# Patient Record
Sex: MALE | Race: WHITE | Employment: OTHER | URBAN - METROPOLITAN AREA
[De-identification: names, ages, dates, MRNs, and addresses within clinical notes are randomized per-mention and may not be internally consistent; named-entity substitution may affect disease eponyms.]

---

## 2017-08-24 ENCOUNTER — APPOINTMENT (OUTPATIENT)
Dept: CT IMAGING | Age: 52
DRG: 660 | End: 2017-08-24
Attending: EMERGENCY MEDICINE
Payer: MEDICARE

## 2017-08-24 ENCOUNTER — ANESTHESIA EVENT (OUTPATIENT)
Dept: SURGERY | Age: 52
DRG: 660 | End: 2017-08-24
Payer: MEDICARE

## 2017-08-24 ENCOUNTER — APPOINTMENT (OUTPATIENT)
Dept: GENERAL RADIOLOGY | Age: 52
DRG: 660 | End: 2017-08-24
Attending: EMERGENCY MEDICINE
Payer: MEDICARE

## 2017-08-24 ENCOUNTER — HOSPITAL ENCOUNTER (INPATIENT)
Age: 52
LOS: 3 days | Discharge: HOME | DRG: 660 | End: 2017-08-27
Attending: EMERGENCY MEDICINE | Admitting: HOSPITALIST
Payer: MEDICARE

## 2017-08-24 DIAGNOSIS — N20.0 KIDNEY STONE ON LEFT SIDE: ICD-10-CM

## 2017-08-24 DIAGNOSIS — R73.9 HYPERGLYCEMIA: ICD-10-CM

## 2017-08-24 DIAGNOSIS — N23 RENAL COLIC ON LEFT SIDE: Primary | ICD-10-CM

## 2017-08-24 PROBLEM — M06.9 RA (RHEUMATOID ARTHRITIS) (HCC): Status: ACTIVE | Noted: 2017-08-24

## 2017-08-24 PROBLEM — N13.2 HYDRONEPHROSIS WITH URINARY OBSTRUCTION DUE TO URETERAL CALCULUS: Status: ACTIVE | Noted: 2017-08-24

## 2017-08-24 PROBLEM — G43.909 MIGRAINE: Status: ACTIVE | Noted: 2017-08-24

## 2017-08-24 PROBLEM — K56.7 ILEUS (HCC): Status: ACTIVE | Noted: 2017-08-24

## 2017-08-24 PROBLEM — E10.9 TYPE 1 DIABETES MELLITUS (HCC): Status: ACTIVE | Noted: 2017-08-24

## 2017-08-24 PROBLEM — F32.A DEPRESSION: Status: ACTIVE | Noted: 2017-08-24

## 2017-08-24 LAB
ALBUMIN SERPL-MCNC: 3.5 G/DL (ref 3.4–5)
ALBUMIN/GLOB SERPL: 0.9 {RATIO} (ref 0.8–1.7)
ALP SERPL-CCNC: 120 U/L (ref 45–117)
ALT SERPL-CCNC: 32 U/L (ref 16–61)
ANION GAP BLD CALC-SCNC: 19 MMOL/L (ref 10–20)
ANION GAP SERPL CALC-SCNC: 6 MMOL/L (ref 3–18)
APPEARANCE UR: CLEAR
AST SERPL-CCNC: 17 U/L (ref 15–37)
BASOPHILS # BLD: 0 K/UL (ref 0–0.06)
BASOPHILS NFR BLD: 0 % (ref 0–2)
BILIRUB SERPL-MCNC: 0.3 MG/DL (ref 0.2–1)
BILIRUB UR QL: NEGATIVE
BUN BLD-MCNC: 16 MG/DL (ref 7–18)
BUN SERPL-MCNC: 16 MG/DL (ref 7–18)
BUN/CREAT SERPL: 15 (ref 12–20)
CA-I BLD-MCNC: 1.2 MMOL/L (ref 1.12–1.32)
CALCIUM SERPL-MCNC: 9.1 MG/DL (ref 8.5–10.1)
CHLORIDE BLD-SCNC: 101 MMOL/L (ref 100–108)
CHLORIDE SERPL-SCNC: 104 MMOL/L (ref 100–108)
CK MB CFR SERPL CALC: 1.4 % (ref 0–4)
CK MB SERPL-MCNC: 1.2 NG/ML (ref 5–25)
CK SERPL-CCNC: 86 U/L (ref 39–308)
CO2 BLD-SCNC: 27 MMOL/L (ref 19–24)
CO2 SERPL-SCNC: 30 MMOL/L (ref 21–32)
COLOR UR: YELLOW
CREAT SERPL-MCNC: 1.09 MG/DL (ref 0.6–1.3)
CREAT UR-MCNC: 0.9 MG/DL (ref 0.6–1.3)
DIFFERENTIAL METHOD BLD: ABNORMAL
EOSINOPHIL # BLD: 0.3 K/UL (ref 0–0.4)
EOSINOPHIL NFR BLD: 3 % (ref 0–5)
ERYTHROCYTE [DISTWIDTH] IN BLOOD BY AUTOMATED COUNT: 13.5 % (ref 11.6–14.5)
EST. AVERAGE GLUCOSE BLD GHB EST-MCNC: 235 MG/DL
GLOBULIN SER CALC-MCNC: 3.9 G/DL (ref 2–4)
GLUCOSE BLD STRIP.AUTO-MCNC: 140 MG/DL (ref 70–110)
GLUCOSE BLD STRIP.AUTO-MCNC: 174 MG/DL (ref 70–110)
GLUCOSE BLD STRIP.AUTO-MCNC: 245 MG/DL (ref 74–106)
GLUCOSE SERPL-MCNC: 265 MG/DL (ref 74–99)
GLUCOSE UR STRIP.AUTO-MCNC: >1000 MG/DL
HBA1C MFR BLD: 9.8 % (ref 4.5–5.6)
HCT VFR BLD AUTO: 42.8 % (ref 36–48)
HCT VFR BLD CALC: 41 % (ref 36–49)
HGB BLD-MCNC: 13.9 G/DL (ref 12–16)
HGB BLD-MCNC: 14.2 G/DL (ref 13–16)
HGB UR QL STRIP: NEGATIVE
KETONES UR QL STRIP.AUTO: NEGATIVE MG/DL
LEUKOCYTE ESTERASE UR QL STRIP.AUTO: NEGATIVE
LIPASE SERPL-CCNC: 36 U/L (ref 73–393)
LYMPHOCYTES # BLD: 0.6 K/UL (ref 0.9–3.6)
LYMPHOCYTES NFR BLD: 7 % (ref 21–52)
MCH RBC QN AUTO: 29.7 PG (ref 24–34)
MCHC RBC AUTO-ENTMCNC: 33.2 G/DL (ref 31–37)
MCV RBC AUTO: 89.5 FL (ref 74–97)
MONOCYTES # BLD: 0.6 K/UL (ref 0.05–1.2)
MONOCYTES NFR BLD: 6 % (ref 3–10)
NEUTS SEG # BLD: 7.6 K/UL (ref 1.8–8)
NEUTS SEG NFR BLD: 84 % (ref 40–73)
NITRITE UR QL STRIP.AUTO: NEGATIVE
PH UR STRIP: 5 [PH] (ref 5–8)
PLATELET # BLD AUTO: 228 K/UL (ref 135–420)
PMV BLD AUTO: 10.7 FL (ref 9.2–11.8)
POTASSIUM BLD-SCNC: 4 MMOL/L (ref 3.5–5.5)
POTASSIUM SERPL-SCNC: 4.4 MMOL/L (ref 3.5–5.5)
PROT SERPL-MCNC: 7.4 G/DL (ref 6.4–8.2)
PROT UR STRIP-MCNC: NEGATIVE MG/DL
RBC # BLD AUTO: 4.78 M/UL (ref 4.7–5.5)
SODIUM BLD-SCNC: 142 MMOL/L (ref 136–145)
SODIUM SERPL-SCNC: 140 MMOL/L (ref 136–145)
SP GR UR REFRACTOMETRY: >1.03 (ref 1–1.03)
TROPONIN I SERPL-MCNC: <0.02 NG/ML (ref 0–0.06)
UROBILINOGEN UR QL STRIP.AUTO: 0.2 EU/DL (ref 0.2–1)
WBC # BLD AUTO: 9.2 K/UL (ref 4.6–13.2)

## 2017-08-24 PROCEDURE — 74011000258 HC RX REV CODE- 258: Performed by: EMERGENCY MEDICINE

## 2017-08-24 PROCEDURE — 93005 ELECTROCARDIOGRAM TRACING: CPT

## 2017-08-24 PROCEDURE — 81003 URINALYSIS AUTO W/O SCOPE: CPT | Performed by: EMERGENCY MEDICINE

## 2017-08-24 PROCEDURE — 83036 HEMOGLOBIN GLYCOSYLATED A1C: CPT | Performed by: HOSPITALIST

## 2017-08-24 PROCEDURE — 85025 COMPLETE CBC W/AUTO DIFF WBC: CPT | Performed by: EMERGENCY MEDICINE

## 2017-08-24 PROCEDURE — 65270000029 HC RM PRIVATE

## 2017-08-24 PROCEDURE — 80047 BASIC METABLC PNL IONIZED CA: CPT

## 2017-08-24 PROCEDURE — 74011636320 HC RX REV CODE- 636/320: Performed by: EMERGENCY MEDICINE

## 2017-08-24 PROCEDURE — 96376 TX/PRO/DX INJ SAME DRUG ADON: CPT

## 2017-08-24 PROCEDURE — 74011250636 HC RX REV CODE- 250/636: Performed by: EMERGENCY MEDICINE

## 2017-08-24 PROCEDURE — 71010 XR CHEST PORT: CPT

## 2017-08-24 PROCEDURE — 99285 EMERGENCY DEPT VISIT HI MDM: CPT

## 2017-08-24 PROCEDURE — 82962 GLUCOSE BLOOD TEST: CPT

## 2017-08-24 PROCEDURE — 82550 ASSAY OF CK (CPK): CPT | Performed by: EMERGENCY MEDICINE

## 2017-08-24 PROCEDURE — 96375 TX/PRO/DX INJ NEW DRUG ADDON: CPT

## 2017-08-24 PROCEDURE — 74000 XR ABD (KUB): CPT

## 2017-08-24 PROCEDURE — 74011250636 HC RX REV CODE- 250/636: Performed by: HOSPITALIST

## 2017-08-24 PROCEDURE — 96374 THER/PROPH/DIAG INJ IV PUSH: CPT

## 2017-08-24 PROCEDURE — 80053 COMPREHEN METABOLIC PANEL: CPT | Performed by: EMERGENCY MEDICINE

## 2017-08-24 PROCEDURE — 83690 ASSAY OF LIPASE: CPT | Performed by: EMERGENCY MEDICINE

## 2017-08-24 PROCEDURE — 74011250637 HC RX REV CODE- 250/637: Performed by: HOSPITALIST

## 2017-08-24 PROCEDURE — 96361 HYDRATE IV INFUSION ADD-ON: CPT

## 2017-08-24 PROCEDURE — 74011000258 HC RX REV CODE- 258: Performed by: UROLOGY

## 2017-08-24 PROCEDURE — 74177 CT ABD & PELVIS W/CONTRAST: CPT

## 2017-08-24 PROCEDURE — 77030027138 HC INCENT SPIROMETER -A

## 2017-08-24 RX ORDER — METOCLOPRAMIDE 10 MG/1
10 TABLET ORAL AS NEEDED
COMMUNITY

## 2017-08-24 RX ORDER — MAGNESIUM SULFATE 100 %
4 CRYSTALS MISCELLANEOUS AS NEEDED
Status: DISCONTINUED | OUTPATIENT
Start: 2017-08-24 | End: 2017-08-27 | Stop reason: HOSPADM

## 2017-08-24 RX ORDER — INSULIN LISPRO 100 [IU]/ML
INJECTION, SOLUTION INTRAVENOUS; SUBCUTANEOUS ONCE
Status: CANCELLED | OUTPATIENT
Start: 2017-08-24 | End: 2017-08-25

## 2017-08-24 RX ORDER — DEXTROSE 50 % IN WATER (D50W) INTRAVENOUS SYRINGE
25-50 AS NEEDED
Status: CANCELLED | OUTPATIENT
Start: 2017-08-24

## 2017-08-24 RX ORDER — INSULIN LISPRO 100 [IU]/ML
INJECTION, SOLUTION INTRAVENOUS; SUBCUTANEOUS
Status: DISCONTINUED | OUTPATIENT
Start: 2017-08-24 | End: 2017-08-27 | Stop reason: HOSPADM

## 2017-08-24 RX ORDER — TRIAZOLAM 0.12 MG/1
0.12 TABLET ORAL
Status: ON HOLD | COMMUNITY
End: 2017-08-26

## 2017-08-24 RX ORDER — HYDROMORPHONE HYDROCHLORIDE 1 MG/ML
1 INJECTION, SOLUTION INTRAMUSCULAR; INTRAVENOUS; SUBCUTANEOUS
Status: COMPLETED | OUTPATIENT
Start: 2017-08-24 | End: 2017-08-24

## 2017-08-24 RX ORDER — MORPHINE SULFATE 4 MG/ML
4 INJECTION, SOLUTION INTRAMUSCULAR; INTRAVENOUS ONCE
Status: COMPLETED | OUTPATIENT
Start: 2017-08-24 | End: 2017-08-24

## 2017-08-24 RX ORDER — LEVOFLOXACIN 500 MG/1
500 TABLET, FILM COATED ORAL EVERY 24 HOURS
Status: DISCONTINUED | OUTPATIENT
Start: 2017-08-25 | End: 2017-08-25

## 2017-08-24 RX ORDER — ENOXAPARIN SODIUM 100 MG/ML
40 INJECTION SUBCUTANEOUS EVERY 24 HOURS
Status: DISCONTINUED | OUTPATIENT
Start: 2017-08-24 | End: 2017-08-27 | Stop reason: HOSPADM

## 2017-08-24 RX ORDER — DEXTROSE 50 % IN WATER (D50W) INTRAVENOUS SYRINGE
25-50 AS NEEDED
Status: DISCONTINUED | OUTPATIENT
Start: 2017-08-24 | End: 2017-08-27 | Stop reason: HOSPADM

## 2017-08-24 RX ORDER — INSULIN GLARGINE 100 [IU]/ML
18 INJECTION, SOLUTION SUBCUTANEOUS DAILY
Status: DISCONTINUED | OUTPATIENT
Start: 2017-08-24 | End: 2017-08-25

## 2017-08-24 RX ORDER — LAMOTRIGINE 100 MG/1
100 TABLET ORAL DAILY
COMMUNITY
End: 2019-02-19

## 2017-08-24 RX ORDER — INSULIN ASPART 100 [IU]/ML
INJECTION, SOLUTION INTRAVENOUS; SUBCUTANEOUS
COMMUNITY

## 2017-08-24 RX ORDER — SODIUM CHLORIDE 450 MG/100ML
100 INJECTION, SOLUTION INTRAVENOUS CONTINUOUS
Status: DISCONTINUED | OUTPATIENT
Start: 2017-08-24 | End: 2017-08-27 | Stop reason: HOSPADM

## 2017-08-24 RX ORDER — ALPRAZOLAM 0.25 MG/1
0.25 TABLET ORAL
Status: DISCONTINUED | OUTPATIENT
Start: 2017-08-24 | End: 2017-08-27 | Stop reason: HOSPADM

## 2017-08-24 RX ORDER — SODIUM CHLORIDE 0.9 % (FLUSH) 0.9 %
5-10 SYRINGE (ML) INJECTION EVERY 8 HOURS
Status: DISCONTINUED | OUTPATIENT
Start: 2017-08-24 | End: 2017-08-27 | Stop reason: HOSPADM

## 2017-08-24 RX ORDER — ONDANSETRON 2 MG/ML
4 INJECTION INTRAMUSCULAR; INTRAVENOUS
Status: DISCONTINUED | OUTPATIENT
Start: 2017-08-24 | End: 2017-08-27 | Stop reason: HOSPADM

## 2017-08-24 RX ORDER — METOCLOPRAMIDE 10 MG/1
10 TABLET ORAL
Status: DISCONTINUED | OUTPATIENT
Start: 2017-08-24 | End: 2017-08-26

## 2017-08-24 RX ORDER — LAMOTRIGINE 100 MG/1
100 TABLET ORAL DAILY
Status: DISCONTINUED | OUTPATIENT
Start: 2017-08-25 | End: 2017-08-27 | Stop reason: HOSPADM

## 2017-08-24 RX ORDER — SODIUM CHLORIDE 0.9 % (FLUSH) 0.9 %
5-10 SYRINGE (ML) INJECTION AS NEEDED
Status: DISCONTINUED | OUTPATIENT
Start: 2017-08-24 | End: 2017-08-27 | Stop reason: HOSPADM

## 2017-08-24 RX ORDER — HYDROMORPHONE HYDROCHLORIDE 1 MG/ML
1 INJECTION, SOLUTION INTRAMUSCULAR; INTRAVENOUS; SUBCUTANEOUS
Status: DISPENSED | OUTPATIENT
Start: 2017-08-24 | End: 2017-08-25

## 2017-08-24 RX ORDER — HYDROMORPHONE HYDROCHLORIDE 2 MG/ML
1-2 INJECTION, SOLUTION INTRAMUSCULAR; INTRAVENOUS; SUBCUTANEOUS
Status: DISCONTINUED | OUTPATIENT
Start: 2017-08-24 | End: 2017-08-26

## 2017-08-24 RX ORDER — KETOROLAC TROMETHAMINE 30 MG/ML
30 INJECTION, SOLUTION INTRAMUSCULAR; INTRAVENOUS
Status: COMPLETED | OUTPATIENT
Start: 2017-08-24 | End: 2017-08-24

## 2017-08-24 RX ORDER — ONDANSETRON 2 MG/ML
4 INJECTION INTRAMUSCULAR; INTRAVENOUS ONCE
Status: DISPENSED | OUTPATIENT
Start: 2017-08-24 | End: 2017-08-24

## 2017-08-24 RX ORDER — VENLAFAXINE HYDROCHLORIDE 37.5 MG/1
37.5 CAPSULE, EXTENDED RELEASE ORAL
Status: DISCONTINUED | OUTPATIENT
Start: 2017-08-25 | End: 2017-08-26

## 2017-08-24 RX ADMIN — METOCLOPRAMIDE 10 MG: 10 TABLET ORAL at 22:19

## 2017-08-24 RX ADMIN — SODIUM CHLORIDE 1000 ML: 900 INJECTION, SOLUTION INTRAVENOUS at 16:59

## 2017-08-24 RX ADMIN — IOPAMIDOL 100 ML: 612 INJECTION, SOLUTION INTRAVENOUS at 12:12

## 2017-08-24 RX ADMIN — ONDANSETRON 4 MG: 2 INJECTION INTRAMUSCULAR; INTRAVENOUS at 21:43

## 2017-08-24 RX ADMIN — Medication 4 MG: at 11:18

## 2017-08-24 RX ADMIN — KETOROLAC TROMETHAMINE 30 MG: 30 INJECTION, SOLUTION INTRAMUSCULAR at 12:52

## 2017-08-24 RX ADMIN — PROMETHAZINE HYDROCHLORIDE 12.5 MG: 25 INJECTION, SOLUTION INTRAMUSCULAR; INTRAVENOUS at 12:55

## 2017-08-24 RX ADMIN — SODIUM CHLORIDE 1000 ML: 900 INJECTION, SOLUTION INTRAVENOUS at 11:18

## 2017-08-24 RX ADMIN — Medication 4 MG: at 12:50

## 2017-08-24 RX ADMIN — PANCRELIPASE 4 CAPSULE: 5000; 17000; 27000 CAPSULE, DELAYED RELEASE ORAL at 22:19

## 2017-08-24 RX ADMIN — Medication 10 ML: at 21:43

## 2017-08-24 RX ADMIN — HYDROMORPHONE HYDROCHLORIDE 2 MG: 2 INJECTION INTRAMUSCULAR; INTRAVENOUS; SUBCUTANEOUS at 18:09

## 2017-08-24 RX ADMIN — HYDROMORPHONE HYDROCHLORIDE 1 MG: 2 INJECTION INTRAMUSCULAR; INTRAVENOUS; SUBCUTANEOUS at 22:19

## 2017-08-24 RX ADMIN — SODIUM CHLORIDE 100 ML/HR: 4.5 INJECTION, SOLUTION INTRAVENOUS at 18:10

## 2017-08-24 RX ADMIN — HYDROMORPHONE HYDROCHLORIDE 1 MG: 1 INJECTION, SOLUTION INTRAMUSCULAR; INTRAVENOUS; SUBCUTANEOUS at 14:02

## 2017-08-24 NOTE — ED NOTES
Pt medicated per MAR. Pt continues to report pain, 8/10. IV phenergan infusing. Dr. Augustus Gabriel aware. Further order to be placed. Pt reports that he is not able to void at this time. Bladder scan performed with result of 277 mL.

## 2017-08-24 NOTE — IP AVS SNAPSHOT
Sara James 
 
 
 [address] [place], [place] [address] 
[phone] Patient: Deonte Landers MRN: [id number] AOV:[id number] You are allergic to the following Allergen Reactions Penicillins Hives Swelling Thimerosal Hives Swelling Recent Documentation Height Weight BMI Smoking Status 1.778 m 67.5 kg 21.35 kg/m2 Former Smoker Unresulted Labs Order Current Status STONE ANALYSIS, URINARY In process Emergency Contacts Name Discharge Info Relation Home Work Mobile Dylon Law [3] [phone] About your hospitalization You were admitted on:  August 24, 2017 You last received care in the:  [address] [place] [address] You were discharged on:  August 27, 2017 Unit phone number:  [phone] Why you were hospitalized Your primary diagnosis was:  Kidney Stone On Left Side Your diagnoses also included:  Hyperglycemia, Renal Colic On Left Side, Type 1 Diabetes Mellitus (Hcc), Hydronephrosis With Urinary Obstruction Due To Ureteral Calculus, Ileus (Hcc), Migraine, Depression, Ra (Rheumatoid Arthritis) ([facility]) Providers Seen During Your Hospitalizations Provider Role Specialty Primary office phone Ramila Singh MD Attending Provider Emergency Medicine [phone] Veronika Jhaveri MD Attending Provider Internal Medicine [phone] Your Primary Care Physician (PCP) Primary Care Physician Office Phone Office Fax Jael Simmons [phone] Follow-up Information Follow up With Details Comments Contact Info Yolanda Curran [facility] [address] 
[phone] Loco Brody MD In 3 days  [address] [facility] [address] 
[phone] Emma Grace MD In 1 week  [address] [facility] [address] 
[phone] Current Discharge Medication List  
  
START taking these medications Dose & Instructions Dispensing Information Comments Morning Noon Evening Bedtime HYDROcodone-acetaminophen 5-325 mg per tablet Commonly known as:  Tyra Iron Your last dose was: Your next dose is:    
   
   
 Dose:  1-2 Tab Take 1-2 Tabs by mouth every four (4) hours as needed. Max Daily Amount: 12 Tabs. Quantity:  10 Tab Refills:  0  
     
   
   
   
  
 levoFLOXacin 250 mg tablet Commonly known as:  Abrahan Herndon Your last dose was: Your next dose is:    
   
   
 Dose:  250 mg Take 1 Tab by mouth daily. Quantity:  4 Tab Refills:  0 CONTINUE these medications which have NOT CHANGED Dose & Instructions Dispensing Information Comments Morning Noon Evening Bedtime CREON 24,000-76,000 -120,000 unit capsule Generic drug:  amylase-lipase-protease Your last dose was: Your next dose is:    
   
   
 Dose:  1 Cap Take 1 Cap by mouth three (3) times daily (with meals). 1 tab with snacks , 2 tab with meals Refills:  0  
     
   
   
   
  
 EFFEXOR XR PO Your last dose was: Your next dose is:    
   
   
 Dose:  300 mg Take 300 mg by mouth once. Refills:  0 LaMICtal 100 mg tablet Generic drug:  lamoTRIgine Your last dose was: Your next dose is:    
   
   
 Dose:  100 mg Take 100 mg by mouth daily. Refills:  0  
     
   
   
   
  
 leflunomide 10 mg tablet Commonly known as:  Korina Garcia Your last dose was: Your next dose is:    
   
   
 Dose:  10 mg Take 10 mg by mouth daily. Refills:  0 LYRICA 150 mg capsule Generic drug:  pregabalin Your last dose was: Your next dose is:    
   
   
 Dose:  150 mg Take 150 mg by mouth three (3) times daily. Indications: Diabetic Peripheral Neuropathy Refills:  0 NovoLOG 100 unit/mL injection Generic drug:  insulin aspart Your last dose was: Your next dose is:    
   
   
 by Continuous Infusion route. Refills:  0 REGLAN 10 mg tablet Generic drug:  metoclopramide HCl Your last dose was: Your next dose is:    
   
   
 Dose:  10 mg Take 10 mg by mouth as needed. Refills:  0  
     
   
   
   
  
 RITUXAN 10 mg/mL injection Generic drug:  riTUXimab Your last dose was: Your next dose is:    
   
   
 by IntraVENous route once. Every 6 months Refills:  0  
     
   
   
   
  
 triazolam 0.25 mg tablet Commonly known as:  Dawit Nery Your last dose was: Your next dose is:    
   
   
 Dose:  0.25 mg Take 0.25 mg by mouth nightly as needed (midnight). Refills:  0  
     
   
   
   
  
 XANAX 0.5 mg tablet Generic drug:  ALPRAZolam  
   
Your last dose was: Your next dose is:    
   
   
 Dose:  0.5 mg Take 0.5 mg by mouth nightly as needed (5 pm). Refills:  0 ZOFRAN (AS HYDROCHLORIDE) 4 mg tablet Generic drug:  ondansetron hcl Your last dose was: Your next dose is:    
   
   
 Dose:  4 mg Take 4 mg by mouth every eight (8) hours as needed. Refills:  0 Where to Get Your Medications Information on where to get these meds will be given to you by the nurse or doctor. ! Ask your nurse or doctor about these medications HYDROcodone-acetaminophen 5-325 mg per tablet  
 levoFLOXacin 250 mg tablet Discharge Instructions Lidya Zepeda. Janneth Kaiser M.D. [address] Office: [phone] Fax:    [phone] PROCEDURE: Procedure(s): 
CYSTOSCOPY,LEFT RETRO PYELOGRAM,LEFT URETEROSCOPY WITH HOLIMIUM LASER FRAGMENTATION OF STONE AND STONE EXTRACTION,STENT PLACEMENT W/C-ARM Notify [facility] IMMEDIATELY if any of the following occur: ? You are unable to urinate. Urgency to urinate is not uncommon. ? You find yourself urinating small frequent amounts associated with severe lower abdominal discomfort. ? Bright red blood with clots in the urine. Some reddish urine is not uncommon and should be treated with increasing the amount of fluids you drink. ? Temperature above 101.5° and / or chills. ? You are nauseous and / or vomiting and you cannot hold down any fluids. ? Your pain is not controlled with the pain medication prescribed. Special Considerations:  
 
? Do not drive for at least 24 hours after the procedure and until you are no longer taking narcotic pain medication and you are able to move and react without hesitation. MEDICATIONS: 
Pain     Norco®     Percocet®   Dilaudid® Antibiotics     Cipro     Ceftin®     Levaquin     Bactrim DS® Urgency     Vesicare® Burning     Pyridium®      Marvene Cap Nausea     Zofran®       Phenergan® Miscellaneous Prescriptions Written on Chart Prescriptions sent Electronically My office will call pt on MONDAY to schedule your first follow-up appointment. Please contact [facility] at [phone] or go to the nearest Emergency Department / Urgent Care facility for any other medical questions or concerns. Discharge Orders None [facility] Announcement We are excited to announce that we are making your provider's discharge notes available to you in [employer]. You will see these notes when they are completed and signed by the physician that discharged you from your recent hospital stay. If you have any questions or concerns about any information you see in [facility], please call the Health Information Department where you were seen or reach out to your Primary Care Provider for more information about your plan of care. Introducing [facility]! Dear Elaine Wang: Thank you for requesting a [employer] account. Our records indicate that you already have an active [employer] account. You can access your account anytime at [email or web address] Did you know that you can access your hospital and ER discharge instructions at any time in [employer]? You can also review all of your test results from your hospital stay or ER visit. Additional Information If you have questions, please visit the Frequently Asked Questions section of the [employer] website at [email or web address] Remember, [employer] is NOT to be used for urgent needs. For medical emergencies, dial 911. Now available from your iPhone and Android! General Information Please provide this summary of care documentation to your next provider. Patient Signature:  ____________________________________________________________ Date:  ____________________________________________________________  
  
Richelle Samse Provider Signature:  ____________________________________________________________ Date:  ____________________________________________________________

## 2017-08-24 NOTE — CONSULTS
[facility]rology Consultation        Patient: Ten Davila MRN: [id number]  SSN: [id number]    YOB: 1965  Age: 46 y.o. Sex: male          Subjective:     Date of Consultation:  August 24, 2017    Referring Physician: Dr. Sabiha Moore    Reason for Consultation:  LEFT ureteral stone    History of Present Illness:   Patient is a 46 y.o.  male who is being seen for a 5.5mm stone in mid left ureter and a lower pole stone left kidney. He was admitted to the hospital for Renal colic on left side  Kidney stone on left side  Hyperglycemia. Pt denies any previous stones. He woke up this morning with severe LEFT flank pain and then had N/V. No fever. Denies hematuria, dysuria frequency urgency. CT scan performed revealed a mid ureteral stone and LEFT renal stone.   I reviewed these findings with the patient and treatment options and he wants     Past Medical History:   Diagnosis Date    Arthritis     BPH (benign prostatic hyperplasia)     Chronic pain     COPD     Depression     Diverticulosis     Gastritis     Gastroparesis     GERD (gastroesophageal reflux disease)     fair control with meds    Hiatal hernia     Hypercholesterolemia     Migraine     Other ill-defined conditions     chronic pancreatitis    Pancreatitis, chronic (HCC)     Psychiatric disorder     emetophobia    Rheumatoid arthritis ([facility] [address]     Vertigo, peripheral       Past Surgical History:   Procedure Laterality Date    ABDOMEN SURGERY PROC UNLISTED      whipple procedure    HX CERVICAL DISKECTOMY  2001    HX ORTHOPAEDIC      left foot surgery x 2    HX ORTHOPAEDIC      joint replacement  right index finger      Family History   Problem Relation Age of Onset    Malignant Hyperthermia Neg Hx     Pseudocholinesterase Deficiency Neg Hx     Delayed Awakening Neg Hx     Post-op Nausea/Vomiting Neg Hx     Emergence Delirium Neg Hx     Post-op Cognitive Dysfunction Neg Hx     Other Neg Hx       Social History Substance Use Topics    Smoking status: Former Smoker     Packs/day: 1.00     Years: 35.00    Smokeless tobacco: Not on file    Alcohol use No     Allergies   Allergen Reactions    Penicillins Hives and Swelling    Thimerosal Hives and Swelling      Prior to Admission medications    Medication Sig Start Date End Date Taking? Authorizing Provider   insulin aspart (NOVOLOG) 100 unit/mL injection by Continuous Infusion route. Yes Jose Live MD   triazolam (HALCION) 0.125 mg tablet Take 0.125 mg by mouth nightly as needed. Yes Jose Live MD   metoclopramide HCl (REGLAN) 10 mg tablet Take 10 mg by mouth Before breakfast, lunch, dinner and at bedtime. Yes Jose Live MD   lamoTRIgine (LAMICTAL) 100 mg tablet Take 100 mg by mouth daily. Yes Jose Live MD   riTUXimab (RITUXAN) 10 mg/mL injection by IntraVENous route once. Every 6 months   Yes Jose Live MD   ZOLPIDEM TARTRATE (AMBIEN PO) Take  by mouth. Jose Live MD   VENLAFAXINE HCL (EFFEXOR XR PO) Take  by mouth. Jose Live MD   ALPRAZolam Huson Leader) 0.5 mg tablet Take  by mouth three (3) times daily as needed. Historical Provider   amylase-lipase-protease (CREON) capsule Take 1 Cap by mouth three (3) times daily (with meals). 1 tab with snacks , 2 tab with meals     Historical Provider   ondansetron hcl (ZOFRAN) 4 mg tablet Take 4 mg by mouth every eight (8) hours as needed. Historical Provider         REVIEW OF SYSTEMS  System Details   Constitutional Chills and fever. ENMT Ear infections and sore throat. Eyes Blurred vision, double vision and eye pain. Respiratory Asthma, chronic cough, dyspnea and wheezing. Cardio Chest pain. GI Constipation. GI Decreased appetite, diarrhea, nausea and vomiting.  Incomplete emptying, Intermittent urinary stream, Slow stream, Urinary straining.     Dysuria, hematuria, pelvic pain, pelvic pressure, pressure, suprapubic pain, urgency, urinary dribbling, urinary frequency and urinary incontinence. Endocrine Cold intolerance, heat intolerance, increased thirst and weight loss. Neuro Headache and tremors. Psych Anxiety and depression. Integumentary Itching skin and rash. MS Back pain and joint pain. Hema/Lymph Easy bleeding. Objective:     Patient Vitals for the past 8 hrs:   BP Temp Pulse Resp SpO2 Height Weight   17 1400 129/74 - 77 15 98 % - -   17 1345 132/73 - 75 15 98 % - -   17 1330 131/76 - 70 16 96 % - -   17 1315 138/79 - 65 19 97 % - -   17 1300 138/76 - 67 26 100 % - -   17 1245 129/66 - 77 20 100 % - -   17 1034 121/70 97.5 °F (36.4 °C) 79 16 99 % 5' 10\" (1.778 m) 65.8 kg (145 lb)     Temp (24hrs), Av.5 °F (36.4 °C), Min:97.5 °F (36.4 °C), Max:97.5 °F (36.4 °C)      Intake and Output:        Physical Exam:  Constitutional Well developed. Neck Exam Inspection - Normal. Palpation - Normal. Thyroid gland - Normal.   Respiratory Inspection - No labored breathing   Abdomen No abdominal tenderness. No hepatic enlargement. No splenic enlargement. No hernia. Genitourinary No Suprapubic tenderness. No CVA tenderness. No Flank mass   Extremity No Edema. Neurological Level of consciousness - Normal. Orientation - Normal.   Psychiatric Oriented to time, place, person and situation. Appropriate mood and affect.      Lab/Data Reviewed:  BMP:   Lab Results   Component Value Date/Time     2017 11:00 AM    K 4.4 2017 11:00 AM     2017 11:00 AM    CO2 30 2017 11:00 AM    AGAP 6 2017 11:00 AM     (H) 2017 11:00 AM    BUN 16 2017 11:00 AM    CREA 1.09 2017 11:00 AM    GFRAA >60 2017 11:00 AM    GFRNA >60 2017 11:00 AM     CBC:   Lab Results   Component Value Date/Time    WBC 9.2 2017 11:00 AM    HGB 14.2 2017 11:00 AM    HCT 42.8 2017 11:00 AM     2017 11:00 AM       Assessment:     Active Problems:    Hyperglycemia (4/58/6480)      Renal colic on left side (6/86/8445)      Kidney stone on left side (8/24/2017)        Impression:  46 yr old male with multiple medical problems and a left 5.5mm ureteral stone and renal stone. He is scheduled for LEFT ureteroscopic stone extraction tomorrow. All risks including pain infection bleeding ureteral injury and need for stent reviewed and they agreed    Plan:     1. NPO after MN for OR tomorrow  2. IV fluids'  3. IV pain medications  4.  Strain all urine    Signed By: Yoli Chang MD                         August 24, 2017

## 2017-08-24 NOTE — ROUTINE PROCESS
TRANSFER - OUT REPORT:    Bedside and verbal report given to To Aguirre RN on Gina Alfaro  being transferred to [facility] for routine progression of care       Report consisted of patients Situation, Background, Assessment and   Recommendations(SBAR). Information from the following report(s) SBAR, ED Summary, [facility] and Recent Results was reviewed with the receiving nurse. Lines:   Peripheral IV 08/24/17 Right Antecubital (Active)   Site Assessment Clean, dry, & intact 8/24/2017 11:00 AM   Phlebitis Assessment 0 8/24/2017 11:00 AM   Infiltration Assessment 0 8/24/2017 11:00 AM   Dressing Status Clean, dry, & intact 8/24/2017 11:00 AM   Dressing Type Tape;Transparent 8/24/2017 11:00 AM   Hub Color/Line Status Pink;Capped;Flushed 8/24/2017 11:00 AM   Action Taken Blood drawn 8/24/2017 11:00 AM   Alcohol Cap Used Yes 8/24/2017 11:00 AM        Opportunity for questions and clarification was provided.       Patient transported with:   [employer]

## 2017-08-24 NOTE — ED TRIAGE NOTES
Pt states woke up this am with left side abd pain radiating to left groin area;  Vomiting several times this morning (10);

## 2017-08-24 NOTE — H&P
History & Physical    Patient: Anuel Gonzalez MRN: [id number]  CSN: [id number]    YOB: 1965  Age: 46 y.o. Sex: male      DOA: 8/24/2017  Primary Care Provider:  Willis Lou MD      Assessment/Plan     Hospital Problems  Date Reviewed: 11/9/2012          Codes Class Noted POA    Hyperglycemia ICD-10-CM: R73.9  ICD-9-CM: 790.29  8/24/2017 Unknown        Renal colic on left side [id number]-[id number]: N23  ICD-9-CM: 788.0  8/24/2017 Unknown        * (Principal)Kidney stone on left side ICD-10-CM: N20.0  ICD-9-CM: 592.0  8/24/2017 Unknown        Type 1 diabetes mellitus ([facility] ICD-10-CM: E10.9  ICD-9-CM: 250.01  8/24/2017 Unknown        Hydronephrosis with urinary obstruction due to ureteral calculus ICD-10-CM: N13.2  ICD-9-CM: 592.1, 661  8/24/2017 Unknown        Ileus ([facility] ICD-10-CM: K56.7  ICD-9-CM: 560.1  8/24/2017 Unknown        Migraine ICD-10-CM: Z03.673  ICD-9-CM: 346.90  8/24/2017 Unknown        Depression ICD-10-CM: F32.9  ICD-9-CM: 566  8/24/2017 Unknown        RA (rheumatoid arthritis) ([facility] ICD-10-CM: M06.9  ICD-9-CM: 714.0  8/24/2017 Unknown                Admit to medical   1.  hydronephrosis left with urethra   Urology on board, will have procedure tomorrow   Npo after midnight   2. Renal stone   Pain control, hydration   3. Ileus   Clear diet,  potentially relating to left hydroureter or obstruction. 4. DM type I  Acquired after whipple procedure in 2014    Hold insulin pump due to the procedure  Use 0.925 U/hr basal, will give insulin 18 units, ssi   5. Lung nodule   6 mm, need repeat ct chest in 6-12 month    6. Migraine and depression   Continue home medication      full code     DVT : lovenox. ppi proph  CC: left val pain        HPI:     Aunel Gonzalez is a 46 y.o. male who hx of depression/whipple procedure 2014 -type I DM came to ed due to left flank pain while waking up today. It is sharp and radiated to LLQ, associated with n/v.  Denies any slurred speech/headache/cp/blurred vission/d/c/palpitation/gait change/bleeding. Denies smoking/ any alcohol or drug use. Ct abdomen : Left ureteral obstructing 5 mm calculus, positioned above the left iliac crossing result mild to moderate left-sided hydronephrosis, nephroureteral  induration and a small amount of perinephric fluid. He was given iv dilaudid in ed and urology was called. Urology wants to patient to be admitted to medical team due to DM.        Visit Vitals    /75 (BP 1 Location: Left arm, BP Patient Position: At rest)    Pulse 76    Temp 98.4 °F (36.9 °C)    Resp 19    Ht 5' 10\" (1.778 m)    Wt 65.8 kg (145 lb)    SpO2 100%    BMI 20.81 kg/m2      O2 Device: Room air      Past Medical History:   Diagnosis Date    Arthritis     BPH (benign prostatic hyperplasia)     Chronic pain     COPD     Depression     Diverticulosis     Gastritis     Gastroparesis     GERD (gastroesophageal reflux disease)     fair control with meds    Hiatal hernia     Hypercholesterolemia     Migraine     Other ill-defined conditions     chronic pancreatitis    Pancreatitis, chronic (HCC)     Psychiatric disorder     emetophobia    RA (rheumatoid arthritis) ([facility] 8/24/2017    Rheumatoid arthritis (HCC)     Type 1 diabetes mellitus ([facility] 8/24/2017    Vertigo, peripheral        Past Surgical History:   Procedure Laterality Date    ABDOMEN SURGERY PROC UNLISTED      whipple procedure    HX CERVICAL DISKECTOMY  2001    HX ORTHOPAEDIC      left foot surgery x 2    HX ORTHOPAEDIC      joint replacement  right index finger       Family History   Problem Relation Age of Onset    Malignant Hyperthermia Neg Hx     Pseudocholinesterase Deficiency Neg Hx     Delayed Awakening Neg Hx     Post-op Nausea/Vomiting Neg Hx     Emergence Delirium Neg Hx     Post-op Cognitive Dysfunction Neg Hx     Other Neg Hx        Social History     Social History    Marital status:      Spouse name: N/A    Number of children: N/A    Years of education: N/A     Social History Main Topics    Smoking status: Former Smoker     Packs/day: 1.00     Years: 35.00    Smokeless tobacco: None    Alcohol use No    Drug use: No    Sexual activity: Not Asked     Other Topics Concern    None     Social History Narrative       Prior to Admission medications    Medication Sig Start Date End Date Taking? Authorizing Provider   insulin aspart (NOVOLOG) 100 unit/mL injection by Continuous Infusion route. Yes Jose Live MD   triazolam (HALCION) 0.125 mg tablet Take 0.125 mg by mouth nightly as needed. Yes Jose Live MD   metoclopramide HCl (REGLAN) 10 mg tablet Take 10 mg by mouth Before breakfast, lunch, dinner and at bedtime. Yes Jose Live MD   lamoTRIgine (LAMICTAL) 100 mg tablet Take 100 mg by mouth daily. Yes Jose Live MD   riTUXimab (RITUXAN) 10 mg/mL injection by IntraVENous route once. Every 6 months   Yes Jose Live MD   ZOLPIDEM TARTRATE (AMBIEN PO) Take  by mouth. Jose Lvie MD   VENLAFAXINE HCL (EFFEXOR XR PO) Take  by mouth. Jose Live MD   ALPRAZolam Margorie Clunes) 0.5 mg tablet Take  by mouth three (3) times daily as needed. Historical Provider   amylase-lipase-protease (CREON) capsule Take 1 Cap by mouth three (3) times daily (with meals). 1 tab with snacks , 2 tab with meals     Historical Provider   ondansetron hcl (ZOFRAN) 4 mg tablet Take 4 mg by mouth every eight (8) hours as needed. Historical Provider       Allergies   Allergen Reactions    Penicillins Hives and Swelling    Thimerosal Hives and Swelling       Review of Systems  Gen: No fever, chills, malaise, weight loss/gain. Heent: No headache, rhinorrhea, epistaxis, ear pain, hearing loss, sinus pain, neck pain/stiffness, sore throat. Heart: No chest pain, palpitations, CUMMINGS, pnd, or orthopnea. Resp: No cough, hemoptysis, wheezing and shortness of breath. GI: nausea, vomiting, no diarrhea, constipation, melena or hematochezia.  Left abdomen pain   : left flank pain   Derm: No rash, new skin lesion or pruritis. Musc/skeletal: no bone or joint complains. Vasc: No edema, cyanosis or claudication. Endo: No heat/cold intolerance, no polyuria,polydipsia or polyphagia. Neuro: No unilateral weakness, numbness, tingling. No seizures. Heme: No easy bruising or bleeding. Physical Exam:     Physical Exam:  Visit Vitals    /75 (BP 1 Location: Left arm, BP Patient Position: At rest)    Pulse 76    Temp 98.4 °F (36.9 °C)    Resp 19    Ht 5' 10\" (1.778 m)    Wt 65.8 kg (145 lb)    SpO2 100%    BMI 20.81 kg/m2      O2 Device: Room air    Temp (24hrs), Av °F (36.7 °C), Min:97.5 °F (36.4 °C), Max:98.4 °F (36.9 °C)             General:  Awake, cooperative, no distress. Head:  Normocephalic, without obvious abnormality, atraumatic. Eyes:  Conjunctivae/corneas clear, sclera anicteric, PERRL, EOMs intact. Nose: Nares normal. No drainage or sinus tenderness. Throat: Lips, mucosa, and tongue normal. .   Neck: Supple, symmetrical, trachea midline, no adenopathy. Lungs:   Clear to auscultation bilaterally. Heart:  Regular rate and rhythm, S1, S2 normal, no murmur, click, rub or gallop. Abdomen: Soft, non-tender. Bowel sounds normal. No masses,  No organomegaly. Hernia noted, no tenderness, left cva tenderness    Extremities: Extremities normal, atraumatic, no cyanosis or edema. Pulses: 2+ and symmetric all extremities. Skin: Skin color-pink, texture, turgor normal. No rashes or lesions. Capillary refill normal    Neurologic: CNII-XII intact. No focal motor or sensory deficit.        Labs Reviewed:    BMP:   Lab Results   Component Value Date/Time     2017 11:00 AM    K 4.4 2017 11:00 AM     2017 11:00 AM    CO2 30 2017 11:00 AM    AGAP 6 2017 11:00 AM     (H) 2017 11:00 AM    BUN 16 2017 11:00 AM    CREA 1.09 2017 11:00 AM    GFRAA >60 2017 11:00 AM    GFRNA >60 08/24/2017 11:00 AM     CMP:   Lab Results   Component Value Date/Time     08/24/2017 11:00 AM    K 4.4 08/24/2017 11:00 AM     08/24/2017 11:00 AM    CO2 30 08/24/2017 11:00 AM    AGAP 6 08/24/2017 11:00 AM     (H) 08/24/2017 11:00 AM    BUN 16 08/24/2017 11:00 AM    CREA 1.09 08/24/2017 11:00 AM    GFRAA >60 08/24/2017 11:00 AM    GFRNA >60 08/24/2017 11:00 AM    CA 9.1 08/24/2017 11:00 AM    ALB 3.5 08/24/2017 11:00 AM    TP 7.4 08/24/2017 11:00 AM    GLOB 3.9 08/24/2017 11:00 AM    AGRAT 0.9 08/24/2017 11:00 AM    SGOT 17 08/24/2017 11:00 AM    ALT 32 08/24/2017 11:00 AM     CBC:   Lab Results   Component Value Date/Time    WBC 9.2 08/24/2017 11:00 AM    HGB 14.2 08/24/2017 11:00 AM    HCT 42.8 08/24/2017 11:00 AM     08/24/2017 11:00 AM     All Cardiac Markers in the last 24 hours:   Lab Results   Component Value Date/Time    CPK 86 08/24/2017 11:00 AM    CKMB 1.2 08/24/2017 11:00 AM    CKND1 1.4 08/24/2017 11:00 AM    TROIQ <0.02 08/24/2017 11:00 AM     Recent Glucose Results:   Lab Results   Component Value Date/Time     (H) 08/24/2017 11:00 AM     ABG: No results found for: PH, PHI, PCO2, PCO2I, PO2, PO2I, HCO3, HCO3I, FIO2, FIO2I  COAGS: No results found for: APTT, PTP, INR  Liver Panel:   Lab Results   Component Value Date/Time    ALB 3.5 08/24/2017 11:00 AM    TP 7.4 08/24/2017 11:00 AM    GLOB 3.9 08/24/2017 11:00 AM    AGRAT 0.9 08/24/2017 11:00 AM    SGOT 17 08/24/2017 11:00 AM    ALT 32 08/24/2017 11:00 AM     (H) 08/24/2017 11:00 AM     Pancreatic Markers:   Lab Results   Component Value Date/Time    LPSE 36 (L) 08/24/2017 11:00 AM       Procedures/imaging: see electronic medical records for all procedures/Xrays and details which were not copied into this note but were reviewed prior to creation of Johann Young MD, Internal Medicine     CC: Candice Soto MD

## 2017-08-24 NOTE — ED PROVIDER NOTES
[address]  EMERGENCY DEPARTMENT HISTORY AND PHYSICAL EXAM       Date: 8/24/2017   Patient Name: Betina Hawkins   YOB: 1965  Medical Record Number: [id number]    History of Presenting Illness     Chief Complaint   Patient presents with    Groin Pain    Vomiting        History Provided By:  patient    Additional History: 10:48 AM  Betina Hawkins is a 46 y.o. male with a PMHx of IDDM who presents to the emergency department C/O 6/10 left flank and LLQ pain which he woke up with this morning. Associated symptoms include nausea, vomiting (10 times). Hx of Whipple surgery at [facility] (2014). Patient denies urgency, fever, testicle pain, dizziness, CP, SOB and any other symptoms or complaints. Pt is followed by Dr. Leti José.     Primary Care Provider: Uma Haddad MD   Specialist:    Past History     Past Medical History:   Past Medical History:   Diagnosis Date    Arthritis     BPH (benign prostatic hyperplasia)     Chronic pain     COPD     Depression     Diverticulosis     Gastritis     Gastroparesis     GERD (gastroesophageal reflux disease)     fair control with meds    Hiatal hernia     Hypercholesterolemia     Migraine     Other ill-defined conditions     chronic pancreatitis    Pancreatitis, chronic (HCC)     Psychiatric disorder     emetophobia    Rheumatoid arthritis ([address]     Vertigo, peripheral         Past Surgical History:   Past Surgical History:   Procedure Laterality Date    ABDOMEN SURGERY PROC UNLISTED      whipple procedure    HX CERVICAL DISKECTOMY  2001    HX ORTHOPAEDIC      left foot surgery x 2    HX ORTHOPAEDIC      joint replacement  right index finger        Family History:   Family History   Problem Relation Age of Onset    Malignant Hyperthermia Neg Hx     Pseudocholinesterase Deficiency Neg Hx     Delayed Awakening Neg Hx     Post-op Nausea/Vomiting Neg Hx     Emergence Delirium Neg Hx     Post-op Cognitive Dysfunction Neg Hx     Other Neg Hx         Social History:   Social History   Substance Use Topics    Smoking status: Former Smoker     Packs/day: 1.00     Years: 35.00    Smokeless tobacco: None    Alcohol use No        Allergies: Allergies   Allergen Reactions    Penicillins Hives and Swelling    Thimerosal Hives and Swelling        Review of Systems   Review of Systems   Constitutional: Negative for fever. Respiratory: Negative for shortness of breath. Cardiovascular: Negative for chest pain. Gastrointestinal: Positive for abdominal pain (Llq), nausea and vomiting. Negative for diarrhea. Genitourinary: Positive for flank pain (left). Negative for dysuria, testicular pain and urgency. Musculoskeletal: Negative for back pain and neck pain. Neurological: Negative for dizziness. All other systems reviewed and are negative. Physical Exam  Vitals:    08/24/17 1315 08/24/17 1330 08/24/17 1345 08/24/17 1400   BP: 138/79 131/76 132/73 129/74   Pulse: 65 70 75 77   Resp: 19 16 15 15   Temp:       SpO2: 97% 96% 98% 98%   Weight:       Height:           Physical Exam   Constitutional: He is oriented to person, place, and time. He appears well-developed and well-nourished. He appears distressed. Active retching   HENT:   Head: Normocephalic and atraumatic. Right Ear: External ear normal.   Left Ear: External ear normal.   Nose: Nose normal.   OP dry   Eyes: Conjunctivae and EOM are normal. Pupils are equal, round, and reactive to light. Neck: Normal range of motion. Neck supple. No JVD present. No tracheal deviation present. No thyromegaly present. Cardiovascular: Normal rate, regular rhythm, normal heart sounds and intact distal pulses. Exam reveals no gallop and no friction rub. No murmur heard. Pulmonary/Chest: Effort normal and breath sounds normal. No respiratory distress. He has no wheezes. He has no rales. Abdominal: Soft.  Bowel sounds are normal. He exhibits no distension and no mass. There is tenderness (left flank) in the left lower quadrant. There is no rebound and no guarding. Musculoskeletal: Normal range of motion. Right lower leg: He exhibits no edema. Left lower leg: He exhibits no edema. Neurological: He is alert and oriented to person, place, and time. He has normal reflexes. No cranial nerve deficit. Skin: Skin is warm and dry. No rash noted. Psychiatric: He has a normal mood and affect. His behavior is normal.   Nursing note and vitals reviewed. Diagnostic Study Results     Labs -      Recent Results (from the past 12 hour(s))   CBC WITH AUTOMATED DIFF    Collection Time: 08/24/17 11:00 AM   Result Value Ref Range    WBC 9.2 4.6 - 13.2 K/uL    RBC 4.78 4.70 - 5.50 M/uL    HGB 14.2 13.0 - 16.0 g/dL    HCT 42.8 36.0 - 48.0 %    MCV 89.5 74.0 - 97.0 FL    MCH 29.7 24.0 - 34.0 PG    MCHC 33.2 31.0 - 37.0 g/dL    RDW 13.5 11.6 - 14.5 %    PLATELET 400 103 - 900 K/uL    MPV 10.7 9.2 - 11.8 FL    NEUTROPHILS 84 (H) 40 - 73 %    LYMPHOCYTES 7 (L) 21 - 52 %    MONOCYTES 6 3 - 10 %    EOSINOPHILS 3 0 - 5 %    BASOPHILS 0 0 - 2 %    ABS. NEUTROPHILS 7.6 1.8 - 8.0 K/UL    ABS. LYMPHOCYTES 0.6 (L) 0.9 - 3.6 K/UL    ABS. MONOCYTES 0.6 0.05 - 1.2 K/UL    ABS. EOSINOPHILS 0.3 0.0 - 0.4 K/UL    ABS. BASOPHILS 0.0 0.0 - 0.06 K/UL    DF AUTOMATED     METABOLIC PANEL, COMPREHENSIVE    Collection Time: 08/24/17 11:00 AM   Result Value Ref Range    Sodium 140 136 - 145 mmol/L    Potassium 4.4 3.5 - 5.5 mmol/L    Chloride 104 100 - 108 mmol/L    CO2 30 21 - 32 mmol/L    Anion gap 6 3.0 - 18 mmol/L    Glucose 265 (H) 74 - 99 mg/dL    BUN 16 7.0 - 18 MG/DL    Creatinine 1.09 0.6 - 1.3 MG/DL    BUN/Creatinine ratio 15 12 - 20      GFR est AA >60 >60 ml/min/1.73m2    GFR est non-AA >60 >60 ml/min/1.73m2    Calcium 9.1 8.5 - 10.1 MG/DL    Bilirubin, total 0.3 0.2 - 1.0 MG/DL    ALT (SGPT) 32 16 - 61 U/L    AST (SGOT) 17 15 - 37 U/L    Alk.  phosphatase 120 (H) 45 - 117 U/L Protein, total 7.4 6.4 - 8.2 g/dL    Albumin 3.5 3.4 - 5.0 g/dL    Globulin 3.9 2.0 - 4.0 g/dL    A-G Ratio 0.9 0.8 - 1.7     CARDIAC PANEL,(CK, CKMB & TROPONIN)    Collection Time: 08/24/17 11:00 AM   Result Value Ref Range    CK 86 39 - 308 U/L    CK - MB 1.2 <3.6 ng/ml    CK-MB Index 1.4 0.0 - 4.0 %    Troponin-I, Qt. <0.02 0.00 - 0.06 NG/ML   LIPASE    Collection Time: 08/24/17 11:00 AM   Result Value Ref Range    Lipase 36 (L) 73 - 393 U/L   EKG, 12 LEAD, INITIAL    Collection Time: 08/24/17 11:32 AM   Result Value Ref Range    Ventricular Rate 66 BPM    Atrial Rate 66 BPM    P-R Interval 202 ms    QRS Duration 106 ms    Q-T Interval 404 ms    QTC Calculation (Bezet) 423 ms    Calculated P Axis 42 degrees    Calculated R Axis 27 degrees    Calculated T Axis 62 degrees    Diagnosis       Normal sinus rhythm  RSR' or QR pattern in V1 suggests right ventricular conduction delay  Septal infarct , age undetermined  Abnormal ECG  When compared with ECG of 31-OCT-2012 10:39,  Septal infarct is now present     POC CHEM8    Collection Time: 08/24/17 11:42 AM   Result Value Ref Range    CO2, POC 27 (H) 19 - 24 MMOL/L    Glucose,  (H) 74 - 106 MG/DL    BUN, POC 16 7 - 18 MG/DL    Creatinine, POC 0.9 0.6 - 1.3 MG/DL    GFRAA, POC >60 >60 ml/min/1.73m2    GFRNA, POC >60 >60 ml/min/1.73m2    Sodium,  136 - 145 MMOL/L    Potassium, POC 4.0 3.5 - 5.5 MMOL/L    Calcium, ionized (POC) 1.20 1. 12 - 1.32 MMOL/L    Chloride,  100 - 108 MMOL/L    Anion gap, POC 19 10 - 20      Hematocrit, POC 41 36 - 49 %    Hemoglobin, POC 13.9 12 - 16 G/DL       Radiologic Studies -  The following have been ordered and reviewed:  CT ABD PELV W CONT   Final Result   IMPRESSION:        1. Left ureteral obstructing 5 mm calculus, positioned above the left iliac  crossing result mild to moderate left-sided hydronephrosis, nephroureteral  induration and a small amount of perinephric fluid.        2.  CT findings that are suggestive of small bowel ileus, potentially relating to  left hydroureter or obstruction.     3. Nonobstructive left renal calculus.     4. Lateral right lower lung subpleural 6 mm pulmonary nodule, please see  recommended follow-up as directed below.     5. CT findings suggestive of of chronic pancreatitis.     6. Prior cholecystectomy with pneumobilia, likely related to sphincterotomy. Recommend clinical correlation with patient's surgical history. As read by the radiologist.     XR CHEST PORT   Final Result   IMPRESSION:     1. Minimal left diaphragmatic opacity favoring atelectasis.       As read by the radiologist.     XR ABD (KUB)   Final Result   IMPRESSION:     1. Small bowel ileus versus developing obstruction. 2. CT findings may represent constipation in the appropriate clinical setting. As read by the radiologist.       EKG interpretation: (Preliminary)  Rhythm: NSR Rate (approx.): 66 bpm; Q wave V2; ST elements V2 V3; ERR 'V1+V2  EKG read by Arlyn Cook MD at 11:35 AM    Medical Decision Making   I am the first provider for this patient. I reviewed the vital signs, available nursing notes, past medical history, past surgical history, family history and social history. Vital Signs-Reviewed the patient's vital signs. Patient Vitals for the past 12 hrs:   Temp Pulse Resp BP SpO2   08/24/17 1400 - 77 15 129/74 98 %   08/24/17 1345 - 75 15 132/73 98 %   08/24/17 1330 - 70 16 131/76 96 %   08/24/17 1315 - 65 19 138/79 97 %   08/24/17 1300 - 67 26 138/76 100 %   08/24/17 1245 - 77 20 129/66 100 %   08/24/17 1034 97.5 °F (36.4 °C) 79 16 121/70 99 %       Pulse Oximetry Analysis - Normal 99% on RA     Old Medical Records: Old medical records. Provider Notes:  INITIAL CLINICAL IMPRESSION and PLANS:  The patient presents with the primary complaint(s) of: Left flank pain and LLQ pain.  The presentation, to include historical aspects and clinical findings are consistent with the DX of Renal Colic. However, other possible DX's to consider as primary, associated with, or exacerbated by include: UTI, SBO, diverticulitis, colitis    Considering the above, my initial management plan to evaluate and therapeutic interventions include the following and as noted in the orders:    1. Labs: CMP, CBC, UA, POC Chem 8, Cardiac Panel, Lipase  2. Imaging: EKG, XR Abd, CT Abd, CXR      Procedures:   Procedures    ED Course:  10:48 AM  Initial assessment performed. The patients presenting problems have been discussed, and they are in agreement with the care plan formulated and outlined with them. I have encouraged them to ask questions as they arise throughout their visit. CONSULT NOTE:   2:10 PM  Arlyn Cook MD spoke with Loco rBody MD   Specialty: Urology  Discussed pt's hx, disposition, and available diagnostic and imaging results over the telephone. Reviewed care plans. Consulting physician agrees with plans as outlined. Advises admitting  to medicine for hyperglycemia. CONSULT NOTE:   2:29 PM  Arlyn Cook MD spoke with Veronika Jhaveri MD   Specialty: Hospitalist  Discussed pt's hx, disposition, and available diagnostic and imaging results. Reviewed care plans. Consulting physician agrees with plans as outlined. Agrees to admit pt to Medical.   Written by Karla Smith ED Scribe, as dictated by Arlyn Cook MD    DISCUSSION:  Patient was stable in the ED with severe left flank pain and vomiting, improved after Toradol and serial doses of morphine, Dilaudid, Zofran and phenergan. Patient was given IV NS boluses. Labs remarkable for hyperglycemia. CT abdomen and pelvis showed an obstructing left ureteral stone with left perinephric stranding. Case discussed with  Urology and hospitalist who agree with admission. ADMISSION NOTE:  2:29 PM  Patient is being admitted to Medical by Veronika Jhaveri MD. The results of their tests and reasons for their admission have been discussed with them and/or available family. They convey agreement and understanding for the need to be admitted and for their admission diagnosis. Written by Andres Gilbert ED Scribe, as dictated by Kelly Bauer MD.    CONDITIONS ON ADMISSION:  Deep Vein Thrombosis is not present at the time of admission. Thrombosis is not present at the time of admission. Urinary Tract Infection is not present at the time of admission. Pneumonia is not present at the time of admission. MRSA is not present at the time of admission. Wound infection is not present at the time of admission. Pressure Ulcer is not present at the time of admission. CLINICAL IMPRESSION    1. Renal colic on left side    2. Kidney stone on left side    3. Hyperglycemia    4. Insulin dependent diabetes mellitus (HCC)          Medications Given in the ED:  Medications   sodium chloride (NS) flush 5-10 mL (not administered)   sodium chloride (NS) flush 5-10 mL (not administered)   ondansetron (ZOFRAN) injection 4 mg (0 mg IntraVENous Held 8/24/17 1120)   HYDROmorphone (PF) (DILAUDID) injection 1 mg (not administered)   sodium chloride 0.9 % bolus infusion 1,000 mL (not administered)   sodium chloride 0.9 % bolus infusion 1,000 mL (0 mL IntraVENous IV Completed 8/24/17 1230)   morphine injection 4 mg (4 mg IntraVENous Given 8/24/17 1118)   iopamidol (ISOVUE 300) 61 % contrast injection 100 mL (100 mL IntraVENous Given 8/24/17 1212)   morphine injection 4 mg (4 mg IntraVENous Given 8/24/17 1250)   promethazine (PHENERGAN) 12.5 mg in 0.9% sodium chloride 50 mL IVPB (0 mg IntraVENous IV Completed 8/24/17 1310)   ketorolac (TORADOL) injection 30 mg (30 mg IntraVENous Given 8/24/17 1252)   HYDROmorphone (PF) (DILAUDID) injection 1 mg (1 mg IntraVENous Given 8/24/17 1402)       _______________________________   Attestations: This note is prepared by Andres Gilbert, acting as a Scribe for Kelly Bauer MD on 10:48 AM on 8/24/2017 .     Kelly Bauer MD: The scribe's documentation has been prepared under my direction and personally reviewed by me in its entirety.   _______________________________

## 2017-08-24 NOTE — ED NOTES
Pt appears sleepy at this time, resting quietly in stretcher, alert to verbal stimulation. Pt reports pain as documented. Urinal provided for pt to provide specimen, verbalizes understanding. Pt in NAD at this time. Family at bedside. Pt on CCM, call light within reach.

## 2017-08-24 NOTE — ED NOTES
Rounded on pt. Pt in obvious distress, sitting in stretcher, holding abdomen and rocking back and forth. Pt tachypneic between 28-33. Wife at bedside. MD aware and orders placed for pain medication.

## 2017-08-25 ENCOUNTER — APPOINTMENT (OUTPATIENT)
Dept: GENERAL RADIOLOGY | Age: 52
DRG: 660 | End: 2017-08-25
Attending: UROLOGY
Payer: MEDICARE

## 2017-08-25 ENCOUNTER — ANESTHESIA (OUTPATIENT)
Dept: SURGERY | Age: 52
DRG: 660 | End: 2017-08-25
Payer: MEDICARE

## 2017-08-25 LAB
ANION GAP SERPL CALC-SCNC: 7 MMOL/L (ref 3–18)
BASOPHILS # BLD: 0 K/UL (ref 0–0.06)
BASOPHILS NFR BLD: 0 % (ref 0–2)
BUN SERPL-MCNC: 16 MG/DL (ref 7–18)
BUN/CREAT SERPL: 13 (ref 12–20)
CALCIUM SERPL-MCNC: 7.9 MG/DL (ref 8.5–10.1)
CHLORIDE SERPL-SCNC: 107 MMOL/L (ref 100–108)
CO2 SERPL-SCNC: 27 MMOL/L (ref 21–32)
CREAT SERPL-MCNC: 1.2 MG/DL (ref 0.6–1.3)
DIFFERENTIAL METHOD BLD: ABNORMAL
EOSINOPHIL # BLD: 0.4 K/UL (ref 0–0.4)
EOSINOPHIL NFR BLD: 5 % (ref 0–5)
ERYTHROCYTE [DISTWIDTH] IN BLOOD BY AUTOMATED COUNT: 13.9 % (ref 11.6–14.5)
GLUCOSE BLD STRIP.AUTO-MCNC: 110 MG/DL (ref 70–110)
GLUCOSE BLD STRIP.AUTO-MCNC: 114 MG/DL (ref 70–110)
GLUCOSE BLD STRIP.AUTO-MCNC: 117 MG/DL (ref 70–110)
GLUCOSE BLD STRIP.AUTO-MCNC: 149 MG/DL (ref 70–110)
GLUCOSE BLD STRIP.AUTO-MCNC: 172 MG/DL (ref 70–110)
GLUCOSE BLD STRIP.AUTO-MCNC: 189 MG/DL (ref 70–110)
GLUCOSE SERPL-MCNC: 196 MG/DL (ref 74–99)
HCT VFR BLD AUTO: 38.4 % (ref 36–48)
HGB BLD-MCNC: 12.5 G/DL (ref 13–16)
LYMPHOCYTES # BLD: 1 K/UL (ref 0.9–3.6)
LYMPHOCYTES NFR BLD: 13 % (ref 21–52)
MAGNESIUM SERPL-MCNC: 1.7 MG/DL (ref 1.6–2.6)
MCH RBC QN AUTO: 29.6 PG (ref 24–34)
MCHC RBC AUTO-ENTMCNC: 32.6 G/DL (ref 31–37)
MCV RBC AUTO: 90.8 FL (ref 74–97)
MONOCYTES # BLD: 0.8 K/UL (ref 0.05–1.2)
MONOCYTES NFR BLD: 11 % (ref 3–10)
NEUTS SEG # BLD: 5.3 K/UL (ref 1.8–8)
NEUTS SEG NFR BLD: 71 % (ref 40–73)
PLATELET # BLD AUTO: 197 K/UL (ref 135–420)
PMV BLD AUTO: 10.6 FL (ref 9.2–11.8)
POTASSIUM SERPL-SCNC: 5.3 MMOL/L (ref 3.5–5.5)
RBC # BLD AUTO: 4.23 M/UL (ref 4.7–5.5)
SODIUM SERPL-SCNC: 141 MMOL/L (ref 136–145)
WBC # BLD AUTO: 7.5 K/UL (ref 4.6–13.2)

## 2017-08-25 PROCEDURE — 74011636637 HC RX REV CODE- 636/637: Performed by: HOSPITALIST

## 2017-08-25 PROCEDURE — BT1F1ZZ FLUOROSCOPY OF LEFT KIDNEY, URETER AND BLADDER USING LOW OSMOLAR CONTRAST: ICD-10-PCS | Performed by: UROLOGY

## 2017-08-25 PROCEDURE — C1758 CATHETER, URETERAL: HCPCS | Performed by: UROLOGY

## 2017-08-25 PROCEDURE — 93005 ELECTROCARDIOGRAM TRACING: CPT

## 2017-08-25 PROCEDURE — 83735 ASSAY OF MAGNESIUM: CPT | Performed by: HOSPITALIST

## 2017-08-25 PROCEDURE — 77030012508 HC MSK AIRWY LMA AMBU -A: Performed by: ANESTHESIOLOGY

## 2017-08-25 PROCEDURE — 0T778DZ DILATION OF LEFT URETER WITH INTRALUMINAL DEVICE, VIA NATURAL OR ARTIFICIAL OPENING ENDOSCOPIC: ICD-10-PCS | Performed by: UROLOGY

## 2017-08-25 PROCEDURE — 76210000006 HC OR PH I REC 0.5 TO 1 HR: Performed by: UROLOGY

## 2017-08-25 PROCEDURE — 74011250637 HC RX REV CODE- 250/637: Performed by: HOSPITALIST

## 2017-08-25 PROCEDURE — 0TC18ZZ EXTIRPATION OF MATTER FROM LEFT KIDNEY, VIA NATURAL OR ARTIFICIAL OPENING ENDOSCOPIC: ICD-10-PCS | Performed by: UROLOGY

## 2017-08-25 PROCEDURE — 74011250636 HC RX REV CODE- 250/636

## 2017-08-25 PROCEDURE — 76010000149 HC OR TIME 1 TO 1.5 HR: Performed by: UROLOGY

## 2017-08-25 PROCEDURE — 85025 COMPLETE CBC W/AUTO DIFF WBC: CPT | Performed by: HOSPITALIST

## 2017-08-25 PROCEDURE — 74011000250 HC RX REV CODE- 250

## 2017-08-25 PROCEDURE — 74011250636 HC RX REV CODE- 250/636: Performed by: HOSPITALIST

## 2017-08-25 PROCEDURE — 77030018836 HC SOL IRR NACL ICUM -A: Performed by: UROLOGY

## 2017-08-25 PROCEDURE — 74011250636 HC RX REV CODE- 250/636: Performed by: UROLOGY

## 2017-08-25 PROCEDURE — C2617 STENT, NON-COR, TEM W/O DEL: HCPCS | Performed by: UROLOGY

## 2017-08-25 PROCEDURE — 82360 CALCULUS ASSAY QUANT: CPT | Performed by: HOSPITALIST

## 2017-08-25 PROCEDURE — 36415 COLL VENOUS BLD VENIPUNCTURE: CPT | Performed by: HOSPITALIST

## 2017-08-25 PROCEDURE — 77030019927 HC TBNG IRR CYSTO BAXT -A: Performed by: UROLOGY

## 2017-08-25 PROCEDURE — 77030010103 HC SEAL PRT ENDOSC OCOA -B: Performed by: UROLOGY

## 2017-08-25 PROCEDURE — C1769 GUIDE WIRE: HCPCS | Performed by: UROLOGY

## 2017-08-25 PROCEDURE — 77030014023 HC SYR INFL LVEEN BSC -B: Performed by: UROLOGY

## 2017-08-25 PROCEDURE — 74011000250 HC RX REV CODE- 250: Performed by: HOSPITALIST

## 2017-08-25 PROCEDURE — 74011250636 HC RX REV CODE- 250/636: Performed by: ANESTHESIOLOGY

## 2017-08-25 PROCEDURE — 74011636320 HC RX REV CODE- 636/320: Performed by: UROLOGY

## 2017-08-25 PROCEDURE — 76060000033 HC ANESTHESIA 1 TO 1.5 HR: Performed by: UROLOGY

## 2017-08-25 PROCEDURE — 82962 GLUCOSE BLOOD TEST: CPT

## 2017-08-25 PROCEDURE — 80048 BASIC METABOLIC PNL TOTAL CA: CPT | Performed by: HOSPITALIST

## 2017-08-25 PROCEDURE — 77030013079 HC BLNKT BAIR HGGR 3M -A: Performed by: ANESTHESIOLOGY

## 2017-08-25 PROCEDURE — 65660000000 HC RM CCU STEPDOWN

## 2017-08-25 PROCEDURE — 77030006974 HC BSKT URET RTVR BSC -C: Performed by: UROLOGY

## 2017-08-25 PROCEDURE — C1894 INTRO/SHEATH, NON-LASER: HCPCS | Performed by: UROLOGY

## 2017-08-25 DEVICE — URETERAL STENT
Type: IMPLANTABLE DEVICE | Site: URETER | Status: FUNCTIONAL
Brand: POLARIS™ ULTRA

## 2017-08-25 RX ORDER — INSULIN GLARGINE 100 [IU]/ML
15 INJECTION, SOLUTION SUBCUTANEOUS DAILY
Status: DISCONTINUED | OUTPATIENT
Start: 2017-08-25 | End: 2017-08-27 | Stop reason: HOSPADM

## 2017-08-25 RX ORDER — HYDROMORPHONE HYDROCHLORIDE 1 MG/ML
0.5 INJECTION, SOLUTION INTRAMUSCULAR; INTRAVENOUS; SUBCUTANEOUS
Status: DISCONTINUED | OUTPATIENT
Start: 2017-08-25 | End: 2017-08-25 | Stop reason: HOSPADM

## 2017-08-25 RX ORDER — INSULIN LISPRO 100 [IU]/ML
INJECTION, SOLUTION INTRAVENOUS; SUBCUTANEOUS ONCE
Status: DISCONTINUED | OUTPATIENT
Start: 2017-08-25 | End: 2017-08-25 | Stop reason: HOSPADM

## 2017-08-25 RX ORDER — LEVOFLOXACIN 5 MG/ML
500 INJECTION, SOLUTION INTRAVENOUS EVERY 24 HOURS
Status: DISCONTINUED | OUTPATIENT
Start: 2017-08-26 | End: 2017-08-27 | Stop reason: HOSPADM

## 2017-08-25 RX ORDER — DILTIAZEM HYDROCHLORIDE 5 MG/ML
5 INJECTION INTRAVENOUS ONCE
Status: COMPLETED | OUTPATIENT
Start: 2017-08-25 | End: 2017-08-25

## 2017-08-25 RX ORDER — DEXTROSE 50 % IN WATER (D50W) INTRAVENOUS SYRINGE
25-50 AS NEEDED
Status: DISCONTINUED | OUTPATIENT
Start: 2017-08-25 | End: 2017-08-27 | Stop reason: HOSPADM

## 2017-08-25 RX ORDER — HYDROCODONE BITARTRATE AND ACETAMINOPHEN 5; 325 MG/1; MG/1
1-2 TABLET ORAL
Status: DISCONTINUED | OUTPATIENT
Start: 2017-08-25 | End: 2017-08-27 | Stop reason: HOSPADM

## 2017-08-25 RX ORDER — METOCLOPRAMIDE HYDROCHLORIDE 5 MG/ML
INJECTION INTRAMUSCULAR; INTRAVENOUS AS NEEDED
Status: DISCONTINUED | OUTPATIENT
Start: 2017-08-25 | End: 2017-08-25 | Stop reason: HOSPADM

## 2017-08-25 RX ORDER — SODIUM CHLORIDE 0.9 % (FLUSH) 0.9 %
5-10 SYRINGE (ML) INJECTION AS NEEDED
Status: DISCONTINUED | OUTPATIENT
Start: 2017-08-25 | End: 2017-08-25 | Stop reason: HOSPADM

## 2017-08-25 RX ORDER — FENTANYL CITRATE 50 UG/ML
INJECTION, SOLUTION INTRAMUSCULAR; INTRAVENOUS AS NEEDED
Status: DISCONTINUED | OUTPATIENT
Start: 2017-08-25 | End: 2017-08-25 | Stop reason: HOSPADM

## 2017-08-25 RX ORDER — SODIUM CHLORIDE, SODIUM LACTATE, POTASSIUM CHLORIDE, CALCIUM CHLORIDE 600; 310; 30; 20 MG/100ML; MG/100ML; MG/100ML; MG/100ML
125 INJECTION, SOLUTION INTRAVENOUS CONTINUOUS
Status: DISCONTINUED | OUTPATIENT
Start: 2017-08-25 | End: 2017-08-25 | Stop reason: HOSPADM

## 2017-08-25 RX ORDER — ESMOLOL HYDROCHLORIDE 10 MG/ML
INJECTION INTRAVENOUS AS NEEDED
Status: DISCONTINUED | OUTPATIENT
Start: 2017-08-25 | End: 2017-08-25 | Stop reason: HOSPADM

## 2017-08-25 RX ORDER — ACETAMINOPHEN 10 MG/ML
1000 INJECTION, SOLUTION INTRAVENOUS ONCE
Status: COMPLETED | OUTPATIENT
Start: 2017-08-25 | End: 2017-08-26

## 2017-08-25 RX ORDER — LEVOFLOXACIN 5 MG/ML
500 INJECTION, SOLUTION INTRAVENOUS ONCE
Status: COMPLETED | OUTPATIENT
Start: 2017-08-25 | End: 2017-08-25

## 2017-08-25 RX ORDER — LIDOCAINE HYDROCHLORIDE 20 MG/ML
INJECTION, SOLUTION EPIDURAL; INFILTRATION; INTRACAUDAL; PERINEURAL AS NEEDED
Status: DISCONTINUED | OUTPATIENT
Start: 2017-08-25 | End: 2017-08-25 | Stop reason: HOSPADM

## 2017-08-25 RX ORDER — PROPOFOL 10 MG/ML
INJECTION, EMULSION INTRAVENOUS AS NEEDED
Status: DISCONTINUED | OUTPATIENT
Start: 2017-08-25 | End: 2017-08-25 | Stop reason: HOSPADM

## 2017-08-25 RX ORDER — MIDAZOLAM HYDROCHLORIDE 1 MG/ML
INJECTION, SOLUTION INTRAMUSCULAR; INTRAVENOUS AS NEEDED
Status: DISCONTINUED | OUTPATIENT
Start: 2017-08-25 | End: 2017-08-25 | Stop reason: HOSPADM

## 2017-08-25 RX ORDER — KETAMINE HYDROCHLORIDE 10 MG/ML
INJECTION, SOLUTION INTRAMUSCULAR; INTRAVENOUS AS NEEDED
Status: DISCONTINUED | OUTPATIENT
Start: 2017-08-25 | End: 2017-08-25 | Stop reason: HOSPADM

## 2017-08-25 RX ORDER — ONDANSETRON 2 MG/ML
INJECTION INTRAMUSCULAR; INTRAVENOUS AS NEEDED
Status: DISCONTINUED | OUTPATIENT
Start: 2017-08-25 | End: 2017-08-25 | Stop reason: HOSPADM

## 2017-08-25 RX ORDER — FENTANYL CITRATE 50 UG/ML
50 INJECTION, SOLUTION INTRAMUSCULAR; INTRAVENOUS AS NEEDED
Status: DISCONTINUED | OUTPATIENT
Start: 2017-08-25 | End: 2017-08-25 | Stop reason: HOSPADM

## 2017-08-25 RX ORDER — MAGNESIUM SULFATE HEPTAHYDRATE 40 MG/ML
2 INJECTION, SOLUTION INTRAVENOUS ONCE
Status: COMPLETED | OUTPATIENT
Start: 2017-08-25 | End: 2017-08-26

## 2017-08-25 RX ORDER — FLUMAZENIL 0.1 MG/ML
0.2 INJECTION INTRAVENOUS
Status: DISCONTINUED | OUTPATIENT
Start: 2017-08-25 | End: 2017-08-25 | Stop reason: HOSPADM

## 2017-08-25 RX ORDER — NALOXONE HYDROCHLORIDE 0.4 MG/ML
0.1 INJECTION, SOLUTION INTRAMUSCULAR; INTRAVENOUS; SUBCUTANEOUS AS NEEDED
Status: DISCONTINUED | OUTPATIENT
Start: 2017-08-25 | End: 2017-08-25 | Stop reason: HOSPADM

## 2017-08-25 RX ORDER — MAGNESIUM SULFATE 100 %
4 CRYSTALS MISCELLANEOUS AS NEEDED
Status: DISCONTINUED | OUTPATIENT
Start: 2017-08-25 | End: 2017-08-25 | Stop reason: HOSPADM

## 2017-08-25 RX ORDER — GLYCOPYRROLATE 0.2 MG/ML
INJECTION INTRAMUSCULAR; INTRAVENOUS AS NEEDED
Status: DISCONTINUED | OUTPATIENT
Start: 2017-08-25 | End: 2017-08-25 | Stop reason: HOSPADM

## 2017-08-25 RX ORDER — SODIUM CHLORIDE, SODIUM LACTATE, POTASSIUM CHLORIDE, CALCIUM CHLORIDE 600; 310; 30; 20 MG/100ML; MG/100ML; MG/100ML; MG/100ML
125 INJECTION, SOLUTION INTRAVENOUS CONTINUOUS
Status: DISCONTINUED | OUTPATIENT
Start: 2017-08-25 | End: 2017-08-26

## 2017-08-25 RX ADMIN — SODIUM CHLORIDE, SODIUM LACTATE, POTASSIUM CHLORIDE, AND CALCIUM CHLORIDE 125 ML/HR: 600; 310; 30; 20 INJECTION, SOLUTION INTRAVENOUS at 18:42

## 2017-08-25 RX ADMIN — HYDROMORPHONE HYDROCHLORIDE 2 MG: 2 INJECTION INTRAMUSCULAR; INTRAVENOUS; SUBCUTANEOUS at 12:28

## 2017-08-25 RX ADMIN — SODIUM CHLORIDE, SODIUM LACTATE, POTASSIUM CHLORIDE, AND CALCIUM CHLORIDE 125 ML/HR: 600; 310; 30; 20 INJECTION, SOLUTION INTRAVENOUS at 20:15

## 2017-08-25 RX ADMIN — ESMOLOL HYDROCHLORIDE 10 MG: 10 INJECTION INTRAVENOUS at 15:19

## 2017-08-25 RX ADMIN — LIDOCAINE HYDROCHLORIDE 100 MG: 20 INJECTION, SOLUTION EPIDURAL; INFILTRATION; INTRACAUDAL; PERINEURAL at 13:42

## 2017-08-25 RX ADMIN — ACETAMINOPHEN 1000 MG: 10 INJECTION, SOLUTION INTRAVENOUS at 20:24

## 2017-08-25 RX ADMIN — HYDROMORPHONE HYDROCHLORIDE 1 MG: 2 INJECTION INTRAMUSCULAR; INTRAVENOUS; SUBCUTANEOUS at 06:40

## 2017-08-25 RX ADMIN — LEVOFLOXACIN 500 MG: 5 INJECTION, SOLUTION INTRAVENOUS at 13:36

## 2017-08-25 RX ADMIN — HYDROMORPHONE HYDROCHLORIDE 0.5 MG: 2 INJECTION INTRAMUSCULAR; INTRAVENOUS; SUBCUTANEOUS at 19:07

## 2017-08-25 RX ADMIN — METOCLOPRAMIDE 10 MG: 10 TABLET ORAL at 22:12

## 2017-08-25 RX ADMIN — SODIUM CHLORIDE, SODIUM LACTATE, POTASSIUM CHLORIDE, AND CALCIUM CHLORIDE: 600; 310; 30; 20 INJECTION, SOLUTION INTRAVENOUS at 14:20

## 2017-08-25 RX ADMIN — INSULIN LISPRO 2 UNITS: 100 INJECTION, SOLUTION INTRAVENOUS; SUBCUTANEOUS at 09:34

## 2017-08-25 RX ADMIN — ONDANSETRON 4 MG: 2 INJECTION INTRAMUSCULAR; INTRAVENOUS at 23:37

## 2017-08-25 RX ADMIN — HYDROMORPHONE HYDROCHLORIDE 2 MG: 2 INJECTION INTRAMUSCULAR; INTRAVENOUS; SUBCUTANEOUS at 09:41

## 2017-08-25 RX ADMIN — ESMOLOL HYDROCHLORIDE 30 MG: 10 INJECTION INTRAVENOUS at 15:30

## 2017-08-25 RX ADMIN — Medication 10 ML: at 05:41

## 2017-08-25 RX ADMIN — PROPOFOL 20 MG: 10 INJECTION, EMULSION INTRAVENOUS at 14:43

## 2017-08-25 RX ADMIN — ONDANSETRON 4 MG: 2 INJECTION INTRAMUSCULAR; INTRAVENOUS at 09:41

## 2017-08-25 RX ADMIN — SODIUM CHLORIDE, SODIUM LACTATE, POTASSIUM CHLORIDE, AND CALCIUM CHLORIDE 125 ML/HR: 600; 310; 30; 20 INJECTION, SOLUTION INTRAVENOUS at 13:09

## 2017-08-25 RX ADMIN — KETAMINE HYDROCHLORIDE 30 MG: 10 INJECTION, SOLUTION INTRAMUSCULAR; INTRAVENOUS at 13:56

## 2017-08-25 RX ADMIN — HYDROMORPHONE HYDROCHLORIDE 1 MG: 2 INJECTION INTRAMUSCULAR; INTRAVENOUS; SUBCUTANEOUS at 05:41

## 2017-08-25 RX ADMIN — INSULIN LISPRO 2 UNITS: 100 INJECTION, SOLUTION INTRAVENOUS; SUBCUTANEOUS at 12:09

## 2017-08-25 RX ADMIN — ESMOLOL HYDROCHLORIDE 20 MG: 10 INJECTION INTRAVENOUS at 15:21

## 2017-08-25 RX ADMIN — DILTIAZEM HYDROCHLORIDE 5 MG: 5 INJECTION INTRAVENOUS at 16:46

## 2017-08-25 RX ADMIN — FENTANYL CITRATE 25 MCG: 50 INJECTION, SOLUTION INTRAMUSCULAR; INTRAVENOUS at 15:13

## 2017-08-25 RX ADMIN — METOCLOPRAMIDE HYDROCHLORIDE 10 MG: 5 INJECTION INTRAMUSCULAR; INTRAVENOUS at 13:36

## 2017-08-25 RX ADMIN — ESMOLOL HYDROCHLORIDE 10 MG: 10 INJECTION INTRAVENOUS at 15:12

## 2017-08-25 RX ADMIN — INSULIN GLARGINE 15 UNITS: 100 INJECTION, SOLUTION SUBCUTANEOUS at 10:03

## 2017-08-25 RX ADMIN — GLYCOPYRROLATE 0.1 MG: 0.2 INJECTION INTRAMUSCULAR; INTRAVENOUS at 13:36

## 2017-08-25 RX ADMIN — PROPOFOL 20 MG: 10 INJECTION, EMULSION INTRAVENOUS at 14:47

## 2017-08-25 RX ADMIN — MIDAZOLAM HYDROCHLORIDE 2 MG: 1 INJECTION, SOLUTION INTRAMUSCULAR; INTRAVENOUS at 13:36

## 2017-08-25 RX ADMIN — GLYCOPYRROLATE 0.1 MG: 0.2 INJECTION INTRAMUSCULAR; INTRAVENOUS at 13:42

## 2017-08-25 RX ADMIN — ONDANSETRON 8 MG: 2 INJECTION INTRAMUSCULAR; INTRAVENOUS at 14:37

## 2017-08-25 RX ADMIN — ESMOLOL HYDROCHLORIDE 20 MG: 10 INJECTION INTRAVENOUS at 15:26

## 2017-08-25 RX ADMIN — PROPOFOL 100 MG: 10 INJECTION, EMULSION INTRAVENOUS at 13:51

## 2017-08-25 RX ADMIN — ESMOLOL HYDROCHLORIDE 10 MG: 10 INJECTION INTRAVENOUS at 15:15

## 2017-08-25 RX ADMIN — FENTANYL CITRATE 25 MCG: 50 INJECTION, SOLUTION INTRAMUSCULAR; INTRAVENOUS at 15:16

## 2017-08-25 RX ADMIN — MAGNESIUM SULFATE HEPTAHYDRATE 2 G: 40 INJECTION, SOLUTION INTRAVENOUS at 16:52

## 2017-08-25 NOTE — PROGRESS NOTES
Hospitalist Progress Note-critical care note     Patient: Zenia Stahl MRN: [id number]  CSN: [id number]    YOB: 1965  Age: 46 y.o. Sex: male    DOA: 8/24/2017 LOS:  LOS: 1 day            Chief complaint: DM . Hydronephrosis , renal stone     Assessment/Plan         Hospital Problems  Date Reviewed: 8/25/2017          Codes Class Noted POA    Hyperglycemia ICD-10-CM: R73.9  ICD-9-CM: 790.29  8/24/2017 Unknown        Renal colic on left side QLF-31-VN: N23  ICD-9-CM: 788.0  8/24/2017 Unknown        * (Principal)Kidney stone on left side ICD-10-CM: N20.0  ICD-9-CM: 592.0  8/24/2017 Unknown        Type 1 diabetes mellitus ([facility] ICD-10-CM: E10.9  ICD-9-CM: 250.01  8/24/2017 Unknown        Hydronephrosis with urinary obstruction due to ureteral calculus ICD-10-CM: N13.2  ICD-9-CM: 592.1, 045  8/24/2017 Unknown        Ileus ([facility] ICD-10-CM: K56.7  ICD-9-CM: 560.1  8/24/2017 Unknown        Migraine ICD-10-CM: Q46.838  ICD-9-CM: 346.90  8/24/2017 Unknown        Depression ICD-10-CM: F32.9  ICD-9-CM: 521  8/24/2017 Unknown        RA (rheumatoid arthritis) ([facility] ICD-10-CM: M06.9  ICD-9-CM: 714.0  8/24/2017 Unknown              1  hydronephrosis left with urethra   Urology on board, will have procedure today     2. Renal stone   Pain control, hydration   3. Ileus   Tolerated the clear diet well. No n/v   potentially relating to left hydroureter or obstruction. 4. DM type I  He agrees to have lantus during his stay ,  Hold insulin pump  Diabetic educator on board   5. Lung nodule   6 mm, need repeat ct chest in 6-12 month     7. Migraine and depression   Stable   Continue home medication     Subjective: no n/v , pain is controlled     Nurse: no acute issue   Review of systems:    General: No fevers or chills. Cardiovascular: No chest pain or pressure. No palpitations. Pulmonary: No shortness of breath. Gastrointestinal: No nausea, vomiting.      Vital signs/Intake and Output:  Visit Vitals    /69 (BP 1 Location: Left arm, BP Patient Position: At rest;Head of bed elevated (Comment degrees))    Pulse 96    Temp 98.9 °F (37.2 °C)    Resp 16    Ht 5' 10\" (1.778 m)    Wt 65.8 kg (145 lb)    SpO2 97%    BMI 20.81 kg/m2     Current Shift:  08/25 0701 - 08/25 1900  In: 1000 [I.V.:1000]  Out: 500 [Urine:500]  Last three shifts:  08/23 1901 - 08/25 0700  In: 1320 [P.O.:120; I.V.:1200]  Out: 400 [Urine:400]    Physical Exam:  General: WD, WN. Alert, cooperative, no acute distress    HEENT: NC, Atraumatic. PERRLA, anicteric sclerae. Lungs: CTA Bilaterally. No Wheezing/Rhonchi/Rales. Heart:  Regular  rhythm,  No murmur, No Rubs, No Gallops  Abdomen: Soft, Non distended, Non tender.  +Bowel sounds,   Extremities: No c/c/e  Psych:   Not anxious or agitated. Neurologic:  No acute neurological deficit. Labs: Results:       Chemistry Recent Labs      08/25/17   0524 08/24/17   1100   GLU  196*  265*   NA  141  140   K  5.3  4.4   CL  107  104   CO2  27  30   BUN  16  16   CREA  1.20  1.09   CA  7.9*  9.1   AGAP  7  6   BUCR  13  15   AP   --   120*   TP   --   7.4   ALB   --   3.5   GLOB   --   3.9   AGRAT   --   0.9      CBC w/Diff Recent Labs      08/25/17   0524  08/24/17   1100   WBC  7.5  9.2   RBC  4.23*  4.78   HGB  12.5*  14.2   HCT  38.4  42.8   PLT  197  228   GRANS  71  84*   LYMPH  13*  7*   EOS  5  3      Cardiac Enzymes Recent Labs      08/24/17   1100   CPK  86   CKND1  1.4      Coagulation No results for input(s): PTP, INR, APTT in the last 72 hours. No lab exists for component: INREXT    Lipid Panel No results found for: CHOL, CHOLPOCT, CHOLX, CHLST, CHOLV, 859230, HDL, LDL, LDLC, DLDLP, 078542, VLDLC, VLDL, TGLX, TRIGL, TRIGP, TGLPOCT, CHHD, CHHDX   BNP No results for input(s): BNPP in the last 72 hours.    Liver Enzymes Recent Labs      08/24/17   1100   TP  7.4   ALB  3.5   AP  120*   SGOT  17      Thyroid Studies No results found for: T4, T3U, TSH, TSHEXT     Procedures/imaging: see electronic medical records for all procedures/Xrays and details which were not copied into this note but were reviewed prior to creation of Liss Davies MD

## 2017-08-25 NOTE — DISCHARGE INSTRUCTIONS
Yohana Barboza. Rosemary Juan M.D. [facility]  [address], [address] [place], [address] [facility]  Office: [phone]  Fax:    [phone]    PROCEDURE: Procedure(s):  CYSTOSCOPY,LEFT RETRO PYELOGRAM,LEFT URETEROSCOPY Deepak Trav [address] [place] [address] W/C-ARM    Notify [facility] IMMEDIATELY if any of the following occur:     You are unable to urinate. Urgency to urinate is not uncommon.  You find yourself urinating small frequent amounts associated with severe lower abdominal discomfort.  Bright red blood with clots in the urine. Some reddish urine is not uncommon and should be treated with increasing the amount of fluids you drink.  Temperature above 101.5° and / or chills.  You are nauseous and / or vomiting and you cannot hold down any fluids.  Your pain is not controlled with the pain medication prescribed. Special Considerations:      Do not drive for at least 24 hours after the procedure and until you are no longer taking narcotic pain medication and you are able to move and react without hesitation. MEDICATIONS:  Pain   [x]  Norco®   []  Percocet® []  Dilaudid®       Antibiotics   []  Cipro   []  Ceftin®    [x] Levaquin   []  Bactrim DS®       Urgency   []  Vesicare®   []       Burning   [x]  Pyridium®   []   UribelTM     Nausea   []  Zofran®   []    Phenergan®     Miscellaneous         [x] Prescriptions Written on Chart    [] Prescriptions sent Electronically           My office will call pt on MONDAY to schedule your first follow-up appointment. Please contact [facility] at [phone] or go to the nearest Emergency Department / Urgent Care facility for any other medical questions or concerns.

## 2017-08-25 NOTE — OP NOTES
OPERATIVE NOTE     Patient: Tori Fuller MRN: [id number]  SSN: [id number]    YOB: 1965  Age: 46 y.o. Sex: male      Date of Procedure:  8/25/2017   Preoperative Diagnosis:  LEFT URETERAL CALCULUS  Postoperative Diagnosis:  LEFT URETERAL CALCULUS    Procedure:  Procedure(s):  CYSTOSCOPY,LEFT RETRO PYELOGRAM,LEFT URETEROSCOPY WITH HOLIMIUM LASER FRAGMENTATION OF STONE AND STONE EXTRACTION,STENT PLACEMENT W/C-ARM  Surgeon:  Surgeon(s) and Role:     * Adrianna Palacio MD - Primary  Anesthesia:  General     Findings:  Mid ureteral stone that floated up to collecting system during RPG, stone in lower pole LK    Procedure Details: The patient was seen in the pre-operative area. The risks, benefits, complications, alternative treatment options, and expected outcomes were again discussed with the patient. The possibilities of reaction to medication, pain, infection, bleeding, major cardiovascular event, death, damage to surrounding structures were specifically addressed. Informed consent was then obtained. The site of surgery properly noted/marked. Patient brought in the operating room and placed   in supine position. After administration of general anesthesia, he was   placed in lithotomy position. Groin and genitalia prepped and draped in the   usual sterile fashion. I began with a 21-[place]izen [place] cystoscope. Cystourethroscopy was performed. I identified the LEFT ureteral orifice. I was able to intubate the ureteral orifice with an open-ended   catheter and retrograde pyelogram was performed using 10 ml of Visipaque. There was mild left  hydronephrosis   and mild  hydroureter identified. The stone in the mid ureter floated up to the renal pelvis as well. I then used a 0.038 sensor wire, intubated the open-ended catheter, placed this into the collecting system on   the LEFT side.  Then using a dual-lumen catheter, a second wire was placed into the   collecting system and an 11 x 13 Navigator sheath was placed over one of   the wires. The sheath and the wire were removed. So at this point, we had a   wire in place in the collecting system and the Navigator sheath. I then   used a flexible ureteroscope, traversed the ureter up to the collecting   system on the left side. Using holmium laser, I fragmented both the stones into   multiple tiny pieces. Using a 0 tip basket, I removed the 2 larger stone fragments,which   will be sent to pathology for analysis. At this point, on   fluoroscopy, there was no evidence of any residual stone. The wire remained   in place. I removed the ureteroscope and the Navigator sheath under direct   vision and using cystoscopic guidance, a 6 x 24 double-J stent was placed   over the wire into the collecting system. I removed the   wire. There was a good coil in the kidney and a good coil in the bladder. The bladder was emptied. The patient tolerated the procedure well. The patient was   transferred to recovery room in stable condition. Estimated Blood Loss:  Minimal  Specimens:    ID Type Source Tests Collected by Time Destination   1 : left ureteraL stone Preservative   Michelle Mccoy MD 8/25/2017 1422 Pathology      Implants:    Implant Name Type Inv.  Item Serial No.  Lot No. LRB No. Used Action   Teresa Lapping DBL-PGTL [id number] Tere Henriquez   Teresa Lapping DBL-PGTL [id number] -- [facility] [id number] Left 1 Implanted       Complications:  None    Michelle Mccoy MD  8/25/2017  2:56 PM

## 2017-08-25 NOTE — PROGRESS NOTES
Urology Progress Note    Patient: Tori Fuller MRN: [id number] SSN: [id number]    YOB: 1965  Age: 46 y.o. Sex: male    DOA: 2017 LOS:  LOS: 1 day              Subjective:   Pt still having intermittant LEFT flank pain. No vomiting. Pain has been controlled. Objective:      Visit Vitals    /60 (BP 1 Location: Left arm, BP Patient Position: At rest)    Pulse 86    Temp 99 °F (37.2 °C)    Resp 18    Ht 5' 10\" (1.778 m)    Wt 65.8 kg (145 lb)    SpO2 97%    BMI 20.81 kg/m2     Temp (24hrs), Av.7 °F (37.1 °C), Min:98 °F (36.7 °C), Max:99.5 °F (37.5 °C)      Intake and Output:  1901 -  0700  In: 1931 [P.O.:120; I.V.:1200]  Out: 400 [Urine:400]   07 -  190  In: -   Out: 500 [Urine:500]    Physical Exam:  Abdomen:  soft, non-tender, non-distended, + bowel sounds, + bowel movement, + flatus  Lab/Data Reviewed:  BMP:   Lab Results   Component Value Date/Time     2017 05:24 AM    K 5.3 2017 05:24 AM     2017 05:24 AM    CO2 27 2017 05:24 AM    AGAP 7 2017 05:24 AM     (H) 2017 05:24 AM    BUN 16 2017 05:24 AM    CREA 1.20 2017 05:24 AM    GFRAA >60 2017 05:24 AM    GFRNA >60 2017 05:24 AM     CBC:   Lab Results   Component Value Date/Time    WBC 7.5 2017 05:24 AM    HGB 12.5 (L) 2017 05:24 AM    HCT 38.4 2017 05:24 AM     2017 05:24 AM       Medications Reviewed.     Assessment/Plan:   Principal Problem:    Kidney stone on left side (2017)    Active Problems:    Hyperglycemia (3623)      Renal colic on left side (7052)      Type 1 diabetes mellitus ([address] (2017)      Hydronephrosis with urinary obstruction due to ureteral calculus (2017)      Ileus ([address] (2017)      Migraine (2017)      Depression (2017)      RA (rheumatoid arthritis) ([address] (2017)        Status Post:  Procedure(s):  CYSTOSCOPY,LEFT RETRO PYELOGRAM,LEFT URETEROSCOPY WITH HOLIMIUM LASER FRAGMENTATION OF STONE AND STONE EXTRACTION,STENT PLACEMENT W/C-ARM,PATIENT IS IN ROOM 313   Impression: Pt with a 5.5 mm LEFT ureteral stone and 5.5mm LEFT Renal stone    Plan:  1. Pt scheduled for OR this afternoon. LEFT ureteroscopic stone extraction. JJ stent placement. All risks including  Pain, infection, bleeding, ureteral injury need for stent reviewed.   He understands and agrees    Wai Arciniega MD  August 25, 2017

## 2017-08-25 NOTE — DIABETES MGMT
NUTRITION ASSESSMENT / [facility] PLAN OF CARE     Margarito Salas           46 y.o.              Patient Active Problem List   Diagnosis Code    DDD (degenerative disc disease), cervical M50.30    Hyperglycemia I05.8    Renal colic on left side U89    Kidney stone on left side N20.0    Type 1 diabetes mellitus (HCC) E10.9    Hydronephrosis with urinary obstruction due to ureteral calculus N13.2    Ileus ([facility]) K56.7    Migraine G43.909    Depression F32.9    RA (rheumatoid arthritis) ([facility]) M06.9        INTERVENTIONS/PLAN:   - BG out of target range r/t DM1, wears insulin pump at home, was confused about Lantus last night & refused  - long discussion today re: how to precede with Dm management while IP, pt with desire to remove his pump at this time & let [facility] manage his insulin  - recommend decrease basal dose to Lantus 15 units every day and continue corrective coverage, he will need a mealtime dose but feel like we need to observe his trends once he starts eating before scheduling dose, per his pump history his TDD fluctuated a lot  - DM education provided per Hayley Tinsley RD  - discussed with RN on floor and Dr. Oswald Harmon)      ASSESSMENT:   Nutritional Status: healthy wt (BMI 20%), sporadic intake per pt report, uncontrolled DM 1 (A1C 9.8%)         Lab Results   Component Value Date/Time     (H) 08/25/2017 05:24 AM    GLUCPOC 189 (H) 08/25/2017 09:06 AM    GLUCPOC 140 (H) 08/24/2017 09:05 PM    GLUCPOC 174 (H) 08/24/2017 06:32 PM             Within target range (non-ICU: <140; ICU<180): [] Yes   [x]  No    Current Insulin regimen:   - Lantus 18 qhs (pt refused last night, was confused about type of insulin)  - Humalog Normal Insulin Sensitivity Corrective Coverage    Home medication/insulin regimen:     INSULIN PUMP    Brand of pump and model #:  Medtronic Mini-med 630   Type of insulin used  novolog u100   Type of infusion set  unknown   What are your basal rate(s)?  ~0.925 units/hr x 24 hours (= TDD 22.2 basal insulin )   What is your sensitivity factor?  unknown   What is your insulin to carbohydrate ratio?  unknown   What is your total daily dose?  12.225 - 22.2 units/day (per pump history last 14 days)   What conventional insulin dose do you use if you pump were inoperable?   (\"off pump plan\")  unknown           Lab Results   Component Value Date/Time    Hemoglobin A1c 9.8 08/24/2017 11:00 AM       Adequate glycemic control PTA:  [] Yes  [x] No       SUBJECTIVE/OBJECTIVE:   Information obtained from: pt, chart, IDT; report DM1 after whipple procedure in 2014, reports has been using insulin pump at home since January 2017, suspended pump yesterday ~ 3pm, pt was able to provide some information regarding pump settings (see above) but was unable to provide full detailed information; states goal for A1C is <10% because he is \"brittle\", however pt reports rarely <70 mg/dl at home, states goal is 160-170 mg/dl and SMBG 2-3/xday, reports does not eat consistently and does not bolus when he eats, waits 2 hours to check BG & will then do a correctional bolus, pt states he does known how to CHO count, pt was very pleased to hear A1C <10%        Medications: [x]                Reviewed        Labs:   Lab Results   Component Value Date/Time    Sodium 141 08/25/2017 05:24 AM    Potassium 5.3 08/25/2017 05:24 AM    Chloride 107 08/25/2017 05:24 AM    CO2 27 08/25/2017 05:24 AM    Anion gap 7 08/25/2017 05:24 AM    Glucose 196 08/25/2017 05:24 AM    BUN 16 08/25/2017 05:24 AM    Creatinine 1.20 08/25/2017 05:24 AM    Calcium 7.9 08/25/2017 05:24 AM    Magnesium 1.7 08/25/2017 05:24 AM    Albumin 3.5 08/24/2017 11:00 AM       Anthropometrics: IBW : 68 kg (150 lb), % IBW (Calculated): 96.67 %, BMI (calculated): 20.8  Wt Readings from Last 1 Encounters:   08/25/17 65.8 kg (145 lb)      Ht Readings from Last 1 Encounters:   08/25/17 5' 10\" (1.778 m)       Estimated Nutrition Needs: 1700 Kcals/day (25 kcal/kg of IBW), Protein (g): 82 g (1.2 g/kg) Fluid (ml): 1700 ml (1 ml/kcal)  Based on:   []          Actual BW    [x]          IBW   []            Adjusted BW        Diet:   Active Orders   Diet    DIET NPO       Intake:   No data found. Nutrition Diagnoses:   1. Nutrition Diagnosis 1: altered nutrition related lab values Related to: DM1 Nutrition Diagnosis 1 Evidenced By: A1C of 9.8% and known h/o DM1    2. Nutrition Diagnosis 2: Self-monitoring deficit Nutrition Diagnosis 2 Related to: Dm and insulin pump Nutrition Diagnosis 2 Evidenced By: pt report of SMBG 2-3x/day      Nutrition Interventions:   Intervention :Food/Nutrient Delivery: Yes  Meals/Snacks: General/healthful diet (Consistent CHO)  Intervention: Nutrition Education: Yes  Intervention: Nutrition Counseling: Yes  Recommended Diet/Supplements: Continue current diet (Consistent CHO )    Goal:   - BG will be within target range of  mg/dl by 8/28  - PO intake will average at least 50% once resumes  - pt will follow-up with OP PCM re: DM1 goals and regimen   - pt will demonstrate the ability to identify CHO containing foods and order from the Consistent CHO menu by 8/28     Nutrition Monitoring and Evaluation      [x]     Monitor po intake on meal rounds  [x]     Continue inpatient monitoring and intervention  []     Other:      Nutrition Risk:  []   High     []  Moderate    [x]  Minimal/Uncompromised    UDAY Lott, MPH, RD, CDE

## 2017-08-25 NOTE — PERIOP NOTES
Patient tachycardic and temp elevated. Advised Dr. Maurice Sarah and he spoke with hospitalist.  Hospitalist advised to xfer to Addie Bueno.  Called nursing supervisor

## 2017-08-25 NOTE — PERIOP NOTES
TRANSFER - IN REPORT:    Verbal report received from Janet Chandra on Lesli Search  being received from [facility] for routine progression of care      Report consisted of patients Situation, Background, Assessment and   Recommendations(SBAR). Information from the following report(s) SBAR, Kardex, Intake/Output and MAR was reviewed with the receiving nurse. Opportunity for questions and clarification was provided. Assessment completed upon patients arrival to unit and care assumed.

## 2017-08-25 NOTE — PROGRESS NOTES
Pt admitted due to left flank pain due to left ureteral obstruction/kidney stone. Plan surgical intervention today (8/25/17). Pt is independent. Please encourage ambulation when appropriate following surgical intervention. No plan of care needs identified. Anticipate pt will be medically stable for discharge within the next 24-48 hours. CM available to assist as needed. Discharge Reassessment Plan:  Low Risk    RRAT Score:  1 - 12     Low Risk Care Transition Interventions:  1. Discharge transition plan:  Physician follow up   2. Involved patient/caregiver in assessment, planning, education and implement of intervention. 3. CM daily patient care huddles/interdisciplinary rounds were completed. 4. PCP/Specialist appointment within 5 days made prior to discharge. Date/Time  5. Facilitated transportation and logistics for follow-up appointments. 6. Handoff to [facility] Nurse Navigator or PCP practice. Care Management Interventions  PCP Verified by CM: Yes  Mode of Transport at Discharge:  Other (see comment) (spouse)  Transition of Care Consult (CM Consult): Discharge Planning  Health Maintenance Reviewed: Yes  Current Support Network: Lives with Spouse  Confirm Follow Up Transport: Self  Discharge Location  Discharge Placement: Home

## 2017-08-25 NOTE — ANESTHESIA PREPROCEDURE EVALUATION
Anesthetic History   No history of anesthetic complications            Review of Systems / Medical History  Patient summary reviewed, nursing notes reviewed and pertinent labs reviewed    Pulmonary    COPD: moderate               Neuro/Psych              Cardiovascular                  Exercise tolerance: <4 METS     GI/Hepatic/Renal     GERD: well controlled           Endo/Other    Diabetes: well controlled    Arthritis     Other Findings            Physical Exam    Airway  Mallampati: II  TM Distance: 4 - 6 cm  Neck ROM: normal range of motion   Mouth opening: Normal     Cardiovascular    Rhythm: regular  Rate: normal         Dental  No notable dental hx       Pulmonary  Breath sounds clear to auscultation               Abdominal  GI exam deferred       Other Findings            Anesthetic Plan    ASA: 2  Anesthesia type: general          Induction: Intravenous  Anesthetic plan and risks discussed with: Patient

## 2017-08-25 NOTE — DIABETES MGMT
Diabetes Patient/Family Education Record    Factors That  May Influence Patients Ability  to Learn or  Comply With  Recommendations:    []   Language barrier    []   Cultural needs   [x]   Motivation    []   Cognitive limitation    []   Physical   []   Education    []   Physiological factors   []   Hearing/vision/speaking impairment   []   Jainism beliefs    []   Financial factors   []  Other:   []  No factors identified at this time.      Person Instructed:   [x]   Patient   []   Family   []  Other     Preference for Learning:   [x]   Verbal   [x]   Written   []  Demonstration     Level of Comprehension & Competence:    []  Good                                      [x] Fair                                     []  Poor                             [x]  Needs Reinforcement   []  Teachback completed    Education Component:   [x]  Medication management, confirmation of home insulin pump  (see in depth notes)    [x]  Nutritional management including the role of carbohydrate intake, pt reports sporadic appetite   []  Exercise   [x]  Signs, symptoms, and treatment of hyperglycemia and hypoglycemia, pt reports get shaky, lightheaded   [] Treatment of hyperglycemia and hypoglycemia   [x]  Importance of blood glucose monitoring, reports SMBG 2-3x/day, reports rarely <70 mg/dl, states he is \"brittle\" and goal is 160-170 mg/dl    []  Instruction on use of blood glucose meter   [x]  Discuss the importance of HbA1C monitoring and provide patient with  results   []  Sick day guidelines   []  Proper use and disposal of lancets, needles, syringes or insulin pens (if appropriate)   []  Potential long-term complications (retinopathy, kidney disease, neuropathy, heart disease, stroke, vascular disease, foot care)   [] Provide emergency contact number and contact number for more information    [x]  Goal:  Patient/family will demonstrate understanding of Diabetes Self Management Skills by: (date) __10/1_____  Plan for post-discharge education or self-management support:    [x] Outpatient class schedule provided            [] Patient Declined    [] Scheduled for outpatient classes (date) _______           John Ahn.  Jeremy Banerjee, MPH, RD, CDE

## 2017-08-25 NOTE — PERIOP NOTES
TRANSFER - IN REPORT:    Verbal report received from ORN and CRNA on [facility]  being received from OR for routine progression of care      Report consisted of patients Situation, Background, Assessment and   Recommendations(SBAR). Information from the following report(s) SBAR, Kardex, OR Summary, Intake/Output, MAR and Recent Results was reviewed with the receiving nurse. Opportunity for questions and clarification was provided. Assessment completed upon patients arrival to unit and care assumed.

## 2017-08-25 NOTE — PROGRESS NOTES
Pt alert and awake with c/o pain. PRN meds given as ordered. Assessment complete. Call light in reach, safety and comfort measures are in place.

## 2017-08-25 NOTE — ANESTHESIA POSTPROCEDURE EVALUATION
Post-Anesthesia Evaluation and Assessment    Patient: Shanna Lewis MRN: [id number]  SSN: [id number]    YOB: 1965  Age: 46 y.o. Sex: male       Cardiovascular Function/Vital Signs  Visit Vitals    /73 (BP 1 Location: Left arm, BP Patient Position: At rest;Supine)    Pulse 94    Temp 36.5 °C (97.7 °F)    Resp 20    Ht 5' 10\" (1.778 m)    Wt 65.8 kg (145 lb)    SpO2 99%    BMI 20.81 kg/m2       Patient is status post general anesthesia for Procedure(s):  CYSTOSCOPY,LEFT RETRO PYELOGRAM,LEFT URETEROSCOPY WITH HOLIMIUM LASER FRAGMENTATION OF STONE AND STONE EXTRACTION,STENT PLACEMENT W/C-ARM. Nausea/Vomiting: None    Postoperative hydration reviewed and adequate. Pain:  Pain Scale 1: Numeric (0 - 10) (08/25/17 1540)  Pain Intensity 1: 0 (08/25/17 1540)   Managed    Neurological Status:   Neuro (WDL): Within Defined Limits (08/25/17 1540)  Neuro  Neurologic State: Alert (08/25/17 1540)  Orientation Level: Oriented X4 (08/25/17 1459)  Cognition: Appropriate decision making (08/24/17 1235)  Speech: Clear (08/24/17 1235)   At baseline    Mental Status and Level of Consciousness: Arousable    Pulmonary Status:   O2 Device: Nasal cannula (08/25/17 1540)   Adequate oxygenation and airway patent    Complications related to anesthesia: None    Post-anesthesia assessment completed.  No concerns      Signed By: Romeo Townsend CRNA     August 25, 2017

## 2017-08-25 NOTE — ROUTINE PROCESS
Bedside and Verbal shift change report given to KENNETH Dickinson RN (oncoming nurse) by PRETTY Haley RN (offgoing nurse). Report included the following information SBAR, Kardex, Intake/Output and MAR.

## 2017-08-26 ENCOUNTER — APPOINTMENT (OUTPATIENT)
Dept: GENERAL RADIOLOGY | Age: 52
DRG: 660 | End: 2017-08-26
Attending: HOSPITALIST
Payer: MEDICARE

## 2017-08-26 LAB
ANION GAP SERPL CALC-SCNC: 6 MMOL/L (ref 3–18)
BASOPHILS # BLD: 0 K/UL (ref 0–0.06)
BASOPHILS NFR BLD: 0 % (ref 0–2)
BUN SERPL-MCNC: 10 MG/DL (ref 7–18)
BUN/CREAT SERPL: 10 (ref 12–20)
CALCIUM SERPL-MCNC: 8.4 MG/DL (ref 8.5–10.1)
CHLORIDE SERPL-SCNC: 106 MMOL/L (ref 100–108)
CO2 SERPL-SCNC: 30 MMOL/L (ref 21–32)
CREAT SERPL-MCNC: 1.02 MG/DL (ref 0.6–1.3)
DIFFERENTIAL METHOD BLD: ABNORMAL
EOSINOPHIL # BLD: 0.1 K/UL (ref 0–0.4)
EOSINOPHIL NFR BLD: 2 % (ref 0–5)
ERYTHROCYTE [DISTWIDTH] IN BLOOD BY AUTOMATED COUNT: 13.9 % (ref 11.6–14.5)
GLUCOSE BLD STRIP.AUTO-MCNC: 128 MG/DL (ref 70–110)
GLUCOSE BLD STRIP.AUTO-MCNC: 137 MG/DL (ref 70–110)
GLUCOSE BLD STRIP.AUTO-MCNC: 142 MG/DL (ref 70–110)
GLUCOSE BLD STRIP.AUTO-MCNC: 158 MG/DL (ref 70–110)
GLUCOSE SERPL-MCNC: 134 MG/DL (ref 74–99)
HCT VFR BLD AUTO: 38.7 % (ref 36–48)
HGB BLD-MCNC: 12.6 G/DL (ref 13–16)
LYMPHOCYTES # BLD: 0.8 K/UL (ref 0.9–3.6)
LYMPHOCYTES NFR BLD: 11 % (ref 21–52)
MAGNESIUM SERPL-MCNC: 1.8 MG/DL (ref 1.6–2.6)
MCH RBC QN AUTO: 29.4 PG (ref 24–34)
MCHC RBC AUTO-ENTMCNC: 32.6 G/DL (ref 31–37)
MCV RBC AUTO: 90.4 FL (ref 74–97)
MONOCYTES # BLD: 0.7 K/UL (ref 0.05–1.2)
MONOCYTES NFR BLD: 10 % (ref 3–10)
NEUTS SEG # BLD: 5.6 K/UL (ref 1.8–8)
NEUTS SEG NFR BLD: 77 % (ref 40–73)
PLATELET # BLD AUTO: 206 K/UL (ref 135–420)
PMV BLD AUTO: 11.2 FL (ref 9.2–11.8)
POTASSIUM SERPL-SCNC: 4.7 MMOL/L (ref 3.5–5.5)
RBC # BLD AUTO: 4.28 M/UL (ref 4.7–5.5)
SODIUM SERPL-SCNC: 142 MMOL/L (ref 136–145)
WBC # BLD AUTO: 7.3 K/UL (ref 4.6–13.2)

## 2017-08-26 PROCEDURE — 74000 XR ABD (KUB): CPT

## 2017-08-26 PROCEDURE — 74011250636 HC RX REV CODE- 250/636: Performed by: HOSPITALIST

## 2017-08-26 PROCEDURE — 36415 COLL VENOUS BLD VENIPUNCTURE: CPT | Performed by: HOSPITALIST

## 2017-08-26 PROCEDURE — 74011000258 HC RX REV CODE- 258: Performed by: UROLOGY

## 2017-08-26 PROCEDURE — 74011250637 HC RX REV CODE- 250/637: Performed by: HOSPITALIST

## 2017-08-26 PROCEDURE — 85025 COMPLETE CBC W/AUTO DIFF WBC: CPT | Performed by: HOSPITALIST

## 2017-08-26 PROCEDURE — 80048 BASIC METABOLIC PNL TOTAL CA: CPT | Performed by: HOSPITALIST

## 2017-08-26 PROCEDURE — 82962 GLUCOSE BLOOD TEST: CPT

## 2017-08-26 PROCEDURE — 65270000029 HC RM PRIVATE

## 2017-08-26 PROCEDURE — 74011636637 HC RX REV CODE- 636/637: Performed by: HOSPITALIST

## 2017-08-26 PROCEDURE — 83735 ASSAY OF MAGNESIUM: CPT | Performed by: HOSPITALIST

## 2017-08-26 RX ORDER — VENLAFAXINE HYDROCHLORIDE 75 MG/1
300 CAPSULE, EXTENDED RELEASE ORAL
Status: DISCONTINUED | OUTPATIENT
Start: 2017-08-26 | End: 2017-08-27 | Stop reason: HOSPADM

## 2017-08-26 RX ORDER — PREGABALIN 150 MG/1
150 CAPSULE ORAL 3 TIMES DAILY
COMMUNITY
End: 2019-02-19

## 2017-08-26 RX ORDER — TRIAZOLAM 0.25 MG/1
0.25 TABLET ORAL
COMMUNITY
End: 2021-02-26

## 2017-08-26 RX ORDER — DIPHENHYDRAMINE HCL 25 MG
25 CAPSULE ORAL
Status: COMPLETED | OUTPATIENT
Start: 2017-08-26 | End: 2017-08-26

## 2017-08-26 RX ORDER — LEFLUNOMIDE 10 MG/1
10 TABLET ORAL DAILY
COMMUNITY
End: 2019-02-19

## 2017-08-26 RX ORDER — PREGABALIN 75 MG/1
150 CAPSULE ORAL 3 TIMES DAILY
Status: DISCONTINUED | OUTPATIENT
Start: 2017-08-26 | End: 2017-08-27 | Stop reason: HOSPADM

## 2017-08-26 RX ADMIN — LAMOTRIGINE 100 MG: 100 TABLET ORAL at 12:03

## 2017-08-26 RX ADMIN — DIPHENHYDRAMINE HYDROCHLORIDE 25 MG: 25 CAPSULE ORAL at 14:20

## 2017-08-26 RX ADMIN — PREGABALIN 150 MG: 75 CAPSULE ORAL at 21:15

## 2017-08-26 RX ADMIN — ONDANSETRON 4 MG: 2 INJECTION INTRAMUSCULAR; INTRAVENOUS at 12:03

## 2017-08-26 RX ADMIN — METOCLOPRAMIDE 10 MG: 10 TABLET ORAL at 10:18

## 2017-08-26 RX ADMIN — SODIUM CHLORIDE 100 ML/HR: 4.5 INJECTION, SOLUTION INTRAVENOUS at 20:07

## 2017-08-26 RX ADMIN — SODIUM CHLORIDE 100 ML/HR: 4.5 INJECTION, SOLUTION INTRAVENOUS at 06:34

## 2017-08-26 RX ADMIN — LEVOFLOXACIN 500 MG: 5 INJECTION, SOLUTION INTRAVENOUS at 12:03

## 2017-08-26 RX ADMIN — ONDANSETRON 4 MG: 2 INJECTION INTRAMUSCULAR; INTRAVENOUS at 06:27

## 2017-08-26 RX ADMIN — ENOXAPARIN SODIUM 40 MG: 40 INJECTION SUBCUTANEOUS at 21:16

## 2017-08-26 RX ADMIN — INSULIN GLARGINE 15 UNITS: 100 INJECTION, SOLUTION SUBCUTANEOUS at 10:22

## 2017-08-26 RX ADMIN — VENLAFAXINE HYDROCHLORIDE 300 MG: 150 CAPSULE, EXTENDED RELEASE ORAL at 12:02

## 2017-08-26 NOTE — PROGRESS NOTES
Urology Progress Note    Patient: Bear Garcia MRN: [id number] SSN: [id number]    YOB: 1965  Age: 46 y.o. Sex: male    DOA: 2017 LOS:  LOS: 2 days              Subjective:   Post op events from yesterday noted. He has done well overnight but cont to complain of nausea. He has  Some mild stent symptoms but not significantly bothered. Pt states he is on Effexor 300 mg at home. It   Appears all records list him as taking 37.5mg. He will call his wife to confirm dosage and bring in medication bottle    Objective:      Visit Vitals    /66 (BP 1 Location: Left arm, BP Patient Position: At rest)    Pulse 72    Temp 98.9 °F (37.2 °C)    Resp 18    Ht 5' 10\" (1.778 m)    Wt 71.8 kg (158 lb 4.8 oz)    SpO2 95%    BMI 22.71 kg/m2     Temp (24hrs), Av.5 °F (37.5 °C), Min:97.7 °F (36.5 °C), Max:101 °F (38.3 °C)      Intake and Output:  1901 -  0700  In: 5574 [P.O.:240; I.V.:4600]  Out: 1500 [Urine:1500]   07 -  190  In: -   Out: 500 [Urine:500]    Lab/Data Reviewed:  BMP:   Lab Results   Component Value Date/Time     2017 04:25 AM    K 4.7 2017 04:25 AM     2017 04:25 AM    CO2 30 2017 04:25 AM    AGAP 6 2017 04:25 AM     (H) 2017 04:25 AM    BUN 10 2017 04:25 AM    CREA 1.02 2017 04:25 AM    GFRAA >60 2017 04:25 AM    GFRNA >60 2017 04:25 AM     CBC:   Lab Results   Component Value Date/Time    WBC 7.3 2017 04:25 AM    HGB 12.6 (L) 2017 04:25 AM    HCT 38.7 2017 04:25 AM     2017 04:25 AM       Medications Reviewed.     Assessment/Plan:   Principal Problem:    Kidney stone on left side (2017)    Active Problems:    Hyperglycemia ()      Renal colic on left side ()      Type 1 diabetes mellitus ([address] (2017)      Hydronephrosis with urinary obstruction due to ureteral calculus (2017)      Ileus ([address] (2017) Migraine (8/24/2017)      Depression (8/24/2017)      RA (rheumatoid arthritis) ([facility] (8/24/2017)        Status Post:  Procedure(s):  CYSTOSCOPY,LEFT RETRO PYELOGRAM,LEFT URETEROSCOPY WITH HOLIMIUM LASER FRAGMENTATION OF STONE AND STONE EXTRACTION,STENT PLACEMENT W/C-ARM   Impression: POD #1 pt doing well urologically, still with nausea and has not had anything to eat post op. Plan:  1. Check Effexor dosing  2. Pt cleared for discharge urologically once he tolerates eating food  3.  My office will call patient Monday to arrange post op          Freddy Garces MD  August 26, 2017

## 2017-08-26 NOTE — PERIOP NOTES
Transported pt to room 356, via hospital bed, accompanied by his wife, Awake A & O X4, upon arrival to room pt was received by Nicole VELEZ, Vs: upon arrival to room 119/64, 87, 16, 97% on RA, 99.4. No C/O voiced by pt, opportunity for questions provided.

## 2017-08-26 NOTE — PROGRESS NOTES
Problem: Falls - Risk of  Goal: *Absence of Falls  Document Sanjiv Fall Risk and appropriate interventions in the flowsheet.    Outcome: Progressing Towards Goal  Fall Risk Interventions:              Medication Interventions: Patient to call before getting OOB, Teach patient to arise slowly     Elimination Interventions: Call light in reach, Patient to call for help with toileting needs, Toileting schedule/hourly rounds, Urinal in reach

## 2017-08-26 NOTE — PROGRESS NOTES
Hospitalist Progress Note-critical care note     Patient: Devorah Lopez MRN: [id number]  CSN: [id number]    YOB: 1965  Age: 46 y.o. Sex: male    DOA: 8/24/2017 LOS:  LOS: 2 days            Chief complaint: DM . Hydronephrosis , renal stone. ileus     Assessment/Plan         Hospital Problems  Date Reviewed: 8/25/2017          Codes Class Noted POA    Hyperglycemia ICD-10-CM: R73.9  ICD-9-CM: 790.29  8/24/2017 Unknown        Renal colic on left side ODJ-51-LV: N23  ICD-9-CM: 788.0  8/24/2017 Unknown        * (Principal)Kidney stone on left side ICD-10-CM: N20.0  ICD-9-CM: 592.0  8/24/2017 Unknown        Type 1 diabetes mellitus ([facility] ICD-10-CM: E10.9  ICD-9-CM: 250.01  8/24/2017 Unknown        Hydronephrosis with urinary obstruction due to ureteral calculus ICD-10-CM: N13.2  ICD-9-CM: 592.1, 933  8/24/2017 Unknown        Ileus ([facility] ICD-10-CM: K56.7  ICD-9-CM: 560.1  8/24/2017 Unknown        Migraine ICD-10-CM: Z67.057  ICD-9-CM: 346.90  8/24/2017 Unknown        Depression ICD-10-CM: F32.9  ICD-9-CM: 133  8/24/2017 Unknown        RA (rheumatoid arthritis) ([facility] ICD-10-CM: M06.9  ICD-9-CM: 714.0  8/24/2017 Unknown              1  hydronephrosis left with urethra   Urology on board and stent placed   Low T this am     2. Renal stone   Pain control,     3. Ileus   Repeated kub: decreased small bowel gaseous distention  Will try clear liquid     4. DM type I  Continue lantus   Hold insulin pump  Diabetic educator on board     5. Lung nodule   6 mm, need repeat ct chest in 6-12 month     7. Migraine and depression   Stable, home medication dose verified with pt and will start the correct dose     Continue home medication     Subjective: nausea, effexor 300 mg at home, felt a little bit sweating -not taking medication since     He was transferred to [facility] yesterday due to tachy, now resolved. Will try clear diet. Review of systems:    General: No fevers or chills.   Cardiovascular: No chest pain or pressure. No palpitations. Pulmonary: No shortness of breath. Gastrointestinal:  nausea, no vomiting. Vital signs/Intake and Output:  Visit Vitals    BP (!) 81/55 (BP 1 Location: Left arm, BP Patient Position: At rest)    Pulse 62    Temp 98.1 °F (36.7 °C)    Resp 18    Ht 5' 10\" (1.778 m)    Wt 71.8 kg (158 lb 4.8 oz)    SpO2 99%    BMI 22.71 kg/m2     Current Shift:  08/26 0701 - 08/26 1900  In: -   Out: 500 [Urine:500]  Last three shifts:  08/24 1901 - 08/26 0700  In: 5886 [P.O.:240; I.V.:4600]  Out: 1500 [Urine:1500]    Physical Exam:  General: WD, WN. Alert, cooperative, no acute distress    HEENT: NC, Atraumatic. PERRLA, anicteric sclerae. Lungs: CTA Bilaterally. No Wheezing/Rhonchi/Rales. Heart:  Regular  rhythm,  No murmur, No Rubs, No Gallops  Abdomen: Soft, Non distended, Non tender.  +Bowel sounds,   Extremities: No c/c/e  Psych:   Not anxious or agitated. Neurologic:  No acute neurological deficit. Labs: Results:       Chemistry Recent Labs      08/26/17 0425 08/25/17 0524 08/24/17   1100   GLU  134*  196*  265*   NA  142  141  140   K  4.7  5.3  4.4   CL  106  107  104   CO2  30  27  30   BUN  10  16  16   CREA  1.02  1.20  1.09   CA  8.4*  7.9*  9.1   AGAP  6  7  6   BUCR  10*  13  15   AP   --    --   120*   TP   --    --   7.4   ALB   --    --   3.5   GLOB   --    --   3.9   AGRAT   --    --   0.9      CBC w/Diff Recent Labs      08/26/17 0425 08/25/17 0524 08/24/17   1100   WBC  7.3  7.5  9.2   RBC  4.28*  4.23*  4.78   HGB  12.6*  12.5*  14.2   HCT  38.7  38.4  42.8   PLT  206  197  228   GRANS  77*  71  84*   LYMPH  11*  13*  7*   EOS  2  5  3      Cardiac Enzymes Recent Labs      08/24/17   1100   CPK  86   CKND1  1.4      Coagulation No results for input(s): PTP, INR, APTT in the last 72 hours.     No lab exists for component: INREXT, INREXT    Lipid Panel No results found for: CHOL, CHOLPOCT, CHOLX, CHLST, CHOLV, 529386, HDL, LDL, LDLC, DLDLP, 791378, VLDLC, VLDL, TGLX, TRIGL, TRIGP, TGLPOCT, CHHD, CHHDX   BNP No results for input(s): BNPP in the last 72 hours.    Liver Enzymes Recent Labs      08/24/17   1100   TP  7.4   ALB  3.5   AP  120*   SGOT  17      Thyroid Studies No results found for: T4, T3U, TSH, TSHEXT, TSHEXT     Procedures/imaging: see electronic medical records for all procedures/Xrays and details which were not copied into this note but were reviewed prior to creation of Korina Morton MD

## 2017-08-26 NOTE — PROGRESS NOTES
conducted an initial consultation and Spiritual Assessment for Piter Perez, who is a 46 y. o.,male. Patients Primary Language is: [place]. According to the patients EMR Jain Affiliation is: Amish. The reason the Patient came to the hospital is:   Patient Active Problem List    Diagnosis Date Noted    Hyperglycemia     Renal colic on left side     Kidney stone on left side 2017    Type 1 diabetes mellitus ([facility] 2017    Hydronephrosis with urinary obstruction due to ureteral calculus 2017    Ileus ([facility] 2017    Migraine 2017    Depression 2017    RA (rheumatoid arthritis) ([facility] 2017    DDD (degenerative disc disease), cervical 2012        The  provided the following Interventions:  Initiated a relationship of care and support. Explored issues of geeta, spirituality and/or Druze needs while hospitalized. Listened empathically. Provided chaplaincy education. Provided information about Spiritual Care Services. Offered prayer and assurance of continued prayers on patient's behalf. Chart reviewed. The following outcomes were achieved:  Patient shared some information about their medical narrative and spiritual journey/beliefs. Patient processed feeling about current hospitalization. Patient expressed gratitude for the 's visit. Assessment:  Patient did not indicate any spiritual or Druze issues which require Spiritual Care Services interventions at this time. Patient does not have any Druze/cultural needs that will affect patients preferences in health care. Plan:  Chaplains will continue to follow and will provide pastoral care on an as needed or requested basis.  recommends bedside caregivers page  on duty if patient shows signs of acute spiritual or emotional distress.     [address], [address] [facility]

## 2017-08-26 NOTE — PROGRESS NOTES
Bedside and Verbal shift change report given to 1 Rhonda Kulkarni (oncoming nurse) by Kiersten Arellano RN (offgoing nurse). Report included the following information SBAR and Kardex.      Patient Vitals for the past 12 hrs:   Temp Pulse Resp BP SpO2   08/26/17 1555 98.1 °F (36.7 °C) 62 18 - 99 %   08/26/17 1127 97.9 °F (36.6 °C) 60 18 (!) 81/55 97 %   08/26/17 0841 97.9 °F (36.6 °C) 78 18 118/64 99 %

## 2017-08-26 NOTE — PROGRESS NOTES
2051 TRANSFER - IN REPORT:    Verbal [facility] [address] [facility] for routine post - op      Report consisted of patients Situation, Background, Assessment and   Recommendations(SBAR). Information from the following report(s) SBAR, Procedure Summary, Intake/Output, MAR and Recent Results was reviewed with the receiving nurse. Opportunity for questions and clarification was provided. Assessment completed upon patients arrival to unit and care assumed. 2113 Assessment completed and documented in flow sheet. Pt denies any further needs at this time. Pt in NAD with bed in low position, wheels locked and call bell within reach. Pt noted to have mild hematuria and small amount of dark red blood noted on peripad  2337 zofran administered for c/o nausea. 2342 Reassessment completed with no changes noted. Bed locked, in lowest position, with call light within reach. 1672 Reassessment completed with no changes noted. Bed locked, in lowest position, with call light within reach. 7248 zofran administered for c/o nausea. 4670 Dr Marge Kawasaki updated on pt status and lovenox being held related to bleeding and hematuria post op., Dr Marge Kawasaki in agreement with holding lovenox for 1900 dose on 8/25/17.  0730 Bedside and Verbal shift change report given to Rosalinda Duane RN (oncoming nurse) by Marianne Sheppard RN   (offgoing nurse). Report included the following information SBAR, Intake/Output and MAR.

## 2017-08-26 NOTE — PROGRESS NOTES
6404 patient very concern about his home medications, stated that the dosage he has in this hospital is wrong. Informed Dr Melinda Luu. Updated home list medications. 1415 patient stated he might have an allergic reaction with levaquin, stated he feels his red is hot and swelling. No redness noted and no swelling on the lips noted. [facility] 24 talked to Dr Melinda Luu. Order one time dose of benadryl.

## 2017-08-26 NOTE — PERIOP NOTES
TRANSFER - OUT REPORT:    Verbal report given to Nicole VELEZ on Chad Solis  being transferred to  for routine post - op       Report consisted of patients Situation, Background, Assessment and   Recommendations(SBAR). Information from the following report(s) OR Summary, Procedure Summary, Intake/Output and MAR was reviewed with the receiving nurse. Lines:   Peripheral IV 08/24/17 Right Antecubital (Active)   Site Assessment Clean, dry, & intact 8/25/2017  8:50 PM   Phlebitis Assessment 0 8/25/2017  8:50 PM   Infiltration Assessment 0 8/25/2017  8:50 PM   Dressing Status Clean, dry, & intact 8/25/2017  8:50 PM   Dressing Type Tape;Transparent 8/25/2017  8:50 PM   Hub Color/Line Status Pink; Infusing 8/25/2017  8:50 PM   Action Taken Blood drawn 8/24/2017 11:00 AM   Alcohol Cap Used Yes 8/24/2017 11:00 AM        Opportunity for questions and clarification was provided.       Patient transported with:   Registered Nurse  Tech

## 2017-08-26 NOTE — PROGRESS NOTES
Problem: Falls - Risk of  Goal: *Absence of Falls  Document Sanjiv Fall Risk and appropriate interventions in the flowsheet.    Outcome: Progressing Towards Goal  Fall Risk Interventions:              Medication Interventions: Evaluate medications/consider consulting pharmacy, Patient to call before getting OOB     Elimination Interventions: Patient to call for help with toileting needs

## 2017-08-27 VITALS
HEIGHT: 70 IN | TEMPERATURE: 98.4 F | RESPIRATION RATE: 17 BRPM | SYSTOLIC BLOOD PRESSURE: 129 MMHG | DIASTOLIC BLOOD PRESSURE: 80 MMHG | OXYGEN SATURATION: 100 % | WEIGHT: 148.8 LBS | HEART RATE: 84 BPM | BODY MASS INDEX: 21.3 KG/M2

## 2017-08-27 LAB
ANION GAP SERPL CALC-SCNC: 9 MMOL/L (ref 3–18)
ATRIAL RATE: 66 BPM
BUN SERPL-MCNC: 9 MG/DL (ref 7–18)
BUN/CREAT SERPL: 10 (ref 12–20)
CALCIUM SERPL-MCNC: 8.3 MG/DL (ref 8.5–10.1)
CALCULATED P AXIS, ECG09: 42 DEGREES
CALCULATED R AXIS, ECG10: 27 DEGREES
CALCULATED T AXIS, ECG11: 62 DEGREES
CHLORIDE SERPL-SCNC: 104 MMOL/L (ref 100–108)
CO2 SERPL-SCNC: 27 MMOL/L (ref 21–32)
CREAT SERPL-MCNC: 0.91 MG/DL (ref 0.6–1.3)
DIAGNOSIS, 93000: NORMAL
GLUCOSE BLD STRIP.AUTO-MCNC: 128 MG/DL (ref 70–110)
GLUCOSE BLD STRIP.AUTO-MCNC: 172 MG/DL (ref 70–110)
GLUCOSE SERPL-MCNC: 127 MG/DL (ref 74–99)
MAGNESIUM SERPL-MCNC: 1.7 MG/DL (ref 1.6–2.6)
P-R INTERVAL, ECG05: 202 MS
POTASSIUM SERPL-SCNC: 3.6 MMOL/L (ref 3.5–5.5)
Q-T INTERVAL, ECG07: 404 MS
QRS DURATION, ECG06: 106 MS
QTC CALCULATION (BEZET), ECG08: 423 MS
SODIUM SERPL-SCNC: 140 MMOL/L (ref 136–145)
VENTRICULAR RATE, ECG03: 66 BPM

## 2017-08-27 PROCEDURE — 36415 COLL VENOUS BLD VENIPUNCTURE: CPT | Performed by: HOSPITALIST

## 2017-08-27 PROCEDURE — 74011636637 HC RX REV CODE- 636/637: Performed by: HOSPITALIST

## 2017-08-27 PROCEDURE — 80048 BASIC METABOLIC PNL TOTAL CA: CPT | Performed by: HOSPITALIST

## 2017-08-27 PROCEDURE — 82962 GLUCOSE BLOOD TEST: CPT

## 2017-08-27 PROCEDURE — 74011250637 HC RX REV CODE- 250/637: Performed by: HOSPITALIST

## 2017-08-27 PROCEDURE — 83735 ASSAY OF MAGNESIUM: CPT | Performed by: HOSPITALIST

## 2017-08-27 RX ORDER — LEVOFLOXACIN 250 MG/1
250 TABLET ORAL DAILY
Qty: 4 TAB | Refills: 0 | Status: SHIPPED | OUTPATIENT
Start: 2017-08-27 | End: 2019-02-19

## 2017-08-27 RX ORDER — HYDROCODONE BITARTRATE AND ACETAMINOPHEN 5; 325 MG/1; MG/1
1-2 TABLET ORAL
Qty: 10 TAB | Refills: 0 | Status: SHIPPED | OUTPATIENT
Start: 2017-08-27 | End: 2019-02-19

## 2017-08-27 RX ADMIN — LAMOTRIGINE 100 MG: 100 TABLET ORAL at 08:39

## 2017-08-27 RX ADMIN — PANCRELIPASE 4 CAPSULE: 5000; 17000; 27000 CAPSULE, DELAYED RELEASE ORAL at 08:40

## 2017-08-27 RX ADMIN — VENLAFAXINE HYDROCHLORIDE 300 MG: 150 CAPSULE, EXTENDED RELEASE ORAL at 08:40

## 2017-08-27 RX ADMIN — INSULIN LISPRO 2 UNITS: 100 INJECTION, SOLUTION INTRAVENOUS; SUBCUTANEOUS at 12:12

## 2017-08-27 RX ADMIN — INSULIN GLARGINE 15 UNITS: 100 INJECTION, SOLUTION SUBCUTANEOUS at 08:40

## 2017-08-27 RX ADMIN — PREGABALIN 150 MG: 75 CAPSULE ORAL at 08:39

## 2017-08-27 RX ADMIN — PANCRELIPASE 4 CAPSULE: 5000; 17000; 27000 CAPSULE, DELAYED RELEASE ORAL at 12:12

## 2017-08-27 NOTE — DISCHARGE SUMMARY
Discharge Summary    Patient: Johanne Blanchard MRN: [id number]  CSN: [id number]    YOB: 1965  Age: 46 y.o. Sex: male    DOA: 8/24/2017 LOS:  LOS: 3 days   Discharge Date:      Primary Care Provider:  Amisha Dawson MD    Admission Diagnoses: Renal colic on left side  Kidney stone on left side  Hyperglycemia  LEFT URETERAL CALCULUS    Discharge Diagnoses:    Hospital Problems  Date Reviewed: 8/25/2017          Codes Class Noted POA    Hyperglycemia ICD-10-CM: R73.9  ICD-9-CM: 790.29  8/24/2017 Unknown        Renal colic on left side YHF-74-TE: N23  ICD-9-CM: 788.0  8/24/2017 Unknown        * (Principal)Kidney stone on left side ICD-10-CM: N20.0  ICD-9-CM: 592.0  8/24/2017 Unknown        Type 1 diabetes mellitus ([facility] ICD-10-CM: E10.9  ICD-9-CM: 250.01  8/24/2017 Unknown        Hydronephrosis with urinary obstruction due to ureteral calculus ICD-10-CM: N13.2  ICD-9-CM: 592.1, 277  8/24/2017 Unknown        Ileus ([facility] ICD-10-CM: K56.7  ICD-9-CM: 560.1  8/24/2017 Unknown        Migraine ICD-10-CM: Q61.851  ICD-9-CM: 346.90  8/24/2017 Unknown        Depression ICD-10-CM: F32.9  ICD-9-CM: 852  8/24/2017 Unknown        RA (rheumatoid arthritis) ([facility] ICD-10-CM: M06.9  ICD-9-CM: 714.0  8/24/2017 Unknown              Discharge Condition: Stable    Discharge Medications:     Current Discharge Medication List      START taking these medications    Details   HYDROcodone-acetaminophen (NORCO) 5-325 mg per tablet Take 1-2 Tabs by mouth every four (4) hours as needed. Max Daily Amount: 12 Tabs. Qty: 10 Tab, Refills: 0      levoFLOXacin (LEVAQUIN) 250 mg tablet Take 1 Tab by mouth daily. Qty: 4 Tab, Refills: 0         CONTINUE these medications which have NOT CHANGED    Details   pregabalin (LYRICA) 150 mg capsule Take 150 mg by mouth three (3) times daily. Indications: Diabetic Peripheral Neuropathy      triazolam (HALCION) 0.25 mg tablet Take 0.25 mg by mouth nightly as needed (midnight).       leflunomide (ARAVA) 10 mg tablet Take 10 mg by mouth daily. insulin aspart (NOVOLOG) 100 unit/mL injection by Continuous Infusion route. metoclopramide HCl (REGLAN) 10 mg tablet Take 10 mg by mouth as needed. lamoTRIgine (LAMICTAL) 100 mg tablet Take 100 mg by mouth daily. riTUXimab (RITUXAN) 10 mg/mL injection by IntraVENous route once. Every 6 months      VENLAFAXINE HCL (EFFEXOR XR PO) Take 300 mg by mouth once. ALPRAZolam (XANAX) 0.5 mg tablet Take 0.5 mg by mouth nightly as needed (5 pm). amylase-lipase-protease (CREON) capsule Take 1 Cap by mouth three (3) times daily (with meals). 1 tab with snacks , 2 tab with meals       ondansetron hcl (ZOFRAN) 4 mg tablet Take 4 mg by mouth every eight (8) hours as needed. Procedures :   CYSTOSCOPY,LEFT RETRO PYELOGRAM,LEFT URETEROSCOPY WITH HOLIMIUM LASER FRAGMENTATION OF STONE AND STONE EXTRACTION,STENT PLACEMENT W/C-ARM    Consults: Urology      PHYSICAL EXAM   Visit Vitals    /78 (BP 1 Location: Left arm, BP Patient Position: Sitting)    Pulse 86    Temp 97.1 °F (36.2 °C)    Resp 18    Ht 5' 10\" (1.778 m)    Wt 67.5 kg (148 lb 12.8 oz)    SpO2 100%    BMI 21.35 kg/m2     General: Awake, cooperative, no acute distress    HEENT: NC, Atraumatic. PERRLA, EOMI. Anicteric sclerae. Lungs:  CTA Bilaterally. No Wheezing/Rhonchi/Rales. Heart:  Regular  rhythm,  No murmur, No Rubs, No Gallops  Abdomen: Soft, Non distended, Non tender. +Bowel sounds,   Extremities: No c/c/e  Psych:   Not anxious or agitated. Neurologic:  No acute neurological deficits. Admission HPI :   Jamie Nieves is a 46 y.o. male who hx of depression/whipple procedure 2014 -type I DM came to ed due to left flank pain while waking up today. It is sharp and radiated to LLQ, associated with n/v. Denies any slurred speech/headache/cp/blurred vission/d/c/palpitation/gait change/bleeding. Denies smoking/ any alcohol or drug use. Ct abdomen : Left ureteral obstructing 5 mm calculus, positioned above the left iliac crossing result mild to moderate left-sided hydronephrosis, nephroureteral  induration and a small amount of perinephric fluid. He was given iv dilaudid in ed and urology was called. Urology wants to patient to be admitted to medical team due to DM. Hospital Course :   Since he was admitted, urology was on board for hydronephrosis left with urethra and stent was place. He tolerated the procedure well. He did have low T/tachycardia after the procedure, he was transferred to [facility] for over night. Fever and tachycardia resolved. levaquin was given and will continued after discharge. He remained afebrile over 24 hr before discharge. He was cleared to be discharged per urology. He did have ileus. Repeated kub: decreased small bowel gaseous distention and tolerated clear liquid. He has DM type I and using insulin pump, pump was hold due to the procedure and he received lantus and ssi for DM control. He was advice to start insulin pump 24 hr after last lantus given and he indicated a verbal understanding. He has lung nodule -6 mm, need repeat ct chest in 6-12 month-discussed with patient. We continued home medication for his Migraine and depression. He remained hemodynamic stable.    Activity: Activity as tolerated    Diet: Clear liquids, advance as tolerated    Follow-up: pcm ,urology     Disposition: home     Minutes spent on discharge: 60 min       Labs: Results:       Chemistry Recent Labs      08/27/17   0312  08/26/17   0425  08/25/17   0524  08/24/17   1100   GLU  127*  134*  196*  265*   NA  140  142  141  140   K  3.6  4.7  5.3  4.4   CL  104  106  107  104   CO2  27  30  27  30   BUN  9  10  16  16   CREA  0.91  1.02  1.20  1.09   CA  8.3*  8.4*  7.9*  9.1   AGAP  9  6  7  6   BUCR  10*  10*  13  15   AP   --    --    --   120*   TP   --    --    --   7.4   ALB   --    --    --   3.5   GLOB   --    --    --   3.9   AGRAT --    --    --   0.9      CBC w/Diff Recent Labs      08/26/17   0425  08/25/17   0524  08/24/17   1100   WBC  7.3  7.5  9.2   RBC  4.28*  4.23*  4.78   HGB  12.6*  12.5*  14.2   HCT  38.7  38.4  42.8   PLT  206  197  228   GRANS  77*  71  84*   LYMPH  11*  13*  7*   EOS  2  5  3      Cardiac Enzymes Recent Labs      08/24/17   1100   CPK  86   CKND1  1.4      Coagulation No results for input(s): PTP, INR, APTT in the last 72 hours. No lab exists for component: INREXT    Lipid Panel No results found for: CHOL, CHOLPOCT, CHOLX, CHLST, CHOLV, 221265, HDL, LDL, LDLC, DLDLP, 632216, VLDLC, VLDL, TGLX, TRIGL, TRIGP, TGLPOCT, CHHD, CHHDX   BNP No results for input(s): BNPP in the last 72 hours. Liver Enzymes Recent Labs      08/24/17   1100   TP  7.4   ALB  3.5   AP  120*   SGOT  17      Thyroid Studies No results found for: T4, T3U, TSH, TSHEXT         Significant Diagnostic Studies: Xr Abd (kub)    Result Date: 8/26/2017  Abdomen, single view COMPARISON: Abdominal radiograph the 20/4/2017. INDICATION: Nephrolithiasis, prior small bowel dilatation, follow-up exam. FINDINGS: Supine AP view the abdomen obtained. Left-sided nephroureteral stent noted with formed loops projecting in expected position. Included bowel gas pattern is nonobstructive and nonspecific. Decreased small bowel diameter in the interval from prior. Dextroconvex curvature of the lumbar spine demonstrated. No acute osseous abnormality. Impression: 1. Improved bowel gas pattern in the interval from prior examination, with decreased small bowel gaseous distention. 2. Left nephroureteral stent in expected position. Xr Abd (kub)    Result Date: 8/24/2017  EXAM: Frontal View of the Abdomen And Pelvis Indication: Vomiting and chest pain Technique: Single frontal view  of the abdomen and pelvis. Comparison: Chest x-ray performed same day, no prior views of the abdomen _______________ Findings:  Moderate length segment of air-filled dilated small bowel in the left abdomen measuring up to 5.4 cm. There is a prominent amount of stool in the right abdomen to left upper quadrant. A small amount of bowel gas and admixed stool are present in the left colon, sigmoid and rectal vault. Surgical clips in the right upper quadrant from prior cholecystectomy. Intact osseous structures with mild to moderate dextroscoliosis. Is a moderate amount of _______________    IMPRESSION: 1. Small bowel ileus versus developing obstruction. 2. CT findings may represent constipation in the appropriate clinical setting. Ct Abd Pelv W Cont    Result Date: 8/24/2017  EXAM: CT of the Abdomen and Pelvis INDICATION: Left lower quadrant pain, clinical concern for diverticulitis COMPARISON: None. TECHNIQUE: Axial CT imaging of the abdomen and pelvis was performed with intravenous contrast. Multiplanar reformats were generated. Dose reduction techniques used: Automated exposure control, adjustment of the mAs and/or kVp according to patient's size, and iterative reconstruction techniques. _______________ FINDINGS: LOWER CHEST: Peripheral pleural based right lower lobe 6 mm pulmonary nodule (image 6). Patchy bibasilar dependent changes with no pleural effusion or focal consolidation. Frutoso Golder LIVER, BILIARY: Low attenuating left lobe 5 mm cystic density structure, without suspicious enhancing features. Pneumobilia on the dependent liver with dilated common duct measuring up to 1.7 cm with an air-fluid level but no discrete induration. Surgically absent gallbladder. PANCREAS: Atrophic pancreas with prominent duct measuring near 5 mm with extensive calcifications, resembling sequela of chronic pancreatitis. No peripancreatic fluid collections. No peripancreatic induration or fluid collections. SPLEEN: Normal. ADRENALS: Normal. KIDNEYS: Mild/moderate left-sided hydronephrosis with delayed left renal nephrogram, secondary to a mid ureteral 5 mm calculus, above the level of the iliac crossing.  There is a nonobstructive left renal calculus in the lower pole, measuring 5 mm. There is a small amount of perinephric fluid and induration. Unremarkable right kidney without hydronephrosis or obstruction. Betha Lute LYMPH NODES: No enlarged lymph nodes. GASTROINTESTINAL TRACT: Moderate air-fluid level in the stomach with air-filled dilated small bowel loops in the upper abdomen measuring up to 3.9 cm with no discrete transition point. There is a small amount of fluid in the distal ileum in the right lower quadrant. Normal caliber appendix. There is a prominent amount of stool in the right and transverse colon with scattered diverticula in this distal left and sigmoid segments. Air filled nondistended sigmoid. No findings of diverticulitis. PELVIC ORGANS: Unremarkable. VASCULATURE: Unremarkable. BONES: No acute or aggressive osseous abnormalities identified. Grade 1 anterolisthesis of L5 on S1 secondary to bilateral L5 pars defects. Dextroscoliosis of the lumbar spine OTHER: None. _______________     IMPRESSION: 1. Left ureteral obstructing 5 mm calculus, positioned above the left iliac crossing result mild to moderate left-sided hydronephrosis, nephroureteral induration and a small amount of perinephric fluid. 2. CT findings that are suggestive of small bowel ileus, potentially relating to left hydroureter or obstruction. 3. Nonobstructive left renal calculus. 4. Lateral right lower lung subpleural 6 mm pulmonary nodule, please see recommended follow-up as directed below. 5. CT findings suggestive of of chronic pancreatitis. 6. Prior cholecystectomy with pneumobilia, likely related to sphincterotomy. Recommend clinical correlation with patient's surgical history. Xr Chest Port    Result Date: 8/24/2017  EXAM:Chest X-Ray  History: Chest pain radiating to back, vomiting Technique:  Portable Frontal View Comparison: 10/31/2012 _______________ FINDINGS: The trachea is midline. Mildly tortuous thoracic aorta.  Midline cardiac silhouette within normal limits. Unremarkable hilar vasculature. Minimal left diaphragmatic opacity, favoring atelectasis secondary to mild elevated diaphragm. No discrete right lung opacity. No pneumothorax or effusion. Intact osseous structures. _______________     IMPRESSION: 1. Minimal left diaphragmatic opacity favoring atelectasis.              [facility]     CC: Cinthya Brown MD

## 2017-08-27 NOTE — PROGRESS NOTES
0720-received report from Lorri Hanna RN included SBAR MAR and Agustina. 0820-assessed pt resting comfortably in bed, IV in progress no c/o.  1200-discharged to home prescriptions given to pt and instructions reviewed and understood by pt.

## 2017-08-27 NOTE — ROUTINE PROCESS
TRANSFER - OUT REPORT:    Verbal report given to Kaitlynn GONZALEZ RN(name) on Ramya Longoria  being transferred to (unit) for routine progression of care       Report consisted of patients Situation, Background, Assessment and   Recommendations(SBAR). Information from the following report(s) SBAR, Kardex and MAR was reviewed with the receiving nurse. Lines:   Peripheral IV 08/24/17 Right Antecubital (Active)   Site Assessment Clean, dry, & intact 8/25/2017  8:50 PM   Phlebitis Assessment 0 8/25/2017  8:50 PM   Infiltration Assessment 0 8/25/2017  8:50 PM   Dressing Status Clean, dry, & intact 8/25/2017  8:50 PM   Dressing Type Tape;Transparent 8/25/2017  8:50 PM   Hub Color/Line Status Pink; Infusing 8/25/2017  8:50 PM   Action Taken Blood drawn 8/24/2017 11:00 AM   Alcohol Cap Used Yes 8/24/2017 11:00 AM        Opportunity for questions and clarification was provided.       Patient transported with:   Monitor

## 2017-08-27 NOTE — PROGRESS NOTES
5325 - Report received on patient by Deborah Baker RN for transfer to medical unit (room 328). 2130 - Transfer completed to room. Patient and wife oriented to room. Patient states that he has not attempted clear liquid diet at this time. 2205 - Clear liquid diet consisting of broth and jello presented to patient. 2300 - Shift assessment completed. Patient currently tolerating clear liquid diet. Vital signs obtained: Blood pressure 144/78, pulse 82, temperature 98.2 °F (36.8 °C), resp. rate 16, height 5' 10\" (1.778 m), weight 67.5 kg (148 lb 12.8 oz), SpO2 99 %. 3783 - Patient resting quietly in bed.     0305 - Patient resting quietly in bed.     0450 - Vital signs obtained by PCT: Blood pressure 139/89, pulse 82, temperature 98.3 °F (36.8 °C), resp. rate 18, height 5' 10\" (1.778 m), weight 67.5 kg (148 lb 12.8 oz), SpO2 100 %.

## 2017-08-27 NOTE — ROUTINE PROCESS
Bedside and Verbal shift change report given to Marifer Raymond RN (oncoming nurse) by John Tan RN (offgoing nurse). Report included the following information SBAR, Kardex, ED Summary, OR Summary, Procedure Summary, Intake/Output, MAR, Recent Results and Med Rec Status.

## 2017-08-27 NOTE — ROUTINE PROCESS
Verbal shift change report given to [address] (oncoming nurse) by Phyllis Rendon   (offgoing nurse). Report included the following information SBAR, Kardex and MAR.

## 2017-08-28 LAB
ATRIAL RATE: 111 BPM
CALCULATED P AXIS, ECG09: 65 DEGREES
CALCULATED R AXIS, ECG10: 45 DEGREES
CALCULATED T AXIS, ECG11: 66 DEGREES
DIAGNOSIS, 93000: NORMAL
P-R INTERVAL, ECG05: 186 MS
Q-T INTERVAL, ECG07: 326 MS
QRS DURATION, ECG06: 92 MS
QTC CALCULATION (BEZET), ECG08: 443 MS
VENTRICULAR RATE, ECG03: 111 BPM

## 2017-09-06 LAB
CA PHOS MFR STONE: 3 %
COLOR STONE: NORMAL
COM MFR STONE: 97 %
COMMENT, 519994: NORMAL
COMPOSITION, 120440: NORMAL
DISCLAIMER, STO32L: NORMAL
NIDUS STONE QL: NORMAL
SIZE STONE: NORMAL MM
WT STONE: 19 MG

## 2017-10-02 ENCOUNTER — HOSPITAL ENCOUNTER (EMERGENCY)
Age: 52
Discharge: HOME | End: 2017-10-02
Attending: FAMILY MEDICINE
Payer: MEDICARE

## 2017-10-02 VITALS
SYSTOLIC BLOOD PRESSURE: 126 MMHG | OXYGEN SATURATION: 100 % | BODY MASS INDEX: 19.33 KG/M2 | TEMPERATURE: 97.9 F | WEIGHT: 135 LBS | DIASTOLIC BLOOD PRESSURE: 82 MMHG | HEART RATE: 94 BPM | RESPIRATION RATE: 18 BRPM | HEIGHT: 70 IN

## 2017-10-02 DIAGNOSIS — E16.2 HYPOGLYCEMIA: Primary | ICD-10-CM

## 2017-10-02 LAB
ALBUMIN SERPL-MCNC: 3.6 G/DL (ref 3.4–5)
ALBUMIN/GLOB SERPL: 1.1 {RATIO} (ref 0.8–1.7)
ALP SERPL-CCNC: 109 U/L (ref 45–117)
ALT SERPL-CCNC: 45 U/L (ref 16–61)
ANION GAP SERPL CALC-SCNC: 14 MMOL/L (ref 3–18)
APPEARANCE UR: CLEAR
AST SERPL-CCNC: 27 U/L (ref 15–37)
BACTERIA URNS QL MICRO: ABNORMAL /HPF
BASOPHILS # BLD: 0 K/UL (ref 0–0.06)
BASOPHILS NFR BLD: 1 % (ref 0–2)
BILIRUB SERPL-MCNC: 0.3 MG/DL (ref 0.2–1)
BILIRUB UR QL: NEGATIVE
BUN SERPL-MCNC: 11 MG/DL (ref 7–18)
BUN/CREAT SERPL: 10 (ref 12–20)
CALCIUM SERPL-MCNC: 8.6 MG/DL (ref 8.5–10.1)
CAOX CRY URNS QL MICRO: ABNORMAL
CHLORIDE SERPL-SCNC: 101 MMOL/L (ref 100–108)
CO2 SERPL-SCNC: 24 MMOL/L (ref 21–32)
COLOR UR: YELLOW
CREAT SERPL-MCNC: 1.12 MG/DL (ref 0.6–1.3)
DIFFERENTIAL METHOD BLD: ABNORMAL
EOSINOPHIL # BLD: 0.3 K/UL (ref 0–0.4)
EOSINOPHIL NFR BLD: 6 % (ref 0–5)
EPITH CASTS URNS QL MICRO: NEGATIVE /LPF (ref 0–5)
ERYTHROCYTE [DISTWIDTH] IN BLOOD BY AUTOMATED COUNT: 13 % (ref 11.6–14.5)
GLOBULIN SER CALC-MCNC: 3.3 G/DL (ref 2–4)
GLUCOSE BLD STRIP.AUTO-MCNC: 146 MG/DL (ref 70–110)
GLUCOSE BLD STRIP.AUTO-MCNC: 261 MG/DL (ref 70–110)
GLUCOSE SERPL-MCNC: 159 MG/DL (ref 74–99)
GLUCOSE UR STRIP.AUTO-MCNC: >1000 MG/DL
HCT VFR BLD AUTO: 40.8 % (ref 36–48)
HGB BLD-MCNC: 14 G/DL (ref 13–16)
HGB UR QL STRIP: NEGATIVE
KETONES UR QL STRIP.AUTO: NEGATIVE MG/DL
LEUKOCYTE ESTERASE UR QL STRIP.AUTO: NEGATIVE
LYMPHOCYTES # BLD: 1.3 K/UL (ref 0.9–3.6)
LYMPHOCYTES NFR BLD: 28 % (ref 21–52)
MCH RBC QN AUTO: 29.7 PG (ref 24–34)
MCHC RBC AUTO-ENTMCNC: 34.3 G/DL (ref 31–37)
MCV RBC AUTO: 86.6 FL (ref 74–97)
MONOCYTES # BLD: 0.5 K/UL (ref 0.05–1.2)
MONOCYTES NFR BLD: 10 % (ref 3–10)
MUCOUS THREADS URNS QL MICRO: ABNORMAL /LPF
NEUTS SEG # BLD: 2.6 K/UL (ref 1.8–8)
NEUTS SEG NFR BLD: 55 % (ref 40–73)
NITRITE UR QL STRIP.AUTO: NEGATIVE
PH UR STRIP: 5 [PH] (ref 5–8)
PLATELET # BLD AUTO: 199 K/UL (ref 135–420)
PMV BLD AUTO: 10.9 FL (ref 9.2–11.8)
POTASSIUM SERPL-SCNC: 3.3 MMOL/L (ref 3.5–5.5)
PROT SERPL-MCNC: 6.9 G/DL (ref 6.4–8.2)
PROT UR STRIP-MCNC: 30 MG/DL
RBC # BLD AUTO: 4.71 M/UL (ref 4.7–5.5)
RBC #/AREA URNS HPF: ABNORMAL /HPF (ref 0–5)
SODIUM SERPL-SCNC: 139 MMOL/L (ref 136–145)
SP GR UR REFRACTOMETRY: >1.03 (ref 1–1.03)
UROBILINOGEN UR QL STRIP.AUTO: 1 EU/DL (ref 0.2–1)
WBC # BLD AUTO: 4.7 K/UL (ref 4.6–13.2)
WBC URNS QL MICRO: ABNORMAL /HPF (ref 0–5)

## 2017-10-02 PROCEDURE — 81001 URINALYSIS AUTO W/SCOPE: CPT | Performed by: FAMILY MEDICINE

## 2017-10-02 PROCEDURE — 99285 EMERGENCY DEPT VISIT HI MDM: CPT

## 2017-10-02 PROCEDURE — 93005 ELECTROCARDIOGRAM TRACING: CPT

## 2017-10-02 PROCEDURE — 82962 GLUCOSE BLOOD TEST: CPT

## 2017-10-02 PROCEDURE — 85025 COMPLETE CBC W/AUTO DIFF WBC: CPT | Performed by: FAMILY MEDICINE

## 2017-10-02 PROCEDURE — 80053 COMPREHEN METABOLIC PANEL: CPT | Performed by: FAMILY MEDICINE

## 2017-10-02 NOTE — ED PROVIDER NOTES
[address]  EMERGENCY DEPARTMENT HISTORY AND PHYSICAL EXAM       Date: 10/2/2017   Patient Name: Erwin Ellis   YOB: 1965  Medical Record Number: [id number]    History of Presenting Illness     Chief Complaint   Patient presents with    Low Blood Sugar        History Provided By:  Patient, EMS, and mother    Additional History:   7:57 PM    Erwin Ellis is a 45 y.o. male with PMHx of IDDM (dx 8/24/17) and pancreatectomy presenting by EMS to the ED c/o hypoglycemia, onset PTA. Associated symptoms include resolved tremors. Witness by mother, pt had violent tremors with abnormal breathing. Pt had similar sxs 2 days ago in [place]. Pt was given oral glucose and 1 amp D50. FSBS was 160 upon arrival. Pt specifically denies any CP, SOB, or any other sxs or complaints at this time.     Primary Care Provider: Chaim Lainez MD   Specialist:    Past History     Past Medical History:   Past Medical History:   Diagnosis Date    Arthritis     BPH (benign prostatic hyperplasia)     Chronic pain     COPD     Depression     Diverticulosis     Gastritis     Gastroparesis     GERD (gastroesophageal reflux disease)     fair control with meds    Hiatal hernia     Hypercholesterolemia     Migraine     Other ill-defined conditions(799.89)     chronic pancreatitis    Pancreatitis, chronic (HCC)     Psychiatric disorder     emetophobia    RA (rheumatoid arthritis) ([address] 8/24/2017    Rheumatoid arthritis(714.0)     Type 1 diabetes mellitus ([facility] 8/24/2017    Vertigo, peripheral         Past Surgical History:   Past Surgical History:   Procedure Laterality Date    ABDOMEN SURGERY PROC UNLISTED      whipple procedure    HX CERVICAL DISKECTOMY  2001    HX ORTHOPAEDIC      left foot surgery x 2    HX ORTHOPAEDIC      joint replacement  right index finger        Family History:   Family History   Problem Relation Age of Onset    Malignant Hyperthermia Neg Hx     Pseudocholinesterase Deficiency Neg Hx     Delayed Awakening Neg Hx     Post-op Nausea/Vomiting Neg Hx     Emergence Delirium Neg Hx     Post-op Cognitive Dysfunction Neg Hx     Other Neg Hx         Social History:   Social History   Substance Use Topics    Smoking status: Former Smoker     Packs/day: 1.00     Years: 35.00    Smokeless tobacco: None    Alcohol use No        Allergies: Allergies   Allergen Reactions    Penicillins Hives and Swelling    Thimerosal Hives and Swelling        Review of Systems   Review of Systems   Constitutional: Negative for fever.        (+) hypoglycemia   Respiratory: Negative for shortness of breath. (+) irregular breathing; resolved   Cardiovascular: Negative for chest pain. Neurological: Positive for tremors (resolved). All other systems reviewed and are negative. Physical Exam  Vitals:    10/02/17 1949 10/02/17 2015 10/02/17 2034   BP: 150/90 126/82    Pulse: (!) 102 94    Resp: 16 18    Temp: 97.9 °F (36.6 °C)     SpO2: 100% 99% 100%   Weight: 61.2 kg (135 lb)     Height: 5' 10\" (1.778 m)         Physical Exam   Nursing note and vitals reviewed. Vital signs and nursing notes reviewed    CONSTITUTIONAL: Alert, in no apparent distress; well-developed; well-nourished. HEAD:  Normocephalic, atraumatic  EYES: PERRL; EOM's intact. ENTM: Nose: no rhinorrhea; Throat: no erythema or exudate, mucous membranes moist  Neck:  No JVD, supple without lymphadenopathy  RESP: Chest clear, equal breath sounds. CV: S1 and S2 WNL; No murmurs, gallops or rubs. GI: Normal bowel sounds, abdomen soft and non-tender. No masses or organomegaly. Abdomen midline with hernia  : No costo-vertebral angle tenderness. BACK:  Non-tender  UPPER EXT:  Normal inspection  LOWER EXT: No edema, no calf tenderness. Distal pulses intact. NEURO: CN intact, reflexes 2/4 and sym, strength 5/5 and sym, sensation intact. SKIN: No rashes; Normal for age and stage.  Well healed surgical scar. PSYCH:  Alert and oriented, normal affect. Diagnostic Study Results     Labs -      Recent Results (from the past 12 hour(s))   EKG, 12 LEAD, INITIAL    Collection Time: 10/02/17  7:51 PM   Result Value Ref Range    Ventricular Rate 0 BPM    Atrial Rate 0 BPM    QRS Duration 0 ms    Q-T Interval 0 ms    QTC Calculation (Bezet) 0 ms    Calculated R Axis 0 degrees    Calculated T Axis 0 degrees    Diagnosis       No QRS complexes found, no ECG analysis possible  When compared with ECG of 25-AUG-2017 16:24,  Current undetermined rhythm precludes rhythm comparison, needs review     GLUCOSE, POC    Collection Time: 10/02/17  7:54 PM   Result Value Ref Range    Glucose (POC) 146 (H) 70 - 110 mg/dL   CBC WITH AUTOMATED DIFF    Collection Time: 10/02/17  7:57 PM   Result Value Ref Range    WBC 4.7 4.6 - 13.2 K/uL    RBC 4.71 4.70 - 5.50 M/uL    HGB 14.0 13.0 - 16.0 g/dL    HCT 40.8 36.0 - 48.0 %    MCV 86.6 74.0 - 97.0 FL    MCH 29.7 24.0 - 34.0 PG    MCHC 34.3 31.0 - 37.0 g/dL    RDW 13.0 11.6 - 14.5 %    PLATELET 253 277 - 031 K/uL    MPV 10.9 9.2 - 11.8 FL    NEUTROPHILS 55 40 - 73 %    LYMPHOCYTES 28 21 - 52 %    MONOCYTES 10 3 - 10 %    EOSINOPHILS 6 (H) 0 - 5 %    BASOPHILS 1 0 - 2 %    ABS. NEUTROPHILS 2.6 1.8 - 8.0 K/UL    ABS. LYMPHOCYTES 1.3 0.9 - 3.6 K/UL    ABS. MONOCYTES 0.5 0.05 - 1.2 K/UL    ABS. EOSINOPHILS 0.3 0.0 - 0.4 K/UL    ABS.  BASOPHILS 0.0 0.0 - 0.06 K/UL    DF AUTOMATED     METABOLIC PANEL, COMPREHENSIVE    Collection Time: 10/02/17  7:57 PM   Result Value Ref Range    Sodium 139 136 - 145 mmol/L    Potassium 3.3 (L) 3.5 - 5.5 mmol/L    Chloride 101 100 - 108 mmol/L    CO2 24 21 - 32 mmol/L    Anion gap 14 3.0 - 18 mmol/L    Glucose 159 (H) 74 - 99 mg/dL    BUN 11 7.0 - 18 MG/DL    Creatinine 1.12 0.6 - 1.3 MG/DL    BUN/Creatinine ratio 10 (L) 12 - 20      GFR est AA >60 >60 ml/min/1.73m2    GFR est non-AA >60 >60 ml/min/1.73m2    Calcium 8.6 8.5 - 10.1 MG/DL    Bilirubin, total 0.3 0.2 - 1.0 MG/DL    ALT (SGPT) 45 16 - 61 U/L    AST (SGOT) 27 15 - 37 U/L    Alk. phosphatase 109 45 - 117 U/L    Protein, total 6.9 6.4 - 8.2 g/dL    Albumin 3.6 3.4 - 5.0 g/dL    Globulin 3.3 2.0 - 4.0 g/dL    A-G Ratio 1.1 0.8 - 1.7     URINALYSIS W/ RFLX MICROSCOPIC    Collection Time: 10/02/17  9:00 PM   Result Value Ref Range    Color YELLOW      Appearance CLEAR      Specific gravity >1.030 (H) 1.005 - 1.030    pH (UA) 5.0 5.0 - 8.0      Protein 30 (A) NEG mg/dL    Glucose >1000 (A) NEG mg/dL    Ketone NEGATIVE  NEG mg/dL    Bilirubin NEGATIVE  NEG      Blood NEGATIVE  NEG      Urobilinogen 1.0 0.2 - 1.0 EU/dL    Nitrites NEGATIVE  NEG      Leukocyte Esterase NEGATIVE  NEG     URINE MICROSCOPIC ONLY    Collection Time: 10/02/17  9:00 PM   Result Value Ref Range    WBC 0 to 2 0 - 5 /hpf    RBC 0 to 2 0 - 5 /hpf    Epithelial cells NEGATIVE  0 - 5 /lpf    Bacteria FEW (A) NEG /hpf    Mucus 2+ (A) NEG /lpf    CA Oxalate crystals 1+ (A) NEG   GLUCOSE, POC    Collection Time: 10/02/17  9:20 PM   Result Value Ref Range    Glucose (POC) 261 (H) 70 - 110 mg/dL       Radiologic Studies -  No orders to display        Medical Decision Making   I am the first provider for this patient. I reviewed the vital signs, available nursing notes, past medical history, past surgical history, family history and social history. Vital Signs-Reviewed the patient's vital signs. Patient Vitals for the past 12 hrs:   Temp Pulse Resp BP SpO2   10/02/17 2034 - - - - 100 %   10/02/17 2015 - 94 18 126/82 99 %   10/02/17 1949 97.9 °F (36.6 °C) (!) 102 16 150/90 100 %     INITIAL CLINICAL IMPRESSION and PLANS:  The patient presents with the primary complaint(s) of: hypoglycemia. The presentation, to include historical aspects and clinical findings are consistent with the DX of hypoglycemia. However, other possible DX's to consider as primary, associated with, or exacerbated by include:    1.  UTI  2.   Electrolyte abnormality    Considering the above, my initial management plan to evaluate and therapeutic interventions include the following and as noted in the orders:    1. Labs: POC glucose, UA, urine microscope, CBC, CMP    Pulse Oximetry Analysis - Normal 100% on RA. No intervention needed. Old Medical Records: Nursing notes. ED Course:     7:57 PM   Initial assessment performed. The patients presenting problems have been discussed, and they are in agreement with the care plan formulated and outlined with them. I have encouraged them to ask questions as they arise throughout their visit. 9:20 PM  Feeling much better, glucose was in the 250s after eating. Medications Given in the ED:  Medications - No data to display     Discharge Note:  9:25 PM   Patients results have been reviewed with them. Patient and/or family have verbally conveyed their understanding and agreement of the patient's signs, symptoms, diagnosis, treatment and prognosis and additionally agree to follow up as recommended or return to the Emergency Room should their condition change prior to their follow-up appointment. Patient verbally agrees with the care-plan and verbally conveys that all of their questions have been answered. Discharge instructions have also been provided to the patient with some educational information regarding their diagnosis as well a list of reasons why they would want to return to the ER prior to their follow-up appointment should their condition change. Diagnosis   Clinical Impression:   1. Hypoglycemia         Discussion:  45 y.o. male presenting with seizure like activity after a hypoglycemic attack. Blood glucose, per EMS was in 50s, 160 upon arrival. Labs were unremarkable. Able to get blood glucose to 250's here. Will discharge home with PCP follow up.     Follow-up Information     Follow up With Details Comments [address], [place] Schedule an appointment as soon as possible for a visit in 2 days for primary care follow up [address]      THE [facility] EMERGENCY DEPT Go to As needed, If symptoms worsen [address] [place] [id number]  [phone]          Discharge Medication List as of 10/2/2017  9:23 PM          _______________________________   Attestations:     SCRIBE ATTESTATION:  This note is prepared by Meet Soriano, acting as Scribe for Marti Amos MD.    PROVIDER ATTESTATION:  Marti Amos MD: The scribe's documentation has been prepared under my direction and personally reviewed by me in its entirety.  I confirm that the note above accurately reflects all work, treatment, procedures, and medical decision making performed by me.   _______________________________

## 2017-10-02 NOTE — ED TRIAGE NOTES
Pt had pancreas removed for chronic pancreatitis. Has insulin pump. Tonight dropped low and had seizure activity. Pt alert and oriented at this time. EMS gave oral glucose and 1 amp D50. FSBS 160 upon arrival by EMS. Sepsis Screening completed    (  )Patient meets SIRS criteria. ( x )Patient does not meet SIRS criteria.       SIRS Criteria is achieved when two or more of the following are present   Temperature < 96.8°F (36°C) or > 100.9°F (38.3°C)   Heart Rate > 90 beats per minute   Respiratory Rate > 20 breaths per minute   WBC count > 12,000 or <4,000 or > 10% bands

## 2017-10-03 NOTE — ED NOTES
Pt presents to the ER with complaints of hypoglycemia. Pt states he has 1 episode of hypoglycemia 1 week ago while in [place]. Pt has an insulin pump on which patient has currently turned off while in the ER. Pt FSBS upon arrival in the ER is 145. Pt was provided a boxed lunch. Pt placed on cardiac monitor with vital signs stable. Bed in lowest locked position, side rails up x 1 and call bell in reach of patient and patient instructed to call for any assistance needed.

## 2017-10-03 NOTE — DISCHARGE INSTRUCTIONS
Hypoglycemia: Care Instructions  Your Care Instructions  Hypoglycemia means that your blood sugar is low and your body is not getting enough fuel. Some people get low blood sugar from not eating often enough. Some medicines to treat diabetes can cause low blood sugar. People who have had surgery on their stomachs or intestines may get hypoglycemia. Problems with the pancreas, kidneys, or liver also can cause low blood sugar. A snack or drink with sugar in it will raise your blood sugar and should ease your symptoms right away. Your doctor may recommend that you change or stop your medicines until you can get your blood sugar levels under control. In the long run, you may need to change your diet and eating habits so that you get enough fuel for your body throughout the day. Follow-up care is a key part of your treatment and safety. Be sure to make and go to all appointments, and call your doctor if you are having problems. It's also a good idea to know your test results and keep a list of the medicines you take. How can you care for yourself at home? · Learn to recognize the early signs of low blood sugar. Signs include:  ¨ Nausea. ¨ Hunger. ¨ Feeling nervous, irritable, or shaky. ¨ Cold, clammy, wet skin. ¨ Sweating (when you are not exercising). ¨ A fast heartbeat. ¨ Numbness or tingling of the fingertips or lips. · If you feel an episode of low blood sugar coming on, drink fruit juice or sugared (not diet) soda, or eat sugar in the form of candy, cubes, or tablets. [employer] are another [employer]. · Eat small, frequent meals so that you do not get too hungry between meals. · Balance extra exercise with eating more. · Keep a written record of your low blood sugar episodes, including when you last ate and what you ate, so that you can learn what causes your blood sugar to drop.   · Make sure your family, friends, and coworkers know the symptoms of low blood sugar and know what to do to get your sugar level up. · Wear medical alert jewelry that lists your condition. You can buy this at most drugstores. When should you call for help? Call 911 anytime you think you may need emergency care. For example, call if:  · You passed out (lost consciousness). · You are confused or cannot think clearly. · Your blood sugar is very high or very low. Watch closely for changes in your health, and be sure to contact your doctor if:  · Your blood sugar stays outside the level your doctor set for you. · You have any problems. Where can you learn more? Go to http://isauro-maday.info/. Enter V228 in the search box to learn more about \"Hypoglycemia: Care Instructions. \"  Current as of: March 13, 2017  Content Version: 11.3  © 6980-4015 [employer]. Care instructions adapted under license by [employer] (which disclaims liability or warranty for this information). If you have questions about a medical condition or this instruction, always ask your healthcare professional. [address] any warranty or liability for your use of this information.

## 2017-10-07 LAB
ATRIAL RATE: 93 BPM
CALCULATED P AXIS, ECG09: 53 DEGREES
CALCULATED R AXIS, ECG10: -10 DEGREES
CALCULATED T AXIS, ECG11: 60 DEGREES
DIAGNOSIS, 93000: NORMAL
P-R INTERVAL, ECG05: 192 MS
Q-T INTERVAL, ECG07: 364 MS
QRS DURATION, ECG06: 96 MS
QTC CALCULATION (BEZET), ECG08: 452 MS
VENTRICULAR RATE, ECG03: 93 BPM

## 2018-08-06 NOTE — PROGRESS NOTES
Shift summary: Pt A&Ox4, up ad artem, voiding per urinal. Pain managed with IV Dilaudid. Denies nausea. Pt NPO at midnight for procedure 8/25. Informed Dr. Jacquie Acuna of patient's refusal of Lantus 18 units. Pt does not feel comfortable with that amount of Lantus. Dr. Jacquie Acuna made aware that pt BS was 140. Received no new orders. Surgeon: Dr. Jax Worley

## 2019-02-15 ENCOUNTER — HOSPITAL ENCOUNTER (EMERGENCY)
Age: 54
Discharge: HOME | End: 2019-02-16
Attending: EMERGENCY MEDICINE
Payer: MEDICARE

## 2019-02-15 DIAGNOSIS — N13.2 HYDRONEPHROSIS WITH URINARY OBSTRUCTION DUE TO RENAL CALCULUS: ICD-10-CM

## 2019-02-15 DIAGNOSIS — N20.0 KIDNEY STONE ON LEFT SIDE: Primary | ICD-10-CM

## 2019-02-15 LAB
ALBUMIN SERPL-MCNC: 4.2 G/DL (ref 3.4–5)
ALBUMIN/GLOB SERPL: 1.4 {RATIO} (ref 0.8–1.7)
ALP SERPL-CCNC: 129 U/L (ref 45–117)
ALT SERPL-CCNC: 46 U/L (ref 16–61)
ANION GAP SERPL CALC-SCNC: 10 MMOL/L (ref 3–18)
APPEARANCE UR: CLEAR
AST SERPL-CCNC: 32 U/L (ref 15–37)
BACTERIA URNS QL MICRO: ABNORMAL /HPF
BASOPHILS # BLD: 0 K/UL (ref 0–0.1)
BASOPHILS NFR BLD: 0 % (ref 0–2)
BILIRUB SERPL-MCNC: 0.7 MG/DL (ref 0.2–1)
BILIRUB UR QL: NEGATIVE
BUN SERPL-MCNC: 22 MG/DL (ref 7–18)
BUN/CREAT SERPL: 16 (ref 12–20)
CALCIUM SERPL-MCNC: 9.6 MG/DL (ref 8.5–10.1)
CHLORIDE SERPL-SCNC: 105 MMOL/L (ref 100–108)
CO2 SERPL-SCNC: 24 MMOL/L (ref 21–32)
COLOR UR: YELLOW
CREAT SERPL-MCNC: 1.39 MG/DL (ref 0.6–1.3)
DIFFERENTIAL METHOD BLD: ABNORMAL
EOSINOPHIL # BLD: 0.1 K/UL (ref 0–0.4)
EOSINOPHIL NFR BLD: 1 % (ref 0–5)
EPITH CASTS URNS QL MICRO: NEGATIVE /LPF (ref 0–5)
ERYTHROCYTE [DISTWIDTH] IN BLOOD BY AUTOMATED COUNT: 13.1 % (ref 11.6–14.5)
GLOBULIN SER CALC-MCNC: 3.1 G/DL (ref 2–4)
GLUCOSE SERPL-MCNC: 193 MG/DL (ref 74–99)
GLUCOSE UR STRIP.AUTO-MCNC: 100 MG/DL
HCT VFR BLD AUTO: 45.2 % (ref 36–48)
HGB BLD-MCNC: 15 G/DL (ref 13–16)
HGB UR QL STRIP: ABNORMAL
KETONES UR QL STRIP.AUTO: 15 MG/DL
LEUKOCYTE ESTERASE UR QL STRIP.AUTO: ABNORMAL
LYMPHOCYTES # BLD: 0.8 K/UL (ref 0.9–3.6)
LYMPHOCYTES NFR BLD: 6 % (ref 21–52)
MCH RBC QN AUTO: 29.8 PG (ref 24–34)
MCHC RBC AUTO-ENTMCNC: 33.2 G/DL (ref 31–37)
MCV RBC AUTO: 89.9 FL (ref 74–97)
MONOCYTES # BLD: 0.6 K/UL (ref 0.05–1.2)
MONOCYTES NFR BLD: 5 % (ref 3–10)
MUCOUS THREADS URNS QL MICRO: ABNORMAL /LPF
NEUTS SEG # BLD: 11.4 K/UL (ref 1.8–8)
NEUTS SEG NFR BLD: 88 % (ref 40–73)
NITRITE UR QL STRIP.AUTO: NEGATIVE
PH UR STRIP: 5 [PH] (ref 5–8)
PLATELET # BLD AUTO: 237 K/UL (ref 135–420)
PMV BLD AUTO: 10.6 FL (ref 9.2–11.8)
POTASSIUM SERPL-SCNC: 5.1 MMOL/L (ref 3.5–5.5)
PROT SERPL-MCNC: 7.3 G/DL (ref 6.4–8.2)
PROT UR STRIP-MCNC: ABNORMAL MG/DL
RBC # BLD AUTO: 5.03 M/UL (ref 4.7–5.5)
RBC #/AREA URNS HPF: ABNORMAL /HPF (ref 0–5)
RBC MORPH BLD: ABNORMAL
SODIUM SERPL-SCNC: 139 MMOL/L (ref 136–145)
SP GR UR REFRACTOMETRY: 1.02 (ref 1–1.03)
UROBILINOGEN UR QL STRIP.AUTO: 0.2 EU/DL (ref 0.2–1)
WBC # BLD AUTO: 12.9 K/UL (ref 4.6–13.2)
WBC URNS QL MICRO: ABNORMAL /HPF (ref 0–5)

## 2019-02-15 PROCEDURE — 74011250636 HC RX REV CODE- 250/636: Performed by: EMERGENCY MEDICINE

## 2019-02-15 PROCEDURE — 81001 URINALYSIS AUTO W/SCOPE: CPT

## 2019-02-15 PROCEDURE — 99285 EMERGENCY DEPT VISIT HI MDM: CPT

## 2019-02-15 PROCEDURE — 96361 HYDRATE IV INFUSION ADD-ON: CPT

## 2019-02-15 PROCEDURE — 80053 COMPREHEN METABOLIC PANEL: CPT

## 2019-02-15 PROCEDURE — 96375 TX/PRO/DX INJ NEW DRUG ADDON: CPT

## 2019-02-15 PROCEDURE — 96374 THER/PROPH/DIAG INJ IV PUSH: CPT

## 2019-02-15 PROCEDURE — 85025 COMPLETE CBC W/AUTO DIFF WBC: CPT

## 2019-02-15 RX ORDER — ONDANSETRON 2 MG/ML
4 INJECTION INTRAMUSCULAR; INTRAVENOUS
Status: COMPLETED | OUTPATIENT
Start: 2019-02-15 | End: 2019-02-15

## 2019-02-15 RX ORDER — KETOROLAC TROMETHAMINE 15 MG/ML
15 INJECTION, SOLUTION INTRAMUSCULAR; INTRAVENOUS
Status: COMPLETED | OUTPATIENT
Start: 2019-02-15 | End: 2019-02-15

## 2019-02-15 RX ADMIN — KETOROLAC TROMETHAMINE 15 MG: 15 INJECTION, SOLUTION INTRAMUSCULAR; INTRAVENOUS at 21:26

## 2019-02-15 RX ADMIN — SODIUM CHLORIDE 1000 ML: 900 INJECTION, SOLUTION INTRAVENOUS at 21:26

## 2019-02-15 RX ADMIN — ONDANSETRON HYDROCHLORIDE 4 MG: 2 INJECTION INTRAMUSCULAR; INTRAVENOUS at 21:26

## 2019-02-16 ENCOUNTER — APPOINTMENT (OUTPATIENT)
Dept: CT IMAGING | Age: 54
End: 2019-02-16
Attending: EMERGENCY MEDICINE
Payer: MEDICARE

## 2019-02-16 VITALS
WEIGHT: 140 LBS | BODY MASS INDEX: 20.04 KG/M2 | DIASTOLIC BLOOD PRESSURE: 71 MMHG | HEIGHT: 70 IN | HEART RATE: 87 BPM | RESPIRATION RATE: 14 BRPM | TEMPERATURE: 98.3 F | OXYGEN SATURATION: 100 % | SYSTOLIC BLOOD PRESSURE: 112 MMHG

## 2019-02-16 LAB — GLUCOSE BLD STRIP.AUTO-MCNC: 184 MG/DL (ref 70–110)

## 2019-02-16 PROCEDURE — 74176 CT ABD & PELVIS W/O CONTRAST: CPT

## 2019-02-16 PROCEDURE — 82962 GLUCOSE BLOOD TEST: CPT

## 2019-02-16 PROCEDURE — 96372 THER/PROPH/DIAG INJ SC/IM: CPT

## 2019-02-16 PROCEDURE — 74011250636 HC RX REV CODE- 250/636: Performed by: EMERGENCY MEDICINE

## 2019-02-16 RX ORDER — HYDROCODONE BITARTRATE AND ACETAMINOPHEN 5; 325 MG/1; MG/1
1 TABLET ORAL
Qty: 20 TAB | Refills: 0 | Status: SHIPPED | OUTPATIENT
Start: 2019-02-16 | End: 2021-02-26

## 2019-02-16 RX ORDER — KETOROLAC TROMETHAMINE 30 MG/ML
15 INJECTION, SOLUTION INTRAMUSCULAR; INTRAVENOUS
Status: COMPLETED | OUTPATIENT
Start: 2019-02-16 | End: 2019-02-16

## 2019-02-16 RX ORDER — TAMSULOSIN HYDROCHLORIDE 0.4 MG/1
0.4 CAPSULE ORAL DAILY
Qty: 15 CAP | Refills: 0 | Status: SHIPPED | OUTPATIENT
Start: 2019-02-16 | End: 2019-02-19

## 2019-02-16 RX ADMIN — KETOROLAC TROMETHAMINE 15 MG: 30 INJECTION, SOLUTION INTRAMUSCULAR at 02:18

## 2019-02-16 NOTE — ED PROVIDER NOTES
EMERGENCY DEPARTMENT HISTORY AND PHYSICAL EXAM 
 
Date: 2/15/2019 Patient Name: Margarito Salas History of Presenting Illness Chief Complaint Patient presents with  Kidney Stone History Provided By: Patient Chief Complaint: Left flank pain Duration: 5 Hours Timing:  Acute Location: Left flank Severity: 6 out of 10 Modifying Factors: No nausea relief with Zofran Associated Symptoms: N/V and urinary retention Additional History (Context):  
9:26 PM 
Margarito Salas is a 52 y.o. male with PMHX of IDDM, recurrent kidney stones, and gastroparesis who presents to the emergency department C/O 6/10 left flank pain with radiation to abd, onset 5 hours ago. Associated sxs include N/V and urinary retention. No nausea relief with Zofran. Pt complaining of flank pain and N/V that are similar in symptomology to previous kidney stones. Pt has hx of recurrent kidney stones and gastroparesis  and is followed by Yoli Chang MD (Urology). His next f/u appointment is in 9 months. Pt also notes that he previously seen by his PCP yesterday who noted a thickening of his cardiac wall. He has been referred to a cardiologist. Pt denies hematuria, fever, diarrhea, right sided abd pain, CP, SOB and any other sxs or complaints. PCP: Suzy Thomas MD 
 
Current Outpatient Medications Medication Sig Dispense Refill  
 HYDROcodone-acetaminophen (NORCO) 5-325 mg per tablet Take 1 Tab by mouth every four (4) hours as needed for Pain. Max Daily Amount: 6 Tabs. 20 Tab 0  
 tamsulosin (FLOMAX) 0.4 mg capsule Take 1 Cap by mouth daily for 15 days. 15 Cap 0  
 HYDROcodone-acetaminophen (NORCO) 5-325 mg per tablet Take 1-2 Tabs by mouth every four (4) hours as needed. Max Daily Amount: 12 Tabs. 10 Tab 0  
 levoFLOXacin (LEVAQUIN) 250 mg tablet Take 1 Tab by mouth daily. 4 Tab 0  pregabalin (LYRICA) 150 mg capsule Take 150 mg by mouth three (3) times daily. Indications: Diabetic Peripheral Neuropathy  triazolam (HALCION) 0.25 mg tablet Take 0.25 mg by mouth nightly as needed (midnight).  leflunomide (ARAVA) 10 mg tablet Take 10 mg by mouth daily.  insulin aspart (NOVOLOG) 100 unit/mL injection by Continuous Infusion route.  metoclopramide HCl (REGLAN) 10 mg tablet Take 10 mg by mouth as needed.  lamoTRIgine (LAMICTAL) 100 mg tablet Take 100 mg by mouth daily.  riTUXimab (RITUXAN) 10 mg/mL injection by IntraVENous route once. Every 6 months  VENLAFAXINE HCL (EFFEXOR XR PO) Take 300 mg by mouth once.  ALPRAZolam (XANAX) 0.5 mg tablet Take 0.5 mg by mouth nightly as needed (5 pm).  amylase-lipase-protease (CREON) capsule Take 1 Cap by mouth three (3) times daily (with meals). 1 tab with snacks , 2 tab with meals  ondansetron hcl (ZOFRAN) 4 mg tablet Take 4 mg by mouth every eight (8) hours as needed. Past History Past Medical History: 
Past Medical History:  
Diagnosis Date  Arthritis  BPH (benign prostatic hyperplasia)  Chronic pain  COPD  Depression  Diverticulosis  Gastritis  Gastroparesis  GERD (gastroesophageal reflux disease)   
 fair control with meds  Hiatal hernia  Hypercholesterolemia  Migraine  Other ill-defined conditions(799.89) chronic pancreatitis  Pancreatitis, chronic ([address]  Psychiatric disorder   
 emetophobia  RA (rheumatoid arthritis) [address] [address] 8/24/2017  Rheumatoid arthritis(714.0)  Type 1 diabetes mellitus ([facility] [address] 8/24/2017  Vertigo, peripheral   
 
 
Past Surgical History: 
Past Surgical History:  
Procedure Laterality Date  ABDOMEN SURGERY PROC UNLISTED    
 whipple procedure  HX CERVICAL DISKECTOMY  2001  HX ORTHOPAEDIC    
 left foot surgery x 2  
 HX ORTHOPAEDIC    
 joint replacement  right index finger Family History: 
Family History Problem Relation Age of Onset  Malignant Hyperthermia Neg Hx  Pseudocholinesterase Deficiency Neg Hx  Delayed Awakening Neg Hx  Post-op Nausea/Vomiting Neg Hx  Emergence Delirium Neg Hx  Post-op Cognitive Dysfunction Neg Hx   
 Other Neg Hx Social History: 
Social History Tobacco Use  Smoking status: Former Smoker Packs/day: 1.00 Years: 35.00 Pack years: 35.00  Smokeless tobacco: Never Used Substance Use Topics  Alcohol use: No  
 Drug use: No  
 
 
Allergies: Allergies Allergen Reactions  Fentanyl Shortness of Breath  Penicillins Hives and Swelling  Thimerosal Hives and Swelling Review of Systems Review of Systems Constitutional: Negative for chills and fever. HENT: Negative for congestion, ear pain, sinus pain and sore throat. Eyes: Negative for pain and visual disturbance. Respiratory: Negative for cough and shortness of breath. Cardiovascular: Negative for chest pain and leg swelling. Gastrointestinal: Positive for abdominal pain (right sided), nausea and vomiting. Negative for constipation and diarrhea. Genitourinary: Positive for decreased urine volume and flank pain. Negative for dysuria and hematuria. Musculoskeletal: Negative for back pain and neck pain. All other systems reviewed and are negative. Physical Exam  
 
Vitals:  
 02/15/19 2029 BP: 115/68 Pulse: 89 Resp: 16 Temp: 98 °F (36.7 °C) SpO2: 100% Weight: 63.5 kg (140 lb) Height: 5' 10\" (1.778 m) Physical Exam  
Nursing note and vitals reviewed. Constitutional: 53 yo  male. No acute distress Head: Normocephalic, Atraumatic Eyes: Pupils are equal, round, and reactive to light, EOMI, no scleral icterus, no conjunctival pallor ENT: moist mucous membranes, hearing intact Neck: Supple, non-tender Cardiovascular: Regular rate and rhythm, no murmurs, rubs, or gallops Chest: Normal work of breathing and chest excursion bilaterally Lungs: Clear to ausculation bilaterally, no wheezes, no rhonchi Abdomen: Soft, non tender, non distended, normoactive bowel sounds Back: No evidence of trauma or deformity. No CVA tenderness. Extremities: No evidence of trauma or deformity, no LE edema Skin: Warm and dry, normal cap refill Neuro: Alert and appropriate, CN intact, normal speech, moving all 4 extremities freely and symmetrically Psychiatric: Cooperative, appropriate mood Diagnostic Study Results Labs - Recent Results (from the past 12 hour(s)) CBC WITH AUTOMATED DIFF Collection Time: 02/15/19  9:37 PM  
Result Value Ref Range WBC 12.9 4.6 - 13.2 K/uL  
 RBC 5.03 4.70 - 5.50 M/uL  
 HGB 15.0 13.0 - 16.0 g/dL HCT 45.2 36.0 - 48.0 % MCV 89.9 74.0 - 97.0 FL  
 MCH 29.8 24.0 - 34.0 PG  
 MCHC 33.2 31.0 - 37.0 g/dL  
 RDW 13.1 11.6 - 14.5 % PLATELET 505 820 - 890 K/uL MPV 10.6 9.2 - 11.8 FL  
 NEUTROPHILS 88 (H) 40 - 73 % LYMPHOCYTES 6 (L) 21 - 52 % MONOCYTES 5 3 - 10 % EOSINOPHILS 1 0 - 5 % BASOPHILS 0 0 - 2 %  
 ABS. NEUTROPHILS 11.4 (H) 1.8 - 8.0 K/UL  
 ABS. LYMPHOCYTES 0.8 (L) 0.9 - 3.6 K/UL  
 ABS. MONOCYTES 0.6 0.05 - 1.2 K/UL  
 ABS. EOSINOPHILS 0.1 0.0 - 0.4 K/UL  
 ABS. BASOPHILS 0.0 0.0 - 0.1 K/UL  
 RBC COMMENTS NORMOCYTIC, NORMOCHROMIC    
 DF AUTOMATED METABOLIC PANEL, COMPREHENSIVE Collection Time: 02/15/19  9:37 PM  
Result Value Ref Range Sodium 139 136 - 145 mmol/L Potassium 5.1 3.5 - 5.5 mmol/L Chloride 105 100 - 108 mmol/L  
 CO2 24 21 - 32 mmol/L Anion gap 10 3.0 - 18 mmol/L Glucose 193 (H) 74 - 99 mg/dL BUN 22 (H) 7.0 - 18 MG/DL Creatinine 1.39 (H) 0.6 - 1.3 MG/DL  
 BUN/Creatinine ratio 16 12 - 20 GFR est AA >60 >60 ml/min/1.73m2 GFR est non-AA 53 (L) >60 ml/min/1.73m2 Calcium 9.6 8.5 - 10.1 MG/DL Bilirubin, total 0.7 0.2 - 1.0 MG/DL  
 ALT (SGPT) 46 16 - 61 U/L  
 AST (SGOT) 32 15 - 37 U/L Alk. phosphatase 129 (H) 45 - 117 U/L Protein, total 7.3 6.4 - 8.2 g/dL Albumin 4.2 3.4 - 5.0 g/dL Globulin 3.1 2.0 - 4.0 g/dL A-G Ratio 1.4 0.8 - 1.7 URINALYSIS W/ RFLX MICROSCOPIC Collection Time: 02/15/19 11:25 PM  
Result Value Ref Range Color YELLOW Appearance CLEAR Specific gravity 1.020 1.005 - 1.030    
 pH (UA) 5.0 5.0 - 8.0 Protein TRACE (A) NEG mg/dL Glucose 100 (A) NEG mg/dL Ketone 15 (A) NEG mg/dL Bilirubin NEGATIVE  NEG Blood MODERATE (A) NEG Urobilinogen 0.2 0.2 - 1.0 EU/dL Nitrites NEGATIVE  NEG Leukocyte Esterase TRACE (A) NEG URINE MICROSCOPIC ONLY Collection Time: 02/15/19 11:25 PM  
Result Value Ref Range WBC 2 to 5 0 - 5 /hpf  
 RBC 5 to 10 0 - 5 /hpf Epithelial cells NEGATIVE  0 - 5 /lpf Bacteria FEW (A) NEG /hpf Mucus 3+ (A) NEG /lpf Radiologic Studies - 
CT ABD PELV WO CONT Final Result IMPRESSION:  
  
5 x 6 mm calculus proximal left ureter with mild left proximal hydroureter and  
hydronephrosis. Nonobstructing bilateral renal calculi. Stable 3.5 cm infrarenal abdominal aortic aneurysm. CT Results  (Last 48 hours) 02/16/19 0046  CT ABD PELV WO CONT Final result Impression:  IMPRESSION:  
   
5 x 6 mm calculus proximal left ureter with mild left proximal hydroureter and  
hydronephrosis. Nonobstructing bilateral renal calculi. Stable 3.5 cm infrarenal abdominal aortic aneurysm. Narrative:  EXAM: CT of the abdomen and pelvis INDICATION: Flank pain. COMPARISON: August 24, 2017. TECHNIQUE: Axial CT imaging of the abdomen and pelvis was performed without  
intravenous contrast. Multiplanar reformats were generated. Dose reduction  
techniques used: automated exposure control, adjustment of the mAs and/or kVp  
according to patient size, and iterative reconstruction techniques. _______________ FINDINGS:  
   
LOWER CHEST: There is scarring at the left lung base. LIVER, BILIARY: The liver is normal in appearance. Biliary air is again noted. There has been a prior cholecystectomy. PANCREAS: Diffuse pancreatic calcification and atrophy is noted. SPLEEN: Normal.  
   
ADRENALS: Normal.  
   
KIDNEYS: There is a 2 mm calculus upper pole of the right kidney. There are  
scattered 1 to 2 mm calculi within the left kidney. There is mild left  
hydronephrosis and hydroureter to the level of a 5 x 6 mm calculus proximal left  
ureter. LYMPH NODES: No enlarged lymph nodes. GASTROINTESTINAL TRACT: No bowel dilation or wall thickening. The appendix is  
visualized and is within normal limits. There is retained stool throughout the  
colon. PELVIC ORGANS: Unremarkable. VASCULATURE: There is atherosclerotic plaque of the abdominal aorta with a  
stable 3.9 cm infrarenal abdominal aortic aneurysm. BONES: There is degenerative change throughout the lumbar spine with mild  
curvature to the right. There is grade 1 anterolisthesis L5 over S1 with L5  
spondylolysis. Michaelyn Daily OTHER: None.  
   
_______________ CXR Results  (Last 48 hours) None Medications given in the ED- Medications  
ketorolac (TORADOL) injection 15 mg (15 mg IntraVENous Given 2/15/19 2126)  
sodium chloride 0.9 % bolus infusion 1,000 mL (0 mL IntraVENous IV Completed 2/15/19 2226) ondansetron [facility]) injection 4 mg (4 mg IntraVENous Given 2/15/19 2126) Medical Decision Making I am the first provider for this patient. I reviewed the vital signs, available nursing notes, past medical history, past surgical history, family history and social history. Vital Signs-Reviewed the patient's vital signs. Pulse Oximetry Analysis - 100% on RA Cardiac Monitor: 
Rate: 89 bpm 
Rhythm: NSR Records Reviewed: Nursing Notes and Old Medical Records Provider Notes (Medical Decision Making): 53 yo male hx of recurrent kidney stones and gastroparesis presenting with left flank pain, N/V and urinary retention. On exam, pt is in NAD with a benign abd exam. Will check kidney function and a CT scan of his abd/pelvis to evaluate for stone and will treat him symptomatically. Procedures: 
Procedures ED Course:  
9:26 PM Initial assessment performed. The patients presenting problems have been discussed, and they are in agreement with the care plan formulated and outlined with them. I have encouraged them to ask questions as they arise throughout their visit. UA reassuring, not indicative of UTI. Cr stable. CT showing 5-6 mm L ureteral stone with hydro-uternephrosis. CONSULT NOTE: 
1:50 AM 
General Renny DO spoke with Samson Acosta MD, Specialty: Urology Discussed pt's hx, disposition, and available diagnostic and imaging results. Reviewed care plans. Consultant agrees with plans as outlined. Samson Acosta MD advises follow up with Yonathan Boyle MD. 
Written by Qi Romero, ED Scribe, as dictated by General Renny DO. PROGRESS NOTE: 
1:5 AM 
Will DC with short course of Norco for breakthrough pain and Flomax. Urge close FU with Dr. Jhoan June. Pt and/or family have been updated on their results. Pt and/or pt's family are aware of the plan of care and are in agreement. Written by Qi Romero, ED Scribe, as dictated by General Renny DO. Diagnosis and Disposition DISCHARGE NOTE: 
1:53 AM 
Maggy Morel  results have been reviewed with him. He has been counseled regarding his diagnosis, treatment, and plan. He verbally conveys understanding and agreement of the signs, symptoms, diagnosis, treatment and prognosis and additionally agrees to follow up as discussed. He also agrees with the care-plan and conveys that all of his questions have been answered.   I have also provided discharge instructions for him that include: educational information regarding their diagnosis and treatment, and list of reasons why they would want to return to the ED prior to their follow-up appointment, should his condition change. He has been provided with education for proper emergency department utilization. CLINICAL IMPRESSION: 
 
1. Kidney stone on left side 2. Hydronephrosis with urinary obstruction due to renal calculus PLAN: 
1. D/C Home 2. Current Discharge Medication List  
  
START taking these medications Details  
!! HYDROcodone-acetaminophen (NORCO) 5-325 mg per tablet Take 1 Tab by mouth every four (4) hours as needed for Pain. Max Daily Amount: 6 Tabs. Qty: 20 Tab, Refills: 0 Associated Diagnoses: Kidney stone on left side; Hydronephrosis with urinary obstruction due to renal calculus  
  
tamsulosin (FLOMAX) 0.4 mg capsule Take 1 Cap by mouth daily for 15 days. Qty: 15 Cap, Refills: 0  
  
 !! - Potential duplicate medications found. Please discuss with provider. CONTINUE these medications which have NOT CHANGED Details  
!! HYDROcodone-acetaminophen (NORCO) 5-325 mg per tablet Take 1-2 Tabs by mouth every four (4) hours as needed. Max Daily Amount: 12 Tabs. Qty: 10 Tab, Refills: 0  
  
 !! - Potential duplicate medications found. Please discuss with provider. 3.  
Follow-up Information Follow up With Specialties Details Why Contact Info Beth Villavicencio MD Urology Schedule an appointment as soon as possible for a visit for urology follow up [address] [place] [address] 
[phone] [facility] EMERGENCY DEPT Emergency Medicine  As needed, If symptoms worsen [address] [place] [id number] 
[phone]  
  
 
_______________________________ Attestations: This note is prepared by Maribel Farnsworth and Carlitos Serrano, acting as Scribes for Kathy Sequeira DO.  
 
Kathy Sequeira DO:  The scribe's documentation has been prepared under my direction and personally reviewed by me in its entirety. I confirm that the note above accurately reflects all work, treatment, procedures, and medical decision making performed by me. 
_______________________________

## 2019-02-16 NOTE — DISCHARGE INSTRUCTIONS
Learning About Hydronephrosis  What is hydronephrosis? Hydronephrosis is swelling of the kidneys. It is caused by a buildup of urine. This condition can happen if a tube that drains urine from your kidneys is blocked. The blockage can come from within the urinary tract or from pressure outside of the tract. Pregnancy is an example of an outside (external) cause. This condition is often caused by a blockage such as a kidney stone, tumor, or blood clot. It also can be caused by a problem in your urinary system that you were born with (congenital problem). What are the symptoms? Some of the common symptoms are:  · Pain in one or both sides. · Stomach pain. · Blood in your urine. Some people have no symptoms. How is it diagnosed? Your doctor will do an ultrasound to look for a blockage in your urinary system. An ultrasound allows your doctor to see a picture of the organs and other structures in your belly (abdomen). You also may need blood and urine tests. How is it treated? Your treatment depends on the cause of the swelling. If it is caused by a blockage, your treatment will depend on the type of blockage you have. If the blockage is caused by a kidney stone, you may wait for the stone to pass. If hydronephrosis happens during pregnancy, it usually clears up on its own. You may need to have urine drained from your bladder or kidneys. A urinary catheter is a small, flexible tube that can be inserted through the urethra and into the bladder, allowing urine to drain. A nephrostomy catheter is a thin tube placed into your kidney to drain urine. Sometimes surgery is needed to clear the blockage. If you have a blockage, you should begin to feel better after the blockage is gone. Many people recover and have no long-term problems. But some may have kidney damage. If hydronephrosis was left untreated for a long time, the damage can be severe. Severe damage will require further treatment.   Follow-up care is a key part of your treatment and safety. Be sure to make and go to all appointments, and call your doctor if you are having problems. It's also a good idea to know your test results and keep a list of the medicines you take. Where can you learn more? Go to http://isauro-maday.info/. Enter S386 in the search box to learn more about \"Learning About Hydronephrosis. \"  Current as of: March 14, 2018  Content Version: 11.9  © 1895-6851 [employer]. Care instructions adapted under license by [employer] (which disclaims liability or warranty for this information). If you have questions about a medical condition or this instruction, always ask your healthcare professional. [address] any warranty or liability for your use of this information. Learning About Diet for Kidney Stone Prevention  What are kidney stones? Kidney stones are made of salts and minerals in the urine that form small \"paulina. \" Stones can form in the kidneys and the ureters (the tubes that lead from the kidneys to the bladder). They can also form in the bladder. Stones may not cause a problem as long as they stay in the kidneys. But they can cause sudden, severe pain. Pain is most likely when the stones travel from the kidneys to the bladder. Kidney stones can cause bloody urine. Kidney stones often run in families. You are more likely to get them if you don't drink enough fluids, mainly water. Certain foods and drinks and some dietary supplements may also increase your risk for kidney stones if you consume too much of them. What can you do to prevent kidney stones? Changing what you eat may not prevent all types of kidney stones. But for people who have a history of certain kinds of kidney stones, some changes in diet may help. A dietitian can help you set up a meal plan that includes healthy, low-oxalate choices. Here are some general guidelines to get you started.   Plan your meals and snacks around foods that are low in oxalate. These foods include:  · Corn, kale, parsnips, and squash,. · Beef, chicken, pork, turkey, and fish. · Milk, butter, cheese, and yogurt. You can eat certain foods that are medium-high in oxalate, but eat them only once in a while. These foods include:  · Bread. · Brown rice. · English muffins. · Figs. · Popcorn. · String beans. · Tomatoes. Limit very high-oxalate foods, including:  · Black tea. · Coffee. · Chocolate. · Dark green vegetables. · Nuts. Here are some other things you can do to help prevent kidney stones. · Drink plenty of fluids. If you have kidney, heart, or liver disease and have to limit fluids, talk with your doctor before you increase the amount of fluids you drink. · Do not take more than the recommended daily dose of vitamins C and D.  · Limit the salt in your diet. · Eat a balanced diet that is not too high in protein. Follow-up care is a key part of your treatment and safety. Be sure to make and go to all appointments, and call your doctor if you are having problems. It's also a good idea to know your test results and keep a list of the medicines you take. Where can you learn more? Go to http://isauro-maday.info/. Enter C138 in the search box to learn more about \"Learning About Diet for Kidney Stone Prevention. \"  Current as of: March 28, 2018  Content Version: 11.9  © 3948-3892 [employer]. Care instructions adapted under license by [employer] (which disclaims liability or warranty for this information). If you have questions about a medical condition or this instruction, always ask your healthcare professional. [place] [address] any warranty or liability for your use of this information.     Kidney Stone: Care Instructions  Your Care Instructions    Kidney stones are formed when salts, minerals, and other substances normally found in the urine clump together. They can be as small as grains of sand or, rarely, as large as golf balls. While the stone is traveling through the ureter, which is the tube that carries urine from the kidney to the bladder, you will probably feel pain. The pain may be mild or very severe. You may also have some blood in your urine. As soon as the stone reaches the bladder, any intense pain should go away. If a stone is too large to pass on its own, you may need a medical procedure to help you pass the stone. The doctor has checked you carefully, but problems can develop later. If you notice any problems or new symptoms, get medical treatment right away. Follow-up care is a key part of your treatment and safety. Be sure to make and go to all appointments, and call your doctor if you are having problems. It's also a good idea to know your test results and keep a list of the medicines you take. How can you care for yourself at home? · Drink plenty of fluids, enough so that your urine is light yellow or clear like water. If you have kidney, heart, or liver disease and have to limit fluids, talk with your doctor before you increase the amount of fluids you drink. · Take pain medicines exactly as directed. Call your doctor if you think you are having a problem with your medicine. ? If the doctor gave you a prescription medicine for pain, take it as prescribed. ? If you are not taking a prescription pain medicine, ask your doctor if you can take an over-the-counter medicine. Read and follow all instructions on the label. · Your doctor may ask you to strain your urine so that you can collect your kidney stone when it passes. You can use a kitchen strainer or a tea strainer to catch the stone. Store it in a plastic bag until you see your doctor again. Preventing future kidney stones  Some changes in your diet may help prevent kidney stones.  Depending on the cause of your stones, your doctor may recommend that you:  · Drink plenty of fluids, enough so that your urine is light yellow or clear like water. If you have kidney, heart, or liver disease and have to limit fluids, talk with your doctor before you increase the amount of fluids you drink. · Limit coffee, tea, and alcohol. Also avoid grapefruit juice. · Do not take more than the recommended daily dose of vitamins C and D.  · Avoid antacids such as Gaviscon, Maalox, Mylanta, or Tums. · Limit the amount of salt (sodium) in your diet. · Eat a balanced diet that is not too high in protein. · Limit foods that are high in a substance called oxalate, which can cause kidney stones. These foods include dark green vegetables, rhubarb, chocolate, wheat bran, nuts, cranberries, and beans. When should you call for help? Call your doctor now or seek immediate medical care if:    · You cannot keep down fluids.     · Your pain gets worse.     · You have a fever or chills.     · You have new or worse pain in your back just below your rib cage (the flank area).     · You have new or more blood in your urine.    Watch closely for changes in your health, and be sure to contact your doctor if:    · You do not get better as expected. Where can you learn more? Go to http://isauro-maday.info/. Enter U792 in the search box to learn more about \"Kidney Stone: Care Instructions. \"  Current as of: March 14, 2018  Content Version: 11.9  © 7223-4770 [employer]. Care instructions adapted under license by [employer] (which disclaims liability or warranty for this information). If you have questions about a medical condition or this instruction, always ask your healthcare professional. [place] [address] any warranty or liability for your use of this information.

## 2019-02-16 NOTE — ED TRIAGE NOTES
Pt arrives ambulatory to ED with c\o \"I've got kidney stones again, the pain started at 4 pm and hasnt stopped or let up but gotten worse\", pts pain is located left flank, pt is able to make needs known speaking in complete sentences, pt in nad at this time

## 2019-02-19 NOTE — H&P
Urology [facility] [address]  [address] Tel: [phone] Fax: [phone] Patient: Luis Ferrari YOB: 1965 Date: 02/18/2019 1:00 PM  
Visit Type: Office Visit Assessment/Plan # Detail Type Description 1. Assessment Hydronephrosis with ureteral calculous obstruction (N13.2). Patient Plan Pt with a long hx of kidney stones has a hx of extremely high urinary oxalate. He is here today, b/c on Friday he was seen in the ER at [facility] with left flank pain. A CT revealed a 5x6mm in the upper ureter of the left kidney. I reviewed tx options with the pt, he would like to undergo ureteroscopic stone extraction. He has undergone this before so he is familiar with the procedure. All risks including pain, infection, bleeding were reviewed and he agreed. We will schedule him for a cysto, left RPG, left URS stone extraction with Holmium laser fragmentation and placement of a left double J stent. This 52year old male presents for Kidney and/or Ureteral Stone, Hematuria and BPH. History of Present Illness: 1. Kidney and/or Ureteral Adine Howard Onset was gradual. Severity level is moderate. It occurs intermittently. The problem is worse. Location of pain is left flank. Prior stone type of Calcium oxalate di-hydrate. Pertinent history includes history of prior stone(s). Associated symptoms include dysuria and hematuria. Pertinent negatives include chills, constipation, diarrhea, fever, nausea, pain and vomiting. Additional information: Pt with a hx of kidney stones presented 2/15/19 LEFT flank pain, 5x6mm stone LEFT kidney. I reviewed options and will schedule him Thursday  . Marcus Stevenson 2.  Hematuria The patient presents with hematuria (gross). The problem began suddenly and occurs every 6-12 hours. The problem has resolved. He denies pain.  Pertinent history includes being at least 34years of age but not history of UTIs, family history of stones or prior prostate surgeries. Denies aggravating factors. Denies relieving factors. He denies chills, fever, nausea and vomiting. Additional information: Pt with sudden onset of clots x 2 in urine. He has hx of stones. He had CT scan 8/2017 and then ureteroscopy so no work up needs to be done. 3.  BPH Onset was gradual. It occurs intermittently. The problem is improving. Reviewed today was a PSA taken on 11/12/2018 with findings of 0.607 ng/mL. There were no identified risk factors. Associated symptoms include dysuria, hematuria, intermittent stream, pelvic pressure and urgency. Pertinent negatives include chills, constipation and fever. Additional information: Pt with pelvic pressure and discomfort after voiding also urinates small amount when passing gas. He is using 50mg   He will f/u 1 yr PSA prior. PROBLEM LIST:    
Problem Description Onset Date Chronic Clinical Status Notes Depression  N Hyperlipidemia  N Migraine  N Peripheral neuropathy  N    
Depression  N    
RA  N Asthma  N Pancreatic alpha-amylase deficiency  N Seizures due to metabolic disorder  N    
Kidney stone  Y Diabetes type 1  Y Medications (active prior to today) Medication Instructions Start Date Stop Date Refilled Elsewhere Creon 24,000-76,000-120,000 unit capsule,delayed release  04/03/2013   N Effexor  mg capsule,extended release take 1 capsule by oral route  every day 09/11/2013 09/11/2013 N Lyrica 150 mg capsule take 1 capsule by oral route 3 times every day // 02/18/2019  Y Zofran 4 mg tablet take 2 tablet by oral route 3 times every day 08/31/2017 08/31/2017 N Xanax 0.5 mg tablet take 1 tablet by oral route 3 times every day 08/31/2017 08/31/2017 N  
triazolam 0.25 mg tablet take 1 tablet by oral route  every day at bedtime as needed 08/31/2017   N Reglan 10 mg tablet take 1 tablet by oral route 4 times every day 30 minutes before meals and at bedtime 08/31/2017   N Aspir-81 81 mg tablet,delayed release take 1 tablet by oral route  every day 08/31/2017   N Rituxan 10 mg/mL concentrate,intravenous infuse (375MG/M2)  by intravenous route once 08/31/2017   N Lamictal 100 mg tablet take 1 tablet by oral route  every day 08/31/2017   N  
simvastatin 20 mg tablet take 1 tablet by oral route  every day in the evening //  01/29/2018 Y Myrbetriq 50 mg tablet,extended release take 1 tablet by oral route  every day swallowing whole with water. Do not crush, chew and/or divide. 05/09/2018 02/18/2019  N  
pyridoxine (vitamin B6) 100 mg tablet take 1 by Oral route once 10/12/2018 02/18/2019  N Tums 200 mg calcium (500 mg) chewable tablet One PO with largest meal 10/12/2018   N  
potassium citrate-citric acid 1,100 mg-334 mg/5 mL oral solution take 10 milliliter by oral route 2 times every day diluted with water or juice after meals and at bedtime with a snack 10/12/2018   N Medication Reconciliation Medications reconciled today. Medication Reviewed Adherence Medication Name Sig Desc Elsewhere Status  
taking as directed Effexor  mg capsule,extended release take 1 capsule by oral route  every day N Verified  
taking as directed simvastatin 20 mg tablet take 1 tablet by oral route  every day in the evening Y Verified  
taking as directed Zofran 4 mg tablet take 2 tablet by oral route 3 times every day N Verified  
taking as directed triazolam 0.25 mg tablet take 1 tablet by oral route  every day at bedtime as needed N Verified  
taking as directed Reglan 10 mg tablet take 1 tablet by oral route 4 times every day 30 minutes before meals and at bedtime N Verified  
taking as directed potassium citrate-citric acid 1,100 mg-334 mg/5 mL oral solution take 10 milliliter by oral route 2 times every day diluted with water or juice after meals and at bedtime with a snack N Verified taking as directed Lamictal 100 mg tablet take 1 tablet by oral route  every day N Verified  
taking as directed Tums 200 mg calcium (500 mg) chewable tablet One PO with largest meal N Verified  
taking as directed Xanax 0.5 mg tablet take 1 tablet by oral route 3 times every day N Verified  
taking as directed Rituxan 10 mg/mL concentrate,intravenous infuse (375MG/M2)  by intravenous route once N Verified  
taking as directed Aspir-81 81 mg tablet,delayed release take 1 tablet by oral route  every day N Verified  
taking as directed Creon 24,000-76,000-120,000 unit capsule,delayed release  N Verified Allergies Ingredient Reaction (Severity) Medication Name Comment PENICILLINS THIMEROSAL Reviewed, no changes. Review of Systems System Neg/Pos Details Constitutional Negative Chills, Fever and Pain. ENMT Negative Ear infections and Sore throat. Eyes Negative Blurred vision, Double vision and Eye pain. Respiratory Negative Asthma, Chronic cough, Dyspnea and Wheezing. Cardio Negative Chest pain. GI Negative Constipation, Decreased appetite, Diarrhea, Nausea and Vomiting.  Positive Dysuria, Hematuria, Intermittent urinary stream, Pelvic pressure, Urgency. Endocrine Negative Cold intolerance, Heat intolerance, Increased thirst and Weight loss. Neuro Negative Headache and Tremors. Psych Negative Anxiety and Depression. Integumentary Negative Itching skin and Rash. MS Negative Back pain and Joint pain. Hema/Lymph Negative Easy bleeding. Reproductive Negative Sexual dysfunction. Physical Exam 
Exam Findings Details Constitutional Normal Well developed. Neck Exam Normal Inspection - Normal.  
Respiratory Normal Inspection - Normal.  
Abdomen Normal No abdominal tenderness. Extremity Normal No edema. Neurological Normal Alert and oriented to person, place and time. Cranial nerves intact. No motor or sensory deficits. Psychiatric Normal Orientation - Oriented to time, place, person & situation. Appropriate mood and affect. Medications (added, continued, or stopped today) Start Date Medication Directions PRN Status PRN Reason Instruction Stop Date  
08/31/2017 Aspir-81 81 mg tablet,delayed release take 1 tablet by oral route  every day N     
04/03/2013 Creon 24,000-76,000-120,000 unit capsule,delayed release  N     
09/11/2013 Effexor  mg capsule,extended release take 1 capsule by oral route  every day N     
08/31/2017 Lamictal 100 mg tablet take 1 tablet by oral route  every day N Lyrica 150 mg capsule take 1 capsule by oral route 3 times every day N   02/18/2019 05/09/2018 Myrbetriq 50 mg tablet,extended release take 1 tablet by oral route  every day swallowing whole with water. Do not crush, chew and/or divide. N   02/18/2019  
10/12/2018 potassium citrate-citric acid 1,100 mg-334 mg/5 mL oral solution take 10 milliliter by oral route 2 times every day diluted with water or juice after meals and at bedtime with a snack N     
10/12/2018 pyridoxine (vitamin B6) 100 mg tablet take 1 by Oral route once N   02/18/2019 08/31/2017 Reglan 10 mg tablet take 1 tablet by oral route 4 times every day 30 minutes before meals and at bedtime N     
08/31/2017 Rituxan 10 mg/mL concentrate,intravenous infuse (375MG/M2)  by intravenous route once N     
 simvastatin 20 mg tablet take 1 tablet by oral route  every day in the evening N     
08/31/2017 triazolam 0.25 mg tablet take 1 tablet by oral route  every day at bedtime as needed N     
10/12/2018 Tums 200 mg calcium (500 mg) chewable tablet One PO with largest meal N     
08/31/2017 Xanax 0.5 mg tablet take 1 tablet by oral route 3 times every day N     
08/31/2017 Zofran 4 mg tablet take 2 tablet by oral route 3 times every day N Active Patient Care Team Members Name Contact Agency Type Support Role Relationship Active Date Inactive Date Specialty Kaitlin Zabala   encounter provider    Urology Bethany Tadeo   encounter provider Kendrick Goel   Emergency Contact Spouse Kendrick Goel    Spouse I L    Other     
Joie Juan   encounter provider    Neurology X Y   Emergency Contact Other Provider:  
Davied Claude  02/18/2019 7:30 PM  
Document generated by:  Destini Melara.  Kaylee 02/18/2019 07:30 PM

## 2019-02-20 NOTE — DIABETES MGMT
INSULIN PUMP CONSULT/ Plan Of Care How long have you had diabetes? T1DM ~ 2 years (secondary to [facility] Procedure to pancrease for recurrent chronic pancreatitis How long have you had an insulin pump? 2 years Where did you learn how to use it? Endocrinologist office What is your blood glucose target? < 150 mg/dL How often do you usually test your blood glucose in a day? Dexcom G 5 What was your most recent A1C and date? 8% February 2019 Who is your endocrinologist?     Dr. Amador Moreno When was your last visit to your endocrinologist?  January 2019 INSULIN PUMP Brand of pump and model #:   Medtronic 630 G Type of insulin used   Novolog Type of infusion set What are your basal rate(s)? 0.85 units/hr What is your sensitivity factor? 50 mg/dL What is your insulin to carbohydrate ratio? 1:20 What is your total daily dose? What conventional insulin dose do you use if you pump were inoperable? (\"off pump plan\") Do you often have hypoglycemia? How do you treat hypoglycemia? Do you have any site problems?  no 
Do you feel able and confident to manage your pump while here?  yes Who helps you manage your insulin pump when you are not able? No one Spoke with pt via phone for insulin pump consult r/t upcoming surgery scheduled with Dr. Marge Kawasaki on 02/20/19. The following topics were thoroughly discussed: 
   
Per THE [facility] policy for patient safety, the use of home insulin pumps is not permitted during surgery or diagnostic procedures (i.e. xray, ultrasound, MRI, etc). You will be able to wear your pump up until they roll you back for surgery, at which time you will be asked to either suspend the pump or turn it off completely and remove it from your body; you may keep your cannula in place and use the same infusion set.  Your blood sugar will be checked during the entire pre-op process and you will be able to bolus yourself if needed prior to taking off the pump. You will be able to re-apply your pump once you are coherent in the recovery room and able to operate the device. You will be solely responsible for the operation and management of your insulin pump. Please bring extra of all supplies needed, including insulin. We ask that you communicate with the nursing staff at all times regarding your pump and notify your nurse before you bolus at any time so that the amount of insulin being delivered can be noted in your chart. Please come prepared to report your basal rates to your nurse as well as your carb ratio and your sensitivity factor. The pump agreement also states that if at any time it becomes unsafe for you to continue wearing your pump, you will agree to remove the pump if asked so by your nurse or doctor and let our medical team handle your insulin delivery via sub-q injection. Examples of this would be a change in your level of consciousness or level of awareness or an inability for you to competently operate the pump. If you have a family member that is able to operate your pump on your behalf they may do so if they are willing to stay with you at all times. Your safety and your recovery remain our primary goals and we look forward to having you with us. Pt verbalized understanding of all items discussed. Pt stated that he has no additional questions or concerns at this time. Contact information provided in the event pt needs to discuss anything further prior to surgery. - pt instructed to contact Endocrinologist office today regarding if there is a need to adjust home insulin pump basal rates the night before and day of surgery r/t NPO status; pt states he left a message with office 
- pt reports in the past he has adjusted basal rate temporarily to 50% and will do so again the night before surgery - spoke with Pre-OP holding, aware of pt with pump & per THE [facility] policy to be removed prior to OR, please make sure orders in place for POCT & Humalog corrective coverage Sea García MS, RN, CDE Glycemic Control Team 
[phone] Pager [phone] (M-TH 8:00-4:30P) *After Hours pager [phone]

## 2019-02-21 ENCOUNTER — HOSPITAL ENCOUNTER (OUTPATIENT)
Age: 54
Setting detail: OUTPATIENT SURGERY
Discharge: HOME | End: 2019-02-21
Attending: UROLOGY | Admitting: UROLOGY
Payer: MEDICARE

## 2019-02-21 ENCOUNTER — ANESTHESIA EVENT (OUTPATIENT)
Dept: SURGERY | Age: 54
End: 2019-02-21
Payer: MEDICARE

## 2019-02-21 ENCOUNTER — APPOINTMENT (OUTPATIENT)
Dept: GENERAL RADIOLOGY | Age: 54
End: 2019-02-21
Attending: UROLOGY
Payer: MEDICARE

## 2019-02-21 ENCOUNTER — ANESTHESIA (OUTPATIENT)
Dept: SURGERY | Age: 54
End: 2019-02-21
Payer: MEDICARE

## 2019-02-21 VITALS
RESPIRATION RATE: 14 BRPM | OXYGEN SATURATION: 99 % | BODY MASS INDEX: 20.93 KG/M2 | HEART RATE: 77 BPM | DIASTOLIC BLOOD PRESSURE: 88 MMHG | SYSTOLIC BLOOD PRESSURE: 117 MMHG | HEIGHT: 70 IN | WEIGHT: 146.19 LBS | TEMPERATURE: 97.5 F

## 2019-02-21 LAB
GLUCOSE BLD STRIP.AUTO-MCNC: 159 MG/DL (ref 70–110)
GLUCOSE BLD STRIP.AUTO-MCNC: 164 MG/DL (ref 70–110)

## 2019-02-21 PROCEDURE — 77030032490 HC SLV COMPR SCD KNE COVD -B: Performed by: UROLOGY

## 2019-02-21 PROCEDURE — 50590 FRAGMENTING OF KIDNEY STONE: CPT | Performed by: UROLOGY

## 2019-02-21 PROCEDURE — C1769 GUIDE WIRE: HCPCS | Performed by: UROLOGY

## 2019-02-21 PROCEDURE — 82962 GLUCOSE BLOOD TEST: CPT

## 2019-02-21 PROCEDURE — 77030014023 HC SYR INFL LVEEN BSC -B: Performed by: UROLOGY

## 2019-02-21 PROCEDURE — 74011250636 HC RX REV CODE- 250/636: Performed by: UROLOGY

## 2019-02-21 PROCEDURE — 82360 CALCULUS ASSAY QUANT: CPT

## 2019-02-21 PROCEDURE — 77030018836 HC SOL IRR NACL ICUM -A: Performed by: UROLOGY

## 2019-02-21 PROCEDURE — C1758 CATHETER, URETERAL: HCPCS | Performed by: UROLOGY

## 2019-02-21 PROCEDURE — 77030006974 HC BSKT URET RTVR BSC -C: Performed by: UROLOGY

## 2019-02-21 PROCEDURE — 77030010103 HC SEAL PRT ENDOSC OCOA -B: Performed by: UROLOGY

## 2019-02-21 PROCEDURE — 76060000033 HC ANESTHESIA 1 TO 1.5 HR

## 2019-02-21 PROCEDURE — 74011250636 HC RX REV CODE- 250/636

## 2019-02-21 PROCEDURE — 77030012508 HC MSK AIRWY LMA AMBU -A: Performed by: ANESTHESIOLOGY

## 2019-02-21 PROCEDURE — 74011000250 HC RX REV CODE- 250

## 2019-02-21 PROCEDURE — C1894 INTRO/SHEATH, NON-LASER: HCPCS | Performed by: UROLOGY

## 2019-02-21 PROCEDURE — 76210000026 HC REC RM PH II 1 TO 1.5 HR: Performed by: UROLOGY

## 2019-02-21 PROCEDURE — 74011000272 HC RX REV CODE- 272: Performed by: UROLOGY

## 2019-02-21 PROCEDURE — C2617 STENT, NON-COR, TEM W/O DEL: HCPCS | Performed by: UROLOGY

## 2019-02-21 PROCEDURE — 74011636320 HC RX REV CODE- 636/320: Performed by: UROLOGY

## 2019-02-21 PROCEDURE — 76010000149 HC OR TIME 1 TO 1.5 HR

## 2019-02-21 PROCEDURE — 74011636637 HC RX REV CODE- 636/637: Performed by: ANESTHESIOLOGY

## 2019-02-21 PROCEDURE — 77030020782 HC GWN BAIR PAWS FLX 3M -B: Performed by: UROLOGY

## 2019-02-21 DEVICE — URETERAL STENT
Type: IMPLANTABLE DEVICE | Site: URETER | Status: FUNCTIONAL
Brand: POLARIS™ ULTRA

## 2019-02-21 RX ORDER — GLYCOPYRROLATE 0.2 MG/ML
INJECTION INTRAMUSCULAR; INTRAVENOUS AS NEEDED
Status: DISCONTINUED | OUTPATIENT
Start: 2019-02-21 | End: 2019-02-21 | Stop reason: HOSPADM

## 2019-02-21 RX ORDER — LIDOCAINE HYDROCHLORIDE 20 MG/ML
INJECTION, SOLUTION EPIDURAL; INFILTRATION; INTRACAUDAL; PERINEURAL AS NEEDED
Status: DISCONTINUED | OUTPATIENT
Start: 2019-02-21 | End: 2019-02-21 | Stop reason: HOSPADM

## 2019-02-21 RX ORDER — HYDROMORPHONE HYDROCHLORIDE 2 MG/ML
0.5 INJECTION, SOLUTION INTRAMUSCULAR; INTRAVENOUS; SUBCUTANEOUS
Status: DISCONTINUED | OUTPATIENT
Start: 2019-02-21 | End: 2019-02-21 | Stop reason: HOSPADM

## 2019-02-21 RX ORDER — SODIUM CHLORIDE 0.9 % (FLUSH) 0.9 %
5-40 SYRINGE (ML) INJECTION EVERY 8 HOURS
Status: DISCONTINUED | OUTPATIENT
Start: 2019-02-21 | End: 2019-02-21 | Stop reason: HOSPADM

## 2019-02-21 RX ORDER — SODIUM CHLORIDE, SODIUM LACTATE, POTASSIUM CHLORIDE, CALCIUM CHLORIDE 600; 310; 30; 20 MG/100ML; MG/100ML; MG/100ML; MG/100ML
125 INJECTION, SOLUTION INTRAVENOUS CONTINUOUS
Status: DISCONTINUED | OUTPATIENT
Start: 2019-02-21 | End: 2019-02-21 | Stop reason: HOSPADM

## 2019-02-21 RX ORDER — SODIUM CHLORIDE 0.9 % (FLUSH) 0.9 %
5-40 SYRINGE (ML) INJECTION AS NEEDED
Status: DISCONTINUED | OUTPATIENT
Start: 2019-02-21 | End: 2019-02-21 | Stop reason: HOSPADM

## 2019-02-21 RX ORDER — SODIUM CHLORIDE 0.9 % (FLUSH) 0.9 %
5-40 SYRINGE (ML) INJECTION AS NEEDED
Status: CANCELLED | OUTPATIENT
Start: 2019-02-21

## 2019-02-21 RX ORDER — MAGNESIUM SULFATE 100 %
4 CRYSTALS MISCELLANEOUS AS NEEDED
Status: DISCONTINUED | OUTPATIENT
Start: 2019-02-21 | End: 2019-02-21 | Stop reason: HOSPADM

## 2019-02-21 RX ORDER — MIDAZOLAM HYDROCHLORIDE 1 MG/ML
INJECTION, SOLUTION INTRAMUSCULAR; INTRAVENOUS AS NEEDED
Status: DISCONTINUED | OUTPATIENT
Start: 2019-02-21 | End: 2019-02-21 | Stop reason: HOSPADM

## 2019-02-21 RX ORDER — SODIUM CHLORIDE 0.9 % (FLUSH) 0.9 %
5-40 SYRINGE (ML) INJECTION EVERY 8 HOURS
Status: CANCELLED | OUTPATIENT
Start: 2019-02-21

## 2019-02-21 RX ORDER — LEVOFLOXACIN 5 MG/ML
500 INJECTION, SOLUTION INTRAVENOUS ONCE
Status: COMPLETED | OUTPATIENT
Start: 2019-02-21 | End: 2019-02-21

## 2019-02-21 RX ORDER — INSULIN LISPRO 100 [IU]/ML
INJECTION, SOLUTION INTRAVENOUS; SUBCUTANEOUS ONCE
Status: DISCONTINUED | OUTPATIENT
Start: 2019-02-21 | End: 2019-02-21 | Stop reason: HOSPADM

## 2019-02-21 RX ORDER — INSULIN LISPRO 100 [IU]/ML
INJECTION, SOLUTION INTRAVENOUS; SUBCUTANEOUS ONCE
Status: COMPLETED | OUTPATIENT
Start: 2019-02-21 | End: 2019-02-21

## 2019-02-21 RX ORDER — FLUMAZENIL 0.1 MG/ML
0.2 INJECTION INTRAVENOUS
Status: DISCONTINUED | OUTPATIENT
Start: 2019-02-21 | End: 2019-02-21 | Stop reason: HOSPADM

## 2019-02-21 RX ORDER — SODIUM CHLORIDE, SODIUM LACTATE, POTASSIUM CHLORIDE, CALCIUM CHLORIDE 600; 310; 30; 20 MG/100ML; MG/100ML; MG/100ML; MG/100ML
1000 INJECTION, SOLUTION INTRAVENOUS CONTINUOUS
Status: DISCONTINUED | OUTPATIENT
Start: 2019-02-21 | End: 2019-02-21 | Stop reason: HOSPADM

## 2019-02-21 RX ORDER — ONDANSETRON 2 MG/ML
INJECTION INTRAMUSCULAR; INTRAVENOUS AS NEEDED
Status: DISCONTINUED | OUTPATIENT
Start: 2019-02-21 | End: 2019-02-21 | Stop reason: HOSPADM

## 2019-02-21 RX ORDER — DEXTROSE 50 % IN WATER (D50W) INTRAVENOUS SYRINGE
25-50 AS NEEDED
Status: DISCONTINUED | OUTPATIENT
Start: 2019-02-21 | End: 2019-02-21 | Stop reason: HOSPADM

## 2019-02-21 RX ORDER — PROPOFOL 10 MG/ML
INJECTION, EMULSION INTRAVENOUS AS NEEDED
Status: DISCONTINUED | OUTPATIENT
Start: 2019-02-21 | End: 2019-02-21 | Stop reason: HOSPADM

## 2019-02-21 RX ORDER — NALOXONE HYDROCHLORIDE 0.4 MG/ML
0.1 INJECTION, SOLUTION INTRAMUSCULAR; INTRAVENOUS; SUBCUTANEOUS AS NEEDED
Status: DISCONTINUED | OUTPATIENT
Start: 2019-02-21 | End: 2019-02-21 | Stop reason: HOSPADM

## 2019-02-21 RX ORDER — HYDROMORPHONE HYDROCHLORIDE 1 MG/ML
INJECTION, SOLUTION INTRAMUSCULAR; INTRAVENOUS; SUBCUTANEOUS AS NEEDED
Status: DISCONTINUED | OUTPATIENT
Start: 2019-02-21 | End: 2019-02-21 | Stop reason: HOSPADM

## 2019-02-21 RX ADMIN — SODIUM CHLORIDE, SODIUM LACTATE, POTASSIUM CHLORIDE, AND CALCIUM CHLORIDE: 600; 310; 30; 20 INJECTION, SOLUTION INTRAVENOUS at 10:04

## 2019-02-21 RX ADMIN — SODIUM CHLORIDE, SODIUM LACTATE, POTASSIUM CHLORIDE, AND CALCIUM CHLORIDE 125 ML/HR: 600; 310; 30; 20 INJECTION, SOLUTION INTRAVENOUS at 09:06

## 2019-02-21 RX ADMIN — ONDANSETRON 4 MG: 2 INJECTION INTRAMUSCULAR; INTRAVENOUS at 09:50

## 2019-02-21 RX ADMIN — SODIUM CHLORIDE, SODIUM LACTATE, POTASSIUM CHLORIDE, AND CALCIUM CHLORIDE 125 ML/HR: 600; 310; 30; 20 INJECTION, SOLUTION INTRAVENOUS at 11:27

## 2019-02-21 RX ADMIN — INSULIN LISPRO 2 UNITS: 100 INJECTION, SOLUTION INTRAVENOUS; SUBCUTANEOUS at 09:39

## 2019-02-21 RX ADMIN — PROPOFOL 150 MG: 10 INJECTION, EMULSION INTRAVENOUS at 09:54

## 2019-02-21 RX ADMIN — LIDOCAINE HYDROCHLORIDE 100 MG: 20 INJECTION, SOLUTION EPIDURAL; INFILTRATION; INTRACAUDAL; PERINEURAL at 09:54

## 2019-02-21 RX ADMIN — MIDAZOLAM HYDROCHLORIDE 2 MG: 1 INJECTION, SOLUTION INTRAMUSCULAR; INTRAVENOUS at 09:50

## 2019-02-21 RX ADMIN — LEVOFLOXACIN 500 MG: 5 INJECTION, SOLUTION INTRAVENOUS at 09:50

## 2019-02-21 RX ADMIN — GLYCOPYRROLATE 0.2 MG: 0.2 INJECTION INTRAMUSCULAR; INTRAVENOUS at 09:50

## 2019-02-21 RX ADMIN — HYDROMORPHONE HYDROCHLORIDE 1 MG: 1 INJECTION, SOLUTION INTRAMUSCULAR; INTRAVENOUS; SUBCUTANEOUS at 09:50

## 2019-02-21 NOTE — INTERVAL H&P NOTE
H&P Update: 
Johanne Blanchard was seen and examined. History and physical has been reviewed. The patient has been examined.  There have been no significant clinical changes since the completion of the originally dated History and Physical. 
 
Signed By: Kaitlin Zabala MD   
 February 21, 2019 9:46 AM

## 2019-02-21 NOTE — PERIOP NOTES
Spoke to Dr. Kaleb Chambers and let her know that patient's blood sugar was 164 and patient had already removed insulin pump. Dr. Kaleb Chambers ordered 2 units of lispro, least resistance. Spoke to [facility] and let her know order was placed but needs to be administered. Enio Sin confirms and will follow up the order.

## 2019-02-21 NOTE — ANESTHESIA PREPROCEDURE EVALUATION
Anesthetic History No history of anesthetic complications Review of Systems / Medical History Patient summary reviewed, nursing notes reviewed and pertinent labs reviewed Pulmonary COPD: mild Neuro/Psych Psychiatric history Cardiovascular Exercise tolerance: >4 METS 
  
GI/Hepatic/Renal 
  
GERD: well controlled Liver disease Comments: cintron Endo/Other Diabetes: well controlled, type 1, using insulin Arthritis Other Findings Anesthetic History No history of anesthetic complications Review of Systems / Medical History Patient summary reviewed, nursing notes reviewed and pertinent labs reviewed Pulmonary COPD: moderate Neuro/Psych Cardiovascular Exercise tolerance: <4 METS 
  
GI/Hepatic/Renal 
  
GERD: well controlled Endo/Other Diabetes: well controlled Arthritis Other Findings Physical Exam 
 
Airway Mallampati: II 
TM Distance: 4 - 6 cm Neck ROM: normal range of motion Mouth opening: Normal 
 
 Cardiovascular Rhythm: regular Rate: normal 
 
 
 
 Dental 
No notable dental hx Pulmonary Breath sounds clear to auscultation Abdominal 
GI exam deferred Other Findings Anesthetic Plan ASA: 2 Anesthesia type: general 
 
 
 
 
Induction: Intravenous Anesthetic plan and risks discussed with: Patient Physical Exam 
 
Airway Mallampati: II 
TM Distance: 4 - 6 cm Neck ROM: normal range of motion Mouth opening: Normal 
 
 Cardiovascular Rhythm: regular Rate: normal 
 
 
 
 Dental 
 
Dentition: Caps/crowns Pulmonary Breath sounds clear to auscultation Abdominal 
GI exam deferred Other Findings Anesthetic Plan ASA: 3 Anesthesia type: general 
 
 
 
 
Induction: Intravenous Anesthetic plan and risks discussed with: Patient

## 2019-02-21 NOTE — PERIOP NOTES
Reviewed PTA medication list with patient/caregiver and patient/caregiver denies any additional medications. Patient admits to having a responsible adult care for them at home for at least 24 hours after surgery. Pt denies having an active cough and confirms being able to lie still in the supine position for at least 20 minutes. Patient denies jessica chewing/swallowing difficulties. Dual skin assessment & fall risk band verification completed with Alex Penaloza RN.

## 2019-02-21 NOTE — ANESTHESIA POSTPROCEDURE EVALUATION
Procedure(s): 
CYSTOSCOPY,LEFT RETROGRADE PYELOGRAM WITH INTERPRETATION ,LEFT URETEROSCOPIC STONE EXTRECTION WITH  HOLMIUM, JJ STENT PLACEMENT WITH C-ARM. Anesthesia Post Evaluation Comments: Post-Anesthesia Evaluation and Assessment Cardiovascular Function/Vital Signs /79   Pulse 81   Temp 36.4 °C (97.6 °F)   Resp 14   Ht 5' 10\" (1.778 m)   Wt 66.3 kg (146 lb 3 oz)   SpO2 100%   BMI 20.98 kg/m² Patient is status post Procedure(s): 
CYSTOSCOPY,LEFT RETROGRADE PYELOGRAM WITH INTERPRETATION ,LEFT URETEROSCOPIC STONE EXTRECTION WITH  HOLMIUM, JJ STENT PLACEMENT WITH C-ARM. Nausea/Vomiting: Controlled. Postoperative hydration reviewed and adequate. Pain: 
Pain Scale 1: Numeric (0 - 10) (02/21/19 1128) Pain Intensity 1: 0 (02/21/19 1128) Managed. Neurological Status:  
Neuro (WDL): Within Defined Limits (02/21/19 1128) At baseline. Mental Status and Level of Consciousness: Arousable. Pulmonary Status:  
O2 Device: Room air (02/21/19 1128) Adequate oxygenation and airway patent. Complications related to anesthesia: None Post-anesthesia assessment completed. No concerns. Patient has met all discharge requirements. Signed By: Juan Morin MD  
 February 21, 2019 Visit Vitals /79 Pulse 81 Temp 36.4 °C (97.6 °F) Resp 14 Ht 5' 10\" (1.778 m) Wt 66.3 kg (146 lb 3 oz) SpO2 100% BMI 20.98 kg/m²

## 2019-02-21 NOTE — DISCHARGE INSTRUCTIONS
Gema Yuen. Girish Mcdonald M.D. [facility]  [address], [address] [place], [address]  Office: [phone]  Fax:    [phone]: Procedure(s):  CYSTOSCOPY,LEFT RETROGRADE PYELOGRAM WITH INTERPRETATION ,LEFT URETEROSCOPIC STONE EXTRECTION WITH  HOLMIUM, [facility] WITH C-ARM    Notify [facility] IMMEDIATELY if any of the following occur:     You are unable to urinate. Urgency to urinate is not uncommon.  You find yourself urinating small frequent amounts associated with severe lower abdominal discomfort.  Bright red blood with clots in the urine. Some reddish urine is not uncommon and should be treated with increasing the amount of fluids you drink.  Temperature above 101.5° and / or chills.  You are nauseous and / or vomiting and you cannot hold down any fluids.  Your pain is not controlled with the pain medication prescribed. Special Considerations:      Do not drive for at least 24 hours after the procedure and until you are no longer taking narcotic pain medication and you are able to move and react without hesitation. MEDICATIONS:  Pain   []  Norco®   []  Percocet® []  Dilaudid®    []  Tramadol   Antibiotics   []  Cipro   []  Keflex    [x] Levaquin   []  Bactrim DS®       Urination   []  Vesicare®   []  Flomax     Burning   []  Pyridium®   []  UribelTM     Nausea   []  Zofran®   []  Phenergan®     Miscellaneous   []           [] Prescriptions Written on Chart    [x] Prescriptions sent Electronically           Our office will call you tomorrow to schedule your first follow-up appointment. Please contact [facility]. Urology at [phone] or go to the nearest Emergency Department / Urgent Care facility for any other medical questions or concerns.       DISCHARGE SUMMARY from Nurse    PATIENT INSTRUCTIONS:    After general anesthesia or intravenous sedation, for 24 hours or while taking prescription Narcotics:  · Limit your activities  · Do not drive and operate hazardous machinery  · Do not make important personal or business decisions  · Do  not drink alcoholic beverages  · If you have not urinated within 8 hours after discharge, please contact your surgeon on call. Report the following to your surgeon:  · Excessive pain, swelling, redness or odor of or around the surgical area  · Temperature over 100.5  · Nausea and vomiting lasting longer than 4 hours or if unable to take medications  · Any signs of decreased circulation or nerve impairment to extremity: change in color, persistent  numbness, tingling, coldness or increase pain  · Any questions    What to do at Home:  Recommended activity: Ambulate in house and No driving while on analgesics. If you experience any of the following symptoms fever, chills, uncontrollable pain, unable to urinate please follow up with Dr. Nancy Harry. *  Please give a list of your current medications to your Primary Care Provider. *  Please update this list whenever your medications are discontinued, doses are      changed, or new medications (including over-the-counter products) are added. *  Please carry medication information at all times in case of emergency situations. These are general instructions for a healthy lifestyle:    No smoking/ No tobacco products/ Avoid exposure to second hand smoke  Surgeon General's Warning:  Quitting smoking now greatly reduces serious risk to your health. Obesity, smoking, and sedentary lifestyle greatly increases your risk for illness    A healthy diet, regular physical exercise & weight monitoring are important for maintaining a healthy lifestyle    You may be retaining fluid if you have a history of heart failure or if you experience any of the following symptoms:  Weight gain of 3 pounds or more overnight or 5 pounds in a week, increased swelling in our hands or feet or shortness of breath while lying flat in bed.   Please call your doctor as soon as you notice any of these symptoms; do not wait until your next office visit. Recognize signs and symptoms of STROKE:    F-face looks uneven    A-arms unable to move or move unevenly    S-speech slurred or non-existent    T-time-call 911 as soon as signs and symptoms begin-DO NOT go       Back to bed or wait to see if you get better-TIME IS BRAIN. Warning Signs of HEART ATTACK     Call 911 if you have these symptoms:   Chest discomfort. Most heart attacks involve discomfort in the center of the chest that lasts more than a few minutes, or that goes away and comes back. It can feel like uncomfortable pressure, squeezing, fullness, or pain.  Discomfort in other areas of the upper body. Symptoms can include pain or discomfort in one or both arms, the back, neck, jaw, or stomach.  Shortness of breath with or without chest discomfort.  Other signs may include breaking out in a cold sweat, nausea, or lightheadedness. Don't wait more than five minutes to call 911 - MINUTES MATTER! Fast action can save your life. Calling 911 is almost always the fastest way to get lifesaving treatment. Emergency Medical Services staff can begin treatment when they arrive -- up to an hour sooner than if someone gets to the hospital by car. The discharge information has been reviewed with the patient and caregiver. The patient and caregiver verbalized understanding. Discharge medications reviewed with the patient and caregiver and appropriate educational materials and side effects teaching were provided. ___________________________________________________________________________________________________________________________________    Patient armband removed and shredded.

## 2019-02-21 NOTE — BRIEF OP NOTE
BRIEF OPERATIVE NOTE Date of Procedure: 2/21/2019 Preoperative Diagnosis: URETERAL STONE Postoperative Diagnosis: URETERAL STONE Procedure(s): 
CYSTOSCOPY,LEFT RETROGRADE PYELOGRAM WITH INTERPRETATION ,LEFT URETEROSCOPIC STONE EXTRECTION WITH  HOLMIUM, JJ STENT PLACEMENT WITH C-ARM Surgeon(s) and Role: Krishna Hummel MD - Primary Surgical Assistant: UDAY Chaidez Surgical Staff: 
Circ-1: Lois Keenan Circ-Relief: Marcelino Damon RN Radiology Technician: Alicia An Scrub Tech-1: Keyana Heard Scrub Tech-2: Linda Dawn Event Time In Time Out Incision Start 1004 Incision Close 1049 Anesthesia: General  
Estimated Blood Loss: minimal 
Specimens:  
ID Type Source Tests Collected by Time Destination 1 : left kidney stones Fresh Kidney  Jonathanl MD Marques 2/21/2019 1038 Pathology Findings: several stones LEFT kidney, 5 x 6 mm in lower ureter Complications: none Implants:  
Implant Name Type Inv. Item Serial No.  Lot No. LRB No. Used Action Fausto Campbell [facility]-PGTL [id number] [facility] Mobile - [id number]  FQRWQ POLLY [facility]-PGTL [id number] -- [address] [id number] Left 1 Implanted

## 2019-02-25 LAB
CA PHOS MFR STONE: 15 %
COLOR STONE: NORMAL
COM MFR STONE: 85 %
COMMENT, 519994: NORMAL
COMPOSITION, 120440: NORMAL
DISCLAIMER, STO32L: NORMAL
NIDUS STONE QL: NORMAL
SIZE STONE: NORMAL MM
WT STONE: 108.9 MG

## 2019-02-28 NOTE — OP NOTES
OPERATIVE NOTE     Date of Procedure: 2/21/2019   Preoperative Diagnosis: URETERAL STONE  Postoperative Diagnosis: URETERAL STONE    Procedure(s):  CYSTOSCOPY,LEFT RETROGRADE PYELOGRAM WITH INTERPRETATION ,LEFT URETEROSCOPIC STONE EXTRECTION WITH  HOLMIUM, JJ STENT PLACEMENT WITH C-ARM  Surgeon(s) and Role:     Mariza Peace MD - Primary         Surgical Assistant: Tran Ureña     Surgical Staff:  Circ-1: Carey Mahmood RN  Radiology Technician: Chanell Mckeon  Scrub Tech-1: Rikki Zimmerman  Scrub Tech-2: David Ponce  Event Time In Time Out   Incision Start 1004     Incision Close 1049        Anesthesia: General   Estimated Blood Loss: minimal  Specimens:   ID Type Source Tests Collected by Time Destination   1 : left kidney stones Fresh Kidney   Kassi Figueroa MD 2/21/2019 1038 Pathology      Findings: several stones LEFT kidney, 5 x 6 mm in lower ureter  Complications: none  Implants:   Implant Name Type Inv. Item Serial No.  Lot No. LRB No. Used Action   STENT URET DBL-PGTL [id number] -- [facility] - [id number]   Castellanos Fend DBL-PGTL [id number] -- [facility] [id number] Left 1 Implanted           Procedure Details: The patient was brought in the operating room, placed in the   supine position. After administration of general anesthesia, she was placed   in lithotomy position. Groin and genitalia were prepped and draped in the   usual sterile fashion. I began with a 21-Romansh cystoscope. Cystourethroscopy did not reveal any abnormalities. I identified the LEFT  ureteral orifice and intubated it with a 5-Romansh open-ended catheter. I   then performed a retrograde pyelogram, which confirmed a filling defect and   obstructing stone in the mid-lower ureter. I then placed a 0.038 sensor wire   through the open-ended catheter up into the collecting system.  Then, using   a 4 cm ureteral balloon dilator, I dilated the lower ureter without difficulty. I then removed the balloon and we had 1 wire in place. I used   the 6.9-Luxembourgish semirigid ureteroscope, I traversed the ureter up into the    the stone. Using the holmium laser, I fragmented and dusted the stone   Then, using a 0 tip basket, I removed all these   fragments in total. There was no residual fragments or stones seen in the   ureter. Att point I now began to treat the multiple left Renal stones. Using a dual-lumen catheter, a second wire was placed into the   collecting system and an 11 x 13 Navigator sheath was placed over one of   the wires. The sheath and the wire were removed. So at this point, we had a   wire in place in the collecting system and the Navigator sheath. I then   used a flexible ureteroscope, traversed the ureter up to the collecting   system on the left side. Using holmium laser, I fragmented and dusted all the stonest Using saline, I then flushed the stones out of the collecting system. Using a 0 tip basket, I   removed the majority of these stone fragments,which will be sent to pathology for analysis. At this point, on   fluoroscopy, there was no evidence of any residual stone. The wire remained   in place. I removed the ureteroscope and the Navigator sheath under direct   vision and using cystoscopic guidance, a 5 x 26 double-J stent was placed   over the wire into the collecting system. I removed the   wire. There was a good coil in the kidney and a good coil in the bladder. The bladder was emptied. The patient tolerated the procedure well. The patient was   transferred to recovery room in stable condition. Estimated Blood Loss:  Minimal  Specimens:    ID Type Source Tests Collected by Time Destination   1 : left kidney stones Fresh Kidney  Jose Angel Mendosa MD 2/21/2019 1038 Pathology      Implants:    Implant Name Type Inv.  Item Serial No.  Lot No. LRB No. Used Action   STENT URET DBL-PGTL [id number] -- [employer] - [id number]  STENT URET DBL-PGTL [id number] -- [address] [id number] Left 1 Implanted       Complications:  None    Adrianna Palacio MD  2/28/2019  6:45 AM

## 2019-11-29 ENCOUNTER — HOSPITAL ENCOUNTER (OUTPATIENT)
Dept: CT IMAGING | Age: 54
Discharge: HOME | End: 2019-11-29
Attending: UROLOGY
Payer: MEDICARE

## 2019-11-29 DIAGNOSIS — N20.0 KIDNEY STONE: ICD-10-CM

## 2019-11-29 PROCEDURE — 74176 CT ABD & PELVIS W/O CONTRAST: CPT

## 2020-03-04 ENCOUNTER — HOSPITAL ENCOUNTER (EMERGENCY)
Age: 55
Discharge: HOME | End: 2020-03-04
Attending: EMERGENCY MEDICINE
Payer: MEDICARE

## 2020-03-04 ENCOUNTER — APPOINTMENT (OUTPATIENT)
Dept: GENERAL RADIOLOGY | Age: 55
End: 2020-03-04
Attending: EMERGENCY MEDICINE
Payer: MEDICARE

## 2020-03-04 VITALS
BODY MASS INDEX: 18.9 KG/M2 | TEMPERATURE: 98.7 F | HEIGHT: 70 IN | SYSTOLIC BLOOD PRESSURE: 119 MMHG | RESPIRATION RATE: 11 BRPM | DIASTOLIC BLOOD PRESSURE: 70 MMHG | WEIGHT: 132 LBS | OXYGEN SATURATION: 100 % | HEART RATE: 78 BPM

## 2020-03-04 DIAGNOSIS — R73.9 HYPERGLYCEMIA: Primary | ICD-10-CM

## 2020-03-04 DIAGNOSIS — B34.9 VIRAL ILLNESS: ICD-10-CM

## 2020-03-04 LAB
ALBUMIN SERPL-MCNC: 3.7 G/DL (ref 3.4–5)
ALBUMIN/GLOB SERPL: 1.2 {RATIO} (ref 0.8–1.7)
ALP SERPL-CCNC: 144 U/L (ref 45–117)
ALT SERPL-CCNC: 40 U/L (ref 16–61)
ANION GAP SERPL CALC-SCNC: 8 MMOL/L (ref 3–18)
AST SERPL-CCNC: 26 U/L (ref 10–38)
ATRIAL RATE: 102 BPM
BASOPHILS # BLD: 0 K/UL (ref 0–0.1)
BASOPHILS NFR BLD: 1 % (ref 0–2)
BILIRUB SERPL-MCNC: 0.7 MG/DL (ref 0.2–1)
BUN SERPL-MCNC: 9 MG/DL (ref 7–18)
BUN/CREAT SERPL: 9 (ref 12–20)
CALCIUM SERPL-MCNC: 8.7 MG/DL (ref 8.5–10.1)
CALCULATED P AXIS, ECG09: 73 DEGREES
CALCULATED R AXIS, ECG10: 53 DEGREES
CALCULATED T AXIS, ECG11: 79 DEGREES
CHLORIDE SERPL-SCNC: 101 MMOL/L (ref 100–111)
CO2 SERPL-SCNC: 26 MMOL/L (ref 21–32)
CREAT SERPL-MCNC: 1.03 MG/DL (ref 0.6–1.3)
DIAGNOSIS, 93000: NORMAL
DIFFERENTIAL METHOD BLD: ABNORMAL
EOSINOPHIL # BLD: 0.2 K/UL (ref 0–0.4)
EOSINOPHIL NFR BLD: 4 % (ref 0–5)
ERYTHROCYTE [DISTWIDTH] IN BLOOD BY AUTOMATED COUNT: 13.1 % (ref 11.6–14.5)
FLUAV AG NPH QL IA: NEGATIVE
FLUBV AG NOSE QL IA: NEGATIVE
GLOBULIN SER CALC-MCNC: 3.2 G/DL (ref 2–4)
GLUCOSE BLD STRIP.AUTO-MCNC: 273 MG/DL (ref 70–110)
GLUCOSE BLD STRIP.AUTO-MCNC: 326 MG/DL (ref 70–110)
GLUCOSE SERPL-MCNC: 312 MG/DL (ref 74–99)
HCT VFR BLD AUTO: 42.3 % (ref 36–48)
HGB BLD-MCNC: 13.9 G/DL (ref 13–16)
LYMPHOCYTES # BLD: 0.3 K/UL (ref 0.9–3.6)
LYMPHOCYTES NFR BLD: 5 % (ref 21–52)
MCH RBC QN AUTO: 29.3 PG (ref 24–34)
MCHC RBC AUTO-ENTMCNC: 32.9 G/DL (ref 31–37)
MCV RBC AUTO: 89.1 FL (ref 74–97)
MONOCYTES # BLD: 0.7 K/UL (ref 0.05–1.2)
MONOCYTES NFR BLD: 12 % (ref 3–10)
NEUTS SEG # BLD: 4.7 K/UL (ref 1.8–8)
NEUTS SEG NFR BLD: 78 % (ref 40–73)
P-R INTERVAL, ECG05: 196 MS
PLATELET # BLD AUTO: 204 K/UL (ref 135–420)
PMV BLD AUTO: 10.8 FL (ref 9.2–11.8)
POTASSIUM SERPL-SCNC: 4.1 MMOL/L (ref 3.5–5.5)
PROT SERPL-MCNC: 6.9 G/DL (ref 6.4–8.2)
Q-T INTERVAL, ECG07: 334 MS
QRS DURATION, ECG06: 90 MS
QTC CALCULATION (BEZET), ECG08: 435 MS
RBC # BLD AUTO: 4.75 M/UL (ref 4.7–5.5)
SODIUM SERPL-SCNC: 135 MMOL/L (ref 136–145)
VENTRICULAR RATE, ECG03: 102 BPM
WBC # BLD AUTO: 6 K/UL (ref 4.6–13.2)

## 2020-03-04 PROCEDURE — 87804 INFLUENZA ASSAY W/OPTIC: CPT

## 2020-03-04 PROCEDURE — 99285 EMERGENCY DEPT VISIT HI MDM: CPT

## 2020-03-04 PROCEDURE — 85025 COMPLETE CBC W/AUTO DIFF WBC: CPT

## 2020-03-04 PROCEDURE — 82962 GLUCOSE BLOOD TEST: CPT

## 2020-03-04 PROCEDURE — 93005 ELECTROCARDIOGRAM TRACING: CPT

## 2020-03-04 PROCEDURE — 96361 HYDRATE IV INFUSION ADD-ON: CPT

## 2020-03-04 PROCEDURE — 80053 COMPREHEN METABOLIC PANEL: CPT

## 2020-03-04 PROCEDURE — 96360 HYDRATION IV INFUSION INIT: CPT

## 2020-03-04 PROCEDURE — 71045 X-RAY EXAM CHEST 1 VIEW: CPT

## 2020-03-04 PROCEDURE — 74011250636 HC RX REV CODE- 250/636: Performed by: EMERGENCY MEDICINE

## 2020-03-04 RX ORDER — ALBUTEROL SULFATE 90 UG/1
1 AEROSOL, METERED RESPIRATORY (INHALATION)
Qty: 1 INHALER | Refills: 0 | Status: SHIPPED | OUTPATIENT
Start: 2020-03-04

## 2020-03-04 RX ADMIN — SODIUM CHLORIDE 1000 ML: 900 INJECTION, SOLUTION INTRAVENOUS at 13:07

## 2020-03-04 RX ADMIN — SODIUM CHLORIDE 1000 ML: 900 INJECTION, SOLUTION INTRAVENOUS at 14:06

## 2020-03-04 NOTE — ED TRIAGE NOTES
Pt states \" I started feeling sick last night and everyone in my house has had something but didn't go to the doctor. I had a fever 101 earlier. Pt does have a cough as well. \"

## 2020-03-04 NOTE — ED NOTES
I have reviewed discharge instructions with the patient. The patient verbalized understanding.  Patient armband removed and shredded no radiation

## 2020-03-04 NOTE — DISCHARGE INSTRUCTIONS
You were seen and evaluated in the Emergency Department. Please understand that your work up is not all encompassing and you should follow up with your primary care physician for further management and continuity of care. Please return to Emergency Department or seek medical attention immediately if you have acute worsening in your symptoms or develop chest pain, shortness of breath, repeated vomiting, fever, altered level of consciousness, coughing up blood, or start sweating and feel clammy. If you were prescribed any medicine for home, please take as prescribed by your health-care provider. If you were given any follow-up appointments or numbers to call, please do so as instructed. Avoid any tobacco products or excessive alcohol. Patient Education        Learning About High Blood Sugar  What is high blood sugar? Your body turns the food you eat into glucose (sugar), which it uses for energy. But if your body isn't able to use the sugar right away, it can build up in your blood and lead to high blood sugar. When the amount of sugar in your blood stays too high for too much of the time, you may have diabetes. Diabetes is a disease that can cause serious health problems. The good news is that lifestyle changes may help you get your blood sugar back to normal and avoid or delay diabetes. What causes high blood sugar? Sugar (glucose) can build up in your blood if you:  · Are overweight. · Have a family history of diabetes. · Take certain medicines, such as steroids. What are the symptoms? Having high blood sugar may not cause any symptoms at all. Or it may make you feel very thirsty or very hungry. You may also urinate more often than usual, have blurry vision, or lose weight without trying. How is high blood sugar treated? You can take steps to lower your blood sugar level if you understand what makes it get higher. Your doctor may want you to learn how to test your blood sugar level at home. Then you can see how illness, stress, or different kinds of food or medicine raise or lower your blood sugar level. Other tests may be needed to see if you have diabetes. How can you prevent high blood sugar? · Watch your weight. If you're overweight, losing just a small amount of weight may help. Reducing fat around your waist is most important. · Limit the amount of calories, sweets, and unhealthy fat you eat. Ask your doctor if a dietitian can help you. A registered dietitian can help you create meal plans that fit your lifestyle. · Get at least 30 minutes of exercise on most days of the week. Exercise helps control your blood sugar. It also helps you maintain a healthy weight. Walking is a good choice. You also may want to do other activities, such as running, swimming, cycling, or playing tennis or team sports. · If your doctor prescribed medicines, take them exactly as prescribed. Call your doctor if you think you are having a problem with your medicine. You will get more details on the specific medicines your doctor prescribes. Follow-up care is a key part of your treatment and safety. Be sure to make and go to all appointments, and call your doctor if you are having problems. It's also a good idea to know your test results and keep a list of the medicines you take. Where can you learn more? Go to http://isauro-maday.info/. Enter O108 in the search box to learn more about \"Learning About High Blood Sugar. \"  Current as of: April 16, 2019  Content Version: 12.2  © 6638-0893 [employer]. Care instructions adapted under license by [employer] (which disclaims liability or warranty for this information). If you have questions about a medical condition or this instruction, always ask your healthcare professional. [place] [address] any warranty or liability for your use of this information.          Patient Education        Viral Infections: Care Instructions  Your Care Instructions    You don't feel well, but it's not clear what's causing it. You may have a viral infection. Viruses cause many illnesses, such as the common cold, influenza, fever, rashes, and the diarrhea, nausea, and vomiting that are often called \"stomach flu. \" You may wonder if antibiotic medicines could make you feel better. But antibiotics only treat infections caused by bacteria. They don't work on viruses. The good news is that viral infections usually aren't serious. Most will go away in a few days without medical treatment. In the meantime, there are a few things you can do to make yourself more comfortable. Follow-up care is a key part of your treatment and safety. Be sure to make and go to all appointments, and call your doctor if you are having problems. It's also a good idea to know your test results and keep a list of the medicines you take. How can you care for yourself at home? · Get plenty of rest if you feel tired. · Take an over-the-counter pain medicine if needed, such as acetaminophen (Tylenol), ibuprofen (Advil, Motrin), or naproxen (Aleve). Read and follow all instructions on the label. · Be careful when taking over-the-counter cold or flu medicines and Tylenol at the same time. Many of these medicines have acetaminophen, which is Tylenol. Read the labels to make sure that you are not taking more than the recommended dose. Too much acetaminophen (Tylenol) can be harmful. · Drink plenty of fluids, enough so that your urine is light yellow or clear like water. If you have kidney, heart, or liver disease and have to limit fluids, talk with your doctor before you increase the amount of fluids you drink. · Stay home from work, school, and other public places while you have a fever. When should you call for help? Call 911 anytime you think you may need emergency care.  For example, call if:    · You have severe trouble breathing.     · You passed out (lost consciousness).    Call your doctor now or seek immediate medical care if:    · You seem to be getting much sicker.     · You have a new or higher fever.     · You have blood in your stools.     · You have new belly pain, or your pain gets worse.     · You have a new rash.    Watch closely for changes in your health, and be sure to contact your doctor if:    · You start to get better and then get worse.     · You do not get better as expected. Where can you learn more? Go to http://isauro-maday.info/. Enter Z472 in the search box to learn more about \"Viral Infections: Care Instructions. \"  Current as of: June 9, 2019  Content Version: 12.2  © 9051-4145 [employer]. Care instructions adapted under license by [employer] (which disclaims liability or warranty for this information). If you have questions about a medical condition or this instruction, always ask your healthcare professional. [address] any warranty or liability for your use of this information.

## 2020-03-04 NOTE — ED PROVIDER NOTES
EMERGENCY DEPARTMENT HISTORY AND PHYSICAL EXAM    Date: 3/4/2020  Patient Name: Amanda Kaur    History of Presenting Illness     Chief Complaint   Patient presents with    Fever         History Provided By: Patient    Additional History (Context):   Amanda Kaur is a 62 y.o. male with PMHX hyperlipidemia, arthritis, hypertension presents to the emergency department C/O body aches, cough and fever that started today. Patient reports that family members at home have similar symptoms. Pt denies, dysuria, hematuria, and any other sxs or complaints. States that he took Motrin at approximately 11 AM today. PCP: Paul Garcia MD    Current Facility-Administered Medications   Medication Dose Route Frequency Provider Last Rate Last Dose    sodium chloride 0.9 % bolus infusion 1,000 mL  1,000 mL IntraVENous ONCE AgKenia Bernal Odor, DO 1,000 mL/hr at 03/04/20 1307 1,000 mL at 03/04/20 1307     Current Outpatient Medications   Medication Sig Dispense Refill    albuterol (PROAIR HFA) 90 mcg/actuation inhaler Take 1 Puff by inhalation every six (6) hours as needed for Wheezing. 1 Inhaler 0    HYDROcodone-acetaminophen (NORCO) 5-325 mg per tablet Take 1 Tab by mouth every four (4) hours as needed for Pain. Max Daily Amount: 6 Tabs. 20 Tab 0    triazolam (HALCION) 0.25 mg tablet Take 0.25 mg by mouth nightly.  insulin aspart (NOVOLOG) 100 unit/mL injection by Continuous Infusion route. Pump      metoclopramide HCl (REGLAN) 10 mg tablet Take 10 mg by mouth as needed.  riTUXimab (RITUXAN) 10 mg/mL injection by IntraVENous route once. Every 6 months       VENLAFAXINE HCL (EFFEXOR XR PO) Take 300 mg by mouth daily.  ALPRAZolam (XANAX) 0.5 mg tablet Take 0.5 mg by mouth nightly as needed (5 pm).  amylase-lipase-protease (CREON) capsule Take 1 Cap by mouth three (3) times daily (with meals).  1 tab with snacks , 2 tab with meals       ondansetron hcl (ZOFRAN) 4 mg tablet Take 4 mg by mouth every eight (8) hours as needed. Past History     Past Medical History:  Past Medical History:   Diagnosis Date    Arthritis     BPH (benign prostatic hyperplasia)     Chronic pain     COPD     Depression     Diverticulosis     Gastritis     Gastroparesis     GERD (gastroesophageal reflux disease)     fair control with meds    Hiatal hernia     Hypercholesterolemia     Liver disease     ELMORE    Migraine     Other ill-defined conditions(799.89)     chronic pancreatitis    Pancreatitis, chronic (HCC)     Psychiatric disorder     emetophobia    RA (rheumatoid arthritis) ([facility] 8/24/2017    Rheumatoid arthritis(714.0)     Type 1 diabetes mellitus ([facility] 08/24/2015    Vertigo, peripheral        Past Surgical History:  Past Surgical History:   Procedure Laterality Date    ABDOMEN SURGERY PROC UNLISTED  2014    whipple procedure    HX CERVICAL DISKECTOMY  2001    HX CERVICAL FUSION      HX ORTHOPAEDIC      left foot surgery x 2    HX ORTHOPAEDIC      joint replacement  right index finger    HX UROLOGICAL      cysto       Family History:  Family History   Problem Relation Age of Onset    Malignant Hyperthermia Neg Hx     Pseudocholinesterase Deficiency Neg Hx     Delayed Awakening Neg Hx     Post-op Nausea/Vomiting Neg Hx     Emergence Delirium Neg Hx     Post-op Cognitive Dysfunction Neg Hx     Other Neg Hx        Social History:  Social History     Tobacco Use    Smoking status: Former Smoker     Packs/day: 1.00     Years: 35.00     Pack years: 35.00    Smokeless tobacco: Never Used   Substance Use Topics    Alcohol use: No    Drug use: No       Allergies: Allergies   Allergen Reactions    Fentanyl Shortness of Breath    Morphine Nausea and Vomiting    Penicillins Hives and Swelling    Thimerosal Hives and Swelling         Review of Systems   Review of Systems   Constitutional: Positive for fever. Negative for chills.    HENT: Negative for congestion, ear pain, sinus pain and sore throat. Eyes: Negative for pain and visual disturbance. Respiratory: Negative for cough and shortness of breath. Cardiovascular: Negative for chest pain and leg swelling. Gastrointestinal: Negative for abdominal pain, constipation, diarrhea, nausea and vomiting. Genitourinary: Negative for dysuria and hematuria. Musculoskeletal: Positive for myalgias. Negative for back pain and neck pain. Skin: Negative for pallor and rash. Neurological: Negative for dizziness, tremors, weakness, light-headedness and headaches. All other systems reviewed and are negative. Physical Exam     Vitals:    03/04/20 1253   BP: 125/71   Pulse: 99   Resp: 16   Temp: 98.7 °F (37.1 °C)   SpO2: 99%   Weight: 59.9 kg (132 lb)   Height: 5' 10\" (1.778 m)     Physical Exam    Nursing note and vitals reviewed    Constitutional: Middle-aged  male, no acute distress  Head: Normocephalic, Atraumatic  Eyes: Pupils are equal, round, and reactive to light, EOMI  Neck: Supple, non-tender  Cardiovascular: Regular rate and rhythm, no murmurs, rubs, or gallops  Chest: Normal work of breathing and chest excursion bilaterally  Lungs: Clear to ausculation bilaterally, no wheezes, no rhonchi  Abdomen: Soft, non tender, non distended, normoactive bowel sounds  Back: No evidence of trauma or deformity  Extremities: No evidence of trauma or deformity, no LE edema.  No streaking erythema, vesicular lesions, ulcerations or bulla  Skin: Warm and dry, normal cap refill  Neuro: Alert and appropriate, CN intact, normal speech, moving all 4 extremities freely and symmetrically  Psychiatric: Normal mood and affect       Diagnostic Study Results     Labs -     Recent Results (from the past 12 hour(s))   INFLUENZA A & B AG (RAPID TEST)    Collection Time: 03/04/20 12:52 PM   Result Value Ref Range    Influenza A Antigen NEGATIVE  NEG      Influenza B Antigen NEGATIVE  NEG     EKG, 12 LEAD, INITIAL    Collection Time: 03/04/20 12:54 PM   Result Value Ref Range    Ventricular Rate 102 BPM    Atrial Rate 102 BPM    P-R Interval 196 ms    QRS Duration 90 ms    Q-T Interval 334 ms    QTC Calculation (Bezet) 435 ms    Calculated P Axis 73 degrees    Calculated R Axis 53 degrees    Calculated T Axis 79 degrees    Diagnosis       Sinus tachycardia  Nonspecific T wave abnormality  Abnormal ECG  When compared with ECG of 02-OCT-2017 19:52,  Questionable change in QRS axis     CBC WITH AUTOMATED DIFF    Collection Time: 03/04/20  1:00 PM   Result Value Ref Range    WBC 6.0 4.6 - 13.2 K/uL    RBC 4.75 4.70 - 5.50 M/uL    HGB 13.9 13.0 - 16.0 g/dL    HCT 42.3 36.0 - 48.0 %    MCV 89.1 74.0 - 97.0 FL    MCH 29.3 24.0 - 34.0 PG    MCHC 32.9 31.0 - 37.0 g/dL    RDW 13.1 11.6 - 14.5 %    PLATELET 252 424 - 400 K/uL    MPV 10.8 9.2 - 11.8 FL    NEUTROPHILS 78 (H) 40 - 73 %    LYMPHOCYTES 5 (L) 21 - 52 %    MONOCYTES 12 (H) 3 - 10 %    EOSINOPHILS 4 0 - 5 %    BASOPHILS 1 0 - 2 %    ABS. NEUTROPHILS 4.7 1.8 - 8.0 K/UL    ABS. LYMPHOCYTES 0.3 (L) 0.9 - 3.6 K/UL    ABS. MONOCYTES 0.7 0.05 - 1.2 K/UL    ABS. EOSINOPHILS 0.2 0.0 - 0.4 K/UL    ABS. BASOPHILS 0.0 0.0 - 0.1 K/UL    DF AUTOMATED     METABOLIC PANEL, COMPREHENSIVE    Collection Time: 03/04/20  1:00 PM   Result Value Ref Range    Sodium 135 (L) 136 - 145 mmol/L    Potassium 4.1 3.5 - 5.5 mmol/L    Chloride 101 100 - 111 mmol/L    CO2 26 21 - 32 mmol/L    Anion gap 8 3.0 - 18 mmol/L    Glucose 312 (H) 74 - 99 mg/dL    BUN 9 7.0 - 18 MG/DL    Creatinine 1.03 0.6 - 1.3 MG/DL    BUN/Creatinine ratio 9 (L) 12 - 20      GFR est AA >60 >60 ml/min/1.73m2    GFR est non-AA >60 >60 ml/min/1.73m2    Calcium 8.7 8.5 - 10.1 MG/DL    Bilirubin, total 0.7 0.2 - 1.0 MG/DL    ALT (SGPT) 40 16 - 61 U/L    AST (SGOT) 26 10 - 38 U/L    Alk.  phosphatase 144 (H) 45 - 117 U/L    Protein, total 6.9 6.4 - 8.2 g/dL    Albumin 3.7 3.4 - 5.0 g/dL    Globulin 3.2 2.0 - 4.0 g/dL    A-G Ratio 1.2 0.8 - 1.7         Radiologic Studies -   XR CHEST PORT   Final Result   IMPRESSION:      No acute radiographic cardiopulmonary abnormality. CT Results  (Last 48 hours)    None        CXR Results  (Last 48 hours)               03/04/20 1323  XR CHEST PORT Final result    Impression:  IMPRESSION:       No acute radiographic cardiopulmonary abnormality. Narrative:  EXAM: XR CHEST PORT       CLINICAL INDICATION/HISTORY: fever, cough   -Additional: None       COMPARISON: 8/24/2017       TECHNIQUE: Frontal view of the chest       _______________       FINDINGS:       HEART AND MEDIASTINUM: Normal cardiac size and mediastinal contours. LUNGS AND PLEURAL SPACES: No focal pneumonic consolidation, pneumothorax, or   pleural effusion. BONY THORAX AND SOFT TISSUES: No acute osseous abnormality. Postop changes from   cervical fusion procedure partially visualized. _______________                   Medical Decision Making   I am the first provider for this patient. I reviewed the vital signs, available nursing notes, past medical history, past surgical history, family history and social history. Vital Signs-Reviewed the patient's vital signs. Pulse Oximetry Analysis -99 % on room air    Cardiac Monitor:  Rate: 99 bpm  Rhythm: Regular    EKG interpretation: (Preliminary)  1:26 PM   Sinus tachycardia 102 beats minute. QTc 435 ms. No acute ST elevation    Records Reviewed: Nursing Notes and Old Medical Records    Provider Notes:   62 y.o. male presenting with body aches and fever. On exam patient is afebrile 98.7 however did just take Motrin prior to arrival.  Normotensive not tachycardic. Does not appear toxic. Will evaluate for infectious etiology and check flu, chest x-ray and UA. Procedures:  Procedures    ED Course:   1:27 PM   Initial assessment performed. The patients presenting problems have been discussed, and they are in agreement with the care plan formulated and outlined with them.   I have encouraged them to ask questions as they arise throughout their visit. 2:00 PM  CBC showing no leukocytosis or bandemia. CMP with hyperglycemia however normal bicarb and normal anion gap, not consistent with DKA. Patient is flu negative. Chest x-ray showing no acute process. On reassessment, patient is laying on the stretcher comfortably. Requesting a refill of pro-air as states that \"it helps my breathing\". Diagnosis and Disposition       DISCHARGE NOTE:  1:59 PM    Tayo Torres  results have been reviewed with him. He has been counseled regarding his diagnosis, treatment, and plan. He verbally conveys understanding and agreement of the signs, symptoms, diagnosis, treatment and prognosis and additionally agrees to follow up as discussed. He also agrees with the care-plan and conveys that all of his questions have been answered. I have also provided discharge instructions for him that include: educational information regarding their diagnosis and treatment, and list of reasons why they would want to return to the ED prior to their follow-up appointment, should his condition change. He has been provided with education for proper emergency department utilization. CLINICAL IMPRESSION:    1. Hyperglycemia    2. Viral illness        PLAN:  1. D/C Home  2. Current Discharge Medication List      START taking these medications    Details   albuterol (PROAIR HFA) 90 mcg/actuation inhaler Take 1 Puff by inhalation every six (6) hours as needed for Wheezing. Qty: 1 Inhaler, Refills: 0           3.    Follow-up Information     Follow up With Specialties Details Why Contact Info    Ben Vanegas MD Internal Medicine Schedule an appointment as soon as possible for a visit in 2 days  [facility] [address] [phone]      [facility] EMERGENCY DEPT Emergency Medicine  As needed if symptoms worsen [address] [id number]  [phone] ____________________________________     Please note that this dictation was completed with [employer], the computer voice recognition software. Quite often unanticipated grammatical, syntax, homophones, and other interpretive errors are inadvertently transcribed by the computer software. Please disregard these errors. Please excuse any errors that have escaped final proofreading.

## 2021-02-17 ENCOUNTER — HOSPITAL ENCOUNTER (EMERGENCY)
Age: 56
Discharge: HOME | End: 2021-02-18
Attending: EMERGENCY MEDICINE
Payer: MEDICARE

## 2021-02-17 DIAGNOSIS — R73.9 HYPERGLYCEMIA: ICD-10-CM

## 2021-02-17 DIAGNOSIS — N20.0 KIDNEY STONE: Primary | ICD-10-CM

## 2021-02-17 LAB
ALBUMIN SERPL-MCNC: 3.5 G/DL (ref 3.4–5)
ALBUMIN/GLOB SERPL: 1 {RATIO} (ref 0.8–1.7)
ALP SERPL-CCNC: 125 U/L (ref 45–117)
ALT SERPL-CCNC: 36 U/L (ref 16–61)
ANION GAP SERPL CALC-SCNC: 9 MMOL/L (ref 3–18)
AST SERPL-CCNC: 23 U/L (ref 10–38)
BASOPHILS # BLD: 0.1 K/UL (ref 0–0.1)
BASOPHILS NFR BLD: 1 % (ref 0–2)
BILIRUB SERPL-MCNC: 0.4 MG/DL (ref 0.2–1)
BUN SERPL-MCNC: 15 MG/DL (ref 7–18)
BUN/CREAT SERPL: 10 (ref 12–20)
CALCIUM SERPL-MCNC: 8.9 MG/DL (ref 8.5–10.1)
CHLORIDE SERPL-SCNC: 105 MMOL/L (ref 100–111)
CO2 SERPL-SCNC: 23 MMOL/L (ref 21–32)
CREAT SERPL-MCNC: 1.46 MG/DL (ref 0.6–1.3)
DIFFERENTIAL METHOD BLD: ABNORMAL
EOSINOPHIL # BLD: 0.3 K/UL (ref 0–0.4)
EOSINOPHIL NFR BLD: 3 % (ref 0–5)
ERYTHROCYTE [DISTWIDTH] IN BLOOD BY AUTOMATED COUNT: 12.8 % (ref 11.6–14.5)
GLOBULIN SER CALC-MCNC: 3.4 G/DL (ref 2–4)
GLUCOSE SERPL-MCNC: 348 MG/DL (ref 74–99)
HCT VFR BLD AUTO: 39 % (ref 36–48)
HGB BLD-MCNC: 13.4 G/DL (ref 13–16)
LIPASE SERPL-CCNC: 17 U/L (ref 73–393)
LYMPHOCYTES # BLD: 1 K/UL (ref 0.9–3.6)
LYMPHOCYTES NFR BLD: 12 % (ref 21–52)
MAGNESIUM SERPL-MCNC: 1.9 MG/DL (ref 1.6–2.6)
MCH RBC QN AUTO: 30.2 PG (ref 24–34)
MCHC RBC AUTO-ENTMCNC: 34.4 G/DL (ref 31–37)
MCV RBC AUTO: 88 FL (ref 74–97)
MONOCYTES # BLD: 0.5 K/UL (ref 0.05–1.2)
MONOCYTES NFR BLD: 6 % (ref 3–10)
NEUTS SEG # BLD: 7 K/UL (ref 1.8–8)
NEUTS SEG NFR BLD: 78 % (ref 40–73)
PLATELET # BLD AUTO: 205 K/UL (ref 135–420)
PMV BLD AUTO: 10.2 FL (ref 9.2–11.8)
POTASSIUM SERPL-SCNC: 3.8 MMOL/L (ref 3.5–5.5)
PROT SERPL-MCNC: 6.9 G/DL (ref 6.4–8.2)
RBC # BLD AUTO: 4.43 M/UL (ref 4.7–5.5)
SODIUM SERPL-SCNC: 137 MMOL/L (ref 136–145)
WBC # BLD AUTO: 8.9 K/UL (ref 4.6–13.2)

## 2021-02-17 PROCEDURE — 96374 THER/PROPH/DIAG INJ IV PUSH: CPT

## 2021-02-17 PROCEDURE — 83735 ASSAY OF MAGNESIUM: CPT

## 2021-02-17 PROCEDURE — 83690 ASSAY OF LIPASE: CPT

## 2021-02-17 PROCEDURE — 74011250636 HC RX REV CODE- 250/636: Performed by: EMERGENCY MEDICINE

## 2021-02-17 PROCEDURE — 96361 HYDRATE IV INFUSION ADD-ON: CPT

## 2021-02-17 PROCEDURE — 85025 COMPLETE CBC W/AUTO DIFF WBC: CPT

## 2021-02-17 PROCEDURE — 80053 COMPREHEN METABOLIC PANEL: CPT

## 2021-02-17 PROCEDURE — 74011000258 HC RX REV CODE- 258: Performed by: EMERGENCY MEDICINE

## 2021-02-17 PROCEDURE — 99285 EMERGENCY DEPT VISIT HI MDM: CPT

## 2021-02-17 PROCEDURE — 74011000250 HC RX REV CODE- 250: Performed by: EMERGENCY MEDICINE

## 2021-02-17 PROCEDURE — 81001 URINALYSIS AUTO W/SCOPE: CPT

## 2021-02-17 RX ORDER — KETOROLAC TROMETHAMINE 15 MG/ML
15 INJECTION, SOLUTION INTRAMUSCULAR; INTRAVENOUS
Status: DISCONTINUED | OUTPATIENT
Start: 2021-02-17 | End: 2021-02-17

## 2021-02-17 RX ADMIN — SODIUM CHLORIDE 1000 ML: 900 INJECTION, SOLUTION INTRAVENOUS at 22:36

## 2021-02-17 RX ADMIN — LIDOCAINE HYDROCHLORIDE 66 MG: 20 INJECTION, SOLUTION EPIDURAL; INFILTRATION; INTRACAUDAL; PERINEURAL at 22:57

## 2021-02-18 ENCOUNTER — APPOINTMENT (OUTPATIENT)
Dept: CT IMAGING | Age: 56
End: 2021-02-18
Attending: EMERGENCY MEDICINE
Payer: MEDICARE

## 2021-02-18 VITALS
SYSTOLIC BLOOD PRESSURE: 143 MMHG | OXYGEN SATURATION: 100 % | BODY MASS INDEX: 20.62 KG/M2 | DIASTOLIC BLOOD PRESSURE: 83 MMHG | HEART RATE: 69 BPM | RESPIRATION RATE: 11 BRPM | WEIGHT: 144 LBS | TEMPERATURE: 97.6 F | HEIGHT: 70 IN

## 2021-02-18 LAB
AMORPH CRY URNS QL MICRO: ABNORMAL
APPEARANCE UR: CLEAR
BACTERIA URNS QL MICRO: ABNORMAL /HPF
BILIRUB UR QL: NEGATIVE
CAOX CRY URNS QL MICRO: ABNORMAL
COLOR UR: YELLOW
EPITH CASTS URNS QL MICRO: ABNORMAL /LPF (ref 0–5)
GLUCOSE BLD STRIP.AUTO-MCNC: 307 MG/DL (ref 70–110)
GLUCOSE UR STRIP.AUTO-MCNC: >1000 MG/DL
HGB UR QL STRIP: ABNORMAL
HYALINE CASTS URNS QL MICRO: ABNORMAL /LPF (ref 0–2)
KETONES UR QL STRIP.AUTO: ABNORMAL MG/DL
LEUKOCYTE ESTERASE UR QL STRIP.AUTO: NEGATIVE
NITRITE UR QL STRIP.AUTO: NEGATIVE
PH UR STRIP: 5 [PH] (ref 5–8)
PROT UR STRIP-MCNC: ABNORMAL MG/DL
RBC #/AREA URNS HPF: ABNORMAL /HPF (ref 0–5)
SP GR UR REFRACTOMETRY: 1.03 (ref 1–1.03)
UROBILINOGEN UR QL STRIP.AUTO: 0.2 EU/DL (ref 0.2–1)
WBC URNS QL MICRO: ABNORMAL /HPF (ref 0–5)

## 2021-02-18 PROCEDURE — 74011250636 HC RX REV CODE- 250/636: Performed by: EMERGENCY MEDICINE

## 2021-02-18 PROCEDURE — 74176 CT ABD & PELVIS W/O CONTRAST: CPT

## 2021-02-18 PROCEDURE — 74011250637 HC RX REV CODE- 250/637: Performed by: EMERGENCY MEDICINE

## 2021-02-18 PROCEDURE — 82962 GLUCOSE BLOOD TEST: CPT

## 2021-02-18 RX ORDER — HYDROCODONE BITARTRATE AND ACETAMINOPHEN 5; 325 MG/1; MG/1
1 TABLET ORAL
Qty: 12 TAB | Refills: 0 | Status: SHIPPED | OUTPATIENT
Start: 2021-02-18 | End: 2021-02-21

## 2021-02-18 RX ORDER — HYDROCODONE BITARTRATE AND ACETAMINOPHEN 5; 325 MG/1; MG/1
1 TABLET ORAL
Status: COMPLETED | OUTPATIENT
Start: 2021-02-18 | End: 2021-02-18

## 2021-02-18 RX ORDER — ONDANSETRON 4 MG/1
4 TABLET, ORALLY DISINTEGRATING ORAL
Qty: 20 TAB | Refills: 0 | Status: SHIPPED | OUTPATIENT
Start: 2021-02-18

## 2021-02-18 RX ORDER — KETOROLAC TROMETHAMINE 10 MG/1
10 TABLET, FILM COATED ORAL
Qty: 20 TAB | Refills: 0 | Status: SHIPPED | OUTPATIENT
Start: 2021-02-18

## 2021-02-18 RX ORDER — ONDANSETRON 2 MG/ML
4 INJECTION INTRAMUSCULAR; INTRAVENOUS
Status: COMPLETED | OUTPATIENT
Start: 2021-02-18 | End: 2021-02-18

## 2021-02-18 RX ORDER — TAMSULOSIN HYDROCHLORIDE 0.4 MG/1
0.4 CAPSULE ORAL DAILY
Qty: 15 CAP | Refills: 0 | Status: SHIPPED | OUTPATIENT
Start: 2021-02-18 | End: 2021-03-05

## 2021-02-18 RX ADMIN — ONDANSETRON 4 MG: 2 INJECTION INTRAMUSCULAR; INTRAVENOUS at 02:40

## 2021-02-18 RX ADMIN — HYDROCODONE BITARTRATE AND ACETAMINOPHEN 1 TABLET: 5; 325 TABLET ORAL at 02:01

## 2021-02-18 NOTE — ED PROVIDER NOTES
EMERGENCY DEPARTMENT HISTORY AND PHYSICAL EXAM    Date: 2/17/2021  Patient Name: Oriana Morales    History of Presenting Illness     Chief Complaint   Patient presents with    Flank Pain         History Provided By: Patient    Additional History (Context):   Oriana Morales is a 60 y.o. male with PMHX kidney stone, CAD, DM presents to the emergency department via private vehicle C/O sided flank pain and difficulty urinating that started approximately 5 PM.  Patient reports nausea and vomiting. Patient reports that the symptoms are similar to when he had kidney stones in the past. Pt denies chest pain, shortness of breath, fever, diarrhea, and any other sxs or complaints. No relieving or exacerbating factors identified. PCP: Josh Morales MD    Current Facility-Administered Medications   Medication Dose Route Frequency Provider Last Rate Last Admin    ondansetron [facility]) injection 4 mg  4 mg IntraVENous NOW Laura Laurent,          Current Outpatient Medications   Medication Sig Dispense Refill    HYDROcodone-acetaminophen (Norco) 5-325 mg per tablet Take 1 Tab by mouth every six (6) hours as needed for Pain for up to 3 days. Max Daily Amount: 4 Tabs. 12 Tab 0    ondansetron (Zofran ODT) 4 mg disintegrating tablet 1 Tab by SubLINGual route every eight (8) hours as needed for Nausea or Vomiting. 20 Tab 0    tamsulosin (Flomax) 0.4 mg capsule Take 1 Cap by mouth daily for 15 days. 15 Cap 0    ketorolac (TORADOL) 10 mg tablet Take 1 Tab by mouth every six (6) hours as needed for Pain. 20 Tab 0    albuterol (PROAIR HFA) 90 mcg/actuation inhaler Take 1 Puff by inhalation every six (6) hours as needed for Wheezing. 1 Inhaler 0    HYDROcodone-acetaminophen (NORCO) 5-325 mg per tablet Take 1 Tab by mouth every four (4) hours as needed for Pain. Max Daily Amount: 6 Tabs. 20 Tab 0    triazolam (HALCION) 0.25 mg tablet Take 0.25 mg by mouth nightly.       insulin aspart (NOVOLOG) 100 unit/mL injection by Continuous Infusion route. Pump      metoclopramide HCl (REGLAN) 10 mg tablet Take 10 mg by mouth as needed.  riTUXimab (RITUXAN) 10 mg/mL injection by IntraVENous route once. Every 6 months       VENLAFAXINE HCL (EFFEXOR XR PO) Take 300 mg by mouth daily.  ALPRAZolam (XANAX) 0.5 mg tablet Take 0.5 mg by mouth nightly as needed (5 pm).  amylase-lipase-protease (CREON) capsule Take 1 Cap by mouth three (3) times daily (with meals). 1 tab with snacks , 2 tab with meals       ondansetron hcl (ZOFRAN) 4 mg tablet Take 4 mg by mouth every eight (8) hours as needed.            Past History     Past Medical History:  Past Medical History:   Diagnosis Date    Arthritis     BPH (benign prostatic hyperplasia)     Chronic pain     COPD     Depression     Diverticulosis     Gastritis     Gastroparesis     GERD (gastroesophageal reflux disease)     fair control with meds    Hiatal hernia     Hypercholesterolemia     Liver disease     ELMORE    Migraine     Other ill-defined conditions(799.89)     chronic pancreatitis    Pancreatitis, chronic (HCC)     Psychiatric disorder     emetophobia    RA (rheumatoid arthritis) ([facility] [address] 8/24/2017    Rheumatoid arthritis(714.0)     Type 1 diabetes mellitus ([facility] [address] 08/24/2015    Vertigo, peripheral        Past Surgical History:  Past Surgical History:   Procedure Laterality Date    HX CERVICAL DISKECTOMY  2001    HX CERVICAL FUSION      HX ORTHOPAEDIC      left foot surgery x 2    HX ORTHOPAEDIC      joint replacement  right index finger    HX UROLOGICAL      cysto    NM ABDOMEN SURGERY PROC UNLISTED  2014    whipple procedure       Family History:  Family History   Problem Relation Age of Onset    Malignant Hyperthermia Neg Hx     Pseudocholinesterase Deficiency Neg Hx     Delayed Awakening Neg Hx     Post-op Nausea/Vomiting Neg Hx     Emergence Delirium Neg Hx     Post-op Cognitive Dysfunction Neg Hx     Other Neg Hx        Social History:  Social History     Tobacco Use    Smoking status: Former Smoker     Packs/day: 1.00     Years: 35.00     Pack years: 35.00    Smokeless tobacco: Never Used   Substance Use Topics    Alcohol use: No    Drug use: No       Allergies: Allergies   Allergen Reactions    Fentanyl Shortness of Breath    Morphine Nausea and Vomiting    Penicillins Hives and Swelling    Thimerosal Hives and Swelling         Review of Systems   Review of Systems   Constitutional: Negative for chills and fever. HENT: Negative for congestion, ear pain, sinus pain and sore throat. Eyes: Negative for pain and visual disturbance. Respiratory: Negative for cough and shortness of breath. Cardiovascular: Negative for chest pain and leg swelling. Gastrointestinal: Negative for abdominal pain, constipation, diarrhea, nausea and vomiting. Genitourinary: Positive for difficulty urinating and flank pain. Negative for dysuria and hematuria. Musculoskeletal: Negative for back pain and neck pain. Skin: Negative for pallor and rash. Neurological: Negative for dizziness, tremors, weakness, light-headedness and headaches. All other systems reviewed and are negative.       Physical Exam     Vitals:    02/17/21 2300 02/17/21 2330 02/18/21 0000 02/18/21 0030   BP: (!) 148/79 (!) 153/82 (!) 142/83 (!) 143/83   Pulse: 66 69 69 69   Resp: 14 11 11 11   Temp:       SpO2: 100% 99% 96% 100%   Weight:       Height:         Physical Exam    Nursing note and vitals reviewed    Constitutional: Middle-aged  male, appears uncomfortable   head: Normocephalic, Atraumatic  Eyes: Pupils are equal, round, and reactive to light, EOMI  Neck: Supple, non-tender  Cardiovascular: Regular rate and rhythm, no murmurs, rubs, or gallops, + 2 radial pulses bilaterally  Chest: Normal work of breathing and chest excursion bilaterally  Lungs: Clear to ausculation bilaterally, no wheezes, no rhonchi  Abdomen: Soft, non tender, non distended, normoactive bowel sounds  Back: No evidence of trauma or deformity  Extremities: No evidence of trauma or deformity, no LE edema. No streaking erythema, vesicular lesions, ulcerations or bulla  Skin: Warm and dry, normal cap refill  Neuro: Alert and appropriate, CN intact, normal speech, moving all 4 extremities freely and symmetrically  Psychiatric: Normal mood and affect       Diagnostic Study Results     Labs -     Recent Results (from the past 12 hour(s))   CBC WITH AUTOMATED DIFF    Collection Time: 02/17/21 10:30 PM   Result Value Ref Range    WBC 8.9 4.6 - 13.2 K/uL    RBC 4.43 (L) 4.70 - 5.50 M/uL    HGB 13.4 13.0 - 16.0 g/dL    HCT 39.0 36.0 - 48.0 %    MCV 88.0 74.0 - 97.0 FL    MCH 30.2 24.0 - 34.0 PG    MCHC 34.4 31.0 - 37.0 g/dL    RDW 12.8 11.6 - 14.5 %    PLATELET 397 199 - 404 K/uL    MPV 10.2 9.2 - 11.8 FL    NEUTROPHILS 78 (H) 40 - 73 %    LYMPHOCYTES 12 (L) 21 - 52 %    MONOCYTES 6 3 - 10 %    EOSINOPHILS 3 0 - 5 %    BASOPHILS 1 0 - 2 %    ABS. NEUTROPHILS 7.0 1.8 - 8.0 K/UL    ABS. LYMPHOCYTES 1.0 0.9 - 3.6 K/UL    ABS. MONOCYTES 0.5 0.05 - 1.2 K/UL    ABS. EOSINOPHILS 0.3 0.0 - 0.4 K/UL    ABS. BASOPHILS 0.1 0.0 - 0.1 K/UL    DF AUTOMATED     METABOLIC PANEL, COMPREHENSIVE    Collection Time: 02/17/21 10:30 PM   Result Value Ref Range    Sodium 137 136 - 145 mmol/L    Potassium 3.8 3.5 - 5.5 mmol/L    Chloride 105 100 - 111 mmol/L    CO2 23 21 - 32 mmol/L    Anion gap 9 3.0 - 18 mmol/L    Glucose 348 (H) 74 - 99 mg/dL    BUN 15 7.0 - 18 MG/DL    Creatinine 1.46 (H) 0.6 - 1.3 MG/DL    BUN/Creatinine ratio 10 (L) 12 - 20      GFR est AA >60 >60 ml/min/1.73m2    GFR est non-AA 50 (L) >60 ml/min/1.73m2    Calcium 8.9 8.5 - 10.1 MG/DL    Bilirubin, total 0.4 0.2 - 1.0 MG/DL    ALT (SGPT) 36 16 - 61 U/L    AST (SGOT) 23 10 - 38 U/L    Alk.  phosphatase 125 (H) 45 - 117 U/L    Protein, total 6.9 6.4 - 8.2 g/dL    Albumin 3.5 3.4 - 5.0 g/dL    Globulin 3.4 2.0 - 4.0 g/dL    A-G Ratio 1.0 0.8 - 1.7 MAGNESIUM    Collection Time: 02/17/21 10:30 PM   Result Value Ref Range    Magnesium 1.9 1.6 - 2.6 mg/dL   LIPASE    Collection Time: 02/17/21 10:30 PM   Result Value Ref Range    Lipase 17 (L) 73 - 393 U/L       Radiologic Studies -   CT ABD PELV WO CONT   Final Result      5 mm proximal right ureteric calculus with mild right proximal hydroureter and   hydronephrosis. Numerous bilateral nonobstructing renal calculi. 4.2 cm infrarenal abdominal aortic aneurysm. Descending colon diverticulosis without evidence for diverticulitis. Retained stool. There appears to be mild rectal thickening possibly a mild   colitis. CT Results  (Last 48 hours)               02/18/21 0048  CT ABD PELV WO CONT Final result    Impression:      5 mm proximal right ureteric calculus with mild right proximal hydroureter and   hydronephrosis. Numerous bilateral nonobstructing renal calculi. 4.2 cm infrarenal abdominal aortic aneurysm. Descending colon diverticulosis without evidence for diverticulitis. Retained stool. There appears to be mild rectal thickening possibly a mild   colitis. Narrative:  EXAM: CT of the abdomen and pelvis       INDICATION: Pain. COMPARISON: February 16, 2019. TECHNIQUE: Axial CT imaging of the abdomen and pelvis was performed without   intravenous contrast. Multiplanar reformats were generated. Dose reduction   techniques used: automated exposure control, adjustment of the mAs and/or kVp   according to patient size, and iterative reconstruction techniques. Digital   imaging and communications in Medicine (DICOM) format image data are available   to nonaffiliated external healthcare facilities or entities on a secure, media   free, reciprocally searchable basis with patient authorization for at least 12   months after this study. _______________       FINDINGS:       LOWER CHEST: Unremarkable.        LIVER, BILIARY: There is a subcentimeter hypodensity left lobe of the liver. There is air within the common bile duct and proximal intrahepatic bile ducts. There has been a prior cholecystectomy. PANCREAS: The pancreas is atrophic with pancreatic calcifications throughout. SPLEEN: Normal.       ADRENALS: Normal.       KIDNEYS: There are numerous bilateral renal calculi measuring 1 to 3 mm 4 mm. There is mild right perinephric stranding, mild right hydronephrosis and   proximal hydroureter to the level of a 5 mm proximal right ureteric calculus. LYMPH NODES: No enlarged lymph nodes. GASTROINTESTINAL TRACT: No bowel dilation or wall thickening. There is retained   stool. The appendix is visualized and is within normal limits. There are a few   diverticula of the descending colon without evidence for diverticulitis. There   appears to be mild rectal thickening. PELVIC ORGANS: Unremarkable. VASCULATURE: There is atherosclerotic plaque of the abdominal aorta within   infrarenal abdominal aortic aneurysm measuring 4.2 cm. BONES: There is curvature of the lumbar spine to the right. There is grade 1   anterolisthesis L5 over S1 with bilateral L5 spondylolysis. There is moderate to   severe L5-S1 disc degenerative change. OTHER: None.       _______________               CXR Results  (Last 48 hours)    None            Medical Decision Making   I am the first provider for this patient. I reviewed the vital signs, available nursing notes, past medical history, past surgical history, family history and social history. Vital Signs-Reviewed the patient's vital signs. Pulse Oximetry Analysis -100% on room air    Cardiac Monitor:  Rate: 78 bpm  Rhythm: Regular    Records Reviewed: Nursing Notes and Old Medical Records    Provider Notes:   60 y.o. male who presents with right-sided flank pain that is similar to when he had kidney stones in the past.  On exam he appears uncomfortable but nontoxic. He is afebrile with a benign abdominal exam.  Will obtain lab work and CT imaging to evaluate kidney stones. Will also obtain urinalysis to rule out UTI. Will provide symptom control and reassess. Procedures:  Procedures    ED Course:   10:26 PM   Initial assessment performed. The patients presenting problems have been discussed, and they are in agreement with the care plan formulated and outlined with them. I have encouraged them to ask questions as they arise throughout their visit. 2:28 AM  Patient's labs showing no leukocytosis or bandemia. Creatinine 1.46. Glucose noted to be mildly elevated at 348, however not consistent with DKA. Patient hydrated with IV fluids, recheck glucose was 307. UA showing hematuria, not consistent with UTI. CT scan showing 5 mm proximal right ureteric calculus with mild right-sided occipital hydroureter and hydronephrosis. On reassessment, patient reports improved symptoms. Patient will be discharged with a urine strainer. And discharged with Toradol, Norco, Flomax, Zofran for symptom control and urology follow-up. Diagnosis and Disposition       DISCHARGE NOTE:  2:28 AM    Geno Lee  results have been reviewed with him. He has been counseled regarding his diagnosis, treatment, and plan. He verbally conveys understanding and agreement of the signs, symptoms, diagnosis, treatment and prognosis and additionally agrees to follow up as discussed. He also agrees with the care-plan and conveys that all of his questions have been answered. I have also provided discharge instructions for him that include: educational information regarding their diagnosis and treatment, and list of reasons why they would want to return to the ED prior to their follow-up appointment, should his condition change. He has been provided with education for proper emergency department utilization. CLINICAL IMPRESSION:    1. Kidney stone    2. Hyperglycemia        PLAN:  1. D/C Home  2.   Current Discharge Medication List      START taking these medications    Details   !! HYDROcodone-acetaminophen (Norco) 5-325 mg per tablet Take 1 Tab by mouth every six (6) hours as needed for Pain for up to 3 days. Max Daily Amount: 4 Tabs.  Qty: 12 Tab, Refills: 0    Associated Diagnoses: Kidney stone      ondansetron (Zofran ODT) 4 mg disintegrating tablet 1 Tab by SubLINGual route every eight (8) hours as needed for Nausea or Vomiting.  Qty: 20 Tab, Refills: 0      tamsulosin (Flomax) 0.4 mg capsule Take 1 Cap by mouth daily for 15 days.  Qty: 15 Cap, Refills: 0      ketorolac (TORADOL) 10 mg tablet Take 1 Tab by mouth every six (6) hours as needed for Pain.  Qty: 20 Tab, Refills: 0       !! - Potential duplicate medications found. Please discuss with provider.      CONTINUE these medications which have NOT CHANGED    Details   !! HYDROcodone-acetaminophen (NORCO) 5-325 mg per tablet Take 1 Tab by mouth every four (4) hours as needed for Pain. Max Daily Amount: 6 Tabs.  Qty: 20 Tab, Refills: 0    Associated Diagnoses: Kidney stone on left side; Hydronephrosis with urinary obstruction due to renal calculus       !! - Potential duplicate medications found. Please discuss with provider.        3.   Follow-up Information     Follow up With Specialties Details Why Contact Info    Sharmila Gamino MD Internal Medicine Schedule an appointment as soon as possible for a visit in 2 days  [address], [address]  [facility] [address] [phone]      Graham Page MD Urology Schedule an appointment as soon as possible for a visit in 2 days  [address]  [address]  [facility] [address] [phone]      [facility] EMERGENCY DEPT Emergency Medicine  As needed if symptoms worsen [address]  [place] [id number] [phone]        ____________________________________     Please note that this dictation was completed with [employer], the computer voice recognition software.  Quite often  unanticipated grammatical, syntax, homophones, and other interpretive errors are inadvertently transcribed by the computer software. Please disregard these errors. Please excuse any errors that have escaped final proofreading.

## 2021-02-18 NOTE — ED TRIAGE NOTES
Arrives via ems with c/o right flank pain. Pt states has chronic kidney stones and this feels the same.  Pt had 4mg zofran iv and 30mg toradol iv en route to [facility]

## 2021-02-26 ENCOUNTER — HOSPITAL ENCOUNTER (OUTPATIENT)
Dept: PREADMISSION TESTING | Age: 56
Discharge: HOME | End: 2021-02-26
Payer: MEDICARE

## 2021-02-26 PROCEDURE — U0003 INFECTIOUS AGENT DETECTION BY NUCLEIC ACID (DNA OR RNA); SEVERE ACUTE RESPIRATORY SYNDROME CORONAVIRUS 2 (SARS-COV-2) (CORONAVIRUS DISEASE [COVID-19]), AMPLIFIED PROBE TECHNIQUE, MAKING USE OF HIGH THROUGHPUT TECHNOLOGIES AS DESCRIBED BY CMS-2020-01-R: HCPCS

## 2021-02-27 LAB — SARS-COV-2, COV2NT: NOT DETECTED

## 2021-03-01 ENCOUNTER — HOSPITAL ENCOUNTER (OUTPATIENT)
Dept: PREADMISSION TESTING | Age: 56
Discharge: HOME | End: 2021-03-01
Payer: MEDICARE

## 2021-03-01 LAB
EST. AVERAGE GLUCOSE BLD GHB EST-MCNC: 240 MG/DL
HBA1C MFR BLD: 10 % (ref 4.2–5.6)

## 2021-03-01 PROCEDURE — 36415 COLL VENOUS BLD VENIPUNCTURE: CPT

## 2021-03-01 PROCEDURE — 83036 HEMOGLOBIN GLYCOSYLATED A1C: CPT

## 2021-03-05 ENCOUNTER — HOSPITAL ENCOUNTER (OUTPATIENT)
Dept: PREADMISSION TESTING | Age: 56
Discharge: HOME | End: 2021-03-05
Payer: MEDICARE

## 2021-03-05 PROCEDURE — U0005 INFEC AGEN DETEC AMPLI PROBE: HCPCS

## 2021-03-08 LAB — SARS-COV-2, NAA: NOT DETECTED

## 2021-03-11 ENCOUNTER — ANESTHESIA (OUTPATIENT)
Dept: SURGERY | Age: 56
End: 2021-03-11
Payer: MEDICARE

## 2021-03-11 ENCOUNTER — APPOINTMENT (OUTPATIENT)
Dept: GENERAL RADIOLOGY | Age: 56
End: 2021-03-11
Attending: UROLOGY
Payer: MEDICARE

## 2021-03-11 ENCOUNTER — HOSPITAL ENCOUNTER (OUTPATIENT)
Age: 56
Setting detail: OUTPATIENT SURGERY
Discharge: HOME | End: 2021-03-11
Attending: UROLOGY | Admitting: UROLOGY
Payer: MEDICARE

## 2021-03-11 ENCOUNTER — ANESTHESIA EVENT (OUTPATIENT)
Dept: SURGERY | Age: 56
End: 2021-03-11
Payer: MEDICARE

## 2021-03-11 VITALS
WEIGHT: 144.31 LBS | OXYGEN SATURATION: 100 % | TEMPERATURE: 98.6 F | BODY MASS INDEX: 20.66 KG/M2 | RESPIRATION RATE: 14 BRPM | SYSTOLIC BLOOD PRESSURE: 145 MMHG | HEIGHT: 70 IN | DIASTOLIC BLOOD PRESSURE: 88 MMHG | HEART RATE: 81 BPM

## 2021-03-11 LAB
GLUCOSE BLD STRIP.AUTO-MCNC: 172 MG/DL (ref 70–110)
GLUCOSE BLD STRIP.AUTO-MCNC: 199 MG/DL (ref 70–110)
GLUCOSE BLD STRIP.AUTO-MCNC: 220 MG/DL (ref 70–110)

## 2021-03-11 PROCEDURE — 74011250636 HC RX REV CODE- 250/636: Performed by: ANESTHESIOLOGY

## 2021-03-11 PROCEDURE — C2617 STENT, NON-COR, TEM W/O DEL: HCPCS | Performed by: UROLOGY

## 2021-03-11 PROCEDURE — 76210000016 HC OR PH I REC 1 TO 1.5 HR: Performed by: UROLOGY

## 2021-03-11 PROCEDURE — 76210000021 HC REC RM PH II 0.5 TO 1 HR: Performed by: UROLOGY

## 2021-03-11 PROCEDURE — 77030013079 HC BLNKT BAIR HGGR 3M -A: Performed by: ANESTHESIOLOGY

## 2021-03-11 PROCEDURE — 77030010103 HC SEAL PRT ENDOSC OCOA -B: Performed by: UROLOGY

## 2021-03-11 PROCEDURE — 2709999900 HC NON-CHARGEABLE SUPPLY: Performed by: UROLOGY

## 2021-03-11 PROCEDURE — 77030012508 HC MSK AIRWY LMA AMBU -A: Performed by: ANESTHESIOLOGY

## 2021-03-11 PROCEDURE — C1758 CATHETER, URETERAL: HCPCS | Performed by: UROLOGY

## 2021-03-11 PROCEDURE — 77030040361 HC SLV COMPR DVT MDII -B: Performed by: UROLOGY

## 2021-03-11 PROCEDURE — 76060000032 HC ANESTHESIA 0.5 TO 1 HR: Performed by: UROLOGY

## 2021-03-11 PROCEDURE — 76010000138 HC OR TIME 0.5 TO 1 HR: Performed by: UROLOGY

## 2021-03-11 PROCEDURE — 74011636637 HC RX REV CODE- 636/637: Performed by: ANESTHESIOLOGY

## 2021-03-11 PROCEDURE — 82962 GLUCOSE BLOOD TEST: CPT

## 2021-03-11 PROCEDURE — 74011250636 HC RX REV CODE- 250/636: Performed by: UROLOGY

## 2021-03-11 PROCEDURE — 74011000636 HC RX REV CODE- 636: Performed by: UROLOGY

## 2021-03-11 PROCEDURE — C1769 GUIDE WIRE: HCPCS | Performed by: UROLOGY

## 2021-03-11 PROCEDURE — 77030020782 HC GWN BAIR PAWS FLX 3M -B: Performed by: UROLOGY

## 2021-03-11 DEVICE — URETERAL STENT
Type: IMPLANTABLE DEVICE | Site: URETER | Status: FUNCTIONAL
Brand: POLARIS™ ULTRA

## 2021-03-11 RX ORDER — HYDROMORPHONE HYDROCHLORIDE 2 MG/ML
INJECTION, SOLUTION INTRAMUSCULAR; INTRAVENOUS; SUBCUTANEOUS AS NEEDED
Status: DISCONTINUED | OUTPATIENT
Start: 2021-03-11 | End: 2021-03-11 | Stop reason: HOSPADM

## 2021-03-11 RX ORDER — HYDROMORPHONE HYDROCHLORIDE 1 MG/ML
0.2 INJECTION, SOLUTION INTRAMUSCULAR; INTRAVENOUS; SUBCUTANEOUS AS NEEDED
Status: DISCONTINUED | OUTPATIENT
Start: 2021-03-11 | End: 2021-03-11 | Stop reason: HOSPADM

## 2021-03-11 RX ORDER — NALOXONE HYDROCHLORIDE 0.4 MG/ML
0.04 INJECTION, SOLUTION INTRAMUSCULAR; INTRAVENOUS; SUBCUTANEOUS
Status: DISCONTINUED | OUTPATIENT
Start: 2021-03-11 | End: 2021-03-11 | Stop reason: HOSPADM

## 2021-03-11 RX ORDER — FLUMAZENIL 0.1 MG/ML
0.2 INJECTION INTRAVENOUS
Status: DISCONTINUED | OUTPATIENT
Start: 2021-03-11 | End: 2021-03-11 | Stop reason: HOSPADM

## 2021-03-11 RX ORDER — MAGNESIUM SULFATE 100 %
4 CRYSTALS MISCELLANEOUS AS NEEDED
Status: DISCONTINUED | OUTPATIENT
Start: 2021-03-11 | End: 2021-03-11 | Stop reason: HOSPADM

## 2021-03-11 RX ORDER — ALBUTEROL SULFATE 0.83 MG/ML
2.5 SOLUTION RESPIRATORY (INHALATION)
Status: DISCONTINUED | OUTPATIENT
Start: 2021-03-11 | End: 2021-03-11 | Stop reason: HOSPADM

## 2021-03-11 RX ORDER — LEVOFLOXACIN 5 MG/ML
500 INJECTION, SOLUTION INTRAVENOUS ONCE
Status: COMPLETED | OUTPATIENT
Start: 2021-03-11 | End: 2021-03-11

## 2021-03-11 RX ORDER — DIPHENHYDRAMINE HYDROCHLORIDE 50 MG/ML
12.5 INJECTION, SOLUTION INTRAMUSCULAR; INTRAVENOUS
Status: DISCONTINUED | OUTPATIENT
Start: 2021-03-11 | End: 2021-03-11 | Stop reason: HOSPADM

## 2021-03-11 RX ORDER — SODIUM CHLORIDE, SODIUM LACTATE, POTASSIUM CHLORIDE, CALCIUM CHLORIDE 600; 310; 30; 20 MG/100ML; MG/100ML; MG/100ML; MG/100ML
125 INJECTION, SOLUTION INTRAVENOUS CONTINUOUS
Status: DISCONTINUED | OUTPATIENT
Start: 2021-03-11 | End: 2021-03-11 | Stop reason: HOSPADM

## 2021-03-11 RX ORDER — ONDANSETRON 2 MG/ML
INJECTION INTRAMUSCULAR; INTRAVENOUS AS NEEDED
Status: DISCONTINUED | OUTPATIENT
Start: 2021-03-11 | End: 2021-03-11 | Stop reason: HOSPADM

## 2021-03-11 RX ORDER — PROPOFOL 10 MG/ML
INJECTION, EMULSION INTRAVENOUS AS NEEDED
Status: DISCONTINUED | OUTPATIENT
Start: 2021-03-11 | End: 2021-03-11 | Stop reason: HOSPADM

## 2021-03-11 RX ORDER — INSULIN LISPRO 100 [IU]/ML
INJECTION, SOLUTION INTRAVENOUS; SUBCUTANEOUS ONCE
Status: DISCONTINUED | OUTPATIENT
Start: 2021-03-11 | End: 2021-03-11 | Stop reason: HOSPADM

## 2021-03-11 RX ORDER — HYDROMORPHONE HYDROCHLORIDE 1 MG/ML
0.5 INJECTION, SOLUTION INTRAMUSCULAR; INTRAVENOUS; SUBCUTANEOUS
Status: DISCONTINUED | OUTPATIENT
Start: 2021-03-11 | End: 2021-03-11 | Stop reason: HOSPADM

## 2021-03-11 RX ORDER — SODIUM CHLORIDE, SODIUM LACTATE, POTASSIUM CHLORIDE, CALCIUM CHLORIDE 600; 310; 30; 20 MG/100ML; MG/100ML; MG/100ML; MG/100ML
25 INJECTION, SOLUTION INTRAVENOUS CONTINUOUS
Status: DISCONTINUED | OUTPATIENT
Start: 2021-03-11 | End: 2021-03-11 | Stop reason: HOSPADM

## 2021-03-11 RX ORDER — MIDAZOLAM HYDROCHLORIDE 1 MG/ML
INJECTION, SOLUTION INTRAMUSCULAR; INTRAVENOUS AS NEEDED
Status: DISCONTINUED | OUTPATIENT
Start: 2021-03-11 | End: 2021-03-11 | Stop reason: HOSPADM

## 2021-03-11 RX ORDER — INSULIN LISPRO 100 [IU]/ML
INJECTION, SOLUTION INTRAVENOUS; SUBCUTANEOUS ONCE
Status: COMPLETED | OUTPATIENT
Start: 2021-03-11 | End: 2021-03-11

## 2021-03-11 RX ADMIN — INSULIN LISPRO 6 UNITS: 100 INJECTION, SOLUTION INTRAVENOUS; SUBCUTANEOUS at 09:39

## 2021-03-11 RX ADMIN — HYDROMORPHONE HYDROCHLORIDE 0.5 MG: 2 INJECTION, SOLUTION INTRAMUSCULAR; INTRAVENOUS; SUBCUTANEOUS at 11:07

## 2021-03-11 RX ADMIN — MIDAZOLAM 2 MG: 1 INJECTION INTRAMUSCULAR; INTRAVENOUS at 10:59

## 2021-03-11 RX ADMIN — SODIUM CHLORIDE, SODIUM LACTATE, POTASSIUM CHLORIDE, AND CALCIUM CHLORIDE: 600; 310; 30; 20 INJECTION, SOLUTION INTRAVENOUS at 11:20

## 2021-03-11 RX ADMIN — LEVOFLOXACIN 500 MG: 5 INJECTION, SOLUTION INTRAVENOUS at 11:05

## 2021-03-11 RX ADMIN — PHENYLEPHRINE HYDROCHLORIDE 100 MCG: 10 INJECTION INTRAVENOUS at 11:22

## 2021-03-11 RX ADMIN — ONDANSETRON HYDROCHLORIDE 4 MG: 2 INJECTION INTRAMUSCULAR; INTRAVENOUS at 11:22

## 2021-03-11 RX ADMIN — SODIUM CHLORIDE, SODIUM LACTATE, POTASSIUM CHLORIDE, AND CALCIUM CHLORIDE: 600; 310; 30; 20 INJECTION, SOLUTION INTRAVENOUS at 10:59

## 2021-03-11 RX ADMIN — PROPOFOL 200 MG: 10 INJECTION, EMULSION INTRAVENOUS at 11:03

## 2021-03-11 RX ADMIN — SODIUM CHLORIDE, SODIUM LACTATE, POTASSIUM CHLORIDE, AND CALCIUM CHLORIDE 125 ML/HR: 600; 310; 30; 20 INJECTION, SOLUTION INTRAVENOUS at 09:45

## 2021-03-11 RX ADMIN — PHENYLEPHRINE HYDROCHLORIDE 100 MCG: 10 INJECTION INTRAVENOUS at 11:26

## 2021-03-11 RX ADMIN — HYDROMORPHONE HYDROCHLORIDE 0.5 MG: 2 INJECTION, SOLUTION INTRAMUSCULAR; INTRAVENOUS; SUBCUTANEOUS at 11:17

## 2021-03-11 RX ADMIN — SODIUM CHLORIDE, SODIUM LACTATE, POTASSIUM CHLORIDE, AND CALCIUM CHLORIDE 25 ML/HR: 600; 310; 30; 20 INJECTION, SOLUTION INTRAVENOUS at 12:26

## 2021-03-11 NOTE — PERIOP NOTES
Clarified consent and posting sheet correction with Arin Chavez in holding, consent form ok to proceed.

## 2021-03-11 NOTE — H&P
Patient : Ingris Patel   YOB: 1965   Birth Sex: Male      Current Gender: Male      Date:  02/23/2021 8:50 AM    Visit Type:   Office Visit     This 60year old male presents for Kidney and/or Ureteral Stone, Hematuria and BPH. History of Present Illness  1. Kidney and/or Ureteral Stone   Onset was gradual. Severity level is moderate. It occurs intermittently. The problem is improving. Location of pain is right flank. The pain radiates to the abdomen. Prior stone type of Calcium oxalate di-hydrate. Pertinent history includes history of prior stone(s). Associated symptoms include nausea. Pertinent negatives include chills, constipation, diarrhea, fever, pain and vomiting. Additional information: Pt with a hx of kidney stones  He underwent LEFT ureteroscopic stone extraction 2/21/19. He is here for cysto stent removal..           11/25/19  Pt here for f/u, been having LEFT sided pain. He recently passed another stone and constantly passing sand and grit. I will obtain a CT scan  11/24/2020   Patient with a history of stones he has significant high urinary oxalate. He continues to have urinary stones he has passed multiple stones almost every day. We did discuss treatment for primary hyperoxaluria I am going to see if I can find a nephrologist for the patient I will let him know after the holiday weekend. 02/23/2021   Patient here today for follow-up. He has long history of stones on 02/18/2021 he went to the emergency room with right flank pain CT revealed numerous bilateral stones as well as a 5 mm proximal right ureteral stone with mild right hydronephrosis. Patient continues to have intermittent pain he was given prescription yesterday for [place] I will give him prescription today for Toradol and will schedule him for surgical intervention as soon as possible. 2.  Hematuria   The patient presents with hematuria (gross). The problem began suddenly and occurs every 6-12 hours.  The problem has resolved. He denies pain. Pertinent history includes being at least 34years of age but not history of UTIs, family history of stones or prior prostate surgeries. Denies aggravating factors. Denies relieving factors. He denies chills, fever, nausea and vomiting. Additional information: Pt with sudden onset of clots x 2 in urine. He has hx of stones. He had CT scan 8/2017 and then ureteroscopy so no work up needs to be done. 3.  BPH   Onset was gradual. It occurs intermittently. The problem is improving. Reviewed today was a PSA taken on 11/23/2020 with findings of 0.797 ng/mL. There were no identified risk factors. Associated symptoms include dysuria, hematuria, intermittent stream, pelvic pressure and urgency. Pertinent negatives include chills, constipation and fever. Additional information: Pt with pelvic pressure and discomfort after voiding also urinates small amount when passing gas. He is using 50mg   He will f/u 1 yr PSA prior. 11/25/19  Pt with perineal discomfort, he stopped using Myrbetriq 50mg. His sxs are worsening. He will restart, new Rx and sample  11/24/2020  Pt w hx of BPH had TURP,   He is voiding well he does not need Myrbetriq but uses it intermittently no other urinary complaints.       Past Medical/Surgical History   (Reviewed, updated)  Disease/disorder Onset Date Management Date Comments   ELMORE (nonalcoholic Liver disease) 9951      neck injury       numbness in arms       Peripheral Vertigo       RA  Arthritis       Rheumatoid arthritis       scoliosis       spinal stenosis       stomach problems       vestibular dysfunction         foot 2 times       hand       neck two times     Anxiety       atherosclerosis       BPH       copd       cronic pancreatitis       Depression       diverticulosis       dizziness/vertigo       Emetophobia       emotional problems       gastritis/duodenitis       GERD       grade II spondylolisthesis       Headache, migraine hiatal hernia       Hyperlipidemia           Problem List  Problem List reviewed. Problem Description Onset Date Chronic Clinical Status Notes   Depression  N     Hyperlipidemia  N     Migraine  N     Peripheral neuropathy  N     Depression  N     RA  N     Asthma  N     Pancreatic alpha-amylase deficiency  N     Seizures due to metabolic disorder  N     Kidney stone  Y     Diabetes type 1  Y       Medications (active prior to today)  Medication Instructions Start Date Stop Date Refilled Elsewhere   Creon 24,000-76,000-120,000 unit capsule,delayed release  04/03/2013   N   Effexor  mg capsule,extended release take 1 capsule by oral route  every day 09/11/2013 09/11/2013 N   Zofran 4 mg tablet take 2 tablet by oral route 3 times every day 08/31/2017 08/31/2017 N   Reglan 10 mg tablet take 1 tablet by oral route 4 times every day 30 minutes before meals and at bedtime 08/31/2017   N   Rituxan 10 mg/mL concentrate,intravenous infuse (375MG/M2)  by intravenous route once 08/31/2017   N   simvastatin 20 mg tablet take 1 tablet by oral route  every day in the evening // 02/23/2021 01/29/2018 Y   Norco 5 mg-325 mg tablet take 1 tablet by oral route  every 4 hours as needed for pain 02/22/2021   N   atorvastatin 20 mg tablet take 1 tablet by oral route  every day //   Y   Novolog Flexpen U-100 Insulin aspart 100 unit/mL (3 mL) subcutaneous inject by subcutaneous route per prescriber's instructions. Insulin dosing requires individualization. //   Y   ketorolac 10 mg tablet take 1 tablet by oral route  every 6 hours as needed for up to 5 days total use //   Y   ProAir HFA 90 mcg/actuation aerosol inhaler inhale 2 puff by inhalation route  every 4 - 6 hours as needed //   Y       Medication Reconciliation  Medications reconciled today.     Medication Reviewed  Adherence Medication Name Sig Desc Elsewhere Status   taking as directed Creon 24,000-76,000-120,000 unit capsule,delayed release  N Verified   taking as directed Effexor  mg capsule,extended release take 1 capsule by oral route  every day N Verified   taking as directed Zofran 4 mg tablet take 2 tablet by oral route 3 times every day N Verified   taking as directed Reglan 10 mg tablet take 1 tablet by oral route 4 times every day 30 minutes before meals and at bedtime N Verified   taking as directed Rituxan 10 mg/mL concentrate,intravenous infuse (375MG/M2)  by intravenous route once N Verified   taking as directed atorvastatin 20 mg tablet take 1 tablet by oral route  every day Y Verified   taking as directed Norco 5 mg-325 mg tablet take 1 tablet by oral route  every 4 hours as needed for pain N Verified   taking as directed Novolog Flexpen U-100 Insulin aspart 100 unit/mL (3 mL) subcutaneous inject by subcutaneous route per prescriber's instructions. Insulin dosing requires individualization. Y Verified   taking as directed ProAir HFA 90 mcg/actuation aerosol inhaler inhale 2 puff by inhalation route  every 4 - 6 hours as needed Y Verified   taking as directed ketorolac 10 mg tablet take 1 tablet by oral route  every 6 hours as needed for up to 5 days total use Y Verified     Allergies  Ingredient Reaction (Severity) Medication Name Comment   PENICILLINS      THIMEROSAL        Reviewed, no changes. Family History   (Reviewed, updated)    Relationship Family Member Name  Age at Death Condition Onset Age Cause of Death       Family history of Coronary artery disease  N       Family history of Cancer  N       Family history of Crohn's disease  N     Social History  (Reviewed, updated)  Tobacco use reviewed. Preferred language is [place]. Language spoken at home is [place]. Marital Status/Family/Social Support  Marital status:      Tobacco use status: Never smoked tobacco.    Smoking status: Never smoker. Tobacco Screening  Patient has never used tobacco. Patient has not used tobacco in the last 30 days.  Patient has not used smokeless tobacco in the last 30 days. Smoking Status  Type Smoking Status Usage Per Day Years Used Pack Years Total Pack Years    Never smoker         Tobacco/Vaping Exposure  No passive smoke exposure. Alcohol  There is no history of alcohol use. Review of Systems  System Neg/Pos Details   Constitutional Negative Chills, Fever and Pain. ENMT Negative Ear infections and Sore throat. Eyes Negative Blurred vision, Double vision and Eye pain. Respiratory Negative Asthma, Chronic cough, Dyspnea and Wheezing. Cardio Negative Chest pain. GI Positive Nausea. GI Negative Constipation, Decreased appetite, Diarrhea and Vomiting.  Positive Intermittent urinary stream, Pelvic pressure, Urgency. Endocrine Negative Cold intolerance, Heat intolerance, Increased thirst and Weight loss. Neuro Negative Headache and Tremors. Psych Negative Anxiety and Depression. Integumentary Negative Itching skin and Rash. MS Negative Back pain and Joint pain. Hema/Lymph Negative Easy bleeding. Reproductive Negative Sexual dysfunction. Vital Signs   Height  Time ft in cm Last Measured Height Position   8:50 AM 5.0 10.00 177.80 10/12/2018 0     Weight/BSA/BMI  Time lb oz kg Context BMI kg/m2 BSA m2   8:50 .00  67.132  21.24      Measured by  Time Measured by   8:50 AM Monique Seo     Physical Exam  Exam Findings Details   Constitutional Normal Well developed. Neck Exam Normal Inspection - Normal.   Respiratory Normal Inspection - Normal.   Extremity Normal No edema. Neurological Normal Alert and oriented to person, place and time. Cranial nerves intact. No motor or sensory deficits. Psychiatric Normal Orientation - Oriented to time, place, person & situation. Appropriate mood and affect. Assessment/Plan  # Detail Type Description    1. Assessment Hydronephrosis with renal and ureteral calculous obstruction (N13.2).     Patient Plan Patient with 5 mm stone right upper ureter causing mild hydronephrosis. He is doing better than he was previously. He still having intermittent pain which is controlled with Norco.  We discussed options he has elected to undergo right ureteroscopic stone extraction with holmium laser fragmentation and stent placement all risks including pain infection bleeding ureteral injury reviewed he understands and will proceed. 2. Assessment Calculus of kidney (N20.0). Plan Orders UA In Office No Micro to be performed. 3. Assessment Enlarged prostate w/ LUTS (N40.1). 4. Assessment Urgency of urination (R39.15). 5. Assessment Frequency of micturition (R35.0). Patient Education  # Patient Education   1. Kidney Stone: Care Instructions       Medications (added, continued, or stopped today)  Start Date Medication Directions PRN Status PRN Reason Instruction Stop Date    atorvastatin 20 mg tablet take 1 tablet by oral route  every day N      04/03/2013 Creon 24,000-76,000-120,000 unit capsule,delayed release  N      09/11/2013 Effexor  mg capsule,extended release take 1 capsule by oral route  every day N       ketorolac 10 mg tablet take 1 tablet by oral route  every 6 hours as needed for up to 5 days total use N      02/23/2021 ketorolac 10 mg tablet take 1 tablet by oral route  every 6 hours as needed for up to 5 days total use as needed Y      02/22/2021 Norco 5 mg-325 mg tablet take 1 tablet by oral route  every 4 hours as needed for pain N       Novolog Flexpen U-100 Insulin aspart 100 unit/mL (3 mL) subcutaneous inject by subcutaneous route per prescriber's instructions. Insulin dosing requires individualization.  N       ProAir HFA 90 mcg/actuation aerosol inhaler inhale 2 puff by inhalation route  every 4 - 6 hours as needed N      08/31/2017 Reglan 10 mg tablet take 1 tablet by oral route 4 times every day 30 minutes before meals and at bedtime N      08/31/2017 Rituxan 10 mg/mL concentrate,intravenous infuse (375MG/M2)  by intravenous route once N       simvastatin 20 mg tablet take 1 tablet by oral route  every day in the evening N   02/23/2021 08/31/2017 Zofran 4 mg tablet take 2 tablet by oral route 3 times every day N        Prescription Drug Monitoring Report: Accessed by Carl Dhillon MD on 2/22/2021 5:23:18 PM    Active Patient Care Team Members  Name Contact Agency Type Support Role Relationship Active Date Inactive Date Specialty   Purnima Gee   Patient provider PCP      Carl Dhillon   encounter provider    Urology   Cathi Ponce   encounter provider       Elmer Salgado   Emergency Contact Spouse      Elmer Salgado    Spouse      I L    Other      Jacki Honduran   encounter provider    Neurology   X Y   Emergency Contact Other          Provider:    Carl Dhillon MD 02/23/2021 12:31 PM     Document generated by:  Erich Page 02/23/2021 12:31 PM      ----------------------------------------------------------------------------------------------------------------------------------------------------------------------      Electronically signed by Carl Dhillon MD on 02/23/2021 07:56 PM

## 2021-03-11 NOTE — ANESTHESIA POSTPROCEDURE EVALUATION
Post-Anesthesia Evaluation & Assessment    Visit Vitals  /89   Pulse 73   Temp 37 °C (98.6 °F)   Resp 12   Ht 5' 10\" (1.778 m)   Wt 65.5 kg (144 lb 5 oz)   SpO2 98%   BMI 20.71 kg/m²       Nausea/Vomiting: no nausea and no vomiting    Post-operative hydration adequate. Pain score (VAS): 0    Mental status & Level of consciousness: alert and oriented x 3    Neurological status: moves all extremities, sensation grossly intact    Pulmonary status: airway patent, no supplemental oxygen required    Complications related to anesthesia: none    Patient has met all discharge requirements. Additional comments:  Procedure(s):  CYSTOSCOPY, RIGHT URETEROSCOPY, LASER LITHOTRIPSY WITH HOLMIUIM, STENT PLACEMENT (C-ARM, HOLMIUM LASER- AGILITY, STENTS). general    <BSHSIANPOST>    INITIAL Post-op Vital signs:   Vitals Value Taken Time   /89 03/11/21 1220   Temp 37 °C (98.6 °F) 03/11/21 1153   Pulse 75 03/11/21 1225   Resp 11 03/11/21 1225   SpO2 99 % 03/11/21 1225   Vitals shown include unvalidated device data.

## 2021-03-11 NOTE — BRIEF OP NOTE
Brief Postoperative Note    Patient: Thiago Welch  YOB: 1965  MRN: [id number]    Date of Procedure: 3/11/2021     Pre-Op Diagnosis: RIGHT Ureteral stone    Post-Op Diagnosis: Same as pre op    Procedure(s):  CYSTOSCOPY, RIGHT URETEROSCOPY, LASER LITHOTRIPSY WITH HOLMIUIM, STENT PLACEMENT (C-ARM, HOLMIUM LASER- AGILITY, STENTS)    Surgeon(s):  Alli Salinas MD    Surgical Assistant: Surg Asst-1: Gerre Sicard    Anesthesia: Other     Estimated Blood Loss (mL): Minimal    Complications: None    Specimens: * No specimens in log *     Implants:   Implant Name Type Inv.  Item Serial No.  Lot No. LRB No. Used Action   Raiza Aguirre DBL-PGTL [id number] -- PERCUFLEX - SN/A  Raiza Aguirre DBL-PGTL [id number] -- PERCUFLEX N/A [employer] SCI UROLOGY_WD [id number] Right 1 Implanted       Drains: * No LDAs found *    Findings: RIGHT lower ureteral stone    Electronically Signed by David Curtis MD on 3/11/2021 at 11:59 AM

## 2021-03-11 NOTE — PERIOP NOTES
Dr Kerline Slade aware of , no need  to be covered at this time, patient to restart his own Insulin pump when awake.

## 2021-03-11 NOTE — PERIOP NOTES
Assumed care of patient at this time. Sign out from anesthesia requested by prior RN. On hold for transfer to phase 2. Patient resting in stretcher, eyes closed, even chest rise and fall, vital signs WDL. Will continue to monitor and assess.

## 2021-03-11 NOTE — ANESTHESIA PREPROCEDURE EVALUATION
Relevant Problems   NEUROLOGY   (+) Depression      RENAL FAILURE   (+) Hydronephrosis with urinary obstruction due to ureteral calculus   (+) Kidney stone on left side      ENDOCRINE   (+) RA (rheumatoid arthritis) (HCC)   (+) Type 1 diabetes mellitus (HCC)       Anesthetic History   No history of anesthetic complications            Review of Systems / Medical History  Patient summary reviewed, nursing notes reviewed and pertinent labs reviewed    Pulmonary    COPD: mild          Pertinent negatives: No sleep apnea and smoker     Neuro/Psych         Psychiatric history     Cardiovascular              CAD  Pertinent negatives: No hypertension  Exercise tolerance: <4 METS  Comments: Medically managed for nonobstructive CAD   GI/Hepatic/Renal     GERD: well controlled    Renal disease: CRI       Endo/Other    Diabetes: poorly controlled, type 1    Arthritis     Other Findings   Comments: H/o whipple chronic pancreatitis           Physical Exam    Airway  Mallampati: II  TM Distance: 4 - 6 cm  Neck ROM: decreased range of motion        Cardiovascular    Rhythm: regular  Rate: normal         Dental    Dentition: Caps/crowns     Pulmonary  Breath sounds clear to auscultation               Abdominal  GI exam deferred       Other Findings            Anesthetic Plan    ASA: 3  Anesthesia type: general          Induction: Intravenous  Anesthetic plan and risks discussed with: Patient

## 2021-03-11 NOTE — PERIOP NOTES
Per Dr. Engle  patient to have blood sugar rechecked prior to procedure,  patient to have insulin pump removed prior to procedure. Insulin pump placed in Pacu  with glasses.

## 2021-03-11 NOTE — INTERVAL H&P NOTE
Update History & Physical    The Patient's History and Physical of February 23, 2021 was reviewed with the patient and I examined the patient. There was no change. The surgical site was confirmed by the patient and me. Plan:  The risk, benefits, expected outcome, and alternative to the recommended procedure have been discussed with the patient. Patient understands and wants to proceed with the procedure.     Electronically signed by Heber Gonsales MD on 3/11/2021 at 10:43 AM

## 2021-03-11 NOTE — OP NOTES
Intermittent contractions.  Noticing daily swelling now.  No other issues.  GBS obtained.   Postoperative Note     Patient: Ian Priest  YOB: 1965  MRN: [id number]     Date of Procedure: 3/11/2021      Pre-Op Diagnosis: RIGHT Ureteral stone     Post-Op Diagnosis: Same as pre op     Procedure(s):  CYSTOSCOPY, RIGHT URETEROSCOPY, LASER LITHOTRIPSY WITH HOLMIUIM, STENT PLACEMENT (C-ARM, HOLMIUM LASER- AGILITY, STENTS)     Surgeon(s):  Yeny Reilly MD     Surgical Assistant: Surg Asst-1: Page Urbina     Anesthesia: Other      Estimated Blood Loss (mL): Minimal     Complications: None     Specimens: * No specimens in log *      Implants:   Implant Name Type Inv. Item Serial No.  Lot No. LRB No. Used Action   STENT URET DBL-PGTL [id number] -- PERCUFLEX - SN/A   STENT URET DBL-PGTL [id number] -- PERCUFLEX N/A "[employer] UROLOGY_WD [id number] Right 1 Implanted         Drains: * No LDAs found *     Findings: RIGHT lower ureteral stone     Procedure Details: The patient was brought in the operating room, placed in the   supine position. After administration of general anesthesia, she was placed   in lithotomy position. Groin and genitalia were prepped and draped in the   usual sterile fashion. I began with a 21-[place]izen [place] cystoscope. Cystourethroscopy did not reveal any abnormalities. I identified the right   ureteral orifice and intubated it with a 5-[place]izen [place] open-ended catheter. I   then performed a retrograde pyelogram, which confirmed a filling defect and   obstructing stone in the lower ureter. I then placed a 0.035 sensor wire   through the open-ended catheter up into the collecting system. I used   the 6.9-[place]izen [place] semirigid ureteroscope, I traversed the ureter to the stone Using the holmium laser, I fragmented and dusted the stone completely. The stones then passed into the bladder. There was no residual fragments or stones seen in the   ureter. Cystoscopic guidance was used to place a 5 x 26 double-J stent over   the wire. The wire was removed.  There was a good coil in the kidney noted   on fluoroscopy and a good coil in the bladder. The bladder was emptied. The   patient was extubated. He was transferred to the recovery room in stable   condition.

## 2021-03-11 NOTE — DISCHARGE INSTRUCTIONS
Denae Gordon. Hyacinth Tamayo M.D. [facility]  [address], [address], [place] Hand  Office: [phone]  Fax:    [phone]    PROCEDURE: Procedure(s):  CYSTOSCOPY, RIGHT URETEROSCOPY, LASER LITHOTRIPSY WITH HOLMIUIM, [address] (C-ARM, HOLMIUM LASER- AGILITY, [address])    [address] [facility] [address] [place] [address] Urology IMMEDIATELY if any of the following occur:     You are unable to urinate. Urgency to urinate is not uncommon.  You find yourself urinating small frequent amounts associated with severe lower abdominal discomfort.  Bright red blood with clots in the urine. Some reddish urine is not uncommon and should be treated with increasing the amount of fluids you drink.  Temperature above 101.5° and / or chills.  You are nauseous and / or vomiting and you cannot hold down any fluids.  Your pain is not controlled with the pain medication prescribed. Special Considerations:      Do not drive for at least 24 hours after the procedure and until you are no longer taking narcotic pain medication and you are able to move and react without hesitation. MEDICATIONS:  Pain   [x]  Norco®   []  Percocet® []  Dilaudid®    []  Tramadol   Antibiotics   []  Cipro   []  Keflex    [x] Levaquin   []  Bactrim DS®       Urination   []  Vesicare®   []  Flomax     Burning   []  Pyridium®   []  UribelTM     Nausea   []  Zofran®   []  Phenergan®     Miscellaneous   []           [] Prescriptions Written on Chart    [x] Prescriptions sent Electronically           Our office will call you tomorrow to schedule your first follow-up appointment. Please contact [facility], [facility]. Urology at [phone] or go to the nearest Emergency Department / Urgent Care facility for any other medical questions or concerns.       DISCHARGE SUMMARY from Nurse    PATIENT INSTRUCTIONS:    After general anesthesia or intravenous sedation, for 24 hours or while taking prescription Narcotics:  · Limit your activities  · Do not drive and operate hazardous machinery  · Do not make important personal or business decisions  · Do  not drink alcoholic beverages  · If you have not urinated within 8 hours after discharge, please contact your surgeon on call. Report the following to your surgeon:  · Excessive pain, swelling, redness or odor of or around the surgical area  · Temperature over 100.5  · Nausea and vomiting lasting longer than 4 hours or if unable to take medications  · Any signs of decreased circulation or nerve impairment to extremity: change in color, persistent  numbness, tingling, coldness or increase pain  · Any questions    What to do at Home:  Recommended activity: Activity as tolerated, Ambulate in house and See surgical instructions. If you experience any of the following symptoms as noted above, please follow up with Dr. Lius Rausch. *  Please give a list of your current medications to your Primary Care Provider. *  Please update this list whenever your medications are discontinued, doses are      changed, or new medications (including over-the-counter products) are added. *  Please carry medication information at all times in case of emergency situations. These are general instructions for a healthy lifestyle:    No smoking/ No tobacco products/ Avoid exposure to second hand smoke  Surgeon General's Warning:  Quitting smoking now greatly reduces serious risk to your health. Obesity, smoking, and sedentary lifestyle greatly increases your risk for illness    A healthy diet, regular physical exercise & weight monitoring are important for maintaining a healthy lifestyle    You may be retaining fluid if you have a history of heart failure or if you experience any of the following symptoms:  Weight gain of 3 pounds or more overnight or 5 pounds in a week, increased swelling in our hands or feet or shortness of breath while lying flat in bed.   Please call your doctor as soon as you notice any of these symptoms; do not wait until your next office visit. The discharge information has been reviewed with the patient and caregiver. The patient and caregiver verbalized understanding. Discharge medications reviewed with the patient and caregiver and appropriate educational materials and side effects teaching were provided. ___________________________________________________________________________________________________________________________________    Learning About Coronavirus ([id number])  Coronavirus [phone]): Overview  What is coronavirus (GHBLF-07)? The coronavirus disease (COVID-19) is caused by a virus. It is an illness that was first found in [place], [place], in December 2019. It has since spread worldwide. The virus can cause fever, cough, and trouble breathing. In severe cases, it can cause pneumonia and make it hard to breathe without help. It can cause death. Coronaviruses are a large group of viruses. They cause the common cold. They also cause more serious illnesses like Middle East respiratory syndrome (MERS) and severe acute respiratory syndrome (SARS). COVID-19 is caused by a novel coronavirus. That means it's a new type that has not been seen in people before. This virus spreads person-to-person through droplets from coughing and sneezing. It can also spread when you are close to someone who is infected. And it can spread when you touch something that has the virus on it, such as a doorknob or a tabletop. What can you do to protect yourself from coronavirus (COVID-19)? The best way to protect yourself from getting sick is to:  · Avoid areas where there is an outbreak. · Avoid contact with people who may be infected. · Wash your hands often with soap or alcohol-based hand sanitizers. · Avoid crowds and try to stay at least 6 feet away from other people. · Wash your hands often, especially after you cough or sneeze. Use soap and water, and scrub for at least 20 seconds.  If soap and water aren't available, use an alcohol-based hand . · Avoid touching your mouth, nose, and eyes. What can you do to avoid spreading the virus to others? To help avoid spreading the virus to others:  · Cover your mouth with a tissue when you cough or sneeze. Then throw the tissue in the trash. · Use a disinfectant to clean things that you touch often. · Stay home if you are sick or have been exposed to the virus. Don't go to school, work, or public areas. And don't use public transportation. · If you are sick:  ? Leave your home only if you need to get medical care. But call the doctor's office first so they know you're coming. And wear a face mask, if you have one.  ? If you have a face mask, wear it whenever you're around other people. It can help stop the spread of the virus when you cough or sneeze. ? Clean and disinfect your home every day. Use household  and disinfectant wipes or sprays. Take special care to clean things that you grab with your hands. These include doorknobs, remote controls, phones, and handles on your refrigerator and microwave. And don't forget countertops, tabletops, bathrooms, and computer keyboards. When to call for help  Call 911 anytime you think you may need emergency care. For example, call if:  · You have severe trouble breathing. (You can't talk at all.)  · You have constant chest pain or pressure. · You are severely dizzy or lightheaded. · You are confused or can't think clearly. · Your face and lips have a blue color. · You pass out (lose consciousness) or are very hard to wake up. Call your doctor now if you develop symptoms such as:  · Shortness of breath. · Fever. · Cough. If you need to get care, call ahead to the doctor's office for instructions before you go. Make sure you wear a face mask, if you have one, to prevent exposing other people to the virus. Where can you get the latest information? The following health organizations are tracking and studying this virus. Their websites contain the most up-to-date information. Anita Patel also learn what to do if you think you may have been exposed to the virus. · U.S. Centers for Disease Control and Prevention (CDC): The CDC provides updated news about the disease and travel advice. The website also tells you how to prevent the spread of infection. www.cdc.gov  · World Health Organization [facility]): WHO offers information about the virus outbreaks. WHO also has travel advice. www.who.int  Current as of: April 1, 2020               Content Version: 12.4  © 6333-7808 Healthwise, Incorporated. Care instructions adapted under license by your healthcare professional. If you have questions about a medical condition or this instruction, always ask your healthcare professional. [address] any warranty or liability for your use of this information. Patient armband removed and shredded.

## 2021-11-24 ENCOUNTER — TRANSCRIBE ORDER (OUTPATIENT)
Dept: SCHEDULING | Age: 56
End: 2021-11-24

## 2021-11-24 DIAGNOSIS — N20.0 KIDNEY STONES: Primary | ICD-10-CM

## 2021-11-26 ENCOUNTER — HOSPITAL ENCOUNTER (OUTPATIENT)
Dept: CT IMAGING | Age: 56
Discharge: HOME | End: 2021-11-26
Attending: UROLOGY
Payer: MEDICARE

## 2021-11-26 DIAGNOSIS — N20.0 KIDNEY STONES: ICD-10-CM

## 2021-11-26 PROCEDURE — 74176 CT ABD & PELVIS W/O CONTRAST: CPT

## 2022-03-19 PROBLEM — N13.2 HYDRONEPHROSIS WITH URINARY OBSTRUCTION DUE TO URETERAL CALCULUS: Status: ACTIVE | Noted: 2017-08-24

## 2022-03-19 PROBLEM — E10.9 TYPE 1 DIABETES MELLITUS (HCC): Status: ACTIVE | Noted: 2017-08-24

## 2022-03-19 PROBLEM — K56.7 ILEUS (HCC): Status: ACTIVE | Noted: 2017-08-24

## 2022-03-19 PROBLEM — G43.909 MIGRAINE: Status: ACTIVE | Noted: 2017-08-24

## 2022-03-19 PROBLEM — N20.0 KIDNEY STONE ON LEFT SIDE: Status: ACTIVE | Noted: 2017-08-24

## 2022-03-19 PROBLEM — M06.9 RA (RHEUMATOID ARTHRITIS) (HCC): Status: ACTIVE | Noted: 2017-08-24

## 2022-03-19 PROBLEM — N23 RENAL COLIC ON LEFT SIDE: Status: ACTIVE | Noted: 2017-08-24

## 2022-03-19 PROBLEM — R73.9 HYPERGLYCEMIA: Status: ACTIVE | Noted: 2017-08-24

## 2022-03-20 PROBLEM — F32.A DEPRESSION: Status: ACTIVE | Noted: 2017-08-24

## 2023-07-03 ENCOUNTER — HOSPITAL ENCOUNTER (EMERGENCY)
Facility: HOSPITAL | Age: 58
Discharge: HOME | End: 2023-07-03
Attending: EMERGENCY MEDICINE
Payer: MEDICARE

## 2023-07-03 ENCOUNTER — APPOINTMENT (OUTPATIENT)
Facility: HOSPITAL | Age: 58
End: 2023-07-03
Payer: MEDICARE

## 2023-07-03 VITALS
RESPIRATION RATE: 22 BRPM | OXYGEN SATURATION: 98 % | SYSTOLIC BLOOD PRESSURE: 143 MMHG | TEMPERATURE: 99 F | WEIGHT: 145 LBS | HEIGHT: 70 IN | DIASTOLIC BLOOD PRESSURE: 83 MMHG | BODY MASS INDEX: 20.76 KG/M2 | HEART RATE: 100 BPM

## 2023-07-03 DIAGNOSIS — R10.9 LEFT FLANK PAIN: Primary | ICD-10-CM

## 2023-07-03 DIAGNOSIS — N20.1 URETEROLITHIASIS: ICD-10-CM

## 2023-07-03 DIAGNOSIS — I71.40 ABDOMINAL AORTIC ANEURYSM (AAA) WITHOUT RUPTURE, UNSPECIFIED PART (HCC): ICD-10-CM

## 2023-07-03 LAB
ALBUMIN SERPL-MCNC: 4 G/DL (ref 3.4–5)
ALBUMIN/GLOB SERPL: 1.2 (ref 0.8–1.7)
ALP SERPL-CCNC: 143 U/L (ref 45–117)
ALT SERPL-CCNC: 69 U/L (ref 16–61)
ANION GAP SERPL CALC-SCNC: 5 MMOL/L (ref 3–18)
AST SERPL-CCNC: 43 U/L (ref 10–38)
BASOPHILS # BLD: 0 K/UL (ref 0–0.1)
BASOPHILS NFR BLD: 0 % (ref 0–2)
BILIRUB SERPL-MCNC: 0.5 MG/DL (ref 0.2–1)
BUN SERPL-MCNC: 17 MG/DL (ref 7–18)
BUN/CREAT SERPL: 11 (ref 12–20)
CALCIUM SERPL-MCNC: 10.1 MG/DL (ref 8.5–10.1)
CHLORIDE SERPL-SCNC: 104 MMOL/L (ref 100–111)
CO2 SERPL-SCNC: 30 MMOL/L (ref 21–32)
CREAT SERPL-MCNC: 1.51 MG/DL (ref 0.6–1.3)
DIFFERENTIAL METHOD BLD: ABNORMAL
EOSINOPHIL # BLD: 0.3 K/UL (ref 0–0.4)
EOSINOPHIL NFR BLD: 5 % (ref 0–5)
ERYTHROCYTE [DISTWIDTH] IN BLOOD BY AUTOMATED COUNT: 12.4 % (ref 11.6–14.5)
GLOBULIN SER CALC-MCNC: 3.3 G/DL (ref 2–4)
GLUCOSE SERPL-MCNC: 315 MG/DL (ref 74–99)
HCT VFR BLD AUTO: 44.1 % (ref 36–48)
HGB BLD-MCNC: 14.9 G/DL (ref 13–16)
IMM GRANULOCYTES # BLD AUTO: 0 K/UL (ref 0–0.04)
IMM GRANULOCYTES NFR BLD AUTO: 0 % (ref 0–0.5)
LIPASE SERPL-CCNC: 17 U/L (ref 73–393)
LYMPHOCYTES # BLD: 1.3 K/UL (ref 0.9–3.6)
LYMPHOCYTES NFR BLD: 19 % (ref 21–52)
MCH RBC QN AUTO: 30.5 PG (ref 24–34)
MCHC RBC AUTO-ENTMCNC: 33.8 G/DL (ref 31–37)
MCV RBC AUTO: 90.2 FL (ref 78–100)
MONOCYTES # BLD: 0.5 K/UL (ref 0.05–1.2)
MONOCYTES NFR BLD: 7 % (ref 3–10)
NEUTS SEG # BLD: 4.6 K/UL (ref 1.8–8)
NEUTS SEG NFR BLD: 69 % (ref 40–73)
NRBC # BLD: 0 K/UL (ref 0–0.01)
NRBC BLD-RTO: 0 PER 100 WBC
PLATELET # BLD AUTO: 207 K/UL (ref 135–420)
PMV BLD AUTO: 11.1 FL (ref 9.2–11.8)
POTASSIUM SERPL-SCNC: 4.2 MMOL/L (ref 3.5–5.5)
PROT SERPL-MCNC: 7.3 G/DL (ref 6.4–8.2)
RBC # BLD AUTO: 4.89 M/UL (ref 4.35–5.65)
SODIUM SERPL-SCNC: 139 MMOL/L (ref 136–145)
WBC # BLD AUTO: 6.7 K/UL (ref 4.6–13.2)

## 2023-07-03 PROCEDURE — 80053 COMPREHEN METABOLIC PANEL: CPT

## 2023-07-03 PROCEDURE — 85025 COMPLETE CBC W/AUTO DIFF WBC: CPT

## 2023-07-03 PROCEDURE — 96376 TX/PRO/DX INJ SAME DRUG ADON: CPT

## 2023-07-03 PROCEDURE — 83690 ASSAY OF LIPASE: CPT

## 2023-07-03 PROCEDURE — 6360000002 HC RX W HCPCS: Performed by: EMERGENCY MEDICINE

## 2023-07-03 PROCEDURE — 74176 CT ABD & PELVIS W/O CONTRAST: CPT

## 2023-07-03 PROCEDURE — 99284 EMERGENCY DEPT VISIT MOD MDM: CPT

## 2023-07-03 PROCEDURE — 6370000000 HC RX 637 (ALT 250 FOR IP): Performed by: EMERGENCY MEDICINE

## 2023-07-03 PROCEDURE — 96375 TX/PRO/DX INJ NEW DRUG ADDON: CPT

## 2023-07-03 PROCEDURE — 96374 THER/PROPH/DIAG INJ IV PUSH: CPT

## 2023-07-03 PROCEDURE — 2580000003 HC RX 258: Performed by: EMERGENCY MEDICINE

## 2023-07-03 RX ORDER — IBUPROFEN 600 MG/1
600 TABLET ORAL EVERY 6 HOURS PRN
Qty: 120 TABLET | Refills: 0 | Status: SHIPPED | OUTPATIENT
Start: 2023-07-03

## 2023-07-03 RX ORDER — MORPHINE SULFATE 4 MG/ML
4 INJECTION, SOLUTION INTRAMUSCULAR; INTRAVENOUS
Status: COMPLETED | OUTPATIENT
Start: 2023-07-03 | End: 2023-07-03

## 2023-07-03 RX ORDER — OXYCODONE HYDROCHLORIDE AND ACETAMINOPHEN 5; 325 MG/1; MG/1
1 TABLET ORAL EVERY 6 HOURS PRN
Qty: 20 TABLET | Refills: 0 | Status: SHIPPED | OUTPATIENT
Start: 2023-07-03 | End: 2023-07-08

## 2023-07-03 RX ORDER — TAMSULOSIN HYDROCHLORIDE 0.4 MG/1
0.4 CAPSULE ORAL DAILY
Qty: 30 CAPSULE | Refills: 0 | Status: SHIPPED | OUTPATIENT
Start: 2023-07-03

## 2023-07-03 RX ORDER — KETOROLAC TROMETHAMINE 15 MG/ML
15 INJECTION, SOLUTION INTRAMUSCULAR; INTRAVENOUS ONCE
Status: COMPLETED | OUTPATIENT
Start: 2023-07-03 | End: 2023-07-03

## 2023-07-03 RX ORDER — ONDANSETRON 4 MG/1
4 TABLET, ORALLY DISINTEGRATING ORAL
Status: COMPLETED | OUTPATIENT
Start: 2023-07-03 | End: 2023-07-03

## 2023-07-03 RX ORDER — TAMSULOSIN HYDROCHLORIDE 0.4 MG/1
0.4 CAPSULE ORAL
Status: COMPLETED | OUTPATIENT
Start: 2023-07-03 | End: 2023-07-03

## 2023-07-03 RX ORDER — ONDANSETRON 4 MG/1
4 TABLET, FILM COATED ORAL 3 TIMES DAILY PRN
Qty: 15 TABLET | Refills: 0 | Status: SHIPPED | OUTPATIENT
Start: 2023-07-03

## 2023-07-03 RX ORDER — OXYCODONE HYDROCHLORIDE AND ACETAMINOPHEN 5; 325 MG/1; MG/1
1 TABLET ORAL
Status: COMPLETED | OUTPATIENT
Start: 2023-07-03 | End: 2023-07-03

## 2023-07-03 RX ORDER — MORPHINE SULFATE 2 MG/ML
2 INJECTION, SOLUTION INTRAMUSCULAR; INTRAVENOUS
Status: COMPLETED | OUTPATIENT
Start: 2023-07-03 | End: 2023-07-03

## 2023-07-03 RX ORDER — 0.9 % SODIUM CHLORIDE 0.9 %
1000 INTRAVENOUS SOLUTION INTRAVENOUS ONCE
Status: COMPLETED | OUTPATIENT
Start: 2023-07-03 | End: 2023-07-03

## 2023-07-03 RX ADMIN — SODIUM CHLORIDE 1000 ML: 9 INJECTION, SOLUTION INTRAVENOUS at 19:50

## 2023-07-03 RX ADMIN — ONDANSETRON 4 MG: 4 TABLET, ORALLY DISINTEGRATING ORAL at 19:49

## 2023-07-03 RX ADMIN — TAMSULOSIN HYDROCHLORIDE 0.4 MG: 0.4 CAPSULE ORAL at 21:51

## 2023-07-03 RX ADMIN — MORPHINE SULFATE 4 MG: 4 INJECTION, SOLUTION INTRAMUSCULAR; INTRAVENOUS at 19:52

## 2023-07-03 RX ADMIN — KETOROLAC TROMETHAMINE 15 MG: 15 INJECTION, SOLUTION INTRAMUSCULAR; INTRAVENOUS at 19:51

## 2023-07-03 RX ADMIN — OXYCODONE HYDROCHLORIDE AND ACETAMINOPHEN 1 TABLET: 5; 325 TABLET ORAL at 21:51

## 2023-07-03 RX ADMIN — MORPHINE SULFATE 2 MG: 2 INJECTION, SOLUTION INTRAMUSCULAR; INTRAVENOUS at 21:52

## 2023-07-03 ASSESSMENT — PAIN - FUNCTIONAL ASSESSMENT
PAIN_FUNCTIONAL_ASSESSMENT: 0-10
PAIN_FUNCTIONAL_ASSESSMENT: 0-10

## 2023-07-03 ASSESSMENT — PAIN DESCRIPTION - ORIENTATION
ORIENTATION: LEFT

## 2023-07-03 ASSESSMENT — PAIN DESCRIPTION - LOCATION
LOCATION: FLANK

## 2023-07-03 ASSESSMENT — PAIN SCALES - GENERAL
PAINLEVEL_OUTOF10: 9
PAINLEVEL_OUTOF10: 5
PAINLEVEL_OUTOF10: 9
PAINLEVEL_OUTOF10: 3
PAINLEVEL_OUTOF10: 5
PAINLEVEL_OUTOF10: 9
PAINLEVEL_OUTOF10: 2

## 2023-07-03 ASSESSMENT — LIFESTYLE VARIABLES
HOW MANY STANDARD DRINKS CONTAINING ALCOHOL DO YOU HAVE ON A TYPICAL DAY: PATIENT DOES NOT DRINK
HOW OFTEN DO YOU HAVE A DRINK CONTAINING ALCOHOL: NEVER

## 2023-07-03 ASSESSMENT — PAIN DESCRIPTION - PAIN TYPE: TYPE: ACUTE PAIN

## 2023-07-03 NOTE — ED TRIAGE NOTES
Pt with sunned left flank pain approx 2 hrs ago. Pt denies dysuria or hematuria. Pt is actively vomiting, cool, pale and clammy. Hx of stones, last was 2 years ago.

## 2023-07-04 NOTE — ED NOTES
Encouraged pt to give urine sample, urinal at bedside, call light within reach, bed low locked rails up x1     [place], [place]  07/03/23 0161

## 2023-07-14 ENCOUNTER — HOSPITAL ENCOUNTER (OUTPATIENT)
Facility: HOSPITAL | Age: 58
End: 2023-07-14
Payer: MEDICARE

## 2023-07-14 LAB
EKG ATRIAL RATE: 81 BPM
EKG DIAGNOSIS: NORMAL
EKG P AXIS: 66 DEGREES
EKG P-R INTERVAL: 216 MS
EKG Q-T INTERVAL: 358 MS
EKG QRS DURATION: 90 MS
EKG QTC CALCULATION (BAZETT): 415 MS
EKG R AXIS: 64 DEGREES
EKG T AXIS: 77 DEGREES
EKG VENTRICULAR RATE: 81 BPM

## 2023-07-14 PROCEDURE — 93010 ELECTROCARDIOGRAM REPORT: CPT | Performed by: INTERNAL MEDICINE

## 2023-07-14 PROCEDURE — 93005 ELECTROCARDIOGRAM TRACING: CPT | Performed by: UROLOGY

## 2023-11-16 ENCOUNTER — HOSPITAL ENCOUNTER (INPATIENT)
Facility: HOSPITAL | Age: 58
LOS: 12 days | Discharge: NURSING, REHAB OR LONG-TERM CARE | DRG: 870 | End: 2023-11-29
Attending: STUDENT IN AN ORGANIZED HEALTH CARE EDUCATION/TRAINING PROGRAM | Admitting: HOSPITALIST
Payer: MEDICARE

## 2023-11-16 ENCOUNTER — APPOINTMENT (OUTPATIENT)
Facility: HOSPITAL | Age: 58
DRG: 870 | End: 2023-11-16
Payer: MEDICARE

## 2023-11-16 DIAGNOSIS — E87.20 METABOLIC ACIDOSIS: ICD-10-CM

## 2023-11-16 DIAGNOSIS — R94.31 ABNORMAL EKG: ICD-10-CM

## 2023-11-16 DIAGNOSIS — E10.10 DIABETIC KETOACIDOSIS WITHOUT COMA ASSOCIATED WITH TYPE 1 DIABETES MELLITUS (HCC): ICD-10-CM

## 2023-11-16 DIAGNOSIS — E86.0 DEHYDRATION: ICD-10-CM

## 2023-11-16 DIAGNOSIS — I95.9 HYPOTENSION, UNSPECIFIED HYPOTENSION TYPE: Primary | ICD-10-CM

## 2023-11-16 DIAGNOSIS — E87.0 HYPERNATREMIA: ICD-10-CM

## 2023-11-16 DIAGNOSIS — R40.0 SOMNOLENCE: ICD-10-CM

## 2023-11-16 DIAGNOSIS — D72.829 LEUKOCYTOSIS, UNSPECIFIED TYPE: ICD-10-CM

## 2023-11-16 DIAGNOSIS — E87.5 HYPERKALEMIA: ICD-10-CM

## 2023-11-16 DIAGNOSIS — A41.9 SEPSIS, DUE TO UNSPECIFIED ORGANISM, UNSPECIFIED WHETHER ACUTE ORGAN DYSFUNCTION PRESENT (HCC): ICD-10-CM

## 2023-11-16 LAB
ALBUMIN SERPL-MCNC: 3.7 G/DL (ref 3.4–5)
ALBUMIN/GLOB SERPL: 1.1 (ref 0.8–1.7)
ALP SERPL-CCNC: 131 U/L (ref 45–117)
ALT SERPL-CCNC: 63 U/L (ref 16–61)
AMPHET UR QL SCN: NEGATIVE
ANION GAP SERPL CALC-SCNC: 30 MMOL/L (ref 3–18)
APAP SERPL-MCNC: <2 UG/ML (ref 10–30)
APPEARANCE UR: CLEAR
AST SERPL-CCNC: 9 U/L (ref 10–38)
BARBITURATES UR QL SCN: NEGATIVE
BASOPHILS # BLD: 0 K/UL (ref 0–0.1)
BASOPHILS NFR BLD: 0 % (ref 0–2)
BENZODIAZ UR QL: NEGATIVE
BILIRUB SERPL-MCNC: 0.6 MG/DL (ref 0.2–1)
BILIRUB UR QL: NEGATIVE
BUN SERPL-MCNC: 110 MG/DL (ref 7–18)
BUN/CREAT SERPL: 29 (ref 12–20)
CALCIUM SERPL-MCNC: 9.6 MG/DL (ref 8.5–10.1)
CANNABINOIDS UR QL SCN: NEGATIVE
CHLORIDE SERPL-SCNC: 105 MMOL/L (ref 100–111)
CO2 SERPL-SCNC: 8 MMOL/L (ref 21–32)
COCAINE UR QL SCN: NEGATIVE
COLOR UR: YELLOW
CREAT SERPL-MCNC: 3.73 MG/DL (ref 0.6–1.3)
DIFFERENTIAL METHOD BLD: ABNORMAL
EOSINOPHIL # BLD: 0 K/UL (ref 0–0.4)
EOSINOPHIL NFR BLD: 0 % (ref 0–5)
ERYTHROCYTE [DISTWIDTH] IN BLOOD BY AUTOMATED COUNT: 14.1 % (ref 11.6–14.5)
ETHANOL SERPL-MCNC: <3 MG/DL (ref 0–3)
GLOBULIN SER CALC-MCNC: 3.3 G/DL (ref 2–4)
GLUCOSE BLD STRIP.AUTO-MCNC: >600 MG/DL (ref 70–110)
GLUCOSE SERPL-MCNC: 1367 MG/DL (ref 74–99)
GLUCOSE UR STRIP.AUTO-MCNC: >1000 MG/DL
HCT VFR BLD AUTO: 50.3 % (ref 36–48)
HGB BLD-MCNC: 14.9 G/DL (ref 13–16)
HGB UR QL STRIP: NEGATIVE
IMM GRANULOCYTES # BLD AUTO: 0 K/UL
IMM GRANULOCYTES NFR BLD AUTO: 0 %
KETONES UR QL STRIP.AUTO: 15 MG/DL
LACTATE BLD-SCNC: 1.8 MMOL/L (ref 0.4–2)
LEUKOCYTE ESTERASE UR QL STRIP.AUTO: NEGATIVE
LYMPHOCYTES # BLD: 1.3 K/UL (ref 0.9–3.6)
LYMPHOCYTES NFR BLD: 6 % (ref 21–52)
Lab: NORMAL
MCH RBC QN AUTO: 29.8 PG (ref 24–34)
MCHC RBC AUTO-ENTMCNC: 29.6 G/DL (ref 31–37)
MCV RBC AUTO: 100.6 FL (ref 78–100)
METHADONE UR QL: NEGATIVE
MONOCYTES # BLD: 1.3 K/UL (ref 0.05–1.2)
MONOCYTES NFR BLD: 6 % (ref 3–10)
NEUTS BAND NFR BLD MANUAL: 1 % (ref 0–5)
NEUTS SEG # BLD: 19.5 K/UL (ref 1.8–8)
NEUTS SEG NFR BLD: 87 % (ref 40–73)
NITRITE UR QL STRIP.AUTO: NEGATIVE
NRBC # BLD: 0 K/UL (ref 0–0.01)
NRBC BLD-RTO: 0 PER 100 WBC
OPIATES UR QL: NEGATIVE
PCP UR QL: NEGATIVE
PH UR STRIP: 5 (ref 5–8)
PLATELET # BLD AUTO: 243 K/UL (ref 135–420)
PMV BLD AUTO: 13.1 FL (ref 9.2–11.8)
POTASSIUM SERPL-SCNC: 6.3 MMOL/L (ref 3.5–5.5)
PROT SERPL-MCNC: 7 G/DL (ref 6.4–8.2)
PROT UR STRIP-MCNC: NEGATIVE MG/DL
RBC # BLD AUTO: 5 M/UL (ref 4.35–5.65)
RBC MORPH BLD: ABNORMAL
SALICYLATES SERPL-MCNC: 4.9 MG/DL (ref 2.8–20)
SODIUM SERPL-SCNC: 143 MMOL/L (ref 136–145)
SP GR UR REFRACTOMETRY: 1.03 (ref 1–1.03)
TROPONIN I SERPL HS-MCNC: 60 NG/L (ref 0–78)
UROBILINOGEN UR QL STRIP.AUTO: 0.2 EU/DL (ref 0.2–1)
WBC # BLD AUTO: 22.1 K/UL (ref 4.6–13.2)

## 2023-11-16 PROCEDURE — 96375 TX/PRO/DX INJ NEW DRUG ADDON: CPT

## 2023-11-16 PROCEDURE — 82010 KETONE BODYS QUAN: CPT

## 2023-11-16 PROCEDURE — 99285 EMERGENCY DEPT VISIT HI MDM: CPT

## 2023-11-16 PROCEDURE — 96365 THER/PROPH/DIAG IV INF INIT: CPT

## 2023-11-16 PROCEDURE — 80053 COMPREHEN METABOLIC PANEL: CPT

## 2023-11-16 PROCEDURE — 70450 CT HEAD/BRAIN W/O DYE: CPT

## 2023-11-16 PROCEDURE — 83036 HEMOGLOBIN GLYCOSYLATED A1C: CPT

## 2023-11-16 PROCEDURE — 96360 HYDRATION IV INFUSION INIT: CPT

## 2023-11-16 PROCEDURE — 81003 URINALYSIS AUTO W/O SCOPE: CPT

## 2023-11-16 PROCEDURE — 96367 TX/PROPH/DG ADDL SEQ IV INF: CPT

## 2023-11-16 PROCEDURE — 84484 ASSAY OF TROPONIN QUANT: CPT

## 2023-11-16 PROCEDURE — 80143 DRUG ASSAY ACETAMINOPHEN: CPT

## 2023-11-16 PROCEDURE — 80179 DRUG ASSAY SALICYLATE: CPT

## 2023-11-16 PROCEDURE — 71045 X-RAY EXAM CHEST 1 VIEW: CPT

## 2023-11-16 PROCEDURE — 87154 CUL TYP ID BLD PTHGN 6+ TRGT: CPT

## 2023-11-16 PROCEDURE — 80307 DRUG TEST PRSMV CHEM ANLYZR: CPT

## 2023-11-16 PROCEDURE — 82077 ASSAY SPEC XCP UR&BREATH IA: CPT

## 2023-11-16 PROCEDURE — 87040 BLOOD CULTURE FOR BACTERIA: CPT

## 2023-11-16 PROCEDURE — 82962 GLUCOSE BLOOD TEST: CPT

## 2023-11-16 PROCEDURE — 83605 ASSAY OF LACTIC ACID: CPT

## 2023-11-16 PROCEDURE — 87186 SC STD MICRODIL/AGAR DIL: CPT

## 2023-11-16 PROCEDURE — 87077 CULTURE AEROBIC IDENTIFY: CPT

## 2023-11-16 PROCEDURE — 6370000000 HC RX 637 (ALT 250 FOR IP): Performed by: STUDENT IN AN ORGANIZED HEALTH CARE EDUCATION/TRAINING PROGRAM

## 2023-11-16 PROCEDURE — 2580000003 HC RX 258: Performed by: STUDENT IN AN ORGANIZED HEALTH CARE EDUCATION/TRAINING PROGRAM

## 2023-11-16 PROCEDURE — 6360000002 HC RX W HCPCS: Performed by: STUDENT IN AN ORGANIZED HEALTH CARE EDUCATION/TRAINING PROGRAM

## 2023-11-16 PROCEDURE — 85025 COMPLETE CBC W/AUTO DIFF WBC: CPT

## 2023-11-16 RX ORDER — 0.9 % SODIUM CHLORIDE 0.9 %
500 INTRAVENOUS SOLUTION INTRAVENOUS ONCE
Status: COMPLETED | OUTPATIENT
Start: 2023-11-16 | End: 2023-11-16

## 2023-11-16 RX ORDER — MAGNESIUM SULFATE IN WATER 40 MG/ML
2000 INJECTION, SOLUTION INTRAVENOUS PRN
Status: DISCONTINUED | OUTPATIENT
Start: 2023-11-16 | End: 2023-11-17 | Stop reason: SDUPTHER

## 2023-11-16 RX ORDER — POTASSIUM CHLORIDE 7.45 MG/ML
10 INJECTION INTRAVENOUS PRN
Status: DISCONTINUED | OUTPATIENT
Start: 2023-11-16 | End: 2023-11-17

## 2023-11-16 RX ORDER — SODIUM CHLORIDE 450 MG/100ML
INJECTION, SOLUTION INTRAVENOUS CONTINUOUS
Status: DISCONTINUED | OUTPATIENT
Start: 2023-11-16 | End: 2023-11-17

## 2023-11-16 RX ORDER — 0.9 % SODIUM CHLORIDE 0.9 %
1000 INTRAVENOUS SOLUTION INTRAVENOUS ONCE
Status: COMPLETED | OUTPATIENT
Start: 2023-11-16 | End: 2023-11-16

## 2023-11-16 RX ORDER — DEXTROSE AND SODIUM CHLORIDE 5; .45 G/100ML; G/100ML
INJECTION, SOLUTION INTRAVENOUS CONTINUOUS PRN
Status: DISCONTINUED | OUTPATIENT
Start: 2023-11-16 | End: 2023-11-17 | Stop reason: SDUPTHER

## 2023-11-16 RX ADMIN — CEFEPIME 2000 MG: 2 INJECTION, POWDER, FOR SOLUTION INTRAVENOUS at 21:44

## 2023-11-16 RX ADMIN — SODIUM CHLORIDE 1000 ML: 900 INJECTION, SOLUTION INTRAVENOUS at 20:41

## 2023-11-16 RX ADMIN — SODIUM CHLORIDE 10.8 UNITS/HR: 9 INJECTION, SOLUTION INTRAVENOUS at 23:59

## 2023-11-16 RX ADMIN — SODIUM CHLORIDE 500 ML: 9 INJECTION, SOLUTION INTRAVENOUS at 21:15

## 2023-11-16 RX ADMIN — SODIUM CHLORIDE 500 ML: 9 INJECTION, SOLUTION INTRAVENOUS at 21:55

## 2023-11-16 RX ADMIN — SODIUM CHLORIDE: 4.5 INJECTION, SOLUTION INTRAVENOUS at 23:32

## 2023-11-16 RX ADMIN — VANCOMYCIN HYDROCHLORIDE 750 MG: 750 INJECTION, POWDER, LYOPHILIZED, FOR SOLUTION INTRAVENOUS at 22:43

## 2023-11-17 ENCOUNTER — APPOINTMENT (OUTPATIENT)
Facility: HOSPITAL | Age: 58
DRG: 870 | End: 2023-11-17
Payer: MEDICARE

## 2023-11-17 ENCOUNTER — ANESTHESIA (OUTPATIENT)
Facility: HOSPITAL | Age: 58
End: 2023-11-17
Payer: MEDICARE

## 2023-11-17 ENCOUNTER — ANESTHESIA EVENT (OUTPATIENT)
Facility: HOSPITAL | Age: 58
End: 2023-11-17
Payer: MEDICARE

## 2023-11-17 ENCOUNTER — APPOINTMENT (OUTPATIENT)
Facility: HOSPITAL | Age: 58
DRG: 870 | End: 2023-11-17
Attending: INTERNAL MEDICINE
Payer: MEDICARE

## 2023-11-17 PROBLEM — G43.909 MIGRAINE: Status: RESOLVED | Noted: 2017-08-24 | Resolved: 2023-11-17

## 2023-11-17 PROBLEM — N23 RENAL COLIC ON LEFT SIDE: Status: RESOLVED | Noted: 2017-08-24 | Resolved: 2023-11-17

## 2023-11-17 PROBLEM — G24.01 TARDIVE DYSKINESIA: Status: ACTIVE | Noted: 2023-11-17

## 2023-11-17 PROBLEM — N20.0 KIDNEY STONE ON LEFT SIDE: Status: ACTIVE | Noted: 2017-08-24

## 2023-11-17 PROBLEM — E10.10 DKA, TYPE 1, NOT AT GOAL (HCC): Status: ACTIVE | Noted: 2023-11-17

## 2023-11-17 PROBLEM — M06.9 RA (RHEUMATOID ARTHRITIS) (HCC): Status: ACTIVE | Noted: 2017-08-24

## 2023-11-17 PROBLEM — F99 PSYCHIATRIC DISORDER: Status: ACTIVE | Noted: 2023-11-17

## 2023-11-17 PROBLEM — K56.7 ILEUS (HCC): Status: RESOLVED | Noted: 2017-08-24 | Resolved: 2023-11-17

## 2023-11-17 PROBLEM — R56.9 SEIZURE (HCC): Status: ACTIVE | Noted: 2023-11-17

## 2023-11-17 PROBLEM — F32.A DEPRESSION: Status: ACTIVE | Noted: 2017-08-24

## 2023-11-17 PROBLEM — N20.0 KIDNEY STONE ON LEFT SIDE: Status: RESOLVED | Noted: 2017-08-24 | Resolved: 2023-11-17

## 2023-11-17 PROBLEM — E10.9 TYPE 1 DIABETES MELLITUS (HCC): Status: ACTIVE | Noted: 2017-08-24

## 2023-11-17 PROBLEM — N13.2 HYDRONEPHROSIS WITH URINARY OBSTRUCTION DUE TO URETERAL CALCULUS: Status: RESOLVED | Noted: 2017-08-24 | Resolved: 2023-11-17

## 2023-11-17 PROBLEM — N17.9 ARF (ACUTE RENAL FAILURE) (HCC): Status: ACTIVE | Noted: 2023-11-17

## 2023-11-17 LAB
ACCESSION NUMBER, LLC1M: ABNORMAL
ACINETOBACTER CALCOAC BAUMANNII COMPLEX BY PCR: NOT DETECTED
AMMONIA PLAS-SCNC: 21 UMOL/L (ref 11–32)
ANION GAP BLD CALC-SCNC: ABNORMAL MMOL/L (ref 10–20)
ANION GAP BLD CALC-SCNC: ABNORMAL MMOL/L (ref 10–20)
ANION GAP SERPL CALC-SCNC: 10 MMOL/L (ref 3–18)
ANION GAP SERPL CALC-SCNC: 15 MMOL/L (ref 3–18)
ANION GAP SERPL CALC-SCNC: 19 MMOL/L (ref 3–18)
ANION GAP SERPL CALC-SCNC: 27 MMOL/L (ref 3–18)
ANION GAP SERPL CALC-SCNC: 4 MMOL/L (ref 3–18)
ANION GAP SERPL CALC-SCNC: 5 MMOL/L (ref 3–18)
ANION GAP SERPL CALC-SCNC: 6 MMOL/L (ref 3–18)
ANION GAP SERPL CALC-SCNC: 8 MMOL/L (ref 3–18)
ARTERIAL PATENCY WRIST A: ABNORMAL
ARTERIAL PATENCY WRIST A: POSITIVE
B FRAGILIS DNA BLD POS QL NAA+NON-PROBE: NOT DETECTED
B-OH-BUTYR SERPL-SCNC: >4.42 MMOL/L
BASE DEFICIT BLD-SCNC: 11.1 MMOL/L
BASE DEFICIT BLD-SCNC: 2.5 MMOL/L
BASOPHILS # BLD: 0 K/UL (ref 0–0.1)
BASOPHILS NFR BLD: 0 % (ref 0–2)
BDY SITE: ABNORMAL
BDY SITE: ABNORMAL
BIOFIRE TEST COMMENT: ABNORMAL
BLACTX-M ISLT/SPM QL: NOT DETECTED
BLAIMP ISLT/SPM QL: NOT DETECTED
BLAKPC BLD POS QL NAA+NON-PROBE: NOT DETECTED
BLAOXA-48-LIKE ISLT/SPM QL: NOT DETECTED
BLAVIM ISLT/SPM QL: NOT DETECTED
BUN SERPL-MCNC: 100 MG/DL (ref 7–18)
BUN SERPL-MCNC: 108 MG/DL (ref 7–18)
BUN SERPL-MCNC: 114 MG/DL (ref 7–18)
BUN SERPL-MCNC: 91 MG/DL (ref 7–18)
BUN SERPL-MCNC: 92 MG/DL (ref 7–18)
BUN SERPL-MCNC: 92 MG/DL (ref 7–18)
BUN SERPL-MCNC: 98 MG/DL (ref 7–18)
BUN/CREAT SERPL: 31 (ref 12–20)
BUN/CREAT SERPL: 32 (ref 12–20)
BUN/CREAT SERPL: 33 (ref 12–20)
BUN/CREAT SERPL: 34 (ref 12–20)
C ALBICANS DNA BLD POS QL NAA+NON-PROBE: NOT DETECTED
C AURIS DNA BLD POS QL NAA+NON-PROBE: NOT DETECTED
C GATTII+NEOFOR DNA BLD POS QL NAA+N-PRB: NOT DETECTED
C GLABRATA DNA BLD POS QL NAA+NON-PROBE: NOT DETECTED
C KRUSEI DNA BLD POS QL NAA+NON-PROBE: NOT DETECTED
C PARAP DNA BLD POS QL NAA+NON-PROBE: NOT DETECTED
C TROPICLS DNA BLD POS QL NAA+NON-PROBE: NOT DETECTED
CA-I BLD-MCNC: 1.21 MMOL/L (ref 1.12–1.32)
CA-I BLD-MCNC: 1.26 MMOL/L (ref 1.12–1.32)
CA-I SERPL-SCNC: 1.22 MMOL/L (ref 1.12–1.32)
CALCIUM SERPL-MCNC: 8.4 MG/DL (ref 8.5–10.1)
CALCIUM SERPL-MCNC: 8.4 MG/DL (ref 8.5–10.1)
CALCIUM SERPL-MCNC: 8.6 MG/DL (ref 8.5–10.1)
CALCIUM SERPL-MCNC: 8.8 MG/DL (ref 8.5–10.1)
CALCIUM SERPL-MCNC: 9.1 MG/DL (ref 8.5–10.1)
CALCIUM SERPL-MCNC: 9.4 MG/DL (ref 8.5–10.1)
CALCIUM SERPL-MCNC: 9.5 MG/DL (ref 8.5–10.1)
CHLORIDE BLD-SCNC: 127 MMOL/L (ref 98–107)
CHLORIDE BLD-SCNC: 127 MMOL/L (ref 98–107)
CHLORIDE SERPL-SCNC: 114 MMOL/L (ref 100–111)
CHLORIDE SERPL-SCNC: 121 MMOL/L (ref 100–111)
CHLORIDE SERPL-SCNC: 121 MMOL/L (ref 100–111)
CHLORIDE SERPL-SCNC: 122 MMOL/L (ref 100–111)
CHLORIDE SERPL-SCNC: 123 MMOL/L (ref 100–111)
CHLORIDE SERPL-SCNC: 124 MMOL/L (ref 100–111)
CHLORIDE SERPL-SCNC: 126 MMOL/L (ref 100–111)
CHLORIDE UR-SCNC: 30 MMOL/L (ref 55–125)
CO2 BLD-SCNC: 14 MMOL/L (ref 19–24)
CO2 BLD-SCNC: 23 MMOL/L (ref 19–24)
CO2 SERPL-SCNC: 17 MMOL/L (ref 21–32)
CO2 SERPL-SCNC: 21 MMOL/L (ref 21–32)
CO2 SERPL-SCNC: 26 MMOL/L (ref 21–32)
CO2 SERPL-SCNC: 27 MMOL/L (ref 21–32)
CO2 SERPL-SCNC: 27 MMOL/L (ref 21–32)
CO2 SERPL-SCNC: 29 MMOL/L (ref 21–32)
CO2 SERPL-SCNC: 30 MMOL/L (ref 21–32)
CO2 SERPL-SCNC: 9 MMOL/L (ref 21–32)
COLISTIN RES MCR-1 ISLT/SPM QL: NOT DETECTED
CREAT BLD-MCNC: 2.01 MG/DL (ref 0.6–1.3)
CREAT BLD-MCNC: 2.45 MG/DL (ref 0.6–1.3)
CREAT SERPL-MCNC: 2.69 MG/DL (ref 0.6–1.3)
CREAT SERPL-MCNC: 2.72 MG/DL (ref 0.6–1.3)
CREAT SERPL-MCNC: 2.76 MG/DL (ref 0.6–1.3)
CREAT SERPL-MCNC: 2.87 MG/DL (ref 0.6–1.3)
CREAT SERPL-MCNC: 2.9 MG/DL (ref 0.6–1.3)
CREAT SERPL-MCNC: 2.91 MG/DL (ref 0.6–1.3)
CREAT SERPL-MCNC: 3.1 MG/DL (ref 0.6–1.3)
CREAT SERPL-MCNC: 3.18 MG/DL (ref 0.6–1.3)
CREAT SERPL-MCNC: 3.37 MG/DL (ref 0.6–1.3)
CREAT SERPL-MCNC: 3.61 MG/DL (ref 0.6–1.3)
CREAT UR-MCNC: 160 MG/DL (ref 30–125)
DIFFERENTIAL METHOD BLD: ABNORMAL
E CLOAC COMP DNA BLD POS NAA+NON-PROBE: NOT DETECTED
E COLI DNA BLD POS QL NAA+NON-PROBE: NOT DETECTED
E FAECALIS DNA BLD POS QL NAA+NON-PROBE: NOT DETECTED
E FAECIUM DNA BLD POS QL NAA+NON-PROBE: NOT DETECTED
ECHO AO ARCH DIAM: 2.5 CM
ECHO AO ASC DIAM: 3.6 CM
ECHO AO ASCENDING AORTA INDEX: 2.17 CM/M2
ECHO AO ROOT DIAM: 3.2 CM
ECHO AO ROOT INDEX: 1.93 CM/M2
ECHO AV AREA PEAK VELOCITY: 2.7 CM2
ECHO AV AREA VTI: 3.2 CM2
ECHO AV AREA/BSA PEAK VELOCITY: 1.6 CM2/M2
ECHO AV AREA/BSA VTI: 1.9 CM2/M2
ECHO AV MEAN GRADIENT: 2 MMHG
ECHO AV MEAN VELOCITY: 0.6 M/S
ECHO AV PEAK GRADIENT: 4 MMHG
ECHO AV PEAK VELOCITY: 1 M/S
ECHO AV VELOCITY RATIO: 0.8
ECHO AV VTI: 15.1 CM
ECHO BSA: 1.63 M2
ECHO IVC PROX: 1.7 CM
ECHO LA DIAMETER INDEX: 1.2 CM/M2
ECHO LA DIAMETER: 2 CM
ECHO LA TO AORTIC ROOT RATIO: 0.63
ECHO LA VOL A-L A2C: 19 ML (ref 18–58)
ECHO LA VOL A-L A4C: 31 ML (ref 18–58)
ECHO LA VOL BP: 23 ML (ref 18–58)
ECHO LA VOL MOD A2C: 17 ML (ref 18–58)
ECHO LA VOL MOD A4C: 29 ML (ref 18–58)
ECHO LA VOL/BSA BIPLANE: 14 ML/M2 (ref 16–34)
ECHO LA VOLUME AREA LENGTH: 25 ML
ECHO LA VOLUME INDEX A-L A2C: 11 ML/M2 (ref 16–34)
ECHO LA VOLUME INDEX A-L A4C: 19 ML/M2 (ref 16–34)
ECHO LA VOLUME INDEX AREA LENGTH: 15 ML/M2 (ref 16–34)
ECHO LA VOLUME INDEX MOD A2C: 10 ML/M2 (ref 16–34)
ECHO LA VOLUME INDEX MOD A4C: 17 ML/M2 (ref 16–34)
ECHO LV E' LATERAL VELOCITY: 8 CM/S
ECHO LV E' SEPTAL VELOCITY: 6 CM/S
ECHO LV EDV A2C: 60 ML
ECHO LV EDV A4C: 52 ML
ECHO LV EDV BP: 59 ML (ref 67–155)
ECHO LV EDV INDEX A4C: 31 ML/M2
ECHO LV EDV INDEX BP: 36 ML/M2
ECHO LV EDV NDEX A2C: 36 ML/M2
ECHO LV EJECTION FRACTION A2C: 59 %
ECHO LV EJECTION FRACTION A4C: 57 %
ECHO LV EJECTION FRACTION BIPLANE: 58 % (ref 55–100)
ECHO LV ESV A2C: 25 ML
ECHO LV ESV A4C: 22 ML
ECHO LV ESV BP: 25 ML (ref 22–58)
ECHO LV ESV INDEX A2C: 15 ML/M2
ECHO LV ESV INDEX A4C: 13 ML/M2
ECHO LV ESV INDEX BP: 15 ML/M2
ECHO LV FRACTIONAL SHORTENING: 28 % (ref 28–44)
ECHO LV INTERNAL DIMENSION DIASTOLE INDEX: 2.35 CM/M2
ECHO LV INTERNAL DIMENSION DIASTOLIC: 3.9 CM (ref 4.2–5.9)
ECHO LV INTERNAL DIMENSION SYSTOLIC INDEX: 1.69 CM/M2
ECHO LV INTERNAL DIMENSION SYSTOLIC: 2.8 CM
ECHO LV IVSD: 1.4 CM (ref 0.6–1)
ECHO LV MASS 2D: 169.4 G (ref 88–224)
ECHO LV MASS INDEX 2D: 102 G/M2 (ref 49–115)
ECHO LV POSTERIOR WALL DIASTOLIC: 1.1 CM (ref 0.6–1)
ECHO LV RELATIVE WALL THICKNESS RATIO: 0.56
ECHO LVOT AREA: 3.5 CM2
ECHO LVOT AV VTI INDEX: 0.9
ECHO LVOT DIAM: 2.1 CM
ECHO LVOT MEAN GRADIENT: 1 MMHG
ECHO LVOT PEAK GRADIENT: 2 MMHG
ECHO LVOT PEAK VELOCITY: 0.8 M/S
ECHO LVOT STROKE VOLUME INDEX: 28.4 ML/M2
ECHO LVOT SV: 47.1 ML
ECHO LVOT VTI: 13.6 CM
ECHO MV A VELOCITY: 0.75 M/S
ECHO MV E DECELERATION TIME (DT): 275.4 MS
ECHO MV E VELOCITY: 0.47 M/S
ECHO MV E/A RATIO: 0.63
ECHO MV E/E' LATERAL: 5.88
ECHO MV E/E' RATIO (AVERAGED): 6.85
ECHO RA END SYSTOLIC VOLUME APICAL 4 CHAMBER INDEX BSA: 16 ML/M2
ECHO RA VOLUME: 26 ML
ECHO RV FREE WALL PEAK S': 15 CM/S
ECHO RV INTERNAL DIMENSION: 3.7 CM
ECHO RV TAPSE: 1.8 CM (ref 1.7–?)
ENTEROBACTERALES DNA BLD POS NAA+N-PRB: DETECTED
EOSINOPHIL # BLD: 0 K/UL (ref 0–0.4)
EOSINOPHIL NFR BLD: 0 % (ref 0–5)
ERYTHROCYTE [DISTWIDTH] IN BLOOD BY AUTOMATED COUNT: 13.2 % (ref 11.6–14.5)
EST. AVERAGE GLUCOSE BLD GHB EST-MCNC: 326 MG/DL
FIO2 ON VENT: 100 %
FIO2 ON VENT: 21 %
FLUAV RNA SPEC QL NAA+PROBE: NOT DETECTED
FLUBV RNA SPEC QL NAA+PROBE: NOT DETECTED
GAS FLOW.O2 O2 DELIVERY SYS: ABNORMAL
GLUCOSE BLD STRIP.AUTO-MCNC: 100 MG/DL (ref 70–110)
GLUCOSE BLD STRIP.AUTO-MCNC: 100 MG/DL (ref 70–110)
GLUCOSE BLD STRIP.AUTO-MCNC: 125 MG/DL (ref 70–110)
GLUCOSE BLD STRIP.AUTO-MCNC: 139 MG/DL (ref 70–110)
GLUCOSE BLD STRIP.AUTO-MCNC: 147 MG/DL (ref 70–110)
GLUCOSE BLD STRIP.AUTO-MCNC: 163 MG/DL (ref 70–110)
GLUCOSE BLD STRIP.AUTO-MCNC: 167 MG/DL (ref 70–110)
GLUCOSE BLD STRIP.AUTO-MCNC: 188 MG/DL (ref 70–110)
GLUCOSE BLD STRIP.AUTO-MCNC: 202 MG/DL (ref 70–110)
GLUCOSE BLD STRIP.AUTO-MCNC: 202 MG/DL (ref 70–110)
GLUCOSE BLD STRIP.AUTO-MCNC: 241 MG/DL (ref 70–110)
GLUCOSE BLD STRIP.AUTO-MCNC: 246 MG/DL (ref 70–110)
GLUCOSE BLD STRIP.AUTO-MCNC: 314 MG/DL (ref 70–110)
GLUCOSE BLD STRIP.AUTO-MCNC: 414 MG/DL (ref 70–110)
GLUCOSE BLD STRIP.AUTO-MCNC: 463 MG/DL (ref 70–110)
GLUCOSE BLD STRIP.AUTO-MCNC: 528 MG/DL (ref 70–110)
GLUCOSE BLD STRIP.AUTO-MCNC: 91 MG/DL (ref 70–110)
GLUCOSE BLD STRIP.AUTO-MCNC: >600 MG/DL (ref 70–110)
GLUCOSE BLD-MCNC: 622 MG/DL (ref 65–100)
GLUCOSE BLD-MCNC: >700 MG/DL (ref 65–100)
GLUCOSE SERPL-MCNC: 1185 MG/DL (ref 74–99)
GLUCOSE SERPL-MCNC: 139 MG/DL (ref 74–99)
GLUCOSE SERPL-MCNC: 170 MG/DL (ref 74–99)
GLUCOSE SERPL-MCNC: 209 MG/DL (ref 74–99)
GLUCOSE SERPL-MCNC: 230 MG/DL (ref 74–99)
GLUCOSE SERPL-MCNC: 231 MG/DL (ref 74–99)
GLUCOSE SERPL-MCNC: 266 MG/DL (ref 74–99)
GLUCOSE SERPL-MCNC: 427 MG/DL (ref 74–99)
GLUCOSE SERPL-MCNC: 619 MG/DL (ref 74–99)
GLUCOSE SERPL-MCNC: 858 MG/DL (ref 74–99)
GP B STREP DNA BLD POS QL NAA+NON-PROBE: NOT DETECTED
HAEM INFLU DNA BLD POS QL NAA+NON-PROBE: NOT DETECTED
HBA1C MFR BLD: 13 % (ref 4.2–5.6)
HBA1C MFR BLD: 13.7 % (ref 4.2–5.6)
HCO3 BLD-SCNC: 14.2 MMOL/L (ref 22–26)
HCO3 BLD-SCNC: 22.6 MMOL/L (ref 22–26)
HCT VFR BLD AUTO: 40 % (ref 36–48)
HGB BLD-MCNC: 13.3 G/DL (ref 13–16)
IMM GRANULOCYTES # BLD AUTO: 0 K/UL
IMM GRANULOCYTES NFR BLD AUTO: 0 %
K OXYTOCA DNA BLD POS QL NAA+NON-PROBE: NOT DETECTED
KLEBSIELLA SP DNA BLD POS QL NAA+NON-PRB: DETECTED
KLEBSIELLA SP DNA BLD POS QL NAA+NON-PRB: NOT DETECTED
L MONOCYTOG DNA BLD POS QL NAA+NON-PROBE: NOT DETECTED
LACTATE BLD-SCNC: 1.37 MMOL/L (ref 0.4–2)
LACTATE BLD-SCNC: 1.5 MMOL/L (ref 0.4–2)
LIPASE SERPL-CCNC: 15 U/L (ref 13–75)
LYMPHOCYTES # BLD: 1.5 K/UL (ref 0.9–3.6)
LYMPHOCYTES NFR BLD: 12 % (ref 21–52)
MAGNESIUM SERPL-MCNC: 2.4 MG/DL (ref 1.6–2.6)
MAGNESIUM SERPL-MCNC: 2.5 MG/DL (ref 1.6–2.6)
MAGNESIUM SERPL-MCNC: 2.6 MG/DL (ref 1.6–2.6)
MAGNESIUM SERPL-MCNC: 2.8 MG/DL (ref 1.6–2.6)
MAGNESIUM SERPL-MCNC: 3 MG/DL (ref 1.6–2.6)
MAGNESIUM SERPL-MCNC: 3.1 MG/DL (ref 1.6–2.6)
MAGNESIUM SERPL-MCNC: 3.2 MG/DL (ref 1.6–2.6)
MAGNESIUM SERPL-MCNC: 3.3 MG/DL (ref 1.6–2.6)
MCH RBC QN AUTO: 30.3 PG (ref 24–34)
MCHC RBC AUTO-ENTMCNC: 33.3 G/DL (ref 31–37)
MCV RBC AUTO: 91.1 FL (ref 78–100)
METAMYELOCYTES NFR BLD MANUAL: 1 %
MONOCYTES # BLD: 0.1 K/UL (ref 0.05–1.2)
MONOCYTES NFR BLD: 1 % (ref 3–10)
N MEN DNA BLD POS QL NAA+NON-PROBE: NOT DETECTED
NEUTS BAND NFR BLD MANUAL: 9 % (ref 0–5)
NEUTS SEG # BLD: 10.6 K/UL (ref 1.8–8)
NEUTS SEG NFR BLD: 77 % (ref 40–73)
NRBC # BLD: 0 K/UL (ref 0–0.01)
NRBC BLD-RTO: 0 PER 100 WBC
P AERUGINOSA DNA BLD POS NAA+NON-PROBE: NOT DETECTED
PCO2 BLD: 30.1 MMHG (ref 35–45)
PCO2 BLD: 39.6 MMHG (ref 35–45)
PEEP RESPIRATORY: 5
PH BLD: 7.28 (ref 7.35–7.45)
PH BLD: 7.37 (ref 7.35–7.45)
PHOSPHATE SERPL-MCNC: 1.9 MG/DL (ref 2.5–4.9)
PHOSPHATE SERPL-MCNC: 2.1 MG/DL (ref 2.5–4.9)
PHOSPHATE SERPL-MCNC: 2.2 MG/DL (ref 2.5–4.9)
PHOSPHATE SERPL-MCNC: 2.4 MG/DL (ref 2.5–4.9)
PHOSPHATE SERPL-MCNC: 2.6 MG/DL (ref 2.5–4.9)
PHOSPHATE SERPL-MCNC: 2.7 MG/DL (ref 2.5–4.9)
PHOSPHATE SERPL-MCNC: 3.2 MG/DL (ref 2.5–4.9)
PHOSPHATE SERPL-MCNC: 8.9 MG/DL (ref 2.5–4.9)
PLATELET # BLD AUTO: 175 K/UL (ref 135–420)
PLATELET COMMENT: ABNORMAL
PMV BLD AUTO: 12.8 FL (ref 9.2–11.8)
PO2 BLD: 146 MMHG (ref 80–100)
PO2 BLD: 529 MMHG (ref 80–100)
POTASSIUM BLD-SCNC: 3.3 MMOL/L (ref 3.5–5.1)
POTASSIUM BLD-SCNC: 3.5 MMOL/L (ref 3.5–5.1)
POTASSIUM SERPL-SCNC: 3.5 MMOL/L (ref 3.5–5.5)
POTASSIUM SERPL-SCNC: 3.9 MMOL/L (ref 3.5–5.5)
POTASSIUM SERPL-SCNC: 4.3 MMOL/L (ref 3.5–5.5)
POTASSIUM SERPL-SCNC: 4.5 MMOL/L (ref 3.5–5.5)
POTASSIUM SERPL-SCNC: 4.6 MMOL/L (ref 3.5–5.5)
POTASSIUM SERPL-SCNC: 4.7 MMOL/L (ref 3.5–5.5)
POTASSIUM SERPL-SCNC: 5 MMOL/L (ref 3.5–5.5)
POTASSIUM SERPL-SCNC: 5.5 MMOL/L (ref 3.5–5.5)
PROT UR-MCNC: 97 MG/DL
PROT/CREAT UR-RTO: 0.6
PROTEUS SP DNA BLD POS QL NAA+NON-PROBE: NOT DETECTED
RBC # BLD AUTO: 4.39 M/UL (ref 4.35–5.65)
RBC MORPH BLD: ABNORMAL
RESISTANT GENE NDM BY PCR: NOT DETECTED
RESISTANT GENE TARGETS: ABNORMAL
S AUREUS DNA BLD POS QL NAA+NON-PROBE: NOT DETECTED
S AUREUS+CONS DNA BLD POS NAA+NON-PROBE: NOT DETECTED
S EPIDERMIDIS DNA BLD POS QL NAA+NON-PRB: NOT DETECTED
S LUGDUNENSIS DNA BLD POS QL NAA+NON-PRB: NOT DETECTED
S MALTOPHILIA DNA BLD POS QL NAA+NON-PRB: NOT DETECTED
S MARCESCENS DNA BLD POS NAA+NON-PROBE: NOT DETECTED
S PNEUM DNA BLD POS QL NAA+NON-PROBE: NOT DETECTED
S PYO DNA BLD POS QL NAA+NON-PROBE: NOT DETECTED
SALMONELLA DNA BLD POS QL NAA+NON-PROBE: NOT DETECTED
SAO2 % BLD: 100 %
SAO2 % BLD: 99 %
SARS-COV-2 RNA RESP QL NAA+PROBE: NOT DETECTED
SERVICE CMNT-IMP: ABNORMAL
SERVICE CMNT-IMP: ABNORMAL
SODIUM BLD-SCNC: 155 MMOL/L (ref 136–145)
SODIUM BLD-SCNC: 160 MMOL/L (ref 136–145)
SODIUM SERPL-SCNC: 150 MMOL/L (ref 136–145)
SODIUM SERPL-SCNC: 153 MMOL/L (ref 136–145)
SODIUM SERPL-SCNC: 153 MMOL/L (ref 136–145)
SODIUM SERPL-SCNC: 155 MMOL/L (ref 136–145)
SODIUM SERPL-SCNC: 157 MMOL/L (ref 136–145)
SODIUM SERPL-SCNC: 158 MMOL/L (ref 136–145)
SODIUM SERPL-SCNC: 158 MMOL/L (ref 136–145)
SODIUM SERPL-SCNC: 159 MMOL/L (ref 136–145)
SODIUM SERPL-SCNC: 159 MMOL/L (ref 136–145)
SODIUM SERPL-SCNC: 160 MMOL/L (ref 136–145)
SODIUM UR-SCNC: <5 MMOL/L (ref 20–110)
SPECIMEN SITE: ABNORMAL
SPECIMEN SITE: ABNORMAL
STREPTOCOCCUS DNA BLD POS NAA+NON-PROBE: NOT DETECTED
T4 FREE SERPL-MCNC: 1.1 NG/DL (ref 0.7–1.5)
TSH SERPL DL<=0.05 MIU/L-ACNC: 0.23 UIU/ML (ref 0.36–3.74)
VENTILATION MODE VENT: ABNORMAL
VT SETTING VENT: 45
WBC # BLD AUTO: 12.3 K/UL (ref 4.6–13.2)

## 2023-11-17 PROCEDURE — 82570 ASSAY OF URINE CREATININE: CPT

## 2023-11-17 PROCEDURE — 2580000003 HC RX 258: Performed by: HOSPITALIST

## 2023-11-17 PROCEDURE — 6360000002 HC RX W HCPCS: Performed by: INTERNAL MEDICINE

## 2023-11-17 PROCEDURE — 6370000000 HC RX 637 (ALT 250 FOR IP): Performed by: STUDENT IN AN ORGANIZED HEALTH CARE EDUCATION/TRAINING PROGRAM

## 2023-11-17 PROCEDURE — 84156 ASSAY OF PROTEIN URINE: CPT

## 2023-11-17 PROCEDURE — 83930 ASSAY OF BLOOD OSMOLALITY: CPT

## 2023-11-17 PROCEDURE — 94002 VENT MGMT INPAT INIT DAY: CPT

## 2023-11-17 PROCEDURE — 82140 ASSAY OF AMMONIA: CPT

## 2023-11-17 PROCEDURE — 84132 ASSAY OF SERUM POTASSIUM: CPT

## 2023-11-17 PROCEDURE — 71045 X-RAY EXAM CHEST 1 VIEW: CPT

## 2023-11-17 PROCEDURE — 84439 ASSAY OF FREE THYROXINE: CPT

## 2023-11-17 PROCEDURE — 85025 COMPLETE CBC W/AUTO DIFF WBC: CPT

## 2023-11-17 PROCEDURE — 2500000003 HC RX 250 WO HCPCS: Performed by: HOSPITALIST

## 2023-11-17 PROCEDURE — 84443 ASSAY THYROID STIM HORMONE: CPT

## 2023-11-17 PROCEDURE — 2580000003 HC RX 258: Performed by: INTERNAL MEDICINE

## 2023-11-17 PROCEDURE — 2000000000 HC ICU R&B

## 2023-11-17 PROCEDURE — 83690 ASSAY OF LIPASE: CPT

## 2023-11-17 PROCEDURE — 82803 BLOOD GASES ANY COMBINATION: CPT

## 2023-11-17 PROCEDURE — 51702 INSERT TEMP BLADDER CATH: CPT

## 2023-11-17 PROCEDURE — 87636 SARSCOV2 & INF A&B AMP PRB: CPT

## 2023-11-17 PROCEDURE — 0BH17EZ INSERTION OF ENDOTRACHEAL AIRWAY INTO TRACHEA, VIA NATURAL OR ARTIFICIAL OPENING: ICD-10-PCS | Performed by: HOSPITALIST

## 2023-11-17 PROCEDURE — 6360000002 HC RX W HCPCS

## 2023-11-17 PROCEDURE — 2700000000 HC OXYGEN THERAPY PER DAY

## 2023-11-17 PROCEDURE — 31500 INSERT EMERGENCY AIRWAY: CPT | Performed by: NURSE ANESTHETIST, CERTIFIED REGISTERED

## 2023-11-17 PROCEDURE — 76770 US EXAM ABDO BACK WALL COMP: CPT

## 2023-11-17 PROCEDURE — 2500000003 HC RX 250 WO HCPCS: Performed by: INTERNAL MEDICINE

## 2023-11-17 PROCEDURE — 85014 HEMATOCRIT: CPT

## 2023-11-17 PROCEDURE — 87086 URINE CULTURE/COLONY COUNT: CPT

## 2023-11-17 PROCEDURE — 84100 ASSAY OF PHOSPHORUS: CPT

## 2023-11-17 PROCEDURE — 83605 ASSAY OF LACTIC ACID: CPT

## 2023-11-17 PROCEDURE — 36600 WITHDRAWAL OF ARTERIAL BLOOD: CPT

## 2023-11-17 PROCEDURE — 2580000003 HC RX 258: Performed by: STUDENT IN AN ORGANIZED HEALTH CARE EDUCATION/TRAINING PROGRAM

## 2023-11-17 PROCEDURE — P9047 ALBUMIN (HUMAN), 25%, 50ML: HCPCS | Performed by: HOSPITALIST

## 2023-11-17 PROCEDURE — 6370000000 HC RX 637 (ALT 250 FOR IP): Performed by: INTERNAL MEDICINE

## 2023-11-17 PROCEDURE — 95816 EEG AWAKE AND DROWSY: CPT

## 2023-11-17 PROCEDURE — A4216 STERILE WATER/SALINE, 10 ML: HCPCS | Performed by: HOSPITALIST

## 2023-11-17 PROCEDURE — 84295 ASSAY OF SERUM SODIUM: CPT

## 2023-11-17 PROCEDURE — 6370000000 HC RX 637 (ALT 250 FOR IP): Performed by: HOSPITALIST

## 2023-11-17 PROCEDURE — 5A1955Z RESPIRATORY VENTILATION, GREATER THAN 96 CONSECUTIVE HOURS: ICD-10-PCS | Performed by: HOSPITALIST

## 2023-11-17 PROCEDURE — C9113 INJ PANTOPRAZOLE SODIUM, VIA: HCPCS | Performed by: HOSPITALIST

## 2023-11-17 PROCEDURE — 82330 ASSAY OF CALCIUM: CPT

## 2023-11-17 PROCEDURE — 82436 ASSAY OF URINE CHLORIDE: CPT

## 2023-11-17 PROCEDURE — 93306 TTE W/DOPPLER COMPLETE: CPT

## 2023-11-17 PROCEDURE — 2500000003 HC RX 250 WO HCPCS

## 2023-11-17 PROCEDURE — 83735 ASSAY OF MAGNESIUM: CPT

## 2023-11-17 PROCEDURE — 70450 CT HEAD/BRAIN W/O DYE: CPT

## 2023-11-17 PROCEDURE — 83036 HEMOGLOBIN GLYCOSYLATED A1C: CPT

## 2023-11-17 PROCEDURE — 82962 GLUCOSE BLOOD TEST: CPT

## 2023-11-17 PROCEDURE — 80048 BASIC METABOLIC PNL TOTAL CA: CPT

## 2023-11-17 PROCEDURE — 6360000002 HC RX W HCPCS: Performed by: HOSPITALIST

## 2023-11-17 PROCEDURE — 84300 ASSAY OF URINE SODIUM: CPT

## 2023-11-17 PROCEDURE — 82947 ASSAY GLUCOSE BLOOD QUANT: CPT

## 2023-11-17 RX ORDER — DEXTROSE MONOHYDRATE 50 MG/ML
INJECTION, SOLUTION INTRAVENOUS CONTINUOUS
Status: DISCONTINUED | OUTPATIENT
Start: 2023-11-17 | End: 2023-11-17

## 2023-11-17 RX ORDER — POTASSIUM CHLORIDE 7.45 MG/ML
10 INJECTION INTRAVENOUS PRN
Status: DISCONTINUED | OUTPATIENT
Start: 2023-11-17 | End: 2023-11-29 | Stop reason: HOSPADM

## 2023-11-17 RX ORDER — DEXTROSE AND SODIUM CHLORIDE 5; .45 G/100ML; G/100ML
INJECTION, SOLUTION INTRAVENOUS CONTINUOUS PRN
Status: DISCONTINUED | OUTPATIENT
Start: 2023-11-17 | End: 2023-11-29 | Stop reason: HOSPADM

## 2023-11-17 RX ORDER — NOREPINEPHRINE BITARTRATE 0.06 MG/ML
1-30 INJECTION, SOLUTION INTRAVENOUS CONTINUOUS
Status: DISCONTINUED | OUTPATIENT
Start: 2023-11-17 | End: 2023-11-20

## 2023-11-17 RX ORDER — MAGNESIUM SULFATE IN WATER 40 MG/ML
2000 INJECTION, SOLUTION INTRAVENOUS PRN
Status: DISCONTINUED | OUTPATIENT
Start: 2023-11-17 | End: 2023-11-29 | Stop reason: HOSPADM

## 2023-11-17 RX ORDER — NOREPINEPHRINE BITARTRATE 0.06 MG/ML
INJECTION, SOLUTION INTRAVENOUS
Status: COMPLETED
Start: 2023-11-17 | End: 2023-11-17

## 2023-11-17 RX ORDER — ALBUMIN (HUMAN) 12.5 G/50ML
25 SOLUTION INTRAVENOUS ONCE
Status: COMPLETED | OUTPATIENT
Start: 2023-11-17 | End: 2023-11-17

## 2023-11-17 RX ORDER — CHLORHEXIDINE GLUCONATE ORAL RINSE 1.2 MG/ML
15 SOLUTION DENTAL 2 TIMES DAILY
Status: DISCONTINUED | OUTPATIENT
Start: 2023-11-17 | End: 2023-11-26

## 2023-11-17 RX ORDER — NOREPINEPHRINE BITARTRATE 0.06 MG/ML
1-30 INJECTION, SOLUTION INTRAVENOUS CONTINUOUS
Status: DISCONTINUED | OUTPATIENT
Start: 2023-11-17 | End: 2023-11-17

## 2023-11-17 RX ORDER — ENOXAPARIN SODIUM 100 MG/ML
40 INJECTION SUBCUTANEOUS DAILY
Status: DISCONTINUED | OUTPATIENT
Start: 2023-11-17 | End: 2023-11-20

## 2023-11-17 RX ORDER — HYDROMORPHONE HYDROCHLORIDE 1 MG/ML
0.5 INJECTION, SOLUTION INTRAMUSCULAR; INTRAVENOUS; SUBCUTANEOUS
Status: DISCONTINUED | OUTPATIENT
Start: 2023-11-17 | End: 2023-11-26

## 2023-11-17 RX ORDER — SODIUM CHLORIDE 450 MG/100ML
INJECTION, SOLUTION INTRAVENOUS CONTINUOUS
Status: DISCONTINUED | OUTPATIENT
Start: 2023-11-17 | End: 2023-11-17

## 2023-11-17 RX ORDER — HYDROMORPHONE HYDROCHLORIDE 1 MG/ML
1 INJECTION, SOLUTION INTRAMUSCULAR; INTRAVENOUS; SUBCUTANEOUS
Status: DISCONTINUED | OUTPATIENT
Start: 2023-11-17 | End: 2023-11-26

## 2023-11-17 RX ORDER — MAGNESIUM SULFATE IN WATER 40 MG/ML
2000 INJECTION, SOLUTION INTRAVENOUS PRN
Status: DISCONTINUED | OUTPATIENT
Start: 2023-11-17 | End: 2023-11-17

## 2023-11-17 RX ORDER — ATORVASTATIN CALCIUM 20 MG/1
20 TABLET, FILM COATED ORAL NIGHTLY
Status: DISCONTINUED | OUTPATIENT
Start: 2023-11-17 | End: 2023-11-17

## 2023-11-17 RX ORDER — METRONIDAZOLE 500 MG/100ML
500 INJECTION, SOLUTION INTRAVENOUS EVERY 8 HOURS
Status: DISCONTINUED | OUTPATIENT
Start: 2023-11-17 | End: 2023-11-19

## 2023-11-17 RX ORDER — TAMSULOSIN HYDROCHLORIDE 0.4 MG/1
0.4 CAPSULE ORAL DAILY
Status: DISCONTINUED | OUTPATIENT
Start: 2023-11-17 | End: 2023-11-29 | Stop reason: HOSPADM

## 2023-11-17 RX ORDER — MIDAZOLAM HCL IN 0.9 % NACL/PF 1 MG/ML
PLASTIC BAG, INJECTION (ML) INTRAVENOUS
Status: COMPLETED
Start: 2023-11-17 | End: 2023-11-17

## 2023-11-17 RX ORDER — IPRATROPIUM BROMIDE AND ALBUTEROL SULFATE 2.5; .5 MG/3ML; MG/3ML
1 SOLUTION RESPIRATORY (INHALATION) EVERY 4 HOURS PRN
Status: DISCONTINUED | OUTPATIENT
Start: 2023-11-17 | End: 2023-11-29 | Stop reason: HOSPADM

## 2023-11-17 RX ORDER — QUETIAPINE FUMARATE 50 MG/1
150 TABLET, EXTENDED RELEASE ORAL NIGHTLY
Status: DISCONTINUED | OUTPATIENT
Start: 2023-11-17 | End: 2023-11-22

## 2023-11-17 RX ORDER — LORAZEPAM 2 MG/ML
0.5 INJECTION INTRAMUSCULAR EVERY 6 HOURS PRN
Status: DISCONTINUED | OUTPATIENT
Start: 2023-11-17 | End: 2023-11-19

## 2023-11-17 RX ORDER — POTASSIUM CHLORIDE 20 MEQ/1
40 TABLET, EXTENDED RELEASE ORAL PRN
Status: DISCONTINUED | OUTPATIENT
Start: 2023-11-17 | End: 2023-11-29 | Stop reason: HOSPADM

## 2023-11-17 RX ORDER — MIDAZOLAM HCL IN 0.9 % NACL/PF 1 MG/ML
1-10 PLASTIC BAG, INJECTION (ML) INTRAVENOUS CONTINUOUS
Status: DISCONTINUED | OUTPATIENT
Start: 2023-11-17 | End: 2023-11-20

## 2023-11-17 RX ORDER — VENLAFAXINE HYDROCHLORIDE 150 MG/1
150 CAPSULE, EXTENDED RELEASE ORAL DAILY
Status: DISCONTINUED | OUTPATIENT
Start: 2023-11-17 | End: 2023-11-21

## 2023-11-17 RX ORDER — SODIUM CHLORIDE 450 MG/100ML
INJECTION, SOLUTION INTRAVENOUS CONTINUOUS
Status: DISCONTINUED | OUTPATIENT
Start: 2023-11-17 | End: 2023-11-18

## 2023-11-17 RX ORDER — ASPIRIN 81 MG/1
81 TABLET, CHEWABLE ORAL DAILY
Status: DISCONTINUED | OUTPATIENT
Start: 2023-11-17 | End: 2023-11-17

## 2023-11-17 RX ORDER — SODIUM CHLORIDE 9 MG/ML
INJECTION, SOLUTION INTRAVENOUS CONTINUOUS
Status: DISCONTINUED | OUTPATIENT
Start: 2023-11-17 | End: 2023-11-17

## 2023-11-17 RX ORDER — ACETAMINOPHEN 650 MG/1
650 SUPPOSITORY RECTAL EVERY 4 HOURS PRN
Status: DISCONTINUED | OUTPATIENT
Start: 2023-11-17 | End: 2023-11-29 | Stop reason: HOSPADM

## 2023-11-17 RX ADMIN — METRONIDAZOLE 500 MG: 500 INJECTION, SOLUTION INTRAVENOUS at 23:23

## 2023-11-17 RX ADMIN — SODIUM CHLORIDE 500 MG: 9 INJECTION, SOLUTION INTRAVENOUS at 03:58

## 2023-11-17 RX ADMIN — ALBUMIN (HUMAN) 25 G: 0.25 INJECTION, SOLUTION INTRAVENOUS at 01:14

## 2023-11-17 RX ADMIN — PANTOPRAZOLE SODIUM 40 MG: 40 INJECTION, POWDER, FOR SOLUTION INTRAVENOUS at 10:59

## 2023-11-17 RX ADMIN — POTASSIUM CHLORIDE 10 MEQ: 7.46 INJECTION, SOLUTION INTRAVENOUS at 06:27

## 2023-11-17 RX ADMIN — SODIUM BICARBONATE 50 MEQ: 84 INJECTION, SOLUTION INTRAVENOUS at 03:18

## 2023-11-17 RX ADMIN — MIDAZOLAM (PF) 1 MG/ML IN 0.9 % SODIUM CHLORIDE INTRAVENOUS SOLUTION 2 MG/HR: at 03:48

## 2023-11-17 RX ADMIN — SODIUM BICARBONATE: 84 INJECTION, SOLUTION INTRAVENOUS at 03:59

## 2023-11-17 RX ADMIN — HYDROMORPHONE HYDROCHLORIDE 0.5 MG: 1 INJECTION, SOLUTION INTRAMUSCULAR; INTRAVENOUS; SUBCUTANEOUS at 06:18

## 2023-11-17 RX ADMIN — ENOXAPARIN SODIUM 40 MG: 100 INJECTION SUBCUTANEOUS at 10:59

## 2023-11-17 RX ADMIN — Medication 9 MCG/MIN: at 04:21

## 2023-11-17 RX ADMIN — CHLORHEXIDINE GLUCONATE 15 ML: 1.2 RINSE ORAL at 21:34

## 2023-11-17 RX ADMIN — SODIUM BICARBONATE 50 MEQ: 84 INJECTION, SOLUTION INTRAVENOUS at 01:14

## 2023-11-17 RX ADMIN — METRONIDAZOLE 500 MG: 500 INJECTION, SOLUTION INTRAVENOUS at 16:00

## 2023-11-17 RX ADMIN — DEXTROSE MONOHYDRATE: 50 INJECTION, SOLUTION INTRAVENOUS at 10:53

## 2023-11-17 RX ADMIN — SODIUM CHLORIDE: 9 INJECTION, SOLUTION INTRAVENOUS at 01:13

## 2023-11-17 RX ADMIN — POTASSIUM CHLORIDE 10 MEQ: 7.46 INJECTION, SOLUTION INTRAVENOUS at 05:17

## 2023-11-17 RX ADMIN — SODIUM CHLORIDE: 4.5 INJECTION, SOLUTION INTRAVENOUS at 06:45

## 2023-11-17 RX ADMIN — POTASSIUM PHOSPHATE, MONOBASIC POTASSIUM PHOSPHATE, DIBASIC 10 MMOL: 224; 236 INJECTION, SOLUTION, CONCENTRATE INTRAVENOUS at 09:13

## 2023-11-17 RX ADMIN — CHLORHEXIDINE GLUCONATE 15 ML: 1.2 RINSE ORAL at 10:59

## 2023-11-17 RX ADMIN — HYDROMORPHONE HYDROCHLORIDE 1 MG: 1 INJECTION, SOLUTION INTRAMUSCULAR; INTRAVENOUS; SUBCUTANEOUS at 23:21

## 2023-11-17 RX ADMIN — CEFEPIME 2000 MG: 2 INJECTION, POWDER, FOR SOLUTION INTRAVENOUS at 21:34

## 2023-11-17 RX ADMIN — Medication 5 MCG/MIN: at 03:58

## 2023-11-17 RX ADMIN — SODIUM CHLORIDE 500 MG: 9 INJECTION, SOLUTION INTRAVENOUS at 18:13

## 2023-11-17 RX ADMIN — SODIUM CHLORIDE 22.86 UNITS/HR: 9 INJECTION, SOLUTION INTRAVENOUS at 09:34

## 2023-11-17 RX ADMIN — SODIUM CHLORIDE: 4.5 INJECTION, SOLUTION INTRAVENOUS at 03:27

## 2023-11-17 RX ADMIN — MIDAZOLAM (PF) 1 MG/ML IN 0.9 % SODIUM CHLORIDE INTRAVENOUS SOLUTION 4 MG/HR: at 05:49

## 2023-11-17 RX ADMIN — POTASSIUM BICARBONATE 40 MEQ: 782 TABLET, EFFERVESCENT ORAL at 05:42

## 2023-11-17 RX ADMIN — MIDAZOLAM (PF) 1 MG/ML IN 0.9 % SODIUM CHLORIDE INTRAVENOUS SOLUTION 4 MG/HR: at 21:01

## 2023-11-17 ASSESSMENT — PULMONARY FUNCTION TESTS
PIF_VALUE: 16
PIF_VALUE: 15
PIF_VALUE: 19
PIF_VALUE: 19
PIF_VALUE: 18
PIF_VALUE: 14

## 2023-11-17 ASSESSMENT — PAIN SCALES - GENERAL: PAINLEVEL_OUTOF10: 0

## 2023-11-17 NOTE — ANESTHESIA PROCEDURE NOTES
Airway  Date/Time: 11/17/2023 3:45 AM  Urgency: emergent      General Information and Staff    Patient location during procedure: ICU  Resident/CRNA: DEREK Gamez - JUDITH  Performed by: DEREK Gamez CRNA  Authorized by: DEREK Gamez CRNA      Consent for Airway (if performed for an anesthetic, see related documentation for consents)  Patient identity confirmed: per hospital policy      Indications and Patient Condition  Indications for airway management: respiratory distress  Spontaneous ventilation: present  Sedation level: deep  Preoxygenated: yes  Patient position: sniffing  Mask difficulty assessment: not attempted    Final Airway Details  Final airway type: endotracheal airway      Successful airway: ETT     Successful intubation technique: video laryngoscopy  Facilitating devices/methods: intubating stylet  Endotracheal tube insertion site: oral  Blade: [place]  Blade size: #4  ETT size (mm): 8.0  Cormack-Lehane Classification: grade I - full view of glottis  Placement verified by: chest auscultation and capnometry   Number of attempts at approach: 1

## 2023-11-17 NOTE — ED NOTES
Patient repositioned. Placed in Trendelenburg. Pt remains in gown and on tele monitoring. Primary RN traveled to and from CT scan with patient. Pt now back in room. 300mL left on fluid bolus.      Adelita Carson RN  11/16/23 5392
Pt to ER  via EMS with c/o AMS. Son at the bedside states the patient was \"sick yesterday and vomiting\" He states the patient may have been \"delirious\" as he was taking cups out of cabinets and placing them on the table. Today, the son states he went to work at 0800, the patient was alert. When he came back home from work around 1800 the patient was \"unresponsive\". On arrival to ER, pt alert but not responding or following commands. Accucheck critically HI in triage. Son states pt has hx of DM and he believes the patient has been compliant with all medications. Pt in gown and on tele monitoring. (2) IVs in place and patent.      Adeltia Carson RN  11/16/23 4899
Son and Goldie GIBBS at the bedside     Rebecca Boo, [address]  11/16/23 2112
Wife called primary RN. Updated wife on patient's status and POC.      Mack Samson RN  11/16/23 4342
whether acute organ dysfunction present ([address])    10. Dehydration        Disposition: admission     DEREK Roman NP  [address]  [address]  [phone]    Follow up         Disclaimer: Sections of this note are dictated using utilizing voice recognition software. Minor typographical errors may be present. If questions arise, please do not hesitate to contact me or call our department.          Michell Pat MD  11/18/23 8679

## 2023-11-17 NOTE — PROCEDURES
ELECTROENCEPHALOGRAPHY     Patient: Adriano Ragland MRN: [id number]  CSN: [id number]    YOB: 1965  Age: 59 y.o. Sex: male    DOA: 11/16/2023 LOS:  LOS: 0 days        Date of Service: 11/17/2023    DICTATING: Enrique Nava MD     REFERRING PHYSICIAN: Dr. Hazel Girt: 23-82    HISTORY OF PRESENT ILLNESS: This is a 58 YO male, who presented with unresponsiveness. This EEG was performed in order to assess electrodiagnostic risk factors for epilepsy. ELECTROENCEPHALOGRAM INTERPRETATION: This is a referential and bipolar EEG recorded with a 10-20 system. There is generalized 2-3 hertz delta activity intermixed with 4 to 7 hertz theta activity intermixed with the background. Drowsiness is accompanied by loss of posterior dominant rhythm and generalized alpha and theta activities. Photic stimulation does not elicit any abnormal activity. There are no epileptiform discharges or electrographic seizures in the study. IMPRESSION: This EEG is abnormal due to  generalized slowing, which is an indicator of diffuse cerebral dysfunction from any cause including -but not limited to- toxic, metabolic and infectious etiologies. There are no ictal or interictal findings with a specific correlation with seizures. Signed:   Enrique Nava MD  11/17/2023  6:06 PM

## 2023-11-17 NOTE — ED TRIAGE NOTES
Pt arrives alert non verbal staring off AMS. Per pts son pt was alert oriented this am, he recently came home to the pt being altered. MD Carrera at bedside. Lakhwinder Diaz)  Surgery  [address] [place] [address] [id number]  Phone: [phone]  Fax: [phone]  Follow Up Time: 2 weeks

## 2023-11-17 NOTE — PLAN OF CARE
the reason for restraint   Evaluate the patient's condition at the time of restraint application   Every 2 hours: Monitor safety, psychosocial status, comfort, nutrition and hydration     Problem: Safety - Adult  Goal: Free from fall injury  Outcome: Progressing     Problem: Chronic Conditions and Co-morbidities  Goal: Patient's chronic conditions and co-morbidity symptoms are monitored and maintained or improved  Outcome: Progressing  Flowsheets (Taken 11/17/2023 0800)  Care Plan - Patient's Chronic Conditions and Co-Morbidity Symptoms are Monitored and Maintained or Improved:   Monitor and assess patient's chronic conditions and comorbid symptoms for stability, deterioration, or improvement   Collaborate with multidisciplinary team to address chronic and comorbid conditions and prevent exacerbation or deterioration   Update acute care plan with appropriate goals if chronic or comorbid symptoms are exacerbated and prevent overall improvement and discharge     Problem: Pain  Goal: Verbalizes/displays adequate comfort level or baseline comfort level  Outcome: Progressing  Flowsheets  Taken 11/17/2023 1200  Verbalizes/displays adequate comfort level or baseline comfort level:   Encourage patient to monitor pain and request assistance   Implement non-pharmacological measures as appropriate and evaluate response   Notify Licensed Independent Practitioner if interventions unsuccessful or patient reports new pain   Administer analgesics based on type and severity of pain and evaluate response  Taken 11/17/2023 0800  Verbalizes/displays adequate comfort level or baseline comfort level:   Notify Licensed Independent Practitioner if interventions unsuccessful or patient reports new pain   Implement non-pharmacological measures as appropriate and evaluate response   Administer analgesics based on type and severity of pain and evaluate response   Assess pain using appropriate pain scale     Problem: Nutrition Deficit:  Goal:

## 2023-11-17 NOTE — CONSULTS
Comprehensive Nutrition Assessment      Type and Reason for Visit:  Initial, Consult    Nutrition Recommendations/Plan:   NPO as ordered  Recommend trickle feed with diabetic formula if advance TF   Continue to monitor diet plan, weight, labs, and plan of care during admission. Malnutrition Assessment:  Malnutrition Status:  Severe malnutrition (11/17/23 1219)    Context:  Chronic Illness     Findings of the 6 clinical characteristics of malnutrition:  Energy Intake:  Mild decrease in energy intake (Comment)  Weight Loss:  Greater than 7.5% over 3 months     Body Fat Loss:   (moderate fat loss on orbital)     Muscle Mass Loss:  Severe muscle mass loss Temples (temporalis)  Fluid Accumulation:  No significant fluid accumulation     Strength:   (KALEY)    Nutrition Assessment:       Derrick Boswell is a 59 y.o. male  admitted today for Altered Mental Status  . Hx of Anxiety, Arthritis, BPH (benign prostatic hyperplasia), CAD , COPD, Gastroparesis, GERD, Kidney stone, Liver disease, Type 1 diabetes mellitus. Per visit pt is laying in bed, Current on ventilation (Tmax 36.6, VE 7.31). Significant wt change noticed prior to admission: -12 % * 4 month . NPO at this time. Plan of care discussed with MD & RN - will continue NPO at this time. Wt Readings from Last 10 Encounters:   11/17/23 54.4 kg (120 lb)   07/12/23 63.5 kg (140 lb)   07/03/23 65.8 kg (145 lb)        Nutrition Related Findings:    Pertinent meds: levophed @12mcg/min. Labs: Glu 427, Na +159, eGFR 22   Wound Type: Open Wounds (on sacrum)       Current Nutrition Intake & Therapies:    Average Meal Intake: NPO  Average Supplements Intake: NPO  Diet NPO  ADULT TUBE FEEDING; Orogastric; Other Tube Feeding (specify); none; Bolus; Other (specify); none; 0; 200;  Other (specify); q2 hrs     Anthropometric Measures:  Height: 175.3 cm (5' 9\")  Ideal Body Weight (IBW): 160 lbs (73 kg)    Admission Body Weight: 54.4 kg (119 lb 14.9 oz)  Current Body

## 2023-11-17 NOTE — PROGRESS NOTES
Pharmacy Note:  Vancomycin    Vancomycin was Discontinued by Dr. Francisco Monteiro at 03:54 11/17/2023  Patient received 1 Dose of Vancomycin 750 mg at 22:43 11/16/2023  Dr. Naya Stoddard aware  Pharmacy has signed off. Please re-consult if Vancomycin is to continue. Byron Nice Gave  [facility]

## 2023-11-17 NOTE — CONSULTS
[facility]  (A Division of [facility])                                                           Date of Admission: 11/16/2023       Reason for Consult:   Referring MD:     Thank you for involving me in the care of this patient. Please do not hesitate to contact me on the above number if question or concern. Current Antimicrobials:    Prior Antimicrobials:  Cefepime IV 11/17 to date  Vancomycin IV X1 dose      Allergy to antibiotics: Pencillin       Assessment--ID related:     Critically Ill patient with:    GNR bacteremia--anaerobic medium-- source- likely GI/ transient. CXR clear, non obstructive renal stones- BL  with clear UA. CT head without sinus disease mention. Septic shock/ Bandemia 9%- on pressor  DKA Type 1  Acute encepahlopathy due to above  Acute respiratory failure due to above     Diagnosis     DKA, type 1, not at goal [facility]) [E10.10]-A1C11%     Tardive dyskinesia [G24.01]     Psychiatric disorder [F99]     ARF (acute renal failure) ([address]) [N17.9]     Seizure ([address]) [R56.9]     RA (rheumatoid arthritis) ([address]) [M06.9]     Depression [L57. A]     Type 1 diabetes mellitus ([address]) [E10.9]     Kidney stone on left side [N20.0]     Hyperkalemia/ Hypernatremia    Recommendation -- ID related:     Agree with Cefepime, add Flagyl until cultures are final  Supportive care per ICU  FU blood culture- ID On GNR until final  Daily labs-- GALDINO improving     I am covering weekend by phone. Please call  via [employer] or phone  if consults or questions. Thanks. HPI:      James Horta is a 59 y.o. male with PMH DMT1, RA, CAD, renal stones history, pancreatitis history,  was brought in to ED with AMS, and found to be in DKA, septic shock with GALDINO and admitted to ICU. In ED, WBC 22K, DKA with CO2 of 8, GALDINO, very high BS with hyperkalemia as well.      Intubated, on pressor and non verbal.    History obtained from  med stafff, chart reviewed in obtained from the skull base through the vertex. CT dose reduction was achieved through the use of a standardized protocol tailored for this examination and automatic exposure control for dose modulation. The ventricles and cortical sulci are prominent, compatible with age related volume loss. There is no evidence of intracranial hemorrhage, mass, mass effect, or acute infarct. There is periventricular white matter disease. No extra-axial fluid collections are seen. The visualized paranasal sinuses and mastoid air cells are clear. The orbital structures are unremarkable. No osseous abnormalities are seen. 1. No evidence of acute infarct or intracranial hemorrhage. 2. Mild periventricular white matter disease is likely secondary to chronic small vessel ischemic changes. XR CHEST PORTABLE    Result Date: 11/16/2023  EXAM:  XR CHEST PORTABLE INDICATION: Chest pain COMPARISON: March 2020 TECHNIQUE: portable chest AP view FINDINGS: The cardiac silhouette is within normal limits. The pulmonary vasculature is within normal limits. The lungs and pleural spaces are clear. Cervical spine fusion hardware is partially visualized. No acute process on portable chest.       Marcia Segal MD  [facility])  Office #:     [phone]  [phone]   Office Fax: [phone]

## 2023-11-17 NOTE — CONSULTS
Pulmonary Specialists  Pulmonary, Critical Care, and Sleep Medicine    Name: Mary Gallagher MRN: [id number]   : 1965 Hospital: [facility]    Date: 2023  Room: [address] Note                                              Consult requesting physician: Dr. Mohan Cohen  Reason for Consult: Assisting in ICU care for acute respiratory failure     IMPRESSION:     Acute respiratory failure O43.25  Metabolic encephalopathy P98.59  DKA, type 1 not at goal vs hyperglycemic hyperosmolar syndrome E10.10  Hypotension I95.0  Hypernatremia E85.0  ARF (acute renal failure) N17.9  Dehydration E86.0  RA (rheumatoid arthritis)  Kidney stone on left side  Depression  Tardive dyskinesia  Psychiatric disorder  Seizure    Active Hospital Problems    Diagnosis Date Noted    DKA, type 1, not at goal [facility]) [E10.10] 2023    Tardive dyskinesia [G24.01] 2023    Psychiatric disorder [F99] 2023    ARF (acute renal failure) ([address]) [N17.9] 2023    RA (rheumatoid arthritis) ([address]) [M06.9] 2017    Depression [F32. A] 2017    Type 1 diabetes mellitus ([address]) [E10.9] 2017    Kidney stone on left side [N20.0] 2017     Code status: Full Code      RECOMMENDATIONS:     Respiratory: Acute respiratory failure secondary to metabolic encephalopathy, intubated for airway protection  Chest x-ray 2023 no acute issues. Ventilator bundle & Sedation protocol followed. Daily sedation holiday, assessment for readiness for SBT and then re-titrate if required. Chlorhexidine mouth washes. Mech. Ventilated patients- aim to keep peak plateau pressure less than or equal to 30cm H2O. Keep SPO2 >=92%. HOB 30 degree elevation all the time. Aggressive pulmonary toileting. Aspiration precautions, pulmonary hygiene care.   Daily CXR and ABG  Bronchodilators: duo-neb    Daily sedation holiday and assessment for weaning with SBT as tolerated  Sedation: Versed and titrate for goal RASS 0 Blood Updated: 11/16/23 2143    Blood Culture 2 [[id number]] Collected: 11/16/23 1955    Order Status: Sent Specimen: Blood Updated: 11/16/23 2143               Images report reviewed by me:  XR CHEST PORTABLE    Result Date: 11/17/2023  EXAM:  XR CHEST PORTABLE INDICATION: ETT and NGT. COMPARISON: Chest x-ray 11/16/2023. TECHNIQUE: Single portable chest AP view FINDINGS: Cardiac monitoring leads are noted. Endotracheal tube is in expected position with the tip approximately 3 cm above the melvin. Enteric tube traverses expected course to below the diaphragm into the left upper quadrant. The cardiac silhouette is within normal limits. The pulmonary vasculature is within normal limits. The lungs and pleural spaces are clear. The visualized bones and upper abdomen are age-appropriate. Endotracheal tube and enteric tube in expected positions. No acute findings. CT HEAD WO CONTRAST    Result Date: 11/16/2023  Indication:  ams Comparison: None Findings: 5 mm axial images were obtained from the skull base through the vertex. CT dose reduction was achieved through the use of a standardized protocol tailored for this examination and automatic exposure control for dose modulation. The ventricles and cortical sulci are prominent, compatible with age related volume loss. There is no evidence of intracranial hemorrhage, mass, mass effect, or acute infarct. There is periventricular white matter disease. No extra-axial fluid collections are seen. The visualized paranasal sinuses and mastoid air cells are clear. The orbital structures are unremarkable. No osseous abnormalities are seen. 1. No evidence of acute infarct or intracranial hemorrhage. 2. Mild periventricular white matter disease is likely secondary to chronic small vessel ischemic changes.       XR CHEST PORTABLE    Result Date: 11/16/2023  EXAM:  XR CHEST PORTABLE INDICATION: Chest pain COMPARISON: March 2020 TECHNIQUE: portable chest AP view FINDINGS: The

## 2023-11-17 NOTE — CONSULTS
Consult received from Dr Serina LEIGH  GNR bacteremia on anaerobic bottle  Currently on vanco and cefepime  PCN allergy    Plan: add flagyl for anerobic coverage. Will see him  after 3-4pm.    Thanks  Marcia Kilgore MD  [facility])  Office: [phone] -Option #8  Office fax:  [phone]

## 2023-11-17 NOTE — H&P
[facility] [place]  HISTORY AND PHYSICAL    Name:  Adriano Ragland  MR#:   [id number]  :  1965  ACCOUNT #:  [de-identified]  ADMIT DATE:  2023    ADMITTING DIAGNOSES:  1. Diabetic ketoacidosis with type 1 diabetes mellitus. 2.  Nonalcoholic steatohepatitis, liver disease. 3.  Tardive dyskinesia. 4.  Coronary disease. 5.  Severe dehydration. 6.  Acute renal failure. 7.  Hyperkalemia and acidosis secondary to diabetic ketoacidosis. 8.  Leukocytosis. 9.  Severe metabolic encephalopathy. HISTORY OF PRESENT ILLNESS:  This is a 59-year-old gentleman who arrived with altered mental status, staring off into space, from home today. The patient was last seen awake and alert at 08:00 a.m. this morning by his son. When his son returned home today, he was altered. He was very ill appearing. They brought him into the emergency room. Blood sugar was very high. CT head showed no evidence for stroke. He does have an extensive history to include tardive dyskinesia; emetophobia, which is a fever of vomiting; and depression, and he has three psychiatric medications on his list.  He does have type 1 diabetes, rheumatoid arthritis, hypertension, hyperlipidemia. The patient has been hydrated aggressively. He has been started on an insulin drip. He now has been ordered bicarb, we are going to repeat his metabolic panel now, and he is being admitted to the intensive care unit. He is currently hemodynamically stable, also still altered mentally. PCP is Dr. Joann Echevarria. PAST MEDICAL HISTORY:  Anxiety; depression; coronary disease; COPD; diverticulosis; chronic pancreatitis, on Creon; emetophobia; rheumatoid arthritis; type 1 diabetes; peripheral vertigo. PAST SURGICAL HISTORY:  Previous surgeries include cardiac cath, cervical fusion and diskectomy, hand surgery, neck surgery, finger surgery, foot surgery, Whipple procedure, and urologic procedure for kidney stones.     FAMILY HISTORY:  There is no

## 2023-11-17 NOTE — CONSULTS
ORTHOPEDIC SURGERY      left foot surgery x 2    OTHER SURGICAL HISTORY  2014    WHIPPLE     MT UNLISTED PROCEDURE ABDOMEN PERITONEUM & OMENTUM  2014    whipple procedure    UROLOGICAL SURGERY      cysto  x4     FAMILY HISTORY:  Family History   Problem Relation Age of Onset    Pseudochol.  Deficiency Neg Hx     Delayed Awakening Neg Hx     Post-op Nausea/Vomiting Neg Hx     Emergence Delirium Neg Hx     Malig Hypertherm Neg Hx     Other Neg Hx     Post-op Cognitive Dysfunction Neg Hx      SOCIAL HISTORY:  Social History     Socioeconomic History    Marital status:    Tobacco Use    Smoking status: Former     Packs/day: 1     Types: Cigarettes     Quit date: 2012     Years since quittin.7    Smokeless tobacco: Never   Vaping Use    Vaping Use: Never used   Substance and Sexual Activity    Alcohol use: No    Drug use: No     MEDICATIONS:  Current Facility-Administered Medications   Medication Dose Route Frequency    ceFEPIme (MAXIPIME) 2,000 mg in sodium chloride 0.9 % 100 mL IVPB (mini-bag)  2,000 mg IntraVENous Q24H    enoxaparin (LOVENOX) injection 40 mg  40 mg SubCUTAneous Daily    pantoprazole (PROTONIX) 40 mg in sodium chloride (PF) 0.9 % 10 mL injection  40 mg IntraVENous Daily    [Held by provider] lipase-protease-amylase (ZENPEP) delayed release capsule 10,000 Units  10,000 Units Oral TID WC    [Held by provider] QUEtiapine (SEROQUEL XR) extended release tablet 150 mg  150 mg Oral Nightly    [Held by provider] tamsulosin (FLOMAX) capsule 0.4 mg  0.4 mg Oral Daily    [Held by provider] venlafaxine (EFFEXOR XR) extended release capsule 150 mg  150 mg Oral Daily    LORazepam (ATIVAN) injection 0.5 mg  0.5 mg IntraVENous Q6H PRN    acetaminophen (TYLENOL) suppository 650 mg  650 mg Rectal Q4H PRN    HYDROmorphone HCl PF (DILAUDID) injection 0.5 mg  0.5 mg IntraVENous Q3H PRN    potassium chloride (KLOR-CON M) extended release tablet 40 mEq  40 mEq Oral PRN    Or    potassium bicarb-citric acid standardized protocol tailored for this examination and automatic exposure control for dose modulation. The ventricles and cortical sulci are prominent, compatible with age related volume loss. There is no evidence of intracranial hemorrhage, mass, mass effect, or acute infarct. There is periventricular white matter disease. No extra-axial fluid collections are seen. The visualized paranasal sinuses and mastoid air cells are clear. The orbital structures are unremarkable. No osseous abnormalities are seen. 1. No evidence of acute infarct or intracranial hemorrhage. 2. Mild periventricular white matter disease is likely secondary to chronic small vessel ischemic changes. XR CHEST PORTABLE    Result Date: 11/16/2023  EXAM:  XR CHEST PORTABLE INDICATION: Chest pain COMPARISON: March 2020 TECHNIQUE: portable chest AP view FINDINGS: The cardiac silhouette is within normal limits. The pulmonary vasculature is within normal limits. The lungs and pleural spaces are clear. Cervical spine fusion hardware is partially visualized. No acute process on portable chest.     ASSESSMENT/IMPRESSION:  59 y.o. male with PMHx significant for diabetes, tardive dyskinesia, emetophobia, rheumatoid arthritis, hypertension, hyperlipidemia, pancreatitis who came via EMS to [facility] ER for DKA and unresponsiveness. Metabolic encephalopathy: Related to DKA. Head CT is unremarkable. Provoked seizure from DKA. RECOMMENDATIONS:  EEG  Stop Keppra after EEG ruled out active seizure.   Continue treat DKA.        ------------------------------------  Mohini London MD  11/17/2023  12:25 PM

## 2023-11-17 NOTE — PROGRESS NOTES
0720 Bedside and Verbal shift change report given to Bryce Plaza RN (oncoming nurse) by Es Holder RN (offgoing nurse). Report included the following information Intake/Output, Recent Results, Cardiac Rhythm SR, and Neuro Assessment. 0830 Patient's Ph 1.9, K 3.9, Na 159. Spoke with Dr. Femi Lemus and obtained an order for K Phos (10 mmol). Will also check Io. Ca at next lab draw. 1030 Stat Mg and Ph sent. 1200 Reassessment, no changes. 1210 Called to f/u on Stat labs sent at 1030, needs recollection. 1300 Dr. Janelle Ball in to assess patient. Requesting for q2hr BMP. Goal for sodium to decrease by 8 mmol in the next 24 hours. 1415 EEG in progress    1500 Patient's labs are clotting due to slow flow. Requested ML placement. 1600 Reassessment, no changes. Spoke with Dr. Femi Lemus about BMP and BS. Patient will stay on the insulin drip due to needing D5 for hypernatremia. 1750 Patient now drawing up right leg however, not able to do so on the right. Also not moving the right hand and appears contracted. Wife and son states that this is not a normal finding. Patient appears to be aware of stimulus however, not following commands or focusing or tracking with eyes. Pupils sluggish, but reactive. Versed continues to infuse due to patient alarming the vent. Dr. Burt Dotson paged to discuss findings. 1537 Chivo Whitney paged again, awaiting response. 200 Dr. Femi Lemus paged to discuss neuro assessment. 1920 Returned call from Dr. Femi Lemus. Concern for seizure versus possible stroke. Obtain STAT CT head. If negative consult neurology for further recommendations. Please refrain from giving additional ativan until after CT and speaking with neurology. Spoke with CT, able to accept patient now. Oncoming nurse, Es Holder aware. 1920 Bedside and Verbal shift change report given to Es Holder RN (oncoming nurse) by Bryce Plaza RN (offgoing nurse).  Report included the following information Nurse Handoff Report, Intake/Output, Recent Results, Cardiac Rhythm SR, and Neuro Assessment.

## 2023-11-18 ENCOUNTER — APPOINTMENT (OUTPATIENT)
Facility: HOSPITAL | Age: 58
DRG: 870 | End: 2023-11-18
Payer: MEDICARE

## 2023-11-18 LAB
ANION GAP BLD CALC-SCNC: ABNORMAL MMOL/L (ref 10–20)
ANION GAP SERPL CALC-SCNC: 3 MMOL/L (ref 3–18)
ANION GAP SERPL CALC-SCNC: 4 MMOL/L (ref 3–18)
ANION GAP SERPL CALC-SCNC: 6 MMOL/L (ref 3–18)
ANION GAP SERPL CALC-SCNC: 6 MMOL/L (ref 3–18)
ANION GAP SERPL CALC-SCNC: 7 MMOL/L (ref 3–18)
ANION GAP SERPL CALC-SCNC: 7 MMOL/L (ref 3–18)
ARTERIAL PATENCY WRIST A: POSITIVE
BASE DEFICIT BLD-SCNC: 1.5 MMOL/L
BASOPHILS # BLD: 0 K/UL (ref 0–0.1)
BASOPHILS NFR BLD: 0 % (ref 0–2)
BDY SITE: ABNORMAL
BUN SERPL-MCNC: 59 MG/DL (ref 7–18)
BUN SERPL-MCNC: 68 MG/DL (ref 7–18)
BUN SERPL-MCNC: 76 MG/DL (ref 7–18)
BUN SERPL-MCNC: 76 MG/DL (ref 7–18)
BUN SERPL-MCNC: 87 MG/DL (ref 7–18)
BUN SERPL-MCNC: 87 MG/DL (ref 7–18)
BUN SERPL-MCNC: 89 MG/DL (ref 7–18)
BUN SERPL-MCNC: 92 MG/DL (ref 7–18)
BUN/CREAT SERPL: 34 (ref 12–20)
BUN/CREAT SERPL: 37 (ref 12–20)
BUN/CREAT SERPL: 38 (ref 12–20)
BUN/CREAT SERPL: 40 (ref 12–20)
BUN/CREAT SERPL: 41 (ref 12–20)
BUN/CREAT SERPL: 43 (ref 12–20)
CA-I BLD-MCNC: 1.21 MMOL/L (ref 1.12–1.32)
CA-I SERPL-SCNC: 1.17 MMOL/L (ref 1.12–1.32)
CALCIUM SERPL-MCNC: 7.6 MG/DL (ref 8.5–10.1)
CALCIUM SERPL-MCNC: 7.7 MG/DL (ref 8.5–10.1)
CALCIUM SERPL-MCNC: 7.9 MG/DL (ref 8.5–10.1)
CALCIUM SERPL-MCNC: 8.3 MG/DL (ref 8.5–10.1)
CALCIUM SERPL-MCNC: 8.4 MG/DL (ref 8.5–10.1)
CALCIUM SERPL-MCNC: 8.5 MG/DL (ref 8.5–10.1)
CHLORIDE BLD-SCNC: 124 MMOL/L (ref 98–107)
CHLORIDE SERPL-SCNC: 122 MMOL/L (ref 100–111)
CHLORIDE SERPL-SCNC: 123 MMOL/L (ref 100–111)
CHLORIDE SERPL-SCNC: 123 MMOL/L (ref 100–111)
CHLORIDE SERPL-SCNC: 125 MMOL/L (ref 100–111)
CHLORIDE SERPL-SCNC: 126 MMOL/L (ref 100–111)
CHLORIDE SERPL-SCNC: 126 MMOL/L (ref 100–111)
CO2 BLD-SCNC: 23 MMOL/L (ref 19–24)
CO2 SERPL-SCNC: 22 MMOL/L (ref 21–32)
CO2 SERPL-SCNC: 23 MMOL/L (ref 21–32)
CO2 SERPL-SCNC: 23 MMOL/L (ref 21–32)
CO2 SERPL-SCNC: 24 MMOL/L (ref 21–32)
CO2 SERPL-SCNC: 24 MMOL/L (ref 21–32)
CO2 SERPL-SCNC: 25 MMOL/L (ref 21–32)
CO2 SERPL-SCNC: 26 MMOL/L (ref 21–32)
CO2 SERPL-SCNC: 26 MMOL/L (ref 21–32)
CREAT BLD-MCNC: 1.89 MG/DL (ref 0.6–1.3)
CREAT SERPL-MCNC: 1.36 MG/DL (ref 0.6–1.3)
CREAT SERPL-MCNC: 1.67 MG/DL (ref 0.6–1.3)
CREAT SERPL-MCNC: 1.88 MG/DL (ref 0.6–1.3)
CREAT SERPL-MCNC: 2.07 MG/DL (ref 0.6–1.3)
CREAT SERPL-MCNC: 2.28 MG/DL (ref 0.6–1.3)
CREAT SERPL-MCNC: 2.33 MG/DL (ref 0.6–1.3)
CREAT SERPL-MCNC: 2.39 MG/DL (ref 0.6–1.3)
CREAT SERPL-MCNC: 2.67 MG/DL (ref 0.6–1.3)
DIFFERENTIAL METHOD BLD: ABNORMAL
EOSINOPHIL # BLD: 0.1 K/UL (ref 0–0.4)
EOSINOPHIL NFR BLD: 1 % (ref 0–5)
ERYTHROCYTE [DISTWIDTH] IN BLOOD BY AUTOMATED COUNT: 13.6 % (ref 11.6–14.5)
FIO2 ON VENT: 50 %
GAS FLOW.O2 O2 DELIVERY SYS: ABNORMAL
GLUCOSE BLD STRIP.AUTO-MCNC: 100 MG/DL (ref 70–110)
GLUCOSE BLD STRIP.AUTO-MCNC: 107 MG/DL (ref 70–110)
GLUCOSE BLD STRIP.AUTO-MCNC: 117 MG/DL (ref 70–110)
GLUCOSE BLD STRIP.AUTO-MCNC: 118 MG/DL (ref 70–110)
GLUCOSE BLD STRIP.AUTO-MCNC: 123 MG/DL (ref 70–110)
GLUCOSE BLD STRIP.AUTO-MCNC: 125 MG/DL (ref 70–110)
GLUCOSE BLD STRIP.AUTO-MCNC: 129 MG/DL (ref 70–110)
GLUCOSE BLD STRIP.AUTO-MCNC: 130 MG/DL (ref 70–110)
GLUCOSE BLD STRIP.AUTO-MCNC: 148 MG/DL (ref 70–110)
GLUCOSE BLD STRIP.AUTO-MCNC: 162 MG/DL (ref 70–110)
GLUCOSE BLD STRIP.AUTO-MCNC: 162 MG/DL (ref 70–110)
GLUCOSE BLD STRIP.AUTO-MCNC: 164 MG/DL (ref 70–110)
GLUCOSE BLD STRIP.AUTO-MCNC: 174 MG/DL (ref 70–110)
GLUCOSE BLD STRIP.AUTO-MCNC: 200 MG/DL (ref 70–110)
GLUCOSE BLD STRIP.AUTO-MCNC: 205 MG/DL (ref 70–110)
GLUCOSE BLD STRIP.AUTO-MCNC: 208 MG/DL (ref 70–110)
GLUCOSE BLD STRIP.AUTO-MCNC: 223 MG/DL (ref 70–110)
GLUCOSE BLD STRIP.AUTO-MCNC: 227 MG/DL (ref 70–110)
GLUCOSE BLD STRIP.AUTO-MCNC: 238 MG/DL (ref 70–110)
GLUCOSE BLD STRIP.AUTO-MCNC: 255 MG/DL (ref 70–110)
GLUCOSE BLD STRIP.AUTO-MCNC: 272 MG/DL (ref 70–110)
GLUCOSE BLD STRIP.AUTO-MCNC: 95 MG/DL (ref 70–110)
GLUCOSE BLD-MCNC: 176 MG/DL (ref 65–100)
GLUCOSE SERPL-MCNC: 111 MG/DL (ref 74–99)
GLUCOSE SERPL-MCNC: 127 MG/DL (ref 74–99)
GLUCOSE SERPL-MCNC: 143 MG/DL (ref 74–99)
GLUCOSE SERPL-MCNC: 175 MG/DL (ref 74–99)
GLUCOSE SERPL-MCNC: 244 MG/DL (ref 74–99)
GLUCOSE SERPL-MCNC: 253 MG/DL (ref 74–99)
GLUCOSE SERPL-MCNC: 315 MG/DL (ref 74–99)
GLUCOSE SERPL-MCNC: 338 MG/DL (ref 74–99)
HCO3 BLD-SCNC: 23.1 MMOL/L (ref 22–26)
HCT VFR BLD AUTO: 39.7 % (ref 36–48)
HGB BLD-MCNC: 13.2 G/DL (ref 13–16)
IMM GRANULOCYTES # BLD AUTO: 0 K/UL
IMM GRANULOCYTES NFR BLD AUTO: 0 %
LACTATE BLD-SCNC: 0.59 MMOL/L (ref 0.4–2)
LYMPHOCYTES # BLD: 2.7 K/UL (ref 0.9–3.6)
LYMPHOCYTES NFR BLD: 23 % (ref 21–52)
MAGNESIUM SERPL-MCNC: 2.1 MG/DL (ref 1.6–2.6)
MAGNESIUM SERPL-MCNC: 2.3 MG/DL (ref 1.6–2.6)
MAGNESIUM SERPL-MCNC: 2.3 MG/DL (ref 1.6–2.6)
MAGNESIUM SERPL-MCNC: 2.4 MG/DL (ref 1.6–2.6)
MAGNESIUM SERPL-MCNC: 2.5 MG/DL (ref 1.6–2.6)
MCH RBC QN AUTO: 30.2 PG (ref 24–34)
MCHC RBC AUTO-ENTMCNC: 33.2 G/DL (ref 31–37)
MCV RBC AUTO: 90.8 FL (ref 78–100)
METAMYELOCYTES NFR BLD MANUAL: 1 %
MONOCYTES # BLD: 0.5 K/UL (ref 0.05–1.2)
MONOCYTES NFR BLD: 4 % (ref 3–10)
NEUTS BAND NFR BLD MANUAL: 8 % (ref 0–5)
NEUTS SEG # BLD: 8.3 K/UL (ref 1.8–8)
NEUTS SEG NFR BLD: 63 % (ref 40–73)
NRBC # BLD: 0 K/UL (ref 0–0.01)
NRBC BLD-RTO: 0 PER 100 WBC
OSMOLALITY SERPL: 400 MOSM/KG H2O (ref 280–300)
PCO2 BLD: 38 MMHG (ref 35–45)
PEEP RESPIRATORY: 5
PH BLD: 7.39 (ref 7.35–7.45)
PHOSPHATE SERPL-MCNC: 1.8 MG/DL (ref 2.5–4.9)
PHOSPHATE SERPL-MCNC: 2.5 MG/DL (ref 2.5–4.9)
PHOSPHATE SERPL-MCNC: 2.6 MG/DL (ref 2.5–4.9)
PHOSPHATE SERPL-MCNC: 2.7 MG/DL (ref 2.5–4.9)
PHOSPHATE SERPL-MCNC: 2.8 MG/DL (ref 2.5–4.9)
PLATELET # BLD AUTO: 88 K/UL (ref 135–420)
PLATELET COMMENT: ABNORMAL
PMV BLD AUTO: 12.2 FL (ref 9.2–11.8)
PO2 BLD: 202 MMHG (ref 80–100)
POTASSIUM BLD-SCNC: 4.8 MMOL/L (ref 3.5–5.1)
POTASSIUM SERPL-SCNC: 4.1 MMOL/L (ref 3.5–5.5)
POTASSIUM SERPL-SCNC: 4.1 MMOL/L (ref 3.5–5.5)
POTASSIUM SERPL-SCNC: 4.2 MMOL/L (ref 3.5–5.5)
POTASSIUM SERPL-SCNC: 4.7 MMOL/L (ref 3.5–5.5)
POTASSIUM SERPL-SCNC: 4.7 MMOL/L (ref 3.5–5.5)
POTASSIUM SERPL-SCNC: 5 MMOL/L (ref 3.5–5.5)
POTASSIUM SERPL-SCNC: 5.2 MMOL/L (ref 3.5–5.5)
POTASSIUM SERPL-SCNC: 5.2 MMOL/L (ref 3.5–5.5)
RBC # BLD AUTO: 4.37 M/UL (ref 4.35–5.65)
RBC MORPH BLD: ABNORMAL
SAO2 % BLD: 100 %
SERVICE CMNT-IMP: ABNORMAL
SODIUM BLD-SCNC: 154 MMOL/L (ref 136–145)
SODIUM SERPL-SCNC: 151 MMOL/L (ref 136–145)
SODIUM SERPL-SCNC: 152 MMOL/L (ref 136–145)
SODIUM SERPL-SCNC: 153 MMOL/L (ref 136–145)
SODIUM SERPL-SCNC: 154 MMOL/L (ref 136–145)
SODIUM SERPL-SCNC: 155 MMOL/L (ref 136–145)
SODIUM SERPL-SCNC: 156 MMOL/L (ref 136–145)
SPECIMEN SITE: ABNORMAL
VENTILATION MODE VENT: ABNORMAL
VT SETTING VENT: 450
WBC # BLD AUTO: 11.7 K/UL (ref 4.6–13.2)

## 2023-11-18 PROCEDURE — 2580000003 HC RX 258: Performed by: HOSPITALIST

## 2023-11-18 PROCEDURE — 85014 HEMATOCRIT: CPT

## 2023-11-18 PROCEDURE — 6370000000 HC RX 637 (ALT 250 FOR IP): Performed by: INTERNAL MEDICINE

## 2023-11-18 PROCEDURE — 2580000003 HC RX 258: Performed by: STUDENT IN AN ORGANIZED HEALTH CARE EDUCATION/TRAINING PROGRAM

## 2023-11-18 PROCEDURE — 2500000003 HC RX 250 WO HCPCS: Performed by: FAMILY MEDICINE

## 2023-11-18 PROCEDURE — 6360000002 HC RX W HCPCS: Performed by: INTERNAL MEDICINE

## 2023-11-18 PROCEDURE — 2000000000 HC ICU R&B

## 2023-11-18 PROCEDURE — 36415 COLL VENOUS BLD VENIPUNCTURE: CPT

## 2023-11-18 PROCEDURE — 80048 BASIC METABOLIC PNL TOTAL CA: CPT

## 2023-11-18 PROCEDURE — 6360000002 HC RX W HCPCS: Performed by: HOSPITALIST

## 2023-11-18 PROCEDURE — 82330 ASSAY OF CALCIUM: CPT

## 2023-11-18 PROCEDURE — 85025 COMPLETE CBC W/AUTO DIFF WBC: CPT

## 2023-11-18 PROCEDURE — 71045 X-RAY EXAM CHEST 1 VIEW: CPT

## 2023-11-18 PROCEDURE — 84132 ASSAY OF SERUM POTASSIUM: CPT

## 2023-11-18 PROCEDURE — C9113 INJ PANTOPRAZOLE SODIUM, VIA: HCPCS | Performed by: HOSPITALIST

## 2023-11-18 PROCEDURE — 37799 UNLISTED PX VASCULAR SURGERY: CPT

## 2023-11-18 PROCEDURE — 6370000000 HC RX 637 (ALT 250 FOR IP): Performed by: FAMILY MEDICINE

## 2023-11-18 PROCEDURE — 83605 ASSAY OF LACTIC ACID: CPT

## 2023-11-18 PROCEDURE — 84295 ASSAY OF SERUM SODIUM: CPT

## 2023-11-18 PROCEDURE — 94003 VENT MGMT INPAT SUBQ DAY: CPT

## 2023-11-18 PROCEDURE — 82962 GLUCOSE BLOOD TEST: CPT

## 2023-11-18 PROCEDURE — 2580000003 HC RX 258: Performed by: FAMILY MEDICINE

## 2023-11-18 PROCEDURE — 36600 WITHDRAWAL OF ARTERIAL BLOOD: CPT

## 2023-11-18 PROCEDURE — A4216 STERILE WATER/SALINE, 10 ML: HCPCS | Performed by: HOSPITALIST

## 2023-11-18 PROCEDURE — 83735 ASSAY OF MAGNESIUM: CPT

## 2023-11-18 PROCEDURE — 84100 ASSAY OF PHOSPHORUS: CPT

## 2023-11-18 PROCEDURE — 82803 BLOOD GASES ANY COMBINATION: CPT

## 2023-11-18 RX ORDER — GLUCAGON 1 MG/ML
1 KIT INJECTION PRN
Status: DISCONTINUED | OUTPATIENT
Start: 2023-11-18 | End: 2023-11-29 | Stop reason: HOSPADM

## 2023-11-18 RX ORDER — INSULIN LISPRO 100 [IU]/ML
0-10 INJECTION, SOLUTION INTRAVENOUS; SUBCUTANEOUS EVERY 6 HOURS
Status: DISCONTINUED | OUTPATIENT
Start: 2023-11-18 | End: 2023-11-29 | Stop reason: HOSPADM

## 2023-11-18 RX ORDER — INSULIN GLARGINE 100 [IU]/ML
15 INJECTION, SOLUTION SUBCUTANEOUS NIGHTLY
Status: DISCONTINUED | OUTPATIENT
Start: 2023-11-18 | End: 2023-11-26

## 2023-11-18 RX ORDER — INSULIN GLARGINE 100 [IU]/ML
15 INJECTION, SOLUTION SUBCUTANEOUS NIGHTLY
Status: DISCONTINUED | OUTPATIENT
Start: 2023-11-18 | End: 2023-11-18 | Stop reason: SDUPTHER

## 2023-11-18 RX ORDER — DEXTROSE MONOHYDRATE 100 MG/ML
INJECTION, SOLUTION INTRAVENOUS CONTINUOUS PRN
Status: DISCONTINUED | OUTPATIENT
Start: 2023-11-18 | End: 2023-11-29 | Stop reason: HOSPADM

## 2023-11-18 RX ORDER — DEXTROSE MONOHYDRATE 50 MG/ML
INJECTION, SOLUTION INTRAVENOUS CONTINUOUS
Status: DISCONTINUED | OUTPATIENT
Start: 2023-11-18 | End: 2023-11-18

## 2023-11-18 RX ORDER — INSULIN LISPRO 100 [IU]/ML
0-4 INJECTION, SOLUTION INTRAVENOUS; SUBCUTANEOUS
Status: DISCONTINUED | OUTPATIENT
Start: 2023-11-18 | End: 2023-11-18

## 2023-11-18 RX ADMIN — CHLORHEXIDINE GLUCONATE 15 ML: 1.2 RINSE ORAL at 20:20

## 2023-11-18 RX ADMIN — HYDROMORPHONE HYDROCHLORIDE 1 MG: 1 INJECTION, SOLUTION INTRAMUSCULAR; INTRAVENOUS; SUBCUTANEOUS at 05:26

## 2023-11-18 RX ADMIN — PANTOPRAZOLE SODIUM 40 MG: 40 INJECTION, POWDER, FOR SOLUTION INTRAVENOUS at 08:18

## 2023-11-18 RX ADMIN — DEXTROSE MONOHYDRATE: 50 INJECTION, SOLUTION INTRAVENOUS at 07:59

## 2023-11-18 RX ADMIN — HYDROMORPHONE HYDROCHLORIDE 0.5 MG: 1 INJECTION, SOLUTION INTRAMUSCULAR; INTRAVENOUS; SUBCUTANEOUS at 13:12

## 2023-11-18 RX ADMIN — HYDROMORPHONE HYDROCHLORIDE 1 MG: 1 INJECTION, SOLUTION INTRAMUSCULAR; INTRAVENOUS; SUBCUTANEOUS at 16:28

## 2023-11-18 RX ADMIN — CHLORHEXIDINE GLUCONATE 15 ML: 1.2 RINSE ORAL at 08:18

## 2023-11-18 RX ADMIN — METRONIDAZOLE 500 MG: 500 INJECTION, SOLUTION INTRAVENOUS at 15:16

## 2023-11-18 RX ADMIN — INSULIN LISPRO 6 UNITS: 100 INJECTION, SOLUTION INTRAVENOUS; SUBCUTANEOUS at 22:26

## 2023-11-18 RX ADMIN — CEFEPIME 2000 MG: 2 INJECTION, POWDER, FOR SOLUTION INTRAVENOUS at 20:19

## 2023-11-18 RX ADMIN — METRONIDAZOLE 500 MG: 500 INJECTION, SOLUTION INTRAVENOUS at 08:18

## 2023-11-18 RX ADMIN — INSULIN GLARGINE 15 UNITS: 100 INJECTION, SOLUTION SUBCUTANEOUS at 20:19

## 2023-11-18 RX ADMIN — POTASSIUM PHOSPHATE, MONOBASIC POTASSIUM PHOSPHATE, DIBASIC 15 MMOL: 224; 236 INJECTION, SOLUTION, CONCENTRATE INTRAVENOUS at 02:42

## 2023-11-18 RX ADMIN — METRONIDAZOLE 500 MG: 500 INJECTION, SOLUTION INTRAVENOUS at 22:24

## 2023-11-18 RX ADMIN — DEXTROSE MONOHYDRATE: 50 INJECTION, SOLUTION INTRAVENOUS at 19:45

## 2023-11-18 ASSESSMENT — PULMONARY FUNCTION TESTS
PIF_VALUE: 13
PIF_VALUE: 14
PIF_VALUE: 15
PIF_VALUE: 14

## 2023-11-18 NOTE — PROGRESS NOTES
Pulmonary Specialists  Pulmonary, Critical Care, and Sleep Medicine    Name: Alyssa Zuñiga MRN: [id number]   : 1965 Hospital: [facility]    Date: 2023  Room: [address] [place] [address] Note                                              Consult requesting physician: Dr. Tash Farias  Reason for Consult: Assisting in ICU care for acute respiratory failure     IMPRESSION:     Acute respiratory failure I67.28  Metabolic encephalopathy V87.37  DKA, type 1 not at goal vs hyperglycemic hyperosmolar syndrome E10.10  Hypotension I95.0  Hypernatremia E85.0  ARF (acute renal failure) N17.9  Dehydration E86.0  RA (rheumatoid arthritis)  Kidney stone on left side  Depression  Tardive dyskinesia  Psychiatric disorder  Seizure    Active Hospital Problems    Diagnosis Date Noted    DKA, type 1, not at goal [facility]) [E10.10] 2023    Tardive dyskinesia [G24.01] 2023    Psychiatric disorder [F99] 2023    ARF (acute renal failure) ([address]) [N17.9] 2023    Seizure ([address]) [R56.9] 2023    RA (rheumatoid arthritis) ([address]) [M06.9] 2017    Depression [F32. A] 2017    Type 1 diabetes mellitus ([address]) [E10.9] 2017    Kidney stone on left side [N20.0] 2017     Code status: Full Code      RECOMMENDATIONS:     Respiratory: Acute respiratory failure 2' to metabolic encephalopathy, intubated for airway protection  Chest x-ray 2023 pending report  ABG 2023 with good oxygenation and no hypercapnia  Daily CXR and ABG  On AC/VC+ 16/450/5/40 %; airway pressures are within reference range  Ventilator bundle & Sedation protocol followed. Daily sedation holiday, assessment for readiness for SBT and then re-titrate if required. Chlorhexidine mouth washes. Mech. Ventilated patients- aim to keep peak plateau pressure less than or equal to 30cm H2O. Keep SPO2 >=92%. HOB 30 degree elevation all the time. Aggressive pulmonary toileting.  Aspiration precautions, pulmonary hygiene - 111 mmol/L    CO2 26 21 - 32 mmol/L    Anion Gap 4 3.0 - 18 mmol/L    Glucose 143 (H) 74 - 99 mg/dL    BUN 92 (H) 7.0 - 18 MG/DL    Creatinine 2.67 (H) 0.6 - 1.3 MG/DL    Bun/Cre Ratio 34 (H) 12 - 20      Est, Glom Filt Rate 27 (L) >60 ml/min/1.73m2    Calcium 8.4 (L) 8.5 - 10.1 MG/DL   POCT Glucose    Collection Time: 11/18/23  2:02 AM   Result Value Ref Range    POC Glucose 117 (H) 70 - 110 mg/dL   POCT Glucose    Collection Time: 11/18/23  3:06 AM   Result Value Ref Range    POC Glucose 118 (H) 70 - 110 mg/dL   Basic Metabolic Panel    Collection Time: 11/18/23  3:08 AM   Result Value Ref Range    Sodium 155 (H) 136 - 145 mmol/L    Potassium 4.7 3.5 - 5.5 mmol/L    Chloride 125 (H) 100 - 111 mmol/L    CO2 26 21 - 32 mmol/L    Anion Gap 4 3.0 - 18 mmol/L    Glucose 127 (H) 74 - 99 mg/dL    BUN 89 (H) 7.0 - 18 MG/DL    Creatinine 2.39 (H) 0.6 - 1.3 MG/DL    Bun/Cre Ratio 37 (H) 12 - 20      Est, Glom Filt Rate 30 (L) >60 ml/min/1.73m2    Calcium 8.3 (L) 8.5 - 10.1 MG/DL   Magnesium    Collection Time: 11/18/23  3:08 AM   Result Value Ref Range    Magnesium 2.3 1.6 - 2.6 mg/dL   Phosphorus    Collection Time: 11/18/23  3:08 AM   Result Value Ref Range    Phosphorus 2.5 2.5 - 4.9 MG/DL   POCT Glucose    Collection Time: 11/18/23  4:08 AM   Result Value Ref Range    POC Glucose 123 (H) 70 - 110 mg/dL   POCT Blood Gas & Electrolytes    Collection Time: 11/18/23  4:22 AM   Result Value Ref Range    POC pH 7.39 7.35 - 7.45      POC pCO2 38.0 35.0 - 45.0 MMHG    POC PO2 202 (H) 80 - 100 MMHG    POC Ionized Calcium 1.21 1.12 - 1.32 mmol/L    BASE DEFICIT (POC) 1.5 mmol/L    POC HCO3 23.1 22 - 26 MMOL/L    POC TCO2 23 19 - 24 MMOL/L    POC O2  %    Source ARTERIAL      Site LEFT RADIAL      Toni Test Positive      DEVICE ADULT VENT      Mode ASSIST CONTROL      FIO2 Arterial 50 %    POC TIDAL VOLUME 450      POC PEEP/CPA 5      Performed by:  Gavin Hamlin Resp     POC Sodium 154 (H) 136 - 145 mmol/L    POC not applicable (Male)

## 2023-11-18 NOTE — PROGRESS NOTES
Remote chart review    VS/Notes/Labs and imaging reports reviewed   Blood culture + for Klebsiella aerogenes, no resistent gene detected by PCR    Cont Cefepime/Flagyl    Marcia Garcia MD  [facility])  Office:  -Option #8  Office fax:  [phone]

## 2023-11-18 NOTE — PROGRESS NOTES
1920 - Shift hand-off complete. Communication given from day-shift RN for possible seizure/stroke activity noticed in afternoon, Dr. Jacqueline Escamilla made aware, orders received to obtain STAT head CT and to contact Neurology team with results. 2000 - Patient off unit en-route to CT.     2015 - Patient back on unit and in room. 2120 - Notified Dr. Joshua Heck of CT scan results, orders received to discontinue Keppra. 2320 - Patient attempting to sit up and breathing over ventilator, PRN dilaudid given per orders. 0500 - No blood return in midline for labs, unable to draw labs with venipuncture. Notified medic to come assess midline. 2783 - Patient breathing over ventilator, tachycardia noted on monitor, PRN dilaudid given per orders.

## 2023-11-18 NOTE — PROGRESS NOTES
5 - Patient arrived on unit from emergency department. Responds to painful stimuli, pupils brisk, eyes shaking continuously. Continuous insulin drip in place. 4596 - Paged Dr. Martha Aleman in regards to patient contracted extremities with bilateral eyes shaking, still not following commands. 46 - Paged Dr. Martha Aleman in regards to patient's right eye showing possible droop, jaw locked up and unable to open. 0310 - No improvement in patient's state, Dr. Martha Aleman paged. 9994 - Dr. Martha Aleman at bedside, page out to Dr. Anabela Flores. 80 - Dr. Anabela Flores called, orders received to intubate. 0330 - Patient intubated at bedside.

## 2023-11-19 ENCOUNTER — APPOINTMENT (OUTPATIENT)
Facility: HOSPITAL | Age: 58
DRG: 870 | End: 2023-11-19
Payer: MEDICARE

## 2023-11-19 LAB
ANION GAP SERPL CALC-SCNC: 6 MMOL/L (ref 3–18)
ANION GAP SERPL CALC-SCNC: 6 MMOL/L (ref 3–18)
ARTERIAL PATENCY WRIST A: POSITIVE
BACTERIA SPEC CULT: NORMAL
BASE DEFICIT BLD-SCNC: 1.9 MMOL/L
BASOPHILS # BLD: 0 K/UL (ref 0–0.1)
BASOPHILS NFR BLD: 0 % (ref 0–2)
BDY SITE: ABNORMAL
BUN SERPL-MCNC: 38 MG/DL (ref 7–18)
BUN SERPL-MCNC: 53 MG/DL (ref 7–18)
BUN/CREAT SERPL: 40 (ref 12–20)
BUN/CREAT SERPL: 46 (ref 12–20)
CA-I SERPL-SCNC: 1.1 MMOL/L (ref 1.12–1.32)
CALCIUM SERPL-MCNC: 7.3 MG/DL (ref 8.5–10.1)
CALCIUM SERPL-MCNC: 7.8 MG/DL (ref 8.5–10.1)
CHLORIDE SERPL-SCNC: 117 MMOL/L (ref 100–111)
CHLORIDE SERPL-SCNC: 120 MMOL/L (ref 100–111)
CO2 SERPL-SCNC: 24 MMOL/L (ref 21–32)
CO2 SERPL-SCNC: 24 MMOL/L (ref 21–32)
CREAT SERPL-MCNC: 0.95 MG/DL (ref 0.6–1.3)
CREAT SERPL-MCNC: 1.16 MG/DL (ref 0.6–1.3)
DIFFERENTIAL METHOD BLD: ABNORMAL
EOSINOPHIL # BLD: 0.3 K/UL (ref 0–0.4)
EOSINOPHIL NFR BLD: 3 % (ref 0–5)
ERYTHROCYTE [DISTWIDTH] IN BLOOD BY AUTOMATED COUNT: 13.8 % (ref 11.6–14.5)
GAS FLOW.O2 O2 DELIVERY SYS: ABNORMAL
GAS FLOW.O2 SETTING OXYMISER: 16 BPM
GLUCOSE BLD STRIP.AUTO-MCNC: 149 MG/DL (ref 70–110)
GLUCOSE BLD STRIP.AUTO-MCNC: 171 MG/DL (ref 70–110)
GLUCOSE BLD STRIP.AUTO-MCNC: 213 MG/DL (ref 70–110)
GLUCOSE BLD STRIP.AUTO-MCNC: 222 MG/DL (ref 70–110)
GLUCOSE BLD STRIP.AUTO-MCNC: 228 MG/DL (ref 70–110)
GLUCOSE SERPL-MCNC: 169 MG/DL (ref 74–99)
GLUCOSE SERPL-MCNC: 223 MG/DL (ref 74–99)
HCO3 BLD-SCNC: 22.5 MMOL/L (ref 22–26)
HCT VFR BLD AUTO: 37.3 % (ref 36–48)
HGB BLD-MCNC: 12.3 G/DL (ref 13–16)
IMM GRANULOCYTES # BLD AUTO: 0 K/UL
IMM GRANULOCYTES NFR BLD AUTO: 0 %
LYMPHOCYTES # BLD: 1 K/UL (ref 0.9–3.6)
LYMPHOCYTES NFR BLD: 10 % (ref 21–52)
MAGNESIUM SERPL-MCNC: 2.1 MG/DL (ref 1.6–2.6)
MAGNESIUM SERPL-MCNC: 2.1 MG/DL (ref 1.6–2.6)
MCH RBC QN AUTO: 30.1 PG (ref 24–34)
MCHC RBC AUTO-ENTMCNC: 33 G/DL (ref 31–37)
MCV RBC AUTO: 91.4 FL (ref 78–100)
MONOCYTES # BLD: 0.5 K/UL (ref 0.05–1.2)
MONOCYTES NFR BLD: 5 % (ref 3–10)
NEUTS BAND NFR BLD MANUAL: 14 % (ref 0–5)
NEUTS SEG # BLD: 8.3 K/UL (ref 1.8–8)
NEUTS SEG NFR BLD: 68 % (ref 40–73)
NRBC # BLD: 0 K/UL (ref 0–0.01)
NRBC BLD-RTO: 0 PER 100 WBC
O2/TOTAL GAS SETTING VFR VENT: 30 %
PCO2 BLD: 35.9 MMHG (ref 35–45)
PEEP RESPIRATORY: 5 CMH2O
PH BLD: 7.4 (ref 7.35–7.45)
PHOSPHATE SERPL-MCNC: 1.6 MG/DL (ref 2.5–4.9)
PHOSPHATE SERPL-MCNC: 2.2 MG/DL (ref 2.5–4.9)
PLATELET # BLD AUTO: 60 K/UL (ref 135–420)
PLATELET COMMENT: ABNORMAL
PMV BLD AUTO: 12.3 FL (ref 9.2–11.8)
PO2 BLD: 59 MMHG (ref 80–100)
POTASSIUM SERPL-SCNC: 4.1 MMOL/L (ref 3.5–5.5)
POTASSIUM SERPL-SCNC: 4.3 MMOL/L (ref 3.5–5.5)
RBC # BLD AUTO: 4.08 M/UL (ref 4.35–5.65)
RBC MORPH BLD: ABNORMAL
SAO2 % BLD: 90.3 % (ref 92–97)
SERVICE CMNT-IMP: ABNORMAL
SERVICE CMNT-IMP: NORMAL
SODIUM SERPL-SCNC: 147 MMOL/L (ref 136–145)
SODIUM SERPL-SCNC: 150 MMOL/L (ref 136–145)
SPECIMEN TYPE: ABNORMAL
VENTILATION MODE VENT: ABNORMAL
VT SETTING VENT: 450 ML
WBC # BLD AUTO: 10.1 K/UL (ref 4.6–13.2)

## 2023-11-19 PROCEDURE — 2500000003 HC RX 250 WO HCPCS: Performed by: INTERNAL MEDICINE

## 2023-11-19 PROCEDURE — C9113 INJ PANTOPRAZOLE SODIUM, VIA: HCPCS | Performed by: HOSPITALIST

## 2023-11-19 PROCEDURE — 85025 COMPLETE CBC W/AUTO DIFF WBC: CPT

## 2023-11-19 PROCEDURE — 82330 ASSAY OF CALCIUM: CPT

## 2023-11-19 PROCEDURE — 36415 COLL VENOUS BLD VENIPUNCTURE: CPT

## 2023-11-19 PROCEDURE — 71045 X-RAY EXAM CHEST 1 VIEW: CPT

## 2023-11-19 PROCEDURE — 82803 BLOOD GASES ANY COMBINATION: CPT

## 2023-11-19 PROCEDURE — 84100 ASSAY OF PHOSPHORUS: CPT

## 2023-11-19 PROCEDURE — A4216 STERILE WATER/SALINE, 10 ML: HCPCS | Performed by: HOSPITALIST

## 2023-11-19 PROCEDURE — 87040 BLOOD CULTURE FOR BACTERIA: CPT

## 2023-11-19 PROCEDURE — 94003 VENT MGMT INPAT SUBQ DAY: CPT

## 2023-11-19 PROCEDURE — 6360000002 HC RX W HCPCS: Performed by: INTERNAL MEDICINE

## 2023-11-19 PROCEDURE — 6370000000 HC RX 637 (ALT 250 FOR IP): Performed by: FAMILY MEDICINE

## 2023-11-19 PROCEDURE — 2580000003 HC RX 258: Performed by: HOSPITALIST

## 2023-11-19 PROCEDURE — 82962 GLUCOSE BLOOD TEST: CPT

## 2023-11-19 PROCEDURE — 83735 ASSAY OF MAGNESIUM: CPT

## 2023-11-19 PROCEDURE — 36600 WITHDRAWAL OF ARTERIAL BLOOD: CPT

## 2023-11-19 PROCEDURE — 2000000000 HC ICU R&B

## 2023-11-19 PROCEDURE — 6360000002 HC RX W HCPCS: Performed by: HOSPITALIST

## 2023-11-19 PROCEDURE — 80048 BASIC METABOLIC PNL TOTAL CA: CPT

## 2023-11-19 PROCEDURE — 6370000000 HC RX 637 (ALT 250 FOR IP): Performed by: INTERNAL MEDICINE

## 2023-11-19 PROCEDURE — 2580000003 HC RX 258: Performed by: INTERNAL MEDICINE

## 2023-11-19 RX ORDER — METRONIDAZOLE 250 MG/1
500 TABLET ORAL EVERY 8 HOURS SCHEDULED
Status: DISCONTINUED | OUTPATIENT
Start: 2023-11-19 | End: 2023-11-21

## 2023-11-19 RX ORDER — LORAZEPAM 2 MG/ML
1 INJECTION INTRAMUSCULAR EVERY 6 HOURS PRN
Status: DISCONTINUED | OUTPATIENT
Start: 2023-11-19 | End: 2023-11-26

## 2023-11-19 RX ADMIN — INSULIN LISPRO 2 UNITS: 100 INJECTION, SOLUTION INTRAVENOUS; SUBCUTANEOUS at 06:39

## 2023-11-19 RX ADMIN — MIDAZOLAM (PF) 1 MG/ML IN 0.9 % SODIUM CHLORIDE INTRAVENOUS SOLUTION 2 MG/HR: at 22:37

## 2023-11-19 RX ADMIN — LORAZEPAM 1 MG: 2 INJECTION INTRAMUSCULAR at 20:49

## 2023-11-19 RX ADMIN — HYDROMORPHONE HYDROCHLORIDE 1 MG: 1 INJECTION, SOLUTION INTRAMUSCULAR; INTRAVENOUS; SUBCUTANEOUS at 04:20

## 2023-11-19 RX ADMIN — CHLORHEXIDINE GLUCONATE 15 ML: 1.2 RINSE ORAL at 20:46

## 2023-11-19 RX ADMIN — METRONIDAZOLE 500 MG: 500 INJECTION, SOLUTION INTRAVENOUS at 09:02

## 2023-11-19 RX ADMIN — MIDAZOLAM (PF) 1 MG/ML IN 0.9 % SODIUM CHLORIDE INTRAVENOUS SOLUTION 3 MG/HR: at 00:49

## 2023-11-19 RX ADMIN — INSULIN LISPRO 4 UNITS: 100 INJECTION, SOLUTION INTRAVENOUS; SUBCUTANEOUS at 17:43

## 2023-11-19 RX ADMIN — HYDROMORPHONE HYDROCHLORIDE 1 MG: 1 INJECTION, SOLUTION INTRAMUSCULAR; INTRAVENOUS; SUBCUTANEOUS at 16:06

## 2023-11-19 RX ADMIN — METRONIDAZOLE 500 MG: 250 TABLET ORAL at 17:43

## 2023-11-19 RX ADMIN — CHLORHEXIDINE GLUCONATE 15 ML: 1.2 RINSE ORAL at 09:03

## 2023-11-19 RX ADMIN — SODIUM PHOSPHATE, MONOBASIC, MONOHYDRATE AND SODIUM PHOSPHATE, DIBASIC, ANHYDROUS 10 MMOL: 142; 276 INJECTION, SOLUTION INTRAVENOUS at 11:52

## 2023-11-19 RX ADMIN — CEFEPIME: 2 INJECTION, POWDER, FOR SOLUTION INTRAVENOUS at 19:29

## 2023-11-19 RX ADMIN — PANTOPRAZOLE SODIUM 40 MG: 40 INJECTION, POWDER, FOR SOLUTION INTRAVENOUS at 09:03

## 2023-11-19 ASSESSMENT — PULMONARY FUNCTION TESTS
PIF_VALUE: 10
PIF_VALUE: 14
PIF_VALUE: 12
PIF_VALUE: 14
PIF_VALUE: 13
PIF_VALUE: 15

## 2023-11-19 NOTE — PROGRESS NOTES
1945-Report and care received, assessment completed per flow sheet. Intubated, sedated, opens eyes spontaneously, doesn't follow commands.  and  updated regarding status. 0030- Reassessment completed. 0400- Reassessment completed. 0420- Grimacing, restless, swung both feet out of bed, see MAR.

## 2023-11-19 NOTE — PROGRESS NOTES
[facility]  (A Division of [facility])                                                           Date of Admission: 11/16/2023       Reason for Consult: GNR Bacteremia  Referring MD: Dr Dora Christiansen         Current Antimicrobials:    Prior Antimicrobials:  Cefepime IV 11/17 to date  Flagyl 11/17 to date     Vancomycin IV X1 dose   Allergy to antibiotics: Pencillin       Assessment--ID related:     Critically Ill patient with:    Klebsiella aerogenes low grade  bacteremia--anaerobic medium-- source- lsuspect GI/ transient. CXR clear, non obstructive renal stones- BL  with clear UA. CT head without sinus disease mention. Septic shock- off pressor    Leucocytoisis imiproving/ Bandemia up to 14% today, Thrombocytopenia down further to 60K    DKA Type 1--better    Acute encepahlopathy due to above    Acute respiratory failure due to above    GALDINO- resolved     Diagnosis     DKA, type 1, not at goal ([address]) [E10.10]-A1C11%     Tardive dyskinesia [G24.01]     Psychiatric disorder [F99]     ARF (acute renal failure) ([address]) [N17.9]     Seizure ([address]) [R56.9]-      RA (rheumatoid arthritis) ([address]) [M06.9]     Depression [C69. A]     Type 1 diabetes mellitus (HCC) [E10.9]     Kidney stone on left side [N20.0]     Hyperkalemia/ Hypernatremia    Recommendation -- ID related:     Cont Cefepime and Flagyl-- K. Aerogenes without resistent gene by [employer]/PCR. Final ID pending  FU repeat blood cultures- 11/19- in process  Thrombocytopenia still worsening- due to sepsis ( which seems to be better) Vs other etiology. ICU team managing. Lovenox on hold. CT AP with IV contrast to rule out occult abscess tomorrow- if ok with renal team    DW ICU staff      Today's Visit:      Afebrile, 97.7. off presor.  Remains intubated and unable to give history  CT head seem ok, work up by Neuro ongoing- brain MRI pending       Notes/Labs/Cultures and Imaging reports reviewed -- GALDINO better, WBC acid assay. This test is a multiplex Real-Time Reverse Transcriptase Polymerase Chain Reaction (RT-PCR) based in vitro diagnostic test intended for the qualitative detection of nucleic acids from SARS-CoV-2, Influenza A, and Influenza B in nasopharyngeal for use under the FDA's Emergency Use Authorization(EAU) only. Fact sheet for Patients: FindDrives.pl  Fact sheet for Healthcare Providers: FindDrives.pl         Blood Culture 1 [[id number]]  (Abnormal) Collected: 11/16/23 1955    Order Status: Completed Specimen: Blood Updated: 11/19/23 0623     Special Requests NO SPECIAL REQUESTS        Gram stain       ANAEROBIC BOTTLE Gram negative rods                  SMEAR CALLED TO AND CORRECTLY REPEATED BY: Price Frank RN ICU 11/17/23 AT 1213 TO [facility]           Culture       PROBABLE Klebsiella (Enterobacter) aerogenes GROWING IN 1 OF 2 BOTTLES DRAWN No Site Indicated IDENTIFICATION AND SUSCEPTIBILITY TO FOLLOW          Blood Culture 2 [[id number]] Collected: 11/16/23 1955    Order Status: Completed Specimen: Blood Updated: 11/19/23 0631     Special Requests NO SPECIAL REQUESTS        Gram stain       AEROBIC BOTTLE Gram negative rods                  SMEAR CALLED TO AND CORRECTLY REPEATED BY: Fabricio Baez RN ICU ON 11/19/23 AT 0630 TO TMB. Culture       Sent to [facility] for ID/Susceptibility if indicated.                   CULTURE IN [facility] UPDATES TO FOLLOW          Culture, Blood, PCR ID Panel [[id number]]  (Abnormal) Collected: 11/16/23 1955    Order Status: Completed Specimen: Blood Updated: 11/17/23 2326     Accession Number [id number]     Enterococcus faecalis by PCR Not detected        Enterococcus faecium by PCR Not detected        Listeria monocytogenes by PCR Not detected        STAPHYLOCOCCUS Not detected        Staphylococcus Aureus Not detected        Staphylococcus epidermidis by PCR Not detected        Staphylococcus lugdunensis by PCR

## 2023-11-19 NOTE — PROGRESS NOTES
Physician Progress Note      PATIENT:               Gideon Smith  CSN #:                  [id number]  :                       1965  ADMIT DATE:       2023 7:44 PM  [address] [facility] DATE:   [place]  PROVIDER #:        Miriam Gomes MD          QUERY TEXT:    Patient admitted with DKA. Noted to have documentation by dietician assessment   with severe malnutrition diagnosis in progress note on 23. If possible,   please document in progress notes and discharge summary if you are evaluating   and /or treating any of the following: The medical record reflects the following:  Risk Factors: liver dz, gastroparesis, DKA, DM1  Clinical Indicators: Nutrition Diagnosis:  ? Predicted inadequate energy intake related to endocrine dysfunction (DKA,   type 1) as evidenced by NPO or clear liquid status due to medical condition,   intubation  Energy Intake:  Mild decrease in energy intake (Comment)  Weight Loss:  Greater than 7.5% over 3 months  Body Fat Loss:   (moderate fat loss on orbital)  Muscle Mass Loss:  Severe muscle mass loss Temples (temporalis)    Treatment: 1. NPO as ordered  2. Recommend trickle feed with diabetic formula if advance TF  3. Continue to monitor diet plan, weight, labs, and plan of care during   admission. ASPEN Criteria:    https://aspenjournals. onlinelibrary. hernandez. com/doi/full/10.1177/248517940094613  5    Thank You  Ayden Ramirez RN,CDI,CRCR  Options provided:  -- Protein calorie malnutrition severe  -- Other - I will add my own diagnosis  -- Disagree - Not applicable / Not valid  -- Disagree - Clinically unable to determine / Unknown  -- Refer to Clinical Documentation Reviewer    PROVIDER RESPONSE TEXT:    This patient has severe malnutrition.     Query created by: Ayden Ramirez on 2023 2:13 PM      Electronically signed by:  Miriam Gomes MD 2023 6:52 PM

## 2023-11-19 NOTE — PROGRESS NOTES
Value Ref Range    POC Glucose 100 70 - 110 mg/dL   Basic Metabolic Panel    Collection Time: 11/18/23  2:34 PM   Result Value Ref Range    Sodium 154 (H) 136 - 145 mmol/L    Potassium 5.0 3.5 - 5.5 mmol/L    Chloride 125 (H) 100 - 111 mmol/L    CO2 25 21 - 32 mmol/L    Anion Gap 4 3.0 - 18 mmol/L    Glucose 111 (H) 74 - 99 mg/dL    BUN 76 (H) 7.0 - 18 MG/DL    Creatinine 1.88 (H) 0.6 - 1.3 MG/DL    Bun/Cre Ratio 40 (H) 12 - 20      Est, Glom Filt Rate 40 (L) >60 ml/min/1.73m2    Calcium 8.5 8.5 - 10.1 MG/DL   Magnesium    Collection Time: 11/18/23  2:34 PM   Result Value Ref Range    Magnesium 2.5 1.6 - 2.6 mg/dL   Phosphorus    Collection Time: 11/18/23  2:34 PM   Result Value Ref Range    Phosphorus 2.8 2.5 - 4.9 MG/DL   POCT Glucose    Collection Time: 11/18/23  3:00 PM   Result Value Ref Range    POC Glucose 95 70 - 110 mg/dL   Basic Metabolic Panel    Collection Time: 11/18/23  3:21 PM   Result Value Ref Range    Sodium 154 (H) 136 - 145 mmol/L    Potassium 4.1 3.5 - 5.5 mmol/L    Chloride 123 (H) 100 - 111 mmol/L    CO2 24 21 - 32 mmol/L    Anion Gap 7 3.0 - 18 mmol/L    Glucose 175 (H) 74 - 99 mg/dL    BUN 68 (H) 7.0 - 18 MG/DL    Creatinine 1.67 (H) 0.6 - 1.3 MG/DL    Bun/Cre Ratio 41 (H) 12 - 20      Est, Glom Filt Rate 47 (L) >60 ml/min/1.73m2    Calcium 7.9 (L) 8.5 - 10.1 MG/DL   Magnesium    Collection Time: 11/18/23  3:21 PM   Result Value Ref Range    Magnesium 2.3 1.6 - 2.6 mg/dL   Phosphorus    Collection Time: 11/18/23  3:21 PM   Result Value Ref Range    Phosphorus 2.6 2.5 - 4.9 MG/DL   POCT Glucose    Collection Time: 11/18/23  4:10 PM   Result Value Ref Range    POC Glucose 125 (H) 70 - 110 mg/dL   POCT Glucose    Collection Time: 11/18/23  5:08 PM   Result Value Ref Range    POC Glucose 162 (H) 70 - 110 mg/dL   POCT Glucose    Collection Time: 11/18/23  6:18 PM   Result Value Ref Range    POC Glucose 164 (H) 70 - 110 mg/dL   POCT Glucose    Collection Time: 11/18/23  7:36 PM   Result Value Ref parapsilosis by PCR Not detected        Candida tropicalis by PCR Not detected        Cryptococcus neoformans/gattii by PCR Not detected        Resistant gene targets          Resistant gene ctx-m by PCR Not detected        Resistant gene imp by PCR Not detected        KPC (Carbapenem resistance gene) Not detected        Colistin Resistance mcr-1 gene by PCR Not detected        Resistant gene ndm by PCR Not detected        Resistant gene oxa-48-like by pcr Not detected        Resistant gene vim by PCR Not detected        [employer] test comment       False positive results may rarely occur. Correlate with clinical,epidemiologic, and other laboratory findings           Comment: Please see BCID Interpretation Guide in EPIC Links                  Images report reviewed by me:  XR CHEST PORTABLE    Result Date: 11/18/2023  EXAM:  XR CHEST PORTABLE INDICATION: Intubated COMPARISON: 11/17/2023 TECHNIQUE: Semiupright portable chest AP view FINDINGS: Tubes and lines are stable. The cardiac silhouette is within normal limits. The pulmonary vasculature is within normal limits. The lungs and pleural spaces are clear. The visualized bones and upper abdomen are age-appropriate. No acute process on portable chest.     CT HEAD WO CONTRAST    Result Date: 11/17/2023  EXAM: CT HEAD WO CONTRAST INDICATION: Altered mental status COMPARISON: 11/16/2023. CONTRAST: None. TECHNIQUE: Unenhanced CT of the head was performed using 5 mm images. Brain and bone windows were generated. Coronal and sagittal reformats. CT dose reduction was achieved through use of a standardized protocol tailored for this examination and automatic exposure control for dose modulation. FINDINGS: The ventricles and sulci are normal in size, shape and configuration. There is no significant white matter disease. There is no intracranial hemorrhage, extra-axial collection, or mass effect. The basilar cisterns are open. No CT evidence of acute infarct.  The bone

## 2023-11-19 NOTE — PROGRESS NOTES
I have been notified that because of staffing limitation, unable to transport patient with routine MRI need today. Neurology is okay with MRI done as routine when staff available to travel with patient.  ICU in-charge and patient's RN aware    Apoorva Gracia MD

## 2023-11-19 NOTE — PROGRESS NOTES
Renal Dosing Adjustment     The following medication: Cefepime was automatically dose-adjusted per [address] [address] [facility],Renato [address] P[employer]T approved Protocols, with respect to renal function. Pt Weight:   Wt Readings from Last 1 Encounters:   11/17/23 54.4 kg (120 lb)       Previous Regimen                                            Cefepime 2 grams IV q24h (over 30 minutes)   Creatinine Clearance Estimated Creatinine Clearance: 64 mL/min (based on SCr of 0.95 mg/dL). BUN Lab Results   Component Value Date/Time    BUN 38 11/19/2023 09:02 AM         Assessment/Plan  Dose adjusted to Cefepime 2 grams IV q8h (over 240 minutes) (extended interval infusion while in ICU)  Indication: sepsis   Improvement if renal function  Preparation in D5W requested by MD  Pharmacy to continue to monitor patient daily. Will make dosage adjustments based upon changing renal function.     CASI Henriquez [facility]  Contact: [phone]

## 2023-11-20 ENCOUNTER — APPOINTMENT (OUTPATIENT)
Facility: HOSPITAL | Age: 58
DRG: 870 | End: 2023-11-20
Payer: MEDICARE

## 2023-11-20 PROBLEM — D69.6 THROMBOCYTOPENIA (HCC): Status: ACTIVE | Noted: 2023-11-20

## 2023-11-20 PROBLEM — R78.81 BACTEREMIA: Status: ACTIVE | Noted: 2023-11-20

## 2023-11-20 LAB
ANION GAP SERPL CALC-SCNC: 11 MMOL/L (ref 3–18)
APTT PPP: 28.4 SEC (ref 23–36.4)
ARTERIAL PATENCY WRIST A: POSITIVE
BACTERIA SPEC CULT: ABNORMAL
BACTERIA SPEC CULT: ABNORMAL
BASE DEFICIT BLD-SCNC: 3.5 MMOL/L
BASOPHILS # BLD: 0 K/UL (ref 0–0.1)
BASOPHILS NFR BLD: 0 % (ref 0–2)
BDY SITE: ABNORMAL
BUN SERPL-MCNC: 24 MG/DL (ref 7–18)
BUN/CREAT SERPL: 25 (ref 12–20)
CA-I SERPL-SCNC: 1.11 MMOL/L (ref 1.12–1.32)
CALCIUM SERPL-MCNC: 7.9 MG/DL (ref 8.5–10.1)
CHLORIDE SERPL-SCNC: 110 MMOL/L (ref 100–111)
CO2 SERPL-SCNC: 20 MMOL/L (ref 21–32)
CREAT SERPL-MCNC: 0.96 MG/DL (ref 0.6–1.3)
DIFFERENTIAL METHOD BLD: ABNORMAL
EKG ATRIAL RATE: 104 BPM
EKG DIAGNOSIS: NORMAL
EKG P AXIS: 87 DEGREES
EKG P-R INTERVAL: 192 MS
EKG Q-T INTERVAL: 354 MS
EKG QRS DURATION: 76 MS
EKG QTC CALCULATION (BAZETT): 465 MS
EKG R AXIS: 74 DEGREES
EKG T AXIS: 86 DEGREES
EKG VENTRICULAR RATE: 104 BPM
EOSINOPHIL # BLD: 0.4 K/UL (ref 0–0.4)
EOSINOPHIL NFR BLD: 4 % (ref 0–5)
ERYTHROCYTE [DISTWIDTH] IN BLOOD BY AUTOMATED COUNT: 12.9 % (ref 11.6–14.5)
GAS FLOW.O2 O2 DELIVERY SYS: ABNORMAL
GAS FLOW.O2 SETTING OXYMISER: 16 BPM
GLUCOSE BLD STRIP.AUTO-MCNC: 190 MG/DL (ref 70–110)
GLUCOSE BLD STRIP.AUTO-MCNC: 211 MG/DL (ref 70–110)
GLUCOSE BLD STRIP.AUTO-MCNC: 212 MG/DL (ref 70–110)
GLUCOSE BLD STRIP.AUTO-MCNC: 251 MG/DL (ref 70–110)
GLUCOSE SERPL-MCNC: 228 MG/DL (ref 74–99)
GRAM STN SPEC: ABNORMAL
HCO3 BLD-SCNC: 19.7 MMOL/L (ref 22–26)
HCT VFR BLD AUTO: 35.8 % (ref 36–48)
HGB BLD-MCNC: 11.9 G/DL (ref 13–16)
IMM GRANULOCYTES # BLD AUTO: 0 K/UL
IMM GRANULOCYTES NFR BLD AUTO: 0 %
INR PPP: 1.4 (ref 0.9–1.1)
LYMPHOCYTES # BLD: 1.3 K/UL (ref 0.9–3.6)
LYMPHOCYTES NFR BLD: 12 % (ref 21–52)
MAGNESIUM SERPL-MCNC: 1.7 MG/DL (ref 1.6–2.6)
MCH RBC QN AUTO: 29.8 PG (ref 24–34)
MCHC RBC AUTO-ENTMCNC: 33.2 G/DL (ref 31–37)
MCV RBC AUTO: 89.5 FL (ref 78–100)
MONOCYTES # BLD: 0.3 K/UL (ref 0.05–1.2)
MONOCYTES NFR BLD: 3 % (ref 3–10)
NEUTS BAND NFR BLD MANUAL: 4 % (ref 0–5)
NEUTS SEG # BLD: 8.9 K/UL (ref 1.8–8)
NEUTS SEG NFR BLD: 77 % (ref 40–73)
NRBC # BLD: 0 K/UL (ref 0–0.01)
NRBC BLD-RTO: 0 PER 100 WBC
O2/TOTAL GAS SETTING VFR VENT: 40 %
PCO2 BLD: 29.1 MMHG (ref 35–45)
PEEP RESPIRATORY: 5 CMH2O
PH BLD: 7.44 (ref 7.35–7.45)
PHOSPHATE SERPL-MCNC: 1.7 MG/DL (ref 2.5–4.9)
PHOSPHATE SERPL-MCNC: 2 MG/DL (ref 2.5–4.9)
PLATELET # BLD AUTO: 48 K/UL (ref 135–420)
PLATELET COMMENT: ABNORMAL
PMV BLD AUTO: 12 FL (ref 9.2–11.8)
PO2 BLD: 158 MMHG (ref 80–100)
POTASSIUM SERPL-SCNC: 4.5 MMOL/L (ref 3.5–5.5)
PROTHROMBIN TIME: 16.8 SEC (ref 11.9–14.7)
RBC # BLD AUTO: 4 M/UL (ref 4.35–5.65)
RBC MORPH BLD: ABNORMAL
SAO2 % BLD: 99.5 % (ref 92–97)
SERVICE CMNT-IMP: ABNORMAL
SODIUM SERPL-SCNC: 141 MMOL/L (ref 136–145)
SPECIMEN TYPE: ABNORMAL
VENTILATION MODE VENT: ABNORMAL
VT SETTING VENT: 450 ML
WBC # BLD AUTO: 10.9 K/UL (ref 4.6–13.2)

## 2023-11-20 PROCEDURE — 2500000003 HC RX 250 WO HCPCS: Performed by: HOSPITALIST

## 2023-11-20 PROCEDURE — 2500000003 HC RX 250 WO HCPCS: Performed by: INTERNAL MEDICINE

## 2023-11-20 PROCEDURE — 94003 VENT MGMT INPAT SUBQ DAY: CPT

## 2023-11-20 PROCEDURE — 70551 MRI BRAIN STEM W/O DYE: CPT

## 2023-11-20 PROCEDURE — 2580000003 HC RX 258: Performed by: INTERNAL MEDICINE

## 2023-11-20 PROCEDURE — 83735 ASSAY OF MAGNESIUM: CPT

## 2023-11-20 PROCEDURE — 82330 ASSAY OF CALCIUM: CPT

## 2023-11-20 PROCEDURE — 2000000000 HC ICU R&B

## 2023-11-20 PROCEDURE — 36415 COLL VENOUS BLD VENIPUNCTURE: CPT

## 2023-11-20 PROCEDURE — 85025 COMPLETE CBC W/AUTO DIFF WBC: CPT

## 2023-11-20 PROCEDURE — 6360000002 HC RX W HCPCS: Performed by: INTERNAL MEDICINE

## 2023-11-20 PROCEDURE — 6370000000 HC RX 637 (ALT 250 FOR IP): Performed by: INTERNAL MEDICINE

## 2023-11-20 PROCEDURE — 36600 WITHDRAWAL OF ARTERIAL BLOOD: CPT

## 2023-11-20 PROCEDURE — 85610 PROTHROMBIN TIME: CPT

## 2023-11-20 PROCEDURE — A4216 STERILE WATER/SALINE, 10 ML: HCPCS | Performed by: HOSPITALIST

## 2023-11-20 PROCEDURE — C9113 INJ PANTOPRAZOLE SODIUM, VIA: HCPCS | Performed by: HOSPITALIST

## 2023-11-20 PROCEDURE — 2580000003 HC RX 258: Performed by: HOSPITALIST

## 2023-11-20 PROCEDURE — 82962 GLUCOSE BLOOD TEST: CPT

## 2023-11-20 PROCEDURE — 84100 ASSAY OF PHOSPHORUS: CPT

## 2023-11-20 PROCEDURE — 82803 BLOOD GASES ANY COMBINATION: CPT

## 2023-11-20 PROCEDURE — 6360000002 HC RX W HCPCS: Performed by: HOSPITALIST

## 2023-11-20 PROCEDURE — 85730 THROMBOPLASTIN TIME PARTIAL: CPT

## 2023-11-20 PROCEDURE — 80048 BASIC METABOLIC PNL TOTAL CA: CPT

## 2023-11-20 PROCEDURE — 6370000000 HC RX 637 (ALT 250 FOR IP): Performed by: FAMILY MEDICINE

## 2023-11-20 PROCEDURE — 76705 ECHO EXAM OF ABDOMEN: CPT

## 2023-11-20 PROCEDURE — 71045 X-RAY EXAM CHEST 1 VIEW: CPT

## 2023-11-20 RX ORDER — LORAZEPAM 2 MG/ML
4 INJECTION INTRAMUSCULAR ONCE
Status: COMPLETED | OUTPATIENT
Start: 2023-11-20 | End: 2023-11-20

## 2023-11-20 RX ORDER — PROPOFOL 10 MG/ML
5-50 INJECTION, EMULSION INTRAVENOUS CONTINUOUS
Status: DISCONTINUED | OUTPATIENT
Start: 2023-11-20 | End: 2023-11-26

## 2023-11-20 RX ORDER — CIPROFLOXACIN 2 MG/ML
400 INJECTION, SOLUTION INTRAVENOUS EVERY 12 HOURS
Status: COMPLETED | OUTPATIENT
Start: 2023-11-20 | End: 2023-11-29

## 2023-11-20 RX ORDER — CISATRACURIUM BESYLATE 2 MG/ML
10 INJECTION, SOLUTION INTRAVENOUS PRN
Status: DISCONTINUED | OUTPATIENT
Start: 2023-11-20 | End: 2023-11-29 | Stop reason: HOSPADM

## 2023-11-20 RX ADMIN — CHLORHEXIDINE GLUCONATE 15 ML: 1.2 RINSE ORAL at 21:06

## 2023-11-20 RX ADMIN — SODIUM PHOSPHATE, MONOBASIC, MONOHYDRATE AND SODIUM PHOSPHATE, DIBASIC, ANHYDROUS 20 MMOL: 142; 276 INJECTION, SOLUTION INTRAVENOUS at 10:00

## 2023-11-20 RX ADMIN — LORAZEPAM 1 MG: 2 INJECTION INTRAMUSCULAR at 18:46

## 2023-11-20 RX ADMIN — METRONIDAZOLE 500 MG: 250 TABLET ORAL at 06:10

## 2023-11-20 RX ADMIN — INSULIN LISPRO 4 UNITS: 100 INJECTION, SOLUTION INTRAVENOUS; SUBCUTANEOUS at 11:04

## 2023-11-20 RX ADMIN — LORAZEPAM 1 MG: 2 INJECTION INTRAMUSCULAR at 11:11

## 2023-11-20 RX ADMIN — PROPOFOL 20 MCG/KG/MIN: 10 INJECTION, EMULSION INTRAVENOUS at 20:09

## 2023-11-20 RX ADMIN — SODIUM PHOSPHATE, MONOBASIC, MONOHYDRATE AND SODIUM PHOSPHATE, DIBASIC, ANHYDROUS 20 MMOL: 142; 276 INJECTION, SOLUTION INTRAVENOUS at 10:11

## 2023-11-20 RX ADMIN — METRONIDAZOLE 500 MG: 250 TABLET ORAL at 16:27

## 2023-11-20 RX ADMIN — CIPROFLOXACIN 400 MG: 400 INJECTION, SOLUTION INTRAVENOUS at 21:00

## 2023-11-20 RX ADMIN — LORAZEPAM 4 MG: 2 INJECTION INTRAMUSCULAR; INTRAVENOUS at 14:09

## 2023-11-20 RX ADMIN — CEFEPIME: 2 INJECTION, POWDER, FOR SOLUTION INTRAVENOUS at 12:45

## 2023-11-20 RX ADMIN — INSULIN GLARGINE 15 UNITS: 100 INJECTION, SOLUTION SUBCUTANEOUS at 23:48

## 2023-11-20 RX ADMIN — INSULIN LISPRO 6 UNITS: 100 INJECTION, SOLUTION INTRAVENOUS; SUBCUTANEOUS at 06:10

## 2023-11-20 RX ADMIN — SODIUM PHOSPHATE, MONOBASIC, MONOHYDRATE AND SODIUM PHOSPHATE, DIBASIC, ANHYDROUS 15 MMOL: 142; 276 INJECTION, SOLUTION INTRAVENOUS at 23:41

## 2023-11-20 RX ADMIN — INSULIN LISPRO 4 UNITS: 100 INJECTION, SOLUTION INTRAVENOUS; SUBCUTANEOUS at 16:31

## 2023-11-20 RX ADMIN — HYDROMORPHONE HYDROCHLORIDE 1 MG: 1 INJECTION, SOLUTION INTRAMUSCULAR; INTRAVENOUS; SUBCUTANEOUS at 23:52

## 2023-11-20 RX ADMIN — INSULIN LISPRO 4 UNITS: 100 INJECTION, SOLUTION INTRAVENOUS; SUBCUTANEOUS at 00:18

## 2023-11-20 RX ADMIN — INSULIN LISPRO 2 UNITS: 100 INJECTION, SOLUTION INTRAVENOUS; SUBCUTANEOUS at 23:47

## 2023-11-20 RX ADMIN — PANTOPRAZOLE SODIUM 40 MG: 40 INJECTION, POWDER, FOR SOLUTION INTRAVENOUS at 08:34

## 2023-11-20 RX ADMIN — HYDROMORPHONE HYDROCHLORIDE 1 MG: 1 INJECTION, SOLUTION INTRAMUSCULAR; INTRAVENOUS; SUBCUTANEOUS at 18:22

## 2023-11-20 RX ADMIN — HYDROMORPHONE HYDROCHLORIDE 1 MG: 1 INJECTION, SOLUTION INTRAMUSCULAR; INTRAVENOUS; SUBCUTANEOUS at 12:48

## 2023-11-20 RX ADMIN — HYDROMORPHONE HYDROCHLORIDE 1 MG: 1 INJECTION, SOLUTION INTRAMUSCULAR; INTRAVENOUS; SUBCUTANEOUS at 09:42

## 2023-11-20 RX ADMIN — CISATRACURIUM BESYLATE 10 MG: 2 INJECTION, SOLUTION INTRAVENOUS at 14:09

## 2023-11-20 RX ADMIN — METRONIDAZOLE 500 MG: 250 TABLET ORAL at 23:47

## 2023-11-20 RX ADMIN — CHLORHEXIDINE GLUCONATE 15 ML: 1.2 RINSE ORAL at 08:34

## 2023-11-20 RX ADMIN — CEFEPIME: 2 INJECTION, POWDER, FOR SOLUTION INTRAVENOUS at 03:54

## 2023-11-20 RX ADMIN — INSULIN GLARGINE 15 UNITS: 100 INJECTION, SOLUTION SUBCUTANEOUS at 00:18

## 2023-11-20 ASSESSMENT — PULMONARY FUNCTION TESTS
PIF_VALUE: 16
PIF_VALUE: 13
PIF_VALUE: 9
PIF_VALUE: 10
PIF_VALUE: 13

## 2023-11-20 NOTE — PROGRESS NOTES
1300-350 residual from OG. OG clamped and MD aware. 1430- 150 residual from OG tube. Tube feed till clamped 400 water flush given with medication. 1800- Another 200 residual OG still clamped.

## 2023-11-20 NOTE — PROGRESS NOTES
UROLOGICAL SURGERY      cysto  x4     Family History   Problem Relation Age of Onset    Pseudochol.  Deficiency Neg Hx     Delayed Awakening Neg Hx     Post-op Nausea/Vomiting Neg Hx     Emergence Delirium Neg Hx     Malig Hypertherm Neg Hx     Other Neg Hx     Post-op Cognitive Dysfunction Neg Hx      Social History     Socioeconomic History    Marital status:      Spouse name: Not on file    Number of children: Not on file    Years of education: Not on file    Highest education level: Not on file   Occupational History    Not on file   Tobacco Use    Smoking status: Former     Packs/day: 1     Types: Cigarettes     Quit date: 2012     Years since quittin.7    Smokeless tobacco: Never   Vaping Use    Vaping Use: Never used   Substance and Sexual Activity    Alcohol use: No    Drug use: No    Sexual activity: Not on file   Other Topics Concern    Not on file   Social History Narrative    Not on file     Social Determinants of Health     Financial Resource Strain: Not on file   Food Insecurity: Not on file   Transportation Needs: Not on file   Physical Activity: Not on file   Stress: Not on file   Social Connections: Not on file   Intimate Partner Violence: Not on file   Housing Stability: Not on file       Medications:  Current Facility-Administered Medications   Medication Dose Route Frequency Provider Last Rate Last Admin    ciprofloxacin (CIPRO) IVPB 400 mg  400 mg IntraVENous Q12H Marcia Briggs MD        cisatracurium Besylate injection SOLN 10 mg  10 mg IntraVENous PRN Vincent Jarrell MD   10 mg at 23 1409    sodium phosphate 15 mmol in sodium chloride 0.9 % 250 mL IVPB  15 mmol IntraVENous PRN Melina Uriarte MD        LORazepam (ATIVAN) injection 1 mg  1 mg IntraVENous Q6H PRN Melina Uriarte MD   1 mg at 23 1111    metroNIDAZOLE (FLAGYL) tablet 500 mg  500 mg Per NG tube 3 times per day Melina Uriarte MD   500 mg at 23 1627    glucose chewable atelectasis compared to prior examination. ET tube and NG tube are stable in position. No interval change. XR CHEST PORTABLE    Result Date: 11/18/2023  EXAM:  XR CHEST PORTABLE INDICATION: Intubated COMPARISON: 11/17/2023 TECHNIQUE: Semiupright portable chest AP view FINDINGS: Tubes and lines are stable. The cardiac silhouette is within normal limits. The pulmonary vasculature is within normal limits. The lungs and pleural spaces are clear. The visualized bones and upper abdomen are age-appropriate. No acute process on portable chest.     CT HEAD WO CONTRAST    Result Date: 11/17/2023  EXAM: CT HEAD WO CONTRAST INDICATION: Altered mental status COMPARISON: 11/16/2023. CONTRAST: None. TECHNIQUE: Unenhanced CT of the head was performed using 5 mm images. Brain and bone windows were generated. Coronal and sagittal reformats. CT dose reduction was achieved through use of a standardized protocol tailored for this examination and automatic exposure control for dose modulation. FINDINGS: The ventricles and sulci are normal in size, shape and configuration. There is no significant white matter disease. There is no intracranial hemorrhage, extra-axial collection, or mass effect. The basilar cisterns are open. No CT evidence of acute infarct. The bone windows demonstrate no abnormalities. The visualized portions of the paranasal sinuses and mastoid air cells are clear. No acute process or change compared to prior exam.        Alex Landon.  Jas Hinton MD  [facility])  Office #:     [phone]   Office Fax: [phone]

## 2023-11-20 NOTE — CARE COORDINATION
The SW called and left a message with the pt son Jessee Loyola to complete the initial case management assessment. The SW will continue to follow.

## 2023-11-20 NOTE — PROGRESS NOTES
1945- Report and care received, assessment completed per flow sheet. 2049- Restless, agitated, swinging legs OOB, coughing, asynchronous with ventilator, ativan administered per orders. 2237- Remains restless and agitated, squirming all over bed and putting both feet OOB. See MAR.    2300- Calm, reassessment, bath and linen change completed. 0400- Reassessment completed.

## 2023-11-20 NOTE — PROGRESS NOTES
[facility]  Pulmonary, Critical Care, and Sleep Medicine    Name: Maricarmen Saunders MRN: [id number]   : 1965 Hospital: [facility]    Date: 2023  Room: [address] [place] [address] Note                                              Consult requesting physician: Dr. Rufino Ashraf  Reason for Consult: Assisting in ICU care for acute respiratory failure     IMPRESSION:     Acute respiratory failure L77.65  Metabolic encephalopathy M04.14  DKA, type 1 not at goal vs hyperglycemic hyperosmolar syndrome E10.10  Hypotension I95.0  Hypernatremia E85.0  ARF (acute renal failure) N17.9  Dehydration E86.0  RA (rheumatoid arthritis)  Kidney stone on left side  Depression  Tardive dyskinesia  Psychiatric disorder  Seizure  Active Hospital Problems    Diagnosis Date Noted    Bacteremia [R78.81] 2023    Thrombocytopenia ([address]) [D69.6] 2023    DKA, type 1, not at goal [facility]) [E10.10] 2023    Tardive dyskinesia [G24.01] 2023    Psychiatric disorder [F99] 2023    ARF (acute renal failure) ([address]) [N17.9] 2023    Seizure ([address]) [R56.9] 2023    RA (rheumatoid arthritis) ([address]) [M06.9] 2017    Depression [F32. A] 2017    Type 1 diabetes mellitus ([address]) [E10.9] 2017    Kidney stone on left side [N20.0] 2017     Code status: Full Code      RECOMMENDATIONS:     Respiratory: Acute respiratory failure secondary to metabolic encephalopathy, intubated for airway protection  Continue ventilator support; patient poorly responsive; not stable for weaning trials or extubation. Chest x-ray done today reviewed images and report-stable ET tube and OG tube position; cardiac size normal; lungs clear; no focal infiltrates or consolidation; no pleural effusions. VCV mode of ventilation; FiO2-30%; PEEP 5. Continue ventilator support. Sedation- prn Dilaudid. Continue ventilator and sedation bundles. Mech.  Ventilated patients- aim to keep peak plateau pressure is within normal limits. The bladder is decompressed with Hennessy. Atherosclerosis with 3.9 cm x 3.7 cm in axial dimension and 7.6 cm craniocaudal  dimension infrarenal abdominal aortic aneurysm. IVC within normal limits. Impression  1. Nonobstructing bilateral nephrolithiasis. 2.  Infrarenal abdominal aortic aneurysm. Culture data during this hospitalization. Results       Procedure Component Value Units Date/Time    Culture, Blood 1 [[id number]] Collected: 11/19/23 0258    Order Status: Completed Specimen: Blood Updated: 11/20/23 0748     Special Requests --        LEFT  ARM       Culture NO GROWTH 1 DAY       Culture, Blood 2 [[id number]] Collected: 11/19/23 0258    Order Status: Completed Specimen: Blood Updated: 11/20/23 0748     Special Requests --        LEFT  HAND       Culture NO GROWTH 1 DAY       Culture, Urine [[id number]] Collected: 11/17/23 1713    Order Status: Completed Specimen: Urine voided Updated: 11/19/23 1810     Special Requests NO SPECIAL REQUESTS        Culture No growth (<1,000 CFU/ML)       COVID-19 & Influenza Combo [[id number]] Collected: 11/17/23 0446    Order Status: Completed Specimen: Nasopharyngeal Updated: 11/17/23 0547     SARS-CoV-2, PCR Not detected        Comment: Not Detected results do not preclude SARS-CoV-2 infection and should not be used as the sole basis for patient management decisions. Results must be combined with clinical observations, patient history, and epidemiological information. Rapid Influenza A By PCR Not detected        Rapid Influenza B By PCR Not detected        Comment: Testing was performed using helga Rowena SARS-CoV-2 and Influenza A/B nucleic acid assay.   This test is a multiplex Real-Time Reverse Transcriptase Polymerase Chain Reaction (RT-PCR) based in vitro diagnostic test intended for the qualitative detection of nucleic acids from SARS-CoV-2, Influenza A, and Influenza B in nasopharyngeal for use under the FDA's Emergency

## 2023-11-21 ENCOUNTER — APPOINTMENT (OUTPATIENT)
Facility: HOSPITAL | Age: 58
DRG: 870 | End: 2023-11-21
Payer: MEDICARE

## 2023-11-21 LAB
ANION GAP SERPL CALC-SCNC: 7 MMOL/L (ref 3–18)
ARTERIAL PATENCY WRIST A: POSITIVE
BASE EXCESS BLD CALC-SCNC: 2.9 MMOL/L
BASOPHILS # BLD: 0 K/UL (ref 0–0.1)
BASOPHILS NFR BLD: 0 % (ref 0–2)
BDY SITE: ABNORMAL
BUN SERPL-MCNC: 18 MG/DL (ref 7–18)
BUN/CREAT SERPL: 19 (ref 12–20)
CALCIUM SERPL-MCNC: 7.7 MG/DL (ref 8.5–10.1)
CHLORIDE SERPL-SCNC: 114 MMOL/L (ref 100–111)
CO2 SERPL-SCNC: 26 MMOL/L (ref 21–32)
CREAT SERPL-MCNC: 0.94 MG/DL (ref 0.6–1.3)
DIFFERENTIAL METHOD BLD: ABNORMAL
EOSINOPHIL # BLD: 0.3 K/UL (ref 0–0.4)
EOSINOPHIL NFR BLD: 4 % (ref 0–5)
ERYTHROCYTE [DISTWIDTH] IN BLOOD BY AUTOMATED COUNT: 12.9 % (ref 11.6–14.5)
GAS FLOW.O2 O2 DELIVERY SYS: ABNORMAL
GAS FLOW.O2 SETTING OXYMISER: 16 BPM
GLUCOSE BLD STRIP.AUTO-MCNC: 107 MG/DL (ref 70–110)
GLUCOSE BLD STRIP.AUTO-MCNC: 147 MG/DL (ref 70–110)
GLUCOSE BLD STRIP.AUTO-MCNC: 197 MG/DL (ref 70–110)
GLUCOSE BLD STRIP.AUTO-MCNC: 202 MG/DL (ref 70–110)
GLUCOSE SERPL-MCNC: 140 MG/DL (ref 74–99)
HCO3 BLD-SCNC: 27.1 MMOL/L (ref 22–26)
HCT VFR BLD AUTO: 34.1 % (ref 36–48)
HGB BLD-MCNC: 11.5 G/DL (ref 13–16)
IMM GRANULOCYTES # BLD AUTO: 0 K/UL (ref 0–0.04)
IMM GRANULOCYTES NFR BLD AUTO: 1 % (ref 0–0.5)
LYMPHOCYTES # BLD: 1.1 K/UL (ref 0.9–3.6)
LYMPHOCYTES NFR BLD: 15 % (ref 21–52)
MAGNESIUM SERPL-MCNC: 2.2 MG/DL (ref 1.6–2.6)
MCH RBC QN AUTO: 30.4 PG (ref 24–34)
MCHC RBC AUTO-ENTMCNC: 33.7 G/DL (ref 31–37)
MCV RBC AUTO: 90.2 FL (ref 78–100)
MONOCYTES # BLD: 0.5 K/UL (ref 0.05–1.2)
MONOCYTES NFR BLD: 7 % (ref 3–10)
NEUTS SEG # BLD: 5.5 K/UL (ref 1.8–8)
NEUTS SEG NFR BLD: 73 % (ref 40–73)
NRBC # BLD: 0 K/UL (ref 0–0.01)
NRBC BLD-RTO: 0 PER 100 WBC
O2/TOTAL GAS SETTING VFR VENT: 30 %
PCO2 BLD: 38.9 MMHG (ref 35–45)
PEEP RESPIRATORY: 5 CMH2O
PH BLD: 7.45 (ref 7.35–7.45)
PHOSPHATE SERPL-MCNC: 3.1 MG/DL (ref 2.5–4.9)
PLATELET # BLD AUTO: 39 K/UL (ref 135–420)
PMV BLD AUTO: 12.2 FL (ref 9.2–11.8)
PO2 BLD: 121 MMHG (ref 80–100)
POTASSIUM SERPL-SCNC: 3.8 MMOL/L (ref 3.5–5.5)
RBC # BLD AUTO: 3.78 M/UL (ref 4.35–5.65)
SAO2 % BLD: 98.9 % (ref 92–97)
SERVICE CMNT-IMP: ABNORMAL
SODIUM SERPL-SCNC: 147 MMOL/L (ref 136–145)
SPECIMEN TYPE: ABNORMAL
VENTILATION MODE VENT: ABNORMAL
VT SETTING VENT: 400 ML
WBC # BLD AUTO: 7.5 K/UL (ref 4.6–13.2)

## 2023-11-21 PROCEDURE — 82803 BLOOD GASES ANY COMBINATION: CPT

## 2023-11-21 PROCEDURE — 6360000002 HC RX W HCPCS: Performed by: FAMILY MEDICINE

## 2023-11-21 PROCEDURE — 80048 BASIC METABOLIC PNL TOTAL CA: CPT

## 2023-11-21 PROCEDURE — 84100 ASSAY OF PHOSPHORUS: CPT

## 2023-11-21 PROCEDURE — A4216 STERILE WATER/SALINE, 10 ML: HCPCS | Performed by: HOSPITALIST

## 2023-11-21 PROCEDURE — 36415 COLL VENOUS BLD VENIPUNCTURE: CPT

## 2023-11-21 PROCEDURE — 6370000000 HC RX 637 (ALT 250 FOR IP): Performed by: INTERNAL MEDICINE

## 2023-11-21 PROCEDURE — 2580000003 HC RX 258: Performed by: HOSPITALIST

## 2023-11-21 PROCEDURE — 6360000002 HC RX W HCPCS: Performed by: INTERNAL MEDICINE

## 2023-11-21 PROCEDURE — 83735 ASSAY OF MAGNESIUM: CPT

## 2023-11-21 PROCEDURE — 2700000000 HC OXYGEN THERAPY PER DAY

## 2023-11-21 PROCEDURE — 51702 INSERT TEMP BLADDER CATH: CPT

## 2023-11-21 PROCEDURE — 85025 COMPLETE CBC W/AUTO DIFF WBC: CPT

## 2023-11-21 PROCEDURE — C9113 INJ PANTOPRAZOLE SODIUM, VIA: HCPCS | Performed by: HOSPITALIST

## 2023-11-21 PROCEDURE — 2000000000 HC ICU R&B

## 2023-11-21 PROCEDURE — 6360000002 HC RX W HCPCS: Performed by: HOSPITALIST

## 2023-11-21 PROCEDURE — 82962 GLUCOSE BLOOD TEST: CPT

## 2023-11-21 PROCEDURE — 6370000000 HC RX 637 (ALT 250 FOR IP): Performed by: FAMILY MEDICINE

## 2023-11-21 PROCEDURE — 94003 VENT MGMT INPAT SUBQ DAY: CPT

## 2023-11-21 PROCEDURE — 74176 CT ABD & PELVIS W/O CONTRAST: CPT

## 2023-11-21 PROCEDURE — 71045 X-RAY EXAM CHEST 1 VIEW: CPT

## 2023-11-21 PROCEDURE — 36600 WITHDRAWAL OF ARTERIAL BLOOD: CPT

## 2023-11-21 RX ORDER — ASPIRIN 81 MG/1
81 TABLET, CHEWABLE ORAL DAILY
Status: DISCONTINUED | OUTPATIENT
Start: 2023-11-21 | End: 2023-11-29 | Stop reason: HOSPADM

## 2023-11-21 RX ORDER — MIDAZOLAM HYDROCHLORIDE 2 MG/2ML
2 INJECTION, SOLUTION INTRAMUSCULAR; INTRAVENOUS
Status: DISCONTINUED | OUTPATIENT
Start: 2023-11-21 | End: 2023-11-26

## 2023-11-21 RX ORDER — FENTANYL CITRATE-0.9 % NACL/PF 10 MCG/ML
25-200 PLASTIC BAG, INJECTION (ML) INTRAVENOUS CONTINUOUS
Status: DISCONTINUED | OUTPATIENT
Start: 2023-11-21 | End: 2023-11-22

## 2023-11-21 RX ORDER — VENLAFAXINE 37.5 MG/1
37.5 TABLET ORAL
Status: DISCONTINUED | OUTPATIENT
Start: 2023-11-21 | End: 2023-11-29 | Stop reason: HOSPADM

## 2023-11-21 RX ORDER — ACETAMINOPHEN 325 MG/1
650 TABLET ORAL EVERY 6 HOURS PRN
Status: DISCONTINUED | OUTPATIENT
Start: 2023-11-21 | End: 2023-11-29 | Stop reason: HOSPADM

## 2023-11-21 RX ORDER — METOCLOPRAMIDE HYDROCHLORIDE 5 MG/ML
5 INJECTION INTRAMUSCULAR; INTRAVENOUS EVERY 8 HOURS
Status: DISCONTINUED | OUTPATIENT
Start: 2023-11-21 | End: 2023-11-29

## 2023-11-21 RX ADMIN — PANTOPRAZOLE SODIUM 40 MG: 40 INJECTION, POWDER, FOR SOLUTION INTRAVENOUS at 08:53

## 2023-11-21 RX ADMIN — LORAZEPAM 1 MG: 2 INJECTION INTRAMUSCULAR at 17:21

## 2023-11-21 RX ADMIN — CHLORHEXIDINE GLUCONATE 15 ML: 1.2 RINSE ORAL at 08:53

## 2023-11-21 RX ADMIN — HYDROMORPHONE HYDROCHLORIDE 0.5 MG: 1 INJECTION, SOLUTION INTRAMUSCULAR; INTRAVENOUS; SUBCUTANEOUS at 20:08

## 2023-11-21 RX ADMIN — INSULIN LISPRO 4 UNITS: 100 INJECTION, SOLUTION INTRAVENOUS; SUBCUTANEOUS at 17:13

## 2023-11-21 RX ADMIN — MIDAZOLAM HYDROCHLORIDE 2 MG: 1 INJECTION, SOLUTION INTRAMUSCULAR; INTRAVENOUS at 22:56

## 2023-11-21 RX ADMIN — INSULIN GLARGINE 15 UNITS: 100 INJECTION, SOLUTION SUBCUTANEOUS at 22:58

## 2023-11-21 RX ADMIN — CIPROFLOXACIN 400 MG: 400 INJECTION, SOLUTION INTRAVENOUS at 06:36

## 2023-11-21 RX ADMIN — CIPROFLOXACIN 400 MG: 400 INJECTION, SOLUTION INTRAVENOUS at 18:01

## 2023-11-21 RX ADMIN — PROPOFOL 15 MCG/KG/MIN: 10 INJECTION, EMULSION INTRAVENOUS at 09:29

## 2023-11-21 RX ADMIN — CHLORHEXIDINE GLUCONATE 15 ML: 1.2 RINSE ORAL at 20:09

## 2023-11-21 RX ADMIN — HYDROMORPHONE HYDROCHLORIDE 1 MG: 1 INJECTION, SOLUTION INTRAMUSCULAR; INTRAVENOUS; SUBCUTANEOUS at 03:28

## 2023-11-21 RX ADMIN — INSULIN LISPRO 2 UNITS: 100 INJECTION, SOLUTION INTRAVENOUS; SUBCUTANEOUS at 22:57

## 2023-11-21 RX ADMIN — METOCLOPRAMIDE 5 MG: 5 INJECTION, SOLUTION INTRAMUSCULAR; INTRAVENOUS at 22:56

## 2023-11-21 RX ADMIN — METOCLOPRAMIDE 5 MG: 5 INJECTION, SOLUTION INTRAMUSCULAR; INTRAVENOUS at 15:05

## 2023-11-21 RX ADMIN — METRONIDAZOLE 500 MG: 250 TABLET ORAL at 06:30

## 2023-11-21 ASSESSMENT — PULMONARY FUNCTION TESTS
PIF_VALUE: 15
PIF_VALUE: 11
PIF_VALUE: 13
PIF_VALUE: 15
PIF_VALUE: 10
PIF_VALUE: 13

## 2023-11-21 NOTE — PROGRESS NOTES
Nutrition Follow-up Assessment       Nutrition Recommendations/Plan:   Diet as ordered - hold today, resume trickle as tolerated   Encourage water flushes for trending up Na+   Obtain new wt when feasible  Continue to monitor diet plan, weight, labs, and plan of care during admission. Patient Active Problem List   Diagnosis    DDD (degenerative disc disease), cervical    Hyperglycemia    RA (rheumatoid arthritis) (HCC)    Kidney stone on left side    Type 1 diabetes mellitus (HCC)    Depression    DKA, type 1, not at goal [facility])    Tardive dyskinesia    Psychiatric disorder    ARF (acute renal failure) ([facility])    Seizure ([address])    Bacteremia    Thrombocytopenia ([facility])       Nutrition Assessment:  59 y.o. male is on ICU D# 4 for Altered Mental Status  Current on ventilation (Tmax 37.1, VE 10.7)  Current on NPO with TF formula (hold today for CT scan), high GRV (>200ml) reported. Last BM (including prior to admit):  (PTA)  Edema: None    Discussed care during interdisciplinary rounds - hold TF for today, water flushes continue for elevated Na+    Nutrition Related Findings:   Pertinent meds: lantus, humalog (held), propofol @ 15 mcg/kg/min.  Pertinent Labs: glu 140, Na+ 147 (trending up)   Wound Type: Open Wounds (on sacrum)    Malnutrition Assessment:  Malnutrition Status: Severe malnutrition  (Refer to Nutrition Note on 11/17 for full assessment)    Estimated Daily Nutrient Needs:  Energy (kcal):  1420 ([id number]) Weight Used for Energy Requirements: Current     Protein (g):   (1.5-2 g/kg) Weight Used for Protein Requirements: Ideal        Fluid (ml/day):  or per MD Method Used for Fluid Requirements: 1 ml/kcal    Current Nutrition Therapies:    Diet NPO  ADULT TUBE FEEDING; Orogastric; Diabetic; Continuous; 10; Yes; 10; Q 24 hours; 30; 200; Q 4 hours    Anthropometric Measures:  Height: 175.3 cm (5' 9\")  Current Body Wt: 54.4 kg (119 lb 14.9 oz)   BMI: 17.7    Nutrition Diagnosis:   Predicted inadequate

## 2023-11-21 NOTE — CARE COORDINATION
The SW called and left a message with the wife in regards to the case management initial assessment. The SW will continue to follow.

## 2023-11-21 NOTE — CARE COORDINATION
Case Management Assessment  Initial Evaluation    Date/Time of Evaluation: 11/21/2023 12:55 PM  Assessment Completed by: Marissa Rodas LCSW    If patient is discharged prior to next notation, then this note serves as note for discharge by case management. Patient Name: Alyssa Zuñiga                   YOB: 1965  Diagnosis: DKA, type 1, not at goal [facility]) [E10.10]                   Date / Time: 11/16/2023  7:44 PM    Patient Admission Status: Inpatient   Readmission Risk (Low < 19, Mod (19-27), High > 27): Readmission Risk Score: 13.5    Current PCP: DEREK Jiang NP  PCP verified by CM? (P)  (The son stated the PCP is Dr. Irma Ayala)    Chart Reviewed: Yes      History Provided by: (P)  (The history was provided by the pt son Sregey Hill.)  Patient Orientation: (P)  (The pt is on the vent.)    Patient Cognition: (P)  (Vent)    Hospitalization in the last 30 days (Readmission):  No    If yes, Readmission Assessment in CM Navigator will be completed. Advance Directives:      Code Status: Full Code   Patient's Primary Decision Maker is:        Discharge Planning:    Patient lives with: (P)  (Wife) Type of Home:    Primary Care Giver: (P) Self  Patient Support Systems include: (P)  (The pt son and his wife.)   Current Financial resources: (P)  (The son stated the pt is retired.)  Current community resources:    Current services prior to admission: (P) None            Current DME:              Type of Home Care services:       ADLS  Prior functional level: (P) Independent in ADLs/IADLs  Current functional level: (P)  (The pt is on the vent)    PT AM-PAC:   /24  OT AM-PAC:   /24    Family can provide assistance at DC: Would you like Case Management to discuss the discharge plan with any other family members/significant others, and if so, who?     Plans to Return to Present Housing: (P)  (TBD)  Other Identified Issues/Barriers to RETURNING to current housing: TBD  Potential Assistance

## 2023-11-21 NOTE — PROGRESS NOTES
(Susceptibility) Collected: 11/16/23 1955    Order Status: Completed Specimen: Blood Updated: 11/20/23 0704     Special Requests NO SPECIAL REQUESTS        Gram stain       ANAEROBIC BOTTLE Gram negative rods                  SMEAR CALLED TO AND CORRECTLY REPEATED BY: Salas Bell RN ICU 11/17/23 AT 1213 TO MCL           Culture       Klebsiella (Enterobacter) aerogenes GROWING IN 1 OF 2 BOTTLES DRAWN No Site Indicated          Susceptibility        Klebsiella aerogenes      BACTERIAL SUSCEPTIBILITY PANEL FIOR      amikacin <=2 ug/mL Sensitive      ceFAZolin >=64 ug/mL Resistant      cefepime <=1 ug/mL Sensitive      cefOXitin >=64 ug/mL Resistant      cefTAZidime <=1 ug/mL Sensitive      cefTRIAXone <=1 ug/mL Sensitive      ciprofloxacin <=0.25 ug/mL Sensitive      gentamicin <=1 ug/mL Sensitive      levofloxacin <=0.12 ug/mL Sensitive      tobramycin <=1 ug/mL Sensitive      trimethoprim-sulfamethoxazole <=20 ug/mL Sensitive                           Blood Culture 2 [[id number]]  (Abnormal) Collected: 11/16/23 1955    Order Status: Completed Specimen: Blood Updated: 11/20/23 1449     Special Requests NO SPECIAL REQUESTS        Gram stain       AEROBIC BOTTLE Gram negative rods                  SMEAR CALLED TO AND CORRECTLY REPEATED BY: Angelo Person RN ICU ON 11/19/23 AT 0630 TO TMB.            Culture       Klebsiella (Enterobacter) aerogenes GROWING IN 1 OF 2 BOTTLES DRAWN No Site Indicated REFER TO [id number] FOR SENSITIVITIES          Culture, Blood, PCR ID Panel [[id number]]  (Abnormal) Collected: 11/16/23 1955    Order Status: Completed Specimen: Blood Updated: 11/17/23 2326     Accession Number [id number]     Enterococcus faecalis by PCR Not detected        Enterococcus faecium by PCR Not detected        Listeria monocytogenes by PCR Not detected        STAPHYLOCOCCUS Not detected        Staphylococcus Aureus Not detected        Staphylococcus epidermidis by PCR Not detected        Staphylococcus lugdunensis by PCR tonsillar herniation. Expected arterial  flow-voids are present. There is a small amount of fluid in the mastoid air cells, greater on the left. The orbital contents are within normal limits. No significant osseous or scalp  lesions are identified. IMPRESSION:  Small acute lacunar infarct in the gray matter of the left parietal lobe. Please note: Voice-recognition software may have been used to generate this report, which may have resulted in some phonetic-based errors in grammar and contents. Even though attempts were made to correct all the mistakes, some may have been missed, and remained in the body of the document.       Daniel Sanchez MD  11/21/2023

## 2023-11-21 NOTE — PROGRESS NOTES
monocytogenes by PCR Not detected        STAPHYLOCOCCUS Not detected        Staphylococcus Aureus Not detected        Staphylococcus epidermidis by PCR Not detected        Staphylococcus lugdunensis by PCR Not detected        STREPTOCOCCUS Not detected        Streptococcus agalactiae (Group B) Not detected        Strep pneumoniae Not detected        Strep pyogenes,(Grp. A) Not detected        Acinetobacter calcoac baumannii complex by PCR Not detected        Bacteroides fragilis by PCR Not detected        Enterobacteriaceae by PCR Detected        Enterobacter cloacae complex by PCR Not detected        Escherichia Coli Not detected        Klebsiella aerogenes by PCR Detected        Klebsiella oxytoca by PCR Not detected        Klebsiella pneumoniae group by PCR Not detected        Proteus by PCR Not detected        Salmonella species by PCR Not detected        Serratia marcescens by PCR Not detected        Haemophilus Influenzae by PCR Not detected        Neisseria meningitidis by PCR Not detected        Pseudomonas aeruginosa Not detected        Stenotrophomonas maltophilia by PCR Not detected        Candida albicans by PCR Not detected        Candida auris by PCR Not detected        Candida glabrata Not detected        Candida krusei by PCR Not detected        Candida parapsilosis by PCR Not detected        Candida tropicalis by PCR Not detected        Cryptococcus neoformans/gattii by PCR Not detected        Resistant gene targets          Resistant gene ctx-m by PCR Not detected        Resistant gene imp by PCR Not detected        KPC (Carbapenem resistance gene) Not detected        Colistin Resistance mcr-1 gene by PCR Not detected        Resistant gene ndm by PCR Not detected        Resistant gene oxa-48-like by pcr Not detected        Resistant gene vim by PCR Not detected        [employer] test comment       False positive results may rarely occur.  Correlate with clinical,epidemiologic, and other laboratory chronic small vessel ischemic changes. XR CHEST PORTABLE    Result Date: 11/19/2023  Indication: Intubated Comparison to 11/18/2023. Portable exam obtained at 6:15 demonstrates little change in the bilateral lower lobe atelectasis compared to prior examination. ET tube and NG tube are stable in position. No interval change. XR CHEST PORTABLE    Result Date: 11/18/2023  EXAM:  XR CHEST PORTABLE INDICATION: Intubated COMPARISON: 11/17/2023 TECHNIQUE: Semiupright portable chest AP view FINDINGS: Tubes and lines are stable. The cardiac silhouette is within normal limits. The pulmonary vasculature is within normal limits. The lungs and pleural spaces are clear. The visualized bones and upper abdomen are age-appropriate. No acute process on portable chest.     CT HEAD WO CONTRAST    Result Date: 11/17/2023  EXAM: CT HEAD WO CONTRAST INDICATION: Altered mental status COMPARISON: 11/16/2023. CONTRAST: None. TECHNIQUE: Unenhanced CT of the head was performed using 5 mm images. Brain and bone windows were generated. Coronal and sagittal reformats. CT dose reduction was achieved through use of a standardized protocol tailored for this examination and automatic exposure control for dose modulation. FINDINGS: The ventricles and sulci are normal in size, shape and configuration. There is no significant white matter disease. There is no intracranial hemorrhage, extra-axial collection, or mass effect. The basilar cisterns are open. No CT evidence of acute infarct. The bone windows demonstrate no abnormalities. The visualized portions of the paranasal sinuses and mastoid air cells are clear. No acute process or change compared to prior exam.        Skipper Quale.  Ángel Gunter MD  [facility])  Office #:     [phone]  [phone]   Office Fax: [phone]

## 2023-11-21 NOTE — PROGRESS NOTES
Physician Progress Note      PATIENT:               Zohreh Hubbard  CSN #:                  [id number]  :                       1965  ADMIT DATE:       2023 7:44 PM  [address] [facility] DATE:  Carlos Mccarthy  PROVIDER #:        Simona Jackson MD          QUERY TEXT:    Pt admitted with DKA. Noted documentation of septic shock and GNR bacteremia   with source- likely GI/ transient on  by ordered ID consultant. If   possible, please document in progress notes and discharge summary:        The medical record reflects the following:  Risk Factors:Hypotension . Possible GI source  Clinical Indicators: WBC 22.1, with left shift, , RR 29-34, Hypotension   , Positive blood culture  / ID/GNR bacteremia--anaerobic medium-- source- likely GI/   transient. Septic shock/ Bandemia 9%- on pressor  Brought into ED with AMS, and found to be in DKA, septic shock with GALDINO and   admitted to ICU.    /Hosp/H&P/ levophed support hypotension    Treatment: Levophed, Currently on Vanco and cefepime, add Flagyl for anerobic   coverage      Thank You  Keerthi Ag RN,CDI,CRCR  Options provided:  -- Septic shock  confirmed present on admission  -- Septic shock ruled out  -- Other - I will add my own diagnosis  -- Disagree - Not applicable / Not valid  -- Disagree - Clinically unable to determine / Unknown  -- Refer to Clinical Documentation Reviewer    PROVIDER RESPONSE TEXT:    The diagnosis of Septic Shock was confirmed as present on admission.     Query created by: Keerthi Ag on 2023 9:35 AM      Electronically signed by:  Simona Jackson MD 2023 6:52 AM

## 2023-11-21 NOTE — PROGRESS NOTES
pleural spaces are clear. The visualized bones and upper abdomen are age-appropriate. Endotracheal tube and enteric tube in expected positions. No acute findings. CT HEAD WO CONTRAST    Result Date: 11/16/2023  Indication:  ams Comparison: None Findings: 5 mm axial images were obtained from the skull base through the vertex. CT dose reduction was achieved through the use of a standardized protocol tailored for this examination and automatic exposure control for dose modulation. The ventricles and cortical sulci are prominent, compatible with age related volume loss. There is no evidence of intracranial hemorrhage, mass, mass effect, or acute infarct. There is periventricular white matter disease. No extra-axial fluid collections are seen. The visualized paranasal sinuses and mastoid air cells are clear. The orbital structures are unremarkable. No osseous abnormalities are seen. 1. No evidence of acute infarct or intracranial hemorrhage. 2. Mild periventricular white matter disease is likely secondary to chronic small vessel ischemic changes. XR CHEST PORTABLE    Result Date: 11/16/2023  EXAM:  XR CHEST PORTABLE INDICATION: Chest pain COMPARISON: March 2020 TECHNIQUE: portable chest AP view FINDINGS: The cardiac silhouette is within normal limits. The pulmonary vasculature is within normal limits. The lungs and pleural spaces are clear. Cervical spine fusion hardware is partially visualized. No acute process on portable chest.       ASSESSMENT/IMPRESSION:   59 y.o. male with PMHx significant for diabetes, tardive dyskinesia, emetophobia, rheumatoid arthritis, hypertension, hyperlipidemia, pancreatitis who came via EMS to THE [facility] ER for DKA and unresponsiveness. Metabolic encephalopathy: Improved today. It is related to DKA, ARF, adverse effect from Versed, sepsis and hypernatremia. Head CT is unremarkable. Provoked seizure from DKA.   Acute small lacunar infarct in left parietal lobe RECOMMENDATIONS:  Aspirin 81 mg daily. Continue treat the DKA, sepsis, hypernatremia per respective team      Neurology standby. Please do not hesitate to return with any questions. Signed:   Tereso Wharton MD  11/21/2023  1:14 PM

## 2023-11-21 NOTE — PROGRESS NOTES
Patient kicking his feet over the bed, pulling at restraints, Restless, Bucking the vent. Tricia GIBBS, orders to begin propofol drip.

## 2023-11-21 NOTE — CONSULTS
Phone: [phone]  Paging : [phone]     Hematology/Oncology Consult Note    Patient: Kaylin Mccall MRN: [id number]  CSN: [id number]    YOB: 1965  Age: 59 y.o. Sex: male    DOA: 11/16/2023 LOS:  LOS: 4 days            REASON FOR CONSULTATION:     Thrombocytopenia    ASSESSMENT:     Hem/Onc Problems: Thrombocytopenia, likely due to sepsis, other etiology include meds. Reason for Admission: Mental status change - Metabolic encephalopathy  Other Active Problems:   Bacteremia  Respiratory failure, on vent  Hypotension/sepsis, on pressors  Stroke acute small lacunar infarct in left parietal lobe  Provoked seizure from DKA  DKA  ARF  Hypernatremia  Tardive dyskinesia. Coronary disease. Severe dehydration. Active Problems:        RECOMMENDATIONS:   Check fibrinogen, FDP to rule out DIC  LDH haptoglobin  It doesn't appear that patient had received heparin so will not check HIT  Continue abx for bacteremia/sepsis  Follow up counts  He is on ASA for acute stroke by neurology; monitor for bleeding given severe thrombocytopenia  We will follow up      HPI:     Kaylin Mccall is a 59 y.o., White (non-), male, who I have been asked to see for thrombocytopenia. History obtained by ramona review. He has PMH DMT1, RA, CAD, renal stones history, pancreatitis history,  was brought in to ED with AMS, and found to be in DKA, septic shock with GALDINO and admitted to ICU. He was subsequently found to have hypernatremia, bacteremia, stroke. He has been intubated, on pressors, and abx. He was noted to have trending down of platelet from normal range to 30's. No bleeding. No heparin exposure. We are consulted for further work up and management.     Past Medical History:   Diagnosis Date    Anxiety     Arthritis     PCP Libby Bolden    BPH (benign prostatic hyperplasia)     CAD (coronary artery disease) 2021    sees Dr Collie Kocher cardiology    Chronic pain     COPD (chronic obstructive pulmonary hemorrhage, extra-axial collection, or mass effect. The basilar cisterns are open. No CT evidence of acute infarct. The bone windows demonstrate no abnormalities. The visualized portions of the paranasal sinuses and mastoid air cells are clear. No acute process or change compared to prior exam.     Echo (TTE) complete (PRN contrast/bubble/strain/3D)    Result Date: 11/17/2023    Left Ventricle: Normal left ventricular systolic function with a visually estimated EF of 55 - 60%. Left ventricle size is normal. Mildly increased wall thickness. Normal wall motion. Indeterminate diastolic function. Tricuspid Valve: Unable to assess RVSP due to inadequate or insignificant tricuspid regurgitation. US RETROPERITONEAL COMPLETE    Result Date: 11/17/2023  EXAM: US RETROPERITONEAL COMPLETE INDICATION: ARF COMPARISON: CT abdomen pelvis 7/3/2023 TECHNIQUE: Ultrasound of the kidneys and urinary bladder. FINDINGS: The right and left kidneys measure 10.1 and 9.9 cm, respectively. No hydronephrosis. Several nonobstructing bilateral renal calculi measuring up to 9 mm in the right upper pole and 10 mm in the left lower pole. No renal mass. The cortical thickness and echogenicity are preserved. The corticomedullary differentiation is within normal limits. The bladder is decompressed with Hennessy. Atherosclerosis with 3.9 cm x 3.7 cm in axial dimension and 7.6 cm craniocaudal dimension infrarenal abdominal aortic aneurysm. IVC within normal limits. 1.  Nonobstructing bilateral nephrolithiasis. 2.  Infrarenal abdominal aortic aneurysm. XR CHEST PORTABLE    Result Date: 11/17/2023  EXAM:  XR CHEST PORTABLE INDICATION: ETT and NGT. COMPARISON: Chest x-ray 11/16/2023. TECHNIQUE: Single portable chest AP view FINDINGS: Cardiac monitoring leads are noted. Endotracheal tube is in expected position with the tip approximately 3 cm above the melvin.  Enteric tube traverses expected course to below the diaphragm into the left upper

## 2023-11-22 ENCOUNTER — APPOINTMENT (OUTPATIENT)
Facility: HOSPITAL | Age: 58
DRG: 870 | End: 2023-11-22
Payer: MEDICARE

## 2023-11-22 LAB
ANION GAP SERPL CALC-SCNC: 10 MMOL/L (ref 3–18)
BASOPHILS # BLD: 0 K/UL (ref 0–0.1)
BASOPHILS NFR BLD: 0 % (ref 0–2)
BUN SERPL-MCNC: 14 MG/DL (ref 7–18)
BUN/CREAT SERPL: 13 (ref 12–20)
CALCIUM SERPL-MCNC: 8 MG/DL (ref 8.5–10.1)
CHLORIDE SERPL-SCNC: 110 MMOL/L (ref 100–111)
CO2 SERPL-SCNC: 24 MMOL/L (ref 21–32)
CREAT SERPL-MCNC: 1.05 MG/DL (ref 0.6–1.3)
DIFFERENTIAL METHOD BLD: ABNORMAL
EOSINOPHIL # BLD: 0.4 K/UL (ref 0–0.4)
EOSINOPHIL NFR BLD: 4 % (ref 0–5)
ERYTHROCYTE [DISTWIDTH] IN BLOOD BY AUTOMATED COUNT: 12.6 % (ref 11.6–14.5)
GLUCOSE BLD STRIP.AUTO-MCNC: 144 MG/DL (ref 70–110)
GLUCOSE BLD STRIP.AUTO-MCNC: 154 MG/DL (ref 70–110)
GLUCOSE BLD STRIP.AUTO-MCNC: 190 MG/DL (ref 70–110)
GLUCOSE BLD STRIP.AUTO-MCNC: 236 MG/DL (ref 70–110)
GLUCOSE SERPL-MCNC: 164 MG/DL (ref 74–99)
HCT VFR BLD AUTO: 36.9 % (ref 36–48)
HGB BLD-MCNC: 12.4 G/DL (ref 13–16)
IMM GRANULOCYTES # BLD AUTO: 0.1 K/UL (ref 0–0.04)
IMM GRANULOCYTES NFR BLD AUTO: 1 % (ref 0–0.5)
LDH SERPL L TO P-CCNC: 352 U/L (ref 81–234)
LYMPHOCYTES # BLD: 1.1 K/UL (ref 0.9–3.6)
LYMPHOCYTES NFR BLD: 11 % (ref 21–52)
MAGNESIUM SERPL-MCNC: 1.8 MG/DL (ref 1.6–2.6)
MCH RBC QN AUTO: 30.2 PG (ref 24–34)
MCHC RBC AUTO-ENTMCNC: 33.6 G/DL (ref 31–37)
MCV RBC AUTO: 89.8 FL (ref 78–100)
MONOCYTES # BLD: 1.4 K/UL (ref 0.05–1.2)
MONOCYTES NFR BLD: 15 % (ref 3–10)
NEUTS SEG # BLD: 6.6 K/UL (ref 1.8–8)
NEUTS SEG NFR BLD: 69 % (ref 40–73)
NRBC # BLD: 0 K/UL (ref 0–0.01)
NRBC BLD-RTO: 0 PER 100 WBC
PHOSPHATE SERPL-MCNC: 1.8 MG/DL (ref 2.5–4.9)
PLATELET # BLD AUTO: 82 K/UL (ref 135–420)
PMV BLD AUTO: 12.5 FL (ref 9.2–11.8)
POTASSIUM SERPL-SCNC: 4.2 MMOL/L (ref 3.5–5.5)
RBC # BLD AUTO: 4.11 M/UL (ref 4.35–5.65)
SODIUM SERPL-SCNC: 144 MMOL/L (ref 136–145)
WBC # BLD AUTO: 9.6 K/UL (ref 4.6–13.2)

## 2023-11-22 PROCEDURE — 2700000000 HC OXYGEN THERAPY PER DAY

## 2023-11-22 PROCEDURE — 6360000002 HC RX W HCPCS: Performed by: INTERNAL MEDICINE

## 2023-11-22 PROCEDURE — 6370000000 HC RX 637 (ALT 250 FOR IP): Performed by: INTERNAL MEDICINE

## 2023-11-22 PROCEDURE — 83735 ASSAY OF MAGNESIUM: CPT

## 2023-11-22 PROCEDURE — 2580000003 HC RX 258: Performed by: HOSPITALIST

## 2023-11-22 PROCEDURE — 83615 LACTATE (LD) (LDH) ENZYME: CPT

## 2023-11-22 PROCEDURE — 6370000000 HC RX 637 (ALT 250 FOR IP): Performed by: FAMILY MEDICINE

## 2023-11-22 PROCEDURE — C9113 INJ PANTOPRAZOLE SODIUM, VIA: HCPCS | Performed by: HOSPITALIST

## 2023-11-22 PROCEDURE — 6370000000 HC RX 637 (ALT 250 FOR IP): Performed by: PSYCHIATRY & NEUROLOGY

## 2023-11-22 PROCEDURE — 6360000002 HC RX W HCPCS: Performed by: HOSPITALIST

## 2023-11-22 PROCEDURE — 80048 BASIC METABOLIC PNL TOTAL CA: CPT

## 2023-11-22 PROCEDURE — 84100 ASSAY OF PHOSPHORUS: CPT

## 2023-11-22 PROCEDURE — 94003 VENT MGMT INPAT SUBQ DAY: CPT

## 2023-11-22 PROCEDURE — A4216 STERILE WATER/SALINE, 10 ML: HCPCS | Performed by: HOSPITALIST

## 2023-11-22 PROCEDURE — 82962 GLUCOSE BLOOD TEST: CPT

## 2023-11-22 PROCEDURE — 2000000000 HC ICU R&B

## 2023-11-22 PROCEDURE — 85025 COMPLETE CBC W/AUTO DIFF WBC: CPT

## 2023-11-22 PROCEDURE — 71045 X-RAY EXAM CHEST 1 VIEW: CPT

## 2023-11-22 RX ADMIN — PROPOFOL 35 MCG/KG/MIN: 10 INJECTION, EMULSION INTRAVENOUS at 14:53

## 2023-11-22 RX ADMIN — CIPROFLOXACIN 400 MG: 400 INJECTION, SOLUTION INTRAVENOUS at 06:31

## 2023-11-22 RX ADMIN — METOCLOPRAMIDE 5 MG: 5 INJECTION, SOLUTION INTRAMUSCULAR; INTRAVENOUS at 13:51

## 2023-11-22 RX ADMIN — METOCLOPRAMIDE 5 MG: 5 INJECTION, SOLUTION INTRAMUSCULAR; INTRAVENOUS at 06:29

## 2023-11-22 RX ADMIN — INSULIN LISPRO 2 UNITS: 100 INJECTION, SOLUTION INTRAVENOUS; SUBCUTANEOUS at 07:23

## 2023-11-22 RX ADMIN — VENLAFAXINE 37.5 MG: 37.5 TABLET ORAL at 00:26

## 2023-11-22 RX ADMIN — CIPROFLOXACIN 400 MG: 400 INJECTION, SOLUTION INTRAVENOUS at 18:04

## 2023-11-22 RX ADMIN — INSULIN LISPRO 4 UNITS: 100 INJECTION, SOLUTION INTRAVENOUS; SUBCUTANEOUS at 23:49

## 2023-11-22 RX ADMIN — ASPIRIN 81 MG: 81 TABLET, CHEWABLE ORAL at 08:12

## 2023-11-22 RX ADMIN — PROPOFOL 35 MCG/KG/MIN: 10 INJECTION, EMULSION INTRAVENOUS at 00:32

## 2023-11-22 RX ADMIN — PROPOFOL 40 MCG/KG/MIN: 10 INJECTION, EMULSION INTRAVENOUS at 23:10

## 2023-11-22 RX ADMIN — CHLORHEXIDINE GLUCONATE 15 ML: 1.2 RINSE ORAL at 08:12

## 2023-11-22 RX ADMIN — METOCLOPRAMIDE 5 MG: 5 INJECTION, SOLUTION INTRAMUSCULAR; INTRAVENOUS at 22:23

## 2023-11-22 RX ADMIN — VENLAFAXINE 37.5 MG: 37.5 TABLET ORAL at 08:13

## 2023-11-22 RX ADMIN — VENLAFAXINE 37.5 MG: 37.5 TABLET ORAL at 12:05

## 2023-11-22 RX ADMIN — CHLORHEXIDINE GLUCONATE 15 ML: 1.2 RINSE ORAL at 20:06

## 2023-11-22 RX ADMIN — PANTOPRAZOLE SODIUM 40 MG: 40 INJECTION, POWDER, FOR SOLUTION INTRAVENOUS at 08:12

## 2023-11-22 RX ADMIN — LORAZEPAM 1 MG: 2 INJECTION INTRAMUSCULAR at 06:47

## 2023-11-22 RX ADMIN — INSULIN GLARGINE 15 UNITS: 100 INJECTION, SOLUTION SUBCUTANEOUS at 20:53

## 2023-11-22 RX ADMIN — VENLAFAXINE 37.5 MG: 37.5 TABLET ORAL at 17:06

## 2023-11-22 RX ADMIN — PROPOFOL 35 MCG/KG/MIN: 10 INJECTION, EMULSION INTRAVENOUS at 07:22

## 2023-11-22 ASSESSMENT — PULMONARY FUNCTION TESTS
PIF_VALUE: 10
PIF_VALUE: 9
PIF_VALUE: 9
PIF_VALUE: 14
PIF_VALUE: 13

## 2023-11-22 ASSESSMENT — PAIN SCALES - GENERAL
PAINLEVEL_OUTOF10: 0
PAINLEVEL_OUTOF10: 0

## 2023-11-22 NOTE — PROGRESS NOTES
24   BUN 24* 18 14   ANIONGAP 11 7 10   CREATININE 0.96 0.94 1.05   GLUCOSE 228* 140* 164*   CALCIUM 7.9* 7.7* 8.0*          Ionized Calcium:   No results found for: \"IONCA\"    No results found for: \"IONCA\"  No results for input(s): \"IONCA\" in the last 72 hours. Magnesium:   Recent Labs     11/20/23  0557 11/21/23  0427 11/22/23  0619   MG 1.7 2.2 1.8          Phosphorus:   Recent Labs     11/20/23  1900 11/21/23  0427 11/22/23  0619   PHOS 2.0* 3.1 1.8*         Amylase, Lipase:   Lipase   Date/Time Value Ref Range Status   11/17/2023 04:09 AM 15 13 - 75 U/L Final     Comment:     PLEASE NOTE NEW REFERENCE RANGE     No results for input(s): \"AMYLASE\", \"LIPASE\" in the last 72 hours. Lipase:   Lipase   Date/Time Value Ref Range Status   11/17/2023 04:09 AM 15 13 - 75 U/L Final     Comment:     PLEASE NOTE NEW REFERENCE RANGE     No results for input(s): \"LIPASE\" in the last 72 hours. Ammonis:   Lab Results   Component Value Date/Time    AMMONIA 21 11/17/2023 04:09 AM      No results for input(s): \"AMMONIA\" in the last 72 hours. Lactic Acid POC Lactic Acid   Date Value Ref Range Status   11/18/2023 0.59 0.40 - 2.00 mmol/L Final   11/17/2023 1.37 0.40 - 2.00 mmol/L Final   11/17/2023 1.50 0.40 - 2.00 mmol/L Final       No results for input(s): \"LACACIDPL\" in the last 72 hours. Cardiac Enzymes Troponin, High Sensitivity   Date/Time Value Ref Range Status   11/16/2023 07:55 PM 60 0 - 78 ng/L Final     Comment:     A HS troponin value change of (+ or -) 50% or more below the 99th percentile, in a 1/2/3 hr interval represents a significant change. Clinical correlation is recommended. A HS troponin value change of (+ or -) 20% or above the 99th percentile, in a 1/2/3 hr interval represents a significant change. Clinical correlation is recommended.   99th Percentile:    Women:  0-54 ng/L                                                               Men:  0-78 ng/L         No results for input(s): ug/mL Sensitive      cefTRIAXone <=1 ug/mL Sensitive      ciprofloxacin <=0.25 ug/mL Sensitive      gentamicin <=1 ug/mL Sensitive      levofloxacin <=0.12 ug/mL Sensitive      tobramycin <=1 ug/mL Sensitive      trimethoprim-sulfamethoxazole <=20 ug/mL Sensitive                           Blood Culture 2 [[id number]]  (Abnormal) Collected: 11/16/23 1955    Order Status: Completed Specimen: Blood Updated: 11/20/23 1449     Special Requests NO SPECIAL REQUESTS        Gram stain       AEROBIC BOTTLE Gram negative rods                  SMEAR CALLED TO AND CORRECTLY REPEATED BY: Radha Vera RN ICU ON 11/19/23 AT 0630 TO TMB.            Culture       Klebsiella (Enterobacter) aerogenes GROWING IN 1 OF 2 BOTTLES DRAWN No Site Indicated REFER TO [id number] FOR SENSITIVITIES          Culture, Blood, PCR ID Panel [[id number]]  (Abnormal) Collected: 11/16/23 1955    Order Status: Completed Specimen: Blood Updated: 11/17/23 2326     Accession Number [id number]     Enterococcus faecalis by PCR Not detected        Enterococcus faecium by PCR Not detected        Listeria monocytogenes by PCR Not detected        STAPHYLOCOCCUS Not detected        Staphylococcus Aureus Not detected        Staphylococcus epidermidis by PCR Not detected        Staphylococcus lugdunensis by PCR Not detected        STREPTOCOCCUS Not detected        Streptococcus agalactiae (Group B) Not detected        Strep pneumoniae Not detected        Strep pyogenes,(Grp. A) Not detected        Acinetobacter calcoac baumannii complex by PCR Not detected        Bacteroides fragilis by PCR Not detected        Enterobacteriaceae by PCR Detected        Enterobacter cloacae complex by PCR Not detected        Escherichia Coli Not detected        Klebsiella aerogenes by PCR Detected        Klebsiella oxytoca by PCR Not detected        Klebsiella pneumoniae group by PCR Not detected        Proteus by PCR Not detected        Salmonella species by PCR Not detected        Serratia

## 2023-11-23 ENCOUNTER — APPOINTMENT (OUTPATIENT)
Facility: HOSPITAL | Age: 58
DRG: 870 | End: 2023-11-23
Payer: MEDICARE

## 2023-11-23 LAB
ALBUMIN SERPL-MCNC: 2.9 G/DL (ref 3.4–5)
ALBUMIN/GLOB SERPL: 0.9 (ref 0.8–1.7)
ALP SERPL-CCNC: 143 U/L (ref 45–117)
ALT SERPL-CCNC: 47 U/L (ref 16–61)
ANION GAP SERPL CALC-SCNC: 14 MMOL/L (ref 3–18)
AST SERPL-CCNC: 51 U/L (ref 10–38)
BASOPHILS # BLD: 0 K/UL (ref 0–0.1)
BASOPHILS NFR BLD: 0 % (ref 0–2)
BILIRUB SERPL-MCNC: 0.6 MG/DL (ref 0.2–1)
BUN SERPL-MCNC: 14 MG/DL (ref 7–18)
BUN/CREAT SERPL: 12 (ref 12–20)
CALCIUM SERPL-MCNC: 8.5 MG/DL (ref 8.5–10.1)
CHLORIDE SERPL-SCNC: 107 MMOL/L (ref 100–111)
CO2 SERPL-SCNC: 18 MMOL/L (ref 21–32)
CREAT SERPL-MCNC: 1.15 MG/DL (ref 0.6–1.3)
DIFFERENTIAL METHOD BLD: ABNORMAL
EOSINOPHIL # BLD: 0.4 K/UL (ref 0–0.4)
EOSINOPHIL NFR BLD: 5 % (ref 0–5)
ERYTHROCYTE [DISTWIDTH] IN BLOOD BY AUTOMATED COUNT: 12.6 % (ref 11.6–14.5)
GLOBULIN SER CALC-MCNC: 3.1 G/DL (ref 2–4)
GLUCOSE BLD STRIP.AUTO-MCNC: 179 MG/DL (ref 70–110)
GLUCOSE BLD STRIP.AUTO-MCNC: 205 MG/DL (ref 70–110)
GLUCOSE BLD STRIP.AUTO-MCNC: 257 MG/DL (ref 70–110)
GLUCOSE BLD STRIP.AUTO-MCNC: 277 MG/DL (ref 70–110)
GLUCOSE SERPL-MCNC: 186 MG/DL (ref 74–99)
HCT VFR BLD AUTO: 35.1 % (ref 36–48)
HGB BLD-MCNC: 11.6 G/DL (ref 13–16)
IMM GRANULOCYTES # BLD AUTO: 0 K/UL
IMM GRANULOCYTES NFR BLD AUTO: 0 %
LYMPHOCYTES # BLD: 1.2 K/UL (ref 0.9–3.6)
LYMPHOCYTES NFR BLD: 14 % (ref 21–52)
MCH RBC QN AUTO: 30.2 PG (ref 24–34)
MCHC RBC AUTO-ENTMCNC: 33 G/DL (ref 31–37)
MCV RBC AUTO: 91.4 FL (ref 78–100)
MONOCYTES # BLD: 1.5 K/UL (ref 0.05–1.2)
MONOCYTES NFR BLD: 17 % (ref 3–10)
NEUTS SEG # BLD: 5.5 K/UL (ref 1.8–8)
NEUTS SEG NFR BLD: 64 % (ref 40–73)
NRBC # BLD: 0 K/UL (ref 0–0.01)
NRBC BLD-RTO: 0 PER 100 WBC
PLATELET # BLD AUTO: 148 K/UL (ref 135–420)
PLATELET COMMENT: ABNORMAL
PMV BLD AUTO: 11.5 FL (ref 9.2–11.8)
POTASSIUM SERPL-SCNC: 4.5 MMOL/L (ref 3.5–5.5)
PROT SERPL-MCNC: 6 G/DL (ref 6.4–8.2)
RBC # BLD AUTO: 3.84 M/UL (ref 4.35–5.65)
RBC MORPH BLD: ABNORMAL
SODIUM SERPL-SCNC: 139 MMOL/L (ref 136–145)
WBC # BLD AUTO: 8.6 K/UL (ref 4.6–13.2)

## 2023-11-23 PROCEDURE — A4216 STERILE WATER/SALINE, 10 ML: HCPCS | Performed by: HOSPITALIST

## 2023-11-23 PROCEDURE — 85025 COMPLETE CBC W/AUTO DIFF WBC: CPT

## 2023-11-23 PROCEDURE — 6360000002 HC RX W HCPCS: Performed by: INTERNAL MEDICINE

## 2023-11-23 PROCEDURE — 2580000003 HC RX 258: Performed by: HOSPITALIST

## 2023-11-23 PROCEDURE — 6370000000 HC RX 637 (ALT 250 FOR IP): Performed by: PSYCHIATRY & NEUROLOGY

## 2023-11-23 PROCEDURE — 82962 GLUCOSE BLOOD TEST: CPT

## 2023-11-23 PROCEDURE — 2000000000 HC ICU R&B

## 2023-11-23 PROCEDURE — 71045 X-RAY EXAM CHEST 1 VIEW: CPT

## 2023-11-23 PROCEDURE — 36415 COLL VENOUS BLD VENIPUNCTURE: CPT

## 2023-11-23 PROCEDURE — 80053 COMPREHEN METABOLIC PANEL: CPT

## 2023-11-23 PROCEDURE — 6370000000 HC RX 637 (ALT 250 FOR IP): Performed by: FAMILY MEDICINE

## 2023-11-23 PROCEDURE — 6370000000 HC RX 637 (ALT 250 FOR IP): Performed by: INTERNAL MEDICINE

## 2023-11-23 PROCEDURE — 94003 VENT MGMT INPAT SUBQ DAY: CPT

## 2023-11-23 PROCEDURE — C9113 INJ PANTOPRAZOLE SODIUM, VIA: HCPCS | Performed by: HOSPITALIST

## 2023-11-23 PROCEDURE — 6360000002 HC RX W HCPCS: Performed by: FAMILY MEDICINE

## 2023-11-23 PROCEDURE — 6360000002 HC RX W HCPCS: Performed by: HOSPITALIST

## 2023-11-23 RX ORDER — ENOXAPARIN SODIUM 100 MG/ML
30 INJECTION SUBCUTANEOUS EVERY 24 HOURS
Status: DISCONTINUED | OUTPATIENT
Start: 2023-11-23 | End: 2023-11-29 | Stop reason: HOSPADM

## 2023-11-23 RX ADMIN — CHLORHEXIDINE GLUCONATE 15 ML: 1.2 RINSE ORAL at 07:29

## 2023-11-23 RX ADMIN — METOCLOPRAMIDE 5 MG: 5 INJECTION, SOLUTION INTRAMUSCULAR; INTRAVENOUS at 14:27

## 2023-11-23 RX ADMIN — MIDAZOLAM HYDROCHLORIDE 2 MG: 1 INJECTION, SOLUTION INTRAMUSCULAR; INTRAVENOUS at 14:30

## 2023-11-23 RX ADMIN — METOPROLOL TARTRATE 12.5 MG: 25 TABLET, FILM COATED ORAL at 20:31

## 2023-11-23 RX ADMIN — PROPOFOL 10 MCG/KG/MIN: 10 INJECTION, EMULSION INTRAVENOUS at 20:58

## 2023-11-23 RX ADMIN — INSULIN GLARGINE 15 UNITS: 100 INJECTION, SOLUTION SUBCUTANEOUS at 21:30

## 2023-11-23 RX ADMIN — ENOXAPARIN SODIUM 30 MG: 100 INJECTION SUBCUTANEOUS at 14:28

## 2023-11-23 RX ADMIN — VENLAFAXINE 37.5 MG: 37.5 TABLET ORAL at 07:31

## 2023-11-23 RX ADMIN — CHLORHEXIDINE GLUCONATE 15 ML: 1.2 RINSE ORAL at 20:31

## 2023-11-23 RX ADMIN — ASPIRIN 81 MG: 81 TABLET, CHEWABLE ORAL at 07:28

## 2023-11-23 RX ADMIN — VENLAFAXINE 37.5 MG: 37.5 TABLET ORAL at 12:22

## 2023-11-23 RX ADMIN — CIPROFLOXACIN 400 MG: 400 INJECTION, SOLUTION INTRAVENOUS at 07:07

## 2023-11-23 RX ADMIN — VENLAFAXINE 37.5 MG: 37.5 TABLET ORAL at 18:10

## 2023-11-23 RX ADMIN — METOCLOPRAMIDE 5 MG: 5 INJECTION, SOLUTION INTRAMUSCULAR; INTRAVENOUS at 07:00

## 2023-11-23 RX ADMIN — CIPROFLOXACIN 400 MG: 400 INJECTION, SOLUTION INTRAVENOUS at 18:10

## 2023-11-23 RX ADMIN — METOCLOPRAMIDE 5 MG: 5 INJECTION, SOLUTION INTRAMUSCULAR; INTRAVENOUS at 21:49

## 2023-11-23 RX ADMIN — METOPROLOL TARTRATE 12.5 MG: 25 TABLET, FILM COATED ORAL at 14:28

## 2023-11-23 RX ADMIN — PROPOFOL 35 MCG/KG/MIN: 10 INJECTION, EMULSION INTRAVENOUS at 07:29

## 2023-11-23 RX ADMIN — PANTOPRAZOLE SODIUM 40 MG: 40 INJECTION, POWDER, FOR SOLUTION INTRAVENOUS at 07:29

## 2023-11-23 RX ADMIN — INSULIN LISPRO 6 UNITS: 100 INJECTION, SOLUTION INTRAVENOUS; SUBCUTANEOUS at 12:21

## 2023-11-23 RX ADMIN — INSULIN LISPRO 4 UNITS: 100 INJECTION, SOLUTION INTRAVENOUS; SUBCUTANEOUS at 18:18

## 2023-11-23 ASSESSMENT — PAIN SCALES - GENERAL: PAINLEVEL_OUTOF10: 0

## 2023-11-23 ASSESSMENT — PULMONARY FUNCTION TESTS
PIF_VALUE: 12
PIF_VALUE: 16
PIF_VALUE: 10
PIF_VALUE: 10
PIF_VALUE: 11
PIF_VALUE: 12

## 2023-11-23 NOTE — PROGRESS NOTES
[facility]  Pulmonary, Critical Care, and Sleep Medicine    Name: Reynaldo Lipscomb MRN: [id number]   : 1965 Hospital: [facility]    Date: 2023  Room: 109/[address] [place] [address] Note                                              Consult requesting physician: Dr. Flores November  Reason for Consult: Assisting in ICU care for acute respiratory failure     IMPRESSION:     Acute respiratory failure U08.99  Metabolic encephalopathy O98.82  DKA, type 1 not at goal vs hyperglycemic hyperosmolar syndrome E10.10  Hypotension I95.0  Hypernatremia E85.0  ARF (acute renal failure) N17.9  Dehydration E86.0  RA (rheumatoid arthritis)  Kidney stone on left side  Depression  Tardive dyskinesia  Psychiatric disorder  Seizure  Active Hospital Problems    Diagnosis Date Noted    Bacteremia [R78.81] 2023    Thrombocytopenia ([address]) [D69.6] 2023    DKA, type 1, not at goal [facility]) [E10.10] 2023    Tardive dyskinesia [G24.01] 2023    Psychiatric disorder [F99] 2023    ARF (acute renal failure) ([address]) [N17.9] 2023    Seizure ([address]) [R56.9] 2023    RA (rheumatoid arthritis) ([address]) [M06.9] 2017    Depression [F32. A] 2017    Type 1 diabetes mellitus ([address]) [E10.9] 2017    Kidney stone on left side [N20.0] 2017     Code status: Full Code      RECOMMENDATIONS:     Respiratory: Acute respiratory failure secondary to metabolic encephalopathy, intubated for airway protection  VCV mode of ventilation; FiO2 30%; PEEP 5. Chest x-ray done today reviewed images-stable ET tube position; OG tube needs advancement 10 cm-discussed with RN; lungs remain clear; no pleural effusions. Sedation-propofol-sedation interruption; switched to CPAP pressure support of 10-patient seems to be tolerating well. Weaning trials as tolerated. Continue ventilator support; continue ventilator and sedation bundles. Mech.  Ventilated patients- aim to keep peak plateau pressure less The orbital structures are unremarkable. No osseous abnormalities are seen. 1. No evidence of acute infarct or intracranial hemorrhage. 2. Mild periventricular white matter disease is likely secondary to chronic small vessel ischemic changes. US ABD LIMITED  Result Date: 11/20/2023  INDICATION: Abnormal liver function tests. Sepsis. COMPARISON:  CT 7/3/2023   TECHNIQUE:  Limited ultrasound of the abdomen. In particular only the right  upper quadrant was evaluated. FINDINGS: The liver is increased in echogenicity. No evidence of a focal liver  lesion. The portal vein is patent with flow towards the liver. The diameter of  the portal vein measures 1.2 cm. The velocity measures 24.0 cm/sec. The gallbladder is surgically absent. There are no gallstones, gallbladder wall  thickening or pericholecystic fluid. There is no intrahepatic biliary  dilatation. The common bile duct measures 7.5 mm. The visualized pancreas is within normal limits. The right kidney measures 10.2  cm in length. Nonobstructive nephrolithiasis is again seen in the right kidney. No hydronephrosis. IMPRESSION:  1. Increased liver echogenicity likely related to hepatic steatosis. 2. Status post cholecystectomy. Mild dilatation of the common bile duct is  likely related to postsurgical change. 3. Nonobstructive nephrolithiasis in the right kidney. CT ABDOMEN AND PELVIS   Result Date: 11/21/2023  FINDINGS:   LOWER THORAX: Dependent atelectasis  LIVER/GALLBLADDER: No mass. CBD is not dilated. SPLEEN/PANCREAS:  within normal limits. ADRENALS/KIDNEYS: Unremarkable. Bilateral nephrolithiasis. STOMACH: NG tube in place. SMALL BOWEL/COLON: Fecal stasis. PERITONEUM: No ascites or pneumoperitoneum.   RETROPERITONEUM: Aneurysmal dilatation of the abdominal aorta is mild to 43 x 37 mm in size, not significantly changed  APPENDIX: Unremarkable  BLADDER/REPRODUCTIVE ORGANS: Bladder wall thickening and air in the urinary bladder,

## 2023-11-23 NOTE — PLAN OF CARE
Bedside report given to RN Zonia Casas. SBAR< MAR< ED summary and overnight updates on the systems. She was given a chance to ask questions. We verified propofol at 40 and Cipro antibiotic infusing. Pt was just bathed and all linen changed. 1920 bedside turnover given to this RN, included SBAR< MAR< Ed summary, seizure precautions, fall risk, blood sugar checks. Given a chance to ask questions at bedside, wife and son present. Bed is in the lowest position with the wheels locked. 60 Y/O male full code  11/16/23 arrived altered mental status non- responsive, blood glucose was 1185.  DKA,    Diagnosis: Hypotension, abnormal EKG, DKA, metabolic acidosis, leukocytosis, dehydration, acute renal failure, seizure  Blood culture + Klebsiella aerogenes  Abdominal aneurism on CT scan11/21/23  MRI shows small stroke    Allergies: morphine, fentanyl, penicillins    History: rheumatoid arthritis, diabetes type 1, HTN, hyperlipidemia, CAD, tardive dyskinesia, emetophobia, depression and takes three psych medications, diverticulosis, chronic pancreatitis on Creon, peripheral vertigo  Has previously had high blood glucose and had a seizure from it    Neuro no command following, restraints, propofol  Cardiac   Respiratory 11/17/23 Intubated 8.0 ETT and 24 at the lip   GI prophylaxis Protonix tube feeds on hold d/t high feed residuals   ruggiero  Skin skin tears R Knee, L ankle, scattered scars and ecchymosis  Lines L midline  R midline        Problem: Discharge Planning  Goal: Discharge to home or other facility with appropriate resources  Outcome: Progressing  Flowsheets (Taken 11/22/2023 2000)  Discharge to home or other facility with appropriate resources: Identify barriers to discharge with patient and caregiver     Problem: Safety - Medical Restraint  Goal: Remains free of injury from restraints (Restraint for Interference with Medical Device)  Outcome: Progressing  Flowsheets  Taken 11/23/2023 0200 by Phani Santos

## 2023-11-23 NOTE — PROGRESS NOTES
Pt remains orally intubated with size 8.0 mm et tube secured with tube ortega @ 24 cm flores at the lip; breath sounds were decreased throughout with pt suctioned for small amount clear, white, thin secretions; all alarms on & functioning with ambu bag @ hob

## 2023-11-24 ENCOUNTER — APPOINTMENT (OUTPATIENT)
Facility: HOSPITAL | Age: 58
DRG: 870 | End: 2023-11-24
Payer: MEDICARE

## 2023-11-24 PROBLEM — G93.40 ENCEPHALOPATHY: Status: ACTIVE | Noted: 2023-11-24

## 2023-11-24 LAB
ALBUMIN SERPL-MCNC: 2.9 G/DL (ref 3.4–5)
ALBUMIN/GLOB SERPL: 1 (ref 0.8–1.7)
ALP SERPL-CCNC: 167 U/L (ref 45–117)
ALT SERPL-CCNC: 57 U/L (ref 16–61)
ANION GAP SERPL CALC-SCNC: 12 MMOL/L (ref 3–18)
AST SERPL-CCNC: 89 U/L (ref 10–38)
BASOPHILS # BLD: 0 K/UL (ref 0–0.1)
BASOPHILS NFR BLD: 0 % (ref 0–2)
BILIRUB SERPL-MCNC: 0.6 MG/DL (ref 0.2–1)
BUN SERPL-MCNC: 15 MG/DL (ref 7–18)
BUN/CREAT SERPL: 14 (ref 12–20)
CA-I SERPL-SCNC: 1.11 MMOL/L (ref 1.12–1.32)
CALCIUM SERPL-MCNC: 8.4 MG/DL (ref 8.5–10.1)
CHLORIDE SERPL-SCNC: 106 MMOL/L (ref 100–111)
CO2 SERPL-SCNC: 21 MMOL/L (ref 21–32)
CREAT SERPL-MCNC: 1.04 MG/DL (ref 0.6–1.3)
DIFFERENTIAL METHOD BLD: ABNORMAL
EOSINOPHIL # BLD: 0.6 K/UL (ref 0–0.4)
EOSINOPHIL NFR BLD: 6 % (ref 0–5)
ERYTHROCYTE [DISTWIDTH] IN BLOOD BY AUTOMATED COUNT: 12.4 % (ref 11.6–14.5)
GLOBULIN SER CALC-MCNC: 2.9 G/DL (ref 2–4)
GLUCOSE BLD STRIP.AUTO-MCNC: 173 MG/DL (ref 70–110)
GLUCOSE BLD STRIP.AUTO-MCNC: 189 MG/DL (ref 70–110)
GLUCOSE BLD STRIP.AUTO-MCNC: 194 MG/DL (ref 70–110)
GLUCOSE BLD STRIP.AUTO-MCNC: 247 MG/DL (ref 70–110)
GLUCOSE SERPL-MCNC: 173 MG/DL (ref 74–99)
HCT VFR BLD AUTO: 34.4 % (ref 36–48)
HGB BLD-MCNC: 11.8 G/DL (ref 13–16)
IMM GRANULOCYTES # BLD AUTO: 0 K/UL
IMM GRANULOCYTES NFR BLD AUTO: 0 %
LYMPHOCYTES # BLD: 1.4 K/UL (ref 0.9–3.6)
LYMPHOCYTES NFR BLD: 15 % (ref 21–52)
MAGNESIUM SERPL-MCNC: 1.9 MG/DL (ref 1.6–2.6)
MCH RBC QN AUTO: 30.3 PG (ref 24–34)
MCHC RBC AUTO-ENTMCNC: 34.3 G/DL (ref 31–37)
MCV RBC AUTO: 88.2 FL (ref 78–100)
MONOCYTES # BLD: 1.8 K/UL (ref 0.05–1.2)
MONOCYTES NFR BLD: 19 % (ref 3–10)
NEUTS BAND NFR BLD MANUAL: 1 % (ref 0–5)
NEUTS SEG # BLD: 5.5 K/UL (ref 1.8–8)
NEUTS SEG NFR BLD: 59 % (ref 40–73)
NRBC # BLD: 0 K/UL (ref 0–0.01)
NRBC BLD-RTO: 0 PER 100 WBC
PHOSPHATE SERPL-MCNC: 1.4 MG/DL (ref 2.5–4.9)
PLATELET # BLD AUTO: 204 K/UL (ref 135–420)
PLATELET COMMENT: ABNORMAL
PMV BLD AUTO: 11 FL (ref 9.2–11.8)
POTASSIUM SERPL-SCNC: 3.8 MMOL/L (ref 3.5–5.5)
PROT SERPL-MCNC: 5.8 G/DL (ref 6.4–8.2)
RBC # BLD AUTO: 3.9 M/UL (ref 4.35–5.65)
RBC MORPH BLD: ABNORMAL
SODIUM SERPL-SCNC: 139 MMOL/L (ref 136–145)
WBC # BLD AUTO: 9.3 K/UL (ref 4.6–13.2)

## 2023-11-24 PROCEDURE — 2580000003 HC RX 258: Performed by: INTERNAL MEDICINE

## 2023-11-24 PROCEDURE — 83735 ASSAY OF MAGNESIUM: CPT

## 2023-11-24 PROCEDURE — 6360000002 HC RX W HCPCS: Performed by: INTERNAL MEDICINE

## 2023-11-24 PROCEDURE — 6360000002 HC RX W HCPCS: Performed by: HOSPITALIST

## 2023-11-24 PROCEDURE — 6370000000 HC RX 637 (ALT 250 FOR IP): Performed by: INTERNAL MEDICINE

## 2023-11-24 PROCEDURE — 82962 GLUCOSE BLOOD TEST: CPT

## 2023-11-24 PROCEDURE — 84100 ASSAY OF PHOSPHORUS: CPT

## 2023-11-24 PROCEDURE — 2580000003 HC RX 258: Performed by: HOSPITALIST

## 2023-11-24 PROCEDURE — 82330 ASSAY OF CALCIUM: CPT

## 2023-11-24 PROCEDURE — 2500000003 HC RX 250 WO HCPCS: Performed by: INTERNAL MEDICINE

## 2023-11-24 PROCEDURE — 2700000000 HC OXYGEN THERAPY PER DAY

## 2023-11-24 PROCEDURE — 2000000000 HC ICU R&B

## 2023-11-24 PROCEDURE — 85025 COMPLETE CBC W/AUTO DIFF WBC: CPT

## 2023-11-24 PROCEDURE — A4216 STERILE WATER/SALINE, 10 ML: HCPCS | Performed by: HOSPITALIST

## 2023-11-24 PROCEDURE — C9113 INJ PANTOPRAZOLE SODIUM, VIA: HCPCS | Performed by: HOSPITALIST

## 2023-11-24 PROCEDURE — 6370000000 HC RX 637 (ALT 250 FOR IP): Performed by: PSYCHIATRY & NEUROLOGY

## 2023-11-24 PROCEDURE — 80053 COMPREHEN METABOLIC PANEL: CPT

## 2023-11-24 PROCEDURE — 6370000000 HC RX 637 (ALT 250 FOR IP): Performed by: FAMILY MEDICINE

## 2023-11-24 PROCEDURE — 71045 X-RAY EXAM CHEST 1 VIEW: CPT

## 2023-11-24 PROCEDURE — 6360000002 HC RX W HCPCS: Performed by: FAMILY MEDICINE

## 2023-11-24 PROCEDURE — 94003 VENT MGMT INPAT SUBQ DAY: CPT

## 2023-11-24 RX ORDER — CALCIUM GLUCONATE 20 MG/ML
2000 INJECTION, SOLUTION INTRAVENOUS ONCE
Status: COMPLETED | OUTPATIENT
Start: 2023-11-24 | End: 2023-11-24

## 2023-11-24 RX ADMIN — INSULIN LISPRO 2 UNITS: 100 INJECTION, SOLUTION INTRAVENOUS; SUBCUTANEOUS at 18:48

## 2023-11-24 RX ADMIN — ENOXAPARIN SODIUM 30 MG: 100 INJECTION SUBCUTANEOUS at 13:49

## 2023-11-24 RX ADMIN — CHLORHEXIDINE GLUCONATE 15 ML: 1.2 RINSE ORAL at 20:55

## 2023-11-24 RX ADMIN — CIPROFLOXACIN 400 MG: 400 INJECTION, SOLUTION INTRAVENOUS at 18:48

## 2023-11-24 RX ADMIN — METOCLOPRAMIDE 5 MG: 5 INJECTION, SOLUTION INTRAMUSCULAR; INTRAVENOUS at 05:59

## 2023-11-24 RX ADMIN — HYDROMORPHONE HYDROCHLORIDE 1 MG: 1 INJECTION, SOLUTION INTRAMUSCULAR; INTRAVENOUS; SUBCUTANEOUS at 18:47

## 2023-11-24 RX ADMIN — HYDROMORPHONE HYDROCHLORIDE 1 MG: 1 INJECTION, SOLUTION INTRAMUSCULAR; INTRAVENOUS; SUBCUTANEOUS at 22:50

## 2023-11-24 RX ADMIN — MIDAZOLAM HYDROCHLORIDE 2 MG: 1 INJECTION, SOLUTION INTRAMUSCULAR; INTRAVENOUS at 16:04

## 2023-11-24 RX ADMIN — INSULIN LISPRO 4 UNITS: 100 INJECTION, SOLUTION INTRAVENOUS; SUBCUTANEOUS at 01:23

## 2023-11-24 RX ADMIN — VENLAFAXINE 37.5 MG: 37.5 TABLET ORAL at 16:04

## 2023-11-24 RX ADMIN — CHLORHEXIDINE GLUCONATE 15 ML: 1.2 RINSE ORAL at 07:35

## 2023-11-24 RX ADMIN — CALCIUM GLUCONATE 2000 MG: 20 INJECTION, SOLUTION INTRAVENOUS at 09:00

## 2023-11-24 RX ADMIN — PANTOPRAZOLE SODIUM 40 MG: 40 INJECTION, POWDER, FOR SOLUTION INTRAVENOUS at 07:34

## 2023-11-24 RX ADMIN — SODIUM PHOSPHATE, MONOBASIC, MONOHYDRATE AND SODIUM PHOSPHATE, DIBASIC, ANHYDROUS 15 MMOL: 142; 276 INJECTION, SOLUTION INTRAVENOUS at 05:59

## 2023-11-24 RX ADMIN — MIDAZOLAM HYDROCHLORIDE 2 MG: 1 INJECTION, SOLUTION INTRAMUSCULAR; INTRAVENOUS at 20:59

## 2023-11-24 RX ADMIN — METOPROLOL TARTRATE 12.5 MG: 25 TABLET, FILM COATED ORAL at 07:34

## 2023-11-24 RX ADMIN — CIPROFLOXACIN 400 MG: 400 INJECTION, SOLUTION INTRAVENOUS at 11:00

## 2023-11-24 RX ADMIN — ASPIRIN 81 MG: 81 TABLET, CHEWABLE ORAL at 07:34

## 2023-11-24 RX ADMIN — MIDAZOLAM HYDROCHLORIDE 2 MG: 1 INJECTION, SOLUTION INTRAMUSCULAR; INTRAVENOUS at 01:47

## 2023-11-24 RX ADMIN — PROPOFOL 20 MCG/KG/MIN: 10 INJECTION, EMULSION INTRAVENOUS at 10:07

## 2023-11-24 RX ADMIN — MIDAZOLAM HYDROCHLORIDE 2 MG: 1 INJECTION, SOLUTION INTRAMUSCULAR; INTRAVENOUS at 10:22

## 2023-11-24 RX ADMIN — INSULIN GLARGINE 15 UNITS: 100 INJECTION, SOLUTION SUBCUTANEOUS at 20:57

## 2023-11-24 RX ADMIN — INSULIN LISPRO 2 UNITS: 100 INJECTION, SOLUTION INTRAVENOUS; SUBCUTANEOUS at 05:58

## 2023-11-24 RX ADMIN — METOCLOPRAMIDE 5 MG: 5 INJECTION, SOLUTION INTRAMUSCULAR; INTRAVENOUS at 13:49

## 2023-11-24 RX ADMIN — METOPROLOL TARTRATE 12.5 MG: 25 TABLET, FILM COATED ORAL at 20:58

## 2023-11-24 RX ADMIN — VENLAFAXINE 37.5 MG: 37.5 TABLET ORAL at 12:00

## 2023-11-24 RX ADMIN — VENLAFAXINE 37.5 MG: 37.5 TABLET ORAL at 07:34

## 2023-11-24 ASSESSMENT — PULMONARY FUNCTION TESTS
PIF_VALUE: 14
PIF_VALUE: 11
PIF_VALUE: 13
PIF_VALUE: 12
PIF_VALUE: 12

## 2023-11-24 ASSESSMENT — PAIN SCALES - GENERAL: PAINLEVEL_OUTOF10: 0

## 2023-11-24 NOTE — PROGRESS NOTES
[facility]  (A Division of [facility])                                                           Date of Admission: 11/16/2023       Reason for Consult: GNR Bacteremia  Referring MD: Dr Susu Orourke     Current Antimicrobials:    Prior Antimicrobials:  Cefepime IV 11/17-> Cipro 11/20  to date       Vancomycin IV X1 dose  Flagyl 11/17 to 11/21   Allergy to antibiotics: Pencillin       Assessment--ID related:     Critically Ill patient with:    Klebsiella aerogenes low grade  bacteremia(2/4)-- source- Suspect GI/ transient. CXR clear, non obstructive renal stones- BL  with clear UA. CT head without sinus disease mention. CT AP without IV contrast-- no GI finding suggestive of source, no obstructive uropathy- 11/21/23    Septic shock- off pressor    Leucocytoisis imiproving/ Bandemia improving- lower to 4%  Thrombocytopenia -- resolved    DKA Type 1--better    Acute encepahlopathy due to above    Acute respiratory failure due to above    GALDINO- resolved     Diagnosis     DKA, type 1, not at goal [facility]) [E10.10]-A1C11%     Tardive dyskinesia [G24.01]     Psychiatric disorder [F99]     ARF (acute renal failure) ([address]) [N17.9]     Seizure ([address]) [R56.9]-      RA (rheumatoid arthritis) ([address]) [M06.9]     Depression [T07. A]     Type 1 diabetes mellitus ([address]) [E10.9]     Kidney stone on left side [N20.0]     Hyperkalemia/ Hypernatremia  CVA- Acute left parietal 11/20/23  Abdominal aneruism CT 11/21/23    Recommendation -- ID related:     Cont Cipro IV 400mg IV BID, can switch to oral in few days to complete course to 11/27  Supportive care per team    Will sign off. Please call back if questions or concerns. Thanks. Today's Visit:      Unable to give history    Temp 99, still on Vent--  Vent PEEP 5, FIO2 30%  DW nursing/ ICU MD     Notes/Labs/Cultures and Imaging reports reviewed -- reviewed.  Platlet back to normal   CXR this am: Improvement in right basal aeration of acute infarct. The bone windows demonstrate no abnormalities. The visualized portions of the paranasal sinuses and mastoid air cells are clear. No acute process or change compared to prior exam.        Jeremy Hinton.  Jing Oshea MD  [facility])  Office #:     [phone]  [phone]   Office Fax: [phone]

## 2023-11-24 NOTE — PROGRESS NOTES
Pt tolerated SBT trial from 3535-2606 on PSV 7/5 30%, Vt 400+, RSBI 32, Spo2 100% . Vital signs stable.  Pt placed on AC VC 16/400/+5/30% to rest.

## 2023-11-24 NOTE — PROGRESS NOTES
PORTABLE    Result Date: 11/20/2023  EXAM:  XR CHEST PORTABLE INDICATION: Intubated COMPARISON: Prior day TECHNIQUE: portable chest AP view at 0630 hours FINDINGS: The cardiac silhouette is within normal limits. The pulmonary vasculature is within normal limits. The endotracheal tube terminates about 3 cm above the melvin. NG tube extends into the stomach. The lungs and pleural spaces are clear. The visualized bones and upper abdomen are age-appropriate. No acute process on portable chest. Lines and tubes as described. XR CHEST PORTABLE    Result Date: 11/19/2023  Indication: Intubated Comparison to 11/18/2023. Portable exam obtained at 6:15 demonstrates little change in the bilateral lower lobe atelectasis compared to prior examination. ET tube and NG tube are stable in position. No interval change. XR CHEST PORTABLE    Result Date: 11/18/2023  EXAM:  XR CHEST PORTABLE INDICATION: Intubated COMPARISON: 11/17/2023 TECHNIQUE: Semiupright portable chest AP view FINDINGS: Tubes and lines are stable. The cardiac silhouette is within normal limits. The pulmonary vasculature is within normal limits. The lungs and pleural spaces are clear. The visualized bones and upper abdomen are age-appropriate. No acute process on portable chest.     CT HEAD WO CONTRAST    Result Date: 11/17/2023  EXAM: CT HEAD WO CONTRAST INDICATION: Altered mental status COMPARISON: 11/16/2023. CONTRAST: None. TECHNIQUE: Unenhanced CT of the head was performed using 5 mm images. Brain and bone windows were generated. Coronal and sagittal reformats. CT dose reduction was achieved through use of a standardized protocol tailored for this examination and automatic exposure control for dose modulation. FINDINGS: The ventricles and sulci are normal in size, shape and configuration. There is no significant white matter disease. There is no intracranial hemorrhage, extra-axial collection, or mass effect. The basilar cisterns are open. is within normal limits. The lungs and pleural spaces are clear. The visualized bones and upper abdomen are age-appropriate. Endotracheal tube and enteric tube in expected positions. No acute findings. CT HEAD WO CONTRAST    Result Date: 11/16/2023  Indication:  ams Comparison: None Findings: 5 mm axial images were obtained from the skull base through the vertex. CT dose reduction was achieved through the use of a standardized protocol tailored for this examination and automatic exposure control for dose modulation. The ventricles and cortical sulci are prominent, compatible with age related volume loss. There is no evidence of intracranial hemorrhage, mass, mass effect, or acute infarct. There is periventricular white matter disease. No extra-axial fluid collections are seen. The visualized paranasal sinuses and mastoid air cells are clear. The orbital structures are unremarkable. No osseous abnormalities are seen. 1. No evidence of acute infarct or intracranial hemorrhage. 2. Mild periventricular white matter disease is likely secondary to chronic small vessel ischemic changes. XR CHEST PORTABLE    Result Date: 11/16/2023  EXAM:  XR CHEST PORTABLE INDICATION: Chest pain COMPARISON: March 2020 TECHNIQUE: portable chest AP view FINDINGS: The cardiac silhouette is within normal limits. The pulmonary vasculature is within normal limits. The lungs and pleural spaces are clear. Cervical spine fusion hardware is partially visualized. No acute process on portable chest.             Davie Beltrán.  Ashley Swenson MD, PhD, [facility] Cardenas  Phone: [phone]  Pager: [phone]

## 2023-11-24 NOTE — PROGRESS NOTES
Special Requests NO SPECIAL REQUESTS        Gram stain       ANAEROBIC BOTTLE Gram negative rods                  SMEAR CALLED TO AND CORRECTLY REPEATED BY: [facility] [place] RN ICU 11/17/23 AT 1213 TO MCL           Culture       Klebsiella (Enterobacter) aerogenes GROWING IN 1 OF 2 BOTTLES DRAWN No Site Indicated          Susceptibility        Klebsiella aerogenes      BACTERIAL SUSCEPTIBILITY PANEL FIOR      amikacin <=2 ug/mL Sensitive      ceFAZolin >=64 ug/mL Resistant      cefepime <=1 ug/mL Sensitive      cefOXitin >=64 ug/mL Resistant      cefTAZidime <=1 ug/mL Sensitive      cefTRIAXone <=1 ug/mL Sensitive      ciprofloxacin <=0.25 ug/mL Sensitive      gentamicin <=1 ug/mL Sensitive      levofloxacin <=0.12 ug/mL Sensitive      tobramycin <=1 ug/mL Sensitive      trimethoprim-sulfamethoxazole <=20 ug/mL Sensitive                           Blood Culture 2 [[id number]]  (Abnormal) Collected: 11/16/23 1955    Order Status: Completed Specimen: Blood Updated: 11/20/23 1449     Special Requests NO SPECIAL REQUESTS        Gram stain       AEROBIC BOTTLE Gram negative rods                  SMEAR CALLED TO AND CORRECTLY REPEATED BY: Nevin Holter RN ICU ON 11/19/23 AT 0630 TO TMB.            Culture       Klebsiella (Enterobacter) aerogenes GROWING IN 1 OF 2 BOTTLES DRAWN No Site Indicated REFER TO [id number] FOR SENSITIVITIES          Culture, Blood, PCR ID Panel [[id number]]  (Abnormal) Collected: 11/16/23 1955    Order Status: Completed Specimen: Blood Updated: 11/17/23 2326     Accession Number [id number]     Enterococcus faecalis by PCR Not detected        Enterococcus faecium by PCR Not detected        Listeria monocytogenes by PCR Not detected        STAPHYLOCOCCUS Not detected        Staphylococcus Aureus Not detected        Staphylococcus epidermidis by PCR Not detected        Staphylococcus lugdunensis by PCR Not detected        STREPTOCOCCUS Not detected        Streptococcus agalactiae (Group B) Not detected        Strep pneumoniae Not detected        Strep pyogenes,(Grp. A) Not detected        Acinetobacter calcoac baumannii complex by PCR Not detected        Bacteroides fragilis by PCR Not detected        Enterobacteriaceae by PCR Detected        Enterobacter cloacae complex by PCR Not detected        Escherichia Coli Not detected        Klebsiella aerogenes by PCR Detected        Klebsiella oxytoca by PCR Not detected        Klebsiella pneumoniae group by PCR Not detected        Proteus by PCR Not detected        Salmonella species by PCR Not detected        Serratia marcescens by PCR Not detected        Haemophilus Influenzae by PCR Not detected        Neisseria meningitidis by PCR Not detected        Pseudomonas aeruginosa Not detected        Stenotrophomonas maltophilia by PCR Not detected        Candida albicans by PCR Not detected        Candida auris by PCR Not detected        Candida glabrata Not detected        Candida krusei by PCR Not detected        Candida parapsilosis by PCR Not detected        Candida tropicalis by PCR Not detected        Cryptococcus neoformans/gattii by PCR Not detected        Resistant gene targets          Resistant gene ctx-m by PCR Not detected        Resistant gene imp by PCR Not detected        KPC (Carbapenem resistance gene) Not detected        Colistin Resistance mcr-1 gene by PCR Not detected        Resistant gene ndm by PCR Not detected        Resistant gene oxa-48-like by pcr Not detected        Resistant gene vim by PCR Not detected        [employer] test comment       False positive results may rarely occur.  Correlate with clinical,epidemiologic, and other laboratory findings           Comment: Please see BCID Interpretation Guide in EPIC Links                   Last EKG Results for orders placed or performed during the hospital encounter of 11/16/23   EKG 12 Lead   Result Value Ref Range    Ventricular Rate 104 BPM    Atrial Rate 104 BPM    P-R Interval 192 ms    QRS Duration 76 ms

## 2023-11-25 ENCOUNTER — APPOINTMENT (OUTPATIENT)
Facility: HOSPITAL | Age: 58
DRG: 870 | End: 2023-11-25
Payer: MEDICARE

## 2023-11-25 LAB
ALBUMIN SERPL-MCNC: 2.8 G/DL (ref 3.4–5)
ALBUMIN/GLOB SERPL: 0.8 (ref 0.8–1.7)
ALP SERPL-CCNC: 182 U/L (ref 45–117)
ALT SERPL-CCNC: 61 U/L (ref 16–61)
ANION GAP SERPL CALC-SCNC: 6 MMOL/L (ref 3–18)
AST SERPL-CCNC: 74 U/L (ref 10–38)
BACTERIA SPEC CULT: NORMAL
BACTERIA SPEC CULT: NORMAL
BASOPHILS # BLD: 0.1 K/UL (ref 0–0.1)
BASOPHILS NFR BLD: 1 % (ref 0–2)
BILIRUB SERPL-MCNC: 0.5 MG/DL (ref 0.2–1)
BUN SERPL-MCNC: 13 MG/DL (ref 7–18)
BUN/CREAT SERPL: 14 (ref 12–20)
CA-I SERPL-SCNC: 1.2 MMOL/L (ref 1.12–1.32)
CALCIUM SERPL-MCNC: 8.6 MG/DL (ref 8.5–10.1)
CHLORIDE SERPL-SCNC: 108 MMOL/L (ref 100–111)
CO2 SERPL-SCNC: 27 MMOL/L (ref 21–32)
CREAT SERPL-MCNC: 0.95 MG/DL (ref 0.6–1.3)
DIFFERENTIAL METHOD BLD: ABNORMAL
EOSINOPHIL # BLD: 0.5 K/UL (ref 0–0.4)
EOSINOPHIL NFR BLD: 5 % (ref 0–5)
ERYTHROCYTE [DISTWIDTH] IN BLOOD BY AUTOMATED COUNT: 12.4 % (ref 11.6–14.5)
GLOBULIN SER CALC-MCNC: 3.3 G/DL (ref 2–4)
GLUCOSE BLD STRIP.AUTO-MCNC: 108 MG/DL (ref 70–110)
GLUCOSE BLD STRIP.AUTO-MCNC: 146 MG/DL (ref 70–110)
GLUCOSE BLD STRIP.AUTO-MCNC: 187 MG/DL (ref 70–110)
GLUCOSE BLD STRIP.AUTO-MCNC: 202 MG/DL (ref 70–110)
GLUCOSE BLD STRIP.AUTO-MCNC: 237 MG/DL (ref 70–110)
GLUCOSE SERPL-MCNC: 151 MG/DL (ref 74–99)
HCT VFR BLD AUTO: 35.2 % (ref 36–48)
HGB BLD-MCNC: 11.8 G/DL (ref 13–16)
IMM GRANULOCYTES # BLD AUTO: 0.8 K/UL (ref 0–0.04)
IMM GRANULOCYTES NFR BLD AUTO: 8 % (ref 0–0.5)
LYMPHOCYTES # BLD: 2.1 K/UL (ref 0.9–3.6)
LYMPHOCYTES NFR BLD: 22 % (ref 21–52)
MAGNESIUM SERPL-MCNC: 1.9 MG/DL (ref 1.6–2.6)
MCH RBC QN AUTO: 29.9 PG (ref 24–34)
MCHC RBC AUTO-ENTMCNC: 33.5 G/DL (ref 31–37)
MCV RBC AUTO: 89.1 FL (ref 78–100)
MONOCYTES # BLD: 1.3 K/UL (ref 0.05–1.2)
MONOCYTES NFR BLD: 13 % (ref 3–10)
NEUTS SEG # BLD: 5.1 K/UL (ref 1.8–8)
NEUTS SEG NFR BLD: 52 % (ref 40–73)
NRBC # BLD: 0 K/UL (ref 0–0.01)
NRBC BLD-RTO: 0 PER 100 WBC
PHOSPHATE SERPL-MCNC: 2.3 MG/DL (ref 2.5–4.9)
PLATELET # BLD AUTO: 244 K/UL (ref 135–420)
PMV BLD AUTO: 11 FL (ref 9.2–11.8)
POTASSIUM SERPL-SCNC: 3.9 MMOL/L (ref 3.5–5.5)
PROT SERPL-MCNC: 6.1 G/DL (ref 6.4–8.2)
RBC # BLD AUTO: 3.95 M/UL (ref 4.35–5.65)
SERVICE CMNT-IMP: NORMAL
SERVICE CMNT-IMP: NORMAL
SODIUM SERPL-SCNC: 141 MMOL/L (ref 136–145)
WBC # BLD AUTO: 9.9 K/UL (ref 4.6–13.2)

## 2023-11-25 PROCEDURE — C9113 INJ PANTOPRAZOLE SODIUM, VIA: HCPCS | Performed by: HOSPITALIST

## 2023-11-25 PROCEDURE — 2580000003 HC RX 258: Performed by: HOSPITALIST

## 2023-11-25 PROCEDURE — 2580000003 HC RX 258: Performed by: INTERNAL MEDICINE

## 2023-11-25 PROCEDURE — 6370000000 HC RX 637 (ALT 250 FOR IP): Performed by: PSYCHIATRY & NEUROLOGY

## 2023-11-25 PROCEDURE — 82962 GLUCOSE BLOOD TEST: CPT

## 2023-11-25 PROCEDURE — 2500000003 HC RX 250 WO HCPCS: Performed by: INTERNAL MEDICINE

## 2023-11-25 PROCEDURE — 6360000002 HC RX W HCPCS: Performed by: INTERNAL MEDICINE

## 2023-11-25 PROCEDURE — 85025 COMPLETE CBC W/AUTO DIFF WBC: CPT

## 2023-11-25 PROCEDURE — 83735 ASSAY OF MAGNESIUM: CPT

## 2023-11-25 PROCEDURE — 6360000002 HC RX W HCPCS: Performed by: FAMILY MEDICINE

## 2023-11-25 PROCEDURE — A4216 STERILE WATER/SALINE, 10 ML: HCPCS | Performed by: HOSPITALIST

## 2023-11-25 PROCEDURE — 6370000000 HC RX 637 (ALT 250 FOR IP): Performed by: INTERNAL MEDICINE

## 2023-11-25 PROCEDURE — 94003 VENT MGMT INPAT SUBQ DAY: CPT

## 2023-11-25 PROCEDURE — 6370000000 HC RX 637 (ALT 250 FOR IP): Performed by: FAMILY MEDICINE

## 2023-11-25 PROCEDURE — 84100 ASSAY OF PHOSPHORUS: CPT

## 2023-11-25 PROCEDURE — 6360000002 HC RX W HCPCS: Performed by: HOSPITALIST

## 2023-11-25 PROCEDURE — 80053 COMPREHEN METABOLIC PANEL: CPT

## 2023-11-25 PROCEDURE — 82330 ASSAY OF CALCIUM: CPT

## 2023-11-25 PROCEDURE — 71045 X-RAY EXAM CHEST 1 VIEW: CPT

## 2023-11-25 PROCEDURE — 2000000000 HC ICU R&B

## 2023-11-25 RX ADMIN — ASPIRIN 81 MG: 81 TABLET, CHEWABLE ORAL at 09:41

## 2023-11-25 RX ADMIN — VENLAFAXINE 37.5 MG: 37.5 TABLET ORAL at 09:46

## 2023-11-25 RX ADMIN — METOCLOPRAMIDE 5 MG: 5 INJECTION, SOLUTION INTRAMUSCULAR; INTRAVENOUS at 21:52

## 2023-11-25 RX ADMIN — PROPOFOL 20 MCG/KG/MIN: 10 INJECTION, EMULSION INTRAVENOUS at 15:31

## 2023-11-25 RX ADMIN — INSULIN GLARGINE 15 UNITS: 100 INJECTION, SOLUTION SUBCUTANEOUS at 21:45

## 2023-11-25 RX ADMIN — VENLAFAXINE 37.5 MG: 37.5 TABLET ORAL at 17:23

## 2023-11-25 RX ADMIN — SODIUM PHOSPHATE, MONOBASIC, MONOHYDRATE AND SODIUM PHOSPHATE, DIBASIC, ANHYDROUS 15 MMOL: 142; 276 INJECTION, SOLUTION INTRAVENOUS at 17:58

## 2023-11-25 RX ADMIN — METOPROLOL TARTRATE 12.5 MG: 25 TABLET, FILM COATED ORAL at 21:47

## 2023-11-25 RX ADMIN — MIDAZOLAM HYDROCHLORIDE 2 MG: 1 INJECTION, SOLUTION INTRAMUSCULAR; INTRAVENOUS at 01:07

## 2023-11-25 RX ADMIN — MIDAZOLAM HYDROCHLORIDE 2 MG: 1 INJECTION, SOLUTION INTRAMUSCULAR; INTRAVENOUS at 16:03

## 2023-11-25 RX ADMIN — CIPROFLOXACIN 400 MG: 400 INJECTION, SOLUTION INTRAVENOUS at 19:00

## 2023-11-25 RX ADMIN — METOCLOPRAMIDE 5 MG: 5 INJECTION, SOLUTION INTRAMUSCULAR; INTRAVENOUS at 00:20

## 2023-11-25 RX ADMIN — METOCLOPRAMIDE 5 MG: 5 INJECTION, SOLUTION INTRAMUSCULAR; INTRAVENOUS at 13:14

## 2023-11-25 RX ADMIN — ENOXAPARIN SODIUM 30 MG: 100 INJECTION SUBCUTANEOUS at 14:07

## 2023-11-25 RX ADMIN — VENLAFAXINE 37.5 MG: 37.5 TABLET ORAL at 13:10

## 2023-11-25 RX ADMIN — METOPROLOL TARTRATE 12.5 MG: 25 TABLET, FILM COATED ORAL at 09:44

## 2023-11-25 RX ADMIN — CIPROFLOXACIN 400 MG: 400 INJECTION, SOLUTION INTRAVENOUS at 06:10

## 2023-11-25 RX ADMIN — MIDAZOLAM HYDROCHLORIDE 2 MG: 1 INJECTION, SOLUTION INTRAMUSCULAR; INTRAVENOUS at 21:55

## 2023-11-25 RX ADMIN — INSULIN LISPRO 4 UNITS: 100 INJECTION, SOLUTION INTRAVENOUS; SUBCUTANEOUS at 00:24

## 2023-11-25 RX ADMIN — CHLORHEXIDINE GLUCONATE 15 ML: 1.2 RINSE ORAL at 20:26

## 2023-11-25 RX ADMIN — MIDAZOLAM HYDROCHLORIDE 2 MG: 1 INJECTION, SOLUTION INTRAMUSCULAR; INTRAVENOUS at 06:10

## 2023-11-25 RX ADMIN — CHLORHEXIDINE GLUCONATE 15 ML: 1.2 RINSE ORAL at 09:47

## 2023-11-25 RX ADMIN — INSULIN LISPRO 2 UNITS: 100 INJECTION, SOLUTION INTRAVENOUS; SUBCUTANEOUS at 13:13

## 2023-11-25 RX ADMIN — METOCLOPRAMIDE 5 MG: 5 INJECTION, SOLUTION INTRAMUSCULAR; INTRAVENOUS at 06:10

## 2023-11-25 RX ADMIN — PANTOPRAZOLE SODIUM 40 MG: 40 INJECTION, POWDER, FOR SOLUTION INTRAVENOUS at 09:47

## 2023-11-25 RX ADMIN — HYDROMORPHONE HYDROCHLORIDE 1 MG: 1 INJECTION, SOLUTION INTRAMUSCULAR; INTRAVENOUS; SUBCUTANEOUS at 03:56

## 2023-11-25 ASSESSMENT — PULMONARY FUNCTION TESTS
PIF_VALUE: 12
PIF_VALUE: 12
PIF_VALUE: 15
PIF_VALUE: 11
PIF_VALUE: 12
PIF_VALUE: 11

## 2023-11-25 ASSESSMENT — PAIN SCALES - GENERAL: PAINLEVEL_OUTOF10: 0

## 2023-11-25 NOTE — PROGRESS NOTES
[facility]  Pulmonary, Critical Care, and Sleep Medicine    Name: Fortino Harman MRN: [id number]   : 1965 Hospital: [facility]    Date: 2023  Room: [address] [place] [address] Note                                              Consult requesting physician: Dr. José Dumont  Reason for Consult: Assisting in ICU care for acute respiratory failure     IMPRESSION:     Acute respiratory failure I02.08  Metabolic encephalopathy B47.31  DKA, type 1 not at goal vs hyperglycemic hyperosmolar syndrome E10.10  Hypotension I95.0  Hypernatremia E85.0  ARF (acute renal failure) N17.9  Dehydration E86.0  RA (rheumatoid arthritis)  Kidney stone on left side  Depression  Tardive dyskinesia  Psychiatric disorder  Seizure  Active Hospital Problems    Diagnosis Date Noted    Encephalopathy [G93.40] 2023    Bacteremia [R78.81] 2023    Thrombocytopenia ([address]) [D69.6] 2023    DKA, type 1, not at goal [facility]) [E10.10] 2023    Tardive dyskinesia [G24.01] 2023    Psychiatric disorder [F99] 2023    ARF (acute renal failure) ([address]) [N17.9] 2023    Seizure ([address]) [R56.9] 2023    RA (rheumatoid arthritis) ([address]) [M06.9] 2017    Depression [F32. A] 2017    Type 1 diabetes mellitus ([address]) [E10.9] 2017    Kidney stone on left side [N20.0] 2017     Code status: Full Code      RECOMMENDATIONS:     Respiratory: Acute respiratory failure secondary to metabolic encephalopathy, intubated for airway protection on 2023 early morning. Day 9 on ventilator support. VCV mode of ventilation; FiO2 30%; PEEP 5.   Patient did some CPAP trials today morning  Plan for sedation interruption tomorrow morning with weaning trials; cuff leak present; after discussion with family, may need to consider trial of extubation tomorrow morning  Chest x-ray today reviewed images and report-ET tube and OG tube positions are stable; lungs are clear; no focal consolidation; no present. There is a small amount of fluid in the mastoid air cells, greater on the left. The orbital contents are within normal limits. No significant osseous or scalp  lesions are identified. IMPRESSION:  Small acute lacunar infarct in the gray matter of the left parietal lobe. Please note: Voice-recognition software may have been used to generate this report, which may have resulted in some phonetic-based errors in grammar and contents. Even though attempts were made to correct all the mistakes, some may have been missed, and remained in the body of the document.       Ferdinand Noel MD  11/25/2023

## 2023-11-25 NOTE — PROGRESS NOTES
Pt tolerated SBT from 8903-3290, pt noted to have apnea and switched back to full support. AC/VC 16/400/+5/30%.  RT

## 2023-11-26 ENCOUNTER — APPOINTMENT (OUTPATIENT)
Facility: HOSPITAL | Age: 58
DRG: 870 | End: 2023-11-26
Payer: MEDICARE

## 2023-11-26 LAB
CA-I SERPL-SCNC: 1.15 MMOL/L (ref 1.12–1.32)
GLUCOSE BLD STRIP.AUTO-MCNC: 129 MG/DL (ref 70–110)
GLUCOSE BLD STRIP.AUTO-MCNC: 177 MG/DL (ref 70–110)
GLUCOSE BLD STRIP.AUTO-MCNC: 178 MG/DL (ref 70–110)
GLUCOSE BLD STRIP.AUTO-MCNC: 212 MG/DL (ref 70–110)
GLUCOSE BLD STRIP.AUTO-MCNC: 222 MG/DL (ref 70–110)
GLUCOSE BLD STRIP.AUTO-MCNC: 239 MG/DL (ref 70–110)
MAGNESIUM SERPL-MCNC: 1.8 MG/DL (ref 1.6–2.6)
PHOSPHATE SERPL-MCNC: 3 MG/DL (ref 2.5–4.9)

## 2023-11-26 PROCEDURE — 84100 ASSAY OF PHOSPHORUS: CPT

## 2023-11-26 PROCEDURE — 82330 ASSAY OF CALCIUM: CPT

## 2023-11-26 PROCEDURE — 6360000002 HC RX W HCPCS: Performed by: INTERNAL MEDICINE

## 2023-11-26 PROCEDURE — 2580000003 HC RX 258: Performed by: HOSPITALIST

## 2023-11-26 PROCEDURE — 6360000002 HC RX W HCPCS: Performed by: HOSPITALIST

## 2023-11-26 PROCEDURE — 6370000000 HC RX 637 (ALT 250 FOR IP): Performed by: INTERNAL MEDICINE

## 2023-11-26 PROCEDURE — 82962 GLUCOSE BLOOD TEST: CPT

## 2023-11-26 PROCEDURE — A4216 STERILE WATER/SALINE, 10 ML: HCPCS | Performed by: HOSPITALIST

## 2023-11-26 PROCEDURE — 92526 ORAL FUNCTION THERAPY: CPT

## 2023-11-26 PROCEDURE — 6370000000 HC RX 637 (ALT 250 FOR IP): Performed by: FAMILY MEDICINE

## 2023-11-26 PROCEDURE — 2580000003 HC RX 258: Performed by: FAMILY MEDICINE

## 2023-11-26 PROCEDURE — 94003 VENT MGMT INPAT SUBQ DAY: CPT

## 2023-11-26 PROCEDURE — 6370000000 HC RX 637 (ALT 250 FOR IP): Performed by: PSYCHIATRY & NEUROLOGY

## 2023-11-26 PROCEDURE — 92610 EVALUATE SWALLOWING FUNCTION: CPT

## 2023-11-26 PROCEDURE — 71045 X-RAY EXAM CHEST 1 VIEW: CPT

## 2023-11-26 PROCEDURE — 83735 ASSAY OF MAGNESIUM: CPT

## 2023-11-26 PROCEDURE — 6360000002 HC RX W HCPCS: Performed by: FAMILY MEDICINE

## 2023-11-26 PROCEDURE — 2000000000 HC ICU R&B

## 2023-11-26 PROCEDURE — C9113 INJ PANTOPRAZOLE SODIUM, VIA: HCPCS | Performed by: HOSPITALIST

## 2023-11-26 RX ORDER — SODIUM CHLORIDE 450 MG/100ML
INJECTION, SOLUTION INTRAVENOUS CONTINUOUS
Status: DISCONTINUED | OUTPATIENT
Start: 2023-11-26 | End: 2023-11-29

## 2023-11-26 RX ORDER — ATORVASTATIN CALCIUM 10 MG/1
10 TABLET, FILM COATED ORAL NIGHTLY
Status: DISCONTINUED | OUTPATIENT
Start: 2023-11-26 | End: 2023-11-29 | Stop reason: HOSPADM

## 2023-11-26 RX ORDER — INSULIN GLARGINE 100 [IU]/ML
10 INJECTION, SOLUTION SUBCUTANEOUS NIGHTLY
Status: DISCONTINUED | OUTPATIENT
Start: 2023-11-26 | End: 2023-11-29 | Stop reason: HOSPADM

## 2023-11-26 RX ADMIN — HYDROMORPHONE HYDROCHLORIDE 1 MG: 1 INJECTION, SOLUTION INTRAMUSCULAR; INTRAVENOUS; SUBCUTANEOUS at 04:08

## 2023-11-26 RX ADMIN — ASPIRIN 81 MG: 81 TABLET, CHEWABLE ORAL at 07:55

## 2023-11-26 RX ADMIN — CIPROFLOXACIN 400 MG: 400 INJECTION, SOLUTION INTRAVENOUS at 19:47

## 2023-11-26 RX ADMIN — SODIUM CHLORIDE: 4.5 INJECTION, SOLUTION INTRAVENOUS at 10:00

## 2023-11-26 RX ADMIN — MIDAZOLAM HYDROCHLORIDE 2 MG: 1 INJECTION, SOLUTION INTRAMUSCULAR; INTRAVENOUS at 01:38

## 2023-11-26 RX ADMIN — INSULIN LISPRO 2 UNITS: 100 INJECTION, SOLUTION INTRAVENOUS; SUBCUTANEOUS at 23:39

## 2023-11-26 RX ADMIN — INSULIN LISPRO 4 UNITS: 100 INJECTION, SOLUTION INTRAVENOUS; SUBCUTANEOUS at 01:40

## 2023-11-26 RX ADMIN — VENLAFAXINE 37.5 MG: 37.5 TABLET ORAL at 07:56

## 2023-11-26 RX ADMIN — CIPROFLOXACIN 400 MG: 400 INJECTION, SOLUTION INTRAVENOUS at 07:09

## 2023-11-26 RX ADMIN — CHLORHEXIDINE GLUCONATE 15 ML: 1.2 RINSE ORAL at 07:56

## 2023-11-26 RX ADMIN — METOPROLOL TARTRATE 12.5 MG: 25 TABLET, FILM COATED ORAL at 07:55

## 2023-11-26 RX ADMIN — INSULIN GLARGINE 10 UNITS: 100 INJECTION, SOLUTION SUBCUTANEOUS at 23:39

## 2023-11-26 RX ADMIN — PANTOPRAZOLE SODIUM 40 MG: 40 INJECTION, POWDER, FOR SOLUTION INTRAVENOUS at 07:55

## 2023-11-26 RX ADMIN — PROPOFOL 20 MCG/KG/MIN: 10 INJECTION, EMULSION INTRAVENOUS at 02:58

## 2023-11-26 RX ADMIN — ENOXAPARIN SODIUM 30 MG: 100 INJECTION SUBCUTANEOUS at 14:00

## 2023-11-26 RX ADMIN — INSULIN LISPRO 4 UNITS: 100 INJECTION, SOLUTION INTRAVENOUS; SUBCUTANEOUS at 13:00

## 2023-11-26 RX ADMIN — METOCLOPRAMIDE 5 MG: 5 INJECTION, SOLUTION INTRAMUSCULAR; INTRAVENOUS at 21:49

## 2023-11-26 RX ADMIN — METOCLOPRAMIDE 5 MG: 5 INJECTION, SOLUTION INTRAMUSCULAR; INTRAVENOUS at 06:21

## 2023-11-26 ASSESSMENT — PULMONARY FUNCTION TESTS
PIF_VALUE: 11
PIF_VALUE: 13

## 2023-11-26 ASSESSMENT — PAIN SCALES - GENERAL: PAINLEVEL_OUTOF10: 0

## 2023-11-26 NOTE — PROGRESS NOTES
aerogenes      BACTERIAL SUSCEPTIBILITY PANEL FIOR      amikacin <=2 ug/mL Sensitive      ceFAZolin >=64 ug/mL Resistant      cefepime <=1 ug/mL Sensitive      cefOXitin >=64 ug/mL Resistant      cefTAZidime <=1 ug/mL Sensitive      cefTRIAXone <=1 ug/mL Sensitive      ciprofloxacin <=0.25 ug/mL Sensitive      gentamicin <=1 ug/mL Sensitive      levofloxacin <=0.12 ug/mL Sensitive      tobramycin <=1 ug/mL Sensitive      trimethoprim-sulfamethoxazole <=20 ug/mL Sensitive                           Blood Culture 2 [[id number]]  (Abnormal) Collected: 11/16/23 1955    Order Status: Completed Specimen: Blood Updated: 11/20/23 1449     Special Requests NO SPECIAL REQUESTS        Gram stain       AEROBIC BOTTLE Gram negative rods                  SMEAR CALLED TO AND CORRECTLY REPEATED BY: Angelo Person RN ICU ON 11/19/23 AT 0630 TO TMB.            Culture       Klebsiella (Enterobacter) aerogenes GROWING IN 1 OF 2 BOTTLES DRAWN No Site Indicated REFER TO [id number] FOR SENSITIVITIES          Culture, Blood, PCR ID Panel [[id number]]  (Abnormal) Collected: 11/16/23 1955    Order Status: Completed Specimen: Blood Updated: 11/17/23 2326     Accession Number [id number]     Enterococcus faecalis by PCR Not detected        Enterococcus faecium by PCR Not detected        Listeria monocytogenes by PCR Not detected        STAPHYLOCOCCUS Not detected        Staphylococcus Aureus Not detected        Staphylococcus epidermidis by PCR Not detected        Staphylococcus lugdunensis by PCR Not detected        STREPTOCOCCUS Not detected        Streptococcus agalactiae (Group B) Not detected        Strep pneumoniae Not detected        Strep pyogenes,(Grp. A) Not detected        Acinetobacter calcoac baumannii complex by PCR Not detected        Bacteroides fragilis by PCR Not detected        Enterobacteriaceae by PCR Detected        Enterobacter cloacae complex by PCR Not detected        Escherichia Coli Not detected        Klebsiella aerogenes by significant osseous or scalp  lesions are identified. IMPRESSION:  Small acute lacunar infarct in the gray matter of the left parietal lobe. Please note: Voice-recognition software may have been used to generate this report, which may have resulted in some phonetic-based errors in grammar and contents. Even though attempts were made to correct all the mistakes, some may have been missed, and remained in the body of the document.       Ferdinand Noel MD  11/26/2023

## 2023-11-26 NOTE — PROGRESS NOTES
Pt started SBT on 7/5 30% @0755 for 30 min per MD Rayo. Vt 515 RSBI 24, Vital signs stable.  Pt placed back on full support post 30 min trial. RT

## 2023-11-26 NOTE — PROGRESS NOTES
MD, RN, and RT at bedside. Pt extubated @ 0920 to 2 lpm NC with no complications. Vital signs stable.  RT.

## 2023-11-27 ENCOUNTER — APPOINTMENT (OUTPATIENT)
Facility: HOSPITAL | Age: 58
DRG: 870 | End: 2023-11-27
Payer: MEDICARE

## 2023-11-27 LAB
ANION GAP SERPL CALC-SCNC: 9 MMOL/L (ref 3–18)
BASOPHILS # BLD: 0.1 K/UL (ref 0–0.1)
BASOPHILS NFR BLD: 1 % (ref 0–2)
BUN SERPL-MCNC: 11 MG/DL (ref 7–18)
BUN/CREAT SERPL: 15 (ref 12–20)
CALCIUM SERPL-MCNC: 8.5 MG/DL (ref 8.5–10.1)
CHLORIDE SERPL-SCNC: 108 MMOL/L (ref 100–111)
CO2 SERPL-SCNC: 26 MMOL/L (ref 21–32)
CREAT SERPL-MCNC: 0.73 MG/DL (ref 0.6–1.3)
DIFFERENTIAL METHOD BLD: ABNORMAL
EOSINOPHIL # BLD: 0.6 K/UL (ref 0–0.4)
EOSINOPHIL NFR BLD: 5 % (ref 0–5)
ERYTHROCYTE [DISTWIDTH] IN BLOOD BY AUTOMATED COUNT: 12.8 % (ref 11.6–14.5)
GLUCOSE BLD STRIP.AUTO-MCNC: 126 MG/DL (ref 70–110)
GLUCOSE BLD STRIP.AUTO-MCNC: 154 MG/DL (ref 70–110)
GLUCOSE BLD STRIP.AUTO-MCNC: 156 MG/DL (ref 70–110)
GLUCOSE BLD STRIP.AUTO-MCNC: 74 MG/DL (ref 70–110)
GLUCOSE SERPL-MCNC: 131 MG/DL (ref 74–99)
HCT VFR BLD AUTO: 32.9 % (ref 36–48)
HGB BLD-MCNC: 11 G/DL (ref 13–16)
IMM GRANULOCYTES # BLD AUTO: 0.3 K/UL (ref 0–0.04)
IMM GRANULOCYTES NFR BLD AUTO: 3 % (ref 0–0.5)
LYMPHOCYTES # BLD: 1 K/UL (ref 0.9–3.6)
LYMPHOCYTES NFR BLD: 8 % (ref 21–52)
MCH RBC QN AUTO: 30.1 PG (ref 24–34)
MCHC RBC AUTO-ENTMCNC: 33.4 G/DL (ref 31–37)
MCV RBC AUTO: 90.1 FL (ref 78–100)
MONOCYTES # BLD: 0.8 K/UL (ref 0.05–1.2)
MONOCYTES NFR BLD: 7 % (ref 3–10)
NEUTS SEG # BLD: 9 K/UL (ref 1.8–8)
NEUTS SEG NFR BLD: 77 % (ref 40–73)
NRBC # BLD: 0 K/UL (ref 0–0.01)
NRBC BLD-RTO: 0 PER 100 WBC
PLATELET # BLD AUTO: 355 K/UL (ref 135–420)
PMV BLD AUTO: 11.1 FL (ref 9.2–11.8)
POTASSIUM SERPL-SCNC: 3.8 MMOL/L (ref 3.5–5.5)
RBC # BLD AUTO: 3.65 M/UL (ref 4.35–5.65)
SODIUM SERPL-SCNC: 143 MMOL/L (ref 136–145)
WBC # BLD AUTO: 11.7 K/UL (ref 4.6–13.2)

## 2023-11-27 PROCEDURE — 6360000002 HC RX W HCPCS: Performed by: INTERNAL MEDICINE

## 2023-11-27 PROCEDURE — 71045 X-RAY EXAM CHEST 1 VIEW: CPT

## 2023-11-27 PROCEDURE — 85025 COMPLETE CBC W/AUTO DIFF WBC: CPT

## 2023-11-27 PROCEDURE — 6370000000 HC RX 637 (ALT 250 FOR IP): Performed by: INTERNAL MEDICINE

## 2023-11-27 PROCEDURE — 82962 GLUCOSE BLOOD TEST: CPT

## 2023-11-27 PROCEDURE — C9113 INJ PANTOPRAZOLE SODIUM, VIA: HCPCS | Performed by: HOSPITALIST

## 2023-11-27 PROCEDURE — 1100000003 HC PRIVATE W/ TELEMETRY

## 2023-11-27 PROCEDURE — 6360000002 HC RX W HCPCS: Performed by: HOSPITALIST

## 2023-11-27 PROCEDURE — 2580000003 HC RX 258: Performed by: FAMILY MEDICINE

## 2023-11-27 PROCEDURE — 80048 BASIC METABOLIC PNL TOTAL CA: CPT

## 2023-11-27 PROCEDURE — 92526 ORAL FUNCTION THERAPY: CPT

## 2023-11-27 PROCEDURE — 2580000003 HC RX 258: Performed by: HOSPITALIST

## 2023-11-27 PROCEDURE — 92610 EVALUATE SWALLOWING FUNCTION: CPT

## 2023-11-27 PROCEDURE — A4216 STERILE WATER/SALINE, 10 ML: HCPCS | Performed by: HOSPITALIST

## 2023-11-27 RX ADMIN — SODIUM CHLORIDE: 4.5 INJECTION, SOLUTION INTRAVENOUS at 05:52

## 2023-11-27 RX ADMIN — INSULIN GLARGINE 10 UNITS: 100 INJECTION, SOLUTION SUBCUTANEOUS at 22:00

## 2023-11-27 RX ADMIN — METOPROLOL TARTRATE 12.5 MG: 25 TABLET, FILM COATED ORAL at 21:44

## 2023-11-27 RX ADMIN — PANTOPRAZOLE SODIUM 40 MG: 40 INJECTION, POWDER, FOR SOLUTION INTRAVENOUS at 08:43

## 2023-11-27 RX ADMIN — METOCLOPRAMIDE 5 MG: 5 INJECTION, SOLUTION INTRAMUSCULAR; INTRAVENOUS at 21:48

## 2023-11-27 RX ADMIN — METOCLOPRAMIDE 5 MG: 5 INJECTION, SOLUTION INTRAMUSCULAR; INTRAVENOUS at 13:14

## 2023-11-27 RX ADMIN — ENOXAPARIN SODIUM 30 MG: 100 INJECTION SUBCUTANEOUS at 13:14

## 2023-11-27 RX ADMIN — SODIUM CHLORIDE: 4.5 INJECTION, SOLUTION INTRAVENOUS at 20:05

## 2023-11-27 RX ADMIN — ATORVASTATIN CALCIUM 10 MG: 10 TABLET, FILM COATED ORAL at 21:43

## 2023-11-27 RX ADMIN — METOCLOPRAMIDE 5 MG: 5 INJECTION, SOLUTION INTRAMUSCULAR; INTRAVENOUS at 05:59

## 2023-11-27 RX ADMIN — CIPROFLOXACIN 400 MG: 400 INJECTION, SOLUTION INTRAVENOUS at 21:53

## 2023-11-27 RX ADMIN — CIPROFLOXACIN 400 MG: 400 INJECTION, SOLUTION INTRAVENOUS at 06:35

## 2023-11-27 ASSESSMENT — PAIN SCALES - GENERAL
PAINLEVEL_OUTOF10: 0

## 2023-11-27 NOTE — PROGRESS NOTES
TRANSFER - OUT REPORT:    Verbal report given to JESI Chen on Charlotte Vickers  being transferred to [facility] for routine progression of patient care       Report consisted of patient's Situation, Background, Assessment and   Recommendations(SBAR). Information from the following report(s) Nurse Handoff Report, Intake/Output, and MAR was reviewed with the receiving nurse. Lines:   Extended Dwell Peripherial IV 11/17/23 Left Brachial (Active)   Criteria for Appropriate Use Limited/no vessel suitable for conventional peripheral access 11/27/23 1600   Site Assessment Clean, dry & intact 11/27/23 1600   Phlebitis Assessment No symptoms 11/27/23 1600   Infiltration Assessment 0 11/27/23 1600   Line Status Infusing 11/27/23 1600   Line Care Connections checked and tightened 11/27/23 1600   Alcohol Cap Used Yes 11/27/23 1600   Date of Last Dressing Change 11/17/23 11/27/23 1600   Dressing Type Transparent w/CHG gel 11/27/23 1600   Dressing Status Clean, dry & intact 11/27/23 1600       Extended Dwell Peripherial IV 11/17/23 Right Brachial (Active)   Criteria for Appropriate Use Limited/no vessel suitable for conventional peripheral access 11/27/23 1600   Site Assessment Clean, dry & intact 11/27/23 1600   Phlebitis Assessment No symptoms 11/27/23 1600   Infiltration Assessment 0 11/27/23 1600   Line Status Infusing 11/27/23 1600   Line Care Connections checked and tightened 11/27/23 1600   Alcohol Cap Used Yes 11/27/23 1600   Date of Last Dressing Change 11/17/23 11/27/23 1600   Dressing Type Transparent w/CHG gel 11/27/23 1600   Dressing Status Clean, dry & intact 11/27/23 1600        Opportunity for questions and clarification was provided.       Patient transported with:  Monitor, O2 @ 2lpm, and Registered Nurse

## 2023-11-27 NOTE — PLAN OF CARE
Problem: SLP Adult - Impaired Swallowing  Goal: By Discharge: Advance to least restrictive diet without signs or symptoms of aspiration for planned discharge setting. See evaluation for individualized goals. Description: Dysphagia:   Patient will:  1. Tolerate PO trials with 0 s/s overt distress in 4/5 trials  2. Utilize compensatory swallow strategies/maneuvers (decrease bite/sip, size/rate, alt. liq/sol) with min cues in 4/5 trials  3. Perform oral-motor/laryngeal exercises to increase oropharyngeal swallow function with min cues  4. Complete an objective swallow study (i.e., MBSS) to assess swallow integrity, r/o aspiration, and determine of safest LRD, min A as indicated/ordered by MD     Recommendations:  NPO x ice chips after oral care  Meds nonorally  Aspiration precautions  Oral care TID  Outcome: Progressing   SPEECH LANGUAGE PATHOLOGY BEDSIDE SWALLOW EVALUATION/TREATMENT    Patient: Eliane Nix (59 y.o. male)  Date: 11/26/2023  Primary Diagnosis: DKA, type 1, not at goal [facility]) [E10.10]       Precautions: Aspiration  PLOF: As per H&P  ASSESSMENT:  Clinical s/sx of oropharyngeal dysphagia in setting of AMS, prolonged endotracheal intubation, and small acute lacunar infarct in the gray matter of the left parietal lobe. Pt intubated 11/17; extubated 11/26. Seen at bedside, alert but appeared confused leaning forward in bed. Vocal quality hoarse with significantly reduced intensity. Dependent oral care completed via suction swab. Dxtx trials included ice chips and thin liquids via tsp and cup edge. Patient demo immediate weak cough and altered vocal quality following thin liquid via cup edge trials. No additional overt s/sx of aspiration with ice chips. Pt a high risk for prandial aspiration in the setting of encephalopathy, poor secretion management, and overall debility. Recommend NPO x ice chips with frequent oral care. Meds nononrally. POC d/w pt and RN.   SLP will follow understands intent and goals of therapy, neutral about participation. []            Patient unable to participate in goal setting/plan of care secondary to cognition, hearing/vision deficits; education ongoing with interdisciplinary staff   []            Handout regarding diet recommendations and thickener instructions provided. []         Posted safety precautions in patient's room.     Thank you for this referral.     Zonia Farooq M.S., [address] [facility]  Speech-Language Pathologist

## 2023-11-27 NOTE — PROGRESS NOTES
11/27/2023 PT note: consult received and chart reviewed. Okayed to see pt per nurse. Evaluation attempted. Pt too lethargic at this time. Nurse notified of same. Will f/u at later time as pt schedule allows for PT evaluation.  Thank you for this referral.   Nelda, PT

## 2023-11-27 NOTE — PROGRESS NOTES
Nutrition Follow-up Assessment       Nutrition Recommendations/Plan:   Initiate po diet within the next 7 days - texture per SLP  Monitor weight for trend  Continue to monitor diet plan, weight, labs, and plan of care during admission. Nutrition Assessment:  59 y.o. male is on ICU D# 10 for DKA, type 1. Recently extubated (on 11/26) and on 2L NC at this time. Per visit pt is laying in bed,  alert to voice, but non-verbal and weak . Significant wt change noticed since admission : +7.4% * 10 days. Question current wt accuracy. Current on NPO diet. Last BM (including prior to admit):  (PTA)  Edema: None    Nutrition Related Findings:   Pertinent meds: lantus.  Pertinent Labs: glu 131   Wound Type: Open Wounds (on sacrum)    Malnutrition Assessment:  Malnutrition Status: Severe malnutrition  (Refer to Nutrition Note on 11/17 for full assessment)    Estimated Daily Nutrient Needs:  Energy (kcal):  9027-5590 ([place] 1.2AF, 1-1.1 SF) Weight Used for Energy Requirements: Current     Protein (g):   (1.2-2 g/kg) Weight Used for Protein Requirements: Current        Fluid (ml/day):  or per MD Method Used for Fluid Requirements: 1 ml/kcal    Current Nutrition Therapies:    Diet NPO    Anthropometric Measures:  Height: 175.3 cm (5' 9.02\")  Current Body Wt: 58.4 kg (128 lb 12 oz)   BMI: 19    Nutrition Diagnosis:   Inadequate oral intake related to  (swallowing difficulty secondary to hx of intubation) as evidenced by NPO or clear liquid status due to medical condition      Nutrition Interventions:   Food and/or Nutrient Delivery: Continue NPO, Start Oral Diet  Nutrition Education/Counseling: No recommendation at this time  Coordination of Nutrition Care: Continue to monitor while inpatient, Interdisciplinary Rounds       Goals:  Previous Goal Met: Goal(s) Achieved  Goals: Initiate PO diet, within 7 days     Nutrition Monitoring and Evaluation:      Food/Nutrient Intake Outcomes: Diet

## 2023-11-27 NOTE — PROGRESS NOTES
[facility]  Pulmonary, Critical Care, and Sleep Medicine    Name: Ruben Mejia MRN: [id number]   : 1965 Hospital: [facility]    Date: 2023  Room: [address] Note                                              Consult requesting physician: Dr. Maximiliano Spicer  Reason for Consult: Assisting in ICU care for acute respiratory failure     IMPRESSION:     Acute respiratory failure D67.20  Metabolic encephalopathy P83.86  DKA, type 1 not at goal vs hyperglycemic hyperosmolar syndrome E10.10  Hypotension I95.0  Hypernatremia E85.0  ARF (acute renal failure) N17.9  Dehydration E86.0  RA (rheumatoid arthritis)  Kidney stone on left side  Depression  Tardive dyskinesia  Psychiatric disorder  Seizure  Active Hospital Problems    Diagnosis Date Noted    Encephalopathy [G93.40] 2023    Bacteremia [R78.81] 2023    Thrombocytopenia ([address]) [D69.6] 2023    DKA, type 1, not at goal [facility]) [E10.10] 2023    Tardive dyskinesia [G24.01] 2023    Psychiatric disorder [F99] 2023    ARF (acute renal failure) ([address]) [N17.9] 2023    Seizure ([address]) [R56.9] 2023    RA (rheumatoid arthritis) ([address]) [M06.9] 2017    Depression [F32. A] 2017    Type 1 diabetes mellitus ([address]) [E10.9] 2017    Kidney stone on left side [N20.0] 2017     Code status: Full Code      RECOMMENDATIONS:     Respiratory: Acute respiratory failure secondary to metabolic encephalopathy, intubated for airway protection on 2023 early morning. Extubated on 2023. CXR 2023: No acute process. Protecting airway well. Incentive spirometer; aspiration precautions. Keep SPO2 >=92%. HOB 30 degree elevation all the time. Aggressive pulmonary toileting. Aspiration precautions, pulmonary hygiene care. Bronchodilators: duo-neb prn. CVS: Stable hemodynamics; patient remains off pressor.   ECHO 2023: LVEF 55-60%, pulmonary pressures not Indeterminate diastolic function. Tricuspid Valve: Unable to assess RVSP due to inadequate or insignificant tricuspid regurgitation. US RETROPERITONEAL COMPLETE  Result Date: 11/17/2023  EXAM: US RETROPERITONEAL COMPLETE INDICATION: ARF COMPARISON: CT abdomen pelvis 7/3/2023 TECHNIQUE: Ultrasound of the kidneys and urinary bladder. FINDINGS: The right and left kidneys measure 10.1 and 9.9 cm, respectively. No hydronephrosis. Several nonobstructing bilateral renal calculi measuring up to 9 mm in the right upper pole and 10 mm in the left lower pole. No renal mass. The cortical thickness and echogenicity are preserved. The corticomedullary differentiation is within normal limits. The bladder is decompressed with Hennessy. Atherosclerosis with 3.9 cm x 3.7 cm in axial dimension and 7.6 cm craniocaudal dimension infrarenal abdominal aortic aneurysm. IVC within normal limits. 1.  Nonobstructing bilateral nephrolithiasis. 2.  Infrarenal abdominal aortic aneurysm. CT HEAD WO CONTRAST  Result Date: 11/16/2023  Indication:  ams Comparison: None Findings: 5 mm axial images were obtained from the skull base through the vertex. CT dose reduction was achieved through the use of a standardized protocol tailored for this examination and automatic exposure control for dose modulation. The ventricles and cortical sulci are prominent, compatible with age related volume loss. There is no evidence of intracranial hemorrhage, mass, mass effect, or acute infarct. There is periventricular white matter disease. No extra-axial fluid collections are seen. The visualized paranasal sinuses and mastoid air cells are clear. The orbital structures are unremarkable. No osseous abnormalities are seen. 1. No evidence of acute infarct or intracranial hemorrhage. 2. Mild periventricular white matter disease is likely secondary to chronic small vessel ischemic changes.       US ABD LIMITED  Result Date: 11/20/2023  INDICATION: Abnormal liver

## 2023-11-28 ENCOUNTER — APPOINTMENT (OUTPATIENT)
Facility: HOSPITAL | Age: 58
DRG: 870 | End: 2023-11-28
Payer: MEDICARE

## 2023-11-28 PROBLEM — I63.9 CVA (CEREBRAL VASCULAR ACCIDENT) (HCC): Status: ACTIVE | Noted: 2023-11-28

## 2023-11-28 LAB
GLUCOSE BLD STRIP.AUTO-MCNC: 166 MG/DL (ref 70–110)
GLUCOSE BLD STRIP.AUTO-MCNC: 172 MG/DL (ref 70–110)
GLUCOSE BLD STRIP.AUTO-MCNC: 189 MG/DL (ref 70–110)
GLUCOSE BLD STRIP.AUTO-MCNC: 196 MG/DL (ref 70–110)
GLUCOSE BLD STRIP.AUTO-MCNC: 231 MG/DL (ref 70–110)

## 2023-11-28 PROCEDURE — C9113 INJ PANTOPRAZOLE SODIUM, VIA: HCPCS | Performed by: HOSPITALIST

## 2023-11-28 PROCEDURE — 74230 X-RAY XM SWLNG FUNCJ C+: CPT

## 2023-11-28 PROCEDURE — A4216 STERILE WATER/SALINE, 10 ML: HCPCS | Performed by: HOSPITALIST

## 2023-11-28 PROCEDURE — 97166 OT EVAL MOD COMPLEX 45 MIN: CPT

## 2023-11-28 PROCEDURE — 6360000002 HC RX W HCPCS: Performed by: HOSPITALIST

## 2023-11-28 PROCEDURE — 6360000002 HC RX W HCPCS: Performed by: INTERNAL MEDICINE

## 2023-11-28 PROCEDURE — 97530 THERAPEUTIC ACTIVITIES: CPT

## 2023-11-28 PROCEDURE — 97162 PT EVAL MOD COMPLEX 30 MIN: CPT

## 2023-11-28 PROCEDURE — 2580000003 HC RX 258: Performed by: FAMILY MEDICINE

## 2023-11-28 PROCEDURE — 6370000000 HC RX 637 (ALT 250 FOR IP): Performed by: INTERNAL MEDICINE

## 2023-11-28 PROCEDURE — 1100000003 HC PRIVATE W/ TELEMETRY

## 2023-11-28 PROCEDURE — 2700000000 HC OXYGEN THERAPY PER DAY

## 2023-11-28 PROCEDURE — 6370000000 HC RX 637 (ALT 250 FOR IP): Performed by: FAMILY MEDICINE

## 2023-11-28 PROCEDURE — 82962 GLUCOSE BLOOD TEST: CPT

## 2023-11-28 PROCEDURE — 2580000003 HC RX 258: Performed by: HOSPITALIST

## 2023-11-28 PROCEDURE — 2500000003 HC RX 250 WO HCPCS: Performed by: HOSPITALIST

## 2023-11-28 RX ADMIN — METOCLOPRAMIDE 5 MG: 5 INJECTION, SOLUTION INTRAMUSCULAR; INTRAVENOUS at 06:29

## 2023-11-28 RX ADMIN — VENLAFAXINE 37.5 MG: 37.5 TABLET ORAL at 18:19

## 2023-11-28 RX ADMIN — PANTOPRAZOLE SODIUM 40 MG: 40 INJECTION, POWDER, FOR SOLUTION INTRAVENOUS at 09:17

## 2023-11-28 RX ADMIN — ENOXAPARIN SODIUM 30 MG: 100 INJECTION SUBCUTANEOUS at 14:12

## 2023-11-28 RX ADMIN — ATORVASTATIN CALCIUM 10 MG: 10 TABLET, FILM COATED ORAL at 21:12

## 2023-11-28 RX ADMIN — INSULIN GLARGINE 10 UNITS: 100 INJECTION, SOLUTION SUBCUTANEOUS at 21:14

## 2023-11-28 RX ADMIN — SODIUM CHLORIDE: 4.5 INJECTION, SOLUTION INTRAVENOUS at 21:03

## 2023-11-28 RX ADMIN — CIPROFLOXACIN 400 MG: 400 INJECTION, SOLUTION INTRAVENOUS at 06:32

## 2023-11-28 RX ADMIN — METOCLOPRAMIDE 5 MG: 5 INJECTION, SOLUTION INTRAMUSCULAR; INTRAVENOUS at 14:12

## 2023-11-28 RX ADMIN — METOPROLOL TARTRATE 12.5 MG: 25 TABLET, FILM COATED ORAL at 21:11

## 2023-11-28 RX ADMIN — BARIUM SULFATE 20 ML: 400 PASTE ORAL at 14:49

## 2023-11-28 RX ADMIN — METOCLOPRAMIDE 5 MG: 5 INJECTION, SOLUTION INTRAMUSCULAR; INTRAVENOUS at 21:11

## 2023-11-28 RX ADMIN — CIPROFLOXACIN 400 MG: 400 INJECTION, SOLUTION INTRAVENOUS at 18:23

## 2023-11-28 RX ADMIN — BARIUM SULFATE 20 G: 960 POWDER, FOR SUSPENSION ORAL at 14:49

## 2023-11-28 ASSESSMENT — PAIN SCALES - GENERAL
PAINLEVEL_OUTOF10: 0
PAINLEVEL_OUTOF10: 0

## 2023-11-28 NOTE — PROCEDURES
SPEECH PATHOLOGY MODIFIED BARIUM SWALLOW STUDY & TREATMENT  Recommendations:   Soft and bite size diet with thin liquids  Meds crushed in puree  Feeding assistance as needed   Aspiration precautions  HOB >45 degrees during all intake and for at least 30 min after intake  Small bites/sips, Feed slowly, alternating bites/sips   Oral care three times daily   Dysphagia treatment    Goals:  Patient will:  1. Tolerate PO trials with 0 s/sx overt distress in 4/5 trials in order to determine least restrictive diet  2. Utilize compensatory swallow strategies/maneuvers (decrease bite/sip, size/rate, alt. liq/sol) with min cues in 4/5 trials in order to increase safety and tolerance of PO intake. 3. Perform oral-motor/laryngeal exercises to increase oropharyngeal swallow function with min cues       Patient: Eve Sandoval (79 y.o. male)  Date: 11/28/2023  Primary Diagnosis: DKA, type 1, not at goal [facility]) [E10.10]       Precautions: Aspiration    ASSESSMENT :  Based on the objective data described below, the patient presents with mild oropharyngeal dysphagia. Patient seen today for MBS. Trials of thin liquid +/- straw, puree, and regular solid trials were completed. Overall, swallow is functional with positive airway protection. No aspiration or penetration observed across study. Deficits include mild oral transit delay and swallow delay, resulting premature spillage of thin and puree material. Residue of all textures noted in valleculae post swallow d/t decreased tongue base retraction. Results of study shared with patient with video feedback. Recommend soft and bite size with thin liquids, meds crushed in puree as able, and aspiration precautions. Compensatory strategies include small bite/sip size, eat only when alert, alternate solids and liquids, slow rate, and upright positioning. Patient verbalized understanding.          TREATMENT :  Treatment provided post diagnostic testing including oropharyngeal assessed  Pharyngeal-Esophageal Segment: No impairment  Pharyngeal Dysfunction: None  Findings  Oral Phase Severity: Mild  Pharyngeal Phase Severity: Minimal       8-point Penetration-Aspiration Scale: Score 1    PAIN:  Pt reports 0/10 pain or discomfort prior to MBS. Pt reports 0/10 pain or discomfort post MBS. COMMUNICATION/EDUCATION:   [x]  Post diagnostic testing education including oropharyngeal anatomy/physiology, MBS results, diet recommendations and compensatory strategies/positioning provided via demonstration, verbalization and teach back of comprehension   []  Video feedback utilized   []  Handout regarding diet recommendations and thickener instructions provided.   []  Patient/family have participated as able in goal setting and plan of care  []  Patient/family agree to work toward stated goals and plan of care  []  Patient understands intent and goals of therapy but is neutral about his/her participation  []  Patient unable to participate in goal setting/plan of care secondary to cognition, hearing/vision deficits; education ongoing with interdisciplinary staff    Thank you for this referral,  Luis E Escobedo M.S., [address] [facility]

## 2023-11-28 NOTE — PLAN OF CARE
Problem: Discharge Planning  Goal: Discharge to home or other facility with appropriate resources  11/28/2023 1156 by Daryle Hai, RN  Outcome: Progressing  Flowsheets (Taken 11/28/2023 0730)  Discharge to home or other facility with appropriate resources: Identify barriers to discharge with patient and caregiver  11/28/2023 0145 by Yokasta Mccord RN  Outcome: Progressing     Problem: Safety - Medical Restraint  Goal: Remains free of injury from restraints (Restraint for Interference with Medical Device)  Description: INTERVENTIONS:  1. Determine that other, less restrictive measures have been tried or would not be effective before applying the restraint  2. Evaluate the patient's condition at the time of restraint application  3. Inform patient/family regarding the reason for restraint  4.  Q2H: Monitor safety, psychosocial status, comfort, nutrition and hydration  11/28/2023 1156 by Daryle Hai, RN  Outcome: Progressing  11/28/2023 0145 by Yokasta Mccord RN  Outcome: Progressing     Problem: Safety - Adult  Goal: Free from fall injury  11/28/2023 0145 by Yokasta Mccord RN  Outcome: Progressing     Problem: Chronic Conditions and Co-morbidities  Goal: Patient's chronic conditions and co-morbidity symptoms are monitored and maintained or improved  Recent Flowsheet Documentation  Taken 11/28/2023 0730 by Daryle Hai, RN  Care Plan - Patient's Chronic Conditions and Co-Morbidity Symptoms are Monitored and Maintained or Improved: Monitor and assess patient's chronic conditions and comorbid symptoms for stability, deterioration, or improvement  11/28/2023 0145 by Yokasta Mccord RN  Outcome: Progressing     Problem: Pain  Goal: Verbalizes/displays adequate comfort level or baseline comfort level  11/28/2023 0145 by Yokasta Mccord RN  Outcome: Progressing     Problem: Nutrition Deficit:  Goal: Optimize nutritional status  11/28/2023 0145 by Yokasta Mccord RN  Outcome: Progressing     Problem:

## 2023-11-28 NOTE — PROGRESS NOTES
Hospitalist Progress Note    Patient: Ruben Mejia MRN: [id number]  CSN: [id number]    YOB: 1965  Age: 59 y.o. Sex: male    DOA: 11/16/2023 LOS:  LOS: 11 days                Assessment/Plan     Active Hospital Problems    Diagnosis     CVA (cerebral vascular accident) ([address]) [I63.9]     Encephalopathy [G93.40]     Bacteremia [R78.81]     Thrombocytopenia ([address]) [D69.6]     DKA, type 1, not at goal [facility]) [E10.10]     Tardive dyskinesia [G24.01]     Psychiatric disorder [F99]     ARF (acute renal failure) ([address]) [N17.9]     Seizure ([address]) [R56.9]     RA (rheumatoid arthritis) ([address]) [M06.9]     Depression [A52. A]     Type 1 diabetes mellitus (HCC) [E10.9]     Kidney stone on left side [N20.0]         Chief complaint :  AMS, DKA    Acute respiratory failure -  Secondary to metabolic encephalopathy  Intubated to protect airway  Extubated 11/23/2023    DKA -  Resolved  Off insulin drip  On lantus, SSI  Diabetic diet    Klebsiella bacteremia -  Suspect GI source  On cipro  Seen by ID    Encephalopathy -  Metabolic resolved    CVA -  MRI brain with small acute lacunar infarct in left parietal lobe  On aspirin and statin. Seen by neurology    Dysphagia -  SLP eval and diet per SLP    Thrombocytopenia -  Resolved     Tardive diskinesia    PT/OT    Discharge planning. Disposition : TBD    Review of systems  General: No fevers or chills. Cardiovascular: No chest pain or pressure. No palpitations. Pulmonary: No shortness of breath. Gastrointestinal: No nausea, vomiting. Physical Exam:  General: Awake, cooperative, no acute distress    HEENT: NC, Atraumatic. PERRLA, anicteric sclerae. Lungs: CTA Bilaterally. No Wheezing/Rhonchi/Rales. Heart:  S1 S2,  No murmur, No Rubs, No Gallops  Abdomen: Soft, Non distended, Non tender. +Bowel sounds,   Extremities: No c/c/e  Psych:   Not anxious or agitated. Neurologic:  No acute neurological deficit.          Vital signs/Intake and Output:  Visit Vitals  BP

## 2023-11-28 NOTE — PROGRESS NOTES
Speech-Language Pathology  MBS completed. No gross aspiration or penetration observed. Patient may begin soft and bite size diet with thin liquids. Aspiration precautions and meds crushed in puree as able. Full report to follow.    Trey Badillo M.S., CCC-SLP

## 2023-11-28 NOTE — PROGRESS NOTES
SW spoke with patient regarding DC planning needs. Patient agreeable to SNF upon DC. Referral placed for local facilities. List provided at bedside. Patient to continue working with PT/OT in acute setting. Patient remains on 02. WILBUR to follow.

## 2023-11-28 NOTE — PLAN OF CARE
Problem: Discharge Planning  Goal: Discharge to home or other facility with appropriate resources  Outcome: Progressing     Problem: Safety - Medical Restraint  Goal: Remains free of injury from restraints (Restraint for Interference with Medical Device)  Description: INTERVENTIONS:  1. Determine that other, less restrictive measures have been tried or would not be effective before applying the restraint  2. Evaluate the patient's condition at the time of restraint application  3. Inform patient/family regarding the reason for restraint  4. Q2H: Monitor safety, psychosocial status, comfort, nutrition and hydration  Outcome: Progressing     Problem: Safety - Adult  Goal: Free from fall injury  Outcome: Progressing     Problem: Pain  Goal: Verbalizes/displays adequate comfort level or baseline comfort level  Outcome: Progressing     Problem: Skin/Tissue Integrity  Goal: Absence of new skin breakdown  Description: 1. Monitor for areas of redness and/or skin breakdown  2. Assess vascular access sites hourly  3. Every 4-6 hours minimum:  Change oxygen saturation probe site  4. Every 4-6 hours:  If on nasal continuous positive airway pressure, respiratory therapy assess nares and determine need for appliance change or resting period.   Outcome: Progressing

## 2023-11-29 VITALS
HEIGHT: 69 IN | DIASTOLIC BLOOD PRESSURE: 71 MMHG | RESPIRATION RATE: 18 BRPM | HEART RATE: 69 BPM | SYSTOLIC BLOOD PRESSURE: 128 MMHG | OXYGEN SATURATION: 99 % | WEIGHT: 128 LBS | BODY MASS INDEX: 18.96 KG/M2 | TEMPERATURE: 97.3 F

## 2023-11-29 LAB
ANION GAP SERPL CALC-SCNC: 10 MMOL/L (ref 3–18)
BUN SERPL-MCNC: 9 MG/DL (ref 7–18)
BUN/CREAT SERPL: 11 (ref 12–20)
CALCIUM SERPL-MCNC: 8.2 MG/DL (ref 8.5–10.1)
CHLORIDE SERPL-SCNC: 103 MMOL/L (ref 100–111)
CO2 SERPL-SCNC: 25 MMOL/L (ref 21–32)
CREAT SERPL-MCNC: 0.82 MG/DL (ref 0.6–1.3)
ERYTHROCYTE [DISTWIDTH] IN BLOOD BY AUTOMATED COUNT: 12.7 % (ref 11.6–14.5)
GLUCOSE BLD STRIP.AUTO-MCNC: 114 MG/DL (ref 70–110)
GLUCOSE BLD STRIP.AUTO-MCNC: 140 MG/DL (ref 70–110)
GLUCOSE BLD STRIP.AUTO-MCNC: 228 MG/DL (ref 70–110)
GLUCOSE BLD STRIP.AUTO-MCNC: 238 MG/DL (ref 70–110)
GLUCOSE SERPL-MCNC: 124 MG/DL (ref 74–99)
HCT VFR BLD AUTO: 32.5 % (ref 36–48)
HGB BLD-MCNC: 10.9 G/DL (ref 13–16)
MCH RBC QN AUTO: 29.9 PG (ref 24–34)
MCHC RBC AUTO-ENTMCNC: 33.5 G/DL (ref 31–37)
MCV RBC AUTO: 89.3 FL (ref 78–100)
NRBC # BLD: 0 K/UL (ref 0–0.01)
NRBC BLD-RTO: 0 PER 100 WBC
PLATELET # BLD AUTO: 419 K/UL (ref 135–420)
PMV BLD AUTO: 10.6 FL (ref 9.2–11.8)
POTASSIUM SERPL-SCNC: 3.1 MMOL/L (ref 3.5–5.5)
RBC # BLD AUTO: 3.64 M/UL (ref 4.35–5.65)
SODIUM SERPL-SCNC: 138 MMOL/L (ref 136–145)
WBC # BLD AUTO: 11.9 K/UL (ref 4.6–13.2)

## 2023-11-29 PROCEDURE — 82962 GLUCOSE BLOOD TEST: CPT

## 2023-11-29 PROCEDURE — 97530 THERAPEUTIC ACTIVITIES: CPT

## 2023-11-29 PROCEDURE — 6370000000 HC RX 637 (ALT 250 FOR IP): Performed by: HOSPITALIST

## 2023-11-29 PROCEDURE — 36415 COLL VENOUS BLD VENIPUNCTURE: CPT

## 2023-11-29 PROCEDURE — 97112 NEUROMUSCULAR REEDUCATION: CPT

## 2023-11-29 PROCEDURE — 97116 GAIT TRAINING THERAPY: CPT

## 2023-11-29 PROCEDURE — 6370000000 HC RX 637 (ALT 250 FOR IP): Performed by: INTERNAL MEDICINE

## 2023-11-29 PROCEDURE — 85027 COMPLETE CBC AUTOMATED: CPT

## 2023-11-29 PROCEDURE — 80048 BASIC METABOLIC PNL TOTAL CA: CPT

## 2023-11-29 PROCEDURE — 6360000002 HC RX W HCPCS: Performed by: HOSPITALIST

## 2023-11-29 PROCEDURE — 6360000002 HC RX W HCPCS: Performed by: INTERNAL MEDICINE

## 2023-11-29 PROCEDURE — 6370000000 HC RX 637 (ALT 250 FOR IP): Performed by: PSYCHIATRY & NEUROLOGY

## 2023-11-29 PROCEDURE — 6370000000 HC RX 637 (ALT 250 FOR IP): Performed by: FAMILY MEDICINE

## 2023-11-29 RX ORDER — POTASSIUM CHLORIDE 20 MEQ/1
40 TABLET, EXTENDED RELEASE ORAL ONCE
Status: DISCONTINUED | OUTPATIENT
Start: 2023-11-29 | End: 2023-11-29 | Stop reason: HOSPADM

## 2023-11-29 RX ORDER — INSULIN GLARGINE 100 [IU]/ML
10 INJECTION, SOLUTION SUBCUTANEOUS NIGHTLY
Qty: 10 ML | Refills: 3
Start: 2023-11-29

## 2023-11-29 RX ADMIN — TAMSULOSIN HYDROCHLORIDE 0.4 MG: 0.4 CAPSULE ORAL at 10:00

## 2023-11-29 RX ADMIN — METOPROLOL TARTRATE 12.5 MG: 25 TABLET, FILM COATED ORAL at 10:00

## 2023-11-29 RX ADMIN — PANCRELIPASE LIPASE, PANCRELIPASE PROTEASE, PANCRELIPASE AMYLASE 10000 UNITS: 5000; 17000; 24000 CAPSULE, DELAYED RELEASE ORAL at 11:40

## 2023-11-29 RX ADMIN — ASPIRIN 81 MG: 81 TABLET, CHEWABLE ORAL at 09:59

## 2023-11-29 RX ADMIN — INSULIN LISPRO 4 UNITS: 100 INJECTION, SOLUTION INTRAVENOUS; SUBCUTANEOUS at 00:38

## 2023-11-29 RX ADMIN — CIPROFLOXACIN 400 MG: 400 INJECTION, SOLUTION INTRAVENOUS at 06:37

## 2023-11-29 RX ADMIN — METOCLOPRAMIDE 5 MG: 5 INJECTION, SOLUTION INTRAMUSCULAR; INTRAVENOUS at 06:33

## 2023-11-29 RX ADMIN — VENLAFAXINE 37.5 MG: 37.5 TABLET ORAL at 10:00

## 2023-11-29 RX ADMIN — POTASSIUM BICARBONATE 40 MEQ: 782 TABLET, EFFERVESCENT ORAL at 10:05

## 2023-11-29 RX ADMIN — VENLAFAXINE 37.5 MG: 37.5 TABLET ORAL at 13:24

## 2023-11-29 RX ADMIN — INSULIN LISPRO 4 UNITS: 100 INJECTION, SOLUTION INTRAVENOUS; SUBCUTANEOUS at 11:40

## 2023-11-29 RX ADMIN — ENOXAPARIN SODIUM 30 MG: 100 INJECTION SUBCUTANEOUS at 13:24

## 2023-11-29 NOTE — DISCHARGE SUMMARY
[facility]ia SUMMARY    Name:  Fortino Harman  MR#:   [id number]  :  1965  ACCOUNT #:  [de-identified]  ADMIT DATE:  2023  DISCHARGE DATE:  2023      HOSPITAL SUMMARY:  The patient was admitted and diagnosed with diabetic ketoacidosis, severe metabolic encephalopathy. He has a history of tardive dyskinesia, depression, type 1 diabetes, psychiatric disease, previous seizures. The patient came in with severe metabolic derangements consistent with hyperosmolar hyperglycemic state as well as DKA. The patient was intubated for management of severe electrolyte abnormalities and fluid resuscitation. The patient was diagnosed with a small stroke. During his hospitalization, he had a prolonged period on the mechanical ventilator, recovering from the severe metabolic illness. He was followed by Neurology and Critical Care Pulmonology. The stroke is a small acute lacunar infarct in the gray matter of the left parietal lobe. The patient's echocardiogram shows ejection fraction of 55-60%. The patient had a CT abdomen and pelvis that showed nephrolithiasis, but no significant hydronephrosis. There is a stable abdominal aortic aneurysm. The patient was ultimately stabilized enough to come off the mechanical ventilator and then placed on diet, transferred to the telemetry unit. Today's potassium is 3.1, which is being repleted. Last blood sugar is 238. Acidosis is all resolved. The patient's hemoglobin A1c was 13.7 on  indicating significant poor control at home. The patient's last chest x-ray shows no acute process. There are no signs of systemic infection here and the patient is off any intravenous antibiotics at this point. His Accu-Cheks have been done before meals and at bedtime. He is on aspirin, Lipitor, basal insulin, his pancreatic enzymes, low-dose Lopressor, Flomax, Effexor that he was on at home, and currently he is on a dysphagia soft and bite sized diet. Today, he is awake and very debilitated, thin gentleman, but pleasant and conversive. He is oriented now. Saturating 99% on room air, pulse is 69, blood pressure 128/71, temperature 97.3, respiratory rate is 18. Lungs are clear. Cardiac exam, regular rate and rhythm. Abdomen is benign. Lower extremities are without edema. The patient can discharge to a skilled nursing facility for continued rehabilitation and strengthening. DISCHARGE MEDICATIONS:  1. Lantus 10 units subcu at night. 2.  Metoprolol 12.5 mg twice daily. 3.  Vitamin D 125 mcg daily. 4.  Continue albuterol two puffs every 4 hours as needed. 5.  Aspirin 81 mg daily. 6.  Lipitor 20 mg at night. 7.  Ingrezza 80 mg at bedtime. 8.  Sliding scale insulin as needed three times daily. 9.  Creon one capsule four times daily. 10.  Seroquel 150 mg daily. 11.  Flomax 0.4 mg daily. 12.  Effexor 150 mg daily. With that, he is medically appropriate for discharge to skilled nursing for continued rehabilitation. Family is aware of the transition plan today. The patient is going to [facility] and [facility] in [place]. He is a Full Code and his diabetic followup should be with his primary care physician, Glenna Dhillon NP, after discharge from the nursing facility. Continue PT and OT and monitoring blood sugars before meals and at bedtime with hypoglycemia protocol in place. Total time on discharge 40 minutes.       Re Garnica MD      [id number]  D:  11/29/2023 13:34  T:  11/29/2023 14:01  JOB #:  [id number]

## 2023-11-29 NOTE — PROGRESS NOTES
0630: Patient was weaned off intranasal oxygen and has been off since 12am, his O2 sat has remained above 98%

## 2023-11-29 NOTE — PLAN OF CARE
Problem: Discharge Planning  Goal: Discharge to home or other facility with appropriate resources  11/29/2023 0139 by Michelle Sanchez RN  Outcome: Progressing  11/28/2023 1156 by Remona Favre, RN  Outcome: Progressing  Flowsheets (Taken 11/28/2023 0730)  Discharge to home or other facility with appropriate resources: Identify barriers to discharge with patient and caregiver     Problem: Safety - Medical Restraint  Goal: Remains free of injury from restraints (Restraint for Interference with Medical Device)  Description: INTERVENTIONS:  1. Determine that other, less restrictive measures have been tried or would not be effective before applying the restraint  2. Evaluate the patient's condition at the time of restraint application  3. Inform patient/family regarding the reason for restraint  4.  Q2H: Monitor safety, psychosocial status, comfort, nutrition and hydration  11/29/2023 0139 by Michelle Sanchez RN  Outcome: Progressing  11/28/2023 1156 by Remona Favre, RN  Outcome: Progressing     Problem: Safety - Adult  Goal: Free from fall injury  Outcome: Progressing     Problem: Chronic Conditions and Co-morbidities  Goal: Patient's chronic conditions and co-morbidity symptoms are monitored and maintained or improved  Outcome: Progressing     Problem: Pain  Goal: Verbalizes/displays adequate comfort level or baseline comfort level  Outcome: Progressing

## 2023-11-29 NOTE — PROGRESS NOTES
0630: Patient has was weaned off off intranasal oxygen and has been off since 12am, his O2 sat has remained above 98%

## 2023-11-29 NOTE — PROGRESS NOTES
PT to see patient prior to DC for SNF purposes. FOC offered, family has chosen:    [facility]    Address:  [address] [facility], [address] [place] [address] [address]    [place] [employer] [facility]  [address]    Phone:  [phone]  Fax:  [phone]      Transport requested for 1600 via stretcher. SNF reaching out to family to provide a med to building upon admission. Family in agreement. Patient aware and agreeable with DC plan at this time.

## 2023-11-29 NOTE — PROGRESS NOTES
Physical Therapy Goals:  Initiated 11/29/2023 to be met within 7-10 days. Short Term Goals  Short Term Goal 1: Patient will perform supine to/from sit with supervision. Short Term Goal 2: Patient will perform sit to/from stand with CGA. Short Term Goal 3: Patient will perform ambuation 50ft with RW/min A for increased functional mobility. Short Term Goal 4: Patient will perform step negotiation x 5 as indicated for home re-entry. PHYSICAL THERAPY TREATMENT    Patient: Catrachita Tadeo (2 y.o. male)  Date: 11/29/2023  Diagnosis: DKA, type 1, not at goal [facility]) [E10.10] DKA, type 1, not at goal [facility])    Precautions: Fall Risk  PLOF: amb independently w/o AD    ASSESSMENT:  Pt found supine in bed, CNA present taking VS.  Pt w/o c/o pain and eager to participate with PT session. Transitions supine to sit EOB with supervision. Demonstrates intact static sitting balance unsupported-able to resist perturbations; dynamic balance fair+/G-. Pt tolerated BLE ex as noted below well, H/S tightness L>R slightly. GB donned and pt STS with vc/CGA. Able to progress to GT/RW/CG/min A ~32ft with reciprocal gt, decr'd foot clearance and step length. Expressed feeling fatigued nursing home and returned to sit EOB post GT activity. Significant improvement in balance and tolerance for standing activity noted. Continue to recommend IPR at discharge. Progression toward goals:   [x]      Improving appropriately and progressing toward goals  []      Improving slowly and progressing toward goals  []      Not making progress toward goals and plan of care will be adjusted     PLAN:  Patient continues to benefit from skilled intervention to address the above impairments. Continue treatment per established plan of care.     Further Equipment Recommendations for Discharge: n/a  Discharge Recommendation: IP Rehab    [facility]: 15/20    This [facility] score should be considered in conjunction with interdisciplinary team recommendations to

## 2023-12-06 NOTE — PROGRESS NOTES
Physician Progress Note      PATIENT:               Charlotte Vickers  CSN #:                  [id number]  :                       1965  ADMIT DATE:       2023 7:44 PM  [address] [facility] DATE:        2023 4:36 PM  RESPONDING  PROVIDER #:        Caleb Blum MD          QUERY TEXT:    Patient admitted with DKA. Documentation reflects septic shock  in  ID   progress note and per query on 23. If possible, please document in the   progress notes and discharge summary if Sepsis gram-negative   was: The medical record reflects the following:  Risk Factors:  septic shock and GNR bacteremia with source- likely GI/  Clinical Indicators: WBC 22.1, with left shift, , RR 29-34, Hypotension   , Positive blood culture    / ID/GNR bacteremia--anaerobic medium-- source- likely GI/   transient. Septic shock/ Bandemia 9%- on pressor  Brought into ED with AMS, and found to be in DKA, septic shock with GALDINO and   admitted to ICU.    /Hosp/H&P/ levophed support hypotension    Treatment: Levophed, Currently on Vanco and cefepime, add Flagyl for anerobic  coverage    Thank You  Hector Yao RN,CDI,CRCR  Options provided:  -- Sepsis gram-negative   confirmed after study  -- Sepsis gram-negative treated and resolved  -- Sepsis ruled out after study  -- Other - I will add my own diagnosis  -- Disagree - Not applicable / Not valid  -- Disagree - Clinically unable to determine / Unknown  -- Refer to Clinical Documentation Reviewer    PROVIDER RESPONSE TEXT:    Sepsis gram-negative treated and resolved.     Query created by: Hector Yao on 2023 10:27 AM      Electronically signed by:  Caleb Blum MD 2023 6:53 AM

## 2024-06-18 RX ORDER — GLYCOPYRROLATE 0.2 MG/ML
0.1 INJECTION INTRAMUSCULAR; INTRAVENOUS ONCE
Status: CANCELLED | OUTPATIENT
Start: 2024-06-18 | End: 2024-06-18

## 2024-06-18 RX ORDER — EPINEPHRINE IN SOD CHLOR,ISO 1 MG/10 ML
1 SYRINGE (ML) INTRAVENOUS ONCE
Status: CANCELLED | OUTPATIENT
Start: 2024-06-18 | End: 2024-06-18

## 2024-06-18 RX ORDER — SIMETHICONE 40MG/0.6ML
40 SUSPENSION, DROPS(FINAL DOSAGE FORM)(ML) ORAL EVERY 6 HOURS PRN
Status: CANCELLED | OUTPATIENT
Start: 2024-06-18

## 2024-06-18 RX ORDER — DIPHENHYDRAMINE HYDROCHLORIDE 50 MG/ML
25 INJECTION INTRAMUSCULAR; INTRAVENOUS EVERY 6 HOURS PRN
Status: CANCELLED | OUTPATIENT
Start: 2024-06-18

## 2024-06-18 NOTE — H&P
Normal.   Respiratory Normal Inspection - Normal.   Cardiovascular Normal Rhythm - Regular. Extra sounds - None. Murmurs - None.   Vascular Normal Pulses - Brachial: Normal.   Skin Normal Inspection - Normal.   Musculoskeletal Normal Hands/Wrist - Right: Normal, Left: Normal.   Extremity Normal No edema.   Neurological Normal Fine motor skills - Normal.   Psychiatric Normal Orientation - Oriented to time, place, person & situation. Appropriate mood and affect.   Patient is questioned and examined

## 2024-06-19 ENCOUNTER — HOSPITAL ENCOUNTER (OUTPATIENT)
Facility: HOSPITAL | Age: 59
Setting detail: OUTPATIENT SURGERY
Discharge: HOME | End: 2024-06-19
Attending: INTERNAL MEDICINE | Admitting: INTERNAL MEDICINE
Payer: MEDICARE

## 2024-06-19 VITALS
WEIGHT: 145.4 LBS | DIASTOLIC BLOOD PRESSURE: 70 MMHG | BODY MASS INDEX: 20.81 KG/M2 | RESPIRATION RATE: 16 BRPM | SYSTOLIC BLOOD PRESSURE: 153 MMHG | HEIGHT: 70 IN | HEART RATE: 75 BPM | OXYGEN SATURATION: 100 % | TEMPERATURE: 97.8 F

## 2024-06-19 LAB — GLUCOSE BLD STRIP.AUTO-MCNC: 187 MG/DL (ref 70–110)

## 2024-06-19 PROCEDURE — 6360000002 HC RX W HCPCS: Performed by: INTERNAL MEDICINE

## 2024-06-19 PROCEDURE — 7100000010 HC PHASE II RECOVERY - FIRST 15 MIN: Performed by: INTERNAL MEDICINE

## 2024-06-19 PROCEDURE — 99152 MOD SED SAME PHYS/QHP 5/>YRS: CPT | Performed by: INTERNAL MEDICINE

## 2024-06-19 PROCEDURE — 3600007512: Performed by: INTERNAL MEDICINE

## 2024-06-19 PROCEDURE — 2580000003 HC RX 258: Performed by: INTERNAL MEDICINE

## 2024-06-19 PROCEDURE — 3600007502: Performed by: INTERNAL MEDICINE

## 2024-06-19 PROCEDURE — 99153 MOD SED SAME PHYS/QHP EA: CPT | Performed by: INTERNAL MEDICINE

## 2024-06-19 PROCEDURE — 82962 GLUCOSE BLOOD TEST: CPT

## 2024-06-19 PROCEDURE — 2709999900 HC NON-CHARGEABLE SUPPLY: Performed by: INTERNAL MEDICINE

## 2024-06-19 PROCEDURE — 7100000011 HC PHASE II RECOVERY - ADDTL 15 MIN: Performed by: INTERNAL MEDICINE

## 2024-06-19 RX ORDER — FENTANYL CITRATE 50 UG/ML
100 INJECTION, SOLUTION INTRAMUSCULAR; INTRAVENOUS
Status: DISCONTINUED | OUTPATIENT
Start: 2024-06-19 | End: 2024-06-19 | Stop reason: HOSPADM

## 2024-06-19 RX ORDER — MIDAZOLAM HYDROCHLORIDE 5 MG/5ML
5 INJECTION, SOLUTION INTRAMUSCULAR; INTRAVENOUS
Status: DISCONTINUED | OUTPATIENT
Start: 2024-06-19 | End: 2024-06-19 | Stop reason: HOSPADM

## 2024-06-19 RX ORDER — RIZATRIPTAN BENZOATE 5 MG/1
5 TABLET ORAL
COMMUNITY
Start: 2022-10-03

## 2024-06-19 RX ORDER — ATORVASTATIN CALCIUM 20 MG/1
20 TABLET, FILM COATED ORAL DAILY
Status: ON HOLD | COMMUNITY
Start: 2024-01-19 | End: 2024-06-19

## 2024-06-19 RX ORDER — ROPINIROLE 0.25 MG/1
0.25 TABLET, FILM COATED ORAL DAILY
COMMUNITY

## 2024-06-19 RX ORDER — ASPIRIN 81 MG/1
81 TABLET ORAL DAILY
COMMUNITY
Start: 2024-02-20 | End: 2025-02-18

## 2024-06-19 RX ORDER — KETOCONAZOLE 20 MG/G
1 CREAM TOPICAL DAILY
COMMUNITY
Start: 2024-06-06 | End: 2025-06-05

## 2024-06-19 RX ORDER — ONDANSETRON 4 MG/1
4 TABLET, FILM COATED ORAL EVERY 8 HOURS PRN
COMMUNITY

## 2024-06-19 RX ORDER — MIDAZOLAM HYDROCHLORIDE 1 MG/ML
INJECTION INTRAMUSCULAR; INTRAVENOUS PRN
Status: DISCONTINUED | OUTPATIENT
Start: 2024-06-19 | End: 2024-06-19 | Stop reason: ALTCHOICE

## 2024-06-19 RX ORDER — KETOCONAZOLE 20 MG/ML
1 SHAMPOO TOPICAL PRN
COMMUNITY
Start: 2023-02-21

## 2024-06-19 RX ORDER — MIRTAZAPINE 7.5 MG/1
7.5 TABLET, FILM COATED ORAL DAILY
COMMUNITY
Start: 2024-06-10

## 2024-06-19 RX ORDER — HYDROCHLOROTHIAZIDE 25 MG/1
25 TABLET ORAL DAILY
COMMUNITY
Start: 2024-04-04

## 2024-06-19 RX ORDER — NALOXONE HYDROCHLORIDE 0.4 MG/ML
0.4 INJECTION, SOLUTION INTRAMUSCULAR; INTRAVENOUS; SUBCUTANEOUS PRN
Status: DISCONTINUED | OUTPATIENT
Start: 2024-06-19 | End: 2024-06-19 | Stop reason: HOSPADM

## 2024-06-19 RX ORDER — SODIUM CHLORIDE 9 MG/ML
INJECTION, SOLUTION INTRAVENOUS CONTINUOUS
Status: DISCONTINUED | OUTPATIENT
Start: 2024-06-19 | End: 2024-06-19 | Stop reason: HOSPADM

## 2024-06-19 RX ORDER — PANCRELIPASE 24000; 76000; 120000 [USP'U]/1; [USP'U]/1; [USP'U]/1
24000 CAPSULE, DELAYED RELEASE PELLETS ORAL DAILY
COMMUNITY
Start: 2023-12-28 | End: 2024-12-26

## 2024-06-19 RX ORDER — QUETIAPINE FUMARATE 100 MG/1
150 TABLET, FILM COATED ORAL NIGHTLY
COMMUNITY
Start: 2024-06-10

## 2024-06-19 RX ORDER — ALBUTEROL SULFATE 90 UG/1
1 AEROSOL, METERED RESPIRATORY (INHALATION) EVERY 6 HOURS PRN
Status: ON HOLD | COMMUNITY
Start: 2020-03-04 | End: 2024-06-19

## 2024-06-19 RX ORDER — FLUMAZENIL 0.1 MG/ML
0.2 INJECTION INTRAVENOUS ONCE
Status: DISCONTINUED | OUTPATIENT
Start: 2024-06-19 | End: 2024-06-19 | Stop reason: HOSPADM

## 2024-06-19 RX ORDER — TRIAMCINOLONE ACETONIDE 1 MG/ML
1 LOTION TOPICAL DAILY
COMMUNITY
Start: 2023-10-12 | End: 2024-10-10

## 2024-06-19 RX ORDER — METOCLOPRAMIDE 10 MG/1
10 TABLET ORAL 4 TIMES DAILY
COMMUNITY
Start: 2006-03-23

## 2024-06-19 RX ADMIN — SODIUM CHLORIDE: 9 INJECTION, SOLUTION INTRAVENOUS at 11:47

## 2024-06-19 ASSESSMENT — PAIN - FUNCTIONAL ASSESSMENT
PAIN_FUNCTIONAL_ASSESSMENT: 0-10

## 2024-06-19 NOTE — DISCHARGE INSTRUCTIONS
Lan Gonzalez  [id number]  1965    COLON DISCHARGE INSTRUCTIONS    Discomfort:  Redness at IV site- apply warm compress to area; if redness or soreness persist- contact your physician  There may be a slight amount of blood passed from the rectum  Gaseous discomfort- walking, belching will help relieve any discomfort  You may not operate a vehicle til the next day.  You may not engage in an occupation involving machinery or appliances til the next day.  You may not drink alcoholic beverages til the next day.    DIET:   High fiber diet.     ACTIVITY:  You may not  resume your normal daily activities til the next day. it is recommended that you spend the remainder of the day resting -  avoid any strenuous activity.    CALL M.D.  IF ANY SIGN OF:   Increasing pain, nausea, vomiting  Abdominal distension (swelling)  New increased bleeding (oral or rectal)  Fever (chills)  Pain in chest area  Bloody discharge from nose or mouth  Shortness of breath    You may not  take any Advil, Aspirin, Ibuprofen, Motrin, Aleve, or Goody’s ONLY  Tylenol as needed for pain.    Post procedure diagnosis:  medium sized internal hemorrhoids. Hypertrophied anal papilla. Tortuous colon. Mild diverticulosis of the sigmoid, descending and transverse colon    Follow-up Instructions:   Your follow up colonoscopy will be in 10 years.    We will notify you the results of your biopsy by letter within 2 weeks.    Mariam Watt MD  June 19, 2024        DISCHARGE SUMMARY from Nurse    PATIENT INSTRUCTIONS:    After general anesthesia or intravenous sedation, for 24 hours or while taking prescription Narcotics:  Limit your activities  Do not drive and operate hazardous machinery  Do not make important personal or business decisions  Do  not drink alcoholic beverages  If you have not urinated within 8 hours after discharge, please contact your surgeon on call.    Report the following to your surgeon:  Excessive pain, swelling, redness or odor of

## 2024-06-19 NOTE — PROCEDURES
[facility]  Colonoscopy Procedure Report  _______________________________________________________  Patient: Lan Gonzalez                                        Attending Physician: JAN Watt MD    Patient ID: [id number]                                    Referring Physician: Nat Donohue APRN - NP    Exam Date: 6/19/2024     Introduction: A  58 y.o. male patient, presents for inpatient Colonoscopy    Indications: Last colonoscopy was reportedly done in 2012: Negative exam, 10yr Recall recommended (Overdue).No FHx of colon cancer  1/14/2023:  DM2 - Novalog  11/17/2023 - Update: Patient was brought to [facility] ED via EMS, unresponsive. Seizure activity noted, with diagnosis of DKA being determined. Prior hx of seizure provoked by hypoglycemia and hyperglycemia. (per Neurology consultation with Dr. Ashley Henry, in chart)   5/9/2024: Pt was admitted to [facility] 3 days after his last visit with me, and was found to be in DKA with Encephalopathy causing seizure activity. He has since stabilized, and has had no further seizure activity since being in the hospital. He is stable and able to be scoped at [facility] by Dr. Watt on 6/19/2024, as previously planned. See below for changes to bowel prep instructions d/t prior Whipple with Pancreaticojejunostomy, gastrojejunostomy, cholecystectomy and hepaticojejunostomy (2014).      Consent: The benefits, risks, and alternatives to the procedure were discussed and informed consent was obtained from the patient.    Preparation: EKG, pulse, pulse oximetry and blood pressure were monitored throughout the procedure. ASA Classification: Class II- . The heart is an S1-S2 and regular heart rate and rhythm. Lungs are clear to auscultation and percussion. Abdomen is soft, nondistended, and nontender. Mental Status: awake, alert, and oriented to person, place, and time    Medications:  Only Versed 5 mg IV throughout the procedure.    Rectal Exam: Normal Rectal Exam. No Blood.

## 2024-06-19 NOTE — PRE SEDATION
Sedation Pre-Procedure Note    Patient Name: Lan Gonzalez   YOB: 1965  Room/Bed: ENDO/PL  Medical Record Number: [id number]  Date: 6/19/2024   Time: 12:17 PM       Indication:  screen for colon cancer    Consent: I have discussed with the patient and/or the patient representative the indication, alternatives, and the possible risks and/or complications of the planned procedure and the anesthesia methods. The patient and/or patient representative appear to understand and agree to proceed.    Vital Signs:   Vitals:    06/19/24 1059   BP: 136/79   Pulse: 82   Resp: 14   Temp: 98.7 °F (37.1 °C)   SpO2: 100%       Past Medical History:   has a past medical history of Anxiety, Arthritis, BPH (benign prostatic hyperplasia), CAD (coronary artery disease), Chronic pain, COPD (chronic obstructive pulmonary disease) (HCC), Depression, Diverticulosis, Exercise involving walking, Gastritis, Gastroparesis, Hiatal hernia, Hypercholesterolemia, Kidney stone, Liver disease, Migraine, Other ill-defined conditions(799.89), Pancreatitis, chronic (HCC), PONV (postoperative nausea and vomiting), Psychiatric disorder, RA (rheumatoid arthritis) (HCC), Rheumatoid arthritis(714.0), Type 1 diabetes mellitus (HCC), Vertigo, peripheral, and Wears glasses.    Past Surgical History:   has a past surgical history that includes other surgical history (2014); cervical fusion; Cervical discectomy (2001); pr unlisted procedure abdomen peritoneum & omentum (2014); orthopedic surgery (Left); Urological Surgery; Cardiac catheterization (11/2020); orthopedic surgery; Neck surgery (2012); Hand surgery (Left); Endoscopy, colon, diagnostic; and Colonoscopy.    Medications:   Scheduled Meds:    flumazenil  0.2 mg IntraVENous Once     Continuous Infusions:    sodium chloride 100 mL/hr at 06/19/24 1147    fentaNYL      midazolam       PRN Meds: naloxone  Home Meds:   Prior to Admission medications    Medication Sig Start Date End Date Taking?

## 2024-10-09 ENCOUNTER — APPOINTMENT (OUTPATIENT)
Facility: HOSPITAL | Age: 59
End: 2024-10-09
Payer: MEDICARE

## 2024-10-09 ENCOUNTER — HOSPITAL ENCOUNTER (EMERGENCY)
Facility: HOSPITAL | Age: 59
Discharge: HOME | End: 2024-10-09
Payer: MEDICARE

## 2024-10-09 VITALS
WEIGHT: 145 LBS | DIASTOLIC BLOOD PRESSURE: 87 MMHG | SYSTOLIC BLOOD PRESSURE: 143 MMHG | RESPIRATION RATE: 16 BRPM | HEIGHT: 70 IN | OXYGEN SATURATION: 99 % | BODY MASS INDEX: 20.76 KG/M2 | TEMPERATURE: 97.5 F | HEART RATE: 104 BPM

## 2024-10-09 DIAGNOSIS — N23 RENAL COLIC: ICD-10-CM

## 2024-10-09 DIAGNOSIS — N20.1 OBSTRUCTION OF LEFT URETEROPELVIC JUNCTION DUE TO STONE: Primary | ICD-10-CM

## 2024-10-09 DIAGNOSIS — N17.9 AKI (ACUTE KIDNEY INJURY) (HCC): ICD-10-CM

## 2024-10-09 DIAGNOSIS — E10.69 TYPE 1 DIABETES MELLITUS WITH OTHER SPECIFIED COMPLICATION (HCC): ICD-10-CM

## 2024-10-09 LAB
ALBUMIN SERPL-MCNC: 3.9 G/DL (ref 3.4–5)
ALBUMIN/GLOB SERPL: 1.1 (ref 0.8–1.7)
ALP SERPL-CCNC: 149 U/L (ref 45–117)
ALT SERPL-CCNC: 26 U/L (ref 16–61)
ANION GAP SERPL CALC-SCNC: 9 MMOL/L (ref 3–18)
APPEARANCE UR: CLEAR
AST SERPL-CCNC: 20 U/L (ref 10–38)
BACTERIA URNS QL MICRO: ABNORMAL /HPF
BASOPHILS # BLD: 0 K/UL (ref 0–0.1)
BASOPHILS NFR BLD: 0 % (ref 0–2)
BILIRUB SERPL-MCNC: 0.7 MG/DL (ref 0.2–1)
BILIRUB UR QL: NEGATIVE
BUN SERPL-MCNC: 20 MG/DL (ref 7–18)
BUN/CREAT SERPL: 11 (ref 12–20)
CALCIUM SERPL-MCNC: 9.2 MG/DL (ref 8.5–10.1)
CHLORIDE SERPL-SCNC: 105 MMOL/L (ref 100–111)
CO2 SERPL-SCNC: 24 MMOL/L (ref 21–32)
COLOR UR: YELLOW
CREAT SERPL-MCNC: 1.76 MG/DL (ref 0.6–1.3)
DIFFERENTIAL METHOD BLD: ABNORMAL
EOSINOPHIL # BLD: 0 K/UL (ref 0–0.4)
EOSINOPHIL NFR BLD: 0 % (ref 0–5)
EPITH CASTS URNS QL MICRO: ABNORMAL /LPF (ref 0–5)
ERYTHROCYTE [DISTWIDTH] IN BLOOD BY AUTOMATED COUNT: 13.2 % (ref 11.6–14.5)
GLOBULIN SER CALC-MCNC: 3.4 G/DL (ref 2–4)
GLUCOSE SERPL-MCNC: 288 MG/DL (ref 74–99)
GLUCOSE UR STRIP.AUTO-MCNC: >1000 MG/DL
HCT VFR BLD AUTO: 41.6 % (ref 36–48)
HGB BLD-MCNC: 13.6 G/DL (ref 13–16)
HGB UR QL STRIP: NEGATIVE
IMM GRANULOCYTES # BLD AUTO: 0.1 K/UL (ref 0–0.04)
IMM GRANULOCYTES NFR BLD AUTO: 0 % (ref 0–0.5)
KETONES UR QL STRIP.AUTO: 15 MG/DL
LEUKOCYTE ESTERASE UR QL STRIP.AUTO: NEGATIVE
LIPASE SERPL-CCNC: 7 U/L (ref 13–75)
LYMPHOCYTES # BLD: 0.7 K/UL (ref 0.9–3.6)
LYMPHOCYTES NFR BLD: 6 % (ref 21–52)
MAGNESIUM SERPL-MCNC: 1.9 MG/DL (ref 1.6–2.6)
MCH RBC QN AUTO: 29.6 PG (ref 24–34)
MCHC RBC AUTO-ENTMCNC: 32.7 G/DL (ref 31–37)
MCV RBC AUTO: 90.6 FL (ref 78–100)
MONOCYTES # BLD: 0.3 K/UL (ref 0.05–1.2)
MONOCYTES NFR BLD: 3 % (ref 3–10)
MUCOUS THREADS URNS QL MICRO: ABNORMAL /LPF
NEUTS SEG # BLD: 10.2 K/UL (ref 1.8–8)
NEUTS SEG NFR BLD: 90 % (ref 40–73)
NITRITE UR QL STRIP.AUTO: NEGATIVE
NRBC # BLD: 0 K/UL (ref 0–0.01)
NRBC BLD-RTO: 0 PER 100 WBC
PH UR STRIP: 5 (ref 5–8)
PLATELET # BLD AUTO: 215 K/UL (ref 135–420)
PMV BLD AUTO: 10.8 FL (ref 9.2–11.8)
POTASSIUM SERPL-SCNC: 4.8 MMOL/L (ref 3.5–5.5)
PROT SERPL-MCNC: 7.3 G/DL (ref 6.4–8.2)
PROT UR STRIP-MCNC: ABNORMAL MG/DL
RBC # BLD AUTO: 4.59 M/UL (ref 4.35–5.65)
RBC #/AREA URNS HPF: ABNORMAL /HPF (ref 0–5)
SODIUM SERPL-SCNC: 138 MMOL/L (ref 136–145)
SP GR UR REFRACTOMETRY: 1.02 (ref 1–1.03)
TROPONIN I SERPL HS-MCNC: 5 NG/L (ref 0–78)
TROPONIN I SERPL HS-MCNC: 8 NG/L (ref 0–78)
UROBILINOGEN UR QL STRIP.AUTO: 0.2 EU/DL (ref 0.2–1)
WBC # BLD AUTO: 11.4 K/UL (ref 4.6–13.2)
WBC URNS QL MICRO: ABNORMAL /HPF (ref 0–5)

## 2024-10-09 PROCEDURE — 83735 ASSAY OF MAGNESIUM: CPT

## 2024-10-09 PROCEDURE — 81001 URINALYSIS AUTO W/SCOPE: CPT

## 2024-10-09 PROCEDURE — 96374 THER/PROPH/DIAG INJ IV PUSH: CPT

## 2024-10-09 PROCEDURE — 71045 X-RAY EXAM CHEST 1 VIEW: CPT

## 2024-10-09 PROCEDURE — 6360000002 HC RX W HCPCS: Performed by: PHYSICIAN ASSISTANT

## 2024-10-09 PROCEDURE — 80053 COMPREHEN METABOLIC PANEL: CPT

## 2024-10-09 PROCEDURE — 99285 EMERGENCY DEPT VISIT HI MDM: CPT

## 2024-10-09 PROCEDURE — 2580000003 HC RX 258: Performed by: PHYSICIAN ASSISTANT

## 2024-10-09 PROCEDURE — 96375 TX/PRO/DX INJ NEW DRUG ADDON: CPT

## 2024-10-09 PROCEDURE — 96361 HYDRATE IV INFUSION ADD-ON: CPT

## 2024-10-09 PROCEDURE — 87086 URINE CULTURE/COLONY COUNT: CPT

## 2024-10-09 PROCEDURE — 85025 COMPLETE CBC W/AUTO DIFF WBC: CPT

## 2024-10-09 PROCEDURE — 74176 CT ABD & PELVIS W/O CONTRAST: CPT

## 2024-10-09 PROCEDURE — 93005 ELECTROCARDIOGRAM TRACING: CPT | Performed by: PHYSICIAN ASSISTANT

## 2024-10-09 PROCEDURE — 83690 ASSAY OF LIPASE: CPT

## 2024-10-09 PROCEDURE — 84484 ASSAY OF TROPONIN QUANT: CPT

## 2024-10-09 RX ORDER — ONDANSETRON 2 MG/ML
4 INJECTION INTRAMUSCULAR; INTRAVENOUS ONCE
Status: COMPLETED | OUTPATIENT
Start: 2024-10-09 | End: 2024-10-09

## 2024-10-09 RX ORDER — TAMSULOSIN HYDROCHLORIDE 0.4 MG/1
0.4 CAPSULE ORAL DAILY
Qty: 14 CAPSULE | Refills: 0 | Status: SHIPPED | OUTPATIENT
Start: 2024-10-09

## 2024-10-09 RX ORDER — 0.9 % SODIUM CHLORIDE 0.9 %
1000 INTRAVENOUS SOLUTION INTRAVENOUS ONCE
Status: COMPLETED | OUTPATIENT
Start: 2024-10-09 | End: 2024-10-09

## 2024-10-09 RX ORDER — CIPROFLOXACIN 500 MG/1
250 TABLET, FILM COATED ORAL 2 TIMES DAILY
Qty: 7 TABLET | Refills: 0 | Status: SHIPPED | OUTPATIENT
Start: 2024-10-09 | End: 2024-10-16

## 2024-10-09 RX ORDER — KETOROLAC TROMETHAMINE 15 MG/ML
15 INJECTION, SOLUTION INTRAMUSCULAR; INTRAVENOUS ONCE
Status: COMPLETED | OUTPATIENT
Start: 2024-10-09 | End: 2024-10-09

## 2024-10-09 RX ORDER — ONDANSETRON 4 MG/1
4 TABLET, ORALLY DISINTEGRATING ORAL 3 TIMES DAILY PRN
Qty: 21 TABLET | Refills: 0 | Status: SHIPPED | OUTPATIENT
Start: 2024-10-09

## 2024-10-09 RX ORDER — OXYCODONE HYDROCHLORIDE 5 MG/1
5 TABLET ORAL EVERY 6 HOURS PRN
Qty: 12 TABLET | Refills: 0 | Status: SHIPPED | OUTPATIENT
Start: 2024-10-09 | End: 2024-10-12

## 2024-10-09 RX ADMIN — SODIUM CHLORIDE 1000 ML: 9 INJECTION, SOLUTION INTRAVENOUS at 10:12

## 2024-10-09 RX ADMIN — ONDANSETRON 4 MG: 2 INJECTION INTRAMUSCULAR; INTRAVENOUS at 10:15

## 2024-10-09 RX ADMIN — KETOROLAC TROMETHAMINE 15 MG: 15 INJECTION, SOLUTION INTRAMUSCULAR; INTRAVENOUS at 10:14

## 2024-10-09 ASSESSMENT — PAIN SCALES - GENERAL
PAINLEVEL_OUTOF10: 0
PAINLEVEL_OUTOF10: 7

## 2024-10-09 ASSESSMENT — PAIN DESCRIPTION - ORIENTATION: ORIENTATION: LEFT

## 2024-10-09 ASSESSMENT — PAIN DESCRIPTION - LOCATION: LOCATION: FLANK

## 2024-10-09 ASSESSMENT — PAIN - FUNCTIONAL ASSESSMENT: PAIN_FUNCTIONAL_ASSESSMENT: 0-10

## 2024-10-09 NOTE — ED NOTES
Paperwork read with patient making note of where to  prescriptions   Armband removed and disposed of in shredder.     Patient has no further questions at this time.     Will discharge from system.

## 2024-10-09 NOTE — DISCHARGE INSTRUCTIONS
Strain your urine  Continue your home medications  Keep your blood sugars under good control  Your creatinine was slightly elevated today, make sure you are staying well-hydrated, this will need to be rechecked in the next few days to week to ensure it is improving  CT does show several stones in your left ureter.  Call Dr. Mason's office today for early evaluation  Pain medication was prescribed for pain as needed, you should take MiraLAX daily to prevent constipation  Zofran for nausea  Flomax to help dilate the ureter  Antibiotic as prescribed  Return to ER if you develop fever, uncontrollable pain, vomiting or any new concerns

## 2024-10-09 NOTE — ED TRIAGE NOTES
Pt ambulatory to ED c/o left flank pain that radiates to abd. Pt reports pain and nausea  began this am. Pt reports hx of kidney stones. Pt denies any urinary symptoms ATT.

## 2024-10-09 NOTE — ED PROVIDER NOTES
Ventricular Rate 91 BPM    Atrial Rate 91 BPM    P-R Interval 232 ms    QRS Duration 92 ms    Q-T Interval 364 ms    QTc Calculation (Bazett) 447 ms    P Axis 64 degrees    R Axis 47 degrees    T Axis 82 degrees    Diagnosis       Sinus rhythm with 1st degree AV block  Otherwise normal ECG  When compared with ECG of 16-NOV-2023 20:00,  MO interval has increased  Criteria for Septal infarct are no longer present  ST no longer depressed in Inferior leads     Troponin    Collection Time: 10/09/24 11:12 AM   Result Value Ref Range    Troponin, High Sensitivity 8 0 - 78 ng/L       EKG: When ordered, EKG's are interpreted by the Emergency Department Provider in the absence of a cardiologist.  Please see their note for interpretation of EKG.      RADIOLOGY:  Non-plain film images such as CT, Ultrasound and MRI are read by the radiologist. Plain radiographic images are visualized and preliminarily interpreted by the ED.  Interpretation per the Radiologist below, if available at the time of this note:  CT ABDOMEN PELVIS WO CONTRAST Additional Contrast? None   Final Result      1. Mild left-sided hydronephrosis with 2 stones in the mid left ureter. Largest   measures 7 mm      2. Nonobstructing bilateral renal stones      3. 4.1 cm infrarenal abdominal aortic aneurysm      Electronically signed by Patricia Vu      XR CHEST PORTABLE   Final Result      Minimal bibasilar atelectasis.         Electronically signed by SCOTT MERCADO          Medications given in the ED-  Medications   sodium chloride 0.9 % bolus 1,000 mL (1,000 mLs IntraVENous New Bag 10/9/24 1012)   ondansetron (ZOFRAN) injection 4 mg (4 mg IntraVENous Given 10/9/24 1015)   ketorolac (TORADOL) injection 15 mg (15 mg IntraVENous Given 10/9/24 1014)       Please note that this dictation was completed with [employer], the [employer] voice recognition software.  Quite often unanticipated grammatical, syntax, homophones, and other interpretive errors are

## 2024-10-10 LAB
BACTERIA SPEC CULT: NORMAL
SERVICE CMNT-IMP: NORMAL

## 2024-10-12 LAB
EKG ATRIAL RATE: 91 BPM
EKG DIAGNOSIS: NORMAL
EKG P AXIS: 64 DEGREES
EKG P-R INTERVAL: 232 MS
EKG Q-T INTERVAL: 364 MS
EKG QRS DURATION: 92 MS
EKG QTC CALCULATION (BAZETT): 447 MS
EKG R AXIS: 47 DEGREES
EKG T AXIS: 82 DEGREES
EKG VENTRICULAR RATE: 91 BPM

## 2024-10-15 ENCOUNTER — ANESTHESIA EVENT (OUTPATIENT)
Facility: HOSPITAL | Age: 59
End: 2024-10-15
Payer: MEDICARE

## 2024-10-16 NOTE — H&P
[facility]  [address]  [facility] [address]  Tel:  [phone]  Fax: [phone]    Patient :  Lan Gonzalez  YOB: 1965  Birth Sex:  Male  Date:   10/14/2024 10:00 AM   Visit Type:    Office Visit  Assessment/Plan  #  Detail Type  Description   1.  Assessment  Hydronephrosis with renal and ureteral calculous obstruction (N13.2).    Patient Plan  Patient with 7 mm mid ureteral stone scheduled for ureteroscopy left side with retrograde pyelogram laser fragmentation of stone stent placement risks including pain infection bleeding reviewed with patient he understands will proceed         2.  Assessment  Calculus of kidney (N20.0).         3.  Assessment  Enlarged prostate w/ LUTS (N40.1).         4.  Assessment  Frequency of micturition (R35.0).         5.  Assessment  Type 2 diabetes mellitus with diabetic polyneuropathy (E11.42).         6.  Assessment  Other male erectile dysfunction (N52.8).         7.  Assessment  Hyperoxaluria (E72.53).    Patient Plan  Patient with primary hyperoxaluria.  Treated by Dr. Kurtz.              Additional Visit Information    This 59 year old patient presents for Kidney and/or Ureteral Stone, Hematuria, BPH and erectile dysfunction uro.    History of Present Illness  1.  Kidney and/or Ureteral Stone   Onset was gradual. Severity level is moderate. It occurs intermittently. The problem is improving. Location of pain is right flank. The pain radiates to the abdomen. Prior stone type of Calcium oxalate di-hydrate. Pertinent history includes history of prior stone(s). Associated symptoms include nausea. Pertinent negatives include chills, constipation, diarrhea, fever, pain and vomiting. Additional information: Pt with a hx of kidney stones  He underwent LEFT ureteroscopic stone extraction 2/21/19.  He is here for cysto stent removal..       Comments: 11/25/19  Pt here for f/u, been having LEFT sided pain.  He recently passed another  risk factors. Associated symptoms include intermittent stream, pelvic pressure and urgency. Pertinent negatives include chills, constipation and fever. Additional information: Pt with pelvic pressure and discomfort after voiding also urinates small amount when passing gas.   He is using 50mg   He will f/u 1 yr PSA prior.        Comments: 11/25/19  Pt with perineal discomfort, he stopped using Myrbetriq 50mg.  His sxs are worsening.  He will restart, new Rx and sample  11/24/2020  Pt w hx of BPH had TURP,   He is voiding well he does not need Myrbetriq but uses it intermittently no other urinary complaints.  06/07/2023 patient is voiding well he is having some rare episodes of overactive bladder he does not need any Myrbetriq at this time.  07/03/2024  Patient voiding well no significant complaints PSA remains very low at 0.524 no intervention is needed.    4.  erectile dysfunction uro   The symptoms began gradually, have been moderate and are worse. The patient is here today for an initial visit. Pertinent history does not include diabetes or neurologic disease.  Reviewed today was a PSA taken on 06/02/2023 with findings of 0.615 ng/mL. Additional information: Pt has had worsening erections significantly over the last 6 months he is having difficulty obtaining any type of erection at this time,  would options will send prescription for sildenafil 100 mg.        Problem List  Problem Description  Onset Date  Chronic  Clinical Status  Notes  Seizures due to metabolic disorder    N      Hyperlipidemia    N      Migraine    N      Peripheral neuropathy    N      Depression    N      RA    N      Asthma    N      Pancreatic alpha-amylase deficiency    N      Depression    N      Kidney stone    Y      Diabetes type 1    Y        Medications (active prior to today)  Medication Name  Sig Description  Start Date  Stop Date  Refilled  Rx Elsewhere  Creon 24,000-76,000-120,000 unit capsule,delayed

## 2024-10-17 ENCOUNTER — HOSPITAL ENCOUNTER (OUTPATIENT)
Facility: HOSPITAL | Age: 59
Setting detail: OUTPATIENT SURGERY
Discharge: HOME | End: 2024-10-17
Attending: UROLOGY | Admitting: UROLOGY
Payer: MEDICARE

## 2024-10-17 ENCOUNTER — ANESTHESIA (OUTPATIENT)
Facility: HOSPITAL | Age: 59
End: 2024-10-17
Payer: MEDICARE

## 2024-10-17 ENCOUNTER — APPOINTMENT (OUTPATIENT)
Facility: HOSPITAL | Age: 59
End: 2024-10-17
Attending: UROLOGY
Payer: MEDICARE

## 2024-10-17 VITALS
OXYGEN SATURATION: 98 % | TEMPERATURE: 98 F | DIASTOLIC BLOOD PRESSURE: 72 MMHG | HEART RATE: 70 BPM | BODY MASS INDEX: 20.57 KG/M2 | HEIGHT: 70 IN | SYSTOLIC BLOOD PRESSURE: 130 MMHG | RESPIRATION RATE: 16 BRPM | WEIGHT: 143.7 LBS

## 2024-10-17 LAB
GLUCOSE BLD STRIP.AUTO-MCNC: 111 MG/DL (ref 70–110)
GLUCOSE BLD STRIP.AUTO-MCNC: 137 MG/DL (ref 70–110)
GLUCOSE BLD STRIP.AUTO-MCNC: 210 MG/DL (ref 70–110)
GLUCOSE BLD STRIP.AUTO-MCNC: 253 MG/DL (ref 70–110)
GLUCOSE BLD STRIP.AUTO-MCNC: 295 MG/DL (ref 70–110)
GLUCOSE BLD STRIP.AUTO-MCNC: 333 MG/DL (ref 70–110)
GLUCOSE BLD STRIP.AUTO-MCNC: 72 MG/DL (ref 70–110)
GLUCOSE BLD STRIP.AUTO-MCNC: 83 MG/DL (ref 70–110)
GLUCOSE SERPL-MCNC: 92 MG/DL (ref 74–99)

## 2024-10-17 PROCEDURE — 6360000002 HC RX W HCPCS: Performed by: UROLOGY

## 2024-10-17 PROCEDURE — 2580000003 HC RX 258: Performed by: UROLOGY

## 2024-10-17 PROCEDURE — 6360000002 HC RX W HCPCS: Performed by: SPECIALIST

## 2024-10-17 PROCEDURE — 7100000011 HC PHASE II RECOVERY - ADDTL 15 MIN: Performed by: UROLOGY

## 2024-10-17 PROCEDURE — 3600000012 HC SURGERY LEVEL 2 ADDTL 15MIN: Performed by: UROLOGY

## 2024-10-17 PROCEDURE — 74420 UROGRAPHY RTRGR +-KUB: CPT

## 2024-10-17 PROCEDURE — 3700000001 HC ADD 15 MINUTES (ANESTHESIA): Performed by: UROLOGY

## 2024-10-17 PROCEDURE — 6360000002 HC RX W HCPCS: Performed by: NURSE ANESTHETIST, CERTIFIED REGISTERED

## 2024-10-17 PROCEDURE — 2500000003 HC RX 250 WO HCPCS: Performed by: NURSE ANESTHETIST, CERTIFIED REGISTERED

## 2024-10-17 PROCEDURE — 2720000010 HC SURG SUPPLY STERILE: Performed by: UROLOGY

## 2024-10-17 PROCEDURE — 82947 ASSAY GLUCOSE BLOOD QUANT: CPT

## 2024-10-17 PROCEDURE — 2709999900 HC NON-CHARGEABLE SUPPLY: Performed by: UROLOGY

## 2024-10-17 PROCEDURE — 6370000000 HC RX 637 (ALT 250 FOR IP): Performed by: ANESTHESIOLOGY

## 2024-10-17 PROCEDURE — 82962 GLUCOSE BLOOD TEST: CPT

## 2024-10-17 PROCEDURE — 7100000001 HC PACU RECOVERY - ADDTL 15 MIN: Performed by: UROLOGY

## 2024-10-17 PROCEDURE — C1894 INTRO/SHEATH, NON-LASER: HCPCS | Performed by: UROLOGY

## 2024-10-17 PROCEDURE — 7100000000 HC PACU RECOVERY - FIRST 15 MIN: Performed by: UROLOGY

## 2024-10-17 PROCEDURE — 7100000010 HC PHASE II RECOVERY - FIRST 15 MIN: Performed by: UROLOGY

## 2024-10-17 PROCEDURE — C2617 STENT, NON-COR, TEM W/O DEL: HCPCS | Performed by: UROLOGY

## 2024-10-17 PROCEDURE — 3700000000 HC ANESTHESIA ATTENDED CARE: Performed by: UROLOGY

## 2024-10-17 PROCEDURE — C1769 GUIDE WIRE: HCPCS | Performed by: UROLOGY

## 2024-10-17 PROCEDURE — 3600000002 HC SURGERY LEVEL 2 BASE: Performed by: UROLOGY

## 2024-10-17 DEVICE — URETERAL STENT
Type: IMPLANTABLE DEVICE | Site: URETER | Status: FUNCTIONAL
Brand: POLARIS™ ULTRA

## 2024-10-17 RX ORDER — ONDANSETRON 2 MG/ML
INJECTION INTRAMUSCULAR; INTRAVENOUS
Status: DISCONTINUED | OUTPATIENT
Start: 2024-10-17 | End: 2024-10-17 | Stop reason: SDUPTHER

## 2024-10-17 RX ORDER — ROCURONIUM BROMIDE 10 MG/ML
INJECTION, SOLUTION INTRAVENOUS
Status: DISCONTINUED | OUTPATIENT
Start: 2024-10-17 | End: 2024-10-17 | Stop reason: SDUPTHER

## 2024-10-17 RX ORDER — METOCLOPRAMIDE HYDROCHLORIDE 5 MG/ML
INJECTION INTRAMUSCULAR; INTRAVENOUS
Status: DISCONTINUED | OUTPATIENT
Start: 2024-10-17 | End: 2024-10-17 | Stop reason: SDUPTHER

## 2024-10-17 RX ORDER — IPRATROPIUM BROMIDE AND ALBUTEROL SULFATE 2.5; .5 MG/3ML; MG/3ML
1 SOLUTION RESPIRATORY (INHALATION)
Status: DISCONTINUED | OUTPATIENT
Start: 2024-10-17 | End: 2024-10-17 | Stop reason: HOSPADM

## 2024-10-17 RX ORDER — LABETALOL HYDROCHLORIDE 5 MG/ML
10 INJECTION, SOLUTION INTRAVENOUS
Status: DISCONTINUED | OUTPATIENT
Start: 2024-10-17 | End: 2024-10-17 | Stop reason: HOSPADM

## 2024-10-17 RX ORDER — PROCHLORPERAZINE EDISYLATE 5 MG/ML
5 INJECTION INTRAMUSCULAR; INTRAVENOUS
Status: DISCONTINUED | OUTPATIENT
Start: 2024-10-17 | End: 2024-10-17 | Stop reason: HOSPADM

## 2024-10-17 RX ORDER — SODIUM CHLORIDE 0.9 % (FLUSH) 0.9 %
5-40 SYRINGE (ML) INJECTION EVERY 12 HOURS SCHEDULED
Status: DISCONTINUED | OUTPATIENT
Start: 2024-10-17 | End: 2024-10-17 | Stop reason: HOSPADM

## 2024-10-17 RX ORDER — NALOXONE HYDROCHLORIDE 0.4 MG/ML
INJECTION, SOLUTION INTRAMUSCULAR; INTRAVENOUS; SUBCUTANEOUS PRN
Status: DISCONTINUED | OUTPATIENT
Start: 2024-10-17 | End: 2024-10-17 | Stop reason: HOSPADM

## 2024-10-17 RX ORDER — SODIUM CHLORIDE 0.9 % (FLUSH) 0.9 %
5-40 SYRINGE (ML) INJECTION PRN
Status: DISCONTINUED | OUTPATIENT
Start: 2024-10-17 | End: 2024-10-17 | Stop reason: HOSPADM

## 2024-10-17 RX ORDER — DIPHENHYDRAMINE HYDROCHLORIDE 50 MG/ML
12.5 INJECTION INTRAMUSCULAR; INTRAVENOUS
Status: DISCONTINUED | OUTPATIENT
Start: 2024-10-17 | End: 2024-10-17 | Stop reason: HOSPADM

## 2024-10-17 RX ORDER — ONDANSETRON 2 MG/ML
4 INJECTION INTRAMUSCULAR; INTRAVENOUS
Status: DISCONTINUED | OUTPATIENT
Start: 2024-10-17 | End: 2024-10-17 | Stop reason: HOSPADM

## 2024-10-17 RX ORDER — MIDAZOLAM HYDROCHLORIDE 1 MG/ML
INJECTION INTRAMUSCULAR; INTRAVENOUS
Status: DISCONTINUED | OUTPATIENT
Start: 2024-10-17 | End: 2024-10-17 | Stop reason: SDUPTHER

## 2024-10-17 RX ORDER — SODIUM CHLORIDE 9 MG/ML
INJECTION, SOLUTION INTRAVENOUS PRN
Status: DISCONTINUED | OUTPATIENT
Start: 2024-10-17 | End: 2024-10-17 | Stop reason: HOSPADM

## 2024-10-17 RX ORDER — HYDROMORPHONE HYDROCHLORIDE 1 MG/ML
0.5 INJECTION, SOLUTION INTRAMUSCULAR; INTRAVENOUS; SUBCUTANEOUS EVERY 5 MIN PRN
Status: DISCONTINUED | OUTPATIENT
Start: 2024-10-17 | End: 2024-10-17

## 2024-10-17 RX ORDER — LIDOCAINE HYDROCHLORIDE 20 MG/ML
INJECTION, SOLUTION EPIDURAL; INFILTRATION; INTRACAUDAL; PERINEURAL
Status: DISCONTINUED | OUTPATIENT
Start: 2024-10-17 | End: 2024-10-17 | Stop reason: SDUPTHER

## 2024-10-17 RX ORDER — METOPROLOL TARTRATE 25 MG/1
25 TABLET, FILM COATED ORAL ONCE
Status: COMPLETED | OUTPATIENT
Start: 2024-10-17 | End: 2024-10-17

## 2024-10-17 RX ORDER — LEVOFLOXACIN 5 MG/ML
500 INJECTION, SOLUTION INTRAVENOUS ONCE
Status: COMPLETED | OUTPATIENT
Start: 2024-10-17 | End: 2024-10-17

## 2024-10-17 RX ORDER — PHENYLEPHRINE HCL IN 0.9% NACL 1 MG/10 ML
SYRINGE (ML) INTRAVENOUS
Status: DISCONTINUED | OUTPATIENT
Start: 2024-10-17 | End: 2024-10-17 | Stop reason: SDUPTHER

## 2024-10-17 RX ORDER — HYDROMORPHONE HYDROCHLORIDE 1 MG/ML
0.5 INJECTION, SOLUTION INTRAMUSCULAR; INTRAVENOUS; SUBCUTANEOUS EVERY 5 MIN PRN
Status: COMPLETED | OUTPATIENT
Start: 2024-10-17 | End: 2024-10-17

## 2024-10-17 RX ORDER — SODIUM CHLORIDE, SODIUM LACTATE, POTASSIUM CHLORIDE, CALCIUM CHLORIDE 600; 310; 30; 20 MG/100ML; MG/100ML; MG/100ML; MG/100ML
INJECTION, SOLUTION INTRAVENOUS CONTINUOUS
Status: DISCONTINUED | OUTPATIENT
Start: 2024-10-17 | End: 2024-10-17 | Stop reason: HOSPADM

## 2024-10-17 RX ORDER — SUCCINYLCHOLINE/SOD CL,ISO/PF 100 MG/5ML
SYRINGE (ML) INTRAVENOUS
Status: DISCONTINUED | OUTPATIENT
Start: 2024-10-17 | End: 2024-10-17 | Stop reason: SDUPTHER

## 2024-10-17 RX ORDER — PROPOFOL 10 MG/ML
INJECTION, EMULSION INTRAVENOUS
Status: DISCONTINUED | OUTPATIENT
Start: 2024-10-17 | End: 2024-10-17 | Stop reason: SDUPTHER

## 2024-10-17 RX ADMIN — SODIUM CHLORIDE, SODIUM LACTATE, POTASSIUM CHLORIDE, AND CALCIUM CHLORIDE: 600; 310; 30; 20 INJECTION, SOLUTION INTRAVENOUS at 11:56

## 2024-10-17 RX ADMIN — METOCLOPRAMIDE 10 MG: 5 INJECTION, SOLUTION INTRAMUSCULAR; INTRAVENOUS at 14:44

## 2024-10-17 RX ADMIN — METOPROLOL TARTRATE 25 MG: 25 TABLET, FILM COATED ORAL at 13:18

## 2024-10-17 RX ADMIN — Medication 100 MCG: at 15:17

## 2024-10-17 RX ADMIN — HYDROMORPHONE HYDROCHLORIDE 0.5 MG: 1 INJECTION, SOLUTION INTRAMUSCULAR; INTRAVENOUS; SUBCUTANEOUS at 16:31

## 2024-10-17 RX ADMIN — ONDANSETRON 4 MG: 2 INJECTION, SOLUTION INTRAMUSCULAR; INTRAVENOUS at 15:17

## 2024-10-17 RX ADMIN — ROCURONIUM BROMIDE 5 MG: 10 INJECTION, SOLUTION INTRAVENOUS at 14:44

## 2024-10-17 RX ADMIN — SODIUM CHLORIDE, SODIUM LACTATE, POTASSIUM CHLORIDE, AND CALCIUM CHLORIDE: 600; 310; 30; 20 INJECTION, SOLUTION INTRAVENOUS at 17:16

## 2024-10-17 RX ADMIN — Medication 100 MCG: at 15:25

## 2024-10-17 RX ADMIN — Medication 20 MG: at 14:44

## 2024-10-17 RX ADMIN — PROPOFOL 200 MG: 10 INJECTION, EMULSION INTRAVENOUS at 14:44

## 2024-10-17 RX ADMIN — Medication 100 MCG: at 14:52

## 2024-10-17 RX ADMIN — LIDOCAINE HYDROCHLORIDE 100 MG: 20 INJECTION, SOLUTION EPIDURAL; INFILTRATION; INTRACAUDAL; PERINEURAL at 14:44

## 2024-10-17 RX ADMIN — SODIUM CHLORIDE, SODIUM LACTATE, POTASSIUM CHLORIDE, AND CALCIUM CHLORIDE: 600; 310; 30; 20 INJECTION, SOLUTION INTRAVENOUS at 15:28

## 2024-10-17 RX ADMIN — Medication 100 MG: at 14:44

## 2024-10-17 RX ADMIN — HYDROMORPHONE HYDROCHLORIDE 0.5 MG: 1 INJECTION, SOLUTION INTRAMUSCULAR; INTRAVENOUS; SUBCUTANEOUS at 16:23

## 2024-10-17 RX ADMIN — Medication 100 MCG: at 14:55

## 2024-10-17 RX ADMIN — MIDAZOLAM 2 MG: 1 INJECTION INTRAMUSCULAR; INTRAVENOUS at 14:38

## 2024-10-17 RX ADMIN — LEVOFLOXACIN 500 MG: 5 INJECTION, SOLUTION INTRAVENOUS at 14:38

## 2024-10-17 RX ADMIN — Medication 200 MCG: at 14:58

## 2024-10-17 ASSESSMENT — PAIN - FUNCTIONAL ASSESSMENT
PAIN_FUNCTIONAL_ASSESSMENT: NONE - DENIES PAIN
PAIN_FUNCTIONAL_ASSESSMENT: NONE - DENIES PAIN
PAIN_FUNCTIONAL_ASSESSMENT: ACTIVITIES ARE NOT PREVENTED
PAIN_FUNCTIONAL_ASSESSMENT: NONE - DENIES PAIN
PAIN_FUNCTIONAL_ASSESSMENT: ACTIVITIES ARE NOT PREVENTED
PAIN_FUNCTIONAL_ASSESSMENT: 0-10
PAIN_FUNCTIONAL_ASSESSMENT: ACTIVITIES ARE NOT PREVENTED
PAIN_FUNCTIONAL_ASSESSMENT: NONE - DENIES PAIN

## 2024-10-17 ASSESSMENT — PAIN DESCRIPTION - DESCRIPTORS
DESCRIPTORS: BURNING

## 2024-10-17 ASSESSMENT — PAIN SCALES - GENERAL
PAINLEVEL_OUTOF10: 0
PAINLEVEL_OUTOF10: 4
PAINLEVEL_OUTOF10: 0
PAINLEVEL_OUTOF10: 6
PAINLEVEL_OUTOF10: 5

## 2024-10-17 ASSESSMENT — COPD QUESTIONNAIRES: CAT_SEVERITY: MODERATE

## 2024-10-17 ASSESSMENT — PAIN DESCRIPTION - PAIN TYPE
TYPE: SURGICAL PAIN
TYPE: SURGICAL PAIN

## 2024-10-17 ASSESSMENT — PAIN DESCRIPTION - FREQUENCY
FREQUENCY: CONTINUOUS
FREQUENCY: CONTINUOUS

## 2024-10-17 ASSESSMENT — PAIN DESCRIPTION - ONSET
ONSET: GRADUAL
ONSET: GRADUAL

## 2024-10-17 ASSESSMENT — PAIN DESCRIPTION - LOCATION
LOCATION: PENIS
LOCATION: PENIS

## 2024-10-17 NOTE — PERIOP NOTE
TRANSFER - IN REPORT:    Verbal report received from JESI Patel on Lan Gonzalez  being received from PACU for routine progression of patient care      Report consisted of patient's Situation, Background, Assessment and   Recommendations(SBAR).     Information from the following report(s) Nurse Handoff Report, Intake/Output, and MAR was reviewed with the receiving nurse.    Opportunity for questions and clarification was provided.      Assessment completed upon patient's arrival to unit and care assumed.

## 2024-10-17 NOTE — PERIOP NOTE
TRANSFER - IN REPORT:    Verbal report received from OR nurse and CRNA on Lan Gonzalez  being received from OR for routine post-op      Report consisted of patient's Situation, Background, Assessment and   Recommendations(SBAR).     Information from the following report(s) Nurse Handoff Report, Adult Overview, Surgery Report, Intake/Output, MAR, Recent Results, and Med Rec Status was reviewed with the receiving nurse.    Opportunity for questions and clarification was provided.      Assessment completed upon patient's arrival to unit and care assumed.

## 2024-10-17 NOTE — PERIOP NOTE
Dr. Monet notified by charge nurse, JESI Price. Patient more alert, color better, patient continues to shiver

## 2024-10-17 NOTE — PERIOP NOTE
TRANSFER - OUT REPORT:    Verbal report given to JESI Arellano on Lan Gonzalez  being transferred to phase 2 for routine post-op       Report consisted of patient's Situation, Background, Assessment and   Recommendations(SBAR).     Information from the following report(s) Nurse Handoff Report, Adult Overview, Surgery Report, Intake/Output, MAR, Recent Results, and Med Rec Status was reviewed with the receiving nurse.           Lines:   Peripheral IV 10/17/24 Left;Posterior Hand (Active)   Site Assessment Clean, dry & intact 10/17/24 1555   Line Status Infusing 10/17/24 1555   Phlebitis Assessment No symptoms 10/17/24 1555   Infiltration Assessment 0 10/17/24 1555   Alcohol Cap Used No 10/17/24 1156   Dressing Status Clean, dry & intact 10/17/24 1555   Dressing Type Transparent 10/17/24 1555   Dressing Intervention New 10/17/24 1156        Opportunity for questions and clarification was provided.      Patient transported with:  Registered Nurse

## 2024-10-17 NOTE — PERIOP NOTE
Notified Dr. Monet for Pt' CGM reading between 51-61. Our BG check is 72 and 81. Will sent blood to LAB stat to confirm Per Dr Monet.

## 2024-10-17 NOTE — PERIOP NOTE
Patient arrived via stretcher, patient pale and shaking, patient's CGM checked noted at 61, patient given apple juice and crackers and peanut butter, remained with patient and monitored closely, blood glucose checked with hospital monitor noted at 72, patient CGM states he is 51, will stay with patient and monitor

## 2024-10-17 NOTE — PERIOP NOTE
Patient CGM ( Continuous  glucose monitor) blood sugar is 144 Hospital accucheck  blood sugar reads 137. Patient is alert and oriented and sitting in chair eating 4 kavitha crackers with peanut butter and 120 ml of juice, patient is calm alert, able to make needs known and denies pain or distress at this time. Paged Anesthesia for further orders.

## 2024-10-17 NOTE — PERIOP NOTE
Notified Dr. Velazco regarding pt's blood sugar of 333. Received orders for pt to bolus himself with insulin pump recommended bolus for sugar of 333. Orders to recheck pt's glucose in 30 min.

## 2024-10-17 NOTE — PERIOP NOTE
TRANSFER - IN REPORT:    Verbal report received from Adan DENNISON  on Lan Gonzalez  being received from PHASE 2  for (shift relief )routine progression of patient care      Report consisted of patient's Situation, Background, Assessment and   Recommendations(SBAR).     Information from the following report(s) Adult Overview, Surgery Report, Intake/Output, MAR, Recent Results, and Med Rec Status was reviewed with the receiving nurse.    Opportunity for questions and clarification was provided.      Assessment completed upon patient's arrival to unit and care assumed.

## 2024-10-17 NOTE — ANESTHESIA PRE PROCEDURE
Temp: 97.2 °F (36.2 °C)   TempSrc: Temporal   SpO2: 100%   Weight: 65.2 kg (143 lb 11.2 oz)   Height: 1.778 m (5' 10\")                                              BP Readings from Last 3 Encounters:   10/17/24 132/77   10/09/24 (!) 143/87   06/19/24 (!) 153/70       NPO Status: Time of last liquid consumption: 2200                        Time of last solid consumption: 1800                        Date of last liquid consumption: 10/16/24                        Date of last solid food consumption: 10/16/24    BMI:   Wt Readings from Last 3 Encounters:   10/17/24 65.2 kg (143 lb 11.2 oz)   10/15/24 63.5 kg (140 lb)   10/09/24 65.8 kg (145 lb)     Body mass index is 20.62 kg/m².    CBC:   Lab Results   Component Value Date/Time    WBC 11.4 10/09/2024 09:45 AM    RBC 4.59 10/09/2024 09:45 AM    HGB 13.6 10/09/2024 09:45 AM    HCT 41.6 10/09/2024 09:45 AM    MCV 90.6 10/09/2024 09:45 AM    RDW 13.2 10/09/2024 09:45 AM     10/09/2024 09:45 AM       CMP:   Lab Results   Component Value Date/Time     10/09/2024 09:45 AM    K 4.8 10/09/2024 09:45 AM     10/09/2024 09:45 AM    CO2 24 10/09/2024 09:45 AM    BUN 20 10/09/2024 09:45 AM    CREATININE 1.76 10/09/2024 09:45 AM    GFRAA >60 02/17/2021 10:30 PM    AGRATIO 1.0 02/17/2021 10:30 PM    LABGLOM 43 10/09/2024 09:45 AM    LABGLOM >60 11/29/2023 06:14 AM    GLUCOSE 288 10/09/2024 09:45 AM    CALCIUM 9.2 10/09/2024 09:45 AM    BILITOT 0.7 10/09/2024 09:45 AM    ALKPHOS 149 10/09/2024 09:45 AM    ALKPHOS 125 02/17/2021 10:30 PM    AST 20 10/09/2024 09:45 AM    ALT 26 10/09/2024 09:45 AM       POC Tests:   Recent Labs     10/17/24  1232   POCGLU 295*       Coags:   Lab Results   Component Value Date/Time    PROTIME 16.8 11/20/2023 03:45 PM    INR 1.4 11/20/2023 03:45 PM    APTT 28.4 11/20/2023 03:45 PM       HCG (If Applicable): No results found for: \"PREGTESTUR\", \"PREGSERUM\", \"HCG\", \"HCGQUANT\"     ABGs: No results found for: \"PHART\", \"PO2ART\", \"ENE1KCE\",

## 2024-10-17 NOTE — PERIOP NOTE
Notified A Kyaw RN that pt took ASA 81 on 10/15/24. She states she will notify Dr. Maosn prior to procedure.

## 2024-10-17 NOTE — PERIOP NOTE
Labs drawn per orders, Patient's monitor reads 76, nurse remains chairside, patient alert and oriented, states \"I feel weird\" Continue to monitor chairside

## 2024-10-17 NOTE — DISCHARGE INSTRUCTIONS
Ronaldo Mason M.D.  [facility]  [address], [address], [place] [address]  Office: [phone]  Fax:    [phone]    PROCEDURE: Procedure(s):  CYSTOSCOPY , LEFT RETROGRADE PYELOGRAM, LEFT URETEROSCOPY WITH THULIUM LASER LITHOTRIPSY, LEFT STENT PLACEMENT WITH C-ARM 'SPEC POP\"    Notify [facility] IMMEDIATELY if any of the following occur:    You are unable to urinate.  Urgency to urinate is not uncommon.  You find yourself urinating small frequent amounts associated with severe lower abdominal discomfort.  Bright red blood with clots in the urine. Some reddish urine is not uncommon and should be treated with increasing the amount of fluids you drink.  Temperature above 101.5° and / or chills.  You are nauseous and / or vomiting and you cannot hold down any fluids.  Your pain is not controlled with the pain medication prescribed.    Special Considerations:     Do not drive for at least 24 hours after the procedure and until you are no longer taking narcotic pain medication and you are able to move and react without hesitation.      MEDICATIONS:  Pain   []  Norco®   []  Percocet®  [] Dilaudid®    []  Tramadol  [] Ketorolac   Antibiotics   []  Cipro   []  Keflex    [] Levaquin   []  Bactrim DS®      [] Cefuroxime   Urination   []  Vesicare®   []  Flomax      Burning   []  Pyridium®   []  UribelTM      Nausea   []  Zofran®   []  Phenergan®      Miscellaneous   []            [] Prescriptions Written on Chart    [x] Prescriptions sent Electronically prior to surgery           Our office will call you tomorrow to schedule your first follow-up appointment.    Please contact [facility] at [phone] or go to the nearest Emergency Department / Urgent Care facility for any other medical questions or concerns.      [facility]  [address]  [place] [address] [phone]    Ureteral Stents   A ureteral stent is a soft plastic tube with holes in it. It's temporarily

## 2024-10-17 NOTE — PERIOP NOTE
Notified Pattie Cadena RN that pt has A1C of 8.5. She states she will notify Dr. Mason prior to procedure. Also notified her that pt has a BS of 333 and received orders from Dr. Velazco to have pt bolus himself with insulin pump and recheck blood sugar around 1230. Also notified her that pt has CGM to right upper arm and insulin pump to right lower abdomen. Dr. Velazco to make determination whether pt to removed pump for procedure or not.

## 2024-10-17 NOTE — PERIOP NOTE
Patient armband removed and shredded     The discharge information has been reviewed with the patient and caregiver.  The patient and caregiver verbalized understanding.  Discharge medications reviewed with the patient and guardian and appropriate educational materials and side effects teaching were provided.

## 2024-10-17 NOTE — OP NOTE
cystoscope, cystourethroscopy was performed. The left ureteral orifice was identified, and intubation was successful with an open-ended catheter, facilitating a retrograde pyelogram with 20 ml of Visipaque mild left hydronephrosis was identified. Subsequently, a 0.035 sensor wire was utilized to intubate the open-ended catheter and placed into the collecting system.  A dual-lumen catheter was then employed to introduce a second wire into the collecting system, followed by the placement of a 11 x 13 x 36 Navigator sheath over one of the wires. After removing the sheath and wire, leaving the wire in place, a flexible ureteroscope was utilized to navigate up to the level of the stone fragmentation and dusting of the stone were accomplished using a thulium laser.  Then using a 0 tip basket I was able to remove the stones.  Stones were sent to pathology for analysis.  On fluoroscopy, no evidence of residual stone was observed. The wire remained in place. Under direct vision, the ureteroscope and Navigator sheath were removed, and a 5 x 26 double-J stent was placed over the wire into the collecting system. The wire was then removed, ensuring proper coiling in both the kidney and bladder. The bladder was emptied, and the stent string was brought through the urethra and secured in place.  The patient tolerated the procedure well and was transferred to the recovery room in stable condition.      Electronically signed by Ronaldo Mason MD on 10/17/2024 at 3:50 PM

## 2024-10-17 NOTE — ANESTHESIA POSTPROCEDURE EVALUATION
.Post-Anesthesia Evaluation & Assessment    Visit Vitals  /89   Pulse 69   Temp 97.9 °F (36.6 °C) (Temporal)   Resp 16   Ht 1.778 m (5' 10\")   Wt 65.2 kg (143 lb 11.2 oz)   SpO2 97%   BMI 20.62 kg/m²       Nausea/Vomiting: no nausea    Post-operative hydration adequate.    Pain score (VAS): 0    Mental status & Level of consciousness: alert and oriented x 3    Neurological status: moves all extremities, sensation grossly intact    Pulmonary status: airway patent, no supplemental oxygen required    Complications related to anesthesia: none    Additional comments:

## 2024-10-17 NOTE — PERIOP NOTE
Reviewed PTA medication list with patient/caregiver and patient/caregiver denies any additional medications.     Patient admits to having a responsible adult care for them at home for at least 24 hours after surgery.    Patient encouraged to use gown warming system and informed that using said warming gown to regulate body temperature prior to a procedure has been shown to help reduce the risks of blood clots and infection.    Patient's pharmacy of choice verified and documented in PTA medication section.    Dual skin assessment & fall risk band verification completed with KEYON Calloway RN.

## 2024-10-17 NOTE — PERIOP NOTE
Notified EULOGIO Hernandez RN in holding that pt did not take betablocker this AM and usually take once in the AM and once in the PM. Hr is 82 and BP is 132/77. She states she will ntoif Armand Velazco

## 2025-02-23 ENCOUNTER — HOSPITAL ENCOUNTER (EMERGENCY)
Facility: HOSPITAL | Age: 60
Discharge: HOME | End: 2025-02-23
Payer: MEDICARE

## 2025-02-23 ENCOUNTER — APPOINTMENT (OUTPATIENT)
Facility: HOSPITAL | Age: 60
End: 2025-02-23
Payer: MEDICARE

## 2025-02-23 VITALS
TEMPERATURE: 98.2 F | HEIGHT: 70 IN | DIASTOLIC BLOOD PRESSURE: 79 MMHG | OXYGEN SATURATION: 99 % | RESPIRATION RATE: 16 BRPM | HEART RATE: 91 BPM | WEIGHT: 140 LBS | SYSTOLIC BLOOD PRESSURE: 149 MMHG | BODY MASS INDEX: 20.04 KG/M2

## 2025-02-23 DIAGNOSIS — N20.1 CALCULUS OF DISTAL RIGHT URETER: Primary | ICD-10-CM

## 2025-02-23 LAB
ALBUMIN SERPL-MCNC: 3.7 G/DL (ref 3.4–5)
ALBUMIN/GLOB SERPL: 1 (ref 0.8–1.7)
ALP SERPL-CCNC: 191 U/L (ref 45–117)
ALT SERPL-CCNC: 28 U/L (ref 16–61)
ANION GAP SERPL CALC-SCNC: 9 MMOL/L (ref 3–18)
APPEARANCE UR: CLEAR
AST SERPL-CCNC: 43 U/L (ref 10–38)
BACTERIA URNS QL MICRO: ABNORMAL /HPF
BASOPHILS # BLD: 0.07 K/UL (ref 0–0.1)
BASOPHILS NFR BLD: 0.5 % (ref 0–2)
BILIRUB SERPL-MCNC: 0.8 MG/DL (ref 0.2–1)
BILIRUB UR QL: NEGATIVE
BUN SERPL-MCNC: 20 MG/DL (ref 7–18)
BUN/CREAT SERPL: 11 (ref 12–20)
CALCIUM SERPL-MCNC: 9.2 MG/DL (ref 8.5–10.1)
CHLORIDE SERPL-SCNC: 106 MMOL/L (ref 100–111)
CO2 SERPL-SCNC: 23 MMOL/L (ref 21–32)
COLOR UR: YELLOW
CREAT SERPL-MCNC: 1.74 MG/DL (ref 0.6–1.3)
DIFFERENTIAL METHOD BLD: ABNORMAL
EOSINOPHIL # BLD: 0.07 K/UL (ref 0–0.4)
EOSINOPHIL NFR BLD: 0.5 % (ref 0–5)
EPITH CASTS URNS QL MICRO: ABNORMAL /LPF (ref 0–5)
ERYTHROCYTE [DISTWIDTH] IN BLOOD BY AUTOMATED COUNT: 13.4 % (ref 11.6–14.5)
GLOBULIN SER CALC-MCNC: 3.8 G/DL (ref 2–4)
GLUCOSE SERPL-MCNC: 323 MG/DL (ref 74–99)
GLUCOSE UR STRIP.AUTO-MCNC: >1000 MG/DL
HCT VFR BLD AUTO: 42.9 % (ref 36–48)
HGB BLD-MCNC: 13.7 G/DL (ref 13–16)
HGB UR QL STRIP: ABNORMAL
IMM GRANULOCYTES # BLD AUTO: 0.06 K/UL (ref 0–0.04)
IMM GRANULOCYTES NFR BLD AUTO: 0.4 % (ref 0–0.5)
KETONES UR QL STRIP.AUTO: 15 MG/DL
LEUKOCYTE ESTERASE UR QL STRIP.AUTO: NEGATIVE
LIPASE SERPL-CCNC: 8 U/L (ref 13–75)
LYMPHOCYTES # BLD: 1.07 K/UL (ref 0.9–3.3)
LYMPHOCYTES NFR BLD: 7.1 % (ref 21–52)
MCH RBC QN AUTO: 28.9 PG (ref 24–34)
MCHC RBC AUTO-ENTMCNC: 31.9 G/DL (ref 31–37)
MCV RBC AUTO: 90.5 FL (ref 78–100)
MONOCYTES # BLD: 0.76 K/UL (ref 0.05–1.2)
MONOCYTES NFR BLD: 5 % (ref 3–10)
NEUTS SEG # BLD: 13.14 K/UL (ref 1.8–8)
NEUTS SEG NFR BLD: 86.5 % (ref 40–73)
NITRITE UR QL STRIP.AUTO: NEGATIVE
NRBC # BLD: 0 K/UL (ref 0–0.01)
NRBC BLD-RTO: 0 PER 100 WBC
PH UR STRIP: 5.5 (ref 5–8)
PLATELET # BLD AUTO: 314 K/UL (ref 135–420)
PMV BLD AUTO: 10.7 FL (ref 9.2–11.8)
POTASSIUM SERPL-SCNC: 4.5 MMOL/L (ref 3.5–5.5)
PROT SERPL-MCNC: 7.5 G/DL (ref 6.4–8.2)
PROT UR STRIP-MCNC: 30 MG/DL
RBC # BLD AUTO: 4.74 M/UL (ref 4.35–5.65)
RBC #/AREA URNS HPF: ABNORMAL /HPF (ref 0–5)
SODIUM SERPL-SCNC: 138 MMOL/L (ref 136–145)
SP GR UR REFRACTOMETRY: 1.03 (ref 1–1.03)
UROBILINOGEN UR QL STRIP.AUTO: 0.2 EU/DL (ref 0.2–1)
WBC # BLD AUTO: 15.2 K/UL (ref 4.6–13.2)
WBC URNS QL MICRO: ABNORMAL /HPF (ref 0–5)

## 2025-02-23 PROCEDURE — 6370000000 HC RX 637 (ALT 250 FOR IP): Performed by: PHYSICIAN ASSISTANT

## 2025-02-23 PROCEDURE — 87086 URINE CULTURE/COLONY COUNT: CPT

## 2025-02-23 PROCEDURE — 80053 COMPREHEN METABOLIC PANEL: CPT

## 2025-02-23 PROCEDURE — 96374 THER/PROPH/DIAG INJ IV PUSH: CPT

## 2025-02-23 PROCEDURE — 96372 THER/PROPH/DIAG INJ SC/IM: CPT

## 2025-02-23 PROCEDURE — 2580000003 HC RX 258: Performed by: PHYSICIAN ASSISTANT

## 2025-02-23 PROCEDURE — 96375 TX/PRO/DX INJ NEW DRUG ADDON: CPT

## 2025-02-23 PROCEDURE — 99284 EMERGENCY DEPT VISIT MOD MDM: CPT

## 2025-02-23 PROCEDURE — 83690 ASSAY OF LIPASE: CPT

## 2025-02-23 PROCEDURE — 74176 CT ABD & PELVIS W/O CONTRAST: CPT

## 2025-02-23 PROCEDURE — 85025 COMPLETE CBC W/AUTO DIFF WBC: CPT

## 2025-02-23 PROCEDURE — 81001 URINALYSIS AUTO W/SCOPE: CPT

## 2025-02-23 PROCEDURE — 6360000002 HC RX W HCPCS: Performed by: PHYSICIAN ASSISTANT

## 2025-02-23 RX ORDER — TAMSULOSIN HYDROCHLORIDE 0.4 MG/1
0.4 CAPSULE ORAL DAILY
Qty: 10 CAPSULE | Refills: 0 | Status: SHIPPED | OUTPATIENT
Start: 2025-02-23

## 2025-02-23 RX ORDER — KETOROLAC TROMETHAMINE 15 MG/ML
15 INJECTION, SOLUTION INTRAMUSCULAR; INTRAVENOUS ONCE
Status: COMPLETED | OUTPATIENT
Start: 2025-02-23 | End: 2025-02-23

## 2025-02-23 RX ORDER — ONDANSETRON 2 MG/ML
4 INJECTION INTRAMUSCULAR; INTRAVENOUS
Status: COMPLETED | OUTPATIENT
Start: 2025-02-23 | End: 2025-02-23

## 2025-02-23 RX ORDER — 0.9 % SODIUM CHLORIDE 0.9 %
500 INTRAVENOUS SOLUTION INTRAVENOUS ONCE
Status: COMPLETED | OUTPATIENT
Start: 2025-02-23 | End: 2025-02-23

## 2025-02-23 RX ORDER — OXYCODONE AND ACETAMINOPHEN 5; 325 MG/1; MG/1
1 TABLET ORAL EVERY 6 HOURS PRN
Qty: 12 TABLET | Refills: 0 | Status: SHIPPED | OUTPATIENT
Start: 2025-02-23 | End: 2025-02-26

## 2025-02-23 RX ORDER — TAMSULOSIN HYDROCHLORIDE 0.4 MG/1
0.4 CAPSULE ORAL
Status: COMPLETED | OUTPATIENT
Start: 2025-02-23 | End: 2025-02-23

## 2025-02-23 RX ORDER — ONDANSETRON 4 MG/1
4 TABLET, ORALLY DISINTEGRATING ORAL EVERY 8 HOURS PRN
Qty: 12 TABLET | Refills: 0 | Status: SHIPPED | OUTPATIENT
Start: 2025-02-23

## 2025-02-23 RX ORDER — DICYCLOMINE HYDROCHLORIDE 10 MG/ML
20 INJECTION INTRAMUSCULAR
Status: COMPLETED | OUTPATIENT
Start: 2025-02-23 | End: 2025-02-23

## 2025-02-23 RX ADMIN — KETOROLAC TROMETHAMINE 15 MG: 15 INJECTION, SOLUTION INTRAMUSCULAR; INTRAVENOUS at 16:54

## 2025-02-23 RX ADMIN — DICYCLOMINE HYDROCHLORIDE 20 MG: 10 INJECTION, SOLUTION INTRAMUSCULAR at 16:54

## 2025-02-23 RX ADMIN — SODIUM CHLORIDE 500 ML: 9 INJECTION, SOLUTION INTRAVENOUS at 16:54

## 2025-02-23 RX ADMIN — ONDANSETRON 4 MG: 2 INJECTION, SOLUTION INTRAMUSCULAR; INTRAVENOUS at 16:53

## 2025-02-23 RX ADMIN — TAMSULOSIN HYDROCHLORIDE 0.4 MG: 0.4 CAPSULE ORAL at 16:55

## 2025-02-23 ASSESSMENT — PAIN - FUNCTIONAL ASSESSMENT: PAIN_FUNCTIONAL_ASSESSMENT: 0-10

## 2025-02-23 ASSESSMENT — PAIN SCALES - GENERAL
PAINLEVEL_OUTOF10: 8
PAINLEVEL_OUTOF10: 7

## 2025-02-23 ASSESSMENT — PAIN DESCRIPTION - ORIENTATION: ORIENTATION: RIGHT

## 2025-02-23 ASSESSMENT — PAIN DESCRIPTION - LOCATION: LOCATION: FLANK

## 2025-02-23 NOTE — ED TRIAGE NOTES
Pt ambulatory to ED c/o right flank pain and emesis. Pt reports pain and emesis began today. Pt has hx of kidney stones and has had stents put in. Pt denies any hematuria or urinary symptoms at this time.

## 2025-02-23 NOTE — ED PROVIDER NOTES
Provider with the below findings:       Read by me, pending review by radiologist.     Interpretation per the Radiologist below, if available at the time of this note:  CT ABDOMEN PELVIS WO CONTRAST Additional Contrast? None   Final Result   There is mild to moderate right-sided hydroureteronephrosis with 5 mm calculus   in the distal right ureter.       Chronic pancreatitis.   Incidental and/or nonemergent findings are as described in detail above.         Electronically signed by ROSARIO HABIB              PROCEDURES   Unless otherwise noted below, none  Procedures         CRITICAL CARE TIME       EMERGENCY DEPARTMENT COURSE and DIFFERENTIAL DIAGNOSIS/MDM   Vitals:    Vitals:    02/23/25 1620 02/23/25 1647 02/23/25 1746 02/23/25 1802   BP: (!) 142/87 (!) 149/79     Pulse: (!) 107  91    Resp: 16      Temp: 98.2 °F (36.8 °C)      TempSrc: Oral      SpO2: 100% 100%  99%   Weight: 63.5 kg (140 lb)      Height: 1.778 m (5' 10\")          Patient was given the following medications:  Medications   sodium chloride 0.9 % bolus 500 mL (0 mLs IntraVENous Stopped 2/23/25 1855)   tamsulosin (FLOMAX) capsule 0.4 mg (0.4 mg Oral Given 2/23/25 1655)   ketorolac (TORADOL) injection 15 mg (15 mg IntraVENous Given 2/23/25 1654)   dicyclomine (BENTYL) injection 20 mg (20 mg IntraMUSCular Given 2/23/25 1654)   ondansetron (ZOFRAN) injection 4 mg (4 mg IntraVENous Given 2/23/25 1653)           Records Reviewed (source and summary): Old medical records.  Nursing notes.  Previous radiology studies.    CLINICAL MANAGEMENT TOOLS:  Not Applicable      ED COURSE       Medial Decision Making:  DDX: stone, renal infarct, UTI/pyelo, appy, colitis, diverticulitis, MSK, zoster. Doubt dissection    Hx of renal stones presenting with right flank pain. Followed by [facility] urology. CT reveals 5 mm distal stone in right ureter. No evidence of concomitant infection. Pain nearly resolved. Will tx pain. Flomax. Antiemetics. FU with Dr. Mason. Reasons to RTED

## 2025-02-25 LAB
BACTERIA SPEC CULT: NORMAL
CC UR VC: NORMAL
SERVICE CMNT-IMP: NORMAL

## 2025-02-26 ENCOUNTER — ANESTHESIA EVENT (OUTPATIENT)
Facility: HOSPITAL | Age: 60
End: 2025-02-26
Payer: MEDICARE

## 2025-02-26 NOTE — H&P
[facility]  [address]  [facility] [address]  Tel:  [phone]  Fax: [phone]    Patient :  Lan Gonzalez  YOB: 1965  Birth Sex:  Male  Date:   02/26/2025 6:57 AM   Visit Type:    Chart Update  Assessment/Plan  #  Detail Type  Description   1.  Assessment  Hydronephrosis with renal and ureteral calculous obstruction (N13.2).    Patient Plan  Pt with RIGHT lower ureteral stone causing pain.  He is scheduled to undergo right ureteroscopy w laser fragmentation of stone and stent' Risks of pain, bleeding, infection, ureteral injury, need for stent and possible need for more than 1 procedure were discussed. The patient understood and agreed              Additional Visit Information    This 59 year old patient presents for Kidney and/or Ureteral Stone, Hematuria, BPH and erectile dysfunction uro.    History of Present Illness  1.  Kidney and/or Ureteral Stone   Onset was gradual. Severity level is moderate. It occurs intermittently. The problem is improving. Location of pain is right flank. The pain radiates to the abdomen. Prior stone type of Calcium oxalate di-hydrate. Pertinent history includes history of prior stone(s). Associated symptoms include nausea. Pertinent negatives include chills, constipation, diarrhea, fever, pain and vomiting. Additional information: Pt with a hx of kidney stones  He underwent LEFT ureteroscopic stone extraction 2/21/19.  He is here for cysto stent removal..       Comments: 11/25/19  Pt here for f/u, been having LEFT sided pain.  He recently passed another stone and constantly passing sand and grit.   I will obtain a CT scan  11/24/2020   Patient with a history of stones he has significant high urinary oxalate.  He continues to have urinary stones he has passed multiple stones almost every day.  We did discuss treatment for primary hyperoxaluria I am going to see if I can find a nephrologist for the patient I will let him know

## 2025-02-27 ENCOUNTER — HOSPITAL ENCOUNTER (OUTPATIENT)
Facility: HOSPITAL | Age: 60
Setting detail: OUTPATIENT SURGERY
Discharge: HOME | End: 2025-02-27
Attending: UROLOGY | Admitting: UROLOGY
Payer: MEDICARE

## 2025-02-27 ENCOUNTER — ANESTHESIA (OUTPATIENT)
Facility: HOSPITAL | Age: 60
End: 2025-02-27
Payer: MEDICARE

## 2025-02-27 ENCOUNTER — APPOINTMENT (OUTPATIENT)
Facility: HOSPITAL | Age: 60
End: 2025-02-27
Attending: UROLOGY
Payer: MEDICARE

## 2025-02-27 VITALS
DIASTOLIC BLOOD PRESSURE: 72 MMHG | BODY MASS INDEX: 19.9 KG/M2 | OXYGEN SATURATION: 99 % | HEART RATE: 90 BPM | WEIGHT: 139 LBS | HEIGHT: 70 IN | TEMPERATURE: 97.7 F | RESPIRATION RATE: 17 BRPM | SYSTOLIC BLOOD PRESSURE: 135 MMHG

## 2025-02-27 LAB
GLUCOSE BLD STRIP.AUTO-MCNC: 161 MG/DL (ref 70–110)
GLUCOSE BLD STRIP.AUTO-MCNC: 231 MG/DL (ref 70–110)
GLUCOSE BLD STRIP.AUTO-MCNC: 279 MG/DL (ref 70–110)

## 2025-02-27 PROCEDURE — 2500000003 HC RX 250 WO HCPCS: Performed by: NURSE ANESTHETIST, CERTIFIED REGISTERED

## 2025-02-27 PROCEDURE — 3600000002 HC SURGERY LEVEL 2 BASE: Performed by: UROLOGY

## 2025-02-27 PROCEDURE — 7100000001 HC PACU RECOVERY - ADDTL 15 MIN: Performed by: UROLOGY

## 2025-02-27 PROCEDURE — 2709999900 HC NON-CHARGEABLE SUPPLY: Performed by: UROLOGY

## 2025-02-27 PROCEDURE — 2580000003 HC RX 258: Performed by: UROLOGY

## 2025-02-27 PROCEDURE — 3700000000 HC ANESTHESIA ATTENDED CARE: Performed by: UROLOGY

## 2025-02-27 PROCEDURE — 2580000003 HC RX 258: Performed by: NURSE ANESTHETIST, CERTIFIED REGISTERED

## 2025-02-27 PROCEDURE — C1747 HC ENDOSCOPE, SINGLE, URINARY TRACT: HCPCS | Performed by: UROLOGY

## 2025-02-27 PROCEDURE — 7100000011 HC PHASE II RECOVERY - ADDTL 15 MIN: Performed by: UROLOGY

## 2025-02-27 PROCEDURE — C1894 INTRO/SHEATH, NON-LASER: HCPCS | Performed by: UROLOGY

## 2025-02-27 PROCEDURE — C1769 GUIDE WIRE: HCPCS | Performed by: UROLOGY

## 2025-02-27 PROCEDURE — C2617 STENT, NON-COR, TEM W/O DEL: HCPCS | Performed by: UROLOGY

## 2025-02-27 PROCEDURE — 6360000004 HC RX CONTRAST MEDICATION: Performed by: UROLOGY

## 2025-02-27 PROCEDURE — 7100000010 HC PHASE II RECOVERY - FIRST 15 MIN: Performed by: UROLOGY

## 2025-02-27 PROCEDURE — 7100000000 HC PACU RECOVERY - FIRST 15 MIN: Performed by: UROLOGY

## 2025-02-27 PROCEDURE — 74420 UROGRAPHY RTRGR +-KUB: CPT

## 2025-02-27 PROCEDURE — 3600000012 HC SURGERY LEVEL 2 ADDTL 15MIN: Performed by: UROLOGY

## 2025-02-27 PROCEDURE — 6360000002 HC RX W HCPCS: Performed by: NURSE ANESTHETIST, CERTIFIED REGISTERED

## 2025-02-27 PROCEDURE — 2720000010 HC SURG SUPPLY STERILE: Performed by: UROLOGY

## 2025-02-27 PROCEDURE — 82962 GLUCOSE BLOOD TEST: CPT

## 2025-02-27 PROCEDURE — 6360000002 HC RX W HCPCS: Performed by: UROLOGY

## 2025-02-27 PROCEDURE — 3700000001 HC ADD 15 MINUTES (ANESTHESIA): Performed by: UROLOGY

## 2025-02-27 DEVICE — URETERAL STENT
Type: IMPLANTABLE DEVICE | Site: URETER | Status: FUNCTIONAL
Brand: POLARIS™ ULTRA

## 2025-02-27 RX ORDER — SUCCINYLCHOLINE/SOD CL,ISO/PF 100 MG/5ML
SYRINGE (ML) INTRAVENOUS
Status: DISCONTINUED | OUTPATIENT
Start: 2025-02-27 | End: 2025-02-27 | Stop reason: SDUPTHER

## 2025-02-27 RX ORDER — DIPHENHYDRAMINE HYDROCHLORIDE 50 MG/ML
12.5 INJECTION INTRAMUSCULAR; INTRAVENOUS
Status: DISCONTINUED | OUTPATIENT
Start: 2025-02-27 | End: 2025-02-27 | Stop reason: HOSPADM

## 2025-02-27 RX ORDER — LEVOFLOXACIN 5 MG/ML
500 INJECTION, SOLUTION INTRAVENOUS
Status: COMPLETED | OUTPATIENT
Start: 2025-02-27 | End: 2025-02-27

## 2025-02-27 RX ORDER — OXYCODONE HYDROCHLORIDE 5 MG/1
5 TABLET ORAL PRN
Status: DISCONTINUED | OUTPATIENT
Start: 2025-02-27 | End: 2025-02-27 | Stop reason: HOSPADM

## 2025-02-27 RX ORDER — PHENYLEPHRINE HCL IN 0.9% NACL 1 MG/10 ML
SYRINGE (ML) INTRAVENOUS
Status: DISCONTINUED | OUTPATIENT
Start: 2025-02-27 | End: 2025-02-27 | Stop reason: SDUPTHER

## 2025-02-27 RX ORDER — METOCLOPRAMIDE HYDROCHLORIDE 5 MG/ML
INJECTION INTRAMUSCULAR; INTRAVENOUS
Status: DISCONTINUED | OUTPATIENT
Start: 2025-02-27 | End: 2025-02-27 | Stop reason: SDUPTHER

## 2025-02-27 RX ORDER — ONDANSETRON 2 MG/ML
4 INJECTION INTRAMUSCULAR; INTRAVENOUS
Status: DISCONTINUED | OUTPATIENT
Start: 2025-02-27 | End: 2025-02-27 | Stop reason: HOSPADM

## 2025-02-27 RX ORDER — PROPOFOL 10 MG/ML
INJECTION, EMULSION INTRAVENOUS
Status: DISCONTINUED | OUTPATIENT
Start: 2025-02-27 | End: 2025-02-27 | Stop reason: SDUPTHER

## 2025-02-27 RX ORDER — HYDROMORPHONE HYDROCHLORIDE 1 MG/ML
0.5 INJECTION, SOLUTION INTRAMUSCULAR; INTRAVENOUS; SUBCUTANEOUS EVERY 5 MIN PRN
Status: DISCONTINUED | OUTPATIENT
Start: 2025-02-27 | End: 2025-02-27 | Stop reason: HOSPADM

## 2025-02-27 RX ORDER — SODIUM CHLORIDE 0.9 % (FLUSH) 0.9 %
5-40 SYRINGE (ML) INJECTION PRN
Status: DISCONTINUED | OUTPATIENT
Start: 2025-02-27 | End: 2025-02-27 | Stop reason: HOSPADM

## 2025-02-27 RX ORDER — LABETALOL HYDROCHLORIDE 5 MG/ML
5 INJECTION, SOLUTION INTRAVENOUS EVERY 10 MIN PRN
Status: DISCONTINUED | OUTPATIENT
Start: 2025-02-27 | End: 2025-02-27 | Stop reason: HOSPADM

## 2025-02-27 RX ORDER — OXYCODONE HYDROCHLORIDE 5 MG/1
10 TABLET ORAL PRN
Status: DISCONTINUED | OUTPATIENT
Start: 2025-02-27 | End: 2025-02-27 | Stop reason: HOSPADM

## 2025-02-27 RX ORDER — NALOXONE HYDROCHLORIDE 0.4 MG/ML
INJECTION, SOLUTION INTRAMUSCULAR; INTRAVENOUS; SUBCUTANEOUS PRN
Status: DISCONTINUED | OUTPATIENT
Start: 2025-02-27 | End: 2025-02-27 | Stop reason: HOSPADM

## 2025-02-27 RX ORDER — ONDANSETRON 2 MG/ML
INJECTION INTRAMUSCULAR; INTRAVENOUS
Status: DISCONTINUED | OUTPATIENT
Start: 2025-02-27 | End: 2025-02-27 | Stop reason: SDUPTHER

## 2025-02-27 RX ORDER — MIDAZOLAM HYDROCHLORIDE 1 MG/ML
INJECTION, SOLUTION INTRAMUSCULAR; INTRAVENOUS
Status: DISCONTINUED | OUTPATIENT
Start: 2025-02-27 | End: 2025-02-27 | Stop reason: SDUPTHER

## 2025-02-27 RX ORDER — MIDAZOLAM HYDROCHLORIDE 2 MG/2ML
2 INJECTION, SOLUTION INTRAMUSCULAR; INTRAVENOUS
Status: DISCONTINUED | OUTPATIENT
Start: 2025-02-27 | End: 2025-02-27 | Stop reason: HOSPADM

## 2025-02-27 RX ORDER — SODIUM CHLORIDE 9 MG/ML
INJECTION, SOLUTION INTRAVENOUS PRN
Status: DISCONTINUED | OUTPATIENT
Start: 2025-02-27 | End: 2025-02-27 | Stop reason: HOSPADM

## 2025-02-27 RX ORDER — GLYCOPYRROLATE 0.2 MG/ML
INJECTION INTRAMUSCULAR; INTRAVENOUS
Status: DISCONTINUED | OUTPATIENT
Start: 2025-02-27 | End: 2025-02-27 | Stop reason: SDUPTHER

## 2025-02-27 RX ORDER — IOPAMIDOL 612 MG/ML
INJECTION, SOLUTION INTRATHECAL PRN
Status: DISCONTINUED | OUTPATIENT
Start: 2025-02-27 | End: 2025-02-27 | Stop reason: ALTCHOICE

## 2025-02-27 RX ORDER — SODIUM CHLORIDE, SODIUM LACTATE, POTASSIUM CHLORIDE, CALCIUM CHLORIDE 600; 310; 30; 20 MG/100ML; MG/100ML; MG/100ML; MG/100ML
INJECTION, SOLUTION INTRAVENOUS
Status: DISCONTINUED | OUTPATIENT
Start: 2025-02-27 | End: 2025-02-27 | Stop reason: SDUPTHER

## 2025-02-27 RX ORDER — SODIUM CHLORIDE 0.9 % (FLUSH) 0.9 %
5-40 SYRINGE (ML) INJECTION EVERY 12 HOURS SCHEDULED
Status: DISCONTINUED | OUTPATIENT
Start: 2025-02-27 | End: 2025-02-27 | Stop reason: HOSPADM

## 2025-02-27 RX ORDER — EPHEDRINE SULFATE/0.9% NACL/PF 50 MG/5 ML
SYRINGE (ML) INTRAVENOUS
Status: DISCONTINUED | OUTPATIENT
Start: 2025-02-27 | End: 2025-02-27 | Stop reason: SDUPTHER

## 2025-02-27 RX ORDER — ROCURONIUM BROMIDE 10 MG/ML
INJECTION, SOLUTION INTRAVENOUS
Status: DISCONTINUED | OUTPATIENT
Start: 2025-02-27 | End: 2025-02-27 | Stop reason: SDUPTHER

## 2025-02-27 RX ORDER — SODIUM CHLORIDE 9 MG/ML
INJECTION, SOLUTION INTRAVENOUS CONTINUOUS
Status: DISCONTINUED | OUTPATIENT
Start: 2025-02-27 | End: 2025-02-27 | Stop reason: HOSPADM

## 2025-02-27 RX ORDER — LIDOCAINE HYDROCHLORIDE 20 MG/ML
INJECTION, SOLUTION INTRAVENOUS
Status: DISCONTINUED | OUTPATIENT
Start: 2025-02-27 | End: 2025-02-27 | Stop reason: SDUPTHER

## 2025-02-27 RX ORDER — PROCHLORPERAZINE EDISYLATE 5 MG/ML
5 INJECTION INTRAMUSCULAR; INTRAVENOUS
Status: DISCONTINUED | OUTPATIENT
Start: 2025-02-27 | End: 2025-02-27 | Stop reason: HOSPADM

## 2025-02-27 RX ADMIN — Medication 10 MG: at 15:58

## 2025-02-27 RX ADMIN — LEVOFLOXACIN 500 MG: 5 INJECTION, SOLUTION INTRAVENOUS at 15:44

## 2025-02-27 RX ADMIN — Medication 10 MG: at 16:04

## 2025-02-27 RX ADMIN — Medication 100 MCG: at 15:54

## 2025-02-27 RX ADMIN — Medication 100 MCG: at 16:18

## 2025-02-27 RX ADMIN — Medication 100 MCG: at 15:56

## 2025-02-27 RX ADMIN — Medication 100 MCG: at 15:57

## 2025-02-27 RX ADMIN — SODIUM CHLORIDE, SODIUM LACTATE, POTASSIUM CHLORIDE, AND CALCIUM CHLORIDE: 600; 310; 30; 20 INJECTION, SOLUTION INTRAVENOUS at 16:15

## 2025-02-27 RX ADMIN — SODIUM CHLORIDE: 0.9 INJECTION, SOLUTION INTRAVENOUS at 13:00

## 2025-02-27 RX ADMIN — PROPOFOL 140 MG: 10 INJECTION, EMULSION INTRAVENOUS at 15:50

## 2025-02-27 RX ADMIN — Medication 100 MCG: at 16:14

## 2025-02-27 RX ADMIN — ONDANSETRON 4 MG: 2 INJECTION INTRAMUSCULAR; INTRAVENOUS at 15:59

## 2025-02-27 RX ADMIN — MIDAZOLAM 2 MG: 1 INJECTION INTRAMUSCULAR; INTRAVENOUS at 15:44

## 2025-02-27 RX ADMIN — METOCLOPRAMIDE 5 MG: 5 INJECTION, SOLUTION INTRAMUSCULAR; INTRAVENOUS at 15:58

## 2025-02-27 RX ADMIN — Medication 100 MG: at 15:50

## 2025-02-27 RX ADMIN — Medication 20 MG: at 15:49

## 2025-02-27 RX ADMIN — GLYCOPYRROLATE 0.1 MG: 0.2 INJECTION INTRAMUSCULAR; INTRAVENOUS at 15:44

## 2025-02-27 RX ADMIN — LIDOCAINE HYDROCHLORIDE 60 MG: 20 INJECTION, SOLUTION INTRAVENOUS at 15:50

## 2025-02-27 RX ADMIN — ROCURONIUM BROMIDE 5 MG: 10 INJECTION, SOLUTION INTRAVENOUS at 15:50

## 2025-02-27 ASSESSMENT — PAIN SCALES - GENERAL
PAINLEVEL_OUTOF10: 0

## 2025-02-27 ASSESSMENT — COPD QUESTIONNAIRES: CAT_SEVERITY: MILD

## 2025-02-27 ASSESSMENT — PAIN - FUNCTIONAL ASSESSMENT: PAIN_FUNCTIONAL_ASSESSMENT: 0-10

## 2025-02-27 NOTE — PERIOP NOTE
Notified Alexandra DENNISON in holding patient took aspirin 81mg on 2/25/25. Dr. Mason to be made aware.       Patient blood sugar this afternoon 279, patient is diagnosed diabetic and is currently wearing a glucose monitor and insulin pump. Dr. Aguirre notified- asked patient to self treat.     Patient gave himself 1 unit of insulin @ 1305. Will re-check in 30 minutes.

## 2025-02-27 NOTE — DISCHARGE INSTRUCTIONS
Ronaldo Mason M.D.  [facility]  [address], [address], [place] [address]  Office: [phone]  Fax:    [phone]    PROCEDURE: Procedure(s):  CYSTOSCOPY , RIGHT RETROGRADE PYELOGRAM, RIGHT URETEROSCOPY WITH THULIUM LASER LITHOTRIPSY , RIGHT JJ STENT PLACEMENT WITH C-ARM    Notify [facility] IMMEDIATELY if any of the following occur:    You are unable to urinate.  Urgency to urinate is not uncommon.  You find yourself urinating small frequent amounts associated with severe lower abdominal discomfort.  Bright red blood with clots in the urine. Some reddish urine is not uncommon and should be treated with increasing the amount of fluids you drink.  Temperature above 101.5° and / or chills.  You are nauseous and / or vomiting and you cannot hold down any fluids.  Your pain is not controlled with the pain medication prescribed.    Special Considerations:     Do not drive for at least 24 hours after the procedure and until you are no longer taking narcotic pain medication and you are able to move and react without hesitation.      MEDICATIONS:  Pain   []  Norco®   []  Percocet®  [] Dilaudid®    []  Tramadol  [] Ketorolac   Antibiotics   []  Cipro   []  Keflex    [] Levaquin   []  Bactrim DS®      [] Cefuroxime   Urination   []  Vesicare®   []  Flomax      Burning   []  Pyridium®   []  UribelTM      Nausea   []  Zofran®   []  Phenergan®      Miscellaneous   []            [] Prescriptions Written on Chart    [x] Prescriptions sent Electronically prior to surgery           Our office will call you tomorrow to schedule your first follow-up appointment.    Please contact [facility] at [phone] or go to the nearest Emergency Department / Urgent Care facility for any other medical questions or concerns.      DISCHARGE SUMMARY from Nurse    PATIENT INSTRUCTIONS:    After general anesthesia or intravenous sedation, for 24 hours or while taking prescription Narcotics:  Limit your

## 2025-02-27 NOTE — PERIOP NOTE
TRANSFER - IN REPORT:    Verbal report received from ORN & CRNA on Lan Gonzalez  being received from OR for routine progression of patient care      Report consisted of patient's Situation, Background, Assessment and   Recommendations(SBAR).     Information from the following report(s) Adult Overview, Surgery Report, Intake/Output, and MAR was reviewed with the receiving nurse.    Opportunity for questions and clarification was provided.      Assessment completed upon patient's arrival to unit and care assumed.

## 2025-02-27 NOTE — ANESTHESIA PRE PROCEDURE
CHOLANGIOPANCREATOGRAM T TUBE  2014    Endoscopic retrograde cholangiopancreatography   • FL CHOLANGIOPANCREATOGRAM T TUBE  2014    Endoscopic retrograde cholangiopancreatography   • HAND SURGERY Left     ,   • NECK SURGERY      &   RFA   • ORTHOPEDIC SURGERY Left     joint replacement   index finger   • ORTHOPEDIC SURGERY      left foot surgery x 2   • PANCREAS SURGERY  2014    Classic pancreaticoduodenectomy with open cholecystectomy.   • CA UNLISTED PROCEDURE ABDOMEN PERITONEUM & OMENTUM      whipple procedure for chronic pancreatitis   • UROLOGICAL SURGERY  2021    CYSTOSCOPY, RIGHT URETEROSCOPY, LASER LITHOTRIPSY WITH HOLMIUIM, STENT PLACEMENT (C-ARM, HOLMIUM LASER- AGILITY, STENTS)   • UROLOGICAL SURGERY  2019    CYSTOSCOPY,LEFT RETROGRADE PYELOGRAM WITH INTERPRETATION ,LEFT URETEROSCOPIC STONE EXTRECTION WITH HOLMIUM, JJ STENT PLACEMENT WITH C-ARM   • UROLOGICAL SURGERY  2017    CYSTOSCOPY,LEFT RETRO PYELOGRAM,LEFT URETEROSCOPY WITH HOLIMIUM LASER FRAGMENTATION OF STONE AND STONE EXTRACTION,STENT PLACEMENT W/C-ARM       Social History:    Social History     Tobacco Use   • Smoking status: Former     Current packs/day: 0.00     Types: Cigarettes     Quit date: 2012     Years since quittin.0   • Smokeless tobacco: Never   Substance Use Topics   • Alcohol use: Never                                Counseling given: Not Answered      Vital Signs (Current):   Vitals:    25 1322   BP: 127/73   Pulse: 91   Resp: 16   Temp: 99 °F (37.2 °C)   TempSrc: Temporal   SpO2: 97%   Weight: 63 kg (139 lb)   Height: 1.778 m (5' 10\")                                              BP Readings from Last 3 Encounters:   25 127/73   25 (!) 149/79   10/17/24 130/72       NPO Status: Time of last liquid consumption:                         Time of last solid consumption:                         Date of last liquid consumption: 25

## 2025-02-27 NOTE — PERIOP NOTE
Pt unable to void at this time. Returned to stretcher. IV fluids resumed. Pt drinking PO fluids. Call bell within reach.

## 2025-02-27 NOTE — INTERVAL H&P NOTE
Update History & Physical    The patient's History and Physical of February 26, 2025 was reviewed with the patient and I examined the patient. There was no change. The surgical site was confirmed by the patient and me.     Plan: The risks, benefits, expected outcome, and alternative to the recommended procedure have been discussed with the patient. Patient understands and wants to proceed with the procedure.     Electronically signed by Ronaldo Mason MD on 2/27/2025 at 12:35 PM

## 2025-02-27 NOTE — PERIOP NOTE
Reviewed PTA medication list with patient/caregiver and patient/caregiver denies any additional medications.     Patient admits to having a responsible adult care for them at home for at least 24 hours after surgery.    Patient encouraged to use gown warming system and informed that using said warming gown to regulate body temperature prior to a procedure has been shown to help reduce the risks of blood clots and infection.    Patient's pharmacy of choice verified and documented in PTA medication section.    Dual skin assessment & fall risk band verification completed with Herlinda ALICEA RN.

## 2025-02-27 NOTE — ANESTHESIA POSTPROCEDURE EVALUATION
Department of Anesthesiology  Postprocedure Note    Patient: Lan Gonzalez  MRN: [id number]  YOB: 1965  Date of evaluation: 2/27/2025    Procedure Summary       Date: 02/27/25 Room / Location: [facility] MAIN CYSTO / [facility] MAIN OR    Anesthesia Start: 1544 Anesthesia Stop: 1647    Procedure: CYSTOSCOPY , RIGHT RETROGRADE PYELOGRAM, RIGHT URETEROSCOPY WITH THULIUM LASER LITHOTRIPSY , RIGHT JJ STENT PLACEMENT WITH C-ARM (Right: Bladder) Diagnosis:       Calculus of kidney      (Calculus of kidney [N20.0])    Surgeons: Ronaldo Mason MD Responsible Provider: Elroy Lynch MD    Anesthesia Type: General ASA Status: 3            Anesthesia Type: General    Jose L Phase I: Jose L Score: 9    Jose L Phase II:      Anesthesia Post Evaluation    Patient location during evaluation: PACU  Patient participation: complete - patient participated  Level of consciousness: confused  Pain score: 1  Airway patency: patent  Nausea & Vomiting: no nausea and no vomiting  Cardiovascular status: blood pressure returned to baseline  Respiratory status: acceptable  Hydration status: euvolemic  Multimodal analgesia pain management approach  Pain management: adequate    No notable events documented.

## 2025-02-28 NOTE — OP NOTE
Operative Note      Patient: Lan Gonzalez  YOB: 1965  MRN: [id number]    Date of Procedure: 2/27/2025    Pre-Op Diagnosis Codes:      * Calculus of kidney [N20.0]    Post-Op Diagnosis:  Right ureteral stone, multiple right renal stones       Procedure(s):  CYSTOSCOPY , RIGHT RETROGRADE PYELOGRAM, RIGHT URETEROSCOPY WITH THULIUM LASER LITHOTRIPSY , RIGHT JJ STENT PLACEMENT WITH C-ARM    Surgeon(s):  Rnoaldo Mason MD    Assistant:   Surgical Assistant: Taylor Marie    Anesthesia: General    Estimated Blood Loss (mL): Minimal    Complications: None    Specimens:   * No specimens in log *    Implants:  Implant Name Type Inv. Item Serial No.  Lot No. LRB No. Used Action   STENT URET 6FR L24CM PERCFLX HYDR+ DBL PGTL THRD 2 - [id number]  STENT URET 6FR L24CM PERCFLX HYDR+ DBL PGTL THRD 2  [employer] [facility]- [id number] Right 1 Implanted         Drains: * No LDAs found *    Findings:  Infection Present At Time Of Surgery (PATOS) (choose all levels that have infection present):  No infection present  Other Findings: Right ureteral stone protruding from right ureteral orifice and multiple sized stones throughout collecting system on the right side    Detailed Description of Procedure:   Procedure Details:  The patient was brought in the operating room, placed in the   supine position. After administration of general anesthesia, she was placed   in lithotomy position. Groin and genitalia were prepped and draped in the   usual sterile fashion. I began with a 21-Burundian cystoscope.   Cystourethroscopy did not reveal any abnormalities.,  Other than the known ureteral stone protruding from the right ureteral orifice.  I began by using an open-ended catheter placed through the cystoscope and was able to the ureteral stone back into the lower ureter.  I was then able to form retrograde pyelogram through the 5 [place]ian open-ended catheter revealing moderate hydroureteronephrosis.  A 0.035 wire was able

## 2025-04-24 ENCOUNTER — APPOINTMENT (OUTPATIENT)
Facility: HOSPITAL | Age: 60
DRG: 872 | End: 2025-04-24
Attending: EMERGENCY MEDICINE
Payer: MEDICARE

## 2025-04-24 ENCOUNTER — HOSPITAL ENCOUNTER (INPATIENT)
Facility: HOSPITAL | Age: 60
LOS: 4 days | Discharge: HOME | DRG: 872 | End: 2025-04-28
Attending: EMERGENCY MEDICINE | Admitting: HOSPITALIST
Payer: MEDICARE

## 2025-04-24 ENCOUNTER — APPOINTMENT (OUTPATIENT)
Facility: HOSPITAL | Age: 60
DRG: 872 | End: 2025-04-24
Payer: MEDICARE

## 2025-04-24 DIAGNOSIS — R06.02 SHORTNESS OF BREATH: ICD-10-CM

## 2025-04-24 DIAGNOSIS — D69.2 PURPURA: ICD-10-CM

## 2025-04-24 DIAGNOSIS — M79.10 MYALGIA: ICD-10-CM

## 2025-04-24 DIAGNOSIS — M06.9 RHEUMATOID ARTHRITIS, INVOLVING UNSPECIFIED SITE, UNSPECIFIED WHETHER RHEUMATOID FACTOR PRESENT (HCC): Primary | ICD-10-CM

## 2025-04-24 DIAGNOSIS — I77.6 VASCULITIS: ICD-10-CM

## 2025-04-24 DIAGNOSIS — R20.2 PARESTHESIA: ICD-10-CM

## 2025-04-24 DIAGNOSIS — N18.31 STAGE 3A CHRONIC KIDNEY DISEASE (HCC): ICD-10-CM

## 2025-04-24 DIAGNOSIS — A41.9 SEPSIS, DUE TO UNSPECIFIED ORGANISM, UNSPECIFIED WHETHER ACUTE ORGAN DYSFUNCTION PRESENT (HCC): ICD-10-CM

## 2025-04-24 PROBLEM — I42.9 CARDIOMYOPATHY (HCC): Status: ACTIVE | Noted: 2025-04-24

## 2025-04-24 PROBLEM — L03.115 CELLULITIS OF RIGHT LOWER EXTREMITY: Status: ACTIVE | Noted: 2025-04-24

## 2025-04-24 PROBLEM — G62.9 PERIPHERAL POLYNEUROPATHY: Status: ACTIVE | Noted: 2025-04-24

## 2025-04-24 PROBLEM — I25.10 CORONARY ARTERY DISEASE INVOLVING NATIVE CORONARY ARTERY OF NATIVE HEART: Status: ACTIVE | Noted: 2025-04-24

## 2025-04-24 PROBLEM — N18.32 STAGE 3B CHRONIC KIDNEY DISEASE (HCC): Status: ACTIVE | Noted: 2025-04-24

## 2025-04-24 LAB
ALBUMIN SERPL-MCNC: 2.2 G/DL (ref 3.4–5)
ALBUMIN/GLOB SERPL: 0.6 (ref 0.8–1.7)
ALP SERPL-CCNC: 114 U/L (ref 45–117)
ALT SERPL-CCNC: 22 U/L (ref 16–61)
ANION GAP SERPL CALC-SCNC: 9 MMOL/L (ref 3–18)
AST SERPL-CCNC: 21 U/L (ref 10–38)
BASOPHILS # BLD: 0.07 K/UL (ref 0–0.1)
BASOPHILS NFR BLD: 0.4 % (ref 0–2)
BILIRUB SERPL-MCNC: 0.6 MG/DL (ref 0.2–1)
BUN SERPL-MCNC: 30 MG/DL (ref 7–18)
BUN/CREAT SERPL: 18 (ref 12–20)
CALCIUM SERPL-MCNC: 8.9 MG/DL (ref 8.5–10.1)
CHLORIDE SERPL-SCNC: 103 MMOL/L (ref 100–111)
CO2 SERPL-SCNC: 24 MMOL/L (ref 21–32)
CREAT SERPL-MCNC: 1.64 MG/DL (ref 0.6–1.3)
DIFFERENTIAL METHOD BLD: ABNORMAL
ECHO BSA: 1.77 M2
EOSINOPHIL # BLD: 0.48 K/UL (ref 0–0.4)
EOSINOPHIL NFR BLD: 2.6 % (ref 0–5)
ERYTHROCYTE [DISTWIDTH] IN BLOOD BY AUTOMATED COUNT: 14.4 % (ref 11.6–14.5)
FLUAV RNA SPEC QL NAA+PROBE: NOT DETECTED
FLUBV RNA SPEC QL NAA+PROBE: NOT DETECTED
GLOBULIN SER CALC-MCNC: 3.9 G/DL (ref 2–4)
GLUCOSE BLD STRIP.AUTO-MCNC: 269 MG/DL (ref 70–110)
GLUCOSE SERPL-MCNC: 193 MG/DL (ref 74–99)
HCT VFR BLD AUTO: 32.7 % (ref 36–48)
HGB BLD-MCNC: 10.3 G/DL (ref 13–16)
IMM GRANULOCYTES # BLD AUTO: 0.2 K/UL (ref 0–0.04)
IMM GRANULOCYTES NFR BLD AUTO: 1.1 % (ref 0–0.5)
LACTATE BLD-SCNC: 2.13 MMOL/L (ref 0.4–2)
LACTATE BLD-SCNC: 2.24 MMOL/L (ref 0.4–2)
LYMPHOCYTES # BLD: 0.84 K/UL (ref 0.9–3.3)
LYMPHOCYTES NFR BLD: 4.5 % (ref 21–52)
MCH RBC QN AUTO: 27 PG (ref 24–34)
MCHC RBC AUTO-ENTMCNC: 31.5 G/DL (ref 31–37)
MCV RBC AUTO: 85.8 FL (ref 78–100)
MONOCYTES # BLD: 0.65 K/UL (ref 0.05–1.2)
MONOCYTES NFR BLD: 3.5 % (ref 3–10)
NEUTS SEG # BLD: 16.52 K/UL (ref 1.8–8)
NEUTS SEG NFR BLD: 87.9 % (ref 40–73)
NRBC # BLD: 0 K/UL (ref 0–0.01)
NRBC BLD-RTO: 0 PER 100 WBC
PLATELET # BLD AUTO: 483 K/UL (ref 135–420)
PMV BLD AUTO: 9.9 FL (ref 9.2–11.8)
POTASSIUM SERPL-SCNC: 4.3 MMOL/L (ref 3.5–5.5)
PROT SERPL-MCNC: 6.1 G/DL (ref 6.4–8.2)
RBC # BLD AUTO: 3.81 M/UL (ref 4.35–5.65)
SARS-COV-2 RNA RESP QL NAA+PROBE: NOT DETECTED
SODIUM SERPL-SCNC: 136 MMOL/L (ref 136–145)
SOURCE: NORMAL
TROPONIN I SERPL HS-MCNC: 11 NG/L (ref 0–78)
WBC # BLD AUTO: 18.8 K/UL (ref 4.6–13.2)

## 2025-04-24 PROCEDURE — 93971 EXTREMITY STUDY: CPT

## 2025-04-24 PROCEDURE — 2580000003 HC RX 258: Performed by: EMERGENCY MEDICINE

## 2025-04-24 PROCEDURE — 86140 C-REACTIVE PROTEIN: CPT

## 2025-04-24 PROCEDURE — 2500000003 HC RX 250 WO HCPCS: Performed by: HOSPITALIST

## 2025-04-24 PROCEDURE — 71045 X-RAY EXAM CHEST 1 VIEW: CPT

## 2025-04-24 PROCEDURE — 87636 SARSCOV2 & INF A&B AMP PRB: CPT

## 2025-04-24 PROCEDURE — 73630 X-RAY EXAM OF FOOT: CPT

## 2025-04-24 PROCEDURE — 1100000000 HC RM PRIVATE

## 2025-04-24 PROCEDURE — 96366 THER/PROPH/DIAG IV INF ADDON: CPT

## 2025-04-24 PROCEDURE — 93005 ELECTROCARDIOGRAM TRACING: CPT | Performed by: EMERGENCY MEDICINE

## 2025-04-24 PROCEDURE — 96375 TX/PRO/DX INJ NEW DRUG ADDON: CPT

## 2025-04-24 PROCEDURE — 6360000002 HC RX W HCPCS: Performed by: EMERGENCY MEDICINE

## 2025-04-24 PROCEDURE — 84145 PROCALCITONIN (PCT): CPT

## 2025-04-24 PROCEDURE — 99285 EMERGENCY DEPT VISIT HI MDM: CPT

## 2025-04-24 PROCEDURE — 6370000000 HC RX 637 (ALT 250 FOR IP): Performed by: HOSPITALIST

## 2025-04-24 PROCEDURE — 80053 COMPREHEN METABOLIC PANEL: CPT

## 2025-04-24 PROCEDURE — 85610 PROTHROMBIN TIME: CPT

## 2025-04-24 PROCEDURE — 82550 ASSAY OF CK (CPK): CPT

## 2025-04-24 PROCEDURE — 2500000003 HC RX 250 WO HCPCS: Performed by: EMERGENCY MEDICINE

## 2025-04-24 PROCEDURE — 6360000002 HC RX W HCPCS: Performed by: HOSPITALIST

## 2025-04-24 PROCEDURE — 84484 ASSAY OF TROPONIN QUANT: CPT

## 2025-04-24 PROCEDURE — 87040 BLOOD CULTURE FOR BACTERIA: CPT

## 2025-04-24 PROCEDURE — 82962 GLUCOSE BLOOD TEST: CPT

## 2025-04-24 PROCEDURE — 85025 COMPLETE CBC W/AUTO DIFF WBC: CPT

## 2025-04-24 PROCEDURE — 96365 THER/PROPH/DIAG IV INF INIT: CPT

## 2025-04-24 PROCEDURE — 85652 RBC SED RATE AUTOMATED: CPT

## 2025-04-24 PROCEDURE — 83605 ASSAY OF LACTIC ACID: CPT

## 2025-04-24 RX ORDER — POTASSIUM CHLORIDE 1500 MG/1
40 TABLET, EXTENDED RELEASE ORAL PRN
Status: DISCONTINUED | OUTPATIENT
Start: 2025-04-24 | End: 2025-04-25

## 2025-04-24 RX ORDER — ENOXAPARIN SODIUM 100 MG/ML
40 INJECTION SUBCUTANEOUS DAILY
Status: DISCONTINUED | OUTPATIENT
Start: 2025-04-24 | End: 2025-04-29 | Stop reason: HOSPADM

## 2025-04-24 RX ORDER — ONDANSETRON 2 MG/ML
4 INJECTION INTRAMUSCULAR; INTRAVENOUS EVERY 6 HOURS PRN
Status: DISCONTINUED | OUTPATIENT
Start: 2025-04-24 | End: 2025-04-29 | Stop reason: HOSPADM

## 2025-04-24 RX ORDER — POTASSIUM CHLORIDE 7.45 MG/ML
10 INJECTION INTRAVENOUS PRN
Status: DISCONTINUED | OUTPATIENT
Start: 2025-04-24 | End: 2025-04-25

## 2025-04-24 RX ORDER — ASPIRIN 81 MG/1
81 TABLET, CHEWABLE ORAL DAILY
Status: DISCONTINUED | OUTPATIENT
Start: 2025-04-24 | End: 2025-04-29 | Stop reason: HOSPADM

## 2025-04-24 RX ORDER — MAGNESIUM SULFATE IN WATER 40 MG/ML
2000 INJECTION, SOLUTION INTRAVENOUS PRN
Status: DISCONTINUED | OUTPATIENT
Start: 2025-04-24 | End: 2025-04-29 | Stop reason: HOSPADM

## 2025-04-24 RX ORDER — SODIUM CHLORIDE 0.9 % (FLUSH) 0.9 %
5-40 SYRINGE (ML) INJECTION PRN
Status: DISCONTINUED | OUTPATIENT
Start: 2025-04-24 | End: 2025-04-29 | Stop reason: HOSPADM

## 2025-04-24 RX ORDER — 0.9 % SODIUM CHLORIDE 0.9 %
30 INTRAVENOUS SOLUTION INTRAVENOUS ONCE
Status: COMPLETED | OUTPATIENT
Start: 2025-04-24 | End: 2025-04-24

## 2025-04-24 RX ORDER — ROPINIROLE 0.25 MG/1
0.25 TABLET, FILM COATED ORAL DAILY
Status: DISCONTINUED | OUTPATIENT
Start: 2025-04-24 | End: 2025-04-28

## 2025-04-24 RX ORDER — SODIUM CHLORIDE 9 MG/ML
INJECTION, SOLUTION INTRAVENOUS PRN
Status: DISCONTINUED | OUTPATIENT
Start: 2025-04-24 | End: 2025-04-29 | Stop reason: HOSPADM

## 2025-04-24 RX ORDER — LINACLOTIDE 145 UG/1
145 CAPSULE, GELATIN COATED ORAL DAILY
Status: ON HOLD | COMMUNITY

## 2025-04-24 RX ORDER — DEXTROSE MONOHYDRATE 100 MG/ML
INJECTION, SOLUTION INTRAVENOUS CONTINUOUS PRN
Status: DISCONTINUED | OUTPATIENT
Start: 2025-04-24 | End: 2025-04-26 | Stop reason: SDUPTHER

## 2025-04-24 RX ORDER — SODIUM CHLORIDE 0.9 % (FLUSH) 0.9 %
5-40 SYRINGE (ML) INJECTION EVERY 12 HOURS SCHEDULED
Status: DISCONTINUED | OUTPATIENT
Start: 2025-04-24 | End: 2025-04-29 | Stop reason: HOSPADM

## 2025-04-24 RX ORDER — POLYETHYLENE GLYCOL 3350 17 G/17G
17 POWDER, FOR SOLUTION ORAL DAILY PRN
Status: DISCONTINUED | OUTPATIENT
Start: 2025-04-24 | End: 2025-04-29 | Stop reason: HOSPADM

## 2025-04-24 RX ORDER — ACETAMINOPHEN 650 MG/1
650 SUPPOSITORY RECTAL EVERY 6 HOURS PRN
Status: DISCONTINUED | OUTPATIENT
Start: 2025-04-24 | End: 2025-04-29 | Stop reason: HOSPADM

## 2025-04-24 RX ORDER — ONDANSETRON 4 MG/1
4 TABLET, ORALLY DISINTEGRATING ORAL EVERY 8 HOURS PRN
Status: DISCONTINUED | OUTPATIENT
Start: 2025-04-24 | End: 2025-04-29 | Stop reason: HOSPADM

## 2025-04-24 RX ORDER — ATORVASTATIN CALCIUM 20 MG/1
20 TABLET, FILM COATED ORAL NIGHTLY
Status: DISCONTINUED | OUTPATIENT
Start: 2025-04-24 | End: 2025-04-29 | Stop reason: HOSPADM

## 2025-04-24 RX ORDER — GLUCAGON 1 MG/ML
1 KIT INJECTION PRN
Status: DISCONTINUED | OUTPATIENT
Start: 2025-04-24 | End: 2025-04-26 | Stop reason: SDUPTHER

## 2025-04-24 RX ORDER — ACETAMINOPHEN 325 MG/1
650 TABLET ORAL EVERY 6 HOURS PRN
Status: DISCONTINUED | OUTPATIENT
Start: 2025-04-24 | End: 2025-04-29 | Stop reason: HOSPADM

## 2025-04-24 RX ADMIN — SODIUM CHLORIDE, PRESERVATIVE FREE 10 ML: 5 INJECTION INTRAVENOUS at 22:29

## 2025-04-24 RX ADMIN — ENOXAPARIN SODIUM 40 MG: 100 INJECTION SUBCUTANEOUS at 22:27

## 2025-04-24 RX ADMIN — QUETIAPINE FUMARATE 150 MG: 100 TABLET ORAL at 22:26

## 2025-04-24 RX ADMIN — VANCOMYCIN HYDROCHLORIDE 1500 MG: 10 INJECTION, POWDER, LYOPHILIZED, FOR SOLUTION INTRAVENOUS at 16:57

## 2025-04-24 RX ADMIN — WATER 2000 MG: 1 INJECTION INTRAMUSCULAR; INTRAVENOUS; SUBCUTANEOUS at 16:44

## 2025-04-24 RX ADMIN — ATORVASTATIN CALCIUM 20 MG: 20 TABLET, FILM COATED ORAL at 22:26

## 2025-04-24 RX ADMIN — ASPIRIN 81 MG: 81 TABLET, CHEWABLE ORAL at 22:27

## 2025-04-24 RX ADMIN — SODIUM CHLORIDE 1905 ML: 0.9 INJECTION, SOLUTION INTRAVENOUS at 16:42

## 2025-04-24 RX ADMIN — PANCRELIPASE LIPASE, PANCRELIPASE PROTEASE, PANCRELIPASE AMYLASE 5000 UNITS: 5000; 17000; 24000 CAPSULE, DELAYED RELEASE ORAL at 22:26

## 2025-04-24 ASSESSMENT — PAIN DESCRIPTION - LOCATION: LOCATION: FOOT

## 2025-04-24 ASSESSMENT — PAIN SCALES - GENERAL: PAINLEVEL_OUTOF10: 8

## 2025-04-24 ASSESSMENT — PAIN - FUNCTIONAL ASSESSMENT: PAIN_FUNCTIONAL_ASSESSMENT: 0-10

## 2025-04-24 ASSESSMENT — PAIN DESCRIPTION - ORIENTATION: ORIENTATION: RIGHT

## 2025-04-24 NOTE — ACP (ADVANCE CARE PLANNING)
[facility] Hospitalist Service  At the heart of better care     Advance Care Planning   Admit Date:  2025  3:36 PM   Name:  Lan Gonzalez   Age:  59 y.o.  Sex:  male  :  1965   MRN:  [id number]   Room:  BECKY/BECKY    Lan Gonzalez has capacity to make his own decisions:   Yes    If pt unable to make decisions, POA/surrogate decision maker:  Spouse    Other people present:   Spouse and Son    Patient / surrogate decision-maker directed code status:  Full Code    Other ACP topics discussed, if applicable:   None    Patient or surrogate consented to discussion of the current conditions, workup, management plans, prognosis, and the risk for further deterioration.  Aggregate face-to-facetime, 17 minutes was spent discussing end-of-life care planning with patient/family and/or Power of . Discussed CPR, Intubation, treatment goals, and Quality of life/Intensity of care.    Signed:  Leon Rodgers PA-C

## 2025-04-24 NOTE — ED TRIAGE NOTES
Pt reports sudden onset bilateral feet numbness, 2-3 days ago. Pt reports right foot is worse than left and is also discolored. Pt also reports feeling like his heart is racing.  in triage.

## 2025-04-24 NOTE — H&P
[]      TIME:  75 minutes were spent on the care of this patient today,  This time also includes physician non-face-to-face service time visit on the date of service such as  Preparing to see the patient (eg, review of tests)  Obtaining and/or reviewing separately obtained history  Performing a medically necessary appropriate examination and/or evaluation  Counseling and educating the patient/family/caregiver  Ordering medications, tests, or procedures  Referring and communicating with other health care professionals as needed  Documenting clinical information in the electronic or other health record  Independently interpreting results (not reported separately) and communicating results to the patient/family/caregiver  Care coordination and discharge planning with Case Management.          Dear patient, if you are reviewing this note and have a question regarding the medical terminology, please bring it with you to your next PCP visit.  Medical notes are meant to be a communication between medical professionals.  Additionally, portion of this note were created using voice recognition function, syntax and phonetic over may have escaped proofreading.    Leon Rodgers PA-C, BISHOP, DFAAPA    4/24/2025 6:54 PM

## 2025-04-24 NOTE — ED NOTES
TRANSFER - OUT REPORT:    Verbal report given to Asiya on Lan Gonzalez  being transferred to [facility] for routine progression of patient care       Report consisted of patient's Situation, Background, Assessment and   Recommendations(SBAR).     Information from the following report(s) Nurse Handoff Report was reviewed with the receiving nurse.    Zoila Fall Assessment:    Presents to emergency department  because of falls (Syncope, seizure, or loss of consciousness): Yes  Age > 70: No  Altered Mental Status, Intoxication with alcohol or substance confusion (Disorientation, impaired judgment, poor safety awaremess, or inability to follow instructions): No  Impaired Mobility: Ambulates or transfers with assistive devices or assistance; Unable to ambulate or transer.: No  Nursing Judgement: Yes          Lines:   Peripheral IV 04/24/25 Left Antecubital (Active)        Opportunity for questions and clarification was provided.      Patient transported with:  Tech

## 2025-04-24 NOTE — ED PROVIDER NOTES
[facility] EMERGENCY DEPT  EMERGENCY DEPARTMENT ENCOUNTER    Patient Name: Lan Gonzalez  MRN: [id number]  YOB: 1965  Provider: Shayne Avila MD  PCP: Nat Donohue APRN - NP   Time/Date of evaluation: 3:52 PM EDT on 4/24/25    History of Presenting Illness     Chief Complaint   Patient presents with    Numbness       History Provided by: Patient and Patient's Wife   History is limited by: Nothing    HISTORY (Narative):   Lan Gonzalez is a 59 y.o. male with a PMHX of AAA, CAD, cardiomyopathy, CKD 3, COPD, gastroparesis, hyperlipidemia, SUNG, diabetes, chronic pancreatitis  who presents to the emergency department (room 9) by POV C/O sudden onset of bilateral foot numbness onset 3 days ago. Associated sxs include discoloration of right foot, palpitations (for racing heart rate). Patient denies or any other sxs or complaints.  Patient states that his right foot is worse than his left.  Patient has been stated that he had a kidney stone approximately a month ago which was removed and after which she had a kidney infection.    Nursing Notes were all reviewed and agreed with or any disagreements were addressed in the HPI.    Past History     PAST MEDICAL HISTORY:  Past Medical History:   Diagnosis Date    AAA (abdominal aortic aneurysm)     \"probably seven or eight years ago\" last seen by Cassie Bagley 6/2/23    Anxiety     Arthritis     Nat Donohue    BPH (benign prostatic hyperplasia)     CAD (coronary artery disease)     at least since 2020, Nonobstructive CAD-mild diffuse RCA disease, minimal circumflex disease, moderate mid LAD disease on cardiac cath in 2020, Joselito Raizada    Cardiomyopathy (HCC)     at least since 2023, Joselito Raizada    Cerebral artery occlusion with cerebral infarction (HCC) 11/2023    Eddy Guallpa    Chronic kidney disease (CKD), active medical management without dialysis, stage 3 (moderate) (HCC) 2020    Ciara Nelson-Post    Chronic pain     lower back    Chronic pancreatitis (HCC)

## 2025-04-25 ENCOUNTER — APPOINTMENT (OUTPATIENT)
Facility: HOSPITAL | Age: 60
DRG: 872 | End: 2025-04-25
Payer: MEDICARE

## 2025-04-25 LAB
ALBUMIN SERPL-MCNC: 1.6 G/DL (ref 3.4–5)
ALBUMIN/GLOB SERPL: 0.5 (ref 0.8–1.7)
ALP SERPL-CCNC: 85 U/L (ref 45–117)
ALT SERPL-CCNC: 16 U/L (ref 16–61)
ANION GAP SERPL CALC-SCNC: 8 MMOL/L (ref 3–18)
APPEARANCE UR: CLEAR
AST SERPL-CCNC: 14 U/L (ref 10–38)
BACTERIA URNS QL MICRO: ABNORMAL /HPF
BASOPHILS # BLD: 0.04 K/UL (ref 0–0.1)
BASOPHILS NFR BLD: 0.3 % (ref 0–2)
BILIRUB SERPL-MCNC: 0.4 MG/DL (ref 0.2–1)
BILIRUB UR QL: NEGATIVE
BUN SERPL-MCNC: 26 MG/DL (ref 7–18)
BUN/CREAT SERPL: 18 (ref 12–20)
CALCIUM SERPL-MCNC: 7.8 MG/DL (ref 8.5–10.1)
CHLORIDE SERPL-SCNC: 107 MMOL/L (ref 100–111)
CK SERPL-CCNC: 57 U/L (ref 39–308)
CO2 SERPL-SCNC: 21 MMOL/L (ref 21–32)
COLOR UR: YELLOW
CREAT SERPL-MCNC: 1.46 MG/DL (ref 0.6–1.3)
CRP SERPL-MCNC: 9.2 MG/DL (ref 0–0.3)
DIFFERENTIAL METHOD BLD: ABNORMAL
ECHO AO ASC DIAM: 3.8 CM
ECHO AO ASCENDING AORTA INDEX: 2.12 CM/M2
ECHO AO ROOT DIAM: 3.8 CM
ECHO AO ROOT INDEX: 2.12 CM/M2
ECHO AV AREA PEAK VELOCITY: 3.4 CM2
ECHO AV AREA VTI: 2.9 CM2
ECHO AV AREA/BSA PEAK VELOCITY: 1.9 CM2/M2
ECHO AV AREA/BSA VTI: 1.6 CM2/M2
ECHO AV MEAN GRADIENT: 2 MMHG
ECHO AV MEAN VELOCITY: 0.7 M/S
ECHO AV PEAK GRADIENT: 4 MMHG
ECHO AV PEAK VELOCITY: 0.9 M/S
ECHO AV VELOCITY RATIO: 0.89
ECHO AV VTI: 17.9 CM
ECHO BSA: 1.77 M2
ECHO LA DIAMETER INDEX: 1.56 CM/M2
ECHO LA DIAMETER: 2.8 CM
ECHO LA TO AORTIC ROOT RATIO: 0.74
ECHO LA VOL A-L A2C: 29 ML (ref 18–58)
ECHO LA VOL A-L A4C: 39 ML (ref 18–58)
ECHO LA VOL BP: 28 ML (ref 18–58)
ECHO LA VOL MOD A2C: 26 ML (ref 18–58)
ECHO LA VOL MOD A4C: 31 ML (ref 18–58)
ECHO LA VOL/BSA BIPLANE: 16 ML/M2 (ref 16–34)
ECHO LA VOLUME AREA LENGTH: 34 ML
ECHO LA VOLUME INDEX A-L A2C: 16 ML/M2 (ref 16–34)
ECHO LA VOLUME INDEX A-L A4C: 22 ML/M2 (ref 16–34)
ECHO LA VOLUME INDEX AREA LENGTH: 19 ML/M2 (ref 16–34)
ECHO LA VOLUME INDEX MOD A2C: 15 ML/M2 (ref 16–34)
ECHO LA VOLUME INDEX MOD A4C: 17 ML/M2 (ref 16–34)
ECHO LV E' LATERAL VELOCITY: 9.01 CM/S
ECHO LV E' SEPTAL VELOCITY: 6.87 CM/S
ECHO LV EDV A2C: 72 ML
ECHO LV EDV A4C: 63 ML
ECHO LV EDV BP: 72 ML (ref 67–155)
ECHO LV EDV INDEX A4C: 35 ML/M2
ECHO LV EDV INDEX BP: 40 ML/M2
ECHO LV EDV NDEX A2C: 40 ML/M2
ECHO LV EF PHYSICIAN: 53 %
ECHO LV EJECTION FRACTION A2C: 49 %
ECHO LV EJECTION FRACTION A2C: 53 %
ECHO LV EJECTION FRACTION A4C: 51 %
ECHO LV EJECTION FRACTION A4C: 55 %
ECHO LV EJECTION FRACTION BIPLANE: 53 % (ref 55–100)
ECHO LV ESV A2C: 34 ML
ECHO LV ESV A4C: 29 ML
ECHO LV ESV BP: 33 ML (ref 22–58)
ECHO LV ESV INDEX A2C: 19 ML/M2
ECHO LV ESV INDEX A4C: 16 ML/M2
ECHO LV ESV INDEX BP: 18 ML/M2
ECHO LV FRACTIONAL SHORTENING: 30 % (ref 28–44)
ECHO LV GLOBAL LONGITUDINAL STRAIN (GLS): -15.1 %
ECHO LV INTERNAL DIMENSION DIASTOLE INDEX: 2.79 CM/M2
ECHO LV INTERNAL DIMENSION DIASTOLIC: 5 CM (ref 4.2–5.9)
ECHO LV INTERNAL DIMENSION SYSTOLIC INDEX: 1.96 CM/M2
ECHO LV INTERNAL DIMENSION SYSTOLIC: 3.5 CM
ECHO LV IVSD: 1 CM (ref 0.6–1)
ECHO LV MASS 2D: 169.9 G (ref 88–224)
ECHO LV MASS INDEX 2D: 94.9 G/M2 (ref 49–115)
ECHO LV POSTERIOR WALL DIASTOLIC: 0.9 CM (ref 0.6–1)
ECHO LV RELATIVE WALL THICKNESS RATIO: 0.36
ECHO LVOT AREA: 4.2 CM2
ECHO LVOT AV VTI INDEX: 0.69
ECHO LVOT DIAM: 2.3 CM
ECHO LVOT MEAN GRADIENT: 1 MMHG
ECHO LVOT PEAK GRADIENT: 2 MMHG
ECHO LVOT PEAK VELOCITY: 0.8 M/S
ECHO LVOT STROKE VOLUME INDEX: 28.5 ML/M2
ECHO LVOT SV: 51.1 ML
ECHO LVOT VTI: 12.3 CM
ECHO MV A VELOCITY: 0.82 M/S
ECHO MV AREA VTI: 2.3 CM2
ECHO MV E DECELERATION TIME (DT): 110.2 MS
ECHO MV E VELOCITY: 0.5 M/S
ECHO MV E/A RATIO: 0.61
ECHO MV E/E' LATERAL: 5.55
ECHO MV E/E' RATIO (AVERAGED): 6.41
ECHO MV E/E' SEPTAL: 7.28
ECHO MV LVOT VTI INDEX: 1.84
ECHO MV MAX VELOCITY: 0.9 M/S
ECHO MV MEAN GRADIENT: 2 MMHG
ECHO MV MEAN VELOCITY: 0.6 M/S
ECHO MV PEAK GRADIENT: 3 MMHG
ECHO MV VTI: 22.6 CM
ECHO PV MAX VELOCITY: 1 M/S
ECHO PV MEAN GRADIENT: 2 MMHG
ECHO PV MEAN VELOCITY: 0.7 M/S
ECHO PV PEAK GRADIENT: 4 MMHG
ECHO RA END SYSTOLIC VOLUME APICAL 4 CHAMBER INDEX BSA: 15 ML/M2
ECHO RA VOLUME: 27 ML
ECHO RV FREE WALL PEAK S': 13.9 CM/S
ECHO RV INTERNAL DIMENSION: 3.2 CM
ECHO RV TAPSE: 1.8 CM (ref 1.7–?)
ECHO RVOT MEAN GRADIENT: 1 MMHG
ECHO RVOT PEAK GRADIENT: 2 MMHG
ECHO RVOT PEAK VELOCITY: 0.7 M/S
ECHO RVOT VTI: 12.1 CM
ECHO TV REGURGITANT MAX VELOCITY: 2.09 M/S
ECHO TV REGURGITANT PEAK GRADIENT: 18 MMHG
EKG ATRIAL RATE: 103 BPM
EKG DIAGNOSIS: NORMAL
EKG P AXIS: 63 DEGREES
EKG P-R INTERVAL: 200 MS
EKG Q-T INTERVAL: 332 MS
EKG QRS DURATION: 76 MS
EKG QTC CALCULATION (BAZETT): 434 MS
EKG R AXIS: 13 DEGREES
EKG T AXIS: 85 DEGREES
EKG VENTRICULAR RATE: 103 BPM
EOSINOPHIL # BLD: 0.49 K/UL (ref 0–0.4)
EOSINOPHIL NFR BLD: 3.1 % (ref 0–5)
EPITH CASTS URNS QL MICRO: ABNORMAL /LPF (ref 0–5)
ERYTHROCYTE [DISTWIDTH] IN BLOOD BY AUTOMATED COUNT: 14.5 % (ref 11.6–14.5)
ERYTHROCYTE [SEDIMENTATION RATE] IN BLOOD: 50 MM/HR (ref 0–20)
GLOBULIN SER CALC-MCNC: 3.2 G/DL (ref 2–4)
GLUCOSE BLD STRIP.AUTO-MCNC: 150 MG/DL (ref 70–110)
GLUCOSE SERPL-MCNC: 378 MG/DL (ref 74–99)
GLUCOSE UR STRIP.AUTO-MCNC: 500 MG/DL
HCT VFR BLD AUTO: 26.9 % (ref 36–48)
HGB BLD-MCNC: 8.4 G/DL (ref 13–16)
HGB UR QL STRIP: NEGATIVE
HYALINE CASTS URNS QL MICRO: ABNORMAL /LPF (ref 0–2)
IMM GRANULOCYTES # BLD AUTO: 0.19 K/UL (ref 0–0.04)
IMM GRANULOCYTES NFR BLD AUTO: 1.2 % (ref 0–0.5)
INR PPP: 1.1 (ref 0.9–1.1)
KETONES UR QL STRIP.AUTO: NEGATIVE MG/DL
LEUKOCYTE ESTERASE UR QL STRIP.AUTO: ABNORMAL
LYMPHOCYTES # BLD: 0.85 K/UL (ref 0.9–3.3)
LYMPHOCYTES NFR BLD: 5.4 % (ref 21–52)
MAGNESIUM SERPL-MCNC: 1.6 MG/DL (ref 1.6–2.6)
MCH RBC QN AUTO: 26.9 PG (ref 24–34)
MCHC RBC AUTO-ENTMCNC: 31.2 G/DL (ref 31–37)
MCV RBC AUTO: 86.2 FL (ref 78–100)
MONOCYTES # BLD: 0.3 K/UL (ref 0.05–1.2)
MONOCYTES NFR BLD: 1.9 % (ref 3–10)
MUCOUS THREADS URNS QL MICRO: ABNORMAL /LPF
NEUTS SEG # BLD: 13.82 K/UL (ref 1.8–8)
NEUTS SEG NFR BLD: 88.1 % (ref 40–73)
NITRITE UR QL STRIP.AUTO: NEGATIVE
NRBC # BLD: 0 K/UL (ref 0–0.01)
NRBC BLD-RTO: 0 PER 100 WBC
PH UR STRIP: 5.5 (ref 5–8)
PLATELET # BLD AUTO: 437 K/UL (ref 135–420)
PMV BLD AUTO: 9.9 FL (ref 9.2–11.8)
POTASSIUM SERPL-SCNC: 3.9 MMOL/L (ref 3.5–5.5)
PROCALCITONIN SERPL-MCNC: 0.24 NG/ML
PROT SERPL-MCNC: 4.8 G/DL (ref 6.4–8.2)
PROT UR STRIP-MCNC: 30 MG/DL
PROTHROMBIN TIME: 14 SEC (ref 11.9–14.9)
RBC # BLD AUTO: 3.12 M/UL (ref 4.35–5.65)
RBC #/AREA URNS HPF: ABNORMAL /HPF (ref 0–5)
SODIUM SERPL-SCNC: 136 MMOL/L (ref 136–145)
SP GR UR REFRACTOMETRY: 1.02 (ref 1–1.03)
UROBILINOGEN UR QL STRIP.AUTO: 0.2 EU/DL (ref 0.2–1)
VANCOMYCIN SERPL-MCNC: 9.4 UG/ML (ref 5–40)
WBC # BLD AUTO: 15.7 K/UL (ref 4.6–13.2)
WBC URNS QL MICRO: ABNORMAL /HPF (ref 0–5)

## 2025-04-25 PROCEDURE — 2580000003 HC RX 258: Performed by: HOSPITALIST

## 2025-04-25 PROCEDURE — 80053 COMPREHEN METABOLIC PANEL: CPT

## 2025-04-25 PROCEDURE — 93010 ELECTROCARDIOGRAM REPORT: CPT | Performed by: INTERNAL MEDICINE

## 2025-04-25 PROCEDURE — 6360000002 HC RX W HCPCS: Performed by: HOSPITALIST

## 2025-04-25 PROCEDURE — 85025 COMPLETE CBC W/AUTO DIFF WBC: CPT

## 2025-04-25 PROCEDURE — 93306 TTE W/DOPPLER COMPLETE: CPT

## 2025-04-25 PROCEDURE — 6370000000 HC RX 637 (ALT 250 FOR IP): Performed by: HOSPITALIST

## 2025-04-25 PROCEDURE — 85652 RBC SED RATE AUTOMATED: CPT

## 2025-04-25 PROCEDURE — 97530 THERAPEUTIC ACTIVITIES: CPT

## 2025-04-25 PROCEDURE — 83735 ASSAY OF MAGNESIUM: CPT

## 2025-04-25 PROCEDURE — 87086 URINE CULTURE/COLONY COUNT: CPT

## 2025-04-25 PROCEDURE — 97166 OT EVAL MOD COMPLEX 45 MIN: CPT

## 2025-04-25 PROCEDURE — 81001 URINALYSIS AUTO W/SCOPE: CPT

## 2025-04-25 PROCEDURE — 6370000000 HC RX 637 (ALT 250 FOR IP): Performed by: INTERNAL MEDICINE

## 2025-04-25 PROCEDURE — 82962 GLUCOSE BLOOD TEST: CPT

## 2025-04-25 PROCEDURE — 36415 COLL VENOUS BLD VENIPUNCTURE: CPT

## 2025-04-25 PROCEDURE — 97116 GAIT TRAINING THERAPY: CPT

## 2025-04-25 PROCEDURE — 97162 PT EVAL MOD COMPLEX 30 MIN: CPT

## 2025-04-25 PROCEDURE — 80202 ASSAY OF VANCOMYCIN: CPT

## 2025-04-25 PROCEDURE — 1100000000 HC RM PRIVATE

## 2025-04-25 PROCEDURE — 2500000003 HC RX 250 WO HCPCS: Performed by: HOSPITALIST

## 2025-04-25 RX ORDER — OXYCODONE AND ACETAMINOPHEN 5; 325 MG/1; MG/1
1 TABLET ORAL EVERY 4 HOURS PRN
Refills: 0 | Status: DISCONTINUED | OUTPATIENT
Start: 2025-04-25 | End: 2025-04-29 | Stop reason: HOSPADM

## 2025-04-25 RX ADMIN — OXYCODONE AND ACETAMINOPHEN 1 TABLET: 325; 5 TABLET ORAL at 05:28

## 2025-04-25 RX ADMIN — WATER 1000 MG: 1 INJECTION INTRAMUSCULAR; INTRAVENOUS; SUBCUTANEOUS at 09:50

## 2025-04-25 RX ADMIN — OXYCODONE AND ACETAMINOPHEN 1 TABLET: 325; 5 TABLET ORAL at 14:25

## 2025-04-25 RX ADMIN — SODIUM CHLORIDE, PRESERVATIVE FREE 10 ML: 5 INJECTION INTRAVENOUS at 09:50

## 2025-04-25 RX ADMIN — PANCRELIPASE LIPASE, PANCRELIPASE PROTEASE, PANCRELIPASE AMYLASE 5000 UNITS: 5000; 17000; 24000 CAPSULE, DELAYED RELEASE ORAL at 14:25

## 2025-04-25 RX ADMIN — VANCOMYCIN HYDROCHLORIDE 1000 MG: 1 INJECTION, POWDER, LYOPHILIZED, FOR SOLUTION INTRAVENOUS at 22:34

## 2025-04-25 RX ADMIN — OXYCODONE AND ACETAMINOPHEN 1 TABLET: 325; 5 TABLET ORAL at 23:36

## 2025-04-25 RX ADMIN — PANCRELIPASE LIPASE, PANCRELIPASE PROTEASE, PANCRELIPASE AMYLASE 5000 UNITS: 5000; 17000; 24000 CAPSULE, DELAYED RELEASE ORAL at 08:59

## 2025-04-25 RX ADMIN — SODIUM CHLORIDE, PRESERVATIVE FREE 10 ML: 5 INJECTION INTRAVENOUS at 21:27

## 2025-04-25 RX ADMIN — QUETIAPINE FUMARATE 150 MG: 100 TABLET ORAL at 21:27

## 2025-04-25 RX ADMIN — ENOXAPARIN SODIUM 40 MG: 100 INJECTION SUBCUTANEOUS at 09:00

## 2025-04-25 RX ADMIN — ATORVASTATIN CALCIUM 20 MG: 20 TABLET, FILM COATED ORAL at 21:27

## 2025-04-25 RX ADMIN — ASPIRIN 81 MG: 81 TABLET, CHEWABLE ORAL at 09:00

## 2025-04-25 ASSESSMENT — PAIN DESCRIPTION - DESCRIPTORS
DESCRIPTORS: SHARP
DESCRIPTORS: SHARP;THROBBING
DESCRIPTORS: SHOOTING

## 2025-04-25 ASSESSMENT — PAIN SCALES - GENERAL
PAINLEVEL_OUTOF10: 7

## 2025-04-25 ASSESSMENT — PAIN DESCRIPTION - LOCATION
LOCATION: FOOT

## 2025-04-25 ASSESSMENT — PAIN DESCRIPTION - ORIENTATION
ORIENTATION: RIGHT

## 2025-04-25 NOTE — PLAN OF CARE
Problem: Pain  Goal: Verbalizes/displays adequate comfort level or baseline comfort level  Outcome: Progressing  Flowsheets (Taken 4/25/2025 0519)  Verbalizes/displays adequate comfort level or baseline comfort level:   Encourage patient to monitor pain and request assistance   Administer analgesics based on type and severity of pain and evaluate response   Consider cultural and social influences on pain and pain management   Notify Licensed Independent Practitioner if interventions unsuccessful or patient reports new pain   Implement non-pharmacological measures as appropriate and evaluate response   Assess pain using appropriate pain scale

## 2025-04-26 LAB
ALBUMIN SERPL-MCNC: 1.5 G/DL (ref 3.4–5)
ALBUMIN/GLOB SERPL: 0.4 (ref 0.8–1.7)
ALP SERPL-CCNC: 80 U/L (ref 45–117)
ALT SERPL-CCNC: 15 U/L (ref 16–61)
ANION GAP SERPL CALC-SCNC: 7 MMOL/L (ref 3–18)
AST SERPL-CCNC: 13 U/L (ref 10–38)
BACTERIA SPEC CULT: NORMAL
BASOPHILS # BLD: 0.06 K/UL (ref 0–0.1)
BASOPHILS NFR BLD: 0.4 % (ref 0–2)
BILIRUB SERPL-MCNC: 0.3 MG/DL (ref 0.2–1)
BUN SERPL-MCNC: 25 MG/DL (ref 7–18)
BUN/CREAT SERPL: 21 (ref 12–20)
CALCIUM SERPL-MCNC: 8.3 MG/DL (ref 8.5–10.1)
CHLORIDE SERPL-SCNC: 106 MMOL/L (ref 100–111)
CO2 SERPL-SCNC: 21 MMOL/L (ref 21–32)
CREAT SERPL-MCNC: 1.21 MG/DL (ref 0.6–1.3)
DIFFERENTIAL METHOD BLD: ABNORMAL
EOSINOPHIL # BLD: 0.53 K/UL (ref 0–0.4)
EOSINOPHIL NFR BLD: 3.6 % (ref 0–5)
ERYTHROCYTE [DISTWIDTH] IN BLOOD BY AUTOMATED COUNT: 14.6 % (ref 11.6–14.5)
EST. AVERAGE GLUCOSE BLD GHB EST-MCNC: 197 MG/DL
GLOBULIN SER CALC-MCNC: 3.7 G/DL (ref 2–4)
GLUCOSE BLD STRIP.AUTO-MCNC: 195 MG/DL (ref 70–110)
GLUCOSE BLD STRIP.AUTO-MCNC: 260 MG/DL (ref 70–110)
GLUCOSE BLD STRIP.AUTO-MCNC: 391 MG/DL (ref 70–110)
GLUCOSE BLD STRIP.AUTO-MCNC: 406 MG/DL (ref 70–110)
GLUCOSE SERPL-MCNC: 391 MG/DL (ref 74–99)
HBA1C MFR BLD: 8.5 % (ref 4.2–5.6)
HCT VFR BLD AUTO: 27.2 % (ref 36–48)
HGB BLD-MCNC: 8.4 G/DL (ref 13–16)
IMM GRANULOCYTES # BLD AUTO: 0.18 K/UL (ref 0–0.04)
IMM GRANULOCYTES NFR BLD AUTO: 1.2 % (ref 0–0.5)
LACTATE SERPL-SCNC: 1.1 MMOL/L (ref 0.4–2)
LYMPHOCYTES # BLD: 0.81 K/UL (ref 0.9–3.3)
LYMPHOCYTES NFR BLD: 5.4 % (ref 21–52)
MAGNESIUM SERPL-MCNC: 1.7 MG/DL (ref 1.6–2.6)
MCH RBC QN AUTO: 26.8 PG (ref 24–34)
MCHC RBC AUTO-ENTMCNC: 30.9 G/DL (ref 31–37)
MCV RBC AUTO: 86.6 FL (ref 78–100)
MONOCYTES # BLD: 0.61 K/UL (ref 0.05–1.2)
MONOCYTES NFR BLD: 4.1 % (ref 3–10)
NEUTS SEG # BLD: 12.73 K/UL (ref 1.8–8)
NEUTS SEG NFR BLD: 85.3 % (ref 40–73)
NRBC # BLD: 0 K/UL (ref 0–0.01)
NRBC BLD-RTO: 0 PER 100 WBC
PLATELET # BLD AUTO: 402 K/UL (ref 135–420)
PMV BLD AUTO: 10.2 FL (ref 9.2–11.8)
POTASSIUM SERPL-SCNC: 4.2 MMOL/L (ref 3.5–5.5)
PROT SERPL-MCNC: 5.2 G/DL (ref 6.4–8.2)
RBC # BLD AUTO: 3.14 M/UL (ref 4.35–5.65)
SERVICE CMNT-IMP: NORMAL
SODIUM SERPL-SCNC: 134 MMOL/L (ref 136–145)
WBC # BLD AUTO: 14.9 K/UL (ref 4.6–13.2)

## 2025-04-26 PROCEDURE — 6370000000 HC RX 637 (ALT 250 FOR IP): Performed by: INTERNAL MEDICINE

## 2025-04-26 PROCEDURE — 85025 COMPLETE CBC W/AUTO DIFF WBC: CPT

## 2025-04-26 PROCEDURE — 80053 COMPREHEN METABOLIC PANEL: CPT

## 2025-04-26 PROCEDURE — 6360000002 HC RX W HCPCS: Performed by: HOSPITALIST

## 2025-04-26 PROCEDURE — 83605 ASSAY OF LACTIC ACID: CPT

## 2025-04-26 PROCEDURE — 82962 GLUCOSE BLOOD TEST: CPT

## 2025-04-26 PROCEDURE — 83735 ASSAY OF MAGNESIUM: CPT

## 2025-04-26 PROCEDURE — 2500000003 HC RX 250 WO HCPCS: Performed by: HOSPITALIST

## 2025-04-26 PROCEDURE — 83036 HEMOGLOBIN GLYCOSYLATED A1C: CPT

## 2025-04-26 PROCEDURE — 2580000003 HC RX 258: Performed by: HOSPITALIST

## 2025-04-26 PROCEDURE — 36415 COLL VENOUS BLD VENIPUNCTURE: CPT

## 2025-04-26 PROCEDURE — 6370000000 HC RX 637 (ALT 250 FOR IP): Performed by: HOSPITALIST

## 2025-04-26 PROCEDURE — 1100000000 HC RM PRIVATE

## 2025-04-26 RX ORDER — GABAPENTIN 100 MG/1
200 CAPSULE ORAL 3 TIMES DAILY
Status: DISCONTINUED | OUTPATIENT
Start: 2025-04-26 | End: 2025-04-29 | Stop reason: HOSPADM

## 2025-04-26 RX ORDER — GLUCAGON 1 MG/ML
1 KIT INJECTION PRN
Status: DISCONTINUED | OUTPATIENT
Start: 2025-04-26 | End: 2025-04-29 | Stop reason: HOSPADM

## 2025-04-26 RX ORDER — INSULIN GLARGINE 100 [IU]/ML
10 INJECTION, SOLUTION SUBCUTANEOUS DAILY
Status: DISCONTINUED | OUTPATIENT
Start: 2025-04-26 | End: 2025-04-27

## 2025-04-26 RX ORDER — DEXTROSE MONOHYDRATE 100 MG/ML
INJECTION, SOLUTION INTRAVENOUS CONTINUOUS PRN
Status: DISCONTINUED | OUTPATIENT
Start: 2025-04-26 | End: 2025-04-29 | Stop reason: HOSPADM

## 2025-04-26 RX ORDER — GLUCAGON 1 MG/ML
1 KIT INJECTION PRN
Status: DISCONTINUED | OUTPATIENT
Start: 2025-04-26 | End: 2025-04-26 | Stop reason: SDUPTHER

## 2025-04-26 RX ORDER — MIRTAZAPINE 15 MG/1
7.5 TABLET, FILM COATED ORAL NIGHTLY
Status: DISCONTINUED | OUTPATIENT
Start: 2025-04-26 | End: 2025-04-29 | Stop reason: HOSPADM

## 2025-04-26 RX ORDER — INSULIN LISPRO 100 [IU]/ML
0-16 INJECTION, SOLUTION INTRAVENOUS; SUBCUTANEOUS
Status: DISCONTINUED | OUTPATIENT
Start: 2025-04-26 | End: 2025-04-28

## 2025-04-26 RX ORDER — DEXTROSE MONOHYDRATE 100 MG/ML
INJECTION, SOLUTION INTRAVENOUS CONTINUOUS PRN
Status: DISCONTINUED | OUTPATIENT
Start: 2025-04-26 | End: 2025-04-26 | Stop reason: SDUPTHER

## 2025-04-26 RX ADMIN — OXYCODONE AND ACETAMINOPHEN 1 TABLET: 325; 5 TABLET ORAL at 13:39

## 2025-04-26 RX ADMIN — INSULIN GLARGINE 10 UNITS: 100 INJECTION, SOLUTION SUBCUTANEOUS at 11:53

## 2025-04-26 RX ADMIN — PANCRELIPASE LIPASE, PANCRELIPASE PROTEASE, PANCRELIPASE AMYLASE 5000 UNITS: 5000; 17000; 24000 CAPSULE, DELAYED RELEASE ORAL at 10:24

## 2025-04-26 RX ADMIN — INSULIN LISPRO 16 UNITS: 100 INJECTION, SOLUTION INTRAVENOUS; SUBCUTANEOUS at 11:53

## 2025-04-26 RX ADMIN — ASPIRIN 81 MG: 81 TABLET, CHEWABLE ORAL at 10:25

## 2025-04-26 RX ADMIN — GABAPENTIN 200 MG: 100 CAPSULE ORAL at 20:40

## 2025-04-26 RX ADMIN — PANCRELIPASE LIPASE, PANCRELIPASE PROTEASE, PANCRELIPASE AMYLASE 5000 UNITS: 5000; 17000; 24000 CAPSULE, DELAYED RELEASE ORAL at 13:40

## 2025-04-26 RX ADMIN — PANCRELIPASE LIPASE, PANCRELIPASE PROTEASE, PANCRELIPASE AMYLASE 5000 UNITS: 5000; 17000; 24000 CAPSULE, DELAYED RELEASE ORAL at 17:06

## 2025-04-26 RX ADMIN — INSULIN LISPRO 4 UNITS: 100 INJECTION, SOLUTION INTRAVENOUS; SUBCUTANEOUS at 20:55

## 2025-04-26 RX ADMIN — WATER 1000 MG: 1 INJECTION INTRAMUSCULAR; INTRAVENOUS; SUBCUTANEOUS at 10:24

## 2025-04-26 RX ADMIN — SODIUM CHLORIDE, PRESERVATIVE FREE 10 ML: 5 INJECTION INTRAVENOUS at 20:44

## 2025-04-26 RX ADMIN — QUETIAPINE FUMARATE 150 MG: 100 TABLET ORAL at 20:40

## 2025-04-26 RX ADMIN — SODIUM CHLORIDE, PRESERVATIVE FREE 10 ML: 5 INJECTION INTRAVENOUS at 10:25

## 2025-04-26 RX ADMIN — MIRTAZAPINE 7.5 MG: 15 TABLET, FILM COATED ORAL at 20:40

## 2025-04-26 RX ADMIN — INSULIN LISPRO 8 UNITS: 100 INJECTION, SOLUTION INTRAVENOUS; SUBCUTANEOUS at 17:13

## 2025-04-26 RX ADMIN — VANCOMYCIN HYDROCHLORIDE 1250 MG: 10 INJECTION, POWDER, LYOPHILIZED, FOR SOLUTION INTRAVENOUS at 21:04

## 2025-04-26 RX ADMIN — ENOXAPARIN SODIUM 40 MG: 100 INJECTION SUBCUTANEOUS at 10:24

## 2025-04-26 RX ADMIN — MIRTAZAPINE 7.5 MG: 15 TABLET, FILM COATED ORAL at 03:21

## 2025-04-26 RX ADMIN — GABAPENTIN 200 MG: 100 CAPSULE ORAL at 16:27

## 2025-04-26 RX ADMIN — ATORVASTATIN CALCIUM 20 MG: 20 TABLET, FILM COATED ORAL at 20:40

## 2025-04-26 ASSESSMENT — PAIN DESCRIPTION - LOCATION
LOCATION: LEG
LOCATION: FOOT;LEG

## 2025-04-26 ASSESSMENT — PAIN DESCRIPTION - DESCRIPTORS: DESCRIPTORS: NUMBNESS;PINS AND NEEDLES

## 2025-04-26 ASSESSMENT — PAIN DESCRIPTION - ORIENTATION
ORIENTATION: RIGHT;LEFT
ORIENTATION: RIGHT;LEFT

## 2025-04-26 ASSESSMENT — PAIN SCALES - GENERAL
PAINLEVEL_OUTOF10: 4
PAINLEVEL_OUTOF10: 7
PAINLEVEL_OUTOF10: 10

## 2025-04-26 NOTE — PLAN OF CARE
Problem: Pain  Goal: Verbalizes/displays adequate comfort level or baseline comfort level  Outcome: Progressing  Flowsheets (Taken 4/26/2025 0043)  Verbalizes/displays adequate comfort level or baseline comfort level:   Encourage patient to monitor pain and request assistance   Administer analgesics based on type and severity of pain and evaluate response   Consider cultural and social influences on pain and pain management   Assess pain using appropriate pain scale   Notify Licensed Independent Practitioner if interventions unsuccessful or patient reports new pain   Implement non-pharmacological measures as appropriate and evaluate response

## 2025-04-26 NOTE — PLAN OF CARE
Problem: ABCDS Injury Assessment  Goal: Absence of physical injury  Outcome: Progressing     Problem: Skin/Tissue Integrity  Goal: Skin integrity remains intact  Description: 1.  Monitor for areas of redness and/or skin breakdown2.  Assess vascular access sites hourly3.  Every 4-6 hours minimum:  Change oxygen saturation probe site4.  Every 4-6 hours:  If on nasal continuous positive airway pressure, respiratory therapy assess nares and determine need for appliance change or resting period  Outcome: Progressing  Flowsheets (Taken 4/26/2025 1023)  Skin Integrity Remains Intact:   Monitor for areas of redness and/or skin breakdown   Assess vascular access sites hourly     Problem: Chronic Conditions and Co-morbidities  Goal: Patient's chronic conditions and co-morbidity symptoms are monitored and maintained or improved  Outcome: Progressing  Flowsheets (Taken 4/26/2025 1023)  Care Plan - Patient's Chronic Conditions and Co-Morbidity Symptoms are Monitored and Maintained or Improved: Monitor and assess patient's chronic conditions and comorbid symptoms for stability, deterioration, or improvement     Problem: Pain  Goal: Verbalizes/displays adequate comfort level or baseline comfort level  Outcome: Progressing     Problem: Safety - Adult  Goal: Free from fall injury  Outcome: Progressing

## 2025-04-27 LAB
ALBUMIN SERPL-MCNC: 1.6 G/DL (ref 3.4–5)
ALBUMIN/GLOB SERPL: 0.4 (ref 0.8–1.7)
ALP SERPL-CCNC: 92 U/L (ref 45–117)
ALT SERPL-CCNC: 15 U/L (ref 16–61)
ANION GAP SERPL CALC-SCNC: 8 MMOL/L (ref 3–18)
AST SERPL-CCNC: 13 U/L (ref 10–38)
BASOPHILS # BLD: 0.06 K/UL (ref 0–0.1)
BASOPHILS NFR BLD: 0.3 % (ref 0–2)
BILIRUB SERPL-MCNC: 0.6 MG/DL (ref 0.2–1)
BUN SERPL-MCNC: 23 MG/DL (ref 7–18)
BUN/CREAT SERPL: 17 (ref 12–20)
CALCIUM SERPL-MCNC: 8.2 MG/DL (ref 8.5–10.1)
CHLORIDE SERPL-SCNC: 106 MMOL/L (ref 100–111)
CK SERPL-CCNC: 27 U/L (ref 39–308)
CO2 SERPL-SCNC: 21 MMOL/L (ref 21–32)
CREAT SERPL-MCNC: 1.38 MG/DL (ref 0.6–1.3)
CRP SERPL-MCNC: 10.5 MG/DL (ref 0–0.3)
DIFFERENTIAL METHOD BLD: ABNORMAL
EOSINOPHIL # BLD: 0.64 K/UL (ref 0–0.4)
EOSINOPHIL NFR BLD: 3.4 % (ref 0–5)
ERYTHROCYTE [DISTWIDTH] IN BLOOD BY AUTOMATED COUNT: 14.5 % (ref 11.6–14.5)
GLOBULIN SER CALC-MCNC: 3.9 G/DL (ref 2–4)
GLUCOSE BLD STRIP.AUTO-MCNC: 235 MG/DL (ref 70–110)
GLUCOSE BLD STRIP.AUTO-MCNC: 246 MG/DL (ref 70–110)
GLUCOSE BLD STRIP.AUTO-MCNC: 266 MG/DL (ref 70–110)
GLUCOSE BLD STRIP.AUTO-MCNC: 290 MG/DL (ref 70–110)
GLUCOSE SERPL-MCNC: 241 MG/DL (ref 74–99)
HCT VFR BLD AUTO: 30 % (ref 36–48)
HGB BLD-MCNC: 9.2 G/DL (ref 13–16)
IMM GRANULOCYTES # BLD AUTO: 0.25 K/UL (ref 0–0.04)
IMM GRANULOCYTES NFR BLD AUTO: 1.3 % (ref 0–0.5)
LYMPHOCYTES # BLD: 0.86 K/UL (ref 0.9–3.3)
LYMPHOCYTES NFR BLD: 4.6 % (ref 21–52)
MAGNESIUM SERPL-MCNC: 1.7 MG/DL (ref 1.6–2.6)
MCH RBC QN AUTO: 27 PG (ref 24–34)
MCHC RBC AUTO-ENTMCNC: 30.7 G/DL (ref 31–37)
MCV RBC AUTO: 88 FL (ref 78–100)
MONOCYTES # BLD: 0.79 K/UL (ref 0.05–1.2)
MONOCYTES NFR BLD: 4.2 % (ref 3–10)
NEUTS SEG # BLD: 16.14 K/UL (ref 1.8–8)
NEUTS SEG NFR BLD: 86.2 % (ref 40–73)
NRBC # BLD: 0 K/UL (ref 0–0.01)
NRBC BLD-RTO: 0 PER 100 WBC
PLATELET # BLD AUTO: 421 K/UL (ref 135–420)
PMV BLD AUTO: 10.1 FL (ref 9.2–11.8)
POTASSIUM SERPL-SCNC: 3.9 MMOL/L (ref 3.5–5.5)
PROT SERPL-MCNC: 5.5 G/DL (ref 6.4–8.2)
RBC # BLD AUTO: 3.41 M/UL (ref 4.35–5.65)
SODIUM SERPL-SCNC: 135 MMOL/L (ref 136–145)
VANCOMYCIN SERPL-MCNC: 26.3 UG/ML (ref 5–40)
WBC # BLD AUTO: 18.7 K/UL (ref 4.6–13.2)

## 2025-04-27 PROCEDURE — 2580000003 HC RX 258: Performed by: HOSPITALIST

## 2025-04-27 PROCEDURE — 97116 GAIT TRAINING THERAPY: CPT

## 2025-04-27 PROCEDURE — 80202 ASSAY OF VANCOMYCIN: CPT

## 2025-04-27 PROCEDURE — 6370000000 HC RX 637 (ALT 250 FOR IP): Performed by: INTERNAL MEDICINE

## 2025-04-27 PROCEDURE — 1100000000 HC RM PRIVATE

## 2025-04-27 PROCEDURE — 82962 GLUCOSE BLOOD TEST: CPT

## 2025-04-27 PROCEDURE — 6360000002 HC RX W HCPCS: Performed by: HOSPITALIST

## 2025-04-27 PROCEDURE — 83735 ASSAY OF MAGNESIUM: CPT

## 2025-04-27 PROCEDURE — 82550 ASSAY OF CK (CPK): CPT

## 2025-04-27 PROCEDURE — 86140 C-REACTIVE PROTEIN: CPT

## 2025-04-27 PROCEDURE — 2500000003 HC RX 250 WO HCPCS: Performed by: HOSPITALIST

## 2025-04-27 PROCEDURE — 85025 COMPLETE CBC W/AUTO DIFF WBC: CPT

## 2025-04-27 PROCEDURE — 80053 COMPREHEN METABOLIC PANEL: CPT

## 2025-04-27 PROCEDURE — 6370000000 HC RX 637 (ALT 250 FOR IP): Performed by: HOSPITALIST

## 2025-04-27 PROCEDURE — 36415 COLL VENOUS BLD VENIPUNCTURE: CPT

## 2025-04-27 RX ORDER — INSULIN GLARGINE 100 [IU]/ML
20 INJECTION, SOLUTION SUBCUTANEOUS DAILY
Status: DISCONTINUED | OUTPATIENT
Start: 2025-04-28 | End: 2025-04-29 | Stop reason: HOSPADM

## 2025-04-27 RX ORDER — PREDNISONE 20 MG/1
20 TABLET ORAL DAILY
Status: DISCONTINUED | OUTPATIENT
Start: 2025-04-27 | End: 2025-04-29 | Stop reason: HOSPADM

## 2025-04-27 RX ORDER — SODIUM CHLORIDE, SODIUM LACTATE, POTASSIUM CHLORIDE, CALCIUM CHLORIDE 600; 310; 30; 20 MG/100ML; MG/100ML; MG/100ML; MG/100ML
INJECTION, SOLUTION INTRAVENOUS CONTINUOUS
Status: DISCONTINUED | OUTPATIENT
Start: 2025-04-27 | End: 2025-04-28

## 2025-04-27 RX ADMIN — SODIUM CHLORIDE, PRESERVATIVE FREE 10 ML: 5 INJECTION INTRAVENOUS at 08:14

## 2025-04-27 RX ADMIN — PANCRELIPASE LIPASE, PANCRELIPASE PROTEASE, PANCRELIPASE AMYLASE 5000 UNITS: 5000; 17000; 24000 CAPSULE, DELAYED RELEASE ORAL at 17:39

## 2025-04-27 RX ADMIN — GABAPENTIN 200 MG: 100 CAPSULE ORAL at 15:58

## 2025-04-27 RX ADMIN — INSULIN LISPRO 4 UNITS: 100 INJECTION, SOLUTION INTRAVENOUS; SUBCUTANEOUS at 20:50

## 2025-04-27 RX ADMIN — PANCRELIPASE LIPASE, PANCRELIPASE PROTEASE, PANCRELIPASE AMYLASE 5000 UNITS: 5000; 17000; 24000 CAPSULE, DELAYED RELEASE ORAL at 08:07

## 2025-04-27 RX ADMIN — OXYCODONE AND ACETAMINOPHEN 1 TABLET: 325; 5 TABLET ORAL at 20:50

## 2025-04-27 RX ADMIN — MIRTAZAPINE 7.5 MG: 15 TABLET, FILM COATED ORAL at 20:25

## 2025-04-27 RX ADMIN — ASPIRIN 81 MG: 81 TABLET, CHEWABLE ORAL at 08:07

## 2025-04-27 RX ADMIN — VANCOMYCIN HYDROCHLORIDE 1000 MG: 1 INJECTION, POWDER, LYOPHILIZED, FOR SOLUTION INTRAVENOUS at 20:55

## 2025-04-27 RX ADMIN — OXYCODONE AND ACETAMINOPHEN 1 TABLET: 325; 5 TABLET ORAL at 12:25

## 2025-04-27 RX ADMIN — INSULIN LISPRO 8 UNITS: 100 INJECTION, SOLUTION INTRAVENOUS; SUBCUTANEOUS at 17:39

## 2025-04-27 RX ADMIN — SODIUM CHLORIDE, POTASSIUM CHLORIDE, SODIUM LACTATE AND CALCIUM CHLORIDE: 600; 310; 30; 20 INJECTION, SOLUTION INTRAVENOUS at 12:46

## 2025-04-27 RX ADMIN — INSULIN LISPRO 8 UNITS: 100 INJECTION, SOLUTION INTRAVENOUS; SUBCUTANEOUS at 08:07

## 2025-04-27 RX ADMIN — INSULIN LISPRO 4 UNITS: 100 INJECTION, SOLUTION INTRAVENOUS; SUBCUTANEOUS at 12:26

## 2025-04-27 RX ADMIN — GABAPENTIN 200 MG: 100 CAPSULE ORAL at 08:07

## 2025-04-27 RX ADMIN — GABAPENTIN 200 MG: 100 CAPSULE ORAL at 20:50

## 2025-04-27 RX ADMIN — QUETIAPINE FUMARATE 150 MG: 100 TABLET ORAL at 20:25

## 2025-04-27 RX ADMIN — CEFEPIME 1000 MG: 1 INJECTION, POWDER, FOR SOLUTION INTRAMUSCULAR; INTRAVENOUS at 23:07

## 2025-04-27 RX ADMIN — INSULIN GLARGINE 10 UNITS: 100 INJECTION, SOLUTION SUBCUTANEOUS at 08:07

## 2025-04-27 RX ADMIN — SODIUM CHLORIDE, POTASSIUM CHLORIDE, SODIUM LACTATE AND CALCIUM CHLORIDE: 600; 310; 30; 20 INJECTION, SOLUTION INTRAVENOUS at 23:05

## 2025-04-27 RX ADMIN — SODIUM CHLORIDE, PRESERVATIVE FREE 10 ML: 5 INJECTION INTRAVENOUS at 20:25

## 2025-04-27 RX ADMIN — ENOXAPARIN SODIUM 40 MG: 100 INJECTION SUBCUTANEOUS at 08:06

## 2025-04-27 RX ADMIN — CEFEPIME 1000 MG: 1 INJECTION, POWDER, FOR SOLUTION INTRAMUSCULAR; INTRAVENOUS at 12:47

## 2025-04-27 RX ADMIN — PREDNISONE 20 MG: 20 TABLET ORAL at 15:59

## 2025-04-27 RX ADMIN — PANCRELIPASE LIPASE, PANCRELIPASE PROTEASE, PANCRELIPASE AMYLASE 5000 UNITS: 5000; 17000; 24000 CAPSULE, DELAYED RELEASE ORAL at 12:25

## 2025-04-27 RX ADMIN — ATORVASTATIN CALCIUM 20 MG: 20 TABLET, FILM COATED ORAL at 20:25

## 2025-04-27 ASSESSMENT — PAIN DESCRIPTION - LOCATION
LOCATION: FOOT
LOCATION: FOOT;LEG

## 2025-04-27 ASSESSMENT — PAIN DESCRIPTION - DESCRIPTORS
DESCRIPTORS: ACHING
DESCRIPTORS: SHOOTING

## 2025-04-27 ASSESSMENT — PAIN SCALES - GENERAL
PAINLEVEL_OUTOF10: 7
PAINLEVEL_OUTOF10: 7

## 2025-04-27 ASSESSMENT — PAIN DESCRIPTION - ORIENTATION
ORIENTATION: RIGHT
ORIENTATION: RIGHT

## 2025-04-27 NOTE — PLAN OF CARE
Problem: ABCDS Injury Assessment  Goal: Absence of physical injury  4/27/2025 0128 by Neda Longoria, RN  Outcome: Progressing  Flowsheets (Taken 4/26/2025 2247)  Absence of Physical Injury: Implement safety measures based on patient assessment  4/26/2025 1544 by Zahra Salinas RN  Outcome: Progressing     Problem: Skin/Tissue Integrity  Goal: Skin integrity remains intact  Description: 1.  Monitor for areas of redness and/or skin breakdown2.  Assess vascular access sites hourly3.  Every 4-6 hours minimum:  Change oxygen saturation probe site4.  Every 4-6 hours:  If on nasal continuous positive airway pressure, respiratory therapy assess nares and determine need for appliance change or resting period  4/27/2025 0128 by Neda Longoria, RN  Outcome: Progressing  Flowsheets  Taken 4/26/2025 2302  Skin Integrity Remains Intact: Monitor for areas of redness and/or skin breakdown  Taken 4/26/2025 2247  Skin Integrity Remains Intact: Monitor for areas of redness and/or skin breakdown  4/26/2025 1544 by Zahra Salinas RN  Outcome: Progressing  Flowsheets (Taken 4/26/2025 1023)  Skin Integrity Remains Intact:   Monitor for areas of redness and/or skin breakdown   Assess vascular access sites hourly     Problem: Chronic Conditions and Co-morbidities  Goal: Patient's chronic conditions and co-morbidity symptoms are monitored and maintained or improved  4/27/2025 0128 by Neda Longoria, RN  Outcome: Progressing  Flowsheets (Taken 4/26/2025 2302)  Care Plan - Patient's Chronic Conditions and Co-Morbidity Symptoms are Monitored and Maintained or Improved: Monitor and assess patient's chronic conditions and comorbid symptoms for stability, deterioration, or improvement  4/26/2025 1544 by Zahra Salinas RN  Outcome: Progressing  Flowsheets (Taken 4/26/2025 1023)  Care Plan - Patient's Chronic Conditions and Co-Morbidity Symptoms are Monitored and Maintained or Improved: Monitor and assess patient's chronic

## 2025-04-28 VITALS
HEIGHT: 70 IN | RESPIRATION RATE: 18 BRPM | SYSTOLIC BLOOD PRESSURE: 129 MMHG | WEIGHT: 140 LBS | HEART RATE: 82 BPM | BODY MASS INDEX: 20.04 KG/M2 | TEMPERATURE: 97.7 F | DIASTOLIC BLOOD PRESSURE: 75 MMHG | OXYGEN SATURATION: 99 %

## 2025-04-28 PROBLEM — A41.9 SEPSIS (HCC): Status: RESOLVED | Noted: 2025-04-24 | Resolved: 2025-04-28

## 2025-04-28 PROBLEM — I77.6 VASCULITIS: Status: RESOLVED | Noted: 2025-04-24 | Resolved: 2025-04-28

## 2025-04-28 LAB
ALBUMIN SERPL-MCNC: 1.4 G/DL (ref 3.4–5)
ALBUMIN/GLOB SERPL: 0.4 (ref 0.8–1.7)
ALP SERPL-CCNC: 75 U/L (ref 45–117)
ALT SERPL-CCNC: 16 U/L (ref 16–61)
ANION GAP SERPL CALC-SCNC: 5 MMOL/L (ref 3–18)
AST SERPL-CCNC: 12 U/L (ref 10–38)
BASOPHILS # BLD: 0.04 K/UL (ref 0–0.1)
BASOPHILS NFR BLD: 0.2 % (ref 0–2)
BILIRUB SERPL-MCNC: 0.3 MG/DL (ref 0.2–1)
BUN SERPL-MCNC: 26 MG/DL (ref 7–18)
BUN/CREAT SERPL: 23 (ref 12–20)
CALCIUM SERPL-MCNC: 8.5 MG/DL (ref 8.5–10.1)
CHLORIDE SERPL-SCNC: 106 MMOL/L (ref 100–111)
CO2 SERPL-SCNC: 24 MMOL/L (ref 21–32)
CREAT SERPL-MCNC: 1.12 MG/DL (ref 0.6–1.3)
DIFFERENTIAL METHOD BLD: ABNORMAL
EOSINOPHIL # BLD: 0.07 K/UL (ref 0–0.4)
EOSINOPHIL NFR BLD: 0.4 % (ref 0–5)
ERYTHROCYTE [DISTWIDTH] IN BLOOD BY AUTOMATED COUNT: 14.6 % (ref 11.6–14.5)
GLOBULIN SER CALC-MCNC: 3.3 G/DL (ref 2–4)
GLUCOSE BLD STRIP.AUTO-MCNC: 159 MG/DL (ref 70–110)
GLUCOSE BLD STRIP.AUTO-MCNC: 287 MG/DL (ref 70–110)
GLUCOSE BLD STRIP.AUTO-MCNC: 303 MG/DL (ref 70–110)
GLUCOSE BLD STRIP.AUTO-MCNC: 317 MG/DL (ref 70–110)
GLUCOSE BLD STRIP.AUTO-MCNC: 318 MG/DL (ref 70–110)
GLUCOSE BLD STRIP.AUTO-MCNC: 321 MG/DL (ref 70–110)
GLUCOSE BLD STRIP.AUTO-MCNC: 348 MG/DL (ref 70–110)
GLUCOSE SERPL-MCNC: 282 MG/DL (ref 74–99)
HCT VFR BLD AUTO: 27.9 % (ref 36–48)
HGB BLD-MCNC: 8.7 G/DL (ref 13–16)
IMM GRANULOCYTES # BLD AUTO: 0.43 K/UL (ref 0–0.04)
IMM GRANULOCYTES NFR BLD AUTO: 2.5 % (ref 0–0.5)
LYMPHOCYTES # BLD: 1.05 K/UL (ref 0.9–3.3)
LYMPHOCYTES NFR BLD: 6 % (ref 21–52)
MCH RBC QN AUTO: 27.7 PG (ref 24–34)
MCHC RBC AUTO-ENTMCNC: 31.2 G/DL (ref 31–37)
MCV RBC AUTO: 88.9 FL (ref 78–100)
MONOCYTES # BLD: 0.44 K/UL (ref 0.05–1.2)
MONOCYTES NFR BLD: 2.5 % (ref 3–10)
NEUTS SEG # BLD: 15.47 K/UL (ref 1.8–8)
NEUTS SEG NFR BLD: 88.4 % (ref 40–73)
NRBC # BLD: 0 K/UL (ref 0–0.01)
NRBC BLD-RTO: 0 PER 100 WBC
PLATELET # BLD AUTO: 394 K/UL (ref 135–420)
PMV BLD AUTO: 10 FL (ref 9.2–11.8)
POTASSIUM SERPL-SCNC: 5.2 MMOL/L (ref 3.5–5.5)
PROCALCITONIN SERPL-MCNC: 0.27 NG/ML
PROT SERPL-MCNC: 4.7 G/DL (ref 6.4–8.2)
RBC # BLD AUTO: 3.14 M/UL (ref 4.35–5.65)
SODIUM SERPL-SCNC: 135 MMOL/L (ref 136–145)
WBC # BLD AUTO: 17.5 K/UL (ref 4.6–13.2)

## 2025-04-28 PROCEDURE — 2580000003 HC RX 258: Performed by: HOSPITALIST

## 2025-04-28 PROCEDURE — 6370000000 HC RX 637 (ALT 250 FOR IP): Performed by: INTERNAL MEDICINE

## 2025-04-28 PROCEDURE — 85025 COMPLETE CBC W/AUTO DIFF WBC: CPT

## 2025-04-28 PROCEDURE — 36415 COLL VENOUS BLD VENIPUNCTURE: CPT

## 2025-04-28 PROCEDURE — 80053 COMPREHEN METABOLIC PANEL: CPT

## 2025-04-28 PROCEDURE — 2500000003 HC RX 250 WO HCPCS: Performed by: HOSPITALIST

## 2025-04-28 PROCEDURE — 6370000000 HC RX 637 (ALT 250 FOR IP): Performed by: HOSPITALIST

## 2025-04-28 PROCEDURE — 84145 PROCALCITONIN (PCT): CPT

## 2025-04-28 PROCEDURE — 82962 GLUCOSE BLOOD TEST: CPT

## 2025-04-28 PROCEDURE — 6360000002 HC RX W HCPCS: Performed by: HOSPITALIST

## 2025-04-28 RX ORDER — PREDNISONE 20 MG/1
20 TABLET ORAL DAILY
Qty: 10 TABLET | Refills: 0 | Status: ON HOLD | OUTPATIENT
Start: 2025-04-29 | End: 2025-05-09

## 2025-04-28 RX ORDER — GABAPENTIN 300 MG/1
300 CAPSULE ORAL 3 TIMES DAILY
Qty: 90 CAPSULE | Refills: 2 | Status: ON HOLD | OUTPATIENT
Start: 2025-04-28 | End: 2025-07-27

## 2025-04-28 RX ORDER — INSULIN LISPRO 100 [IU]/ML
0-16 INJECTION, SOLUTION INTRAVENOUS; SUBCUTANEOUS
Status: DISCONTINUED | OUTPATIENT
Start: 2025-04-28 | End: 2025-04-29 | Stop reason: HOSPADM

## 2025-04-28 RX ADMIN — GABAPENTIN 200 MG: 100 CAPSULE ORAL at 08:00

## 2025-04-28 RX ADMIN — INSULIN LISPRO 12 UNITS: 100 INJECTION, SOLUTION INTRAVENOUS; SUBCUTANEOUS at 11:07

## 2025-04-28 RX ADMIN — PREDNISONE 20 MG: 20 TABLET ORAL at 08:00

## 2025-04-28 RX ADMIN — INSULIN GLARGINE 20 UNITS: 100 INJECTION, SOLUTION SUBCUTANEOUS at 08:01

## 2025-04-28 RX ADMIN — SODIUM CHLORIDE, PRESERVATIVE FREE 10 ML: 5 INJECTION INTRAVENOUS at 11:04

## 2025-04-28 RX ADMIN — ASPIRIN 81 MG: 81 TABLET, CHEWABLE ORAL at 08:00

## 2025-04-28 RX ADMIN — GABAPENTIN 200 MG: 100 CAPSULE ORAL at 13:43

## 2025-04-28 RX ADMIN — PANCRELIPASE LIPASE, PANCRELIPASE PROTEASE, PANCRELIPASE AMYLASE 5000 UNITS: 5000; 17000; 24000 CAPSULE, DELAYED RELEASE ORAL at 16:24

## 2025-04-28 RX ADMIN — CEFEPIME 1000 MG: 1 INJECTION, POWDER, FOR SOLUTION INTRAMUSCULAR; INTRAVENOUS at 11:00

## 2025-04-28 RX ADMIN — QUETIAPINE FUMARATE 150 MG: 100 TABLET ORAL at 20:47

## 2025-04-28 RX ADMIN — PANCRELIPASE LIPASE, PANCRELIPASE PROTEASE, PANCRELIPASE AMYLASE 5000 UNITS: 5000; 17000; 24000 CAPSULE, DELAYED RELEASE ORAL at 11:08

## 2025-04-28 RX ADMIN — PANCRELIPASE LIPASE, PANCRELIPASE PROTEASE, PANCRELIPASE AMYLASE 5000 UNITS: 5000; 17000; 24000 CAPSULE, DELAYED RELEASE ORAL at 08:00

## 2025-04-28 RX ADMIN — INSULIN LISPRO 12 UNITS: 100 INJECTION, SOLUTION INTRAVENOUS; SUBCUTANEOUS at 07:56

## 2025-04-28 RX ADMIN — INSULIN LISPRO 8 UNITS: 100 INJECTION, SOLUTION INTRAVENOUS; SUBCUTANEOUS at 18:01

## 2025-04-28 RX ADMIN — ENOXAPARIN SODIUM 40 MG: 100 INJECTION SUBCUTANEOUS at 08:00

## 2025-04-28 RX ADMIN — ATORVASTATIN CALCIUM 20 MG: 20 TABLET, FILM COATED ORAL at 20:48

## 2025-04-28 RX ADMIN — GABAPENTIN 200 MG: 100 CAPSULE ORAL at 20:47

## 2025-04-28 RX ADMIN — INSULIN LISPRO 12 UNITS: 100 INJECTION, SOLUTION INTRAVENOUS; SUBCUTANEOUS at 16:23

## 2025-04-28 RX ADMIN — INSULIN LISPRO 12 UNITS: 100 INJECTION, SOLUTION INTRAVENOUS; SUBCUTANEOUS at 14:16

## 2025-04-28 RX ADMIN — MIRTAZAPINE 7.5 MG: 15 TABLET, FILM COATED ORAL at 20:47

## 2025-04-28 NOTE — DISCHARGE SUMMARY
pneumobilia. Absent gallbladder.. Pancreas: Pancreatic calcifications and atrophy compatible with chronic pancreatitis Spleen: Negative. Adrenal glands: Negative. Kidneys: Subcentimeter right kidney lower pole hypodensity, too small to accurately characterize. Mildly distended bilateral renal pelvises sees and proximal hydroureters with hyperenhancing urothelium. Will nonobstructing calculus left kidney upper pole 3 mm. Bladder and Pelvic Organs: Negative. Stomach, Small Bowel and Colon: No obstruction. Large colonic stool burden. Normal appendix. Lymph nodes: No lymphadenopathy. Vessels: Atherosclerosis. Infrarenal abdominal aortic aneurysmal dilatation 3.7 cm Peritoneal Spaces: No free fluid or free air. Body wall: Supraumbilical midline wide based ventral hernia or diastases containing transverse colon and distended anterior gastric wall. No incarceration Bones: Bilateral L5 spondylolysis, severe degenerative disc disease L5-S1 and grade 1 anterolisthesis    No evidence of pulmonary embolism. Nonobstructing left nephrolithiasis. Mildly distended bilateral renal collecting systems/mild hydronephrosis with proximal hydroureter and urothelial hyperenhancement, could represent ascending urinary tract infection. No evidence of pyelonephritis. Infrarenal abdominal aortic aneurysm 3.7 cm. Large colonic stool burden, correlate for constipation. Signed By: Keely Sanchez MD on 4/3/2025 4:51 PM    XR CHEST (2 VW)  Result Date: 4/3/2025  Exam: Chest x-ray. Indication:SOB TECHNIQUE: PA and lateral views of the chest. COMPARISON:None Findings: Heart is normal in size. Lungs are clear. No pneumothorax or large pleural effusion. Bony thorax within normal limits.    No acute pulmonary process. Signed By: David Brown DO on 4/3/2025 3:30 PM        @[employer]([id number])@       Please note that this dictation was completed with [employer] the [employer] [place] voice recognition software.  Quite often unanticipated grammatical, syntax,

## 2025-04-28 NOTE — CARE COORDINATION
This CM met with patient in room to confirm patient's decision regarding home health. Patient continues to decline home health services, stating, \"I don't need that.\" Patient states that his wife can provide transport home.  
Consult) N/A   Services At/After Discharge Home Health    Resource Information Provided? No   Mode of Transport at Discharge Other (see comment)  (Spouse can provide transport)   Confirm Follow Up Transport Family   Condition of Participation: Discharge Planning   The Plan for Transition of Care is related to the following treatment goals: Plan to discharge home to family with physician follow-up, potential for HH   The Patient and/or Patient Representative was provided with a Choice of Provider? Patient   The Patient and/Or Patient Representative agree with the Discharge Plan? Yes   Freedom of Choice list was provided with basic dialogue that supports the patient's individualized plan of care/goals, treatment preferences, and shares the quality data associated with the providers?  Yes     This CM met with patient in room. Patient states that he lives with his wife and son in a 2 story home. Patient states that prior to this admission, he was fully independent including driving and he did not use any DME. Patient confirms that his PCP is Nat Donohue NP and states his last appointment was about 2 weeks ago. Patient states that he would like to return home at discharge and states that he does not think he will need any home health services. CM will continue to follow after he works with therapy for potential home health recommendations.    12:34 PM- Therapy recommending home health. CM spoke to patient in room who states he is agreeable to considering home health if still recommended at discharge. CM gave patient the home health choice list and instructed patient that CM will return tomorrow AM to obtain choices for home health. Patient verbalized understanding.

## 2025-04-28 NOTE — PLAN OF CARE
Problem: Pain  Goal: Verbalizes/displays adequate comfort level or baseline comfort level  Flowsheets (Taken 4/27/2025 2223)  Verbalizes/displays adequate comfort level or baseline comfort level:   Encourage patient to monitor pain and request assistance   Administer analgesics based on type and severity of pain and evaluate response   Consider cultural and social influences on pain and pain management   Assess pain using appropriate pain scale   Implement non-pharmacological measures as appropriate and evaluate response   Notify Licensed Independent Practitioner if interventions unsuccessful or patient reports new pain

## 2025-04-29 NOTE — PROGRESS NOTES
Hospitalist Progress Note    Patient: Lan Gonzalez MRN: [id number]  CSN: [id number]    YOB: 1965  Age: 59 y.o.  Sex: male    DOA: 4/24/2025 LOS:  LOS: 1 day          Chief Complain :  Lower extremity edema and right foot rash  59 y.o.  male with history of diabetes mellitus type 1 on insulin, chronic kidney disease stage III A/B (borderline), coronary artery disease, cardiomyopathy, rheumatoid arthritis, cerebrovascular disease, COPD presented to the emergency room with family today who with complaints of sudden onset of bilateral feet numbness and right foot rash  Subjective:   Patient sitting on the side of the bed, working with PT/OT.  Right foot rash improving.    Assessment/Plan     Active Hospital Problems    Diagnosis     Sepsis (HCC) [A41.9]     Peripheral polyneuropathy [G62.9]     Vasculitis [I77.6]     Cellulitis of right lower extremity [L03.115]     Stage 3b chronic kidney disease (HCC) [N18.32]     Myalgia [M79.10]     Coronary artery disease involving native coronary artery of native heart [I25.10]     Exertional shortness of breath [R06.02]     Cardiomyopathy (HCC) [I42.9]     CVA (cerebral vascular accident) (HCC) [I63.9]     RA (rheumatoid arthritis) (HCC) [M06.9]     Type 1 diabetes mellitus (HCC) [E10.9]           Sepsis   Unclear source  Follow blood and urine cultures  Follow lactic acid  PCT 0.24  COVID 19 and influenza A&B negative.  CXR with no acute findings.  Antibiotics vancomycin and cefepime.  IVF    Right foot rash  Does not look like cellulitis, possible vasculitis  Has RA and not on treatment now.   Sed rate elevated.  May need steroids if infection ruled out.     DM  Continue his 
                                                                                                                                                                                                                                                                                                  Hospitalist Progress Note    Patient: Lan Gonzalze MRN: [id number]  CSN: [id number]    YOB: 1965  Age: 59 y.o.  Sex: male    DOA: 4/24/2025 LOS:  LOS: 3 days          Chief Complain :  Lower extremity edema and right foot rash  59 y.o.  male with history of diabetes mellitus type 1 on insulin, chronic kidney disease stage III A/B (borderline), coronary artery disease, cardiomyopathy, rheumatoid arthritis, cerebrovascular disease, COPD presented to the emergency room with family today who with complaints of sudden onset of bilateral feet numbness and right foot rash  Subjective:   Patient sitting on the side of the bed, working with PT/OT.  Right foot rash improving. Complaining of LE pain.    Assessment/Plan     Active Hospital Problems    Diagnosis     Sepsis (HCC) [A41.9]     Peripheral polyneuropathy [G62.9]     Vasculitis [I77.6]     Cellulitis of right lower extremity [L03.115]     Stage 3b chronic kidney disease (HCC) [N18.32]     Myalgia [M79.10]     Coronary artery disease involving native coronary artery of native heart [I25.10]     Exertional shortness of breath [R06.02]     Cardiomyopathy ([facility]) [I42.9]     CVA (cerebral vascular accident) ([facility]) [I63.9]     RA (rheumatoid arthritis) ([facility]) [M06.9]     Type 1 diabetes mellitus ([facility]) [E10.9]         Possible Sepsis   Unclear source  blood and urine cultures no growth  lactic acid in normal range.  PCT 0.24  COVID 19 and influenza A&B negative.  CXR with no acute findings.  Antibiotics vancomycin and cefepime.  IVF  His sed rate and CRP elevated  This could be flare up of his RA  He complains of muscle pain in legs, CK in normal range  Will start on 
    PHYSICAL THERAPY TREATMENT    Patient: Lan Gonzalez (59 y.o. male)  Date: 4/27/2025  Diagnosis: Shortness of breath [R06.02]  Paresthesia [R20.2]  Purpura [D69.2]  Vasculitis [I77.6]  Sepsis (HCC) [A41.9]  Stage 3a chronic kidney disease (HCC) [N18.31]  Sepsis, due to unspecified organism, unspecified whether acute organ dysfunction present (HCC) [A41.9] Sepsis (HCC)      Precautions: Fall Risk,  ,  ,  ,  ,  ,  ,        ASSESSMENT:  Introduced self to pt and family and pt agreeable to PT treatment. Pt c/o feeling pain in foot but just took some pain medicine.  Pt supine in bed upon arrival.  Wife and son present.  Pt able to transfer supine<>sit mod I.  Sit<>stand CGA/SBA.  Gait training using RW and SBA w/ dec step length, foot clearance and tendency to become anxietal during gait as distance increases.  Pt c/o feeling like heart is racing ( bpm), SpO2 96% on RA. Pt denies increased pain during WB activity.  Pt returned to sitting EOB w/ all needs within reach.  Nurse Dede aware of session and outcomes.    Progression toward goals:   [x]      Improving appropriately and progressing toward goals  []      Improving slowly and progressing toward goals  []      Not making progress toward goals and plan of care will be adjusted     PLAN:  Patient continues to benefit from skilled intervention to address the above impairments.  Continue treatment per established plan of care.    Further Equipment Recommendations for Discharge: none    [facility]:   AM-PAC Inpatient Mobility without Stair Climbing Raw Score : 17    Current research shows that an AM-PAC score of 17 (13 without stairs) or less is not associated with a discharge to the patient's home setting. Based on an AM-PAC score and their current functional mobility deficits, it is recommended that the patient have 5-7 sessions per week of Physical Therapy at d/c to increase the patient's independence.  Currently, this patient demonstrates the potential endurance, 
1038 Pt complains of new numbness and tingling to BLE, states \"I'm really concerned about how terrible I feel when walking\". Pt expressed he gets SOB and needs to sit or hold onto something, that his legs are really numb and he can't feel them. Pt mentioned that he has turned off his insulin pump. MD Moseley notified during IDRs    1106 Pt  , recheck was 391. MD Moseley aware.    1532 Pt continues to complain of 7/10 pain in BLE, greater in the left than right. Percocet administered at 1339 with little relief. Pt stated \"they gave me something a while ago but it doesn't last very long\". Will notify MD  
1140 Pt stated blood sugar 195 prior to lunch and no insulin was administered at this time.   1700 Pt stated blood sugar 150 and no insulin administered. Pt turn off insulin pump at this time stating blood sugar trending down. Tomorrow may run out of insulin and would look to hospital sliding scale for guidance.    
1857 Nurse Gonzalez call unit stating had to give report. Nurse Braden notify Nurse Gonzalez that lactic needs to be redrawn per policy prior to accepting pt at floor. Nurse Gonzalez continue with report and confirmed lab will be drawn during night shift.     1920 Verbal shift change report given to JESI Henry (oncoming nurse) by Asiya DENNISON (offgoing nurse). Report included the following information Adult Overview.  Lactic resulted higher; charge nurse / nursing supervisor notified.    1940 Pt on unit       
2033  BG checked and recorded as 159, patient discharged per orders, discharge process continued, opportunities for questions and clarification provided. Son to pick patient up per patient.    2114  Patient wheeled out of the unit by PCT, accompanied by family, discharge completed.   
23:00 Shift assessment completed. See nsg flow sheet for details    03:20 Reassessed with 0 changes noted. Resting quietly in bed with eyes closed between cares x for voiding per urinal w/o dificulty.    07:40 Bedside and Verbal shift change report given to ELHAM Jane RN (oncoming nurse) by LAURO Longoria RN (offgoing nurse). Report included the following information Nurse Handoff Report  .     
4 Eyes Skin Assessment     NAME:  Lan Gonzalez  YOB: 1965  MEDICAL RECORD NUMBER:  [id number]    The patient is being assessed for  Admission    I agree that at least one RN has performed a thorough Head to Toe Skin Assessment on the patient. ALL assessment sites listed below have been assessed.      Areas assessed by both nurses:    Head, Face, Ears, Shoulders, Back, Chest, Arms, Elbows, Hands, Sacrum. Buttock, Coccyx, Ischium, Legs. Feet and Heels, and Under Medical Devices         Does the Patient have a Wound? No noted wound(s)       Flaquito Prevention initiated by RN: Yes  Wound Care Orders initiated by RN: No    Pressure Injury (Stage 3,4, Unstageable, DTI, NWPT, and Complex wounds) if present, place Wound referral order by RN under : No    New Ostomies, if present place, Ostomy referral order under : No     Nurse 1 eSignature: Electronically signed by Delia Arana RN on 4/25/25 at 5:12 AM EDT    **SHARE this note so that the co-signing nurse can place an eSignature**    Nurse 2 eSignature: Electronically signed by Carl Gonzalez RN on 4/25/25 at 7:03 AM EDT   
Bedside and Verbal shift change report given to Carl RN (oncoming nurse) by Dede RN (offgoing nurse). Report included the following information Nurse Handoff Report, Adult Overview, Intake/Output, MAR, Recent Results, and Med Rec Status.     
Bedside and Verbal shift change report given to JESI Henry (oncoming nurse) by JESI Braden (offgoing nurse). Report included the following information Nurse Handoff Report, Adult Overview, Intake/Output, and MAR.     
Bedside and Verbal shift change report given to JESI Oswald (oncoming nurse) by JESI Henry (offgoing nurse). Report included the following information Nurse Handoff Report, Index, Adult Overview, Surgery Report, Intake/Output, MAR, Recent Results, and Med Rec Status.    
Bedside and Verbal shift change report given to JESI Sweeney (oncoming nurse) by JESI Henry (offgoing nurse). Report included the following information Nurse Handoff Report, Index, Adult Overview, Surgery Report, Intake/Output, MAR, Recent Results, and Med Rec Status.    
[facility]   Pharmacy Pharmacokinetic Monitoring Service - Vancomycin     Lan Gonzalez is a 59 y.o. male starting on vancomycin therapy for Skin and Soft Tissue Infection X 7 days. Pharmacy consulted by Dr. Shon Moseley for monitoring and adjustment.    Target Concentration: Goal AUC/FIOR 400-600 mg*hr/L    Additional Antimicrobials: Cefepime    Pertinent Laboratory Values:   Wt Readings from Last 1 Encounters:   04/24/25 63.5 kg (140 lb)     Temp Readings from Last 1 Encounters:   04/24/25 97.7 °F (36.5 °C) (Oral)     Estimated Creatinine Clearance: 42 mL/min (A) (based on SCr of 1.64 mg/dL (H)).  Recent Labs     04/24/25  1547   CREATININE 1.64*   BUN 30*   WBC 18.8*     Plan:  Dosing recommendations based on Bayesian software  Vancomycin 1500mg IV X 1 dose given in the ED  Follow up with Vancomycin 1000mg IV q24h  Anticipated AUC of 496mg/L.hr and trough concentration of 13.5mg/L at steady state  Renal labs as indicated   Pharmacy will continue to monitor patient and adjust therapy as indicated    Thank you for the consult,  Brad Mcelod RPH  4/24/2025 8:14 PM  
[facility]   Pharmacy Pharmacokinetic Monitoring Service - Vancomycin    Consulting Provider: Dr. Moseley   Indication: SSTI - 7 day tx  Target Concentration: Goal AUC/FIOR 400-600 mg*hr/L  Day of Therapy: 5  Additional Antimicrobials: Cefepime    Pertinent Laboratory Values:   Wt Readings from Last 1 Encounters:   04/25/25 63.5 kg (140 lb)     Temp Readings from Last 1 Encounters:   04/28/25 97.1 °F (36.2 °C) (Temporal)     Estimated Creatinine Clearance: 62 mL/min (based on SCr of 1.12 mg/dL).  Recent Labs     04/27/25  0437 04/28/25  0500   CREATININE 1.38* 1.12   BUN 23* 26*   WBC 18.7* 17.5*     Recent vancomycin administrations                     vancomycin (VANCOCIN) 1,000 mg in sodium chloride 0.9 % 250 mL (addEASE) IVPB (mg) 1,000 mg New Bag 04/27/25 2055    Vancomycin random level due 4/27/25 with am labs ()  Given 04/27/25 0649    vancomycin (VANCOCIN) 1250 mg in sodium chloride 0.9% 250 mL IVPB (mg) 1,250 mg New Bag 04/26/25 2104    vancomycin (VANCOCIN) 1,000 mg in sodium chloride 0.9 % 250 mL (addEASE) IVPB (mg) 1,000 mg New Bag 04/25/25 2234             Plan:  Updated  mg/l.hr  Trough 9.4 mg/l  Recommend no dosing change at this time   3 doses remain  Pharmacy will continue to monitor patient and adjust therapy as indicated    DAISY MORA RPH, BCPS  4/28/2025 3:26 PM   [phone]  
[facility]   Pharmacy Pharmacokinetic Monitoring Service - Vancomycin    Consulting Provider: Dr. Moseley   Indication: SSTI x 7 days   Target Concentration: Goal AUC/FIOR 400-600 mg*hr/L  Day of Therapy: 2  Additional Antimicrobials: Ceftriaxone    Pertinent Laboratory Values:   Wt Readings from Last 1 Encounters:   04/25/25 63.5 kg (140 lb)     Temp Readings from Last 1 Encounters:   04/25/25 98.1 °F (36.7 °C) (Oral)     Estimated Creatinine Clearance: 48 mL/min (A) (based on SCr of 1.46 mg/dL (H)).  Recent Labs     04/24/25  1547 04/25/25  0444   CREATININE 1.64* 1.46*   BUN 30* 26*   WBC 18.8* 15.7*     Assessment:  Date Current Dose Concentration AUC   4/25/25 Vancomycin 1000 mg IV q24h Random = 9.4 466   Note: Serum concentrations collected for AUC dosing may appear elevated if collected in close proximity to the dose administered, this is not necessarily an indication of toxicity    Plan:  Current dosing regimen is therapeutic  Continue current dose  Pharmacy will continue to monitor patient and adjust therapy as indicated    DONTRELL MITCHELL RPH, BCPS   4/25/2025 4:31 PM   
[facility]   Pharmacy Pharmacokinetic Monitoring Service - Vancomycin    Consulting Provider: Dr. Moseley   Indication: SSTI x 7 days   Target Concentration: Goal AUC/FIOR 400-600 mg*hr/L  Day of Therapy: 4  Additional Antimicrobials: Ceftriaxone    Pertinent Laboratory Values:   Wt Readings from Last 1 Encounters:   04/25/25 63.5 kg (140 lb)     Temp Readings from Last 1 Encounters:   04/27/25 99.1 °F (37.3 °C) (Oral)     Estimated Creatinine Clearance: 50 mL/min (A) (based on SCr of 1.38 mg/dL (H)).  Recent Labs     04/26/25  0446 04/27/25  0437   CREATININE 1.21 1.38*   BUN 25* 23*   WBC 14.9* 18.7*     Recent vancomycin administrations                     Vancomycin random level due 4/27/25 with am labs ()  Given 04/27/25 0649    vancomycin (VANCOCIN) 1250 mg in sodium chloride 0.9% 250 mL IVPB (mg) 1,250 mg New Bag 04/26/25 2104    vancomycin (VANCOCIN) 1,000 mg in sodium chloride 0.9 % 250 mL (addEASE) IVPB (mg) 1,000 mg New Bag 04/25/25 2234    vancomycin (VANCOCIN) 1500 mg in sodium chloride 0.9% 500 mL IVPB (mg) 1,500 mg New Bag 04/24/25 1657             Assessment:  Date/Time Current Dose Concentration AUC(ss)   4/27/25 @ 0712 Vancomycin 1250 mg IV q24h 26.3 mg/L 602 mg/L.h   Note: Serum concentrations collected for AUC dosing may appear elevated if collected in close proximity to the dose administered, this is not necessarily an indication of toxicity    Plan:  Current dosing regimen is supra-therapeutic  Decrease dose to vancomycin 1000 mg IV q24h  Predicted AUC(ss) 490 mg/L.h and trough concentration 12.9 mg/L  Pharmacy will continue to monitor patient and adjust therapy as indicated    Thank you for the consult,  Tanner Bhakta RPH  4/27/2025 7:10 AM   
[facility]   Pharmacy Pharmacokinetic Monitoring Service - Vancomycin    Consulting Provider: Dr. Moseley   Indication: SSTI x 7 days   Target Concentration: Goal AUC/FIOR 400-600 mg*hr/L  Day of Therapy: 3  Additional Antimicrobials: Ceftriaxone    Pertinent Laboratory Values:   Wt Readings from Last 1 Encounters:   04/25/25 63.5 kg (140 lb)     Temp Readings from Last 1 Encounters:   04/26/25 98.8 °F (37.1 °C) (Oral)     Estimated Creatinine Clearance: 58 mL/min (based on SCr of 1.21 mg/dL).  Recent Labs     04/25/25  0444 04/26/25  0446   CREATININE 1.46* 1.21   BUN 26* 25*   WBC 15.7* 14.9*     Plan:  Current dose: Vancomycin 1 gm IV q24h ==> predicted  mg/l.h ( goal 400-600 )   Will increase dose to Vancomycin 1250 mg IV q24h ==> predicted  mg/l.h and trough 11.8 mg/l  Repeat vancomycin concentration ordered for 4/27/25 with am labs    Pharmacy will continue to monitor patient and adjust therapy as indicated    DONTRELL MITCHELL RPH, BCPS   4/26/2025 11:57 AM   
Moderate Risk Nutrition Assessment Initial    Type and Reason for Visit: Initial, Positive nutrition screen (MST 2)    Nutrition Recommendations/Plan:   Cont diet as kenyatta   If poor PO add Glucerna Shakes PO as kenyatta provides 220kcal, 10g pro per carton  Consider adding Donna-aaron daily, Os-Mateo and vit D3 1000u/day  Tight BG control >70 <180; A1c 8.7  Cont check Phos, Mg, K replete as needed  Please weigh pt to check stated admit wt accuracy  Cont to monitor wt trends, renal fx, lytes, UOP, bowel fx, skin integrity and adjust recs as needed     Malnutrition Assessment:  Malnutrition Status: At risk for malnutrition    Nutrition Assessment:  60yo M admitted with B/L feet numbness, B/L edema, foot rash, noted decline in health since removal of kidney stone in Feb 2025; h/o DM1 on insulin, CKD III A/B (borderline), coronary artery disease, cardiomyopathy, RA, CVD, COPD per MD. on dm diet. MST 2. appears wt slight 4-5% down since 67kg Oct 2024 if correct but appears stable since 63kg Feb 2025 if correct. elevated BG levels.    Estimated Daily Nutrient Needs:  Energy (kcal):  1800 Weight Used for Energy Requirements: Current     Protein (g):  70 Weight Used for Protein Requirements: Current        Fluid (ml/day):  1800 Method Used for Fluid Requirements: 1 ml/kcal    Nutrition Related Findings:     Wound Type: None    Current Nutrition Therapies:    ADULT DIET; Regular; 4 carb choices (60 gm/meal)    Anthropometric Measures:  Height: 177.8 cm (5' 10\")  Current Body Wt: 64 kg (141 lb 1.5 oz)   BMI: 20.2    Nutrition Diagnosis:   Increased nutrient needs, in context of chronic illness related to cardiac dysfunction, endocrine dysfunction, renal dysfunction, increase demand for energy/nutrients, catabolic illness, decreased appetite as evidenced by lab values, localized or generalized fluid accumulation, weight loss    Nutrition Interventions:   Food and/or Nutrient Delivery: Continue Current Diet, Start Oral Nutrition 
OCCUPATIONAL THERAPY EVALUATION/DISCHARGE    Patient: Lan Gonzalez (59 y.o. male)  Date of initial service: 4/25/2025  Date of final service: 4/25/2025  Number of sessions completed: 1  Primary Diagnosis: Shortness of breath [R06.02]  Paresthesia [R20.2]  Purpura [D69.2]  Vasculitis [I77.6]  Sepsis (HCC) [A41.9]  Stage 3a chronic kidney disease (HCC) [N18.31]  Sepsis, due to unspecified organism, unspecified whether acute organ dysfunction present (HCC) [A41.9]       Precautions: Fall Risk,  ,  ,  ,  ,  ,  ,               PLOF: Patient completed activities without use of AD. Patient has RA. Reports recently has had difficulty with endurance when walking and feels fatigued.    ASSESSMENT AND RECOMMENDATIONS:  Patient presented supine in bed with insulin pump. Patient with no visitors present for entire session. Patient completed assessment of ADLs and BUE strength, ROM (see details below). Patient donned bilateral gripper socks. Patient completed functional mobility into hallway, becomes fatigued, standing rest break x2 on way back to room. Patient using RW for mobility, reports has one at home. Patient with windswept deformity of the hands and  some hand weakness, likely related to RA.  No Skilled OT: At baseline function    Maximum therapeutic gains met at current level of care and patient will be discharged from occupational therapy at this time.    Further Equipment Recommendations for Discharge:  ,    none      AMPAC: AM-PAC Inpatient Daily Activity Raw Score: 18/24 -   Current research shows that an AM-PAC score of 18 or greater is associated with a discharge to the patient's home setting.  Based on an AM-PAC score and their current ADL deficits; it is recommended that the patient have 2-3 sessions per week of Occupational Therapy at d/c to increase the patient's independence.      This AMPAC score should be considered in conjunction with interdisciplinary team recommendations to determine the most appropriate 
Pharmacy Antimicrobial Renal Dosing Services:     Pharmacist Renal Dosing Note for Lan Gonzalez   Physician: Dr. Moseley    Indication: SSTI x 5 days  Previous Regimen Cefepime 2000 mg IV q12h   Serum Creatinine 1.38 mg/dL   Creatinine Clearance Estimated Creatinine Clearance: 50 mL/min (A) (based on SCr of 1.38 mg/dL (H)).   BUN Lab Results   Component Value Date/Time    BUN 23 04/27/2025 04:37 AM       New Regimen: Cefepime 1000 mg IV q12h    This dose adjustment made using pharmacy-directed renal dosing protocols for cefepime when CrCl 30-59 mL/min and indication of SSTI.     Thank you,  Jovon Bhakta, PharmD  [phone]    
Physical Therapy Treatment Attempt    Chart reviewed.  Pt deferred to participate in physical therapy session due to:    []  Eating meal  []  Nausea  []  Dizziness  []  Lethargy  []  Lab Results  []  Blood Transfusion  []  Dialysis  []  Pain  [x]  Other:  Requesting to rest.  Noted pt w/ hot pack to abdomen.    Will attempt later today/tomorrow as pt schedule allows.    Orquidea Yoose PT   
Pt to be going home today.  However Dr Moseley called and stated pt can't go home until glucose less than 200.  To be doing accu checks and Rx until less than 200.  Pt aware.  Pt resting in bed with no complaints.  
  WBC 18.8* 15.7* 14.9*   RBC 3.81* 3.12* 3.14*   HGB 10.3* 8.4* 8.4*   HCT 32.7* 26.9* 27.2*   * 437* 402      Cardiac Enzymes Lab Results   Component Value Date    CKTOTAL 57 04/24/2025    TROPHS 11 04/24/2025   ,No results found for: \"BNP\"   Coagulation Recent Labs     04/24/25  1547   INR 1.1       Lipid Panel No results found for: \"CHOL\", \"CHOLPOCT\", \"CHLST\", \"CHOLV\", \"409474\", \"HDL\", \"HDLC\", \"LDL\", \"LDLC\", \"VLDLC\", \"VLDL\"   Pancreas No results for input(s): \"LIPASE\" in the last 72 hours.,No results for input(s): \"AMYLASE\" in the last 72 hours.   Liver Enzymes No results for input(s): \"TP\" in the last 72 hours.    Invalid input(s): \"ALB\", \"TBIL\", \"AP\", \"SGOT\", \"GPT\", \"DBIL\"   Thyroid Studies Lab Results   Component Value Date/Time    TSH 0.23 11/17/2023 01:01 AM        Procedures/imaging: see electronic medical records for all procedures/Xrays and details which were not copied into this note but were reviewed prior to creation of Plan    TIME: E/M Time spent with patient and patient care issues: 55 mins.     This time also includes physician non-face-to-face service time visit on the date of service such as  Preparing to see the patient (eg, review of tests)  Obtaining and/or reviewing separately obtained history  Performing a medically necessary appropriate examination and/or evaluation  Counseling and educating the patient/family/caregiver  Ordering medications, tests, or procedures  Referring and communicating with other health care professionals as needed  Documenting clinical information in the electronic or other health record  Independently interpreting results (not reported separately) and communicating results to the patient/family/caregiver  Care coordination and discharge planning with Case Management.    Dear patient or family member or Caretaker, if you are reviewing this note and have a question regarding the medical terminology, please bring it with you to your next PCP visit.  Medical 
or more falls in the past year or any fall with injury in the past year?: No  Receives Help From: Family  Prior Level of Assist for ADLs: Independent  Prior Level of Assist for Homemaking: Independent  Homemaking Responsibilities: Yes  Prior Level of Assist for Ambulation: Independent community ambulator, with or without device  Prior Level of Assist for Transfers: Independent  Active : Yes  Mode of Transportation: Truck  Occupation: Retired  Additional Comments: h/o 3 neck surgeries in the distant past  Critical Behavior:  WFL      Strength:    Strength: Generally decreased, functional    Tone & Sensation:   Tone: Normal  Sensation: Impaired (c/o numbness bilat LL's to and including feet with R side worse- began ~3 days ago, along with rash dorsal R forefoot)    Range Of Motion:  AROM: Within functional limits    Functional Mobility:  Bed Mobility:  Bed Mobility Training  Supine to Sit: Modified independent  Sit to Supine: Modified independent  Transfers:  Transfer Training  Interventions: Safety awareness training;Verbal cues (vc for safe hand placement STS with RW)  Sit to Stand: Stand by assistance;Contact guard assistance  Stand to Sit: Stand by assistance;Contact guard assistance  Balance:   Balance  Sitting: Intact  Standing: Impaired;With support  Standing - Static: Constant support;Good;Fair  Standing - Dynamic: Constant support;Fair  Wheelchair Mobility:  Wheelchair Management  Wheelchair Management: No  Ambulation/Gait Training:  Gait Training  Right Side Weight Bearing: Full  Left Side Weight Bearing: Full  Gait  Gait Training: Yes  Left Side Weight Bearing: Full  Right Side Weight Bearing: Full  Overall Level of Assistance: Contact guard assistance (manual to pelvis)  Distance (ft): 70 Feet (standing  rest break x 2)  Assistive Device: Walker, rolling  Interventions: Safety awareness training;Verbal cues  Speed/Kristen: Slow  Step Length: Right shortened;Left shortened  Gait Abnormalities:

## 2025-04-30 LAB
BACTERIA SPEC CULT: NORMAL
BACTERIA SPEC CULT: NORMAL
SERVICE CMNT-IMP: NORMAL
SERVICE CMNT-IMP: NORMAL

## 2025-05-02 ENCOUNTER — APPOINTMENT (OUTPATIENT)
Facility: HOSPITAL | Age: 60
DRG: 871 | End: 2025-05-02
Payer: MEDICARE

## 2025-05-02 ENCOUNTER — HOSPITAL ENCOUNTER (INPATIENT)
Facility: HOSPITAL | Age: 60
LOS: 20 days | Discharge: NURSING, REHAB OR LONG-TERM CARE | DRG: 871 | End: 2025-05-22
Attending: EMERGENCY MEDICINE | Admitting: HOSPITALIST
Payer: MEDICARE

## 2025-05-02 DIAGNOSIS — R06.02 SHORTNESS OF BREATH: ICD-10-CM

## 2025-05-02 DIAGNOSIS — I42.9 CARDIOMYOPATHY, UNSPECIFIED TYPE (HCC): ICD-10-CM

## 2025-05-02 DIAGNOSIS — D64.9 ANEMIA, UNSPECIFIED TYPE: ICD-10-CM

## 2025-05-02 DIAGNOSIS — R57.9 SHOCK (HCC): ICD-10-CM

## 2025-05-02 DIAGNOSIS — I71.40 ABDOMINAL AORTIC ANEURYSM (AAA) WITHOUT RUPTURE, UNSPECIFIED PART: ICD-10-CM

## 2025-05-02 DIAGNOSIS — R79.89 ELEVATED TROPONIN: ICD-10-CM

## 2025-05-02 DIAGNOSIS — I82.621 ACUTE DEEP VEIN THROMBOSIS (DVT) OF BRACHIAL VEIN OF RIGHT UPPER EXTREMITY (HCC): ICD-10-CM

## 2025-05-02 DIAGNOSIS — E43 SEVERE MALNUTRITION: ICD-10-CM

## 2025-05-02 DIAGNOSIS — A41.9 SEPSIS, DUE TO UNSPECIFIED ORGANISM, UNSPECIFIED WHETHER ACUTE ORGAN DYSFUNCTION PRESENT (HCC): ICD-10-CM

## 2025-05-02 DIAGNOSIS — M25.421 SWELLING OF JOINT OF UPPER ARM, RIGHT: Primary | ICD-10-CM

## 2025-05-02 DIAGNOSIS — E44.0 MODERATE MALNUTRITION: ICD-10-CM

## 2025-05-02 DIAGNOSIS — G61.0 GBS (GUILLAIN BARRE SYNDROME): ICD-10-CM

## 2025-05-02 PROBLEM — N18.31 STAGE 3A CHRONIC KIDNEY DISEASE (HCC): Status: ACTIVE | Noted: 2025-05-02

## 2025-05-02 PROBLEM — E46 HYPOALBUMINEMIA DUE TO PROTEIN-CALORIE MALNUTRITION: Status: ACTIVE | Noted: 2025-05-02

## 2025-05-02 PROBLEM — F32.9 MDD (MAJOR DEPRESSIVE DISORDER): Status: ACTIVE | Noted: 2017-08-24

## 2025-05-02 PROBLEM — R65.20 SEVERE SEPSIS (HCC): Status: ACTIVE | Noted: 2025-04-24

## 2025-05-02 PROBLEM — R53.1 WEAKNESS: Status: ACTIVE | Noted: 2025-05-02

## 2025-05-02 PROBLEM — K92.2 GI BLEED: Status: ACTIVE | Noted: 2025-05-02

## 2025-05-02 PROBLEM — D50.0 BLOOD LOSS ANEMIA: Status: ACTIVE | Noted: 2025-05-02

## 2025-05-02 PROBLEM — E88.09 HYPOALBUMINEMIA DUE TO PROTEIN-CALORIE MALNUTRITION: Status: ACTIVE | Noted: 2025-05-02

## 2025-05-02 LAB
ALBUMIN SERPL-MCNC: 1.5 G/DL (ref 3.4–5)
ALBUMIN/GLOB SERPL: 0.5 (ref 0.8–1.7)
ALP SERPL-CCNC: 64 U/L (ref 45–117)
ALT SERPL-CCNC: 18 U/L (ref 10–50)
ANION GAP SERPL CALC-SCNC: 13 MMOL/L (ref 3–18)
APPEARANCE UR: CLEAR
APTT PPP: 26.7 SEC (ref 23–36.4)
AST SERPL-CCNC: 20 U/L (ref 10–38)
BASOPHILS # BLD: 0.09 K/UL (ref 0–0.1)
BASOPHILS NFR BLD: 0.3 % (ref 0–2)
BILIRUB SERPL-MCNC: 0.3 MG/DL (ref 0.2–1)
BILIRUB UR QL: NEGATIVE
BUN SERPL-MCNC: 44 MG/DL (ref 6–23)
BUN/CREAT SERPL: 33 (ref 12–20)
CALCIUM SERPL-MCNC: 8.1 MG/DL (ref 8.5–10.1)
CHLORIDE SERPL-SCNC: 100 MMOL/L (ref 98–107)
CO2 SERPL-SCNC: 21 MMOL/L (ref 21–32)
COLOR UR: YELLOW
CREAT SERPL-MCNC: 1.32 MG/DL (ref 0.6–1.3)
DIFFERENTIAL METHOD BLD: ABNORMAL
EOSINOPHIL # BLD: 0.2 K/UL (ref 0–0.4)
EOSINOPHIL NFR BLD: 0.7 % (ref 0–5)
ERYTHROCYTE [DISTWIDTH] IN BLOOD BY AUTOMATED COUNT: 15.2 % (ref 11.6–14.5)
ERYTHROCYTE [SEDIMENTATION RATE] IN BLOOD: 16 MM/HR (ref 0–20)
GLOBULIN SER CALC-MCNC: 2.8 G/DL (ref 2–4)
GLUCOSE BLD STRIP.AUTO-MCNC: 247 MG/DL (ref 70–110)
GLUCOSE BLD STRIP.AUTO-MCNC: 296 MG/DL (ref 70–110)
GLUCOSE BLD STRIP.AUTO-MCNC: 308 MG/DL (ref 70–110)
GLUCOSE BLD-MCNC: 296 MG/DL
GLUCOSE SERPL-MCNC: 277 MG/DL (ref 74–108)
GLUCOSE UR STRIP.AUTO-MCNC: 100 MG/DL
HCT VFR BLD AUTO: 18 % (ref 36–48)
HCT VFR BLD AUTO: 18.6 % (ref 36–48)
HEMOCCULT STL QL: POSITIVE
HGB BLD-MCNC: 5.6 G/DL (ref 13–16)
HGB BLD-MCNC: 5.9 G/DL (ref 13–16)
HGB UR QL STRIP: NEGATIVE
HISTORY CHECK: NORMAL
HISTORY CHECK: NORMAL
IMM GRANULOCYTES # BLD AUTO: 0.74 K/UL (ref 0–0.04)
IMM GRANULOCYTES NFR BLD AUTO: 2.6 % (ref 0–0.5)
INR PPP: 1.3 (ref 0.9–1.1)
KETONES UR QL STRIP.AUTO: 15 MG/DL
LACTATE BLD-SCNC: 4.81 MMOL/L (ref 0.4–2)
LEUKOCYTE ESTERASE UR QL STRIP.AUTO: NEGATIVE
LYMPHOCYTES # BLD: 1.46 K/UL (ref 0.9–3.3)
LYMPHOCYTES NFR BLD: 5.1 % (ref 21–52)
MCH RBC QN AUTO: 27.5 PG (ref 24–34)
MCHC RBC AUTO-ENTMCNC: 31.1 G/DL (ref 31–37)
MCV RBC AUTO: 88.2 FL (ref 78–100)
MONOCYTES # BLD: 1.06 K/UL (ref 0.05–1.2)
MONOCYTES NFR BLD: 3.7 % (ref 3–10)
NEUTS SEG # BLD: 25.05 K/UL (ref 1.8–8)
NEUTS SEG NFR BLD: 87.6 % (ref 40–73)
NITRITE UR QL STRIP.AUTO: NEGATIVE
NRBC # BLD: 0 K/UL (ref 0–0.01)
NRBC BLD-RTO: 0 PER 100 WBC
PH UR STRIP: 6 (ref 5–8)
PLATELET # BLD AUTO: 415 K/UL (ref 135–420)
PLATELET COMMENT: ABNORMAL
PMV BLD AUTO: 11.1 FL (ref 9.2–11.8)
POTASSIUM SERPL-SCNC: 5.6 MMOL/L (ref 3.5–5.5)
PROCALCITONIN SERPL-MCNC: 0.82 NG/ML
PROT SERPL-MCNC: 4.3 G/DL (ref 6.4–8.2)
PROT UR STRIP-MCNC: NEGATIVE MG/DL
PROTHROMBIN TIME: 15.9 SEC (ref 11.9–14.9)
RBC # BLD AUTO: 2.04 M/UL (ref 4.35–5.65)
RBC MORPH BLD: ABNORMAL
SODIUM SERPL-SCNC: 134 MMOL/L (ref 136–145)
SP GR UR REFRACTOMETRY: 1.03 (ref 1–1.03)
TROPONIN T SERPL HS-MCNC: 128 NG/L (ref 0–22)
TROPONIN T SERPL HS-MCNC: 99.5 NG/L (ref 0–22)
TSH W FREE THYROID IF ABNORMAL: 1.67 UIU/ML (ref 0.27–4.2)
UROBILINOGEN UR QL STRIP.AUTO: 0.2 EU/DL (ref 0.2–1)
WBC # BLD AUTO: 28.6 K/UL (ref 4.6–13.2)

## 2025-05-02 PROCEDURE — 71260 CT THORAX DX C+: CPT

## 2025-05-02 PROCEDURE — 84207 ASSAY OF VITAMIN B-6: CPT

## 2025-05-02 PROCEDURE — 82272 OCCULT BLD FECES 1-3 TESTS: CPT

## 2025-05-02 PROCEDURE — 84443 ASSAY THYROID STIM HORMONE: CPT

## 2025-05-02 PROCEDURE — 86900 BLOOD TYPING SEROLOGIC ABO: CPT

## 2025-05-02 PROCEDURE — 82962 GLUCOSE BLOOD TEST: CPT

## 2025-05-02 PROCEDURE — 2000000000 HC ICU R&B

## 2025-05-02 PROCEDURE — 2700000000 HC OXYGEN THERAPY PER DAY

## 2025-05-02 PROCEDURE — 36430 TRANSFUSION BLD/BLD COMPNT: CPT

## 2025-05-02 PROCEDURE — 74174 CTA ABD&PLVS W/CONTRAST: CPT

## 2025-05-02 PROCEDURE — P9047 ALBUMIN (HUMAN), 25%, 50ML: HCPCS | Performed by: HOSPITALIST

## 2025-05-02 PROCEDURE — 80053 COMPREHEN METABOLIC PANEL: CPT

## 2025-05-02 PROCEDURE — 87086 URINE CULTURE/COLONY COUNT: CPT

## 2025-05-02 PROCEDURE — P9016 RBC LEUKOCYTES REDUCED: HCPCS

## 2025-05-02 PROCEDURE — 83605 ASSAY OF LACTIC ACID: CPT

## 2025-05-02 PROCEDURE — 86850 RBC ANTIBODY SCREEN: CPT

## 2025-05-02 PROCEDURE — 82607 VITAMIN B-12: CPT

## 2025-05-02 PROCEDURE — 2580000003 HC RX 258: Performed by: EMERGENCY MEDICINE

## 2025-05-02 PROCEDURE — 6370000000 HC RX 637 (ALT 250 FOR IP): Performed by: FAMILY MEDICINE

## 2025-05-02 PROCEDURE — 71045 X-RAY EXAM CHEST 1 VIEW: CPT

## 2025-05-02 PROCEDURE — 85014 HEMATOCRIT: CPT

## 2025-05-02 PROCEDURE — 2500000003 HC RX 250 WO HCPCS: Performed by: EMERGENCY MEDICINE

## 2025-05-02 PROCEDURE — 6370000000 HC RX 637 (ALT 250 FOR IP): Performed by: HOSPITALIST

## 2025-05-02 PROCEDURE — 86923 COMPATIBILITY TEST ELECTRIC: CPT

## 2025-05-02 PROCEDURE — 009U3ZX DRAINAGE OF SPINAL CANAL, PERCUTANEOUS APPROACH, DIAGNOSTIC: ICD-10-PCS | Performed by: PSYCHIATRY & NEUROLOGY

## 2025-05-02 PROCEDURE — 6370000000 HC RX 637 (ALT 250 FOR IP): Performed by: PHYSICIAN ASSISTANT

## 2025-05-02 PROCEDURE — 87040 BLOOD CULTURE FOR BACTERIA: CPT

## 2025-05-02 PROCEDURE — 86140 C-REACTIVE PROTEIN: CPT

## 2025-05-02 PROCEDURE — 85730 THROMBOPLASTIN TIME PARTIAL: CPT

## 2025-05-02 PROCEDURE — 85025 COMPLETE CBC W/AUTO DIFF WBC: CPT

## 2025-05-02 PROCEDURE — 85610 PROTHROMBIN TIME: CPT

## 2025-05-02 PROCEDURE — 2580000003 HC RX 258: Performed by: HOSPITALIST

## 2025-05-02 PROCEDURE — 6360000002 HC RX W HCPCS: Performed by: HOSPITALIST

## 2025-05-02 PROCEDURE — 84145 PROCALCITONIN (PCT): CPT

## 2025-05-02 PROCEDURE — 2500000003 HC RX 250 WO HCPCS: Performed by: PHYSICIAN ASSISTANT

## 2025-05-02 PROCEDURE — 30233N1 TRANSFUSION OF NONAUTOLOGOUS RED BLOOD CELLS INTO PERIPHERAL VEIN, PERCUTANEOUS APPROACH: ICD-10-PCS | Performed by: EMERGENCY MEDICINE

## 2025-05-02 PROCEDURE — 6360000002 HC RX W HCPCS: Performed by: EMERGENCY MEDICINE

## 2025-05-02 PROCEDURE — 81003 URINALYSIS AUTO W/O SCOPE: CPT

## 2025-05-02 PROCEDURE — 99285 EMERGENCY DEPT VISIT HI MDM: CPT

## 2025-05-02 PROCEDURE — 84484 ASSAY OF TROPONIN QUANT: CPT

## 2025-05-02 PROCEDURE — 6360000004 HC RX CONTRAST MEDICATION: Performed by: INTERNAL MEDICINE

## 2025-05-02 PROCEDURE — 84425 ASSAY OF VITAMIN B-1: CPT

## 2025-05-02 PROCEDURE — 96374 THER/PROPH/DIAG INJ IV PUSH: CPT

## 2025-05-02 PROCEDURE — 85018 HEMOGLOBIN: CPT

## 2025-05-02 PROCEDURE — 93005 ELECTROCARDIOGRAM TRACING: CPT | Performed by: EMERGENCY MEDICINE

## 2025-05-02 PROCEDURE — 86901 BLOOD TYPING SEROLOGIC RH(D): CPT

## 2025-05-02 PROCEDURE — 85652 RBC SED RATE AUTOMATED: CPT

## 2025-05-02 RX ORDER — SODIUM CHLORIDE 9 MG/ML
INJECTION, SOLUTION INTRAVENOUS PRN
Status: DISCONTINUED | OUTPATIENT
Start: 2025-05-02 | End: 2025-05-08

## 2025-05-02 RX ORDER — ACETAMINOPHEN 650 MG/1
650 SUPPOSITORY RECTAL EVERY 6 HOURS PRN
Status: DISCONTINUED | OUTPATIENT
Start: 2025-05-02 | End: 2025-05-22 | Stop reason: HOSPADM

## 2025-05-02 RX ORDER — SODIUM CHLORIDE 9 MG/ML
INJECTION, SOLUTION INTRAVENOUS PRN
Status: DISCONTINUED | OUTPATIENT
Start: 2025-05-02 | End: 2025-05-15 | Stop reason: SDUPTHER

## 2025-05-02 RX ORDER — 0.9 % SODIUM CHLORIDE 0.9 %
30 INTRAVENOUS SOLUTION INTRAVENOUS ONCE
Status: COMPLETED | OUTPATIENT
Start: 2025-05-02 | End: 2025-05-02

## 2025-05-02 RX ORDER — ACETAMINOPHEN 325 MG/1
650 TABLET ORAL EVERY 4 HOURS PRN
Status: DISCONTINUED | OUTPATIENT
Start: 2025-05-02 | End: 2025-05-06 | Stop reason: SDUPTHER

## 2025-05-02 RX ORDER — GABAPENTIN 300 MG/1
300 CAPSULE ORAL 3 TIMES DAILY
Status: DISCONTINUED | OUTPATIENT
Start: 2025-05-02 | End: 2025-05-06

## 2025-05-02 RX ORDER — ACETAMINOPHEN 325 MG/1
650 TABLET ORAL EVERY 6 HOURS PRN
Status: DISCONTINUED | OUTPATIENT
Start: 2025-05-02 | End: 2025-05-22 | Stop reason: HOSPADM

## 2025-05-02 RX ORDER — SODIUM CHLORIDE 0.9 % (FLUSH) 0.9 %
5-40 SYRINGE (ML) INJECTION PRN
Status: DISCONTINUED | OUTPATIENT
Start: 2025-05-02 | End: 2025-05-15 | Stop reason: SDUPTHER

## 2025-05-02 RX ORDER — INSULIN GLARGINE 100 [IU]/ML
10 INJECTION, SOLUTION SUBCUTANEOUS NIGHTLY
Status: DISCONTINUED | OUTPATIENT
Start: 2025-05-02 | End: 2025-05-18

## 2025-05-02 RX ORDER — SODIUM CHLORIDE 9 MG/ML
INJECTION, SOLUTION INTRAVENOUS PRN
Status: DISCONTINUED | OUTPATIENT
Start: 2025-05-02 | End: 2025-05-03 | Stop reason: SDUPTHER

## 2025-05-02 RX ORDER — ONDANSETRON 2 MG/ML
4 INJECTION INTRAMUSCULAR; INTRAVENOUS EVERY 6 HOURS PRN
Status: DISCONTINUED | OUTPATIENT
Start: 2025-05-02 | End: 2025-05-22 | Stop reason: HOSPADM

## 2025-05-02 RX ORDER — GLUCAGON 1 MG/ML
1 KIT INJECTION PRN
Status: DISCONTINUED | OUTPATIENT
Start: 2025-05-02 | End: 2025-05-06 | Stop reason: SDUPTHER

## 2025-05-02 RX ORDER — DEXTROSE MONOHYDRATE 100 MG/ML
INJECTION, SOLUTION INTRAVENOUS CONTINUOUS PRN
Status: DISCONTINUED | OUTPATIENT
Start: 2025-05-02 | End: 2025-05-06 | Stop reason: SDUPTHER

## 2025-05-02 RX ORDER — OXYCODONE HYDROCHLORIDE 5 MG/1
10 TABLET ORAL EVERY 6 HOURS PRN
Status: DISCONTINUED | OUTPATIENT
Start: 2025-05-02 | End: 2025-05-22 | Stop reason: HOSPADM

## 2025-05-02 RX ORDER — ATORVASTATIN CALCIUM 20 MG/1
20 TABLET, FILM COATED ORAL NIGHTLY
Status: DISCONTINUED | OUTPATIENT
Start: 2025-05-02 | End: 2025-05-22 | Stop reason: HOSPADM

## 2025-05-02 RX ORDER — POTASSIUM CHLORIDE 7.45 MG/ML
10 INJECTION INTRAVENOUS PRN
Status: DISCONTINUED | OUTPATIENT
Start: 2025-05-02 | End: 2025-05-11

## 2025-05-02 RX ORDER — ASPIRIN 81 MG/1
81 TABLET, CHEWABLE ORAL DAILY
Status: DISCONTINUED | OUTPATIENT
Start: 2025-05-02 | End: 2025-05-02

## 2025-05-02 RX ORDER — MIRTAZAPINE 15 MG/1
7.5 TABLET, FILM COATED ORAL DAILY
Status: DISCONTINUED | OUTPATIENT
Start: 2025-05-02 | End: 2025-05-22 | Stop reason: HOSPADM

## 2025-05-02 RX ORDER — POTASSIUM CHLORIDE 1500 MG/1
40 TABLET, EXTENDED RELEASE ORAL PRN
Status: DISCONTINUED | OUTPATIENT
Start: 2025-05-02 | End: 2025-05-11

## 2025-05-02 RX ORDER — SENNA AND DOCUSATE SODIUM 50; 8.6 MG/1; MG/1
1 TABLET, FILM COATED ORAL 2 TIMES DAILY
Status: DISCONTINUED | OUTPATIENT
Start: 2025-05-02 | End: 2025-05-22 | Stop reason: HOSPADM

## 2025-05-02 RX ORDER — ONDANSETRON 4 MG/1
4 TABLET, ORALLY DISINTEGRATING ORAL EVERY 8 HOURS PRN
Status: DISCONTINUED | OUTPATIENT
Start: 2025-05-02 | End: 2025-05-22 | Stop reason: HOSPADM

## 2025-05-02 RX ORDER — INSULIN LISPRO 100 [IU]/ML
0-4 INJECTION, SOLUTION INTRAVENOUS; SUBCUTANEOUS
Status: DISCONTINUED | OUTPATIENT
Start: 2025-05-02 | End: 2025-05-06

## 2025-05-02 RX ORDER — ONDANSETRON 2 MG/ML
4 INJECTION INTRAMUSCULAR; INTRAVENOUS EVERY 4 HOURS PRN
Status: DISCONTINUED | OUTPATIENT
Start: 2025-05-02 | End: 2025-05-06 | Stop reason: SDUPTHER

## 2025-05-02 RX ORDER — MAGNESIUM SULFATE IN WATER 40 MG/ML
2000 INJECTION, SOLUTION INTRAVENOUS PRN
Status: DISCONTINUED | OUTPATIENT
Start: 2025-05-02 | End: 2025-05-11

## 2025-05-02 RX ORDER — IOPAMIDOL 755 MG/ML
100 INJECTION, SOLUTION INTRAVASCULAR
Status: COMPLETED | OUTPATIENT
Start: 2025-05-02 | End: 2025-05-02

## 2025-05-02 RX ORDER — ALBUMIN (HUMAN) 12.5 G/50ML
25 SOLUTION INTRAVENOUS EVERY 8 HOURS
Status: COMPLETED | OUTPATIENT
Start: 2025-05-02 | End: 2025-05-03

## 2025-05-02 RX ORDER — SODIUM CHLORIDE 0.9 % (FLUSH) 0.9 %
5-40 SYRINGE (ML) INJECTION EVERY 12 HOURS SCHEDULED
Status: DISCONTINUED | OUTPATIENT
Start: 2025-05-02 | End: 2025-05-15 | Stop reason: SDUPTHER

## 2025-05-02 RX ADMIN — SODIUM CHLORIDE, PRESERVATIVE FREE 10 ML: 5 INJECTION INTRAVENOUS at 21:00

## 2025-05-02 RX ADMIN — GABAPENTIN 300 MG: 300 CAPSULE ORAL at 18:07

## 2025-05-02 RX ADMIN — OXYCODONE 10 MG: 5 TABLET ORAL at 22:00

## 2025-05-02 RX ADMIN — INSULIN GLARGINE 10 UNITS: 100 INJECTION, SOLUTION SUBCUTANEOUS at 20:59

## 2025-05-02 RX ADMIN — MIRTAZAPINE 7.5 MG: 15 TABLET, FILM COATED ORAL at 18:07

## 2025-05-02 RX ADMIN — ALBUMIN (HUMAN) 25 G: 0.25 INJECTION, SOLUTION INTRAVENOUS at 18:04

## 2025-05-02 RX ADMIN — SODIUM CHLORIDE 1770 ML: 0.9 INJECTION, SOLUTION INTRAVENOUS at 12:39

## 2025-05-02 RX ADMIN — SODIUM ZIRCONIUM CYCLOSILICATE 10 G: 5 POWDER, FOR SUSPENSION ORAL at 18:07

## 2025-05-02 RX ADMIN — SODIUM CHLORIDE, PRESERVATIVE FREE 40 MG: 5 INJECTION INTRAVENOUS at 18:08

## 2025-05-02 RX ADMIN — QUETIAPINE FUMARATE 150 MG: 100 TABLET ORAL at 20:57

## 2025-05-02 RX ADMIN — VANCOMYCIN HYDROCHLORIDE 1500 MG: 10 INJECTION, POWDER, LYOPHILIZED, FOR SOLUTION INTRAVENOUS at 14:04

## 2025-05-02 RX ADMIN — ATORVASTATIN CALCIUM 20 MG: 20 TABLET, FILM COATED ORAL at 20:58

## 2025-05-02 RX ADMIN — INSULIN LISPRO 1 UNITS: 100 INJECTION, SOLUTION INTRAVENOUS; SUBCUTANEOUS at 20:58

## 2025-05-02 RX ADMIN — SODIUM ZIRCONIUM CYCLOSILICATE 10 G: 5 POWDER, FOR SUSPENSION ORAL at 22:58

## 2025-05-02 RX ADMIN — IOPAMIDOL 100 ML: 755 INJECTION, SOLUTION INTRAVENOUS at 15:32

## 2025-05-02 RX ADMIN — WATER 2000 MG: 1 INJECTION INTRAMUSCULAR; INTRAVENOUS; SUBCUTANEOUS at 12:45

## 2025-05-02 RX ADMIN — INSULIN LISPRO 3 UNITS: 100 INJECTION, SOLUTION INTRAVENOUS; SUBCUTANEOUS at 18:08

## 2025-05-02 ASSESSMENT — PAIN DESCRIPTION - LOCATION
LOCATION: FOOT;LEG
LOCATION: LEG
LOCATION: LEG

## 2025-05-02 ASSESSMENT — PAIN SCALES - GENERAL
PAINLEVEL_OUTOF10: 8
PAINLEVEL_OUTOF10: 9
PAINLEVEL_OUTOF10: 8
PAINLEVEL_OUTOF10: 8
PAINLEVEL_OUTOF10: 6
PAINLEVEL_OUTOF10: 0

## 2025-05-02 ASSESSMENT — PAIN DESCRIPTION - ORIENTATION
ORIENTATION: RIGHT;LEFT
ORIENTATION: RIGHT;LEFT

## 2025-05-02 ASSESSMENT — PAIN DESCRIPTION - DESCRIPTORS
DESCRIPTORS: NUMBNESS;TINGLING
DESCRIPTORS: STABBING;TINGLING
DESCRIPTORS: STABBING;TINGLING

## 2025-05-02 ASSESSMENT — PAIN - FUNCTIONAL ASSESSMENT
PAIN_FUNCTIONAL_ASSESSMENT: PREVENTS OR INTERFERES SOME ACTIVE ACTIVITIES AND ADLS
PAIN_FUNCTIONAL_ASSESSMENT: 0-10
PAIN_FUNCTIONAL_ASSESSMENT: PREVENTS OR INTERFERES SOME ACTIVE ACTIVITIES AND ADLS

## 2025-05-02 NOTE — ED PROVIDER NOTES
patient’s condition.    Shayne Avila MD    Diagnosis and Disposition     DISPOSITION Admitted 05/02/2025 04:26:34 PM   DISPOSITION CONDITION Stable     ADMISSION NOTE:    Critical Care Time: 135 minutes    Core Measures (for hospitalized patients):    1. Hospitalization Decision Time:  The decision to hospitalize the patient was made by Shayne Avila MD @ at 11:20 PM on 5/2/2025    2. Aspirin: Aspirin was not given because the patient did not present with a stroke at the time of their Emergency Department evaluation    2:45 PM. Patient is being admitted to the hospital by Dr. Guerrero. The results of their tests and reasons for their admission have been discussed with them and/or available family. They convey agreement and understanding for the need to be admitted and for their admission diagnosis.      CONDITIONS ON ADMISSION:  Sepsis is  present at the time of admission. Deep Vein Thrombosis is not present at the time of admission. Thrombosis is not present at the time of admission. Pneumonia is not present at the time of admission. MRSA is not present at the time of admission. Wound infection is not present at the time of admission. Pressure Ulcer is not present at the time of admission.      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 Elevated troponin    3. Sepsis, due to unspecified organism, unspecified whether acute organ dysfunction present (HCC)    4. Anemia, unspecified type        PLAN:  Admit to ICU    I Shayne Avila MD am the primary clinician of record.    Dragon Disclaimer     Please note that this dictation was completed with [employer], the computer voice recognition software.  Quite often unanticipated grammatical, syntax, homophones, and other interpretive errors are inadvertently transcribed by the computer software.  Please disregard these errors.  Please excuse any errors that have escaped final proofreading.    Shayne Avila MD  (Electronically signed)

## 2025-05-02 NOTE — ED TRIAGE NOTES
Pt alert and conversational. Pushed to triage in wheelchair. C/O body pain from neck down. Was sent by provider for an LP and to be admitted. C/O nerve pain     Active Ambulatory Problems     Diagnosis Date Noted    DDD (degenerative disc disease), cervical 11/05/2012    Hyperglycemia 08/24/2017    RA (rheumatoid arthritis) ([facility]) 08/24/2017    Kidney stone on left side 08/24/2017    Type 1 diabetes mellitus ([facility]) 08/24/2017    Depression 08/24/2017    DKA, type 1, not at goal ([facility]) 11/17/2023    Tardive dyskinesia 11/17/2023    Psychiatric disorder 11/17/2023    ARF (acute renal failure) 11/17/2023    Seizure ([facility]) 11/17/2023    Bacteremia 11/20/2023    Thrombocytopenia 11/20/2023    Encephalopathy 11/24/2023    CVA (cerebral vascular accident) ([facility]) 11/28/2023    Peripheral polyneuropathy 04/24/2025    Cellulitis of right lower extremity 04/24/2025    Stage 3b chronic kidney disease ([facility]) 04/24/2025    Myalgia 04/24/2025    Coronary artery disease involving native coronary artery of native heart 04/24/2025    Exertional shortness of breath 04/24/2025    Cardiomyopathy ([facility]) 04/24/2025     Resolved Ambulatory Problems     Diagnosis Date Noted    Migraine 08/24/2017    Ileus ([facility]) 08/24/2017    Hydronephrosis with urinary obstruction due to ureteral calculus 08/24/2017    Renal colic on left side 08/24/2017    Sepsis ([facility]) 04/24/2025    Vasculitis 04/24/2025     Past Medical History:   Diagnosis Date    AAA (abdominal aortic aneurysm)     Anxiety     Arthritis     BPH (benign prostatic hyperplasia)     CAD (coronary artery disease)     Cerebral artery occlusion with cerebral infarction ([facility]) 11/2023    Chronic kidney disease (CKD), active medical management without dialysis, stage 3 (moderate) ([facility]) 2020    Chronic pain     Chronic pancreatitis ([facility])     COPD (chronic obstructive pulmonary disease) ([facility])     Diverticulosis     Exercise tolerance finding     Gastritis     Gastroparesis     Hiatal hernia

## 2025-05-02 NOTE — PROCEDURES
PROCEDURE NOTE  Date: 5/2/2025   Name: Lan Gonzalez  YOB: 1965    Diagnostic lumbar puncture:    Indication: weakness    Technique: Procedure, risks, benefits, and alternatives were discussed with the patient and written, informed consent was obtained. Examination performed with standard aseptic technique. After a local anesthesia with 1 cc of 1% lidocaine instillation, a lumbar puncture was performed with a 22 gauge spinal needle at L3-L4 level.   Number of attempts: 1     Result: dry tap. No complications. Minimal blood loss    Ashley Henry MD

## 2025-05-02 NOTE — CONSENT
Informed Consent for Blood Component Transfusion Note    I have discussed with the patient the rationale for blood component transfusion; its benefits in treating or preventing fatigue, organ damage, or death; and its risk which includes mild transfusion reactions, rare risk of blood borne infection, or more serious but rare reactions. I have discussed the alternatives to transfusion, including the risk and consequences of not receiving transfusion. The patient had an opportunity to ask questions and had agreed to proceed with transfusion of blood components.    Electronically signed by Shayne Avila MD on 5/2/25 at 1:42 PM EDT

## 2025-05-02 NOTE — ACP (ADVANCE CARE PLANNING)
[facility] Hospitalist Service  At the heart of better care     Advance Care Planning   Admit Date:  2025 11:06 AM   Name:  Lan Gonzalez   Age:  59 y.o.  Sex:  male  :  1965   MRN:  [id number]   Room:  [place] [address]    Lan Gonzalez has capacity to make his own decisions:   Yes    If pt unable to make decisions, POA/surrogate decision maker:  Spouse    Other people present:   None    Patient / surrogate decision-maker directed code status:  Full Code    Other ACP topics discussed, if applicable:   None    Patient or surrogate consented to discussion of the current conditions, workup, management plans, prognosis, and the risk for further deterioration.  Aggregate face-to-facetime, 17 minutes was spent discussing end-of-life care planning with patient/family and/or Power of . Discussed CPR, Intubation, treatment goals, and Quality of life/Intensity of care.    Signed:  Leon Rodgers PA-C

## 2025-05-03 ENCOUNTER — APPOINTMENT (OUTPATIENT)
Facility: HOSPITAL | Age: 60
DRG: 871 | End: 2025-05-03
Payer: MEDICARE

## 2025-05-03 LAB
ANION GAP SERPL CALC-SCNC: 8 MMOL/L (ref 3–18)
APTT PPP: 25.7 SEC (ref 23–36.4)
BASOPHILS # BLD: 0.08 K/UL (ref 0–0.1)
BASOPHILS NFR BLD: 0.4 % (ref 0–2)
BUN SERPL-MCNC: 36 MG/DL (ref 6–23)
BUN/CREAT SERPL: 30 (ref 12–20)
CALCIUM SERPL-MCNC: 7.7 MG/DL (ref 8.5–10.1)
CHLORIDE SERPL-SCNC: 107 MMOL/L (ref 98–107)
CO2 SERPL-SCNC: 23 MMOL/L (ref 21–32)
CREAT SERPL-MCNC: 1.23 MG/DL (ref 0.6–1.3)
CRP SERPL-MCNC: 10.8 MG/DL (ref 0–5)
DIFFERENTIAL METHOD BLD: ABNORMAL
EOSINOPHIL # BLD: 0.97 K/UL (ref 0–0.4)
EOSINOPHIL NFR BLD: 5.2 % (ref 0–5)
ERYTHROCYTE [DISTWIDTH] IN BLOOD BY AUTOMATED COUNT: 14.5 % (ref 11.6–14.5)
EST. AVERAGE GLUCOSE BLD GHB EST-MCNC: 160 MG/DL
GLUCOSE BLD STRIP.AUTO-MCNC: 138 MG/DL (ref 70–110)
GLUCOSE BLD STRIP.AUTO-MCNC: 160 MG/DL (ref 70–110)
GLUCOSE BLD STRIP.AUTO-MCNC: 203 MG/DL (ref 70–110)
GLUCOSE BLD STRIP.AUTO-MCNC: 269 MG/DL (ref 70–110)
GLUCOSE SERPL-MCNC: 124 MG/DL (ref 74–108)
HBA1C MFR BLD: 7.2 % (ref 4.2–5.6)
HCT VFR BLD AUTO: 17.6 % (ref 36–48)
HCT VFR BLD AUTO: 20.3 % (ref 36–48)
HCT VFR BLD AUTO: 20.8 % (ref 36–48)
HCT VFR BLD AUTO: 28.1 % (ref 36–48)
HGB BLD-MCNC: 5.7 G/DL (ref 13–16)
HGB BLD-MCNC: 6.5 G/DL (ref 13–16)
HGB BLD-MCNC: 6.7 G/DL (ref 13–16)
HGB BLD-MCNC: 9.2 G/DL (ref 13–16)
HISTORY CHECK: NORMAL
IMM GRANULOCYTES # BLD AUTO: 0.54 K/UL (ref 0–0.04)
IMM GRANULOCYTES NFR BLD AUTO: 2.9 % (ref 0–0.5)
INR PPP: 1.3 (ref 0.9–1.1)
LACTATE SERPL-SCNC: 0.8 MMOL/L (ref 0.4–2)
LYMPHOCYTES # BLD: 1.59 K/UL (ref 0.9–3.3)
LYMPHOCYTES NFR BLD: 8.5 % (ref 21–52)
MAGNESIUM SERPL-MCNC: 1.9 MG/DL (ref 1.6–2.6)
MCH RBC QN AUTO: 27.9 PG (ref 24–34)
MCHC RBC AUTO-ENTMCNC: 32.2 G/DL (ref 31–37)
MCV RBC AUTO: 86.7 FL (ref 78–100)
MONOCYTES # BLD: 0.66 K/UL (ref 0.05–1.2)
MONOCYTES NFR BLD: 3.5 % (ref 3–10)
NEUTS SEG # BLD: 14.79 K/UL (ref 1.8–8)
NEUTS SEG NFR BLD: 79.5 % (ref 40–73)
NRBC # BLD: 0.02 K/UL (ref 0–0.01)
NRBC BLD-RTO: 0.1 PER 100 WBC
PLATELET # BLD AUTO: 320 K/UL (ref 135–420)
PMV BLD AUTO: 10 FL (ref 9.2–11.8)
POTASSIUM SERPL-SCNC: 4 MMOL/L (ref 3.5–5.5)
PROTHROMBIN TIME: 15.9 SEC (ref 11.9–14.9)
RBC # BLD AUTO: 2.4 M/UL (ref 4.35–5.65)
SODIUM SERPL-SCNC: 137 MMOL/L (ref 136–145)
TROPONIN T SERPL HS-MCNC: 118 NG/L (ref 0–22)
TROPONIN T SERPL HS-MCNC: 127 NG/L (ref 0–22)
VIT B12 SERPL-MCNC: <150 PG/ML (ref 211–911)
WBC # BLD AUTO: 18.6 K/UL (ref 4.6–13.2)

## 2025-05-03 PROCEDURE — 72141 MRI NECK SPINE W/O DYE: CPT

## 2025-05-03 PROCEDURE — 6360000002 HC RX W HCPCS: Performed by: PHYSICIAN ASSISTANT

## 2025-05-03 PROCEDURE — 6360000002 HC RX W HCPCS: Performed by: HOSPITALIST

## 2025-05-03 PROCEDURE — 85014 HEMATOCRIT: CPT

## 2025-05-03 PROCEDURE — 1100000003 HC PRIVATE W/ TELEMETRY

## 2025-05-03 PROCEDURE — 85025 COMPLETE CBC W/AUTO DIFF WBC: CPT

## 2025-05-03 PROCEDURE — 85018 HEMOGLOBIN: CPT

## 2025-05-03 PROCEDURE — 82962 GLUCOSE BLOOD TEST: CPT

## 2025-05-03 PROCEDURE — 70551 MRI BRAIN STEM W/O DYE: CPT

## 2025-05-03 PROCEDURE — 80048 BASIC METABOLIC PNL TOTAL CA: CPT

## 2025-05-03 PROCEDURE — 6370000000 HC RX 637 (ALT 250 FOR IP): Performed by: EMERGENCY MEDICINE

## 2025-05-03 PROCEDURE — 6370000000 HC RX 637 (ALT 250 FOR IP): Performed by: PHYSICIAN ASSISTANT

## 2025-05-03 PROCEDURE — 2580000003 HC RX 258: Performed by: PHYSICIAN ASSISTANT

## 2025-05-03 PROCEDURE — 2580000003 HC RX 258: Performed by: HOSPITALIST

## 2025-05-03 PROCEDURE — 6370000000 HC RX 637 (ALT 250 FOR IP): Performed by: FAMILY MEDICINE

## 2025-05-03 PROCEDURE — P9047 ALBUMIN (HUMAN), 25%, 50ML: HCPCS | Performed by: HOSPITALIST

## 2025-05-03 PROCEDURE — 83735 ASSAY OF MAGNESIUM: CPT

## 2025-05-03 PROCEDURE — 72148 MRI LUMBAR SPINE W/O DYE: CPT

## 2025-05-03 PROCEDURE — 2500000003 HC RX 250 WO HCPCS: Performed by: PHYSICIAN ASSISTANT

## 2025-05-03 PROCEDURE — 6370000000 HC RX 637 (ALT 250 FOR IP): Performed by: HOSPITALIST

## 2025-05-03 PROCEDURE — 85730 THROMBOPLASTIN TIME PARTIAL: CPT

## 2025-05-03 PROCEDURE — 83036 HEMOGLOBIN GLYCOSYLATED A1C: CPT

## 2025-05-03 PROCEDURE — 72146 MRI CHEST SPINE W/O DYE: CPT

## 2025-05-03 PROCEDURE — 83605 ASSAY OF LACTIC ACID: CPT

## 2025-05-03 PROCEDURE — P9040 RBC LEUKOREDUCED IRRADIATED: HCPCS

## 2025-05-03 PROCEDURE — 2700000000 HC OXYGEN THERAPY PER DAY

## 2025-05-03 PROCEDURE — 36430 TRANSFUSION BLD/BLD COMPNT: CPT

## 2025-05-03 PROCEDURE — P9016 RBC LEUKOCYTES REDUCED: HCPCS

## 2025-05-03 PROCEDURE — 84484 ASSAY OF TROPONIN QUANT: CPT

## 2025-05-03 PROCEDURE — 85610 PROTHROMBIN TIME: CPT

## 2025-05-03 RX ORDER — SODIUM CHLORIDE 9 MG/ML
INJECTION, SOLUTION INTRAVENOUS PRN
Status: DISCONTINUED | OUTPATIENT
Start: 2025-05-03 | End: 2025-05-08

## 2025-05-03 RX ORDER — SODIUM CHLORIDE 9 MG/ML
INJECTION, SOLUTION INTRAVENOUS PRN
Status: DISCONTINUED | OUTPATIENT
Start: 2025-05-03 | End: 2025-05-03 | Stop reason: SDUPTHER

## 2025-05-03 RX ADMIN — ATORVASTATIN CALCIUM 20 MG: 20 TABLET, FILM COATED ORAL at 20:49

## 2025-05-03 RX ADMIN — INSULIN LISPRO 2 UNITS: 100 INJECTION, SOLUTION INTRAVENOUS; SUBCUTANEOUS at 20:49

## 2025-05-03 RX ADMIN — MIRTAZAPINE 7.5 MG: 15 TABLET, FILM COATED ORAL at 08:57

## 2025-05-03 RX ADMIN — SODIUM CHLORIDE, PRESERVATIVE FREE 10 ML: 5 INJECTION INTRAVENOUS at 20:50

## 2025-05-03 RX ADMIN — SODIUM CHLORIDE, PRESERVATIVE FREE 40 MG: 5 INJECTION INTRAVENOUS at 16:33

## 2025-05-03 RX ADMIN — SODIUM ZIRCONIUM CYCLOSILICATE 10 G: 5 POWDER, FOR SUSPENSION ORAL at 06:52

## 2025-05-03 RX ADMIN — ALBUMIN (HUMAN) 25 G: 0.25 INJECTION, SOLUTION INTRAVENOUS at 16:48

## 2025-05-03 RX ADMIN — SODIUM CHLORIDE, PRESERVATIVE FREE 40 MG: 5 INJECTION INTRAVENOUS at 06:18

## 2025-05-03 RX ADMIN — SENNOSIDES AND DOCUSATE SODIUM 1 TABLET: 50; 8.6 TABLET ORAL at 08:57

## 2025-05-03 RX ADMIN — OXYCODONE 10 MG: 5 TABLET ORAL at 04:30

## 2025-05-03 RX ADMIN — SENNOSIDES AND DOCUSATE SODIUM 1 TABLET: 50; 8.6 TABLET ORAL at 20:49

## 2025-05-03 RX ADMIN — QUETIAPINE FUMARATE 150 MG: 100 TABLET ORAL at 20:49

## 2025-05-03 RX ADMIN — SODIUM CHLORIDE, PRESERVATIVE FREE 10 ML: 5 INJECTION INTRAVENOUS at 09:01

## 2025-05-03 RX ADMIN — INSULIN GLARGINE 10 UNITS: 100 INJECTION, SOLUTION SUBCUTANEOUS at 20:50

## 2025-05-03 RX ADMIN — CEFEPIME 2000 MG: 2 INJECTION, POWDER, FOR SOLUTION INTRAVENOUS at 14:05

## 2025-05-03 RX ADMIN — ACETAMINOPHEN 650 MG: 325 TABLET ORAL at 16:24

## 2025-05-03 RX ADMIN — GABAPENTIN 300 MG: 300 CAPSULE ORAL at 20:49

## 2025-05-03 RX ADMIN — ALBUMIN (HUMAN) 25 G: 0.25 INJECTION, SOLUTION INTRAVENOUS at 01:22

## 2025-05-03 RX ADMIN — INSULIN LISPRO 1 UNITS: 100 INJECTION, SOLUTION INTRAVENOUS; SUBCUTANEOUS at 17:01

## 2025-05-03 RX ADMIN — VANCOMYCIN HYDROCHLORIDE 1250 MG: 10 INJECTION, POWDER, LYOPHILIZED, FOR SOLUTION INTRAVENOUS at 17:02

## 2025-05-03 RX ADMIN — GABAPENTIN 300 MG: 300 CAPSULE ORAL at 08:57

## 2025-05-03 RX ADMIN — ALBUMIN (HUMAN) 25 G: 0.25 INJECTION, SOLUTION INTRAVENOUS at 08:55

## 2025-05-03 RX ADMIN — CEFEPIME 2000 MG: 2 INJECTION, POWDER, FOR SOLUTION INTRAVENOUS at 01:19

## 2025-05-03 RX ADMIN — GABAPENTIN 300 MG: 300 CAPSULE ORAL at 16:51

## 2025-05-03 ASSESSMENT — PAIN DESCRIPTION - DESCRIPTORS
DESCRIPTORS: TINGLING;STABBING
DESCRIPTORS: NUMBNESS;TINGLING
DESCRIPTORS: TINGLING;STABBING
DESCRIPTORS: TINGLING;STABBING
DESCRIPTORS: STABBING;TINGLING

## 2025-05-03 ASSESSMENT — PAIN SCALES - GENERAL
PAINLEVEL_OUTOF10: 7
PAINLEVEL_OUTOF10: 7
PAINLEVEL_OUTOF10: 4
PAINLEVEL_OUTOF10: 0
PAINLEVEL_OUTOF10: 5
PAINLEVEL_OUTOF10: 6

## 2025-05-03 ASSESSMENT — PAIN - FUNCTIONAL ASSESSMENT
PAIN_FUNCTIONAL_ASSESSMENT: PREVENTS OR INTERFERES SOME ACTIVE ACTIVITIES AND ADLS

## 2025-05-03 ASSESSMENT — PAIN DESCRIPTION - ORIENTATION
ORIENTATION: RIGHT;LEFT

## 2025-05-03 ASSESSMENT — PAIN DESCRIPTION - LOCATION
LOCATION: LEG

## 2025-05-04 ENCOUNTER — APPOINTMENT (OUTPATIENT)
Facility: HOSPITAL | Age: 60
DRG: 871 | End: 2025-05-04
Attending: INTERNAL MEDICINE
Payer: MEDICARE

## 2025-05-04 PROBLEM — E53.8 B12 DEFICIENCY: Status: ACTIVE | Noted: 2025-05-04

## 2025-05-04 LAB
ABO + RH BLD: NORMAL
ANION GAP SERPL CALC-SCNC: 9 MMOL/L (ref 3–18)
BACTERIA SPEC CULT: NORMAL
BASOPHILS # BLD: 0.06 K/UL (ref 0–0.1)
BASOPHILS NFR BLD: 0.3 % (ref 0–2)
BLD PROD TYP BPU: NORMAL
BLOOD BANK BLOOD PRODUCT EXPIRATION DATE: NORMAL
BLOOD BANK DISPENSE STATUS: NORMAL
BLOOD BANK ISBT PRODUCT BLOOD TYPE: 5100
BLOOD BANK ISBT PRODUCT BLOOD TYPE: 9500
BLOOD BANK PRODUCT CODE: NORMAL
BLOOD BANK UNIT TYPE AND RH: NORMAL
BLOOD GROUP ANTIBODIES SERPL: NORMAL
BPU ID: NORMAL
BUN SERPL-MCNC: 27 MG/DL (ref 6–23)
BUN/CREAT SERPL: 24 (ref 12–20)
CALCIUM SERPL-MCNC: 8 MG/DL (ref 8.5–10.1)
CALLED TO: NORMAL
CHLORIDE SERPL-SCNC: 105 MMOL/L (ref 98–107)
CO2 SERPL-SCNC: 21 MMOL/L (ref 21–32)
CREAT SERPL-MCNC: 1.11 MG/DL (ref 0.6–1.3)
CROSSMATCH RESULT: NORMAL
DIFFERENTIAL METHOD BLD: ABNORMAL
ECHO AO ASC DIAM: 3.6 CM
ECHO AO ASCENDING AORTA INDEX: 2.02 CM/M2
ECHO AO ROOT DIAM: 3.8 CM
ECHO AO ROOT INDEX: 2.13 CM/M2
ECHO AV AREA PEAK VELOCITY: 4.2 CM2
ECHO AV AREA VTI: 4.1 CM2
ECHO AV AREA/BSA PEAK VELOCITY: 2.4 CM2/M2
ECHO AV AREA/BSA VTI: 2.3 CM2/M2
ECHO AV MEAN GRADIENT: 2 MMHG
ECHO AV MEAN VELOCITY: 0.7 M/S
ECHO AV PEAK GRADIENT: 4 MMHG
ECHO AV PEAK VELOCITY: 1 M/S
ECHO AV VELOCITY RATIO: 0.8
ECHO AV VTI: 17.5 CM
ECHO BSA: 1.75 M2
ECHO EST RA PRESSURE: 3 MMHG
ECHO LA DIAMETER INDEX: 1.01 CM/M2
ECHO LA DIAMETER: 1.8 CM
ECHO LA TO AORTIC ROOT RATIO: 0.47
ECHO LA VOL A-L A2C: 13 ML (ref 18–58)
ECHO LA VOL A-L A4C: 20 ML (ref 18–58)
ECHO LA VOL BP: 13 ML (ref 18–58)
ECHO LA VOL MOD A2C: 13 ML (ref 18–58)
ECHO LA VOL MOD A4C: 13 ML (ref 18–58)
ECHO LA VOL/BSA BIPLANE: 7 ML/M2 (ref 16–34)
ECHO LA VOLUME AREA LENGTH: 17 ML
ECHO LA VOLUME INDEX A-L A2C: 7 ML/M2 (ref 16–34)
ECHO LA VOLUME INDEX A-L A4C: 11 ML/M2 (ref 16–34)
ECHO LA VOLUME INDEX AREA LENGTH: 10 ML/M2 (ref 16–34)
ECHO LA VOLUME INDEX MOD A2C: 7 ML/M2 (ref 16–34)
ECHO LA VOLUME INDEX MOD A4C: 7 ML/M2 (ref 16–34)
ECHO LV E' LATERAL VELOCITY: 8.11 CM/S
ECHO LV E' SEPTAL VELOCITY: 7.61 CM/S
ECHO LV EDV A2C: 88 ML
ECHO LV EDV A4C: 82 ML
ECHO LV EDV BP: 91 ML (ref 67–155)
ECHO LV EDV INDEX A4C: 46 ML/M2
ECHO LV EDV INDEX BP: 51 ML/M2
ECHO LV EDV NDEX A2C: 49 ML/M2
ECHO LV EF PHYSICIAN: 55 %
ECHO LV EJECTION FRACTION A2C: 59 %
ECHO LV EJECTION FRACTION A4C: 67 %
ECHO LV EJECTION FRACTION BIPLANE: 64 % (ref 55–100)
ECHO LV ESV A2C: 36 ML
ECHO LV ESV A4C: 27 ML
ECHO LV ESV BP: 33 ML (ref 22–58)
ECHO LV ESV INDEX A2C: 20 ML/M2
ECHO LV ESV INDEX A4C: 15 ML/M2
ECHO LV ESV INDEX BP: 19 ML/M2
ECHO LV FRACTIONAL SHORTENING: 30 % (ref 28–44)
ECHO LV INTERNAL DIMENSION DIASTOLE INDEX: 2.64 CM/M2
ECHO LV INTERNAL DIMENSION DIASTOLIC: 4.7 CM (ref 4.2–5.9)
ECHO LV INTERNAL DIMENSION SYSTOLIC INDEX: 1.85 CM/M2
ECHO LV INTERNAL DIMENSION SYSTOLIC: 3.3 CM
ECHO LV IVSD: 1 CM (ref 0.6–1)
ECHO LV MASS 2D: 153.4 G (ref 88–224)
ECHO LV MASS INDEX 2D: 86.2 G/M2 (ref 49–115)
ECHO LV POSTERIOR WALL DIASTOLIC: 0.9 CM (ref 0.6–1)
ECHO LV RELATIVE WALL THICKNESS RATIO: 0.38
ECHO LVOT AREA: 4.9 CM2
ECHO LVOT AV VTI INDEX: 0.83
ECHO LVOT DIAM: 2.5 CM
ECHO LVOT MEAN GRADIENT: 1 MMHG
ECHO LVOT PEAK GRADIENT: 3 MMHG
ECHO LVOT PEAK VELOCITY: 0.8 M/S
ECHO LVOT STROKE VOLUME INDEX: 40 ML/M2
ECHO LVOT SV: 71.1 ML
ECHO LVOT VTI: 14.5 CM
ECHO MV A VELOCITY: 0.58 M/S
ECHO MV E VELOCITY: 0.51 M/S
ECHO MV E/A RATIO: 0.88
ECHO MV E/E' LATERAL: 6.29
ECHO MV E/E' RATIO (AVERAGED): 6.5
ECHO MV E/E' SEPTAL: 6.7
ECHO RA END SYSTOLIC VOLUME APICAL 4 CHAMBER INDEX BSA: 12 ML/M2
ECHO RA VOLUME: 22 ML
ECHO RV FREE WALL PEAK S': 15.4 CM/S
ECHO RV INTERNAL DIMENSION: 3.3 CM
ECHO RV TAPSE: 2.5 CM (ref 1.7–?)
EOSINOPHIL # BLD: 1.04 K/UL (ref 0–0.4)
EOSINOPHIL NFR BLD: 5.3 % (ref 0–5)
ERYTHROCYTE [DISTWIDTH] IN BLOOD BY AUTOMATED COUNT: 15.8 % (ref 11.6–14.5)
GLUCOSE BLD STRIP.AUTO-MCNC: 152 MG/DL (ref 70–110)
GLUCOSE BLD STRIP.AUTO-MCNC: 260 MG/DL (ref 70–110)
GLUCOSE BLD STRIP.AUTO-MCNC: 271 MG/DL (ref 70–110)
GLUCOSE BLD STRIP.AUTO-MCNC: 284 MG/DL (ref 70–110)
GLUCOSE SERPL-MCNC: 167 MG/DL (ref 74–108)
HCT VFR BLD AUTO: 28.1 % (ref 36–48)
HGB BLD-MCNC: 9.1 G/DL (ref 13–16)
IMM GRANULOCYTES # BLD AUTO: 0.49 K/UL (ref 0–0.04)
IMM GRANULOCYTES NFR BLD AUTO: 2.5 % (ref 0–0.5)
LACTATE SERPL-SCNC: 0.9 MMOL/L (ref 0.4–2)
LACTATE SERPL-SCNC: 2.9 MMOL/L (ref 0.4–2)
LYMPHOCYTES # BLD: 1.21 K/UL (ref 0.9–3.3)
LYMPHOCYTES NFR BLD: 6.2 % (ref 21–52)
MAGNESIUM SERPL-MCNC: 1.8 MG/DL (ref 1.6–2.6)
MCH RBC QN AUTO: 28 PG (ref 24–34)
MCHC RBC AUTO-ENTMCNC: 32.4 G/DL (ref 31–37)
MCV RBC AUTO: 86.5 FL (ref 78–100)
MONOCYTES # BLD: 0.84 K/UL (ref 0.05–1.2)
MONOCYTES NFR BLD: 4.3 % (ref 3–10)
NEUTS SEG # BLD: 15.83 K/UL (ref 1.8–8)
NEUTS SEG NFR BLD: 81.4 % (ref 40–73)
NRBC # BLD: 0 K/UL (ref 0–0.01)
NRBC BLD-RTO: 0 PER 100 WBC
PLATELET # BLD AUTO: 353 K/UL (ref 135–420)
PMV BLD AUTO: 10 FL (ref 9.2–11.8)
POTASSIUM SERPL-SCNC: 4.1 MMOL/L (ref 3.5–5.5)
PROCALCITONIN SERPL-MCNC: 0.55 NG/ML
RBC # BLD AUTO: 3.25 M/UL (ref 4.35–5.65)
SERVICE CMNT-IMP: NORMAL
SODIUM SERPL-SCNC: 135 MMOL/L (ref 136–145)
SPECIMEN EXP DATE BLD: NORMAL
UNIT DIVISION: 0
UNIT ISSUE DATE/TIME: NORMAL
WBC # BLD AUTO: 19.5 K/UL (ref 4.6–13.2)

## 2025-05-04 PROCEDURE — 6370000000 HC RX 637 (ALT 250 FOR IP): Performed by: FAMILY MEDICINE

## 2025-05-04 PROCEDURE — 83735 ASSAY OF MAGNESIUM: CPT

## 2025-05-04 PROCEDURE — 2500000003 HC RX 250 WO HCPCS: Performed by: PHYSICIAN ASSISTANT

## 2025-05-04 PROCEDURE — 80048 BASIC METABOLIC PNL TOTAL CA: CPT

## 2025-05-04 PROCEDURE — 1100000003 HC PRIVATE W/ TELEMETRY

## 2025-05-04 PROCEDURE — 93306 TTE W/DOPPLER COMPLETE: CPT | Performed by: INTERNAL MEDICINE

## 2025-05-04 PROCEDURE — 36415 COLL VENOUS BLD VENIPUNCTURE: CPT

## 2025-05-04 PROCEDURE — 82962 GLUCOSE BLOOD TEST: CPT

## 2025-05-04 PROCEDURE — 6370000000 HC RX 637 (ALT 250 FOR IP): Performed by: PHYSICIAN ASSISTANT

## 2025-05-04 PROCEDURE — 6360000002 HC RX W HCPCS: Performed by: PHYSICIAN ASSISTANT

## 2025-05-04 PROCEDURE — 84145 PROCALCITONIN (PCT): CPT

## 2025-05-04 PROCEDURE — 2580000003 HC RX 258: Performed by: HOSPITALIST

## 2025-05-04 PROCEDURE — 6370000000 HC RX 637 (ALT 250 FOR IP): Performed by: HOSPITALIST

## 2025-05-04 PROCEDURE — 2580000003 HC RX 258: Performed by: PHYSICIAN ASSISTANT

## 2025-05-04 PROCEDURE — 83605 ASSAY OF LACTIC ACID: CPT

## 2025-05-04 PROCEDURE — 85025 COMPLETE CBC W/AUTO DIFF WBC: CPT

## 2025-05-04 PROCEDURE — 93306 TTE W/DOPPLER COMPLETE: CPT

## 2025-05-04 PROCEDURE — 6360000002 HC RX W HCPCS: Performed by: HOSPITALIST

## 2025-05-04 RX ORDER — CYANOCOBALAMIN 1000 UG/ML
1000 INJECTION, SOLUTION INTRAMUSCULAR; SUBCUTANEOUS WEEKLY
Status: DISCONTINUED | OUTPATIENT
Start: 2025-05-04 | End: 2025-05-04

## 2025-05-04 RX ORDER — 0.9 % SODIUM CHLORIDE 0.9 %
500 INTRAVENOUS SOLUTION INTRAVENOUS ONCE
Status: COMPLETED | OUTPATIENT
Start: 2025-05-04 | End: 2025-05-04

## 2025-05-04 RX ORDER — CYANOCOBALAMIN 1000 UG/ML
1000 INJECTION, SOLUTION INTRAMUSCULAR; SUBCUTANEOUS DAILY
Status: COMPLETED | OUTPATIENT
Start: 2025-05-04 | End: 2025-05-06

## 2025-05-04 RX ADMIN — GABAPENTIN 300 MG: 300 CAPSULE ORAL at 20:58

## 2025-05-04 RX ADMIN — PANCRELIPASE LIPASE, PANCRELIPASE PROTEASE, PANCRELIPASE AMYLASE 10000 UNITS: 5000; 17000; 24000 CAPSULE, DELAYED RELEASE ORAL at 13:34

## 2025-05-04 RX ADMIN — CYANOCOBALAMIN 1000 MCG: 1000 INJECTION, SOLUTION INTRAMUSCULAR; SUBCUTANEOUS at 13:34

## 2025-05-04 RX ADMIN — MIRTAZAPINE 7.5 MG: 15 TABLET, FILM COATED ORAL at 20:57

## 2025-05-04 RX ADMIN — SODIUM CHLORIDE, PRESERVATIVE FREE 40 MG: 5 INJECTION INTRAVENOUS at 18:20

## 2025-05-04 RX ADMIN — CEFEPIME 2000 MG: 2 INJECTION, POWDER, FOR SOLUTION INTRAVENOUS at 01:31

## 2025-05-04 RX ADMIN — INSULIN LISPRO 2 UNITS: 100 INJECTION, SOLUTION INTRAVENOUS; SUBCUTANEOUS at 13:35

## 2025-05-04 RX ADMIN — QUETIAPINE FUMARATE 150 MG: 100 TABLET ORAL at 20:57

## 2025-05-04 RX ADMIN — ATORVASTATIN CALCIUM 20 MG: 20 TABLET, FILM COATED ORAL at 20:57

## 2025-05-04 RX ADMIN — SODIUM CHLORIDE, PRESERVATIVE FREE 10 ML: 5 INJECTION INTRAVENOUS at 20:58

## 2025-05-04 RX ADMIN — INSULIN LISPRO 2 UNITS: 100 INJECTION, SOLUTION INTRAVENOUS; SUBCUTANEOUS at 18:20

## 2025-05-04 RX ADMIN — SODIUM CHLORIDE 500 ML: 0.9 INJECTION, SOLUTION INTRAVENOUS at 18:18

## 2025-05-04 RX ADMIN — INSULIN GLARGINE 10 UNITS: 100 INJECTION, SOLUTION SUBCUTANEOUS at 20:58

## 2025-05-04 RX ADMIN — SODIUM CHLORIDE, PRESERVATIVE FREE 10 ML: 5 INJECTION INTRAVENOUS at 08:36

## 2025-05-04 RX ADMIN — GABAPENTIN 300 MG: 300 CAPSULE ORAL at 08:34

## 2025-05-04 RX ADMIN — GABAPENTIN 300 MG: 300 CAPSULE ORAL at 13:34

## 2025-05-04 RX ADMIN — SODIUM CHLORIDE, PRESERVATIVE FREE 40 MG: 5 INJECTION INTRAVENOUS at 05:52

## 2025-05-04 RX ADMIN — CEFEPIME 2000 MG: 2 INJECTION, POWDER, FOR SOLUTION INTRAVENOUS at 14:38

## 2025-05-04 RX ADMIN — INSULIN LISPRO 2 UNITS: 100 INJECTION, SOLUTION INTRAVENOUS; SUBCUTANEOUS at 20:58

## 2025-05-04 RX ADMIN — PANCRELIPASE LIPASE, PANCRELIPASE PROTEASE, PANCRELIPASE AMYLASE 10000 UNITS: 5000; 17000; 24000 CAPSULE, DELAYED RELEASE ORAL at 18:20

## 2025-05-04 RX ADMIN — SENNOSIDES AND DOCUSATE SODIUM 1 TABLET: 50; 8.6 TABLET ORAL at 20:58

## 2025-05-04 RX ADMIN — OXYCODONE 10 MG: 5 TABLET ORAL at 21:03

## 2025-05-04 RX ADMIN — ACETAMINOPHEN 650 MG: 325 TABLET ORAL at 20:58

## 2025-05-04 ASSESSMENT — PAIN DESCRIPTION - LOCATION
LOCATION: LEG
LOCATION: LEG

## 2025-05-04 ASSESSMENT — PAIN SCALES - GENERAL
PAINLEVEL_OUTOF10: 7
PAINLEVEL_OUTOF10: 5
PAINLEVEL_OUTOF10: 7
PAINLEVEL_OUTOF10: 5

## 2025-05-04 ASSESSMENT — PAIN DESCRIPTION - ORIENTATION
ORIENTATION: RIGHT
ORIENTATION: RIGHT;LEFT

## 2025-05-04 ASSESSMENT — PAIN DESCRIPTION - DESCRIPTORS: DESCRIPTORS: NUMBNESS;TINGLING;ACHING

## 2025-05-04 ASSESSMENT — PAIN SCALES - WONG BAKER: WONGBAKER_NUMERICALRESPONSE: HURTS LITTLE MORE

## 2025-05-04 ASSESSMENT — PAIN - FUNCTIONAL ASSESSMENT: PAIN_FUNCTIONAL_ASSESSMENT: PREVENTS OR INTERFERES WITH MANY ACTIVE NOT PASSIVE ACTIVITIES

## 2025-05-04 ASSESSMENT — PAIN DESCRIPTION - PAIN TYPE: TYPE: CHRONIC PAIN

## 2025-05-05 ENCOUNTER — APPOINTMENT (OUTPATIENT)
Facility: HOSPITAL | Age: 60
DRG: 871 | End: 2025-05-05
Payer: MEDICARE

## 2025-05-05 LAB
ANION GAP SERPL CALC-SCNC: 8 MMOL/L (ref 3–18)
APPEARANCE CSF: CLEAR
BASOPHILS # BLD: 0.07 K/UL (ref 0–0.1)
BASOPHILS NFR BLD: 0.4 % (ref 0–2)
BUN SERPL-MCNC: 24 MG/DL (ref 6–23)
BUN/CREAT SERPL: 21 (ref 12–20)
CALCIUM SERPL-MCNC: 7.8 MG/DL (ref 8.5–10.1)
CHLORIDE SERPL-SCNC: 105 MMOL/L (ref 98–107)
CO2 SERPL-SCNC: 21 MMOL/L (ref 21–32)
COLOR CSF: COLORLESS
COLOR SPUN CSF: CLEAR
CREAT SERPL-MCNC: 1.14 MG/DL (ref 0.6–1.3)
DIFFERENTIAL METHOD BLD: ABNORMAL
EKG ATRIAL RATE: 114 BPM
EKG DIAGNOSIS: NORMAL
EKG P AXIS: 72 DEGREES
EKG P-R INTERVAL: 176 MS
EKG Q-T INTERVAL: 324 MS
EKG QRS DURATION: 72 MS
EKG QTC CALCULATION (BAZETT): 446 MS
EKG R AXIS: 48 DEGREES
EKG T AXIS: 80 DEGREES
EKG VENTRICULAR RATE: 114 BPM
EOSINOPHIL # BLD: 0.96 K/UL (ref 0–0.4)
EOSINOPHIL NFR BLD: 5.2 % (ref 0–5)
ERYTHROCYTE [DISTWIDTH] IN BLOOD BY AUTOMATED COUNT: 16 % (ref 11.6–14.5)
GLUCOSE BLD STRIP.AUTO-MCNC: 188 MG/DL (ref 70–110)
GLUCOSE BLD STRIP.AUTO-MCNC: 215 MG/DL (ref 70–110)
GLUCOSE BLD STRIP.AUTO-MCNC: 246 MG/DL (ref 70–110)
GLUCOSE BLD STRIP.AUTO-MCNC: 355 MG/DL (ref 70–110)
GLUCOSE CSF-MCNC: 103 MG/DL (ref 40–70)
GLUCOSE SERPL-MCNC: 186 MG/DL (ref 74–108)
HCT VFR BLD AUTO: 27.4 % (ref 36–48)
HGB BLD-MCNC: 8.8 G/DL (ref 13–16)
IMM GRANULOCYTES # BLD AUTO: 0.41 K/UL (ref 0–0.04)
IMM GRANULOCYTES NFR BLD AUTO: 2.2 % (ref 0–0.5)
IRON SATN MFR SERPL: 5 %
IRON SERPL-MCNC: 7 UG/DL (ref 50–175)
LACTATE SERPL-SCNC: 0.7 MMOL/L (ref 0.4–2)
LACTATE SERPL-SCNC: 1.5 MMOL/L (ref 0.4–2)
LYMPHOCYTES # BLD: 1.38 K/UL (ref 0.9–3.3)
LYMPHOCYTES NFR BLD: 7.5 % (ref 21–52)
MAGNESIUM SERPL-MCNC: 1.8 MG/DL (ref 1.6–2.6)
MCH RBC QN AUTO: 28.1 PG (ref 24–34)
MCHC RBC AUTO-ENTMCNC: 32.1 G/DL (ref 31–37)
MCV RBC AUTO: 87.5 FL (ref 78–100)
MONOCYTES # BLD: 0.91 K/UL (ref 0.05–1.2)
MONOCYTES NFR BLD: 4.9 % (ref 3–10)
NEUTS SEG # BLD: 14.67 K/UL (ref 1.8–8)
NEUTS SEG NFR BLD: 79.8 % (ref 40–73)
NRBC # BLD: 0 K/UL (ref 0–0.01)
NRBC BLD-RTO: 0 PER 100 WBC
PLATELET # BLD AUTO: 321 K/UL (ref 135–420)
PMV BLD AUTO: 10 FL (ref 9.2–11.8)
POTASSIUM SERPL-SCNC: 3.5 MMOL/L (ref 3.5–5.5)
PROT CSF-MCNC: 47 MG/DL (ref 15–45)
RBC # BLD AUTO: 3.13 M/UL (ref 4.35–5.65)
RBC # CSF: 80 /CU MM
RETICS/RBC NFR AUTO: 3 % (ref 0.5–2.5)
SODIUM SERPL-SCNC: 134 MMOL/L (ref 136–145)
TIBC SERPL-MCNC: 140 UG/DL (ref 250–450)
TSH, 3RD GENERATION: 2.07 UIU/ML (ref 0.27–4.2)
TUBE # CSF: 1
TUBE # CSF: 1
TUBE # CSF: 2
UIBC SERPL-MCNC: 133 UG/DL (ref 112–347)
WBC # BLD AUTO: 18.4 K/UL (ref 4.6–13.2)
WBC # CSF: 4 /CU MM (ref 0–5)

## 2025-05-05 PROCEDURE — 87205 SMEAR GRAM STAIN: CPT

## 2025-05-05 PROCEDURE — 83550 IRON BINDING TEST: CPT

## 2025-05-05 PROCEDURE — 83540 ASSAY OF IRON: CPT

## 2025-05-05 PROCEDURE — 84157 ASSAY OF PROTEIN OTHER: CPT

## 2025-05-05 PROCEDURE — B01B1ZZ FLUOROSCOPY OF SPINAL CORD USING LOW OSMOLAR CONTRAST: ICD-10-PCS

## 2025-05-05 PROCEDURE — 83735 ASSAY OF MAGNESIUM: CPT

## 2025-05-05 PROCEDURE — 87070 CULTURE OTHR SPECIMN AEROBIC: CPT

## 2025-05-05 PROCEDURE — 89050 BODY FLUID CELL COUNT: CPT

## 2025-05-05 PROCEDURE — 6370000000 HC RX 637 (ALT 250 FOR IP): Performed by: FAMILY MEDICINE

## 2025-05-05 PROCEDURE — 85045 AUTOMATED RETICULOCYTE COUNT: CPT

## 2025-05-05 PROCEDURE — 2500000003 HC RX 250 WO HCPCS: Performed by: INTERNAL MEDICINE

## 2025-05-05 PROCEDURE — 85025 COMPLETE CBC W/AUTO DIFF WBC: CPT

## 2025-05-05 PROCEDURE — 6360000002 HC RX W HCPCS: Performed by: INTERNAL MEDICINE

## 2025-05-05 PROCEDURE — 36415 COLL VENOUS BLD VENIPUNCTURE: CPT

## 2025-05-05 PROCEDURE — 1100000003 HC PRIVATE W/ TELEMETRY

## 2025-05-05 PROCEDURE — 009U3ZX DRAINAGE OF SPINAL CANAL, PERCUTANEOUS APPROACH, DIAGNOSTIC: ICD-10-PCS

## 2025-05-05 PROCEDURE — 6360000002 HC RX W HCPCS

## 2025-05-05 PROCEDURE — 80048 BASIC METABOLIC PNL TOTAL CA: CPT

## 2025-05-05 PROCEDURE — 2580000003 HC RX 258: Performed by: INTERNAL MEDICINE

## 2025-05-05 PROCEDURE — 6360000002 HC RX W HCPCS: Performed by: HOSPITALIST

## 2025-05-05 PROCEDURE — 82962 GLUCOSE BLOOD TEST: CPT

## 2025-05-05 PROCEDURE — 6370000000 HC RX 637 (ALT 250 FOR IP): Performed by: PHYSICIAN ASSISTANT

## 2025-05-05 PROCEDURE — 2580000003 HC RX 258: Performed by: HOSPITALIST

## 2025-05-05 PROCEDURE — 6360000002 HC RX W HCPCS: Performed by: PHYSICIAN ASSISTANT

## 2025-05-05 PROCEDURE — 2500000003 HC RX 250 WO HCPCS: Performed by: HOSPITALIST

## 2025-05-05 PROCEDURE — 2500000003 HC RX 250 WO HCPCS: Performed by: PHYSICIAN ASSISTANT

## 2025-05-05 PROCEDURE — 87015 SPECIMEN INFECT AGNT CONCNTJ: CPT

## 2025-05-05 PROCEDURE — 6370000000 HC RX 637 (ALT 250 FOR IP): Performed by: HOSPITALIST

## 2025-05-05 PROCEDURE — 62328 DX LMBR SPI PNXR W/FLUOR/CT: CPT

## 2025-05-05 PROCEDURE — 83605 ASSAY OF LACTIC ACID: CPT

## 2025-05-05 PROCEDURE — 2580000003 HC RX 258: Performed by: PHYSICIAN ASSISTANT

## 2025-05-05 PROCEDURE — 82945 GLUCOSE OTHER FLUID: CPT

## 2025-05-05 PROCEDURE — 84443 ASSAY THYROID STIM HORMONE: CPT

## 2025-05-05 PROCEDURE — 6370000000 HC RX 637 (ALT 250 FOR IP): Performed by: INTERNAL MEDICINE

## 2025-05-05 PROCEDURE — 93010 ELECTROCARDIOGRAM REPORT: CPT | Performed by: INTERNAL MEDICINE

## 2025-05-05 RX ORDER — LIDOCAINE HYDROCHLORIDE 10 MG/ML
5 INJECTION, SOLUTION EPIDURAL; INFILTRATION; INTRACAUDAL; PERINEURAL ONCE
Status: COMPLETED | OUTPATIENT
Start: 2025-05-05 | End: 2025-05-05

## 2025-05-05 RX ORDER — VALBENAZINE 80 MG/1
80 CAPSULE ORAL NIGHTLY
COMMUNITY

## 2025-05-05 RX ORDER — PANTOPRAZOLE SODIUM 40 MG/1
40 TABLET, DELAYED RELEASE ORAL
Status: DISCONTINUED | OUTPATIENT
Start: 2025-05-05 | End: 2025-05-05

## 2025-05-05 RX ORDER — MAGNESIUM SULFATE HEPTAHYDRATE 40 MG/ML
2000 INJECTION, SOLUTION INTRAVENOUS ONCE
Status: COMPLETED | OUTPATIENT
Start: 2025-05-05 | End: 2025-05-05

## 2025-05-05 RX ORDER — INSULIN LISPRO 100 [IU]/ML
6 INJECTION, SOLUTION INTRAVENOUS; SUBCUTANEOUS ONCE
Status: COMPLETED | OUTPATIENT
Start: 2025-05-05 | End: 2025-05-05

## 2025-05-05 RX ADMIN — GABAPENTIN 300 MG: 300 CAPSULE ORAL at 21:45

## 2025-05-05 RX ADMIN — ATORVASTATIN CALCIUM 20 MG: 20 TABLET, FILM COATED ORAL at 21:45

## 2025-05-05 RX ADMIN — SODIUM ZIRCONIUM CYCLOSILICATE 10 G: 5 POWDER, FOR SUSPENSION ORAL at 08:50

## 2025-05-05 RX ADMIN — PANCRELIPASE LIPASE, PANCRELIPASE PROTEASE, PANCRELIPASE AMYLASE 10000 UNITS: 5000; 17000; 24000 CAPSULE, DELAYED RELEASE ORAL at 17:44

## 2025-05-05 RX ADMIN — INSULIN LISPRO 1 UNITS: 100 INJECTION, SOLUTION INTRAVENOUS; SUBCUTANEOUS at 17:45

## 2025-05-05 RX ADMIN — CEFEPIME 2000 MG: 2 INJECTION, POWDER, FOR SOLUTION INTRAVENOUS at 01:40

## 2025-05-05 RX ADMIN — LIDOCAINE HYDROCHLORIDE 5 ML: 10 INJECTION, SOLUTION EPIDURAL; INFILTRATION; INTRACAUDAL; PERINEURAL at 13:22

## 2025-05-05 RX ADMIN — GABAPENTIN 300 MG: 300 CAPSULE ORAL at 08:44

## 2025-05-05 RX ADMIN — WATER 2000 MG: 1 INJECTION INTRAMUSCULAR; INTRAVENOUS; SUBCUTANEOUS at 21:51

## 2025-05-05 RX ADMIN — SODIUM CHLORIDE, PRESERVATIVE FREE 40 MG: 5 INJECTION INTRAVENOUS at 17:44

## 2025-05-05 RX ADMIN — OXYCODONE 10 MG: 5 TABLET ORAL at 21:59

## 2025-05-05 RX ADMIN — SODIUM CHLORIDE, PRESERVATIVE FREE 40 MG: 5 INJECTION INTRAVENOUS at 05:52

## 2025-05-05 RX ADMIN — GABAPENTIN 300 MG: 300 CAPSULE ORAL at 15:57

## 2025-05-05 RX ADMIN — CEFEPIME 2000 MG: 2 INJECTION, POWDER, FOR SOLUTION INTRAVENOUS at 14:47

## 2025-05-05 RX ADMIN — SODIUM CHLORIDE, PRESERVATIVE FREE 10 ML: 5 INJECTION INTRAVENOUS at 21:54

## 2025-05-05 RX ADMIN — MIRTAZAPINE 7.5 MG: 15 TABLET, FILM COATED ORAL at 21:46

## 2025-05-05 RX ADMIN — CEFEPIME 2000 MG: 2 INJECTION, POWDER, FOR SOLUTION INTRAVENOUS at 17:45

## 2025-05-05 RX ADMIN — CYANOCOBALAMIN 1000 MCG: 1000 INJECTION, SOLUTION INTRAMUSCULAR; SUBCUTANEOUS at 08:44

## 2025-05-05 RX ADMIN — INSULIN LISPRO 4 UNITS: 100 INJECTION, SOLUTION INTRAVENOUS; SUBCUTANEOUS at 21:47

## 2025-05-05 RX ADMIN — MAGNESIUM SULFATE HEPTAHYDRATE 2000 MG: 40 INJECTION, SOLUTION INTRAVENOUS at 14:39

## 2025-05-05 RX ADMIN — INSULIN LISPRO 1 UNITS: 100 INJECTION, SOLUTION INTRAVENOUS; SUBCUTANEOUS at 08:45

## 2025-05-05 RX ADMIN — QUETIAPINE FUMARATE 150 MG: 100 TABLET ORAL at 21:45

## 2025-05-05 RX ADMIN — INSULIN LISPRO 6 UNITS: 100 INJECTION, SOLUTION INTRAVENOUS; SUBCUTANEOUS at 21:48

## 2025-05-05 RX ADMIN — INSULIN GLARGINE 10 UNITS: 100 INJECTION, SOLUTION SUBCUTANEOUS at 21:48

## 2025-05-05 RX ADMIN — SENNOSIDES AND DOCUSATE SODIUM 1 TABLET: 50; 8.6 TABLET ORAL at 21:44

## 2025-05-05 ASSESSMENT — PAIN SCALES - GENERAL
PAINLEVEL_OUTOF10: 3
PAINLEVEL_OUTOF10: 7

## 2025-05-05 ASSESSMENT — PAIN DESCRIPTION - DESCRIPTORS: DESCRIPTORS: ACHING;SHOOTING

## 2025-05-05 ASSESSMENT — PAIN DESCRIPTION - LOCATION: LOCATION: LEG

## 2025-05-05 ASSESSMENT — PAIN DESCRIPTION - ORIENTATION: ORIENTATION: RIGHT;LEFT

## 2025-05-05 ASSESSMENT — PAIN - FUNCTIONAL ASSESSMENT: PAIN_FUNCTIONAL_ASSESSMENT: ACTIVITIES ARE NOT PREVENTED

## 2025-05-05 NOTE — PROCEDURES
Interventional Radiology Brief Postoperative Note    Procedure Date:  5/5/2025    Procedure:  Fluoroscopic Guided Lumbar Puncture    :  Yumi Tadeo NP    Attending:  Erin Stallworth MD    Preoperative Diagnosis:  Leg weakness    Postoperative Diagnosis:  Same    Complications:  None immediate.    Estimated Blood Loss:  < 1 mL    Procedure Findings:    Technically successful fluoroscopic guided lumbar puncture at  L3-4.  Opening pressure approximately 14 cmH2O.  8 ml of clear, colorless cerebrospinal fluid collected and sent for analysis.  Closing pressure approximately 13 cmH2O.    Specimens:  Fluid sent for analysis.    Condition:  The patient tolerated the procedure without difficulty and remained in stable condition throughout.    Patient to remain flat x 2 hours post procedure.    Yumi Tadeo, APRN - CNP  [facility]

## 2025-05-06 PROBLEM — M54.17 LUMBOSACRAL RADICULOPATHY: Status: ACTIVE | Noted: 2025-05-06

## 2025-05-06 PROBLEM — G61.0 GBS (GUILLAIN BARRE SYNDROME): Status: RESOLVED | Noted: 2025-05-02 | Resolved: 2025-05-06

## 2025-05-06 LAB
GLUCOSE BLD STRIP.AUTO-MCNC: 216 MG/DL (ref 70–110)
GLUCOSE BLD STRIP.AUTO-MCNC: 224 MG/DL (ref 70–110)
GLUCOSE BLD STRIP.AUTO-MCNC: 363 MG/DL (ref 70–110)
GLUCOSE BLD STRIP.AUTO-MCNC: 364 MG/DL (ref 70–110)
VIT B1 BLD-SCNC: 106.9 NMOL/L (ref 66.5–200)
VIT B6 SERPL-MCNC: NORMAL UG/L

## 2025-05-06 PROCEDURE — 2500000003 HC RX 250 WO HCPCS: Performed by: PHYSICIAN ASSISTANT

## 2025-05-06 PROCEDURE — 6370000000 HC RX 637 (ALT 250 FOR IP): Performed by: INTERNAL MEDICINE

## 2025-05-06 PROCEDURE — 1100000003 HC PRIVATE W/ TELEMETRY

## 2025-05-06 PROCEDURE — 2580000003 HC RX 258: Performed by: HOSPITALIST

## 2025-05-06 PROCEDURE — 6370000000 HC RX 637 (ALT 250 FOR IP): Performed by: PHYSICIAN ASSISTANT

## 2025-05-06 PROCEDURE — 6370000000 HC RX 637 (ALT 250 FOR IP): Performed by: FAMILY MEDICINE

## 2025-05-06 PROCEDURE — 2500000003 HC RX 250 WO HCPCS: Performed by: INTERNAL MEDICINE

## 2025-05-06 PROCEDURE — 6370000000 HC RX 637 (ALT 250 FOR IP): Performed by: HOSPITALIST

## 2025-05-06 PROCEDURE — 6370000000 HC RX 637 (ALT 250 FOR IP): Performed by: PSYCHIATRY & NEUROLOGY

## 2025-05-06 PROCEDURE — 82962 GLUCOSE BLOOD TEST: CPT

## 2025-05-06 PROCEDURE — 6360000002 HC RX W HCPCS: Performed by: HOSPITALIST

## 2025-05-06 PROCEDURE — 6360000002 HC RX W HCPCS: Performed by: INTERNAL MEDICINE

## 2025-05-06 RX ORDER — DEXTROSE MONOHYDRATE 100 MG/ML
INJECTION, SOLUTION INTRAVENOUS CONTINUOUS PRN
Status: DISCONTINUED | OUTPATIENT
Start: 2025-05-06 | End: 2025-05-22 | Stop reason: HOSPADM

## 2025-05-06 RX ORDER — GLUCAGON 1 MG/ML
1 KIT INJECTION PRN
Status: DISCONTINUED | OUTPATIENT
Start: 2025-05-06 | End: 2025-05-22 | Stop reason: HOSPADM

## 2025-05-06 RX ORDER — GABAPENTIN 300 MG/1
600 CAPSULE ORAL 3 TIMES DAILY
Status: DISCONTINUED | OUTPATIENT
Start: 2025-05-06 | End: 2025-05-07

## 2025-05-06 RX ORDER — INSULIN LISPRO 100 [IU]/ML
0-16 INJECTION, SOLUTION INTRAVENOUS; SUBCUTANEOUS
Status: DISCONTINUED | OUTPATIENT
Start: 2025-05-06 | End: 2025-05-22 | Stop reason: HOSPADM

## 2025-05-06 RX ADMIN — WATER 2000 MG: 1 INJECTION INTRAMUSCULAR; INTRAVENOUS; SUBCUTANEOUS at 21:03

## 2025-05-06 RX ADMIN — SODIUM CHLORIDE, PRESERVATIVE FREE 10 ML: 5 INJECTION INTRAVENOUS at 09:02

## 2025-05-06 RX ADMIN — OXYCODONE 10 MG: 5 TABLET ORAL at 21:04

## 2025-05-06 RX ADMIN — PANCRELIPASE LIPASE, PANCRELIPASE PROTEASE, PANCRELIPASE AMYLASE 10000 UNITS: 5000; 17000; 24000 CAPSULE, DELAYED RELEASE ORAL at 09:02

## 2025-05-06 RX ADMIN — QUETIAPINE FUMARATE 150 MG: 100 TABLET ORAL at 21:05

## 2025-05-06 RX ADMIN — SODIUM CHLORIDE, PRESERVATIVE FREE 40 MG: 5 INJECTION INTRAVENOUS at 16:29

## 2025-05-06 RX ADMIN — SODIUM CHLORIDE, PRESERVATIVE FREE 10 ML: 5 INJECTION INTRAVENOUS at 21:11

## 2025-05-06 RX ADMIN — GABAPENTIN 300 MG: 300 CAPSULE ORAL at 09:02

## 2025-05-06 RX ADMIN — INSULIN LISPRO 16 UNITS: 100 INJECTION, SOLUTION INTRAVENOUS; SUBCUTANEOUS at 21:08

## 2025-05-06 RX ADMIN — GABAPENTIN 600 MG: 300 CAPSULE ORAL at 16:29

## 2025-05-06 RX ADMIN — INSULIN LISPRO 1 UNITS: 100 INJECTION, SOLUTION INTRAVENOUS; SUBCUTANEOUS at 07:01

## 2025-05-06 RX ADMIN — SENNOSIDES AND DOCUSATE SODIUM 1 TABLET: 50; 8.6 TABLET ORAL at 21:07

## 2025-05-06 RX ADMIN — MIRTAZAPINE 7.5 MG: 15 TABLET, FILM COATED ORAL at 21:05

## 2025-05-06 RX ADMIN — SODIUM CHLORIDE, PRESERVATIVE FREE 40 MG: 5 INJECTION INTRAVENOUS at 05:59

## 2025-05-06 RX ADMIN — INSULIN GLARGINE 10 UNITS: 100 INJECTION, SOLUTION SUBCUTANEOUS at 21:08

## 2025-05-06 RX ADMIN — INSULIN LISPRO 4 UNITS: 100 INJECTION, SOLUTION INTRAVENOUS; SUBCUTANEOUS at 18:21

## 2025-05-06 RX ADMIN — ATORVASTATIN CALCIUM 20 MG: 20 TABLET, FILM COATED ORAL at 21:04

## 2025-05-06 RX ADMIN — CYANOCOBALAMIN 1000 MCG: 1000 INJECTION, SOLUTION INTRAMUSCULAR; SUBCUTANEOUS at 09:02

## 2025-05-06 RX ADMIN — GABAPENTIN 600 MG: 300 CAPSULE ORAL at 21:03

## 2025-05-06 RX ADMIN — PANCRELIPASE LIPASE, PANCRELIPASE PROTEASE, PANCRELIPASE AMYLASE 10000 UNITS: 5000; 17000; 24000 CAPSULE, DELAYED RELEASE ORAL at 16:29

## 2025-05-06 ASSESSMENT — PAIN SCALES - GENERAL
PAINLEVEL_OUTOF10: 4
PAINLEVEL_OUTOF10: 6

## 2025-05-06 ASSESSMENT — PAIN - FUNCTIONAL ASSESSMENT: PAIN_FUNCTIONAL_ASSESSMENT: ACTIVITIES ARE NOT PREVENTED

## 2025-05-06 ASSESSMENT — PAIN DESCRIPTION - LOCATION: LOCATION: LEG

## 2025-05-06 ASSESSMENT — PAIN DESCRIPTION - DESCRIPTORS: DESCRIPTORS: ACHING

## 2025-05-06 ASSESSMENT — PAIN DESCRIPTION - ORIENTATION: ORIENTATION: RIGHT;LEFT

## 2025-05-07 ENCOUNTER — APPOINTMENT (OUTPATIENT)
Facility: HOSPITAL | Age: 60
DRG: 871 | End: 2025-05-07
Payer: MEDICARE

## 2025-05-07 LAB
AMMONIA PLAS-SCNC: <10 UMOL/L (ref 11–60)
ANION GAP SERPL CALC-SCNC: 11 MMOL/L (ref 3–18)
BASOPHILS # BLD: 0.13 K/UL (ref 0–0.1)
BASOPHILS NFR BLD: 0.5 % (ref 0–2)
BUN SERPL-MCNC: 25 MG/DL (ref 6–23)
BUN/CREAT SERPL: 18 (ref 12–20)
CALCIUM SERPL-MCNC: 8 MG/DL (ref 8.5–10.1)
CHLORIDE SERPL-SCNC: 100 MMOL/L (ref 98–107)
CO2 SERPL-SCNC: 23 MMOL/L (ref 21–32)
CREAT SERPL-MCNC: 1.39 MG/DL (ref 0.6–1.3)
DIFFERENTIAL METHOD BLD: ABNORMAL
EOSINOPHIL # BLD: 0.78 K/UL (ref 0–0.4)
EOSINOPHIL NFR BLD: 3 % (ref 0–5)
ERYTHROCYTE [DISTWIDTH] IN BLOOD BY AUTOMATED COUNT: 16 % (ref 11.6–14.5)
GLUCOSE BLD STRIP.AUTO-MCNC: 100 MG/DL (ref 70–110)
GLUCOSE BLD STRIP.AUTO-MCNC: 148 MG/DL (ref 70–110)
GLUCOSE BLD STRIP.AUTO-MCNC: 156 MG/DL (ref 70–110)
GLUCOSE BLD STRIP.AUTO-MCNC: 168 MG/DL (ref 70–110)
GLUCOSE SERPL-MCNC: 58 MG/DL (ref 74–108)
HCT VFR BLD AUTO: 29.7 % (ref 36–48)
HGB BLD-MCNC: 9.4 G/DL (ref 13–16)
IMM GRANULOCYTES # BLD AUTO: 0.37 K/UL (ref 0–0.04)
IMM GRANULOCYTES NFR BLD AUTO: 1.4 % (ref 0–0.5)
LYMPHOCYTES # BLD: 0.95 K/UL (ref 0.9–3.3)
LYMPHOCYTES NFR BLD: 3.6 % (ref 21–52)
MAGNESIUM SERPL-MCNC: 2.2 MG/DL (ref 1.6–2.6)
MCH RBC QN AUTO: 27.6 PG (ref 24–34)
MCHC RBC AUTO-ENTMCNC: 31.6 G/DL (ref 31–37)
MCV RBC AUTO: 87.4 FL (ref 78–100)
MONOCYTES # BLD: 1.45 K/UL (ref 0.05–1.2)
MONOCYTES NFR BLD: 5.5 % (ref 3–10)
NEUTS SEG # BLD: 22.59 K/UL (ref 1.8–8)
NEUTS SEG NFR BLD: 86 % (ref 40–73)
NRBC # BLD: 0 K/UL (ref 0–0.01)
NRBC BLD-RTO: 0 PER 100 WBC
PLATELET # BLD AUTO: 359 K/UL (ref 135–420)
PMV BLD AUTO: 10.7 FL (ref 9.2–11.8)
POTASSIUM SERPL-SCNC: 4.1 MMOL/L (ref 3.5–5.5)
RBC # BLD AUTO: 3.4 M/UL (ref 4.35–5.65)
SODIUM SERPL-SCNC: 133 MMOL/L (ref 136–145)
WBC # BLD AUTO: 26.3 K/UL (ref 4.6–13.2)

## 2025-05-07 PROCEDURE — 99153 MOD SED SAME PHYS/QHP EA: CPT | Performed by: INTERNAL MEDICINE

## 2025-05-07 PROCEDURE — 6360000002 HC RX W HCPCS: Performed by: INTERNAL MEDICINE

## 2025-05-07 PROCEDURE — 1100000003 HC PRIVATE W/ TELEMETRY

## 2025-05-07 PROCEDURE — 82306 VITAMIN D 25 HYDROXY: CPT

## 2025-05-07 PROCEDURE — 0DJ08ZZ INSPECTION OF UPPER INTESTINAL TRACT, VIA NATURAL OR ARTIFICIAL OPENING ENDOSCOPIC: ICD-10-PCS | Performed by: INTERNAL MEDICINE

## 2025-05-07 PROCEDURE — 3600007502: Performed by: INTERNAL MEDICINE

## 2025-05-07 PROCEDURE — 82140 ASSAY OF AMMONIA: CPT

## 2025-05-07 PROCEDURE — 70450 CT HEAD/BRAIN W/O DYE: CPT

## 2025-05-07 PROCEDURE — 84207 ASSAY OF VITAMIN B-6: CPT

## 2025-05-07 PROCEDURE — 80048 BASIC METABOLIC PNL TOTAL CA: CPT

## 2025-05-07 PROCEDURE — 82962 GLUCOSE BLOOD TEST: CPT

## 2025-05-07 PROCEDURE — 2709999900 HC NON-CHARGEABLE SUPPLY: Performed by: INTERNAL MEDICINE

## 2025-05-07 PROCEDURE — 3600007512: Performed by: INTERNAL MEDICINE

## 2025-05-07 PROCEDURE — 6360000002 HC RX W HCPCS: Performed by: HOSPITALIST

## 2025-05-07 PROCEDURE — 2500000003 HC RX 250 WO HCPCS: Performed by: PHYSICIAN ASSISTANT

## 2025-05-07 PROCEDURE — 84425 ASSAY OF VITAMIN B-1: CPT

## 2025-05-07 PROCEDURE — 85025 COMPLETE CBC W/AUTO DIFF WBC: CPT

## 2025-05-07 PROCEDURE — 2580000003 HC RX 258: Performed by: HOSPITALIST

## 2025-05-07 PROCEDURE — 83735 ASSAY OF MAGNESIUM: CPT

## 2025-05-07 PROCEDURE — 36415 COLL VENOUS BLD VENIPUNCTURE: CPT

## 2025-05-07 PROCEDURE — 6370000000 HC RX 637 (ALT 250 FOR IP): Performed by: HOSPITALIST

## 2025-05-07 PROCEDURE — 6370000000 HC RX 637 (ALT 250 FOR IP): Performed by: PSYCHIATRY & NEUROLOGY

## 2025-05-07 PROCEDURE — 99152 MOD SED SAME PHYS/QHP 5/>YRS: CPT | Performed by: INTERNAL MEDICINE

## 2025-05-07 PROCEDURE — 2500000003 HC RX 250 WO HCPCS: Performed by: INTERNAL MEDICINE

## 2025-05-07 PROCEDURE — 82607 VITAMIN B-12: CPT

## 2025-05-07 RX ORDER — CYANOCOBALAMIN 1000 UG/ML
1000 INJECTION, SOLUTION INTRAMUSCULAR; SUBCUTANEOUS WEEKLY
Status: DISCONTINUED | OUTPATIENT
Start: 2025-05-11 | End: 2025-05-22 | Stop reason: HOSPADM

## 2025-05-07 RX ORDER — GABAPENTIN 300 MG/1
300 CAPSULE ORAL 3 TIMES DAILY
Status: DISCONTINUED | OUTPATIENT
Start: 2025-05-07 | End: 2025-05-22 | Stop reason: HOSPADM

## 2025-05-07 RX ORDER — MIDAZOLAM HYDROCHLORIDE 1 MG/ML
INJECTION, SOLUTION INTRAMUSCULAR; INTRAVENOUS PRN
Status: DISCONTINUED | OUTPATIENT
Start: 2025-05-07 | End: 2025-05-07 | Stop reason: HOSPADM

## 2025-05-07 RX ORDER — FENTANYL CITRATE 50 UG/ML
INJECTION, SOLUTION INTRAMUSCULAR; INTRAVENOUS PRN
Status: DISCONTINUED | OUTPATIENT
Start: 2025-05-07 | End: 2025-05-07 | Stop reason: HOSPADM

## 2025-05-07 RX ADMIN — WATER 2000 MG: 1 INJECTION INTRAMUSCULAR; INTRAVENOUS; SUBCUTANEOUS at 22:19

## 2025-05-07 RX ADMIN — SODIUM CHLORIDE, PRESERVATIVE FREE 10 ML: 5 INJECTION INTRAVENOUS at 08:11

## 2025-05-07 RX ADMIN — SODIUM CHLORIDE, PRESERVATIVE FREE 40 MG: 5 INJECTION INTRAVENOUS at 05:12

## 2025-05-07 RX ADMIN — SODIUM CHLORIDE, PRESERVATIVE FREE 40 MG: 5 INJECTION INTRAVENOUS at 18:26

## 2025-05-07 RX ADMIN — GABAPENTIN 600 MG: 300 CAPSULE ORAL at 08:11

## 2025-05-07 RX ADMIN — INSULIN GLARGINE 10 UNITS: 100 INJECTION, SOLUTION SUBCUTANEOUS at 22:19

## 2025-05-07 RX ADMIN — SODIUM CHLORIDE, PRESERVATIVE FREE 10 ML: 5 INJECTION INTRAVENOUS at 22:19

## 2025-05-07 ASSESSMENT — PAIN - FUNCTIONAL ASSESSMENT
PAIN_FUNCTIONAL_ASSESSMENT: NONE - DENIES PAIN
PAIN_FUNCTIONAL_ASSESSMENT: ADULT NONVERBAL PAIN SCALE (NPVS)
PAIN_FUNCTIONAL_ASSESSMENT: ADULT NONVERBAL PAIN SCALE (NPVS)
PAIN_FUNCTIONAL_ASSESSMENT: NONE - DENIES PAIN
PAIN_FUNCTIONAL_ASSESSMENT: ADULT NONVERBAL PAIN SCALE (NPVS)
PAIN_FUNCTIONAL_ASSESSMENT: NONE - DENIES PAIN
PAIN_FUNCTIONAL_ASSESSMENT: ADULT NONVERBAL PAIN SCALE (NPVS)
PAIN_FUNCTIONAL_ASSESSMENT: NONE - DENIES PAIN
PAIN_FUNCTIONAL_ASSESSMENT: ADULT NONVERBAL PAIN SCALE (NPVS)
PAIN_FUNCTIONAL_ASSESSMENT: NONE - DENIES PAIN

## 2025-05-07 NOTE — PROCEDURES
(EGD) Esophagogastroduodenoscopy (UPPER ENDOSCOPY) Procedure Note  [facility]  __________________________________________________________________________________________________________________________        5/7/2025   Date of Procedure: 5/2/2025 - 5/7/2025    Patient: Lan Gonzalez YOB: 1965 Gender: male Age: 59 y.o.    INDICATION:  60 yo male sp whipple procedure with type one DM, CKD stage 3 exocrine pancreatic insufficiency.  seen for upper and lower extremity weakness.   Noted several weeks ago to be having UE and LE weakness and brought to the hospital  Usually followed by Neurology.  Has had a previous cervical fusion.  Found to have kidney infection, anemia, uncontrolled DM and low B12.  Has had some improvement while in the hospital.  MRI of cervical and lumbar shows degenerative disease and stenosis.    Severe normochromic normocytic anemia to 5.6 , Vit B12 critically low to below 150. Retic count 3 Was started on IV supplementation. He had a difficult colonoscopy 6/19/ 2024 long and redundant colon but no polyps  59 y.o. male with PMH of COPD/RA/CAD/AAA, DM, HLD/ SUNG-- recent admission from April 24-29 with lower ext numbness on both sides and unable to walk. This has been new last 2 weeks or so.  He has leucocytosis, treated with Vanco and cefepime prior to DC. He had leucocytosis throughout the hospital stay, negative cultures. Referral to rheumatology- but no note of eval found on Care everywhere.  Seen at Neurology office, and sent to ED for concern of GBS for further work up. Found to have leucocytosis, severe anemia of <6, stool blood +, cxr clear, CT with right sided pyelitis.   Placed on vancomycin and cefepime.  5/9/2024: Pt was admitted to [facility] 3 days after his last visit with me, and was found to be in DKA with Encephalopathy causing seizure activity. He has since stabilized, and has had no further seizure activity since being in the hospital. prior Whjenny with

## 2025-05-08 ENCOUNTER — APPOINTMENT (OUTPATIENT)
Facility: HOSPITAL | Age: 60
DRG: 871 | End: 2025-05-08
Attending: INTERNAL MEDICINE
Payer: MEDICARE

## 2025-05-08 LAB
25(OH)D3 SERPL-MCNC: <6 NG/ML (ref 30–100)
25(OH)D3 SERPL-MCNC: <6 NG/ML (ref 30–100)
ANION GAP SERPL CALC-SCNC: 12 MMOL/L (ref 3–18)
BACTERIA SPEC CULT: NORMAL
BACTERIA SPEC CULT: NORMAL
BASOPHILS # BLD: 0.11 K/UL (ref 0–0.1)
BASOPHILS NFR BLD: 0.5 % (ref 0–2)
BUN SERPL-MCNC: 26 MG/DL (ref 6–23)
BUN/CREAT SERPL: 18 (ref 12–20)
CALCIUM SERPL-MCNC: 8.1 MG/DL (ref 8.5–10.1)
CHLORIDE SERPL-SCNC: 98 MMOL/L (ref 98–107)
CO2 SERPL-SCNC: 22 MMOL/L (ref 21–32)
CORTIS AM PEAK SERPL-MCNC: 13.3 UG/DL (ref 4.3–22.45)
CREAT SERPL-MCNC: 1.4 MG/DL (ref 0.6–1.3)
CRP SERPL-MCNC: 13.3 MG/DL (ref 0–5)
DIFFERENTIAL METHOD BLD: ABNORMAL
ECHO BSA: 1.75 M2
EOSINOPHIL # BLD: 0.76 K/UL (ref 0–0.4)
EOSINOPHIL NFR BLD: 3.6 % (ref 0–5)
ERYTHROCYTE [DISTWIDTH] IN BLOOD BY AUTOMATED COUNT: 16.2 % (ref 11.6–14.5)
ERYTHROCYTE [SEDIMENTATION RATE] IN BLOOD: 36 MM/HR (ref 0–20)
FERRITIN SERPL-MCNC: 388 NG/ML (ref 13–400)
GLUCOSE BLD STRIP.AUTO-MCNC: 198 MG/DL (ref 70–110)
GLUCOSE BLD STRIP.AUTO-MCNC: 234 MG/DL (ref 70–110)
GLUCOSE BLD STRIP.AUTO-MCNC: 433 MG/DL (ref 70–110)
GLUCOSE BLD STRIP.AUTO-MCNC: 462 MG/DL (ref 70–110)
GLUCOSE SERPL-MCNC: 195 MG/DL (ref 74–108)
HCT VFR BLD AUTO: 30.8 % (ref 36–48)
HGB BLD-MCNC: 9.5 G/DL (ref 13–16)
HIV 1+2 AB+HIV1 P24 AG SERPL QL IA: NONREACTIVE
HIV 1/2 RESULT COMMENT: NORMAL
IMM GRANULOCYTES # BLD AUTO: 0.27 K/UL (ref 0–0.04)
IMM GRANULOCYTES NFR BLD AUTO: 1.3 % (ref 0–0.5)
LYMPHOCYTES # BLD: 1.18 K/UL (ref 0.9–3.3)
LYMPHOCYTES NFR BLD: 5.6 % (ref 21–52)
MAGNESIUM SERPL-MCNC: 2.2 MG/DL (ref 1.6–2.6)
MCH RBC QN AUTO: 28.2 PG (ref 24–34)
MCHC RBC AUTO-ENTMCNC: 30.8 G/DL (ref 31–37)
MCV RBC AUTO: 91.4 FL (ref 78–100)
MONOCYTES # BLD: 1.07 K/UL (ref 0.05–1.2)
MONOCYTES NFR BLD: 5.1 % (ref 3–10)
NEUTS SEG # BLD: 17.62 K/UL (ref 1.8–8)
NEUTS SEG NFR BLD: 83.9 % (ref 40–73)
NRBC # BLD: 0 K/UL (ref 0–0.01)
NRBC BLD-RTO: 0 PER 100 WBC
PLATELET # BLD AUTO: 395 K/UL (ref 135–420)
PMV BLD AUTO: 10.7 FL (ref 9.2–11.8)
POTASSIUM SERPL-SCNC: 4.1 MMOL/L (ref 3.5–5.5)
PROCALCITONIN SERPL-MCNC: 0.65 NG/ML
RBC # BLD AUTO: 3.37 M/UL (ref 4.35–5.65)
RHEUMATOID FACT SERPL-ACNC: 244 IU/ML (ref 0–15)
SERVICE CMNT-IMP: NORMAL
SERVICE CMNT-IMP: NORMAL
SODIUM SERPL-SCNC: 132 MMOL/L (ref 136–145)
VIT B12 SERPL-MCNC: >2000 PG/ML (ref 211–911)
WBC # BLD AUTO: 21 K/UL (ref 4.6–13.2)

## 2025-05-08 PROCEDURE — 6370000000 HC RX 637 (ALT 250 FOR IP): Performed by: INTERNAL MEDICINE

## 2025-05-08 PROCEDURE — 6360000002 HC RX W HCPCS: Performed by: HOSPITALIST

## 2025-05-08 PROCEDURE — 6370000000 HC RX 637 (ALT 250 FOR IP): Performed by: PHYSICIAN ASSISTANT

## 2025-05-08 PROCEDURE — 2580000003 HC RX 258: Performed by: INTERNAL MEDICINE

## 2025-05-08 PROCEDURE — 81279 JAK2 GENE TRGT SEQUENCE ALYS: CPT

## 2025-05-08 PROCEDURE — 81206 BCR/ABL1 GENE MAJOR BP: CPT

## 2025-05-08 PROCEDURE — 86340 INTRINSIC FACTOR ANTIBODY: CPT

## 2025-05-08 PROCEDURE — 6370000000 HC RX 637 (ALT 250 FOR IP): Performed by: FAMILY MEDICINE

## 2025-05-08 PROCEDURE — 85025 COMPLETE CBC W/AUTO DIFF WBC: CPT

## 2025-05-08 PROCEDURE — 82525 ASSAY OF COPPER: CPT

## 2025-05-08 PROCEDURE — 6360000002 HC RX W HCPCS: Performed by: PHYSICIAN ASSISTANT

## 2025-05-08 PROCEDURE — 81338 MPL GENE COMMON VARIANTS: CPT

## 2025-05-08 PROCEDURE — 6360000002 HC RX W HCPCS: Performed by: INTERNAL MEDICINE

## 2025-05-08 PROCEDURE — 86334 IMMUNOFIX E-PHORESIS SERUM: CPT

## 2025-05-08 PROCEDURE — G0499 HEPB SCREEN HIGH RISK INDIV: HCPCS

## 2025-05-08 PROCEDURE — 87340 HEPATITIS B SURFACE AG IA: CPT

## 2025-05-08 PROCEDURE — 97116 GAIT TRAINING THERAPY: CPT

## 2025-05-08 PROCEDURE — 86431 RHEUMATOID FACTOR QUANT: CPT

## 2025-05-08 PROCEDURE — 84145 PROCALCITONIN (PCT): CPT

## 2025-05-08 PROCEDURE — 84155 ASSAY OF PROTEIN SERUM: CPT

## 2025-05-08 PROCEDURE — 83735 ASSAY OF MAGNESIUM: CPT

## 2025-05-08 PROCEDURE — 93970 EXTREMITY STUDY: CPT

## 2025-05-08 PROCEDURE — 84165 PROTEIN E-PHORESIS SERUM: CPT

## 2025-05-08 PROCEDURE — 1100000003 HC PRIVATE W/ TELEMETRY

## 2025-05-08 PROCEDURE — 81270 JAK2 GENE: CPT

## 2025-05-08 PROCEDURE — 80048 BASIC METABOLIC PNL TOTAL CA: CPT

## 2025-05-08 PROCEDURE — 6370000000 HC RX 637 (ALT 250 FOR IP): Performed by: PSYCHIATRY & NEUROLOGY

## 2025-05-08 PROCEDURE — 97162 PT EVAL MOD COMPLEX 30 MIN: CPT

## 2025-05-08 PROCEDURE — 85652 RBC SED RATE AUTOMATED: CPT

## 2025-05-08 PROCEDURE — 81219 CALR GENE COM VARIANTS: CPT

## 2025-05-08 PROCEDURE — 6370000000 HC RX 637 (ALT 250 FOR IP): Performed by: HOSPITALIST

## 2025-05-08 PROCEDURE — 83521 IG LIGHT CHAINS FREE EACH: CPT

## 2025-05-08 PROCEDURE — 82728 ASSAY OF FERRITIN: CPT

## 2025-05-08 PROCEDURE — 36415 COLL VENOUS BLD VENIPUNCTURE: CPT

## 2025-05-08 PROCEDURE — 82784 ASSAY IGA/IGD/IGG/IGM EACH: CPT

## 2025-05-08 PROCEDURE — 82306 VITAMIN D 25 HYDROXY: CPT

## 2025-05-08 PROCEDURE — 82962 GLUCOSE BLOOD TEST: CPT

## 2025-05-08 PROCEDURE — 83516 IMMUNOASSAY NONANTIBODY: CPT

## 2025-05-08 PROCEDURE — 81207 BCR/ABL1 GENE MINOR BP: CPT

## 2025-05-08 PROCEDURE — 2500000003 HC RX 250 WO HCPCS: Performed by: PHYSICIAN ASSISTANT

## 2025-05-08 PROCEDURE — 82941 ASSAY OF GASTRIN: CPT

## 2025-05-08 PROCEDURE — 84630 ASSAY OF ZINC: CPT

## 2025-05-08 PROCEDURE — 97602 WOUND(S) CARE NON-SELECTIVE: CPT

## 2025-05-08 PROCEDURE — 86140 C-REACTIVE PROTEIN: CPT

## 2025-05-08 PROCEDURE — 82533 TOTAL CORTISOL: CPT

## 2025-05-08 PROCEDURE — 2580000003 HC RX 258: Performed by: HOSPITALIST

## 2025-05-08 PROCEDURE — 87389 HIV-1 AG W/HIV-1&-2 AB AG IA: CPT

## 2025-05-08 RX ORDER — VITAMIN B COMPLEX
2000 TABLET ORAL DAILY
Status: DISCONTINUED | OUTPATIENT
Start: 2025-05-15 | End: 2025-05-22 | Stop reason: HOSPADM

## 2025-05-08 RX ORDER — HEPARIN SODIUM 1000 [USP'U]/ML
80 INJECTION, SOLUTION INTRAVENOUS; SUBCUTANEOUS ONCE
Status: DISCONTINUED | OUTPATIENT
Start: 2025-05-08 | End: 2025-05-08

## 2025-05-08 RX ORDER — LACTULOSE 10 G/15ML
20 SOLUTION ORAL ONCE
Status: COMPLETED | OUTPATIENT
Start: 2025-05-08 | End: 2025-05-08

## 2025-05-08 RX ORDER — HEPARIN SODIUM 1000 [USP'U]/ML
80 INJECTION, SOLUTION INTRAVENOUS; SUBCUTANEOUS PRN
Status: DISCONTINUED | OUTPATIENT
Start: 2025-05-08 | End: 2025-05-22 | Stop reason: HOSPADM

## 2025-05-08 RX ORDER — HEPARIN SODIUM 1000 [USP'U]/ML
40 INJECTION, SOLUTION INTRAVENOUS; SUBCUTANEOUS PRN
Status: DISCONTINUED | OUTPATIENT
Start: 2025-05-08 | End: 2025-05-22 | Stop reason: HOSPADM

## 2025-05-08 RX ORDER — HEPARIN SODIUM 10000 [USP'U]/100ML
5-30 INJECTION, SOLUTION INTRAVENOUS CONTINUOUS
Status: DISCONTINUED | OUTPATIENT
Start: 2025-05-08 | End: 2025-05-22 | Stop reason: HOSPADM

## 2025-05-08 RX ADMIN — SODIUM CHLORIDE, PRESERVATIVE FREE 40 MG: 5 INJECTION INTRAVENOUS at 06:35

## 2025-05-08 RX ADMIN — INSULIN LISPRO 4 UNITS: 100 INJECTION, SOLUTION INTRAVENOUS; SUBCUTANEOUS at 06:35

## 2025-05-08 RX ADMIN — OXYCODONE 10 MG: 5 TABLET ORAL at 19:24

## 2025-05-08 RX ADMIN — ATORVASTATIN CALCIUM 20 MG: 20 TABLET, FILM COATED ORAL at 20:12

## 2025-05-08 RX ADMIN — INSULIN LISPRO 16 UNITS: 100 INJECTION, SOLUTION INTRAVENOUS; SUBCUTANEOUS at 20:18

## 2025-05-08 RX ADMIN — SENNOSIDES AND DOCUSATE SODIUM 1 TABLET: 50; 8.6 TABLET ORAL at 20:12

## 2025-05-08 RX ADMIN — LACTULOSE 20 G: 10 SOLUTION ORAL at 18:33

## 2025-05-08 RX ADMIN — GABAPENTIN 300 MG: 300 CAPSULE ORAL at 14:16

## 2025-05-08 RX ADMIN — PANCRELIPASE LIPASE, PANCRELIPASE PROTEASE, PANCRELIPASE AMYLASE 10000 UNITS: 5000; 17000; 24000 CAPSULE, DELAYED RELEASE ORAL at 08:16

## 2025-05-08 RX ADMIN — INSULIN GLARGINE 10 UNITS: 100 INJECTION, SOLUTION SUBCUTANEOUS at 20:18

## 2025-05-08 RX ADMIN — SODIUM CHLORIDE, PRESERVATIVE FREE 40 MG: 5 INJECTION INTRAVENOUS at 17:47

## 2025-05-08 RX ADMIN — PANCRELIPASE LIPASE, PANCRELIPASE PROTEASE, PANCRELIPASE AMYLASE 10000 UNITS: 5000; 17000; 24000 CAPSULE, DELAYED RELEASE ORAL at 17:46

## 2025-05-08 RX ADMIN — INSULIN LISPRO 1 UNITS: 100 INJECTION, SOLUTION INTRAVENOUS; SUBCUTANEOUS at 12:23

## 2025-05-08 RX ADMIN — ACETAMINOPHEN 650 MG: 325 TABLET ORAL at 20:13

## 2025-05-08 RX ADMIN — QUETIAPINE FUMARATE 150 MG: 100 TABLET ORAL at 20:14

## 2025-05-08 RX ADMIN — GABAPENTIN 300 MG: 300 CAPSULE ORAL at 08:16

## 2025-05-08 RX ADMIN — MIRTAZAPINE 7.5 MG: 15 TABLET, FILM COATED ORAL at 20:13

## 2025-05-08 RX ADMIN — GABAPENTIN 300 MG: 300 CAPSULE ORAL at 20:13

## 2025-05-08 RX ADMIN — ONDANSETRON 4 MG: 2 INJECTION, SOLUTION INTRAMUSCULAR; INTRAVENOUS at 18:33

## 2025-05-08 RX ADMIN — Medication 6000 UNITS: at 14:15

## 2025-05-08 RX ADMIN — IRON SUCROSE 300 MG: 20 INJECTION, SOLUTION INTRAVENOUS at 08:23

## 2025-05-08 RX ADMIN — HEPARIN SODIUM 18 UNITS/KG/HR: 10000 INJECTION, SOLUTION INTRAVENOUS at 17:54

## 2025-05-08 RX ADMIN — SODIUM CHLORIDE, PRESERVATIVE FREE 10 ML: 5 INJECTION INTRAVENOUS at 08:17

## 2025-05-08 RX ADMIN — SODIUM CHLORIDE, PRESERVATIVE FREE 10 ML: 5 INJECTION INTRAVENOUS at 20:22

## 2025-05-08 ASSESSMENT — PAIN SCALES - GENERAL
PAINLEVEL_OUTOF10: 10
PAINLEVEL_OUTOF10: 4

## 2025-05-08 ASSESSMENT — PAIN DESCRIPTION - LOCATION: LOCATION: FOOT

## 2025-05-08 ASSESSMENT — PAIN DESCRIPTION - ORIENTATION: ORIENTATION: RIGHT;LEFT

## 2025-05-08 NOTE — PERIOP NOTE
Patient transported via bed by transport staff. Report given to Nhan DENNISON on [facility] by telephone regarding procedure, medications administered, and findings.     Chart and belongings in patient possession on transport.

## 2025-05-08 NOTE — PRE SEDATION
Sedation Pre-Procedure Note    Patient Name: Lan Gonzalez   YOB: 1965  Room/Bed: ENDO/PL  Medical Record Number: [id number]  Date: 5/7/2025   Time: 8:21 PM       Indication:  iron deficiency anemia    Consent: I have discussed with the patient and/or the patient representative the indication, alternatives, and the possible risks and/or complications of the planned procedure and the anesthesia methods. The patient and/or patient representative appear to understand and agree to proceed.    Vital Signs:   Vitals:    05/07/25 1700   BP:    Pulse: 82   Resp:    Temp:    SpO2:        Past Medical History:   has a past medical history of AAA (abdominal aortic aneurysm), Anxiety, Arthritis, BPH (benign prostatic hyperplasia), CAD (coronary artery disease), Cardiomyopathy (HCC), Cerebral artery occlusion with cerebral infarction (HCC), Chronic kidney disease (CKD), active medical management without dialysis, stage 3 (moderate) (HCC), Chronic pain, Chronic pancreatitis (HCC), COPD (chronic obstructive pulmonary disease) (HCC), Diverticulosis, Exercise tolerance finding, Gastritis, Gastroparesis, Hiatal hernia, Hypercholesterolemia, Hypoalbuminemia due to protein-calorie malnutrition, Insulin pump in place, MDD (major depressive disorder), Migraine, Multiple kidney stones, SUNG (nonalcoholic steatohepatitis), Pancreatitis, chronic (HCC), Psychiatric disorder, RA (rheumatoid arthritis) (HCC), Seizure due to hypoglycemia (HCC), Type 1 diabetes mellitus (HCC), Vertigo, peripheral, and Wears glasses.    Past Surgical History:   has a past surgical history that includes cervical fusion (2001); pr unlisted procedure abdomen peritoneum & omentum (2014); orthopedic surgery (Left); Cardiac catheterization (11/2020); orthopedic surgery; Neck surgery (2017); Hand surgery (Left); Endoscopy, colon, diagnostic (2014); Colonoscopy (N/A, 06/19/2024); Cystoscopy (N/A, 10/17/2024); Urological Surgery (03/11/2021); Urological

## 2025-05-09 ENCOUNTER — APPOINTMENT (OUTPATIENT)
Facility: HOSPITAL | Age: 60
DRG: 871 | End: 2025-05-09
Attending: INTERNAL MEDICINE
Payer: MEDICARE

## 2025-05-09 LAB
ANION GAP SERPL CALC-SCNC: 11 MMOL/L (ref 3–18)
APTT PPP: 112.7 SEC (ref 23–36.4)
APTT PPP: 120.7 SEC (ref 23–36.4)
APTT PPP: 33.8 SEC (ref 23–36.4)
APTT PPP: 60.5 SEC (ref 23–36.4)
BASOPHILS # BLD: 0.09 K/UL (ref 0–0.1)
BASOPHILS NFR BLD: 0.4 % (ref 0–2)
BUN SERPL-MCNC: 26 MG/DL (ref 6–23)
BUN/CREAT SERPL: 20 (ref 12–20)
CALCIUM SERPL-MCNC: 7.8 MG/DL (ref 8.5–10.1)
CHLORIDE SERPL-SCNC: 100 MMOL/L (ref 98–107)
CO2 SERPL-SCNC: 22 MMOL/L (ref 21–32)
COPPER SERPL-MCNC: 85 UG/DL (ref 69–132)
CREAT SERPL-MCNC: 1.32 MG/DL (ref 0.6–1.3)
DIFFERENTIAL METHOD BLD: ABNORMAL
ECHO BSA: 1.75 M2
EOSINOPHIL # BLD: 0.77 K/UL (ref 0–0.4)
EOSINOPHIL NFR BLD: 3.8 % (ref 0–5)
ERYTHROCYTE [DISTWIDTH] IN BLOOD BY AUTOMATED COUNT: 15.8 % (ref 11.6–14.5)
GASTRIN SERPL-MCNC: 45 PG/ML (ref 0–115)
GLUCOSE BLD STRIP.AUTO-MCNC: 184 MG/DL (ref 70–110)
GLUCOSE BLD STRIP.AUTO-MCNC: 191 MG/DL (ref 70–110)
GLUCOSE BLD STRIP.AUTO-MCNC: 258 MG/DL (ref 70–110)
GLUCOSE BLD STRIP.AUTO-MCNC: 322 MG/DL (ref 70–110)
GLUCOSE SERPL-MCNC: 284 MG/DL (ref 74–108)
HBV SURFACE AG SER QL: NONREACTIVE
HCT VFR BLD AUTO: 26.1 % (ref 36–48)
HGB BLD-MCNC: 8.3 G/DL (ref 13–16)
IMM GRANULOCYTES # BLD AUTO: 0.82 K/UL (ref 0–0.04)
IMM GRANULOCYTES NFR BLD AUTO: 4 % (ref 0–0.5)
LYMPHOCYTES # BLD: 1.6 K/UL (ref 0.9–3.3)
LYMPHOCYTES NFR BLD: 7.9 % (ref 21–52)
MAGNESIUM SERPL-MCNC: 1.9 MG/DL (ref 1.6–2.6)
MCH RBC QN AUTO: 27.5 PG (ref 24–34)
MCHC RBC AUTO-ENTMCNC: 31.8 G/DL (ref 31–37)
MCV RBC AUTO: 86.4 FL (ref 78–100)
MONOCYTES # BLD: 1.03 K/UL (ref 0.05–1.2)
MONOCYTES NFR BLD: 5.1 % (ref 3–10)
NEUTS SEG # BLD: 15.98 K/UL (ref 1.8–8)
NEUTS SEG NFR BLD: 78.8 % (ref 40–73)
NRBC # BLD: 0 K/UL (ref 0–0.01)
NRBC BLD-RTO: 0 PER 100 WBC
PLATELET # BLD AUTO: 390 K/UL (ref 135–420)
PMV BLD AUTO: 10.6 FL (ref 9.2–11.8)
POTASSIUM SERPL-SCNC: 3.8 MMOL/L (ref 3.5–5.5)
RBC # BLD AUTO: 3.02 M/UL (ref 4.35–5.65)
SODIUM SERPL-SCNC: 133 MMOL/L (ref 136–145)
WBC # BLD AUTO: 20.3 K/UL (ref 4.6–13.2)
ZINC SERPL-MCNC: 40 UG/DL (ref 44–115)

## 2025-05-09 PROCEDURE — 36415 COLL VENOUS BLD VENIPUNCTURE: CPT

## 2025-05-09 PROCEDURE — 85730 THROMBOPLASTIN TIME PARTIAL: CPT

## 2025-05-09 PROCEDURE — 97530 THERAPEUTIC ACTIVITIES: CPT

## 2025-05-09 PROCEDURE — 82962 GLUCOSE BLOOD TEST: CPT

## 2025-05-09 PROCEDURE — 80048 BASIC METABOLIC PNL TOTAL CA: CPT

## 2025-05-09 PROCEDURE — 6370000000 HC RX 637 (ALT 250 FOR IP): Performed by: FAMILY MEDICINE

## 2025-05-09 PROCEDURE — 83735 ASSAY OF MAGNESIUM: CPT

## 2025-05-09 PROCEDURE — 6370000000 HC RX 637 (ALT 250 FOR IP): Performed by: INTERNAL MEDICINE

## 2025-05-09 PROCEDURE — 2500000003 HC RX 250 WO HCPCS: Performed by: PHYSICIAN ASSISTANT

## 2025-05-09 PROCEDURE — 6370000000 HC RX 637 (ALT 250 FOR IP): Performed by: HOSPITALIST

## 2025-05-09 PROCEDURE — 6370000000 HC RX 637 (ALT 250 FOR IP): Performed by: PSYCHIATRY & NEUROLOGY

## 2025-05-09 PROCEDURE — 6360000002 HC RX W HCPCS: Performed by: HOSPITALIST

## 2025-05-09 PROCEDURE — 6360000002 HC RX W HCPCS: Performed by: INTERNAL MEDICINE

## 2025-05-09 PROCEDURE — 2580000003 HC RX 258: Performed by: INTERNAL MEDICINE

## 2025-05-09 PROCEDURE — 85025 COMPLETE CBC W/AUTO DIFF WBC: CPT

## 2025-05-09 PROCEDURE — 6370000000 HC RX 637 (ALT 250 FOR IP): Performed by: PHYSICIAN ASSISTANT

## 2025-05-09 PROCEDURE — 97166 OT EVAL MOD COMPLEX 45 MIN: CPT

## 2025-05-09 PROCEDURE — 93970 EXTREMITY STUDY: CPT

## 2025-05-09 PROCEDURE — 97116 GAIT TRAINING THERAPY: CPT

## 2025-05-09 PROCEDURE — 1100000003 HC PRIVATE W/ TELEMETRY

## 2025-05-09 PROCEDURE — 2580000003 HC RX 258: Performed by: HOSPITALIST

## 2025-05-09 RX ADMIN — MIRTAZAPINE 7.5 MG: 15 TABLET, FILM COATED ORAL at 20:44

## 2025-05-09 RX ADMIN — GABAPENTIN 300 MG: 300 CAPSULE ORAL at 08:56

## 2025-05-09 RX ADMIN — HEPARIN SODIUM 18 UNITS/KG/HR: 10000 INJECTION, SOLUTION INTRAVENOUS at 16:10

## 2025-05-09 RX ADMIN — GABAPENTIN 300 MG: 300 CAPSULE ORAL at 20:46

## 2025-05-09 RX ADMIN — SENNOSIDES AND DOCUSATE SODIUM 1 TABLET: 50; 8.6 TABLET ORAL at 08:55

## 2025-05-09 RX ADMIN — INSULIN LISPRO 4 UNITS: 100 INJECTION, SOLUTION INTRAVENOUS; SUBCUTANEOUS at 11:58

## 2025-05-09 RX ADMIN — QUETIAPINE FUMARATE 150 MG: 100 TABLET ORAL at 20:45

## 2025-05-09 RX ADMIN — SODIUM CHLORIDE, PRESERVATIVE FREE 10 ML: 5 INJECTION INTRAVENOUS at 20:48

## 2025-05-09 RX ADMIN — ATORVASTATIN CALCIUM 20 MG: 20 TABLET, FILM COATED ORAL at 20:44

## 2025-05-09 RX ADMIN — INSULIN LISPRO 8 UNITS: 100 INJECTION, SOLUTION INTRAVENOUS; SUBCUTANEOUS at 17:35

## 2025-05-09 RX ADMIN — GABAPENTIN 300 MG: 300 CAPSULE ORAL at 16:40

## 2025-05-09 RX ADMIN — HEPARIN SODIUM 5000 UNITS: 1000 INJECTION INTRAVENOUS; SUBCUTANEOUS at 20:33

## 2025-05-09 RX ADMIN — HEPARIN SODIUM 2500 UNITS: 1000 INJECTION INTRAVENOUS; SUBCUTANEOUS at 01:21

## 2025-05-09 RX ADMIN — Medication 6000 UNITS: at 08:57

## 2025-05-09 RX ADMIN — SENNOSIDES AND DOCUSATE SODIUM 1 TABLET: 50; 8.6 TABLET ORAL at 20:46

## 2025-05-09 RX ADMIN — INSULIN GLARGINE 10 UNITS: 100 INJECTION, SOLUTION SUBCUTANEOUS at 20:46

## 2025-05-09 RX ADMIN — PANCRELIPASE LIPASE, PANCRELIPASE PROTEASE, PANCRELIPASE AMYLASE 10000 UNITS: 5000; 17000; 24000 CAPSULE, DELAYED RELEASE ORAL at 08:56

## 2025-05-09 RX ADMIN — IRON SUCROSE 300 MG: 20 INJECTION, SOLUTION INTRAVENOUS at 06:39

## 2025-05-09 RX ADMIN — OXYCODONE 10 MG: 5 TABLET ORAL at 20:43

## 2025-05-09 RX ADMIN — INSULIN LISPRO 4 UNITS: 100 INJECTION, SOLUTION INTRAVENOUS; SUBCUTANEOUS at 06:33

## 2025-05-09 RX ADMIN — PANCRELIPASE LIPASE, PANCRELIPASE PROTEASE, PANCRELIPASE AMYLASE 10000 UNITS: 5000; 17000; 24000 CAPSULE, DELAYED RELEASE ORAL at 17:35

## 2025-05-09 RX ADMIN — SODIUM CHLORIDE, PRESERVATIVE FREE 40 MG: 5 INJECTION INTRAVENOUS at 06:33

## 2025-05-09 RX ADMIN — OXYCODONE 10 MG: 5 TABLET ORAL at 06:44

## 2025-05-09 RX ADMIN — SODIUM CHLORIDE, PRESERVATIVE FREE 10 ML: 5 INJECTION INTRAVENOUS at 08:58

## 2025-05-09 RX ADMIN — SODIUM CHLORIDE, PRESERVATIVE FREE 40 MG: 5 INJECTION INTRAVENOUS at 17:35

## 2025-05-09 RX ADMIN — PANCRELIPASE LIPASE, PANCRELIPASE PROTEASE, PANCRELIPASE AMYLASE 10000 UNITS: 5000; 17000; 24000 CAPSULE, DELAYED RELEASE ORAL at 11:58

## 2025-05-09 RX ADMIN — INSULIN LISPRO 12 UNITS: 100 INJECTION, SOLUTION INTRAVENOUS; SUBCUTANEOUS at 20:46

## 2025-05-09 ASSESSMENT — PAIN DESCRIPTION - LOCATION: LOCATION: LEG

## 2025-05-09 ASSESSMENT — PAIN SCALES - GENERAL
PAINLEVEL_OUTOF10: 9
PAINLEVEL_OUTOF10: 6
PAINLEVEL_OUTOF10: 4
PAINLEVEL_OUTOF10: 3

## 2025-05-09 ASSESSMENT — PAIN - FUNCTIONAL ASSESSMENT: PAIN_FUNCTIONAL_ASSESSMENT: ACTIVITIES ARE NOT PREVENTED

## 2025-05-09 ASSESSMENT — PAIN DESCRIPTION - ORIENTATION: ORIENTATION: LEFT;RIGHT

## 2025-05-09 ASSESSMENT — PAIN SCALES - WONG BAKER: WONGBAKER_NUMERICALRESPONSE: HURTS A LITTLE BIT

## 2025-05-09 ASSESSMENT — PAIN DESCRIPTION - DESCRIPTORS: DESCRIPTORS: SHOOTING;ACHING

## 2025-05-10 PROBLEM — G63: Status: ACTIVE | Noted: 2025-05-10

## 2025-05-10 PROBLEM — E56.9: Status: ACTIVE | Noted: 2025-05-10

## 2025-05-10 LAB
APTT PPP: 111.8 SEC (ref 23–36.4)
APTT PPP: 145.9 SEC (ref 23–36.4)
APTT PPP: 50.8 SEC (ref 23–36.4)
CRYPTOC AG SER QL IA: NEGATIVE
ERYTHROCYTE [DISTWIDTH] IN BLOOD BY AUTOMATED COUNT: 16.1 % (ref 11.6–14.5)
GLUCOSE BLD STRIP.AUTO-MCNC: 148 MG/DL (ref 70–110)
GLUCOSE BLD STRIP.AUTO-MCNC: 192 MG/DL (ref 70–110)
GLUCOSE BLD STRIP.AUTO-MCNC: 196 MG/DL (ref 70–110)
GLUCOSE BLD STRIP.AUTO-MCNC: 238 MG/DL (ref 70–110)
HBV CORE AB SERPL QL IA: NEGATIVE
HBV SURFACE AB SERPL IA-ACNC: <3.5 MIU/ML
HCT VFR BLD AUTO: 27.8 % (ref 36–48)
HEP BS AB COMMENT: ABNORMAL
HGB BLD-MCNC: 8.8 G/DL (ref 13–16)
MCH RBC QN AUTO: 27.6 PG (ref 24–34)
MCHC RBC AUTO-ENTMCNC: 31.7 G/DL (ref 31–37)
MCV RBC AUTO: 87.1 FL (ref 78–100)
NRBC # BLD: 0 K/UL (ref 0–0.01)
NRBC BLD-RTO: 0 PER 100 WBC
PLATELET # BLD AUTO: 415 K/UL (ref 135–420)
PMV BLD AUTO: 9.9 FL (ref 9.2–11.8)
RBC # BLD AUTO: 3.19 M/UL (ref 4.35–5.65)
WBC # BLD AUTO: 22.2 K/UL (ref 4.6–13.2)

## 2025-05-10 PROCEDURE — 36415 COLL VENOUS BLD VENIPUNCTURE: CPT

## 2025-05-10 PROCEDURE — 6370000000 HC RX 637 (ALT 250 FOR IP): Performed by: INTERNAL MEDICINE

## 2025-05-10 PROCEDURE — 2500000003 HC RX 250 WO HCPCS: Performed by: PHYSICIAN ASSISTANT

## 2025-05-10 PROCEDURE — 6370000000 HC RX 637 (ALT 250 FOR IP): Performed by: PHYSICIAN ASSISTANT

## 2025-05-10 PROCEDURE — 85027 COMPLETE CBC AUTOMATED: CPT

## 2025-05-10 PROCEDURE — 83521 IG LIGHT CHAINS FREE EACH: CPT

## 2025-05-10 PROCEDURE — 6360000002 HC RX W HCPCS: Performed by: INTERNAL MEDICINE

## 2025-05-10 PROCEDURE — 2580000003 HC RX 258: Performed by: INTERNAL MEDICINE

## 2025-05-10 PROCEDURE — 6360000002 HC RX W HCPCS: Performed by: HOSPITALIST

## 2025-05-10 PROCEDURE — 87327 CRYPTOCOCCUS NEOFORM AG IA: CPT

## 2025-05-10 PROCEDURE — 6370000000 HC RX 637 (ALT 250 FOR IP): Performed by: HOSPITALIST

## 2025-05-10 PROCEDURE — 87449 NOS EACH ORGANISM AG IA: CPT

## 2025-05-10 PROCEDURE — 97116 GAIT TRAINING THERAPY: CPT

## 2025-05-10 PROCEDURE — 82962 GLUCOSE BLOOD TEST: CPT

## 2025-05-10 PROCEDURE — 87305 ASPERGILLUS AG IA: CPT

## 2025-05-10 PROCEDURE — 85730 THROMBOPLASTIN TIME PARTIAL: CPT

## 2025-05-10 PROCEDURE — 82785 ASSAY OF IGE: CPT

## 2025-05-10 PROCEDURE — 1100000003 HC PRIVATE W/ TELEMETRY

## 2025-05-10 PROCEDURE — 2580000003 HC RX 258: Performed by: HOSPITALIST

## 2025-05-10 PROCEDURE — 6370000000 HC RX 637 (ALT 250 FOR IP): Performed by: FAMILY MEDICINE

## 2025-05-10 PROCEDURE — 86612 BLASTOMYCES ANTIBODY: CPT

## 2025-05-10 PROCEDURE — P9047 ALBUMIN (HUMAN), 25%, 50ML: HCPCS | Performed by: HOSPITALIST

## 2025-05-10 PROCEDURE — 82784 ASSAY IGA/IGD/IGG/IGM EACH: CPT

## 2025-05-10 PROCEDURE — 6370000000 HC RX 637 (ALT 250 FOR IP): Performed by: PSYCHIATRY & NEUROLOGY

## 2025-05-10 RX ORDER — ALBUMIN (HUMAN) 12.5 G/50ML
12.5 SOLUTION INTRAVENOUS EVERY 8 HOURS
Status: COMPLETED | OUTPATIENT
Start: 2025-05-10 | End: 2025-05-12

## 2025-05-10 RX ORDER — FUROSEMIDE 10 MG/ML
20 INJECTION INTRAMUSCULAR; INTRAVENOUS DAILY
Status: COMPLETED | OUTPATIENT
Start: 2025-05-11 | End: 2025-05-12

## 2025-05-10 RX ADMIN — SENNOSIDES AND DOCUSATE SODIUM 1 TABLET: 50; 8.6 TABLET ORAL at 08:47

## 2025-05-10 RX ADMIN — PANCRELIPASE LIPASE, PANCRELIPASE PROTEASE, PANCRELIPASE AMYLASE 10000 UNITS: 5000; 17000; 24000 CAPSULE, DELAYED RELEASE ORAL at 06:19

## 2025-05-10 RX ADMIN — SODIUM CHLORIDE, PRESERVATIVE FREE 40 MG: 5 INJECTION INTRAVENOUS at 17:30

## 2025-05-10 RX ADMIN — INSULIN GLARGINE 10 UNITS: 100 INJECTION, SOLUTION SUBCUTANEOUS at 21:04

## 2025-05-10 RX ADMIN — PANCRELIPASE LIPASE, PANCRELIPASE PROTEASE, PANCRELIPASE AMYLASE 10000 UNITS: 5000; 17000; 24000 CAPSULE, DELAYED RELEASE ORAL at 11:52

## 2025-05-10 RX ADMIN — SODIUM CHLORIDE, PRESERVATIVE FREE 10 ML: 5 INJECTION INTRAVENOUS at 08:53

## 2025-05-10 RX ADMIN — GABAPENTIN 300 MG: 300 CAPSULE ORAL at 21:07

## 2025-05-10 RX ADMIN — INSULIN LISPRO 4 UNITS: 100 INJECTION, SOLUTION INTRAVENOUS; SUBCUTANEOUS at 11:52

## 2025-05-10 RX ADMIN — Medication 6000 UNITS: at 08:47

## 2025-05-10 RX ADMIN — HEPARIN SODIUM 17 UNITS/KG/HR: 10000 INJECTION, SOLUTION INTRAVENOUS at 15:24

## 2025-05-10 RX ADMIN — GABAPENTIN 300 MG: 300 CAPSULE ORAL at 08:47

## 2025-05-10 RX ADMIN — INSULIN LISPRO 4 UNITS: 100 INJECTION, SOLUTION INTRAVENOUS; SUBCUTANEOUS at 21:05

## 2025-05-10 RX ADMIN — OXYCODONE 10 MG: 5 TABLET ORAL at 21:17

## 2025-05-10 RX ADMIN — IRON SUCROSE 300 MG: 20 INJECTION, SOLUTION INTRAVENOUS at 07:04

## 2025-05-10 RX ADMIN — MIRTAZAPINE 7.5 MG: 15 TABLET, FILM COATED ORAL at 21:08

## 2025-05-10 RX ADMIN — PANCRELIPASE LIPASE, PANCRELIPASE PROTEASE, PANCRELIPASE AMYLASE 10000 UNITS: 5000; 17000; 24000 CAPSULE, DELAYED RELEASE ORAL at 17:30

## 2025-05-10 RX ADMIN — INSULIN LISPRO 4 UNITS: 100 INJECTION, SOLUTION INTRAVENOUS; SUBCUTANEOUS at 17:30

## 2025-05-10 RX ADMIN — QUETIAPINE FUMARATE 150 MG: 100 TABLET ORAL at 21:09

## 2025-05-10 RX ADMIN — SODIUM CHLORIDE, PRESERVATIVE FREE 10 ML: 5 INJECTION INTRAVENOUS at 21:14

## 2025-05-10 RX ADMIN — GABAPENTIN 300 MG: 300 CAPSULE ORAL at 15:14

## 2025-05-10 RX ADMIN — SODIUM CHLORIDE, PRESERVATIVE FREE 40 MG: 5 INJECTION INTRAVENOUS at 06:19

## 2025-05-10 RX ADMIN — ATORVASTATIN CALCIUM 20 MG: 20 TABLET, FILM COATED ORAL at 21:09

## 2025-05-10 RX ADMIN — ALBUMIN (HUMAN) 12.5 G: 0.25 INJECTION, SOLUTION INTRAVENOUS at 22:31

## 2025-05-10 ASSESSMENT — PAIN SCALES - GENERAL
PAINLEVEL_OUTOF10: 7
PAINLEVEL_OUTOF10: 0
PAINLEVEL_OUTOF10: 5

## 2025-05-10 ASSESSMENT — PAIN DESCRIPTION - LOCATION: LOCATION: LEG;FOOT

## 2025-05-10 ASSESSMENT — PAIN SCALES - WONG BAKER: WONGBAKER_NUMERICALRESPONSE: HURTS LITTLE MORE

## 2025-05-11 LAB
ALBUMIN SERPL-MCNC: 1.7 G/DL (ref 3.4–5)
ANION GAP SERPL CALC-SCNC: 10 MMOL/L (ref 3–18)
APTT PPP: 125.3 SEC (ref 23–36.4)
APTT PPP: 45.7 SEC (ref 23–36.4)
APTT PPP: >180 SEC (ref 23–36.4)
BASOPHILS # BLD: 0.08 K/UL (ref 0–0.1)
BASOPHILS NFR BLD: 0.4 % (ref 0–2)
BUN SERPL-MCNC: 24 MG/DL (ref 6–23)
BUN/CREAT SERPL: 19 (ref 12–20)
CALCIUM SERPL-MCNC: 7.9 MG/DL (ref 8.5–10.1)
CHLORIDE SERPL-SCNC: 99 MMOL/L (ref 98–107)
CO2 SERPL-SCNC: 25 MMOL/L (ref 21–32)
CREAT SERPL-MCNC: 1.26 MG/DL (ref 0.6–1.3)
DIFFERENTIAL METHOD BLD: ABNORMAL
EOSINOPHIL # BLD: 0.89 K/UL (ref 0–0.4)
EOSINOPHIL NFR BLD: 4 % (ref 0–5)
ERYTHROCYTE [DISTWIDTH] IN BLOOD BY AUTOMATED COUNT: 16.7 % (ref 11.6–14.5)
GLUCOSE BLD STRIP.AUTO-MCNC: 191 MG/DL (ref 70–110)
GLUCOSE BLD STRIP.AUTO-MCNC: 194 MG/DL (ref 70–110)
GLUCOSE BLD STRIP.AUTO-MCNC: 267 MG/DL (ref 70–110)
GLUCOSE BLD STRIP.AUTO-MCNC: 270 MG/DL (ref 70–110)
GLUCOSE SERPL-MCNC: 174 MG/DL (ref 74–108)
HCT VFR BLD AUTO: 24.9 % (ref 36–48)
HGB BLD-MCNC: 7.7 G/DL (ref 13–16)
IMM GRANULOCYTES # BLD AUTO: 0.73 K/UL (ref 0–0.04)
IMM GRANULOCYTES NFR BLD AUTO: 3.3 % (ref 0–0.5)
LYMPHOCYTES # BLD: 1.45 K/UL (ref 0.9–3.3)
LYMPHOCYTES NFR BLD: 6.6 % (ref 21–52)
MCH RBC QN AUTO: 27.8 PG (ref 24–34)
MCHC RBC AUTO-ENTMCNC: 30.9 G/DL (ref 31–37)
MCV RBC AUTO: 89.9 FL (ref 78–100)
MONOCYTES # BLD: 0.94 K/UL (ref 0.05–1.2)
MONOCYTES NFR BLD: 4.3 % (ref 3–10)
NEUTS SEG # BLD: 17.93 K/UL (ref 1.8–8)
NEUTS SEG NFR BLD: 81.4 % (ref 40–73)
NRBC # BLD: 0 K/UL (ref 0–0.01)
NRBC BLD-RTO: 0 PER 100 WBC
PHOSPHATE SERPL-MCNC: 2.6 MG/DL (ref 2.5–4.9)
PLATELET # BLD AUTO: 410 K/UL (ref 135–420)
PMV BLD AUTO: 10 FL (ref 9.2–11.8)
POTASSIUM SERPL-SCNC: 4.1 MMOL/L (ref 3.5–5.5)
RBC # BLD AUTO: 2.77 M/UL (ref 4.35–5.65)
SODIUM SERPL-SCNC: 133 MMOL/L (ref 136–145)
VIT B1 BLD-SCNC: 135.9 NMOL/L (ref 66.5–200)
VIT B6 SERPL-MCNC: 1.4 UG/L (ref 3.4–65.2)
WBC # BLD AUTO: 22 K/UL (ref 4.6–13.2)

## 2025-05-11 PROCEDURE — 6360000002 HC RX W HCPCS: Performed by: HOSPITALIST

## 2025-05-11 PROCEDURE — 82962 GLUCOSE BLOOD TEST: CPT

## 2025-05-11 PROCEDURE — 85730 THROMBOPLASTIN TIME PARTIAL: CPT

## 2025-05-11 PROCEDURE — 6370000000 HC RX 637 (ALT 250 FOR IP): Performed by: PSYCHIATRY & NEUROLOGY

## 2025-05-11 PROCEDURE — 6370000000 HC RX 637 (ALT 250 FOR IP): Performed by: HOSPITALIST

## 2025-05-11 PROCEDURE — 36415 COLL VENOUS BLD VENIPUNCTURE: CPT

## 2025-05-11 PROCEDURE — 2500000003 HC RX 250 WO HCPCS: Performed by: PHYSICIAN ASSISTANT

## 2025-05-11 PROCEDURE — 6370000000 HC RX 637 (ALT 250 FOR IP): Performed by: INTERNAL MEDICINE

## 2025-05-11 PROCEDURE — 2580000003 HC RX 258: Performed by: HOSPITALIST

## 2025-05-11 PROCEDURE — 6360000002 HC RX W HCPCS: Performed by: INTERNAL MEDICINE

## 2025-05-11 PROCEDURE — 6370000000 HC RX 637 (ALT 250 FOR IP): Performed by: FAMILY MEDICINE

## 2025-05-11 PROCEDURE — 6370000000 HC RX 637 (ALT 250 FOR IP): Performed by: PHYSICIAN ASSISTANT

## 2025-05-11 PROCEDURE — P9047 ALBUMIN (HUMAN), 25%, 50ML: HCPCS | Performed by: HOSPITALIST

## 2025-05-11 PROCEDURE — 80069 RENAL FUNCTION PANEL: CPT

## 2025-05-11 PROCEDURE — 1100000003 HC PRIVATE W/ TELEMETRY

## 2025-05-11 PROCEDURE — 85025 COMPLETE CBC W/AUTO DIFF WBC: CPT

## 2025-05-11 RX ADMIN — ALBUMIN (HUMAN) 12.5 G: 0.25 INJECTION, SOLUTION INTRAVENOUS at 23:26

## 2025-05-11 RX ADMIN — SODIUM CHLORIDE, PRESERVATIVE FREE 10 ML: 5 INJECTION INTRAVENOUS at 08:48

## 2025-05-11 RX ADMIN — Medication 6000 UNITS: at 08:43

## 2025-05-11 RX ADMIN — HEPARIN SODIUM 5000 UNITS: 1000 INJECTION INTRAVENOUS; SUBCUTANEOUS at 19:54

## 2025-05-11 RX ADMIN — CYANOCOBALAMIN 1000 MCG: 1000 INJECTION, SOLUTION INTRAMUSCULAR; SUBCUTANEOUS at 08:44

## 2025-05-11 RX ADMIN — FUROSEMIDE 20 MG: 10 INJECTION, SOLUTION INTRAMUSCULAR; INTRAVENOUS at 13:33

## 2025-05-11 RX ADMIN — GABAPENTIN 300 MG: 300 CAPSULE ORAL at 15:27

## 2025-05-11 RX ADMIN — MIRTAZAPINE 7.5 MG: 15 TABLET, FILM COATED ORAL at 23:25

## 2025-05-11 RX ADMIN — INSULIN LISPRO 4 UNITS: 100 INJECTION, SOLUTION INTRAVENOUS; SUBCUTANEOUS at 11:45

## 2025-05-11 RX ADMIN — PANCRELIPASE LIPASE, PANCRELIPASE PROTEASE, PANCRELIPASE AMYLASE 10000 UNITS: 5000; 17000; 24000 CAPSULE, DELAYED RELEASE ORAL at 08:43

## 2025-05-11 RX ADMIN — ALBUMIN (HUMAN) 12.5 G: 0.25 INJECTION, SOLUTION INTRAVENOUS at 05:59

## 2025-05-11 RX ADMIN — ALBUMIN (HUMAN) 12.5 G: 0.25 INJECTION, SOLUTION INTRAVENOUS at 15:34

## 2025-05-11 RX ADMIN — HEPARIN SODIUM 5000 UNITS: 1000 INJECTION INTRAVENOUS; SUBCUTANEOUS at 00:00

## 2025-05-11 RX ADMIN — GABAPENTIN 300 MG: 300 CAPSULE ORAL at 08:43

## 2025-05-11 RX ADMIN — SODIUM CHLORIDE, PRESERVATIVE FREE 40 MG: 5 INJECTION INTRAVENOUS at 17:20

## 2025-05-11 RX ADMIN — SODIUM CHLORIDE, PRESERVATIVE FREE 10 ML: 5 INJECTION INTRAVENOUS at 21:00

## 2025-05-11 RX ADMIN — OXYCODONE 10 MG: 5 TABLET ORAL at 23:25

## 2025-05-11 RX ADMIN — PANCRELIPASE LIPASE, PANCRELIPASE PROTEASE, PANCRELIPASE AMYLASE 10000 UNITS: 5000; 17000; 24000 CAPSULE, DELAYED RELEASE ORAL at 17:19

## 2025-05-11 RX ADMIN — HEPARIN SODIUM 19 UNITS/KG/HR: 10000 INJECTION, SOLUTION INTRAVENOUS at 10:36

## 2025-05-11 RX ADMIN — PANCRELIPASE LIPASE, PANCRELIPASE PROTEASE, PANCRELIPASE AMYLASE 10000 UNITS: 5000; 17000; 24000 CAPSULE, DELAYED RELEASE ORAL at 11:44

## 2025-05-11 RX ADMIN — ATORVASTATIN CALCIUM 20 MG: 20 TABLET, FILM COATED ORAL at 23:18

## 2025-05-11 RX ADMIN — INSULIN LISPRO 8 UNITS: 100 INJECTION, SOLUTION INTRAVENOUS; SUBCUTANEOUS at 23:18

## 2025-05-11 RX ADMIN — INSULIN GLARGINE 10 UNITS: 100 INJECTION, SOLUTION SUBCUTANEOUS at 23:19

## 2025-05-11 RX ADMIN — INSULIN LISPRO 4 UNITS: 100 INJECTION, SOLUTION INTRAVENOUS; SUBCUTANEOUS at 08:44

## 2025-05-11 RX ADMIN — SENNOSIDES AND DOCUSATE SODIUM 1 TABLET: 50; 8.6 TABLET ORAL at 08:43

## 2025-05-11 RX ADMIN — INSULIN LISPRO 8 UNITS: 100 INJECTION, SOLUTION INTRAVENOUS; SUBCUTANEOUS at 17:20

## 2025-05-11 RX ADMIN — SODIUM CHLORIDE, PRESERVATIVE FREE 40 MG: 5 INJECTION INTRAVENOUS at 05:53

## 2025-05-11 ASSESSMENT — PAIN SCALES - GENERAL
PAINLEVEL_OUTOF10: 0
PAINLEVEL_OUTOF10: 8

## 2025-05-11 ASSESSMENT — PAIN DESCRIPTION - LOCATION: LOCATION: LEG

## 2025-05-11 ASSESSMENT — PAIN DESCRIPTION - DESCRIPTORS: DESCRIPTORS: ACHING

## 2025-05-11 ASSESSMENT — PAIN DESCRIPTION - ORIENTATION: ORIENTATION: RIGHT

## 2025-05-11 ASSESSMENT — PAIN DESCRIPTION - PAIN TYPE: TYPE: CHRONIC PAIN

## 2025-05-12 ENCOUNTER — APPOINTMENT (OUTPATIENT)
Facility: HOSPITAL | Age: 60
DRG: 871 | End: 2025-05-12
Payer: MEDICARE

## 2025-05-12 PROBLEM — E44.0 MODERATE MALNUTRITION: Status: ACTIVE | Noted: 2025-05-12

## 2025-05-12 PROBLEM — F32.9 MDD (MAJOR DEPRESSIVE DISORDER): Status: RESOLVED | Noted: 2017-08-24 | Resolved: 2025-05-12

## 2025-05-12 LAB
ALBUMIN SERPL-MCNC: 2 G/DL (ref 3.4–5)
ANION GAP SERPL CALC-SCNC: 10 MMOL/L (ref 3–18)
APTT PPP: 107 SEC (ref 23–36.4)
APTT PPP: 28.3 SEC (ref 23–36.4)
APTT PPP: 85.2 SEC (ref 23–36.4)
BACTERIA SPEC CULT: NORMAL
BASOPHILS # BLD: 0.07 K/UL (ref 0–0.1)
BASOPHILS NFR BLD: 0.3 % (ref 0–2)
BUN SERPL-MCNC: 26 MG/DL (ref 6–23)
BUN/CREAT SERPL: 22 (ref 12–20)
CALCIUM SERPL-MCNC: 7.9 MG/DL (ref 8.5–10.1)
CHLORIDE SERPL-SCNC: 99 MMOL/L (ref 98–107)
CO2 SERPL-SCNC: 25 MMOL/L (ref 21–32)
CREAT SERPL-MCNC: 1.18 MG/DL (ref 0.6–1.3)
DIFFERENTIAL METHOD BLD: ABNORMAL
DIRECTOR REVIEW: 489204: NORMAL
EOSINOPHIL # BLD: 0.58 K/UL (ref 0–0.4)
EOSINOPHIL NFR BLD: 2.5 % (ref 0–5)
ERYTHROCYTE [DISTWIDTH] IN BLOOD BY AUTOMATED COUNT: 16.7 % (ref 11.6–14.5)
GLUCOSE BLD STRIP.AUTO-MCNC: 217 MG/DL (ref 70–110)
GLUCOSE BLD STRIP.AUTO-MCNC: 225 MG/DL (ref 70–110)
GLUCOSE BLD STRIP.AUTO-MCNC: 227 MG/DL (ref 70–110)
GLUCOSE BLD STRIP.AUTO-MCNC: 228 MG/DL (ref 70–110)
GLUCOSE BLD STRIP.AUTO-MCNC: 241 MG/DL (ref 70–110)
GLUCOSE BLD STRIP.AUTO-MCNC: 270 MG/DL (ref 70–110)
GLUCOSE BLD STRIP.AUTO-MCNC: 274 MG/DL (ref 70–110)
GLUCOSE SERPL-MCNC: 211 MG/DL (ref 74–108)
GRAM STN SPEC: NORMAL
HCT VFR BLD AUTO: 26.1 % (ref 36–48)
HCT VFR BLD AUTO: 29.1 % (ref 36–48)
HGB BLD-MCNC: 8.2 G/DL (ref 13–16)
HGB BLD-MCNC: 8.9 G/DL (ref 13–16)
IF BLOCK AB SER-ACNC: 1 AU/ML (ref 0–1.1)
IMM GRANULOCYTES # BLD AUTO: 0.79 K/UL (ref 0–0.04)
IMM GRANULOCYTES NFR BLD AUTO: 3.4 % (ref 0–0.5)
JAK2 P.V617F BLD/T QL: NORMAL
LABORATORY COMMENT REPORT: NORMAL
LYMPHOCYTES # BLD: 1.43 K/UL (ref 0.9–3.3)
LYMPHOCYTES NFR BLD: 6.2 % (ref 21–52)
MCH RBC QN AUTO: 27.5 PG (ref 24–34)
MCHC RBC AUTO-ENTMCNC: 31.4 G/DL (ref 31–37)
MCV RBC AUTO: 87.6 FL (ref 78–100)
MONOCYTES # BLD: 0.95 K/UL (ref 0.05–1.2)
MONOCYTES NFR BLD: 4.1 % (ref 3–10)
NEUTS SEG # BLD: 19.22 K/UL (ref 1.8–8)
NEUTS SEG NFR BLD: 83.5 % (ref 40–73)
NRBC # BLD: 0 K/UL (ref 0–0.01)
NRBC BLD-RTO: 0 PER 100 WBC
PCA AB SER-ACNC: 5.8 UNITS (ref 0–20)
PHOSPHATE SERPL-MCNC: 2.2 MG/DL (ref 2.5–4.9)
PLATELET # BLD AUTO: 445 K/UL (ref 135–420)
PMV BLD AUTO: 9.8 FL (ref 9.2–11.8)
POTASSIUM SERPL-SCNC: 3.5 MMOL/L (ref 3.5–5.5)
RBC # BLD AUTO: 2.98 M/UL (ref 4.35–5.65)
SERVICE CMNT-IMP: NORMAL
SODIUM SERPL-SCNC: 134 MMOL/L (ref 136–145)
WBC # BLD AUTO: 23 K/UL (ref 4.6–13.2)

## 2025-05-12 PROCEDURE — 6360000002 HC RX W HCPCS: Performed by: HOSPITALIST

## 2025-05-12 PROCEDURE — 6370000000 HC RX 637 (ALT 250 FOR IP): Performed by: PSYCHIATRY & NEUROLOGY

## 2025-05-12 PROCEDURE — 2500000003 HC RX 250 WO HCPCS: Performed by: PHYSICIAN ASSISTANT

## 2025-05-12 PROCEDURE — 1100000003 HC PRIVATE W/ TELEMETRY

## 2025-05-12 PROCEDURE — 78278 ACUTE GI BLOOD LOSS IMAGING: CPT

## 2025-05-12 PROCEDURE — 80069 RENAL FUNCTION PANEL: CPT

## 2025-05-12 PROCEDURE — 6370000000 HC RX 637 (ALT 250 FOR IP): Performed by: INTERNAL MEDICINE

## 2025-05-12 PROCEDURE — 6370000000 HC RX 637 (ALT 250 FOR IP): Performed by: HOSPITALIST

## 2025-05-12 PROCEDURE — 0DH67UZ INSERTION OF FEEDING DEVICE INTO STOMACH, VIA NATURAL OR ARTIFICIAL OPENING: ICD-10-PCS | Performed by: HOSPITALIST

## 2025-05-12 PROCEDURE — P9047 ALBUMIN (HUMAN), 25%, 50ML: HCPCS | Performed by: HOSPITALIST

## 2025-05-12 PROCEDURE — 36415 COLL VENOUS BLD VENIPUNCTURE: CPT

## 2025-05-12 PROCEDURE — 3E0G76Z INTRODUCTION OF NUTRITIONAL SUBSTANCE INTO UPPER GI, VIA NATURAL OR ARTIFICIAL OPENING: ICD-10-PCS | Performed by: HOSPITALIST

## 2025-05-12 PROCEDURE — 2580000003 HC RX 258: Performed by: HOSPITALIST

## 2025-05-12 PROCEDURE — 71045 X-RAY EXAM CHEST 1 VIEW: CPT

## 2025-05-12 PROCEDURE — 6370000000 HC RX 637 (ALT 250 FOR IP): Performed by: FAMILY MEDICINE

## 2025-05-12 PROCEDURE — 85018 HEMOGLOBIN: CPT

## 2025-05-12 PROCEDURE — 85730 THROMBOPLASTIN TIME PARTIAL: CPT

## 2025-05-12 PROCEDURE — 6370000000 HC RX 637 (ALT 250 FOR IP): Performed by: PHYSICIAN ASSISTANT

## 2025-05-12 PROCEDURE — 85025 COMPLETE CBC W/AUTO DIFF WBC: CPT

## 2025-05-12 PROCEDURE — 85014 HEMATOCRIT: CPT

## 2025-05-12 PROCEDURE — A9560 TC99M LABELED RBC: HCPCS | Performed by: INTERNAL MEDICINE

## 2025-05-12 PROCEDURE — 82962 GLUCOSE BLOOD TEST: CPT

## 2025-05-12 PROCEDURE — 3430000000 HC RX DIAGNOSTIC RADIOPHARMACEUTICAL: Performed by: INTERNAL MEDICINE

## 2025-05-12 RX ORDER — MULTIVITAMIN WITH IRON
100 TABLET ORAL DAILY
Status: DISCONTINUED | OUTPATIENT
Start: 2025-05-12 | End: 2025-05-22 | Stop reason: HOSPADM

## 2025-05-12 RX ORDER — ZINC SULFATE 50(220)MG
50 CAPSULE ORAL DAILY
Status: DISCONTINUED | OUTPATIENT
Start: 2025-05-12 | End: 2025-05-22 | Stop reason: HOSPADM

## 2025-05-12 RX ORDER — THIAMINE HYDROCHLORIDE 100 MG/ML
100 INJECTION, SOLUTION INTRAMUSCULAR; INTRAVENOUS 2 TIMES DAILY
Status: DISCONTINUED | OUTPATIENT
Start: 2025-05-12 | End: 2025-05-18

## 2025-05-12 RX ORDER — OXYMETAZOLINE HYDROCHLORIDE 0.05 G/100ML
2 SPRAY NASAL ONCE
Status: DISPENSED | OUTPATIENT
Start: 2025-05-12 | End: 2025-05-15

## 2025-05-12 RX ORDER — QUETIAPINE FUMARATE 100 MG/1
100 TABLET, FILM COATED ORAL NIGHTLY
Status: DISCONTINUED | OUTPATIENT
Start: 2025-05-12 | End: 2025-05-22 | Stop reason: HOSPADM

## 2025-05-12 RX ORDER — LIDOCAINE HYDROCHLORIDE 20 MG/ML
JELLY TOPICAL ONCE
Status: DISCONTINUED | OUTPATIENT
Start: 2025-05-12 | End: 2025-05-17

## 2025-05-12 RX ORDER — DIAZEPAM 10 MG/2ML
2.5 INJECTION, SOLUTION INTRAMUSCULAR; INTRAVENOUS ONCE
Status: COMPLETED | OUTPATIENT
Start: 2025-05-12 | End: 2025-05-12

## 2025-05-12 RX ADMIN — ATORVASTATIN CALCIUM 20 MG: 20 TABLET, FILM COATED ORAL at 21:48

## 2025-05-12 RX ADMIN — INSULIN LISPRO 8 UNITS: 100 INJECTION, SOLUTION INTRAVENOUS; SUBCUTANEOUS at 21:03

## 2025-05-12 RX ADMIN — FUROSEMIDE 20 MG: 10 INJECTION, SOLUTION INTRAMUSCULAR; INTRAVENOUS at 08:23

## 2025-05-12 RX ADMIN — INSULIN LISPRO 4 UNITS: 100 INJECTION, SOLUTION INTRAVENOUS; SUBCUTANEOUS at 08:25

## 2025-05-12 RX ADMIN — THIAMINE HYDROCHLORIDE 100 MG: 100 INJECTION, SOLUTION INTRAMUSCULAR; INTRAVENOUS at 16:36

## 2025-05-12 RX ADMIN — PANCRELIPASE LIPASE, PANCRELIPASE PROTEASE, PANCRELIPASE AMYLASE 10000 UNITS: 5000; 17000; 24000 CAPSULE, DELAYED RELEASE ORAL at 11:05

## 2025-05-12 RX ADMIN — PANCRELIPASE LIPASE, PANCRELIPASE PROTEASE, PANCRELIPASE AMYLASE 10000 UNITS: 5000; 17000; 24000 CAPSULE, DELAYED RELEASE ORAL at 08:25

## 2025-05-12 RX ADMIN — INSULIN GLARGINE 10 UNITS: 100 INJECTION, SOLUTION SUBCUTANEOUS at 21:03

## 2025-05-12 RX ADMIN — QUETIAPINE FUMARATE 100 MG: 100 TABLET ORAL at 21:48

## 2025-05-12 RX ADMIN — PANCRELIPASE LIPASE, PANCRELIPASE PROTEASE, PANCRELIPASE AMYLASE 10000 UNITS: 5000; 17000; 24000 CAPSULE, DELAYED RELEASE ORAL at 17:37

## 2025-05-12 RX ADMIN — INSULIN LISPRO 4 UNITS: 100 INJECTION, SOLUTION INTRAVENOUS; SUBCUTANEOUS at 17:38

## 2025-05-12 RX ADMIN — SODIUM CHLORIDE, PRESERVATIVE FREE 40 MG: 5 INJECTION INTRAVENOUS at 06:57

## 2025-05-12 RX ADMIN — TECHNETIUM TC 99M-LABELED RED BLOOD CELLS 30.3 MILLICURIE: KIT at 14:46

## 2025-05-12 RX ADMIN — GABAPENTIN 300 MG: 300 CAPSULE ORAL at 08:25

## 2025-05-12 RX ADMIN — ALBUMIN (HUMAN) 12.5 G: 0.25 INJECTION, SOLUTION INTRAVENOUS at 07:02

## 2025-05-12 RX ADMIN — SODIUM CHLORIDE, PRESERVATIVE FREE 40 MG: 5 INJECTION INTRAVENOUS at 17:37

## 2025-05-12 RX ADMIN — SODIUM CHLORIDE, PRESERVATIVE FREE 10 ML: 5 INJECTION INTRAVENOUS at 08:23

## 2025-05-12 RX ADMIN — GABAPENTIN 300 MG: 300 CAPSULE ORAL at 16:36

## 2025-05-12 RX ADMIN — MIRTAZAPINE 7.5 MG: 15 TABLET, FILM COATED ORAL at 21:48

## 2025-05-12 RX ADMIN — Medication 6000 UNITS: at 08:24

## 2025-05-12 RX ADMIN — DIAZEPAM 2.5 MG: 5 INJECTION, SOLUTION INTRAMUSCULAR; INTRAVENOUS at 18:24

## 2025-05-12 RX ADMIN — ZINC SULFATE 220 MG (50 MG) CAPSULE 50 MG: CAPSULE at 11:05

## 2025-05-12 RX ADMIN — SENNOSIDES AND DOCUSATE SODIUM 1 TABLET: 50; 8.6 TABLET ORAL at 08:25

## 2025-05-12 RX ADMIN — THIAMINE HYDROCHLORIDE 100 MG: 100 INJECTION, SOLUTION INTRAMUSCULAR; INTRAVENOUS at 21:03

## 2025-05-12 RX ADMIN — INSULIN LISPRO 4 UNITS: 100 INJECTION, SOLUTION INTRAVENOUS; SUBCUTANEOUS at 11:05

## 2025-05-12 RX ADMIN — OXYCODONE 10 MG: 5 TABLET ORAL at 21:08

## 2025-05-12 RX ADMIN — Medication 100 MG: at 11:05

## 2025-05-12 ASSESSMENT — PAIN SCALES - GENERAL
PAINLEVEL_OUTOF10: 7
PAINLEVEL_OUTOF10: 0

## 2025-05-12 ASSESSMENT — PAIN DESCRIPTION - DESCRIPTORS: DESCRIPTORS: ACHING;DISCOMFORT

## 2025-05-12 ASSESSMENT — PAIN - FUNCTIONAL ASSESSMENT: PAIN_FUNCTIONAL_ASSESSMENT: PREVENTS OR INTERFERES SOME ACTIVE ACTIVITIES AND ADLS

## 2025-05-12 ASSESSMENT — PAIN DESCRIPTION - ORIENTATION: ORIENTATION: RIGHT

## 2025-05-12 ASSESSMENT — PAIN DESCRIPTION - LOCATION: LOCATION: LEG

## 2025-05-12 NOTE — PROCEDURES
PROCEDURE NOTE  Date: 5/12/2025   Name: Lan Gonzalez  YOB: 1965    Procedures: GI Blood Loss Scan  Unable to place order for Heparin Lock Flush as per protocol in Epic. Usually we override and pull heparin from [employer] for use with Ultratag Kits so blood does not clot durinig tagging process. Pulled heparin from [facility] [employer] but it did not require override today. Heparin order did not show up in patients MAR as a result. Unable to document administration of 10 units of 100 USP/1ml Heprain Lock Flush used in Ultratag kit at 2:10 pm.

## 2025-05-13 ENCOUNTER — APPOINTMENT (OUTPATIENT)
Facility: HOSPITAL | Age: 60
DRG: 871 | End: 2025-05-13
Attending: HOSPITALIST
Payer: MEDICARE

## 2025-05-13 LAB
ALBUMIN SERPL-MCNC: 2.1 G/DL (ref 3.4–5)
ANION GAP SERPL CALC-SCNC: 12 MMOL/L (ref 3–18)
APTT PPP: 27.6 SEC (ref 23–36.4)
APTT PPP: 31.9 SEC (ref 23–36.4)
BASOPHILS # BLD: 0 K/UL (ref 0–0.1)
BASOPHILS NFR BLD: 0 % (ref 0–2)
BUN SERPL-MCNC: 29 MG/DL (ref 6–23)
BUN/CREAT SERPL: 24 (ref 12–20)
CALCIUM SERPL-MCNC: 8.1 MG/DL (ref 8.5–10.1)
CHLORIDE SERPL-SCNC: 98 MMOL/L (ref 98–107)
CO2 SERPL-SCNC: 25 MMOL/L (ref 21–32)
CREAT SERPL-MCNC: 1.2 MG/DL (ref 0.6–1.3)
DIFFERENTIAL METHOD BLD: ABNORMAL
EOSINOPHIL # BLD: 0.66 K/UL (ref 0–0.4)
EOSINOPHIL NFR BLD: 3 % (ref 0–5)
ERYTHROCYTE [DISTWIDTH] IN BLOOD BY AUTOMATED COUNT: 16.9 % (ref 11.6–14.5)
FUNGITELL INTERPRETATION: NORMAL
FUNGITELL: <31.25 PG/ML
GALACTOMANNAN AG SPEC IA-ACNC: 0.07 INDEX (ref 0–0.49)
GLUCOSE BLD STRIP.AUTO-MCNC: 160 MG/DL (ref 70–110)
GLUCOSE BLD STRIP.AUTO-MCNC: 184 MG/DL (ref 70–110)
GLUCOSE BLD STRIP.AUTO-MCNC: 185 MG/DL (ref 70–110)
GLUCOSE BLD STRIP.AUTO-MCNC: 200 MG/DL (ref 70–110)
GLUCOSE BLD STRIP.AUTO-MCNC: 252 MG/DL (ref 70–110)
GLUCOSE SERPL-MCNC: 103 MG/DL (ref 74–108)
HCT VFR BLD AUTO: 28.5 % (ref 36–48)
HCT VFR BLD AUTO: 28.7 % (ref 36–48)
HGB BLD-MCNC: 8.9 G/DL (ref 13–16)
HGB BLD-MCNC: 9.1 G/DL (ref 13–16)
IMM GRANULOCYTES # BLD AUTO: 0 K/UL
IMM GRANULOCYTES NFR BLD AUTO: 0 %
INTERPRETATION: NEGATIVE
KAPPA LC FREE SER-MCNC: 98.7 MG/L (ref 3.3–19.4)
KAPPA LC FREE/LAMBDA FREE SER: 1.15 (ref 0.26–1.65)
LAB DIRECTOR NAME PROVIDER: NORMAL
LABORATORY COMMENT REPORT: NORMAL
LAMBDA LC FREE SERPL-MCNC: 85.7 MG/L (ref 5.7–26.3)
LYMPHOCYTES # BLD: 1.31 K/UL (ref 0.9–3.6)
LYMPHOCYTES NFR BLD: 6 % (ref 21–52)
Lab: NORMAL
MCH RBC QN AUTO: 27.8 PG (ref 24–34)
MCHC RBC AUTO-ENTMCNC: 31.2 G/DL (ref 31–37)
MCV RBC AUTO: 89.1 FL (ref 78–100)
MONOCYTES # BLD: 0.88 K/UL (ref 0.05–1.2)
MONOCYTES NFR BLD: 4 % (ref 3–10)
NEUTS BAND NFR BLD MANUAL: 9 % (ref 0–5)
NEUTS SEG # BLD: 19.05 K/UL (ref 1.8–8)
NEUTS SEG NFR BLD: 78 % (ref 40–73)
NRBC # BLD: 0 K/UL (ref 0–0.01)
NRBC BLD-RTO: 0 PER 100 WBC
PHOSPHATE SERPL-MCNC: 2.1 MG/DL (ref 2.5–4.9)
PLATELET # BLD AUTO: 539 K/UL (ref 135–420)
PLATELET COMMENT: ABNORMAL
PMV BLD AUTO: 10.1 FL (ref 9.2–11.8)
POTASSIUM SERPL-SCNC: 3.8 MMOL/L (ref 3.5–5.5)
RBC # BLD AUTO: 3.2 M/UL (ref 4.35–5.65)
RBC MORPH BLD: ABNORMAL
REFERENCE VALUE: NORMAL
RESULT: NEGATIVE
SODIUM SERPL-SCNC: 135 MMOL/L (ref 136–145)
T(ABL1,BCR)B2A2/CONTROL BLD/T: NORMAL
T(ABL1,BCR)B2A2/CONTROL BLD/T: NORMAL %
T(ABL1,BCR)B3A2/CONTROL BLD/T: NORMAL %
T(ABL1,BCR)E1A2/CONTROL BLD/T: NORMAL
T(ABL1,BCR)E1A2/CONTROL BLD/T: NORMAL %
WBC # BLD AUTO: 21.9 K/UL (ref 4.6–13.2)

## 2025-05-13 PROCEDURE — 6370000000 HC RX 637 (ALT 250 FOR IP): Performed by: HOSPITALIST

## 2025-05-13 PROCEDURE — 92610 EVALUATE SWALLOWING FUNCTION: CPT

## 2025-05-13 PROCEDURE — 92526 ORAL FUNCTION THERAPY: CPT

## 2025-05-13 PROCEDURE — 6360000002 HC RX W HCPCS: Performed by: INTERNAL MEDICINE

## 2025-05-13 PROCEDURE — 6370000000 HC RX 637 (ALT 250 FOR IP): Performed by: FAMILY MEDICINE

## 2025-05-13 PROCEDURE — 6360000002 HC RX W HCPCS: Performed by: HOSPITALIST

## 2025-05-13 PROCEDURE — 6370000000 HC RX 637 (ALT 250 FOR IP): Performed by: INTERNAL MEDICINE

## 2025-05-13 PROCEDURE — 97116 GAIT TRAINING THERAPY: CPT

## 2025-05-13 PROCEDURE — 6370000000 HC RX 637 (ALT 250 FOR IP): Performed by: PSYCHIATRY & NEUROLOGY

## 2025-05-13 PROCEDURE — 84591 ASSAY OF NOS VITAMIN: CPT

## 2025-05-13 PROCEDURE — 99152 MOD SED SAME PHYS/QHP 5/>YRS: CPT

## 2025-05-13 PROCEDURE — 6370000000 HC RX 637 (ALT 250 FOR IP): Performed by: PHYSICIAN ASSISTANT

## 2025-05-13 PROCEDURE — 1100000003 HC PRIVATE W/ TELEMETRY

## 2025-05-13 PROCEDURE — 88184 FLOWCYTOMETRY/ TC 1 MARKER: CPT

## 2025-05-13 PROCEDURE — 80069 RENAL FUNCTION PANEL: CPT

## 2025-05-13 PROCEDURE — 82962 GLUCOSE BLOOD TEST: CPT

## 2025-05-13 PROCEDURE — 88305 TISSUE EXAM BY PATHOLOGIST: CPT

## 2025-05-13 PROCEDURE — 85025 COMPLETE CBC W/AUTO DIFF WBC: CPT

## 2025-05-13 PROCEDURE — 85018 HEMOGLOBIN: CPT

## 2025-05-13 PROCEDURE — 88185 FLOWCYTOMETRY/TC ADD-ON: CPT

## 2025-05-13 PROCEDURE — 88313 SPECIAL STAINS GROUP 2: CPT

## 2025-05-13 PROCEDURE — 85730 THROMBOPLASTIN TIME PARTIAL: CPT

## 2025-05-13 PROCEDURE — 2500000003 HC RX 250 WO HCPCS: Performed by: PHYSICIAN ASSISTANT

## 2025-05-13 PROCEDURE — 2709999900 CT BIOPSY BONE MARROW

## 2025-05-13 PROCEDURE — 85014 HEMATOCRIT: CPT

## 2025-05-13 PROCEDURE — 97530 THERAPEUTIC ACTIVITIES: CPT

## 2025-05-13 PROCEDURE — 88311 DECALCIFY TISSUE: CPT

## 2025-05-13 PROCEDURE — 2580000003 HC RX 258: Performed by: HOSPITALIST

## 2025-05-13 PROCEDURE — 6360000002 HC RX W HCPCS: Performed by: STUDENT IN AN ORGANIZED HEALTH CARE EDUCATION/TRAINING PROGRAM

## 2025-05-13 PROCEDURE — 82180 ASSAY OF ASCORBIC ACID: CPT

## 2025-05-13 PROCEDURE — 36415 COLL VENOUS BLD VENIPUNCTURE: CPT

## 2025-05-13 RX ORDER — MIDAZOLAM HYDROCHLORIDE 1 MG/ML
INJECTION, SOLUTION INTRAMUSCULAR; INTRAVENOUS PRN
Status: COMPLETED | OUTPATIENT
Start: 2025-05-13 | End: 2025-05-13

## 2025-05-13 RX ADMIN — HEPARIN SODIUM 23 UNITS/KG/HR: 10000 INJECTION, SOLUTION INTRAVENOUS at 13:57

## 2025-05-13 RX ADMIN — PANCRELIPASE LIPASE, PANCRELIPASE PROTEASE, PANCRELIPASE AMYLASE 10000 UNITS: 5000; 17000; 24000 CAPSULE, DELAYED RELEASE ORAL at 13:54

## 2025-05-13 RX ADMIN — GABAPENTIN 300 MG: 300 CAPSULE ORAL at 08:30

## 2025-05-13 RX ADMIN — THIAMINE HYDROCHLORIDE 100 MG: 100 INJECTION, SOLUTION INTRAMUSCULAR; INTRAVENOUS at 08:29

## 2025-05-13 RX ADMIN — SODIUM CHLORIDE, PRESERVATIVE FREE 40 MG: 5 INJECTION INTRAVENOUS at 06:22

## 2025-05-13 RX ADMIN — PANCRELIPASE LIPASE, PANCRELIPASE PROTEASE, PANCRELIPASE AMYLASE 10000 UNITS: 5000; 17000; 24000 CAPSULE, DELAYED RELEASE ORAL at 17:18

## 2025-05-13 RX ADMIN — GABAPENTIN 300 MG: 300 CAPSULE ORAL at 20:46

## 2025-05-13 RX ADMIN — INSULIN LISPRO 4 UNITS: 100 INJECTION, SOLUTION INTRAVENOUS; SUBCUTANEOUS at 17:17

## 2025-05-13 RX ADMIN — SODIUM CHLORIDE, PRESERVATIVE FREE 10 ML: 5 INJECTION INTRAVENOUS at 20:49

## 2025-05-13 RX ADMIN — HEPARIN SODIUM 5000 UNITS: 1000 INJECTION INTRAVENOUS; SUBCUTANEOUS at 23:25

## 2025-05-13 RX ADMIN — SENNOSIDES AND DOCUSATE SODIUM 1 TABLET: 50; 8.6 TABLET ORAL at 08:30

## 2025-05-13 RX ADMIN — Medication 6000 UNITS: at 08:30

## 2025-05-13 RX ADMIN — OXYCODONE 10 MG: 5 TABLET ORAL at 02:50

## 2025-05-13 RX ADMIN — QUETIAPINE FUMARATE 100 MG: 100 TABLET ORAL at 20:46

## 2025-05-13 RX ADMIN — INSULIN GLARGINE 10 UNITS: 100 INJECTION, SOLUTION SUBCUTANEOUS at 20:51

## 2025-05-13 RX ADMIN — MIDAZOLAM 2 MG: 1 INJECTION INTRAMUSCULAR; INTRAVENOUS at 10:52

## 2025-05-13 RX ADMIN — PANCRELIPASE LIPASE, PANCRELIPASE PROTEASE, PANCRELIPASE AMYLASE 10000 UNITS: 5000; 17000; 24000 CAPSULE, DELAYED RELEASE ORAL at 08:30

## 2025-05-13 RX ADMIN — ZINC SULFATE 220 MG (50 MG) CAPSULE 50 MG: CAPSULE at 08:31

## 2025-05-13 RX ADMIN — SENNOSIDES AND DOCUSATE SODIUM 1 TABLET: 50; 8.6 TABLET ORAL at 20:47

## 2025-05-13 RX ADMIN — ACETAMINOPHEN 650 MG: 325 TABLET ORAL at 17:18

## 2025-05-13 RX ADMIN — INSULIN LISPRO 8 UNITS: 100 INJECTION, SOLUTION INTRAVENOUS; SUBCUTANEOUS at 20:51

## 2025-05-13 RX ADMIN — SODIUM CHLORIDE, PRESERVATIVE FREE 40 MG: 5 INJECTION INTRAVENOUS at 17:17

## 2025-05-13 RX ADMIN — GABAPENTIN 300 MG: 300 CAPSULE ORAL at 14:03

## 2025-05-13 RX ADMIN — Medication 100 MG: at 08:31

## 2025-05-13 RX ADMIN — ATORVASTATIN CALCIUM 20 MG: 20 TABLET, FILM COATED ORAL at 20:46

## 2025-05-13 RX ADMIN — MIRTAZAPINE 7.5 MG: 15 TABLET, FILM COATED ORAL at 20:46

## 2025-05-13 RX ADMIN — INSULIN LISPRO 4 UNITS: 100 INJECTION, SOLUTION INTRAVENOUS; SUBCUTANEOUS at 13:54

## 2025-05-13 RX ADMIN — SODIUM CHLORIDE, PRESERVATIVE FREE 10 ML: 5 INJECTION INTRAVENOUS at 08:30

## 2025-05-13 RX ADMIN — THIAMINE HYDROCHLORIDE 100 MG: 100 INJECTION, SOLUTION INTRAMUSCULAR; INTRAVENOUS at 20:49

## 2025-05-13 ASSESSMENT — PAIN SCALES - WONG BAKER: WONGBAKER_NUMERICALRESPONSE: NO HURT

## 2025-05-13 ASSESSMENT — PAIN DESCRIPTION - DESCRIPTORS: DESCRIPTORS: ACHING;DISCOMFORT

## 2025-05-13 ASSESSMENT — PAIN SCALES - GENERAL
PAINLEVEL_OUTOF10: 9
PAINLEVEL_OUTOF10: 0

## 2025-05-13 ASSESSMENT — PAIN - FUNCTIONAL ASSESSMENT
PAIN_FUNCTIONAL_ASSESSMENT: NONE - DENIES PAIN
PAIN_FUNCTIONAL_ASSESSMENT: NONE - DENIES PAIN
PAIN_FUNCTIONAL_ASSESSMENT: PREVENTS OR INTERFERES SOME ACTIVE ACTIVITIES AND ADLS

## 2025-05-13 ASSESSMENT — PAIN DESCRIPTION - LOCATION: LOCATION: FOOT;LEG

## 2025-05-13 ASSESSMENT — PAIN DESCRIPTION - ORIENTATION: ORIENTATION: LEFT;RIGHT

## 2025-05-13 NOTE — OR NURSING
Pt was educated to procedure and sedation. Pt NPO. Pt heparin drip turned off. Pt confirms no problems with sedation in the past.

## 2025-05-13 NOTE — H&P
Oral Daily    oxymetazoline (AFRIN) 0.05 % nasal spray 2 spray  2 spray Each Nostril Once    lidocaine (XYLOCAINE) 2 % uro-jet   Topical Once    thiamine (B-1) injection 100 mg  100 mg IntraVENous BID    Vitamin D (CHOLECALCIFEROL) 1,000 Units, vitamin D3 (CHOLECALCIFEROL) 5,000 Units  6,000 Units Oral Daily    Followed by    [START ON 5/15/2025] Vitamin D (CHOLECALCIFEROL) tablet 2,000 Units  2,000 Units Oral Daily    heparin (porcine) injection 5,000 Units  80 Units/kg IntraVENous PRN    heparin (porcine) injection 2,500 Units  40 Units/kg IntraVENous PRN    heparin 25,000 units in dextrose 5% 250 mL (premix) infusion  5-30 Units/kg/hr IntraVENous Continuous    cyanocobalamin injection 1,000 mcg  1,000 mcg IntraMUSCular Weekly    gabapentin (NEURONTIN) capsule 300 mg  300 mg Oral TID    glucose chewable tablet 16 g  4 tablet Oral PRN    dextrose bolus 10% 125 mL  125 mL IntraVENous PRN    Or    dextrose bolus 10% 250 mL  250 mL IntraVENous PRN    glucagon injection 1 mg  1 mg SubCUTAneous PRN    dextrose 10 % infusion   IntraVENous Continuous PRN    insulin lispro (HUMALOG,ADMELOG) injection vial 0-16 Units  0-16 Units SubCUTAneous 4x Daily AC & HS    pantoprazole (PROTONIX) 40 mg in sodium chloride (PF) 0.9 % 10 mL injection  40 mg IntraVENous Q12H    Valbenazine Tosylate CAPS 80 mg (Patient Supplied)  80 mg Oral Nightly    sodium chloride flush 0.9 % injection 5-40 mL  5-40 mL IntraVENous 2 times per day    sodium chloride flush 0.9 % injection 5-40 mL  5-40 mL IntraVENous PRN    0.9 % sodium chloride infusion   IntraVENous PRN    ondansetron (ZOFRAN-ODT) disintegrating tablet 4 mg  4 mg Oral Q8H PRN    Or    ondansetron (ZOFRAN) injection 4 mg  4 mg IntraVENous Q6H PRN    acetaminophen (TYLENOL) tablet 650 mg  650 mg Oral Q6H PRN    Or    acetaminophen (TYLENOL) suppository 650 mg  650 mg Rectal Q6H PRN    sennosides-docusate sodium (SENOKOT-S) 8.6-50 MG tablet 1 tablet  1 tablet Oral BID    atorvastatin 
 Sinus tachycardia  Minimal voltage criteria for LVH, may be normal variant ( Sokolow-Navarro )  Borderline ECG  When compared with ECG of 09-OCT-2024 09:58,  IN interval has decreased  Confirmed by Chela Mathis MD ([id number]) on 4/25/2025 9:21:02 AM       Procedures/imaging: see electronic medical records for all procedures/Xrays and details which were not copied into this note but were reviewed prior to creation of Plan    Assessment/Plan     Admit to ICU  Full code  Condition  Diabetic diet  Holding anticoagulation due to concerns of blood loss anemia/GI bleed    Principal Problem:    GBS (Guillain [place] syndrome)  Intensivist consultation Dr. Brown  Neurology consultation Dr. Henry  Patient to be initiated on IVIG infusion with concerns of GBS  Has been taking gabapentin for peripheral nerve pain with little relief  Unsuccessful lumbar puncture in ER by neurology.  Repeat test/procedure on Monday through interventional radiology.      Severe sepsis (HCC)  Met severe sepsis criteria with leukocytosis, respiratory rate, possible source of infection with elevated lactic acid  Received vancomycin and cefepime in ER.  Will continue  Blood cultures x 2 obtained and are pending  Add urinalysis  Monitor lactic acid every 12 hours  Add procalcitonin  Patient received 1770 mL of normal saline in ER  Continue to hold any rheumatological medications due to sepsis diagnosis, immunosuppression      GI bleed    Blood loss anemia  Current H&H 5.6 and 18.  Previous H&H end of April 8.7 and 27.9.  Patient had normal H&H in February  Heme positive stools in ER  Currently receiving blood transfusion.  Will transfuse a total of 2 units packed red blood cells  Stat H&H after transfusion completed and H&H every 6 hours thereafter  CT chest, CTA abdomen pelvis ordered by intensivist due to GI bleed, history of AAA  Consult gastroenterology Dr. Ivan Moss      Elevated troponin  No complaints of chest pain in ER  Continue to trend troponins

## 2025-05-14 ENCOUNTER — APPOINTMENT (OUTPATIENT)
Facility: HOSPITAL | Age: 60
DRG: 871 | End: 2025-05-14
Payer: MEDICARE

## 2025-05-14 ENCOUNTER — APPOINTMENT (OUTPATIENT)
Facility: HOSPITAL | Age: 60
DRG: 871 | End: 2025-05-14
Attending: INTERNAL MEDICINE
Payer: MEDICARE

## 2025-05-14 PROBLEM — R57.9 SHOCK (HCC): Status: ACTIVE | Noted: 2025-05-14

## 2025-05-14 LAB
ALBUMIN SERPL-MCNC: 2.1 G/DL (ref 3.4–5)
ALBUMIN SERPL-MCNC: 2.2 G/DL (ref 3.4–5)
ALBUMIN/GLOB SERPL: 0.7 (ref 0.8–1.7)
ALP SERPL-CCNC: 163 U/L (ref 45–117)
ALT SERPL-CCNC: 13 U/L (ref 10–50)
AMMONIA PLAS-SCNC: 21 UMOL/L (ref 11–60)
ANION GAP SERPL CALC-SCNC: 12 MMOL/L (ref 3–18)
APPEARANCE UR: ABNORMAL
APTT PPP: 51.3 SEC (ref 23–36.4)
APTT PPP: 56.9 SEC (ref 23–36.4)
ARTERIAL PATENCY WRIST A: POSITIVE
AST SERPL-CCNC: 34 U/L (ref 10–38)
B PERT DNA SPEC QL NAA+PROBE: NOT DETECTED
BACTERIA URNS QL MICRO: ABNORMAL /HPF
BASE EXCESS BLD CALC-SCNC: 1.8 MMOL/L
BASOPHILS # BLD: 0 K/UL (ref 0–0.1)
BASOPHILS # BLD: 0.06 K/UL (ref 0–0.1)
BASOPHILS NFR BLD: 0 % (ref 0–2)
BASOPHILS NFR BLD: 0.3 % (ref 0–2)
BDY SITE: ABNORMAL
BILIRUB DIRECT SERPL-MCNC: 0.3 MG/DL (ref 0–0.2)
BILIRUB SERPL-MCNC: 0.6 MG/DL (ref 0.2–1)
BILIRUB UR QL: NEGATIVE
BORDETELLA PARAPERTUSSIS BY PCR: NOT DETECTED
BUN SERPL-MCNC: 29 MG/DL (ref 6–23)
BUN/CREAT SERPL: 25 (ref 12–20)
C PNEUM DNA SPEC QL NAA+PROBE: NOT DETECTED
CALCIUM SERPL-MCNC: 8.6 MG/DL (ref 8.5–10.1)
CHLORIDE SERPL-SCNC: 96 MMOL/L (ref 98–107)
CO2 SERPL-SCNC: 26 MMOL/L (ref 21–32)
COLOR UR: YELLOW
CREAT SERPL-MCNC: 1.13 MG/DL (ref 0.6–1.3)
CRP SERPL-MCNC: 14.3 MG/DL (ref 0–5)
DIFFERENTIAL METHOD BLD: ABNORMAL
DIFFERENTIAL METHOD BLD: ABNORMAL
EOSINOPHIL # BLD: 0 K/UL (ref 0–0.4)
EOSINOPHIL # BLD: 0.53 K/UL (ref 0–0.4)
EOSINOPHIL NFR BLD: 0 % (ref 0–5)
EOSINOPHIL NFR BLD: 2.5 % (ref 0–5)
EPITH CASTS URNS QL MICRO: ABNORMAL /LPF (ref 0–5)
ERYTHROCYTE [DISTWIDTH] IN BLOOD BY AUTOMATED COUNT: 17.4 % (ref 11.6–14.5)
ERYTHROCYTE [DISTWIDTH] IN BLOOD BY AUTOMATED COUNT: 17.5 % (ref 11.6–14.5)
FLUAV SUBTYP SPEC NAA+PROBE: NOT DETECTED
FLUBV RNA SPEC QL NAA+PROBE: NOT DETECTED
GAS FLOW.O2 O2 DELIVERY SYS: ABNORMAL
GLOBULIN SER CALC-MCNC: 2.8 G/DL (ref 2–4)
GLUCOSE BLD STRIP.AUTO-MCNC: 144 MG/DL (ref 70–110)
GLUCOSE BLD STRIP.AUTO-MCNC: 163 MG/DL (ref 70–110)
GLUCOSE BLD STRIP.AUTO-MCNC: 165 MG/DL (ref 70–110)
GLUCOSE BLD STRIP.AUTO-MCNC: 189 MG/DL (ref 70–110)
GLUCOSE BLD STRIP.AUTO-MCNC: 222 MG/DL (ref 70–110)
GLUCOSE SERPL-MCNC: 216 MG/DL (ref 74–108)
GLUCOSE UR STRIP.AUTO-MCNC: 250 MG/DL
GRAN CASTS URNS QL MICRO: ABNORMAL /LPF
HADV DNA SPEC QL NAA+PROBE: NOT DETECTED
HCO3 BLD-SCNC: 25 MMOL/L (ref 21–28)
HCOV 229E RNA SPEC QL NAA+PROBE: NOT DETECTED
HCOV HKU1 RNA SPEC QL NAA+PROBE: NOT DETECTED
HCOV NL63 RNA SPEC QL NAA+PROBE: NOT DETECTED
HCOV OC43 RNA SPEC QL NAA+PROBE: NOT DETECTED
HCT VFR BLD AUTO: 23.6 % (ref 36–48)
HCT VFR BLD AUTO: 24.8 % (ref 36–48)
HCT VFR BLD AUTO: 25.7 % (ref 36–48)
HCT VFR BLD AUTO: 30.6 % (ref 36–48)
HCT VFR BLD AUTO: 32.4 % (ref 36–48)
HCT VFR BLD AUTO: 33.7 % (ref 36–48)
HGB BLD-MCNC: 10.1 G/DL (ref 13–16)
HGB BLD-MCNC: 10.5 G/DL (ref 13–16)
HGB BLD-MCNC: 7.5 G/DL (ref 13–16)
HGB BLD-MCNC: 7.7 G/DL (ref 13–16)
HGB BLD-MCNC: 8.2 G/DL (ref 13–16)
HGB BLD-MCNC: 9.4 G/DL (ref 13–16)
HGB UR QL STRIP: NEGATIVE
HMPV RNA SPEC QL NAA+PROBE: NOT DETECTED
HPIV1 RNA SPEC QL NAA+PROBE: NOT DETECTED
HPIV2 RNA SPEC QL NAA+PROBE: NOT DETECTED
HPIV3 RNA SPEC QL NAA+PROBE: NOT DETECTED
HPIV4 RNA SPEC QL NAA+PROBE: NOT DETECTED
IGA SERPL-MCNC: 210 MG/DL (ref 61–437)
IGE SERPL-ACNC: 1100 IU/ML (ref 6–495)
IGG SERPL-MCNC: 752 MG/DL (ref 603–1613)
IGM SERPL-MCNC: 43 MG/DL (ref 20–172)
IMM GRANULOCYTES # BLD AUTO: 0 K/UL
IMM GRANULOCYTES # BLD AUTO: 0.5 K/UL (ref 0–0.04)
IMM GRANULOCYTES NFR BLD AUTO: 0 %
IMM GRANULOCYTES NFR BLD AUTO: 2.4 % (ref 0–0.5)
INR PPP: 1.2 (ref 0.9–1.1)
KETONES UR QL STRIP.AUTO: NEGATIVE MG/DL
L PNEUMO AG UR QL: NEGATIVE
LACTATE SERPL-SCNC: 1.5 MMOL/L (ref 0.4–2)
LACTATE SERPL-SCNC: 1.8 MMOL/L (ref 0.4–2)
LACTATE SERPL-SCNC: 4 MMOL/L (ref 0.4–2)
LEUKOCYTE ESTERASE UR QL STRIP.AUTO: ABNORMAL
LIPASE SERPL-CCNC: 5 U/L (ref 13–75)
LYMPHOCYTES # BLD: 1.05 K/UL (ref 0.9–3.3)
LYMPHOCYTES # BLD: 1.05 K/UL (ref 0.9–3.6)
LYMPHOCYTES NFR BLD: 2 % (ref 21–52)
LYMPHOCYTES NFR BLD: 5 % (ref 21–52)
M PNEUMO DNA SPEC QL NAA+PROBE: NOT DETECTED
MCH RBC QN AUTO: 27.6 PG (ref 24–34)
MCH RBC QN AUTO: 28 PG (ref 24–34)
MCHC RBC AUTO-ENTMCNC: 31.2 G/DL (ref 31–37)
MCHC RBC AUTO-ENTMCNC: 31.9 G/DL (ref 31–37)
MCV RBC AUTO: 87.7 FL (ref 78–100)
MCV RBC AUTO: 88.7 FL (ref 78–100)
MONOCYTES # BLD: 0 K/UL (ref 0.05–1.2)
MONOCYTES # BLD: 0.36 K/UL (ref 0.05–1.2)
MONOCYTES NFR BLD: 0 % (ref 3–10)
MONOCYTES NFR BLD: 1.7 % (ref 3–10)
NEGATIVE CONTROL: NEGATIVE
NEUTS BAND NFR BLD MANUAL: 20 % (ref 0–5)
NEUTS SEG # BLD: 18.5 K/UL (ref 1.8–8)
NEUTS SEG # BLD: 51.45 K/UL (ref 1.8–8)
NEUTS SEG NFR BLD: 78 % (ref 40–73)
NEUTS SEG NFR BLD: 88.1 % (ref 40–73)
NITRITE UR QL STRIP.AUTO: NEGATIVE
NRBC # BLD: 0 K/UL (ref 0–0.01)
NRBC # BLD: 0 K/UL (ref 0–0.01)
NRBC BLD-RTO: 0 PER 100 WBC
NRBC BLD-RTO: 0 PER 100 WBC
PCO2 BLD: 32.5 MMHG (ref 35–48)
PH BLD: 7.49 (ref 7.35–7.45)
PH UR STRIP: 5.5 (ref 5–8)
PH, URINE: 4 (ref 4.6–8)
PHOSPHATE SERPL-MCNC: 2.3 MG/DL (ref 2.5–4.9)
PLATELET # BLD AUTO: 390 K/UL (ref 135–420)
PLATELET # BLD AUTO: 524 K/UL (ref 135–420)
PLATELET COMMENT: ABNORMAL
PMV BLD AUTO: 10.2 FL (ref 9.2–11.8)
PMV BLD AUTO: 10.3 FL (ref 9.2–11.8)
PO2 BLD: 102 MMHG (ref 83–108)
POSITIVE CONTROL: POSITIVE
POTASSIUM SERPL-SCNC: 3.8 MMOL/L (ref 3.5–5.5)
PROCALCITONIN SERPL-MCNC: 40.6 NG/ML
PROT SERPL-MCNC: 4.9 G/DL (ref 6.4–8.2)
PROT UR STRIP-MCNC: 30 MG/DL
PROTHROMBIN TIME: 15.9 SEC (ref 11.9–14.9)
RBC # BLD AUTO: 2.93 M/UL (ref 4.35–5.65)
RBC # BLD AUTO: 3.8 M/UL (ref 4.35–5.65)
RBC #/AREA URNS HPF: NEGATIVE /HPF (ref 0–5)
RBC MORPH BLD: ABNORMAL
RSV RNA SPEC QL NAA+PROBE: NOT DETECTED
RV+EV RNA SPEC QL NAA+PROBE: NOT DETECTED
S PNEUM AG UR QL IA.RAPID: NEGATIVE
SAO2 % BLD: 98.4 % (ref 92–97)
SARS-COV-2 RNA RESP QL NAA+PROBE: NOT DETECTED
SERVICE CMNT-IMP: ABNORMAL
SODIUM SERPL-SCNC: 133 MMOL/L (ref 136–145)
SP GR UR REFRACTOMETRY: 1.02 (ref 1–1.03)
SPECIMEN TYPE: ABNORMAL
TROPONIN T SERPL HS-MCNC: 113 NG/L (ref 0–22)
UROBILINOGEN UR QL STRIP.AUTO: 1 EU/DL (ref 0.2–1)
WBC # BLD AUTO: 21 K/UL (ref 4.6–13.2)
WBC # BLD AUTO: 52.5 K/UL (ref 4.6–13.2)
WBC URNS QL MICRO: ABNORMAL /HPF (ref 0–5)

## 2025-05-14 PROCEDURE — 83690 ASSAY OF LIPASE: CPT

## 2025-05-14 PROCEDURE — 6360000002 HC RX W HCPCS: Performed by: INTERNAL MEDICINE

## 2025-05-14 PROCEDURE — 87086 URINE CULTURE/COLONY COUNT: CPT

## 2025-05-14 PROCEDURE — 6360000002 HC RX W HCPCS: Performed by: HOSPITALIST

## 2025-05-14 PROCEDURE — 2500000003 HC RX 250 WO HCPCS: Performed by: PHYSICIAN ASSISTANT

## 2025-05-14 PROCEDURE — 6370000000 HC RX 637 (ALT 250 FOR IP): Performed by: PSYCHIATRY & NEUROLOGY

## 2025-05-14 PROCEDURE — 2000000000 HC ICU R&B

## 2025-05-14 PROCEDURE — 2580000003 HC RX 258: Performed by: INTERNAL MEDICINE

## 2025-05-14 PROCEDURE — 86140 C-REACTIVE PROTEIN: CPT

## 2025-05-14 PROCEDURE — 2700000000 HC OXYGEN THERAPY PER DAY

## 2025-05-14 PROCEDURE — 6370000000 HC RX 637 (ALT 250 FOR IP): Performed by: HOSPITALIST

## 2025-05-14 PROCEDURE — 84145 PROCALCITONIN (PCT): CPT

## 2025-05-14 PROCEDURE — 74018 RADEX ABDOMEN 1 VIEW: CPT

## 2025-05-14 PROCEDURE — 93306 TTE W/DOPPLER COMPLETE: CPT

## 2025-05-14 PROCEDURE — 85025 COMPLETE CBC W/AUTO DIFF WBC: CPT

## 2025-05-14 PROCEDURE — 81001 URINALYSIS AUTO W/SCOPE: CPT

## 2025-05-14 PROCEDURE — 2580000003 HC RX 258: Performed by: HOSPITALIST

## 2025-05-14 PROCEDURE — 99223 1ST HOSP IP/OBS HIGH 75: CPT | Performed by: INTERNAL MEDICINE

## 2025-05-14 PROCEDURE — 82962 GLUCOSE BLOOD TEST: CPT

## 2025-05-14 PROCEDURE — 85014 HEMATOCRIT: CPT

## 2025-05-14 PROCEDURE — 85730 THROMBOPLASTIN TIME PARTIAL: CPT

## 2025-05-14 PROCEDURE — P9047 ALBUMIN (HUMAN), 25%, 50ML: HCPCS | Performed by: HOSPITALIST

## 2025-05-14 PROCEDURE — 36600 WITHDRAWAL OF ARTERIAL BLOOD: CPT

## 2025-05-14 PROCEDURE — 84484 ASSAY OF TROPONIN QUANT: CPT

## 2025-05-14 PROCEDURE — 82140 ASSAY OF AMMONIA: CPT

## 2025-05-14 PROCEDURE — 71045 X-RAY EXAM CHEST 1 VIEW: CPT

## 2025-05-14 PROCEDURE — 51798 US URINE CAPACITY MEASURE: CPT

## 2025-05-14 PROCEDURE — 2500000003 HC RX 250 WO HCPCS: Performed by: INTERNAL MEDICINE

## 2025-05-14 PROCEDURE — 87899 AGENT NOS ASSAY W/OPTIC: CPT

## 2025-05-14 PROCEDURE — 85048 AUTOMATED LEUKOCYTE COUNT: CPT

## 2025-05-14 PROCEDURE — 87040 BLOOD CULTURE FOR BACTERIA: CPT

## 2025-05-14 PROCEDURE — 85610 PROTHROMBIN TIME: CPT

## 2025-05-14 PROCEDURE — 2500000003 HC RX 250 WO HCPCS: Performed by: HOSPITALIST

## 2025-05-14 PROCEDURE — 85018 HEMOGLOBIN: CPT

## 2025-05-14 PROCEDURE — 80069 RENAL FUNCTION PANEL: CPT

## 2025-05-14 PROCEDURE — 94640 AIRWAY INHALATION TREATMENT: CPT

## 2025-05-14 PROCEDURE — 82803 BLOOD GASES ANY COMBINATION: CPT

## 2025-05-14 PROCEDURE — 6370000000 HC RX 637 (ALT 250 FOR IP): Performed by: INTERNAL MEDICINE

## 2025-05-14 PROCEDURE — 6370000000 HC RX 637 (ALT 250 FOR IP): Performed by: PHYSICIAN ASSISTANT

## 2025-05-14 PROCEDURE — 80076 HEPATIC FUNCTION PANEL: CPT

## 2025-05-14 PROCEDURE — 51701 INSERT BLADDER CATHETER: CPT

## 2025-05-14 PROCEDURE — 36556 INSERT NON-TUNNEL CV CATH: CPT

## 2025-05-14 PROCEDURE — 36415 COLL VENOUS BLD VENIPUNCTURE: CPT

## 2025-05-14 PROCEDURE — 83605 ASSAY OF LACTIC ACID: CPT

## 2025-05-14 PROCEDURE — 0202U NFCT DS 22 TRGT SARS-COV-2: CPT

## 2025-05-14 RX ORDER — NOREPINEPHRINE BITARTRATE 0.06 MG/ML
INJECTION, SOLUTION INTRAVENOUS
Status: DISPENSED
Start: 2025-05-14 | End: 2025-05-14

## 2025-05-14 RX ORDER — SODIUM CHLORIDE, SODIUM LACTATE, POTASSIUM CHLORIDE, AND CALCIUM CHLORIDE .6; .31; .03; .02 G/100ML; G/100ML; G/100ML; G/100ML
500 INJECTION, SOLUTION INTRAVENOUS ONCE
Status: DISCONTINUED | OUTPATIENT
Start: 2025-05-14 | End: 2025-05-14

## 2025-05-14 RX ORDER — NOREPINEPHRINE BITARTRATE 0.06 MG/ML
1-30 INJECTION, SOLUTION INTRAVENOUS CONTINUOUS
Status: DISCONTINUED | OUTPATIENT
Start: 2025-05-14 | End: 2025-05-17

## 2025-05-14 RX ORDER — SODIUM CHLORIDE 9 MG/ML
INJECTION, SOLUTION INTRAVENOUS PRN
Status: DISCONTINUED | OUTPATIENT
Start: 2025-05-14 | End: 2025-05-22 | Stop reason: HOSPADM

## 2025-05-14 RX ORDER — SODIUM CHLORIDE 0.9 % (FLUSH) 0.9 %
5-40 SYRINGE (ML) INJECTION EVERY 12 HOURS SCHEDULED
Status: DISCONTINUED | OUTPATIENT
Start: 2025-05-14 | End: 2025-05-22 | Stop reason: HOSPADM

## 2025-05-14 RX ORDER — ALBUMIN (HUMAN) 12.5 G/50ML
25 SOLUTION INTRAVENOUS ONCE
Status: COMPLETED | OUTPATIENT
Start: 2025-05-14 | End: 2025-05-14

## 2025-05-14 RX ORDER — ALBUMIN (HUMAN) 12.5 G/50ML
12.5 SOLUTION INTRAVENOUS EVERY 6 HOURS
Status: DISCONTINUED | OUTPATIENT
Start: 2025-05-14 | End: 2025-05-22

## 2025-05-14 RX ORDER — SODIUM CHLORIDE 0.9 % (FLUSH) 0.9 %
5-40 SYRINGE (ML) INJECTION PRN
Status: DISCONTINUED | OUTPATIENT
Start: 2025-05-14 | End: 2025-05-22 | Stop reason: HOSPADM

## 2025-05-14 RX ORDER — NOREPINEPHRINE BITARTRATE 0.03 MG/ML
.5-16 INJECTION, SOLUTION INTRAVENOUS
Status: DISCONTINUED | OUTPATIENT
Start: 2025-05-14 | End: 2025-05-14

## 2025-05-14 RX ORDER — SODIUM CHLORIDE, SODIUM LACTATE, POTASSIUM CHLORIDE, AND CALCIUM CHLORIDE .6; .31; .03; .02 G/100ML; G/100ML; G/100ML; G/100ML
1000 INJECTION, SOLUTION INTRAVENOUS ONCE
Status: COMPLETED | OUTPATIENT
Start: 2025-05-14 | End: 2025-05-14

## 2025-05-14 RX ORDER — 0.9 % SODIUM CHLORIDE 0.9 %
30 INTRAVENOUS SOLUTION INTRAVENOUS ONCE
Status: COMPLETED | OUTPATIENT
Start: 2025-05-14 | End: 2025-05-14

## 2025-05-14 RX ORDER — BUDESONIDE 0.5 MG/2ML
0.5 INHALANT ORAL
Status: DISCONTINUED | OUTPATIENT
Start: 2025-05-14 | End: 2025-05-22 | Stop reason: HOSPADM

## 2025-05-14 RX ORDER — NOREPINEPHRINE BITARTRATE 0.03 MG/ML
.5-16 INJECTION, SOLUTION INTRAVENOUS
Status: CANCELLED | OUTPATIENT
Start: 2025-05-14

## 2025-05-14 RX ORDER — SODIUM CHLORIDE 9 MG/ML
INJECTION, SOLUTION INTRAVENOUS CONTINUOUS
Status: DISCONTINUED | OUTPATIENT
Start: 2025-05-14 | End: 2025-05-22

## 2025-05-14 RX ORDER — METOPROLOL TARTRATE 1 MG/ML
2.5 INJECTION, SOLUTION INTRAVENOUS ONCE
Status: COMPLETED | OUTPATIENT
Start: 2025-05-14 | End: 2025-05-14

## 2025-05-14 RX ORDER — METOPROLOL TARTRATE 1 MG/ML
INJECTION, SOLUTION INTRAVENOUS
Status: DISPENSED
Start: 2025-05-14 | End: 2025-05-14

## 2025-05-14 RX ORDER — NOREPINEPHRINE BITARTRATE 0.06 MG/ML
1-100 INJECTION, SOLUTION INTRAVENOUS CONTINUOUS
Status: DISCONTINUED | OUTPATIENT
Start: 2025-05-14 | End: 2025-05-14

## 2025-05-14 RX ADMIN — ZINC SULFATE 220 MG (50 MG) CAPSULE 50 MG: CAPSULE at 12:00

## 2025-05-14 RX ADMIN — IPRATROPIUM BROMIDE 0.5 MG: 0.5 SOLUTION RESPIRATORY (INHALATION) at 16:35

## 2025-05-14 RX ADMIN — MIRTAZAPINE 7.5 MG: 15 TABLET, FILM COATED ORAL at 20:58

## 2025-05-14 RX ADMIN — SENNOSIDES AND DOCUSATE SODIUM 1 TABLET: 50; 8.6 TABLET ORAL at 20:58

## 2025-05-14 RX ADMIN — METOROPROLOL TARTRATE 2.5 MG: 5 INJECTION, SOLUTION INTRAVENOUS at 07:58

## 2025-05-14 RX ADMIN — HEPARIN SODIUM 27 UNITS/KG/HR: 10000 INJECTION, SOLUTION INTRAVENOUS at 08:16

## 2025-05-14 RX ADMIN — THIAMINE HYDROCHLORIDE 100 MG: 100 INJECTION, SOLUTION INTRAMUSCULAR; INTRAVENOUS at 11:59

## 2025-05-14 RX ADMIN — SODIUM CHLORIDE, SODIUM LACTATE, POTASSIUM CHLORIDE, AND CALCIUM CHLORIDE 1000 ML: 600; 310; 30; 20 INJECTION, SOLUTION INTRAVENOUS at 11:46

## 2025-05-14 RX ADMIN — VANCOMYCIN HYDROCHLORIDE 1250 MG: 10 INJECTION, POWDER, LYOPHILIZED, FOR SOLUTION INTRAVENOUS at 06:36

## 2025-05-14 RX ADMIN — SODIUM CHLORIDE, PRESERVATIVE FREE 10 ML: 5 INJECTION INTRAVENOUS at 08:27

## 2025-05-14 RX ADMIN — ACETAMINOPHEN 650 MG: 650 SUPPOSITORY RECTAL at 11:24

## 2025-05-14 RX ADMIN — VANCOMYCIN HYDROCHLORIDE 750 MG: 750 INJECTION, POWDER, LYOPHILIZED, FOR SOLUTION INTRAVENOUS at 22:43

## 2025-05-14 RX ADMIN — THIAMINE HYDROCHLORIDE 100 MG: 100 INJECTION, SOLUTION INTRAMUSCULAR; INTRAVENOUS at 21:04

## 2025-05-14 RX ADMIN — Medication 100 MG: at 11:58

## 2025-05-14 RX ADMIN — SODIUM CHLORIDE: 0.9 INJECTION, SOLUTION INTRAVENOUS at 15:31

## 2025-05-14 RX ADMIN — INSULIN LISPRO 4 UNITS: 100 INJECTION, SOLUTION INTRAVENOUS; SUBCUTANEOUS at 14:07

## 2025-05-14 RX ADMIN — SODIUM CHLORIDE, SODIUM LACTATE, POTASSIUM CHLORIDE, AND CALCIUM CHLORIDE 500 ML: 600; 310; 30; 20 INJECTION, SOLUTION INTRAVENOUS at 11:00

## 2025-05-14 RX ADMIN — SODIUM CHLORIDE, PRESERVATIVE FREE 10 ML: 5 INJECTION INTRAVENOUS at 21:02

## 2025-05-14 RX ADMIN — VANCOMYCIN HYDROCHLORIDE 750 MG: 750 INJECTION, POWDER, LYOPHILIZED, FOR SOLUTION INTRAVENOUS at 17:57

## 2025-05-14 RX ADMIN — MEROPENEM 1000 MG: 1 INJECTION INTRAVENOUS at 20:51

## 2025-05-14 RX ADMIN — METOROPROLOL TARTRATE 2.5 MG: 5 INJECTION, SOLUTION INTRAVENOUS at 08:05

## 2025-05-14 RX ADMIN — ATORVASTATIN CALCIUM 20 MG: 20 TABLET, FILM COATED ORAL at 20:58

## 2025-05-14 RX ADMIN — BUDESONIDE 500 MCG: 0.5 INHALANT RESPIRATORY (INHALATION) at 19:54

## 2025-05-14 RX ADMIN — GABAPENTIN 300 MG: 300 CAPSULE ORAL at 20:58

## 2025-05-14 RX ADMIN — ALBUMIN (HUMAN) 25 G: 0.25 INJECTION, SOLUTION INTRAVENOUS at 11:05

## 2025-05-14 RX ADMIN — SODIUM CHLORIDE, PRESERVATIVE FREE 40 MG: 5 INJECTION INTRAVENOUS at 05:53

## 2025-05-14 RX ADMIN — IPRATROPIUM BROMIDE 0.5 MG: 0.5 SOLUTION RESPIRATORY (INHALATION) at 19:54

## 2025-05-14 RX ADMIN — MEROPENEM 1000 MG: 1 INJECTION INTRAVENOUS at 11:52

## 2025-05-14 RX ADMIN — SODIUM CHLORIDE, PRESERVATIVE FREE 10 ML: 5 INJECTION INTRAVENOUS at 20:51

## 2025-05-14 RX ADMIN — QUETIAPINE FUMARATE 100 MG: 100 TABLET ORAL at 20:57

## 2025-05-14 RX ADMIN — ACETAMINOPHEN 650 MG: 325 TABLET ORAL at 06:00

## 2025-05-14 RX ADMIN — ALBUMIN (HUMAN) 12.5 G: 0.25 INJECTION, SOLUTION INTRAVENOUS at 20:36

## 2025-05-14 RX ADMIN — Medication 6000 UNITS: at 12:00

## 2025-05-14 RX ADMIN — INSULIN GLARGINE 10 UNITS: 100 INJECTION, SOLUTION SUBCUTANEOUS at 20:53

## 2025-05-14 RX ADMIN — SODIUM CHLORIDE, PRESERVATIVE FREE 40 MG: 5 INJECTION INTRAVENOUS at 17:52

## 2025-05-14 RX ADMIN — CEFTRIAXONE 1000 MG: 1 INJECTION, POWDER, FOR SOLUTION INTRAMUSCULAR; INTRAVENOUS at 06:32

## 2025-05-14 RX ADMIN — SODIUM CHLORIDE 1863 ML: 0.9 INJECTION, SOLUTION INTRAVENOUS at 06:53

## 2025-05-14 RX ADMIN — HEPARIN SODIUM 27 UNITS/KG/HR: 10000 INJECTION, SOLUTION INTRAVENOUS at 18:45

## 2025-05-14 RX ADMIN — NOREPINEPHRINE BITARTRATE 5 MCG/MIN: 64 SOLUTION INTRAVENOUS at 11:21

## 2025-05-14 ASSESSMENT — PAIN SCALES - GENERAL
PAINLEVEL_OUTOF10: 3
PAINLEVEL_OUTOF10: 0

## 2025-05-14 NOTE — ACP (ADVANCE CARE PLANNING)
Advance Care Planning       Palliative Team Advance Care Planning (ACP) Conversation        Date of Conversation: 05/14/25      Individuals present for the conversation: Patient with decision making capacity, patient's wife (Valeria Gonzalez), other family members, Dr. Mac Brown (Palliative) and this writer.        ACP documents on file prior to discussion:  -None      Previously completed document/s not on file: Patient / participant reports that there are no previously executed ACP documents.      Healthcare Decision Maker:     Patient does not have a formal healthcare decision maker document, on file. Patient's wife (Valeria Gonzalez, Ph#[phone]) is patient's legal next-of-kin and default healthcare decision maker. Wife accepts the role of decision maker.       Conversation Summary:      This writer, along with Dr. Mac Brown, with the Palliative Care team; visited with patient today today to offer support and also address healthcare decision maker(s) and discuss goals of care moving forward. Patient was laying in bed; alert and oriented. Patient's wife (Valeria) and other family members were at the bedside.     Patient lives with his wife and is reportedly independent with his ADLs and IADLs. Patient plans to return home, once medically stable for discharge. Patient does not have a formal healthcare decision maker document, on file. Patient's wife is his legal next-of-kin and default healthcare decision maker. Wife accepts this role of decision maker.     Patient was then asked about goals of care moving forward. Patient was educated on the risks and burdens of CPR. Patient made it clear that he is okay with the attempts at resuscitation, in the event that his heart and breathing were to stop. At this time, patient will remain a FULL CODE WITH FULL INTERVENTIONS.       Resuscitation Status:   Code Status: Full Code       Documentation Completed:  -No new documents completed.         Antonio Read Jr.,

## 2025-05-14 NOTE — ED NOTES
MD advised to monitor, pt stayed in the unit running fluids. Pt voided, smear BM, cleaned, repositioned.

## 2025-05-14 NOTE — CONSULTS
[facility]ists  Pulmonary, Critical Care, and Sleep Medicine    Name: Lan Gonzalez MRN: [id number]   : 1965 Hospital: [facility]    Date: 2025  Room: [address] [place] [address] Note                                              Consult requesting physician: Dr. 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] 2025     Priority: Medium    Stage 3a chronic kidney disease (HCC) [N18.31] 2025    Elevated troponin [R79.89] 2025    Hypoalbuminemia due to protein-calorie malnutrition [E88.09, E46] 2025    COPD (chronic obstructive pulmonary disease) (HCC) [J44.9]     BPH (benign prostatic hyperplasia) [N40.0]     AAA (abdominal aortic aneurysm) [I71.40]     CAD (coronary artery disease) [I25.10] 2025    Cardiomyopathy (HCC) [I42.9] 2025    RA (rheumatoid arthritis) (HCC) [M06.9] 2017    Type 1 diabetes mellitus (HCC) [E10.9] 2017    MDD (major depressive disorder) [F32.9] 2017        Code status: Full Code       RECOMMENDATIONS:     Respiratory: History of COPD, former smoker, pulmonary nodule.  CXR 2025: Clear lungs.  No acute pulmonary process.  CT chest 2025 reviewed: sub-6 mm subpleural RLL pulmonary nodule (patient reported he is aware of the nodule and seen on prior low-dose lung cancer screening CT chest with PCP; it has remained stable per patient.  This nodule is reported on [facility] CT chest abdomen pelvis 4/3/2025 compared to LDCT 2024, : Stable reflect 9 mm pleural-based nodule lateral RLL).  Bibasilar mild atelectasis.  Patient is advised to follow-up with PCP and continue 
                    [address]  [address]                              CONSULTATION      PATIENT NAME: ABIGAIL CARCAMO                : 1965  MED REC NO: [id number]                       ROOM: [id number]  ACCOUNT NO: [id number]                       ADMIT DATE: 2025  PROVIDER: Mariam Moss MD    DATE OF SERVICE:  2025    ATTENDING PHYSICIAN:  TYLER SANTA    HISTORY OF PRESENT ILLNESS:  This is a 59-year-old male with complex medical history, mostly diabetes mellitus type 1, exocrine pancreatic insufficiency, had a previous pancreatojejunostomy with gastrojejunostomy, cholecystectomy, and hepaticojejunostomy in .  We are not sure exactly the reason, possibly he has pancreatic cancer, but my suspicion is that he has post cholecystectomy complication.  The patient is not very good historian.  The patient also has coronary artery disease, COPD.  He has dyslipidemia, SUNG.  He has some neurological problem with seizure disorder, has been followed by Neurology, but this time came to the hospital because of progressive weakness over the last few weeks with nausea, vomiting, significant weight loss.  The patient has a previous cervical fusion.  He was found to be severely anemic at 5.6, vitamin B12 below 150, reticulocyte count 3.  His iron saturation was 4.  The patient denied taking any NSAID, smoking or abusing alcohol.  He was found to have leukocytosis and urinary tract infection.  The patient has not been eating well mostly because of daily vomiting.  He claimed that he used to have regular bowel movement every day, but in the last few months, he became more constipated.  He had a colonoscopy by myself in 2024, showed long and redundant loopy colon, but no polyps were found.  The patient is being evaluated by Neurology for possibility of Guillain-[place] syndrome.  He was found to have abdominal aneurysm about 4 cm 
       [facility]  (A Division of [facility])                                                           Date of Admission: 5/2/2025       ID Consult for antimicrobial management presumed sepsis/Pyelitis- requested by Dr Guerrero   PCP: Nat Donohue APRN - NP    C/C:     Current Antimicrobials:    Prior Antimicrobials:    Vanco/Cefepime 5/2 to date Vantin 04/4/25 X10 days PO  Doxycycline X7 days 4/1/25    Vanco/Cefepime 4/24 to 4/28     Allaergy to antibiotics- PCN-hives     Assessment:     Chronically sick looking patient with:    Doubt sepsis- questionable significance of right pyelitis/ascending infection? Non obstructing renal stone on CT-- UA /history and physical exam for CVA tenderness-unremarkable  Leucocytosis- for few months-- current worsening could be from acute GIB, intermittent Prednisone use  5/2- blood culture: NGSF        -UA clean, culture in progress        -CXR-clear        -CTA CAP-- right pyelitis    Procal normal- on 4/24, 4/25 and 5/2    Severe anemia- <6.     GBS- LE numbness and weakness of 1-2 weeks/leucocytosis/question of vasculitis-- work up including LP and MRI Spine  pending. Neurology following  R foot rash- erythema    History of Klebsiella sepsis- 2023    Medical History:   Diagnosis Date    AAA (abdominal aortic aneurysm)-3.9 X4cm -05/2025     \"probably seven or eight years ago\" last seen by Cassie Bagley 6/2/23    Anxiety     Arthritis     Nat Donohue    BPH (benign prostatic hyperplasia)     CAD (coronary artery disease)     at least since 2020, Nonobstructive CAD-mild diffuse RCA disease, minimal circumflex disease, moderate mid LAD disease on cardiac cath in 2020, Joselito Raizada    Cardiomyopathy (HCC)     at least since 2023, Joselito Raizada    Cerebral artery occlusion with cerebral infarction (HCC) 11/2023    Eddy Guallpa    Chronic kidney disease (CKD), active medical management without dialysis, stage 3 (moderate) (HCC) 2020    [facility] 
      Cardiology consultation was requested for evaluation of Elevated troponins    Date of  Admission: 5/2/2025 11:06 AM   Requesting Physician:  Nat Donohue APRN - NP; Joann Guerrero MD     Attending Cardiologist: Delfina     Assessment:   Elevated troponin, etiology unclear  Hx of CAD,   Ischemic CMP  GI bleed  Severe Sepsis  DM, type 1  GBS    Patient Active Problem List    Diagnosis Date Noted    Weakness 05/02/2025    GBS (Guillain [place] syndrome) 05/02/2025    Blood loss anemia 05/02/2025    GI bleed 05/02/2025    Stage 3a chronic kidney disease (HCC) 05/02/2025    Elevated troponin 05/02/2025    Hypoalbuminemia due to protein-calorie malnutrition 05/02/2025    COPD (chronic obstructive pulmonary disease) ([facility])     BPH (benign prostatic hyperplasia)     AAA (abdominal aortic aneurysm)     Severe sepsis ([facility]) 04/24/2025    Peripheral polyneuropathy 04/24/2025    Cellulitis of right lower extremity 04/24/2025    Stage 3b chronic kidney disease ([facility]) 04/24/2025    Myalgia 04/24/2025    CAD (coronary artery disease) 04/24/2025    Exertional shortness of breath 04/24/2025    Cardiomyopathy ([facility]) 04/24/2025    CVA (cerebral vascular accident) ([facility]) 11/28/2023    Encephalopathy 11/24/2023    Bacteremia 11/20/2023    Thrombocytopenia 11/20/2023    DKA, type 1, not at goal ([facility]) 11/17/2023    Tardive dyskinesia 11/17/2023    Psychiatric disorder 11/17/2023    ARF (acute renal failure) 11/17/2023    Seizure ([facility]) 11/17/2023    Hyperglycemia 08/24/2017    RA (rheumatoid arthritis) ([facility]) 08/24/2017    Kidney stone on left side 08/24/2017    Type 1 diabetes mellitus ([facility]) 08/24/2017    MDD (major depressive disorder) 08/24/2017    DDD (degenerative disc disease), cervical 11/05/2012        Plan:   Check ECHO  Will follow  Other issues per hospitalist/ intensivist       History of Present Illness:   Lan Gonzalez is a 59 y.o.  male with a very complex medical history to include recent hospitalization for 
   Phone: [phone]  Paging : [phone]     Hematology/Oncology Consult Note    Patient: Lan Gonzalez MRN: [id number]  CSN: [id number]    YOB: 1965  Age: 59 y.o.  Sex: male    DOA: 5/2/2025 LOS:  LOS: 6 days            REASON FOR CONSULTATION:     Leukocytosis    ASSESSMENT:     Mr. Montenegro is a 59-year-old male with a history of coronary artery disease, peripheral artery disease complicated by AAA, CVA, CKD stage III, chronic pancreatitis status post Whipple, COPD, hyperlipidemia, major depressive disorder, and history of rheumatoid arthritis who is admitted to the hospital with weakness, found to have sepsis, possible GI bleed, and vitamin B12 deficiency.  Hematology consulted on leukocytosis    PLAN:   #Leukocytosis  Based on the differential and in the rapid fluctuations, I doubt this is a primary bone marrow abnormality/myeloproliferative neoplasm.  It seems to be consistent with the patient's recent pyelitis and ureteritis/ascending infection and likely ongoing inflammation from his numerous medical issues. The patient also received prednisone last week per the MAR, unsure if he was intermittently receiving steroids outpatient for his RA and concern for GBS.  Workup via flow cytometry in process, will add BCR abl to rule out CML, will check for other MPNs.   - Follow-up flow cytometry  - Will send additional MPN workup as mentioned  - Trend CBC with differential daily  - Hold on bone marrow biopsy at this time, may plan to complete one as an outpatient once acute issues have resolved and if leukocytosis is persistent  - Can follow-up with me outpatient    #Normocytic anemia  #Vitamin B12 deficiency  #Iron deficiency anemia   Severely B12 deficient, suspecting absorption issues 2/2 whipple. EGD yesterday showed biliary and atrophic gastritis + ulcers in efferent anastomosis loop which could have been source of bleeding. Will extend work up for anemia.  - Continue 1000mcg IM weekly now for 
both arms 5/8/25 heparin gtt, hiv and hep neg, DM1,  abd aneurysm per MD.  Planning IR bone marrow bx. on dysphagia dm diet and Ensure ordered. PO intakes declined to now mostly poor PO since admit. no new wts since admit 62kg 5/4.  on admit appeared wt slight down relatively stable since 63kg Feb 2025 but wt overall trends down since 8% (67kg July 2024) x 10 months not significant. 1500ml UOP. diuresing lasix. on vit B6, B12 and vit D suppl. nutrition consulted for poor PO. RD discussed poor nutrition status w/MD and pt and wife today. pt reports decreased appetite and PO intakes eating poorly \"doesn't feel like eating.\"  RD discussed NGT to help improve nutrition status wife agreeable and request to discuss w/MD. MD ordered TF and consulted dietitian. pt appears w/overt signs of moderate malnutrition on observation. DTI on coccyx per WOCN.    Nutrition Related Findings:      Wound Type: Deep Tissue Injury       Current Nutrition Intake & Therapies:    Average Meal Intake: 0%, 1-25%, 26-50%  Average Supplements Intake: 1-25%  ADULT DIET; Dysphagia - Minced and Moist; 4 carb choices (60 gm/meal)  ADULT ORAL NUTRITION SUPPLEMENT; Breakfast, Lunch, Dinner; Standard High Calorie/High Protein Oral Supplement  Diet NPO Exceptions are: Sips of Water with Meds  ADULT TUBE FEEDING; Nasogastric; Other Tube Feeding (specify); Per dietician; Continuous; 30; Yes; 0; Q 4 hours; 55; 30; Q 4 hours  Current Tube Feeding (TF) Orders:  Nutrition consulted for TF other TF ordered discussed with MD today    Anthropometric Measures:  Height: 177.8 cm (5' 10\")  Ideal Body Weight (IBW): 166 lbs (75 kg)    Admission Body Weight: 62 kg (136 lb 11 oz)  Current Body Weight: 62 kg (136 lb 11 oz), 82.3 % IBW.    Current BMI (kg/m2): 19.6  Usual Body Weight: 67 kg (147 lb 11.3 oz)  % Weight Change (Calculated): -7.5  BMI Categories: Normal Weight (BMI 18.5-24.9)    Estimated Daily Nutrient Needs:  Energy Requirements Based On: Kcal/kg  Weight 
delayed release capsule Take 1 capsule by mouth 3 times daily (with meals)   Yes ProviderDany MD   aspirin 81 MG chewable tablet Take 1 tablet by mouth   Yes ProviderDany MD   ondansetron (ZOFRAN-ODT) 4 MG disintegrating tablet Take 1 tablet by mouth every 8 hours as needed for Nausea or Vomiting 2/23/25  Yes Andrew Vergara PA-C   QUEtiapine (SEROQUEL) 100 MG tablet Take 1.5 tablets by mouth at bedtime Indications: Trouble Sleeping 6/10/24  Yes ProviderDany MD   mirtazapine (REMERON) 7.5 MG tablet Take 1 tablet by mouth daily 6/10/24  Yes ProviderDany MD   metoclopramide (REGLAN) 10 MG tablet Take 1 tablet by mouth 4 times daily Indications: Delayed or Stopped Emptying of Stomach AS NEEDED 3/23/06  Yes ProviderDany MD   insulin aspart (NOVOLOG) 100 UNIT/ML injection vial 50 Units continuous Insulin pump 11/8/17  Yes Provider, MD Dany   atorvastatin (LIPITOR) 20 MG tablet Take 1 tablet by mouth at bedtime 6/22/23  Yes Provider, MD Dany   predniSONE (DELTASONE) 20 MG tablet Take 1 tablet by mouth daily for 10 days  Patient not taking: Reported on 5/2/2025 4/29/25 5/9/25  Shon Moseley MD       Allergies   Allergen Reactions    Morphine      GI upset    Thimerosal Hives    Fentanyl Other (See Comments)     Tachycardia, hypertension    Penicillins Hives       Review of Systems  Pertinent items are noted in the History of Present Illness.      Physical Exam:      Visit Vitals  /74   Pulse 94   Temp 99.5 °F (37.5 °C) (Oral)   Resp 19   Ht 1.778 m (5' 10\")   Wt 62.1 kg (137 lb)   SpO2 99%   BMI 19.66 kg/m²       Physical Exam:  Physical Exam:   General:  Alert, cooperative, no distress, appears stated age.   Neck: Supple, symmetrical, trachea midline,    Back:   Symmetric, no curvature. ROM normal. No CVA tenderness.   Extremities: Extremities normal, atraumatic, no cyanosis or edema.   Neurologic: Normal strength, sensation and reflexes LE 
sodium (SENOKOT-S) 8.6-50 MG tablet 1 tablet  1 tablet Oral BID Leon Rodgers, PA-C   1 tablet at 05/12/25 0825    atorvastatin (LIPITOR) tablet 20 mg  20 mg Oral Nightly Leon Rodgers, PA-C   20 mg at 05/11/25 2318    linaclotide (LINZESS) capsule 145 mcg (Patient Supplied)  145 mcg Oral Daily Leon Rodgers, PA-C   145 mcg at 05/12/25 0824    lipase-protease-amylase (ZENPEP) delayed release capsule 10,000 Units  10,000 Units Oral TID WC Leon Rodgers, PA-C   10,000 Units at 05/12/25 0825    mirtazapine (REMERON) tablet 7.5 mg  7.5 mg Oral Daily Leon Rodgers, PA-C   7.5 mg at 05/11/25 2325    QUEtiapine (SEROQUEL) tablet 150 mg  150 mg Oral Nightly Leon Rodgers PA-C   150 mg at 05/10/25 2109    insulin glargine (LANTUS) injection vial 10 Units  10 Units SubCUTAneous Nightly Joann Guerrero MD   10 Units at 05/11/25 2319    oxyCODONE (ROXICODONE) immediate release tablet 10 mg  10 mg Oral Q6H PRN Irina Britt MD   10 mg at 05/11/25 2325       ALLERGIES  Allergies   Allergen Reactions    Morphine      GI upset    Thimerosal Hives    Fentanyl Other (See Comments)     Tachycardia, hypertension    Penicillins Hives       DIAGNOSTIC STUDIES   IMAGING STUDIES  I personally reviewed the following imaging studies and reports:    LABS  Lab Results   Component Value Date/Time    WBC 23.0 05/12/2025 02:35 AM    HGB 8.2 05/12/2025 02:35 AM    HCT 26.1 05/12/2025 02:35 AM     05/12/2025 02:35 AM    MCV 87.6 05/12/2025 02:35 AM     Lab Results   Component Value Date/Time     05/12/2025 02:35 AM    K 3.5 05/12/2025 02:35 AM    CL 99 05/12/2025 02:35 AM    CO2 25 05/12/2025 02:35 AM    BUN 26 05/12/2025 02:35 AM    GFR 70 05/12/2025 02:35 AM        Lab Results   Component Value Date/Time    INR 1.3 05/03/2025 05:47 AM           REVIEW OF SYSTEMS   Patient denies fever, cough, shortness of breath, chest pain, abdominal pain, nausea, vomiting, extremity weakness or swelling. The remainder of the review of systems is negative for 
Directive/Palliative Care: Consulted.     Quality Care: PPI, DVT prophylaxis, HOB elevated, Infection control all reviewed and addressed.    Care of plan d/w RN, RT, MDR, hospitalist team, infectious disease, hematology  D/w patient and family (answered all questions to satisfaction).     High complexity decision making was performed during the evaluation of this patient at high risk for decompensation with multiple organ involvement.    Total critical care time spent rendering care exclusive of procedures/family discussion/coordination of care: 79 minutes minutes.        Subjective/History of Present Illness:     Patient is a 59 y.o. male with PMHx significant for malnutrition, low BMI, coronary artery disease, peripheral vascular disease, GERD gastritis, AAA, history of CVA, history of Patricio liver, chronic renal insufficiency, chronic pancreatitis s/p Whipple procedure in 2014, diabetes type 1, COPD, history of seizure, depression, rheumatoid arthritis previously on immunosuppression including Rituxan stopped in 2021, presented to [facility] 5/2/2025 with generalized weakness, nausea vomiting, significant weight loss.  Initially he was found to have some UTI pyelonephritis but without positive blood cultures, he was treated with antibiotics, he was found to have hemoglobin 5.6 with some signs of previous GI bleed, eventually GI issues improved and he was given replacement of severe deficiencies from multiple vitamins, patient developed DVT in upper extremity and was started on heparin drip with no bleeding, there was a questionable aspiration event on 5/13 2025 poor p.o. intake, patient presented on 5/14/2025 to ICU with hypotension fever shock.  Patient was off antibiotic.  To transfer to ICU he finished initial UTI treatment.  Bone marrow biopsy are pending, patient has been followed by oncology, GI, infectious disease          5/14/2025 :     Bed 8 ICU.  Patient was transferred from medical floor 
URETEROSCOPY, LASER LITHOTRIPSY WITH HOLMIUIM, STENT PLACEMENT (C-ARM, HOLMIUM LASER- AGILITY, STENTS)    UROLOGICAL SURGERY  2019    CYSTOSCOPY,LEFT RETROGRADE PYELOGRAM WITH INTERPRETATION ,LEFT URETEROSCOPIC STONE EXTRECTION WITH HOLMIUM, JJ STENT PLACEMENT WITH C-ARM    UROLOGICAL SURGERY  2017    CYSTOSCOPY,LEFT RETRO PYELOGRAM,LEFT URETEROSCOPY WITH HOLIMIUM LASER FRAGMENTATION OF STONE AND STONE EXTRACTION,STENT PLACEMENT W/C-ARM      Family History   Problem Relation Age of Onset    Pseudochol. Deficiency Neg Hx     Delayed Awakening Neg Hx     Post-op Nausea/Vomiting Neg Hx     Emergence Delirium Neg Hx     Malig Hypertherm Neg Hx     Other Neg Hx     Post-op Cognitive Dysfunction Neg Hx       History reviewed, no pertinent family history.  Social History     Tobacco Use    Smoking status: Former     Current packs/day: 0.00     Types: Cigarettes     Quit date: 2012     Years since quittin.2    Smokeless tobacco: Never   Substance Use Topics    Alcohol use: Never     Allergies   Allergen Reactions    Morphine      GI upset    Thimerosal Hives    Fentanyl Other (See Comments)     Tachycardia, hypertension    Penicillins Hives      Current Facility-Administered Medications   Medication Dose Route Frequency    sodium chloride flush 0.9 % injection 5-40 mL  5-40 mL IntraVENous 2 times per day    sodium chloride flush 0.9 % injection 5-40 mL  5-40 mL IntraVENous PRN    0.9 % sodium chloride infusion   IntraVENous PRN    metoprolol (LOPRESSOR) 5 MG/5ML injection        meropenem (MERREM) 1,000 mg in sodium chloride 0.9 % 100 mL IVPB (addEASE)  1,000 mg IntraVENous Q8H    vancomycin (VANCOCIN) 750 mg in sodium chloride 0.9 % 250 mL (addEASE) IVPB  750 mg IntraVENous Q12H    Vancomycin - Pharmacy to Dose  1 each Other RX Placeholder    [START ON 5/15/2025] Vancomycin Random level due 5/15/25 at 03:00  1 each Other Once    lactated ringers bolus 1,000 mL  1,000 mL IntraVENous Once    
identified. No pneumothorax is present. Bones: Visualized skeleton is stable in appearance.     No acute cardiopulmonary disease. Electronically signed by Kay Salinas    CTA CHEST ABDOMEN PELVIS W CONTRAST  Result Date: 4/3/2025  A EXAM: CTA CHEST PULMONARY EMBOLISM, CT ABDOMEN AND PELVIS WITH CONTRAST CLINICAL INDICATION/HISTORY: Pulmonary embolism (PE) suspected, unknown D-dimer COMPARISON: CT lung screening 12/6/2024, 2022 TECHNIQUE: CTA of the chest was performed using timing optimized for pulmonary embolism technique, with 100 cc of Omnipaque 350 IV contrast.  To maximize sensitivity the sagittal and coronal reconstructions were created using a 3D multislice MIP (maximal intensity projection) methodology.  This was followed by enhanced CT abdomen and pelvis imaging, with standard coronal and sagittal reconstructions. CT scans at this facility are performed using dose optimization technique as appropriate to the performed exam, to include automated exposure control, adjustment of the mA and/or kV according to patient size (including appropriate matching for site specific examinations), or use of iterative reconstruction technique. FINDINGS: Pulmonary arteries: No filling defects are appreciated within the main, lobar or visualized segmental pulmonary arteries to suggest embolism. Aorta and other cardiovascular structures: No aortic aneurysm or dissection. No right heart strain. Mild coronary calcifications. Lung/Airway:  Patent airways. Minimal dependent atelectasis. Otherwise clear. Stable flat 9 mm pleural-based nodule lateral right lower lobe Pleura:  No pleural effusion. Lymph nodes:  No lymphadenopathy. Chest wall and lower neck: Negative. CT ABDOMEN AND PELVIS FINDINGS: Liver: Negative. Biliary: Chronic pneumobilia. Absent gallbladder.. Pancreas: Pancreatic calcifications and atrophy compatible with chronic pancreatitis Spleen: Negative. Adrenal glands: Negative. Kidneys: Subcentimeter right

## 2025-05-15 ENCOUNTER — APPOINTMENT (OUTPATIENT)
Facility: HOSPITAL | Age: 60
DRG: 871 | End: 2025-05-15
Payer: MEDICARE

## 2025-05-15 PROBLEM — E43 SEVERE MALNUTRITION: Status: ACTIVE | Noted: 2025-05-15

## 2025-05-15 LAB
ALBUMIN SERPL-MCNC: 2 G/DL (ref 3.4–5)
ANION GAP SERPL CALC-SCNC: 11 MMOL/L (ref 3–18)
BACTERIA SPEC CULT: NORMAL
BASOPHILS # BLD: 0.11 K/UL (ref 0–0.1)
BASOPHILS NFR BLD: 0.4 % (ref 0–2)
BUN SERPL-MCNC: 30 MG/DL (ref 6–23)
BUN/CREAT SERPL: 28 (ref 12–20)
CALCIUM SERPL-MCNC: 7.5 MG/DL (ref 8.5–10.1)
CHLORIDE SERPL-SCNC: 107 MMOL/L (ref 98–107)
CITATION REF LAB TEST: NORMAL
CO2 SERPL-SCNC: 24 MMOL/L (ref 21–32)
CREAT SERPL-MCNC: 1.07 MG/DL (ref 0.6–1.3)
DIFFERENTIAL METHOD BLD: ABNORMAL
ECHO AO ASC DIAM: 3.7 CM
ECHO AO ASCENDING AORTA INDEX: 2.08 CM/M2
ECHO AO ROOT DIAM: 3.5 CM
ECHO AO ROOT INDEX: 1.97 CM/M2
ECHO AV AREA PEAK VELOCITY: 3.6 CM2
ECHO AV AREA VTI: 3.4 CM2
ECHO AV AREA/BSA PEAK VELOCITY: 2 CM2/M2
ECHO AV AREA/BSA VTI: 1.9 CM2/M2
ECHO AV MEAN GRADIENT: 2 MMHG
ECHO AV MEAN VELOCITY: 0.7 M/S
ECHO AV PEAK GRADIENT: 5 MMHG
ECHO AV PEAK VELOCITY: 1.1 M/S
ECHO AV VELOCITY RATIO: 0.82
ECHO AV VTI: 18.5 CM
ECHO BSA: 1.75 M2
ECHO EST RA PRESSURE: 3 MMHG
ECHO LA DIAMETER INDEX: 1.57 CM/M2
ECHO LA DIAMETER: 2.8 CM
ECHO LA TO AORTIC ROOT RATIO: 0.8
ECHO LA VOL A-L A2C: 36 ML (ref 18–58)
ECHO LA VOL A-L A4C: 40 ML (ref 18–58)
ECHO LA VOL BP: 33 ML (ref 18–58)
ECHO LA VOL MOD A2C: 35 ML (ref 18–58)
ECHO LA VOL MOD A4C: 33 ML (ref 18–58)
ECHO LA VOL/BSA BIPLANE: 19 ML/M2 (ref 16–34)
ECHO LA VOLUME AREA LENGTH: 40 ML
ECHO LA VOLUME INDEX A-L A2C: 20 ML/M2 (ref 16–34)
ECHO LA VOLUME INDEX A-L A4C: 22 ML/M2 (ref 16–34)
ECHO LA VOLUME INDEX AREA LENGTH: 22 ML/M2 (ref 16–34)
ECHO LA VOLUME INDEX MOD A2C: 20 ML/M2 (ref 16–34)
ECHO LA VOLUME INDEX MOD A4C: 19 ML/M2 (ref 16–34)
ECHO LV E' LATERAL VELOCITY: 9.23 CM/S
ECHO LV E' SEPTAL VELOCITY: 8.64 CM/S
ECHO LV EDV A2C: 148 ML
ECHO LV EDV A4C: 113 ML
ECHO LV EDV BP: 129 ML (ref 67–155)
ECHO LV EDV INDEX A4C: 63 ML/M2
ECHO LV EDV INDEX BP: 72 ML/M2
ECHO LV EDV NDEX A2C: 83 ML/M2
ECHO LV EF PHYSICIAN: 58 %
ECHO LV EJECTION FRACTION A2C: 63 %
ECHO LV EJECTION FRACTION A4C: 58 %
ECHO LV EJECTION FRACTION BIPLANE: 59 % (ref 55–100)
ECHO LV ESV A2C: 56 ML
ECHO LV ESV A4C: 48 ML
ECHO LV ESV BP: 53 ML (ref 22–58)
ECHO LV ESV INDEX A2C: 31 ML/M2
ECHO LV ESV INDEX A4C: 27 ML/M2
ECHO LV ESV INDEX BP: 30 ML/M2
ECHO LV FRACTIONAL SHORTENING: 29 % (ref 28–44)
ECHO LV INTERNAL DIMENSION DIASTOLE INDEX: 3.09 CM/M2
ECHO LV INTERNAL DIMENSION DIASTOLIC: 5.5 CM (ref 4.2–5.9)
ECHO LV INTERNAL DIMENSION SYSTOLIC INDEX: 2.19 CM/M2
ECHO LV INTERNAL DIMENSION SYSTOLIC: 3.9 CM
ECHO LV IVSD: 0.9 CM (ref 0.6–1)
ECHO LV MASS 2D: 199.3 G (ref 88–224)
ECHO LV MASS INDEX 2D: 112 G/M2 (ref 49–115)
ECHO LV POSTERIOR WALL DIASTOLIC: 1 CM (ref 0.6–1)
ECHO LV RELATIVE WALL THICKNESS RATIO: 0.36
ECHO LVOT AREA: 4.5 CM2
ECHO LVOT AV VTI INDEX: 0.77
ECHO LVOT DIAM: 2.4 CM
ECHO LVOT MEAN GRADIENT: 2 MMHG
ECHO LVOT PEAK GRADIENT: 3 MMHG
ECHO LVOT PEAK VELOCITY: 0.9 M/S
ECHO LVOT STROKE VOLUME INDEX: 36.3 ML/M2
ECHO LVOT SV: 64.7 ML
ECHO LVOT VTI: 14.3 CM
ECHO MV A VELOCITY: 0.7 M/S
ECHO MV E DECELERATION TIME (DT): 127.2 MS
ECHO MV E VELOCITY: 0.56 M/S
ECHO MV E/A RATIO: 0.8
ECHO MV E/E' LATERAL: 6.07
ECHO MV E/E' RATIO (AVERAGED): 6.27
ECHO MV E/E' SEPTAL: 6.48
ECHO RA END SYSTOLIC VOLUME APICAL 4 CHAMBER INDEX BSA: 25 ML/M2
ECHO RA VOLUME: 44 ML
ECHO RV FREE WALL PEAK S': 10.4 CM/S
ECHO RV INTERNAL DIMENSION: 4.5 CM
ECHO RV TAPSE: 2 CM (ref 1.7–?)
ECHO RVOT MEAN GRADIENT: 1 MMHG
ECHO RVOT PEAK GRADIENT: 1 MMHG
ECHO RVOT PEAK VELOCITY: 0.6 M/S
ECHO RVOT VTI: 10.8 CM
EKG DIAGNOSIS: NORMAL
EKG Q-T INTERVAL: 408 MS
EKG QRS DURATION: 106 MS
EKG QTC CALCULATION (BAZETT): 518 MS
EKG R AXIS: 32 DEGREES
EKG T AXIS: 78 DEGREES
EKG VENTRICULAR RATE: 97 BPM
EOSINOPHIL # BLD: 0.21 K/UL (ref 0–0.4)
EOSINOPHIL NFR BLD: 0.7 % (ref 0–5)
ERYTHROCYTE [DISTWIDTH] IN BLOOD BY AUTOMATED COUNT: 17.6 % (ref 11.6–14.5)
GLUCOSE BLD STRIP.AUTO-MCNC: 107 MG/DL (ref 70–110)
GLUCOSE BLD STRIP.AUTO-MCNC: 126 MG/DL (ref 70–110)
GLUCOSE BLD STRIP.AUTO-MCNC: 89 MG/DL (ref 70–110)
GLUCOSE BLD STRIP.AUTO-MCNC: 93 MG/DL (ref 70–110)
GLUCOSE SERPL-MCNC: 128 MG/DL (ref 74–108)
HCT VFR BLD AUTO: 23.4 % (ref 36–48)
HCT VFR BLD AUTO: 23.9 % (ref 36–48)
HGB BLD-MCNC: 7.4 G/DL (ref 13–16)
HGB BLD-MCNC: 7.5 G/DL (ref 13–16)
IMM GRANULOCYTES # BLD AUTO: 0.33 K/UL (ref 0–0.04)
IMM GRANULOCYTES NFR BLD AUTO: 1.1 % (ref 0–0.5)
LAB DIRECTOR NAME PROVIDER: NORMAL
LYMPHOCYTES # BLD: 1.43 K/UL (ref 0.9–3.3)
LYMPHOCYTES NFR BLD: 4.7 % (ref 21–52)
MCH RBC QN AUTO: 28.3 PG (ref 24–34)
MCHC RBC AUTO-ENTMCNC: 31.4 G/DL (ref 31–37)
MCV RBC AUTO: 90.2 FL (ref 78–100)
MONOCYTES # BLD: 0.61 K/UL (ref 0.05–1.2)
MONOCYTES NFR BLD: 2 % (ref 3–10)
MPL GENE MUT TESTED BLD/T: NORMAL
MPL P.W515L+W515K+S505N BLD/T QL: NORMAL
NEUTS SEG # BLD: 27.69 K/UL (ref 1.8–8)
NEUTS SEG NFR BLD: 91.1 % (ref 40–73)
NRBC # BLD: 0 K/UL (ref 0–0.01)
NRBC BLD-RTO: 0 PER 100 WBC
PHOSPHATE SERPL-MCNC: 3.5 MG/DL (ref 2.5–4.9)
PLATELET # BLD AUTO: 278 K/UL (ref 135–420)
PMV BLD AUTO: 10.1 FL (ref 9.2–11.8)
POTASSIUM SERPL-SCNC: 3 MMOL/L (ref 3.5–5.5)
POTASSIUM SERPL-SCNC: 3.9 MMOL/L (ref 3.5–5.5)
PROCALCITONIN SERPL-MCNC: 66 NG/ML
RBC # BLD AUTO: 2.65 M/UL (ref 4.35–5.65)
SERVICE CMNT-IMP: NORMAL
SODIUM SERPL-SCNC: 142 MMOL/L (ref 136–145)
TROPONIN T SERPL HS-MCNC: 74.4 NG/L (ref 0–22)
VANCOMYCIN SERPL-MCNC: 25.8 UG/ML (ref 5–40)
WBC # BLD AUTO: 30.4 K/UL (ref 4.6–13.2)
WBC # BLD AUTO: 43.4 K/UL (ref 4.6–13.2)

## 2025-05-15 PROCEDURE — 93005 ELECTROCARDIOGRAM TRACING: CPT | Performed by: INTERNAL MEDICINE

## 2025-05-15 PROCEDURE — 84484 ASSAY OF TROPONIN QUANT: CPT

## 2025-05-15 PROCEDURE — 6360000002 HC RX W HCPCS: Performed by: INTERNAL MEDICINE

## 2025-05-15 PROCEDURE — 85014 HEMATOCRIT: CPT

## 2025-05-15 PROCEDURE — 82962 GLUCOSE BLOOD TEST: CPT

## 2025-05-15 PROCEDURE — 6360000002 HC RX W HCPCS: Performed by: HOSPITALIST

## 2025-05-15 PROCEDURE — 71045 X-RAY EXAM CHEST 1 VIEW: CPT

## 2025-05-15 PROCEDURE — 6360000004 HC RX CONTRAST MEDICATION: Performed by: INTERNAL MEDICINE

## 2025-05-15 PROCEDURE — 2000000000 HC ICU R&B

## 2025-05-15 PROCEDURE — 6370000000 HC RX 637 (ALT 250 FOR IP): Performed by: HOSPITALIST

## 2025-05-15 PROCEDURE — P9047 ALBUMIN (HUMAN), 25%, 50ML: HCPCS | Performed by: INTERNAL MEDICINE

## 2025-05-15 PROCEDURE — 02HV33Z INSERTION OF INFUSION DEVICE INTO SUPERIOR VENA CAVA, PERCUTANEOUS APPROACH: ICD-10-PCS | Performed by: INTERNAL MEDICINE

## 2025-05-15 PROCEDURE — 6370000000 HC RX 637 (ALT 250 FOR IP): Performed by: INTERNAL MEDICINE

## 2025-05-15 PROCEDURE — 80069 RENAL FUNCTION PANEL: CPT

## 2025-05-15 PROCEDURE — 97530 THERAPEUTIC ACTIVITIES: CPT

## 2025-05-15 PROCEDURE — 94640 AIRWAY INHALATION TREATMENT: CPT

## 2025-05-15 PROCEDURE — 6370000000 HC RX 637 (ALT 250 FOR IP): Performed by: PHYSICIAN ASSISTANT

## 2025-05-15 PROCEDURE — 74177 CT ABD & PELVIS W/CONTRAST: CPT

## 2025-05-15 PROCEDURE — 2580000003 HC RX 258: Performed by: HOSPITALIST

## 2025-05-15 PROCEDURE — 2500000003 HC RX 250 WO HCPCS: Performed by: INTERNAL MEDICINE

## 2025-05-15 PROCEDURE — 85025 COMPLETE CBC W/AUTO DIFF WBC: CPT

## 2025-05-15 PROCEDURE — 93306 TTE W/DOPPLER COMPLETE: CPT | Performed by: INTERNAL MEDICINE

## 2025-05-15 PROCEDURE — 6370000000 HC RX 637 (ALT 250 FOR IP): Performed by: PSYCHIATRY & NEUROLOGY

## 2025-05-15 PROCEDURE — 2580000003 HC RX 258: Performed by: INTERNAL MEDICINE

## 2025-05-15 PROCEDURE — 6370000000 HC RX 637 (ALT 250 FOR IP): Performed by: FAMILY MEDICINE

## 2025-05-15 PROCEDURE — 2700000000 HC OXYGEN THERAPY PER DAY

## 2025-05-15 PROCEDURE — 84145 PROCALCITONIN (PCT): CPT

## 2025-05-15 PROCEDURE — 80202 ASSAY OF VANCOMYCIN: CPT

## 2025-05-15 PROCEDURE — P9047 ALBUMIN (HUMAN), 25%, 50ML: HCPCS | Performed by: HOSPITALIST

## 2025-05-15 PROCEDURE — 36415 COLL VENOUS BLD VENIPUNCTURE: CPT

## 2025-05-15 PROCEDURE — 93010 ELECTROCARDIOGRAM REPORT: CPT | Performed by: INTERNAL MEDICINE

## 2025-05-15 PROCEDURE — 71275 CT ANGIOGRAPHY CHEST: CPT

## 2025-05-15 PROCEDURE — 85018 HEMOGLOBIN: CPT

## 2025-05-15 PROCEDURE — 84132 ASSAY OF SERUM POTASSIUM: CPT

## 2025-05-15 RX ORDER — POTASSIUM CHLORIDE 7.45 MG/ML
10 INJECTION INTRAVENOUS PRN
Status: DISCONTINUED | OUTPATIENT
Start: 2025-05-15 | End: 2025-05-17

## 2025-05-15 RX ORDER — IOPAMIDOL 755 MG/ML
100 INJECTION, SOLUTION INTRAVASCULAR
Status: COMPLETED | OUTPATIENT
Start: 2025-05-15 | End: 2025-05-15

## 2025-05-15 RX ORDER — LACTULOSE 10 G/15ML
20 SOLUTION ORAL 2 TIMES DAILY
Status: DISCONTINUED | OUTPATIENT
Start: 2025-05-15 | End: 2025-05-22 | Stop reason: HOSPADM

## 2025-05-15 RX ORDER — ALBUMIN (HUMAN) 12.5 G/50ML
25 SOLUTION INTRAVENOUS ONCE
Status: COMPLETED | OUTPATIENT
Start: 2025-05-15 | End: 2025-05-15

## 2025-05-15 RX ORDER — POTASSIUM CHLORIDE 29.8 MG/ML
20 INJECTION INTRAVENOUS PRN
Status: DISCONTINUED | OUTPATIENT
Start: 2025-05-15 | End: 2025-05-17

## 2025-05-15 RX ORDER — POTASSIUM CHLORIDE 1500 MG/1
40 TABLET, EXTENDED RELEASE ORAL PRN
Status: DISCONTINUED | OUTPATIENT
Start: 2025-05-15 | End: 2025-05-17

## 2025-05-15 RX ADMIN — THIAMINE HYDROCHLORIDE 100 MG: 100 INJECTION, SOLUTION INTRAMUSCULAR; INTRAVENOUS at 20:41

## 2025-05-15 RX ADMIN — MIRTAZAPINE 7.5 MG: 15 TABLET, FILM COATED ORAL at 20:39

## 2025-05-15 RX ADMIN — BUDESONIDE 500 MCG: 0.5 INHALANT RESPIRATORY (INHALATION) at 20:47

## 2025-05-15 RX ADMIN — POTASSIUM CHLORIDE 10 MEQ: 7.46 INJECTION, SOLUTION INTRAVENOUS at 06:41

## 2025-05-15 RX ADMIN — SODIUM CHLORIDE, PRESERVATIVE FREE 20 ML: 5 INJECTION INTRAVENOUS at 22:39

## 2025-05-15 RX ADMIN — GABAPENTIN 300 MG: 300 CAPSULE ORAL at 09:39

## 2025-05-15 RX ADMIN — SODIUM CHLORIDE: 0.9 INJECTION, SOLUTION INTRAVENOUS at 18:49

## 2025-05-15 RX ADMIN — MEROPENEM 1000 MG: 1 INJECTION INTRAVENOUS at 12:49

## 2025-05-15 RX ADMIN — IOPAMIDOL 100 ML: 755 INJECTION, SOLUTION INTRAVENOUS at 15:10

## 2025-05-15 RX ADMIN — Medication 2000 UNITS: at 09:35

## 2025-05-15 RX ADMIN — ALBUMIN (HUMAN) 12.5 G: 0.25 INJECTION, SOLUTION INTRAVENOUS at 20:28

## 2025-05-15 RX ADMIN — IPRATROPIUM BROMIDE 0.5 MG: 0.5 SOLUTION RESPIRATORY (INHALATION) at 20:46

## 2025-05-15 RX ADMIN — GABAPENTIN 300 MG: 300 CAPSULE ORAL at 20:41

## 2025-05-15 RX ADMIN — VANCOMYCIN HYDROCHLORIDE 750 MG: 750 INJECTION, POWDER, LYOPHILIZED, FOR SOLUTION INTRAVENOUS at 12:42

## 2025-05-15 RX ADMIN — IPRATROPIUM BROMIDE 0.5 MG: 0.5 SOLUTION RESPIRATORY (INHALATION) at 07:46

## 2025-05-15 RX ADMIN — SENNOSIDES AND DOCUSATE SODIUM 1 TABLET: 50; 8.6 TABLET ORAL at 09:35

## 2025-05-15 RX ADMIN — THIAMINE HYDROCHLORIDE 100 MG: 100 INJECTION, SOLUTION INTRAMUSCULAR; INTRAVENOUS at 09:36

## 2025-05-15 RX ADMIN — POTASSIUM CHLORIDE 20 MEQ: 29.8 INJECTION, SOLUTION INTRAVENOUS at 10:41

## 2025-05-15 RX ADMIN — SODIUM CHLORIDE, PRESERVATIVE FREE 40 MG: 5 INJECTION INTRAVENOUS at 06:22

## 2025-05-15 RX ADMIN — SODIUM CHLORIDE, PRESERVATIVE FREE 10 ML: 5 INJECTION INTRAVENOUS at 09:39

## 2025-05-15 RX ADMIN — GABAPENTIN 300 MG: 300 CAPSULE ORAL at 16:33

## 2025-05-15 RX ADMIN — SODIUM CHLORIDE: 0.9 INJECTION, SOLUTION INTRAVENOUS at 01:08

## 2025-05-15 RX ADMIN — MEROPENEM 1000 MG: 1 INJECTION INTRAVENOUS at 03:47

## 2025-05-15 RX ADMIN — ZINC SULFATE 220 MG (50 MG) CAPSULE 50 MG: CAPSULE at 09:35

## 2025-05-15 RX ADMIN — LACTULOSE 20 G: 10 SOLUTION ORAL at 20:42

## 2025-05-15 RX ADMIN — ALBUMIN (HUMAN) 12.5 G: 0.25 INJECTION, SOLUTION INTRAVENOUS at 09:35

## 2025-05-15 RX ADMIN — QUETIAPINE FUMARATE 100 MG: 100 TABLET ORAL at 20:39

## 2025-05-15 RX ADMIN — MEROPENEM 1000 MG: 1 INJECTION INTRAVENOUS at 20:38

## 2025-05-15 RX ADMIN — ALBUMIN (HUMAN) 12.5 G: 0.25 INJECTION, SOLUTION INTRAVENOUS at 16:24

## 2025-05-15 RX ADMIN — POTASSIUM CHLORIDE 20 MEQ: 29.8 INJECTION, SOLUTION INTRAVENOUS at 09:33

## 2025-05-15 RX ADMIN — ALBUMIN (HUMAN) 25 G: 0.25 INJECTION, SOLUTION INTRAVENOUS at 14:16

## 2025-05-15 RX ADMIN — ALBUMIN (HUMAN) 12.5 G: 0.25 INJECTION, SOLUTION INTRAVENOUS at 02:39

## 2025-05-15 RX ADMIN — BUDESONIDE 500 MCG: 0.5 INHALANT RESPIRATORY (INHALATION) at 07:46

## 2025-05-15 RX ADMIN — Medication 100 MG: at 09:38

## 2025-05-15 RX ADMIN — ATORVASTATIN CALCIUM 20 MG: 20 TABLET, FILM COATED ORAL at 20:39

## 2025-05-15 RX ADMIN — POTASSIUM CHLORIDE 20 MEQ: 29.8 INJECTION, SOLUTION INTRAVENOUS at 12:30

## 2025-05-15 RX ADMIN — SENNOSIDES AND DOCUSATE SODIUM 1 TABLET: 50; 8.6 TABLET ORAL at 20:39

## 2025-05-15 RX ADMIN — OXYCODONE 10 MG: 5 TABLET ORAL at 19:24

## 2025-05-15 RX ADMIN — IPRATROPIUM BROMIDE 0.5 MG: 0.5 SOLUTION RESPIRATORY (INHALATION) at 15:39

## 2025-05-15 RX ADMIN — SODIUM CHLORIDE, PRESERVATIVE FREE 40 MG: 5 INJECTION INTRAVENOUS at 18:40

## 2025-05-15 ASSESSMENT — PAIN SCALES - GENERAL
PAINLEVEL_OUTOF10: 5
PAINLEVEL_OUTOF10: 7
PAINLEVEL_OUTOF10: 0

## 2025-05-15 ASSESSMENT — PAIN DESCRIPTION - ORIENTATION: ORIENTATION: RIGHT;LEFT

## 2025-05-15 ASSESSMENT — PAIN SCALES - WONG BAKER: WONGBAKER_NUMERICALRESPONSE: NO HURT

## 2025-05-15 ASSESSMENT — PAIN DESCRIPTION - DESCRIPTORS: DESCRIPTORS: BURNING;SHARP

## 2025-05-15 ASSESSMENT — PAIN DESCRIPTION - LOCATION: LOCATION: LEG

## 2025-05-15 NOTE — PROCEDURES
PROCEDURE NOTE  Date: 5/15/2025   Name: Lan Gonzalez  YOB: 1965    Procedures                        [facility]                  Pulmonary, Critical Care, and Sleep Medicine     Central Venous Line Placement Procedure with Ultrasound Guidance    Indication: Need for vasopressors, shock, cardiac   Shock R57.9.  Hypotension I95.9.  Sepsis A41.9.      Risks, benefits, alternatives explained and consent obtained.    Patient positioned in Trendelenburg.   Timeout called with RN and confirmed patient ID, procedure, site, allergy, consent etc.     Central line Bundle:  Full sterile barrier precautions used.  7-Step Sterility Protocol followed.  (cap, mask sterile gown, sterile gloves, large sterile sheet, hand hygiene, 2% chlorhexidine for cutaneous antisepsis)  5 mL 1% Lidocaine used at insertion site.      Using ultrasound guidance,    Right internal jugular cannulated x 1 attempt(s) utilizing the modified Seldinger technique.    no difficulty. Position of wire confirmed in vein using US before dilating. Wire was removed.   Good blood return.  Catheter secured & Biopatch applied.  Sterile Tegaderm placed.        Ultrasound used to rule out pneumothorax.     CXR pending.      Lor Headley MD   5/15/2025 8:27 AM

## 2025-05-16 LAB
ALBUMIN SERPL ELPH-MCNC: 2.1 G/DL (ref 2.9–4.4)
ALBUMIN SERPL-MCNC: 2.7 G/DL (ref 3.4–5)
ALBUMIN/GLOB SERPL: 0.8 (ref 0.7–1.7)
ALPHA1 GLOB SERPL ELPH-MCNC: 0.4 G/DL (ref 0–0.4)
ALPHA2 GLOB SERPL ELPH-MCNC: 0.9 G/DL (ref 0.4–1)
ANION GAP SERPL CALC-SCNC: 17 MMOL/L (ref 3–18)
B DERMAT AB TITR SER: NEGATIVE
B-GLOBULIN SERPL ELPH-MCNC: 0.7 G/DL (ref 0.7–1.3)
BASOPHILS # BLD: 0.07 K/UL (ref 0–0.1)
BASOPHILS NFR BLD: 0.3 % (ref 0–2)
BUN SERPL-MCNC: 28 MG/DL (ref 6–23)
BUN/CREAT SERPL: 31 (ref 12–20)
CALCIUM SERPL-MCNC: 8 MG/DL (ref 8.5–10.1)
CHLORIDE SERPL-SCNC: 108 MMOL/L (ref 98–107)
CO2 SERPL-SCNC: 19 MMOL/L (ref 21–32)
CREAT SERPL-MCNC: 0.93 MG/DL (ref 0.6–1.3)
DIFFERENTIAL METHOD BLD: ABNORMAL
EOSINOPHIL # BLD: 0.54 K/UL (ref 0–0.4)
EOSINOPHIL NFR BLD: 2.6 % (ref 0–5)
ERYTHROCYTE [DISTWIDTH] IN BLOOD BY AUTOMATED COUNT: 18 % (ref 11.6–14.5)
GAMMA GLOB SERPL ELPH-MCNC: 0.7 G/DL (ref 0.4–1.8)
GLOBULIN SER-MCNC: 2.7 G/DL (ref 2.2–3.9)
GLUCOSE BLD STRIP.AUTO-MCNC: 116 MG/DL (ref 70–110)
GLUCOSE BLD STRIP.AUTO-MCNC: 145 MG/DL (ref 70–110)
GLUCOSE BLD STRIP.AUTO-MCNC: 158 MG/DL (ref 70–110)
GLUCOSE BLD STRIP.AUTO-MCNC: 195 MG/DL (ref 70–110)
GLUCOSE BLD STRIP.AUTO-MCNC: 208 MG/DL (ref 70–110)
GLUCOSE BLD STRIP.AUTO-MCNC: 91 MG/DL (ref 70–110)
GLUCOSE SERPL-MCNC: 100 MG/DL (ref 74–108)
HCT VFR BLD AUTO: 25.6 % (ref 36–48)
HGB BLD-MCNC: 7.9 G/DL (ref 13–16)
IGA SERPL-MCNC: 216 MG/DL (ref 61–437)
IGG SERPL-MCNC: 819 MG/DL (ref 603–1613)
IGM SERPL-MCNC: 49 MG/DL (ref 20–172)
IMM GRANULOCYTES # BLD AUTO: 0.22 K/UL (ref 0–0.04)
IMM GRANULOCYTES NFR BLD AUTO: 1.1 % (ref 0–0.5)
INTERPRETATION SERPL IEP-IMP: ABNORMAL
KAPPA LC FREE SER-MCNC: 91.7 MG/L (ref 3.3–19.4)
KAPPA LC FREE/LAMBDA FREE SER: 1 (ref 0.26–1.65)
LAMBDA LC FREE SERPL-MCNC: 91.4 MG/L (ref 5.7–26.3)
LYMPHOCYTES # BLD: 1.08 K/UL (ref 0.9–3.3)
LYMPHOCYTES NFR BLD: 5.2 % (ref 21–52)
M PROTEIN SERPL ELPH-MCNC: ABNORMAL G/DL
MCH RBC QN AUTO: 28.1 PG (ref 24–34)
MCHC RBC AUTO-ENTMCNC: 30.9 G/DL (ref 31–37)
MCV RBC AUTO: 91.1 FL (ref 78–100)
MONOCYTES # BLD: 0.36 K/UL (ref 0.05–1.2)
MONOCYTES NFR BLD: 1.7 % (ref 3–10)
NEUTS SEG # BLD: 18.64 K/UL (ref 1.8–8)
NEUTS SEG NFR BLD: 89.1 % (ref 40–73)
NRBC # BLD: 0 K/UL (ref 0–0.01)
NRBC BLD-RTO: 0 PER 100 WBC
PHOSPHATE SERPL-MCNC: 2.5 MG/DL (ref 2.5–4.9)
PLATELET # BLD AUTO: 312 K/UL (ref 135–420)
PMV BLD AUTO: 10.2 FL (ref 9.2–11.8)
POTASSIUM SERPL-SCNC: 4 MMOL/L (ref 3.5–5.5)
PROCALCITONIN SERPL-MCNC: 43.8 NG/ML
PROT SERPL-MCNC: 4.8 G/DL (ref 6–8.5)
RBC # BLD AUTO: 2.81 M/UL (ref 4.35–5.65)
SODIUM SERPL-SCNC: 144 MMOL/L (ref 136–145)
VANCOMYCIN SERPL-MCNC: 13.2 UG/ML (ref 5–40)
VIT C SERPL-MCNC: 0.3 MG/DL (ref 0.4–2)
WBC # BLD AUTO: 20.9 K/UL (ref 4.6–13.2)

## 2025-05-16 PROCEDURE — 84145 PROCALCITONIN (PCT): CPT

## 2025-05-16 PROCEDURE — 2580000003 HC RX 258: Performed by: HOSPITALIST

## 2025-05-16 PROCEDURE — 6360000002 HC RX W HCPCS: Performed by: HOSPITALIST

## 2025-05-16 PROCEDURE — 6370000000 HC RX 637 (ALT 250 FOR IP): Performed by: FAMILY MEDICINE

## 2025-05-16 PROCEDURE — 2500000003 HC RX 250 WO HCPCS: Performed by: INTERNAL MEDICINE

## 2025-05-16 PROCEDURE — 6370000000 HC RX 637 (ALT 250 FOR IP): Performed by: PHYSICIAN ASSISTANT

## 2025-05-16 PROCEDURE — 6360000002 HC RX W HCPCS: Performed by: INTERNAL MEDICINE

## 2025-05-16 PROCEDURE — 6370000000 HC RX 637 (ALT 250 FOR IP): Performed by: HOSPITALIST

## 2025-05-16 PROCEDURE — 6370000000 HC RX 637 (ALT 250 FOR IP): Performed by: INTERNAL MEDICINE

## 2025-05-16 PROCEDURE — 1100000003 HC PRIVATE W/ TELEMETRY

## 2025-05-16 PROCEDURE — 97110 THERAPEUTIC EXERCISES: CPT

## 2025-05-16 PROCEDURE — P9047 ALBUMIN (HUMAN), 25%, 50ML: HCPCS | Performed by: HOSPITALIST

## 2025-05-16 PROCEDURE — 80069 RENAL FUNCTION PANEL: CPT

## 2025-05-16 PROCEDURE — 2500000003 HC RX 250 WO HCPCS

## 2025-05-16 PROCEDURE — 85025 COMPLETE CBC W/AUTO DIFF WBC: CPT

## 2025-05-16 PROCEDURE — 6370000000 HC RX 637 (ALT 250 FOR IP): Performed by: PSYCHIATRY & NEUROLOGY

## 2025-05-16 PROCEDURE — 97530 THERAPEUTIC ACTIVITIES: CPT

## 2025-05-16 PROCEDURE — 94640 AIRWAY INHALATION TREATMENT: CPT

## 2025-05-16 PROCEDURE — 51702 INSERT TEMP BLADDER CATH: CPT

## 2025-05-16 PROCEDURE — 2580000003 HC RX 258: Performed by: INTERNAL MEDICINE

## 2025-05-16 PROCEDURE — 80202 ASSAY OF VANCOMYCIN: CPT

## 2025-05-16 PROCEDURE — 92526 ORAL FUNCTION THERAPY: CPT

## 2025-05-16 PROCEDURE — 82962 GLUCOSE BLOOD TEST: CPT

## 2025-05-16 RX ORDER — METOPROLOL TARTRATE 1 MG/ML
INJECTION, SOLUTION INTRAVENOUS
Status: COMPLETED
Start: 2025-05-16 | End: 2025-05-16

## 2025-05-16 RX ORDER — METOPROLOL TARTRATE 1 MG/ML
2.5 INJECTION, SOLUTION INTRAVENOUS ONCE
Status: COMPLETED | OUTPATIENT
Start: 2025-05-16 | End: 2025-05-16

## 2025-05-16 RX ADMIN — INSULIN GLARGINE 10 UNITS: 100 INJECTION, SOLUTION SUBCUTANEOUS at 21:22

## 2025-05-16 RX ADMIN — INSULIN LISPRO 4 UNITS: 100 INJECTION, SOLUTION INTRAVENOUS; SUBCUTANEOUS at 17:59

## 2025-05-16 RX ADMIN — IPRATROPIUM BROMIDE 0.5 MG: 0.5 SOLUTION RESPIRATORY (INHALATION) at 08:17

## 2025-05-16 RX ADMIN — ALBUMIN (HUMAN) 12.5 G: 0.25 INJECTION, SOLUTION INTRAVENOUS at 08:59

## 2025-05-16 RX ADMIN — Medication 100 MG: at 08:48

## 2025-05-16 RX ADMIN — THIAMINE HYDROCHLORIDE 100 MG: 100 INJECTION, SOLUTION INTRAMUSCULAR; INTRAVENOUS at 21:23

## 2025-05-16 RX ADMIN — SODIUM CHLORIDE, PRESERVATIVE FREE 40 MG: 5 INJECTION INTRAVENOUS at 06:39

## 2025-05-16 RX ADMIN — ALBUMIN (HUMAN) 12.5 G: 0.25 INJECTION, SOLUTION INTRAVENOUS at 01:33

## 2025-05-16 RX ADMIN — Medication 2000 UNITS: at 08:48

## 2025-05-16 RX ADMIN — MIRTAZAPINE 7.5 MG: 15 TABLET, FILM COATED ORAL at 20:40

## 2025-05-16 RX ADMIN — ALBUMIN (HUMAN) 12.5 G: 0.25 INJECTION, SOLUTION INTRAVENOUS at 23:50

## 2025-05-16 RX ADMIN — GABAPENTIN 300 MG: 300 CAPSULE ORAL at 08:50

## 2025-05-16 RX ADMIN — PANCRELIPASE LIPASE, PANCRELIPASE PROTEASE, PANCRELIPASE AMYLASE 10000 UNITS: 5000; 17000; 24000 CAPSULE, DELAYED RELEASE ORAL at 12:24

## 2025-05-16 RX ADMIN — QUETIAPINE FUMARATE 100 MG: 100 TABLET ORAL at 20:40

## 2025-05-16 RX ADMIN — METOPROLOL TARTRATE 2.5 MG: 1 INJECTION, SOLUTION INTRAVENOUS at 16:55

## 2025-05-16 RX ADMIN — MEROPENEM 1000 MG: 1 INJECTION INTRAVENOUS at 12:24

## 2025-05-16 RX ADMIN — SENNOSIDES AND DOCUSATE SODIUM 1 TABLET: 50; 8.6 TABLET ORAL at 08:48

## 2025-05-16 RX ADMIN — ATORVASTATIN CALCIUM 20 MG: 20 TABLET, FILM COATED ORAL at 20:40

## 2025-05-16 RX ADMIN — SODIUM CHLORIDE, PRESERVATIVE FREE 10 ML: 5 INJECTION INTRAVENOUS at 08:49

## 2025-05-16 RX ADMIN — BUDESONIDE 500 MCG: 0.5 INHALANT RESPIRATORY (INHALATION) at 08:17

## 2025-05-16 RX ADMIN — ALBUMIN (HUMAN) 12.5 G: 0.25 INJECTION, SOLUTION INTRAVENOUS at 14:07

## 2025-05-16 RX ADMIN — LACTULOSE 20 G: 10 SOLUTION ORAL at 21:23

## 2025-05-16 RX ADMIN — PANCRELIPASE LIPASE, PANCRELIPASE PROTEASE, PANCRELIPASE AMYLASE 10000 UNITS: 5000; 17000; 24000 CAPSULE, DELAYED RELEASE ORAL at 08:49

## 2025-05-16 RX ADMIN — ZINC SULFATE 220 MG (50 MG) CAPSULE 50 MG: CAPSULE at 08:48

## 2025-05-16 RX ADMIN — GABAPENTIN 300 MG: 300 CAPSULE ORAL at 20:40

## 2025-05-16 RX ADMIN — SODIUM CHLORIDE, PRESERVATIVE FREE 40 MG: 5 INJECTION INTRAVENOUS at 17:52

## 2025-05-16 RX ADMIN — IPRATROPIUM BROMIDE 0.5 MG: 0.5 SOLUTION RESPIRATORY (INHALATION) at 15:20

## 2025-05-16 RX ADMIN — PANCRELIPASE LIPASE, PANCRELIPASE PROTEASE, PANCRELIPASE AMYLASE 10000 UNITS: 5000; 17000; 24000 CAPSULE, DELAYED RELEASE ORAL at 17:59

## 2025-05-16 RX ADMIN — LACTULOSE 20 G: 10 SOLUTION ORAL at 08:48

## 2025-05-16 RX ADMIN — MEROPENEM 1000 MG: 1 INJECTION INTRAVENOUS at 03:59

## 2025-05-16 RX ADMIN — SODIUM CHLORIDE: 0.9 INJECTION, SOLUTION INTRAVENOUS at 06:45

## 2025-05-16 RX ADMIN — METOPROLOL TARTRATE 2.5 MG: 5 INJECTION INTRAVENOUS at 16:55

## 2025-05-16 RX ADMIN — THIAMINE HYDROCHLORIDE 100 MG: 100 INJECTION, SOLUTION INTRAMUSCULAR; INTRAVENOUS at 08:49

## 2025-05-16 RX ADMIN — GABAPENTIN 300 MG: 300 CAPSULE ORAL at 14:30

## 2025-05-16 RX ADMIN — OXYCODONE 10 MG: 5 TABLET ORAL at 03:32

## 2025-05-16 RX ADMIN — SENNOSIDES AND DOCUSATE SODIUM 1 TABLET: 50; 8.6 TABLET ORAL at 20:41

## 2025-05-16 RX ADMIN — SODIUM CHLORIDE, PRESERVATIVE FREE 10 ML: 5 INJECTION INTRAVENOUS at 20:39

## 2025-05-16 RX ADMIN — MEROPENEM 1000 MG: 1 INJECTION INTRAVENOUS at 20:39

## 2025-05-16 RX ADMIN — OXYCODONE 10 MG: 5 TABLET ORAL at 16:39

## 2025-05-16 ASSESSMENT — PAIN SCALES - GENERAL
PAINLEVEL_OUTOF10: 0
PAINLEVEL_OUTOF10: 6
PAINLEVEL_OUTOF10: 8
PAINLEVEL_OUTOF10: 5

## 2025-05-16 ASSESSMENT — PAIN DESCRIPTION - LOCATION
LOCATION: NECK
LOCATION: LEG

## 2025-05-16 ASSESSMENT — PAIN DESCRIPTION - ORIENTATION: ORIENTATION: LEFT;RIGHT

## 2025-05-16 NOTE — WOUND CARE
Attempted to see patient for follow up.  Patient was on the phone.  Wound care nurse will attempt to see later as time permits.

## 2025-05-17 LAB
ALBUMIN SERPL-MCNC: 2.5 G/DL (ref 3.4–5)
ANION GAP SERPL CALC-SCNC: 11 MMOL/L (ref 3–18)
APTT PPP: 28.7 SEC (ref 23–36.4)
BASOPHILS # BLD: 0.06 K/UL (ref 0–0.1)
BASOPHILS NFR BLD: 0.3 % (ref 0–2)
BUN SERPL-MCNC: 23 MG/DL (ref 6–23)
BUN/CREAT SERPL: 23 (ref 12–20)
CALCIUM SERPL-MCNC: 8.2 MG/DL (ref 8.5–10.1)
CHLORIDE SERPL-SCNC: 110 MMOL/L (ref 98–107)
CO2 SERPL-SCNC: 21 MMOL/L (ref 21–32)
CREAT SERPL-MCNC: 1.01 MG/DL (ref 0.6–1.3)
DIFFERENTIAL METHOD BLD: ABNORMAL
EKG ATRIAL RATE: 73 BPM
EKG DIAGNOSIS: NORMAL
EKG P AXIS: 45 DEGREES
EKG P-R INTERVAL: 210 MS
EKG Q-T INTERVAL: 446 MS
EKG QRS DURATION: 92 MS
EKG QTC CALCULATION (BAZETT): 491 MS
EKG R AXIS: 32 DEGREES
EKG T AXIS: 71 DEGREES
EKG VENTRICULAR RATE: 73 BPM
EOSINOPHIL # BLD: 0.37 K/UL (ref 0–0.4)
EOSINOPHIL NFR BLD: 1.6 % (ref 0–5)
ERYTHROCYTE [DISTWIDTH] IN BLOOD BY AUTOMATED COUNT: 17.9 % (ref 11.6–14.5)
GLUCOSE BLD STRIP.AUTO-MCNC: 136 MG/DL (ref 70–110)
GLUCOSE BLD STRIP.AUTO-MCNC: 209 MG/DL (ref 70–110)
GLUCOSE BLD STRIP.AUTO-MCNC: 256 MG/DL (ref 70–110)
GLUCOSE BLD STRIP.AUTO-MCNC: 337 MG/DL (ref 70–110)
GLUCOSE SERPL-MCNC: 294 MG/DL (ref 74–108)
HCT VFR BLD AUTO: 28.2 % (ref 36–48)
HGB BLD-MCNC: 8.6 G/DL (ref 13–16)
IMM GRANULOCYTES # BLD AUTO: 0.41 K/UL (ref 0–0.04)
IMM GRANULOCYTES NFR BLD AUTO: 1.7 % (ref 0–0.5)
LYMPHOCYTES # BLD: 1.01 K/UL (ref 0.9–3.3)
LYMPHOCYTES NFR BLD: 4.3 % (ref 21–52)
MCH RBC QN AUTO: 27.5 PG (ref 24–34)
MCHC RBC AUTO-ENTMCNC: 30.5 G/DL (ref 31–37)
MCV RBC AUTO: 90.1 FL (ref 78–100)
MONOCYTES # BLD: 0.71 K/UL (ref 0.05–1.2)
MONOCYTES NFR BLD: 3 % (ref 3–10)
NEUTS SEG # BLD: 20.9 K/UL (ref 1.8–8)
NEUTS SEG NFR BLD: 89.1 % (ref 40–73)
NRBC # BLD: 0 K/UL (ref 0–0.01)
NRBC BLD-RTO: 0 PER 100 WBC
PHOSPHATE SERPL-MCNC: 1.8 MG/DL (ref 2.5–4.9)
PLATELET # BLD AUTO: 367 K/UL (ref 135–420)
PMV BLD AUTO: 10.7 FL (ref 9.2–11.8)
POTASSIUM SERPL-SCNC: 3.7 MMOL/L (ref 3.5–5.5)
PROCALCITONIN SERPL-MCNC: 20.8 NG/ML
RBC # BLD AUTO: 3.13 M/UL (ref 4.35–5.65)
SODIUM SERPL-SCNC: 142 MMOL/L (ref 136–145)
WBC # BLD AUTO: 23.5 K/UL (ref 4.6–13.2)

## 2025-05-17 PROCEDURE — 82962 GLUCOSE BLOOD TEST: CPT

## 2025-05-17 PROCEDURE — 1100000003 HC PRIVATE W/ TELEMETRY

## 2025-05-17 PROCEDURE — P9047 ALBUMIN (HUMAN), 25%, 50ML: HCPCS | Performed by: HOSPITALIST

## 2025-05-17 PROCEDURE — 6370000000 HC RX 637 (ALT 250 FOR IP): Performed by: HOSPITALIST

## 2025-05-17 PROCEDURE — 6360000002 HC RX W HCPCS: Performed by: INTERNAL MEDICINE

## 2025-05-17 PROCEDURE — 6360000002 HC RX W HCPCS: Performed by: HOSPITALIST

## 2025-05-17 PROCEDURE — 2580000003 HC RX 258: Performed by: INTERNAL MEDICINE

## 2025-05-17 PROCEDURE — 2500000003 HC RX 250 WO HCPCS: Performed by: INTERNAL MEDICINE

## 2025-05-17 PROCEDURE — 94640 AIRWAY INHALATION TREATMENT: CPT

## 2025-05-17 PROCEDURE — 85025 COMPLETE CBC W/AUTO DIFF WBC: CPT

## 2025-05-17 PROCEDURE — 2580000003 HC RX 258: Performed by: HOSPITALIST

## 2025-05-17 PROCEDURE — 36415 COLL VENOUS BLD VENIPUNCTURE: CPT

## 2025-05-17 PROCEDURE — 6370000000 HC RX 637 (ALT 250 FOR IP): Performed by: INTERNAL MEDICINE

## 2025-05-17 PROCEDURE — 84145 PROCALCITONIN (PCT): CPT

## 2025-05-17 PROCEDURE — 6370000000 HC RX 637 (ALT 250 FOR IP): Performed by: PSYCHIATRY & NEUROLOGY

## 2025-05-17 PROCEDURE — 6370000000 HC RX 637 (ALT 250 FOR IP): Performed by: PHYSICIAN ASSISTANT

## 2025-05-17 PROCEDURE — 85730 THROMBOPLASTIN TIME PARTIAL: CPT

## 2025-05-17 PROCEDURE — 80069 RENAL FUNCTION PANEL: CPT

## 2025-05-17 PROCEDURE — 6370000000 HC RX 637 (ALT 250 FOR IP): Performed by: FAMILY MEDICINE

## 2025-05-17 RX ORDER — OXYCODONE HYDROCHLORIDE 5 MG/1
5 TABLET ORAL EVERY 6 HOURS PRN
Status: DISCONTINUED | OUTPATIENT
Start: 2025-05-17 | End: 2025-05-22 | Stop reason: HOSPADM

## 2025-05-17 RX ORDER — POTASSIUM CHLORIDE 7.45 MG/ML
10 INJECTION INTRAVENOUS
Status: COMPLETED | OUTPATIENT
Start: 2025-05-17 | End: 2025-05-17

## 2025-05-17 RX ORDER — M-VIT,TX,IRON,MINS/CALC/FOLIC 27MG-0.4MG
1 TABLET ORAL DAILY
Status: DISCONTINUED | OUTPATIENT
Start: 2025-05-17 | End: 2025-05-22 | Stop reason: HOSPADM

## 2025-05-17 RX ADMIN — MEROPENEM 1000 MG: 1 INJECTION INTRAVENOUS at 05:16

## 2025-05-17 RX ADMIN — PANCRELIPASE LIPASE, PANCRELIPASE PROTEASE, PANCRELIPASE AMYLASE 10000 UNITS: 5000; 17000; 24000 CAPSULE, DELAYED RELEASE ORAL at 12:32

## 2025-05-17 RX ADMIN — ALBUMIN (HUMAN) 12.5 G: 0.25 INJECTION, SOLUTION INTRAVENOUS at 05:04

## 2025-05-17 RX ADMIN — OXYCODONE 10 MG: 5 TABLET ORAL at 17:38

## 2025-05-17 RX ADMIN — SODIUM CHLORIDE, PRESERVATIVE FREE 10 ML: 5 INJECTION INTRAVENOUS at 09:02

## 2025-05-17 RX ADMIN — GABAPENTIN 300 MG: 300 CAPSULE ORAL at 21:41

## 2025-05-17 RX ADMIN — ZINC SULFATE 220 MG (50 MG) CAPSULE 50 MG: CAPSULE at 08:58

## 2025-05-17 RX ADMIN — HEPARIN SODIUM 5000 UNITS: 1000 INJECTION INTRAVENOUS; SUBCUTANEOUS at 15:43

## 2025-05-17 RX ADMIN — ALBUMIN (HUMAN) 12.5 G: 0.25 INJECTION, SOLUTION INTRAVENOUS at 17:56

## 2025-05-17 RX ADMIN — BUDESONIDE 500 MCG: 0.5 INHALANT RESPIRATORY (INHALATION) at 07:19

## 2025-05-17 RX ADMIN — POTASSIUM CHLORIDE 10 MEQ: 7.46 INJECTION, SOLUTION INTRAVENOUS at 09:09

## 2025-05-17 RX ADMIN — LACTULOSE 20 G: 10 SOLUTION ORAL at 21:42

## 2025-05-17 RX ADMIN — Medication 100 MG: at 08:58

## 2025-05-17 RX ADMIN — ATORVASTATIN CALCIUM 20 MG: 20 TABLET, FILM COATED ORAL at 21:41

## 2025-05-17 RX ADMIN — MIRTAZAPINE 7.5 MG: 15 TABLET, FILM COATED ORAL at 21:41

## 2025-05-17 RX ADMIN — SENNOSIDES AND DOCUSATE SODIUM 1 TABLET: 50; 8.6 TABLET ORAL at 21:41

## 2025-05-17 RX ADMIN — Medication 1 TABLET: at 17:48

## 2025-05-17 RX ADMIN — INSULIN LISPRO 8 UNITS: 100 INJECTION, SOLUTION INTRAVENOUS; SUBCUTANEOUS at 06:42

## 2025-05-17 RX ADMIN — SODIUM CHLORIDE: 9 INJECTION, SOLUTION INTRAVENOUS at 09:13

## 2025-05-17 RX ADMIN — GABAPENTIN 300 MG: 300 CAPSULE ORAL at 14:10

## 2025-05-17 RX ADMIN — ALBUMIN (HUMAN) 12.5 G: 0.25 INJECTION, SOLUTION INTRAVENOUS at 12:36

## 2025-05-17 RX ADMIN — MEROPENEM 1000 MG: 1 INJECTION INTRAVENOUS at 13:55

## 2025-05-17 RX ADMIN — PANCRELIPASE LIPASE, PANCRELIPASE PROTEASE, PANCRELIPASE AMYLASE 10000 UNITS: 5000; 17000; 24000 CAPSULE, DELAYED RELEASE ORAL at 17:38

## 2025-05-17 RX ADMIN — PANCRELIPASE LIPASE, PANCRELIPASE PROTEASE, PANCRELIPASE AMYLASE 10000 UNITS: 5000; 17000; 24000 CAPSULE, DELAYED RELEASE ORAL at 08:57

## 2025-05-17 RX ADMIN — INSULIN LISPRO 4 UNITS: 100 INJECTION, SOLUTION INTRAVENOUS; SUBCUTANEOUS at 22:16

## 2025-05-17 RX ADMIN — INSULIN LISPRO 12 UNITS: 100 INJECTION, SOLUTION INTRAVENOUS; SUBCUTANEOUS at 17:39

## 2025-05-17 RX ADMIN — IPRATROPIUM BROMIDE 0.5 MG: 0.5 SOLUTION RESPIRATORY (INHALATION) at 20:48

## 2025-05-17 RX ADMIN — THIAMINE HYDROCHLORIDE 100 MG: 100 INJECTION, SOLUTION INTRAMUSCULAR; INTRAVENOUS at 08:58

## 2025-05-17 RX ADMIN — IPRATROPIUM BROMIDE 0.5 MG: 0.5 SOLUTION RESPIRATORY (INHALATION) at 07:19

## 2025-05-17 RX ADMIN — QUETIAPINE FUMARATE 100 MG: 100 TABLET ORAL at 21:41

## 2025-05-17 RX ADMIN — SENNOSIDES AND DOCUSATE SODIUM 1 TABLET: 50; 8.6 TABLET ORAL at 08:58

## 2025-05-17 RX ADMIN — GABAPENTIN 300 MG: 300 CAPSULE ORAL at 08:58

## 2025-05-17 RX ADMIN — DIBASIC SODIUM PHOSPHATE, MONOBASIC POTASSIUM PHOSPHATE AND MONOBASIC SODIUM PHOSPHATE 1 TABLET: 852; 155; 130 TABLET ORAL at 12:32

## 2025-05-17 RX ADMIN — INSULIN GLARGINE 10 UNITS: 100 INJECTION, SOLUTION SUBCUTANEOUS at 22:16

## 2025-05-17 RX ADMIN — SODIUM CHLORIDE, PRESERVATIVE FREE 40 MG: 5 INJECTION INTRAVENOUS at 05:06

## 2025-05-17 RX ADMIN — BUDESONIDE 500 MCG: 0.5 INHALANT RESPIRATORY (INHALATION) at 20:47

## 2025-05-17 RX ADMIN — Medication 2000 UNITS: at 08:58

## 2025-05-17 RX ADMIN — SODIUM CHLORIDE, PRESERVATIVE FREE 40 MG: 5 INJECTION INTRAVENOUS at 17:38

## 2025-05-17 RX ADMIN — MEROPENEM 1000 MG: 1 INJECTION INTRAVENOUS at 21:40

## 2025-05-17 RX ADMIN — SODIUM CHLORIDE, PRESERVATIVE FREE 10 ML: 5 INJECTION INTRAVENOUS at 22:17

## 2025-05-17 RX ADMIN — THIAMINE HYDROCHLORIDE 100 MG: 100 INJECTION, SOLUTION INTRAMUSCULAR; INTRAVENOUS at 21:41

## 2025-05-17 ASSESSMENT — PAIN SCALES - GENERAL
PAINLEVEL_OUTOF10: 0

## 2025-05-18 LAB
ALBUMIN SERPL-MCNC: 2.9 G/DL (ref 3.4–5)
ANION GAP SERPL CALC-SCNC: 12 MMOL/L (ref 3–18)
APTT PPP: 110.6 SEC (ref 23–36.4)
APTT PPP: 33 SEC (ref 23–36.4)
APTT PPP: 95.3 SEC (ref 23–36.4)
BASOPHILS # BLD: 0.08 K/UL (ref 0–0.1)
BASOPHILS NFR BLD: 0.4 % (ref 0–2)
BUN SERPL-MCNC: 22 MG/DL (ref 6–23)
BUN/CREAT SERPL: 22 (ref 12–20)
CALCIUM SERPL-MCNC: 8.4 MG/DL (ref 8.5–10.1)
CALR EXON 9 MUT ANL BLD/T: NORMAL
CHLORIDE SERPL-SCNC: 109 MMOL/L (ref 98–107)
CITATION REF LAB TEST: NORMAL
CO2 SERPL-SCNC: 24 MMOL/L (ref 21–32)
CREAT SERPL-MCNC: 1.01 MG/DL (ref 0.6–1.3)
DIFFERENTIAL METHOD BLD: ABNORMAL
EOSINOPHIL # BLD: 0.73 K/UL (ref 0–0.4)
EOSINOPHIL NFR BLD: 3.7 % (ref 0–5)
ERYTHROCYTE [DISTWIDTH] IN BLOOD BY AUTOMATED COUNT: 18.3 % (ref 11.6–14.5)
GLUCOSE BLD STRIP.AUTO-MCNC: 260 MG/DL (ref 70–110)
GLUCOSE BLD STRIP.AUTO-MCNC: 282 MG/DL (ref 70–110)
GLUCOSE BLD STRIP.AUTO-MCNC: 294 MG/DL (ref 70–110)
GLUCOSE BLD STRIP.AUTO-MCNC: 303 MG/DL (ref 70–110)
GLUCOSE BLD STRIP.AUTO-MCNC: 79 MG/DL (ref 70–110)
GLUCOSE SERPL-MCNC: 179 MG/DL (ref 74–108)
HCT VFR BLD AUTO: 27.5 % (ref 36–48)
HGB BLD-MCNC: 8.4 G/DL (ref 13–16)
IMM GRANULOCYTES # BLD AUTO: 0.76 K/UL (ref 0–0.04)
IMM GRANULOCYTES NFR BLD AUTO: 3.9 % (ref 0–0.5)
LAB DIRECTOR NAME PROVIDER: NORMAL
LYMPHOCYTES # BLD: 2.14 K/UL (ref 0.9–3.3)
LYMPHOCYTES NFR BLD: 10.9 % (ref 21–52)
Lab: NORMAL
MCH RBC QN AUTO: 27.6 PG (ref 24–34)
MCHC RBC AUTO-ENTMCNC: 30.5 G/DL (ref 31–37)
MCV RBC AUTO: 90.5 FL (ref 78–100)
MONOCYTES # BLD: 1 K/UL (ref 0.05–1.2)
MONOCYTES NFR BLD: 5.1 % (ref 3–10)
NEUTS SEG # BLD: 14.87 K/UL (ref 1.8–8)
NEUTS SEG NFR BLD: 76 % (ref 40–73)
NRBC # BLD: 0 K/UL (ref 0–0.01)
NRBC BLD-RTO: 0 PER 100 WBC
PHOSPHATE SERPL-MCNC: 2.1 MG/DL (ref 2.5–4.9)
PLATELET # BLD AUTO: 340 K/UL (ref 135–420)
PMV BLD AUTO: 11 FL (ref 9.2–11.8)
POTASSIUM SERPL-SCNC: 4.1 MMOL/L (ref 3.5–5.5)
RBC # BLD AUTO: 3.04 M/UL (ref 4.35–5.65)
REF LAB TEST METHOD: NORMAL
SODIUM SERPL-SCNC: 146 MMOL/L (ref 136–145)
WBC # BLD AUTO: 19.6 K/UL (ref 4.6–13.2)

## 2025-05-18 PROCEDURE — 6360000002 HC RX W HCPCS: Performed by: INTERNAL MEDICINE

## 2025-05-18 PROCEDURE — 85730 THROMBOPLASTIN TIME PARTIAL: CPT

## 2025-05-18 PROCEDURE — 6370000000 HC RX 637 (ALT 250 FOR IP): Performed by: INTERNAL MEDICINE

## 2025-05-18 PROCEDURE — 2500000003 HC RX 250 WO HCPCS: Performed by: INTERNAL MEDICINE

## 2025-05-18 PROCEDURE — 2580000003 HC RX 258: Performed by: HOSPITALIST

## 2025-05-18 PROCEDURE — 2700000000 HC OXYGEN THERAPY PER DAY

## 2025-05-18 PROCEDURE — 51702 INSERT TEMP BLADDER CATH: CPT

## 2025-05-18 PROCEDURE — 6360000002 HC RX W HCPCS: Performed by: HOSPITALIST

## 2025-05-18 PROCEDURE — 94640 AIRWAY INHALATION TREATMENT: CPT

## 2025-05-18 PROCEDURE — 85025 COMPLETE CBC W/AUTO DIFF WBC: CPT

## 2025-05-18 PROCEDURE — 1100000003 HC PRIVATE W/ TELEMETRY

## 2025-05-18 PROCEDURE — 6370000000 HC RX 637 (ALT 250 FOR IP): Performed by: HOSPITALIST

## 2025-05-18 PROCEDURE — 6370000000 HC RX 637 (ALT 250 FOR IP): Performed by: PSYCHIATRY & NEUROLOGY

## 2025-05-18 PROCEDURE — 6370000000 HC RX 637 (ALT 250 FOR IP): Performed by: PHYSICIAN ASSISTANT

## 2025-05-18 PROCEDURE — 97530 THERAPEUTIC ACTIVITIES: CPT

## 2025-05-18 PROCEDURE — 80069 RENAL FUNCTION PANEL: CPT

## 2025-05-18 PROCEDURE — P9047 ALBUMIN (HUMAN), 25%, 50ML: HCPCS | Performed by: HOSPITALIST

## 2025-05-18 PROCEDURE — 82962 GLUCOSE BLOOD TEST: CPT

## 2025-05-18 PROCEDURE — 36415 COLL VENOUS BLD VENIPUNCTURE: CPT

## 2025-05-18 PROCEDURE — 97110 THERAPEUTIC EXERCISES: CPT

## 2025-05-18 PROCEDURE — 6370000000 HC RX 637 (ALT 250 FOR IP): Performed by: FAMILY MEDICINE

## 2025-05-18 RX ORDER — CHOLECALCIFEROL (VITAMIN D3) 50 MCG
2000 TABLET ORAL DAILY
Qty: 60 TABLET | Refills: 2 | Status: SHIPPED | OUTPATIENT
Start: 2025-05-19

## 2025-05-18 RX ORDER — HYDROMORPHONE HYDROCHLORIDE 1 MG/ML
0.5 INJECTION, SOLUTION INTRAMUSCULAR; INTRAVENOUS; SUBCUTANEOUS ONCE
Status: COMPLETED | OUTPATIENT
Start: 2025-05-18 | End: 2025-05-18

## 2025-05-18 RX ORDER — M-VIT,TX,IRON,MINS/CALC/FOLIC 27MG-0.4MG
1 TABLET ORAL DAILY
Qty: 90 TABLET | Refills: 1 | Status: SHIPPED | OUTPATIENT
Start: 2025-05-19

## 2025-05-18 RX ORDER — ZINC SULFATE 50(220)MG
50 CAPSULE ORAL DAILY
Qty: 30 CAPSULE | Refills: 3 | COMMUNITY
Start: 2025-05-19

## 2025-05-18 RX ORDER — PANTOPRAZOLE SODIUM 40 MG/1
40 TABLET, DELAYED RELEASE ORAL
Status: DISCONTINUED | OUTPATIENT
Start: 2025-05-18 | End: 2025-05-22 | Stop reason: HOSPADM

## 2025-05-18 RX ORDER — GAUZE BANDAGE 2" X 2"
100 BANDAGE TOPICAL 2 TIMES DAILY
Status: DISCONTINUED | OUTPATIENT
Start: 2025-05-18 | End: 2025-05-22 | Stop reason: HOSPADM

## 2025-05-18 RX ORDER — INSULIN GLARGINE 100 [IU]/ML
12 INJECTION, SOLUTION SUBCUTANEOUS NIGHTLY
Status: DISCONTINUED | OUTPATIENT
Start: 2025-05-18 | End: 2025-05-22 | Stop reason: HOSPADM

## 2025-05-18 RX ADMIN — INSULIN LISPRO 12 UNITS: 100 INJECTION, SOLUTION INTRAVENOUS; SUBCUTANEOUS at 12:42

## 2025-05-18 RX ADMIN — ZINC SULFATE 220 MG (50 MG) CAPSULE 50 MG: CAPSULE at 09:46

## 2025-05-18 RX ADMIN — THIAMINE HYDROCHLORIDE 100 MG: 100 INJECTION, SOLUTION INTRAMUSCULAR; INTRAVENOUS at 09:49

## 2025-05-18 RX ADMIN — INSULIN LISPRO 8 UNITS: 100 INJECTION, SOLUTION INTRAVENOUS; SUBCUTANEOUS at 23:18

## 2025-05-18 RX ADMIN — BUDESONIDE 500 MCG: 0.5 INHALANT RESPIRATORY (INHALATION) at 20:52

## 2025-05-18 RX ADMIN — HEPARIN SODIUM 26 UNITS/KG/HR: 10000 INJECTION, SOLUTION INTRAVENOUS at 09:58

## 2025-05-18 RX ADMIN — Medication 1 TABLET: at 09:46

## 2025-05-18 RX ADMIN — CYANOCOBALAMIN 1000 MCG: 1000 INJECTION, SOLUTION INTRAMUSCULAR; SUBCUTANEOUS at 09:45

## 2025-05-18 RX ADMIN — MIRTAZAPINE 7.5 MG: 15 TABLET, FILM COATED ORAL at 23:28

## 2025-05-18 RX ADMIN — OXYCODONE 10 MG: 5 TABLET ORAL at 05:44

## 2025-05-18 RX ADMIN — DIBASIC SODIUM PHOSPHATE, MONOBASIC POTASSIUM PHOSPHATE AND MONOBASIC SODIUM PHOSPHATE 1 TABLET: 852; 155; 130 TABLET ORAL at 00:59

## 2025-05-18 RX ADMIN — OXYCODONE 10 MG: 5 TABLET ORAL at 19:13

## 2025-05-18 RX ADMIN — Medication 2000 UNITS: at 09:45

## 2025-05-18 RX ADMIN — IPRATROPIUM BROMIDE 0.5 MG: 0.5 SOLUTION RESPIRATORY (INHALATION) at 13:19

## 2025-05-18 RX ADMIN — GABAPENTIN 300 MG: 300 CAPSULE ORAL at 13:59

## 2025-05-18 RX ADMIN — GABAPENTIN 300 MG: 300 CAPSULE ORAL at 09:46

## 2025-05-18 RX ADMIN — SODIUM CHLORIDE, PRESERVATIVE FREE 10 ML: 5 INJECTION INTRAVENOUS at 23:20

## 2025-05-18 RX ADMIN — INSULIN GLARGINE 12 UNITS: 100 INJECTION, SOLUTION SUBCUTANEOUS at 23:17

## 2025-05-18 RX ADMIN — ALBUMIN (HUMAN) 12.5 G: 0.25 INJECTION, SOLUTION INTRAVENOUS at 07:02

## 2025-05-18 RX ADMIN — PANCRELIPASE LIPASE, PANCRELIPASE PROTEASE, PANCRELIPASE AMYLASE 10000 UNITS: 5000; 17000; 24000 CAPSULE, DELAYED RELEASE ORAL at 17:57

## 2025-05-18 RX ADMIN — ALBUMIN (HUMAN) 12.5 G: 0.25 INJECTION, SOLUTION INTRAVENOUS at 00:58

## 2025-05-18 RX ADMIN — QUETIAPINE FUMARATE 100 MG: 100 TABLET ORAL at 23:28

## 2025-05-18 RX ADMIN — LACTULOSE 20 G: 10 SOLUTION ORAL at 23:18

## 2025-05-18 RX ADMIN — SENNOSIDES AND DOCUSATE SODIUM 1 TABLET: 50; 8.6 TABLET ORAL at 09:46

## 2025-05-18 RX ADMIN — SODIUM CHLORIDE, PRESERVATIVE FREE 10 ML: 5 INJECTION INTRAVENOUS at 09:48

## 2025-05-18 RX ADMIN — BUDESONIDE 500 MCG: 0.5 INHALANT RESPIRATORY (INHALATION) at 07:40

## 2025-05-18 RX ADMIN — IPRATROPIUM BROMIDE 0.5 MG: 0.5 SOLUTION RESPIRATORY (INHALATION) at 07:40

## 2025-05-18 RX ADMIN — HYDROMORPHONE HYDROCHLORIDE 0.5 MG: 1 INJECTION, SOLUTION INTRAMUSCULAR; INTRAVENOUS; SUBCUTANEOUS at 21:06

## 2025-05-18 RX ADMIN — ALBUMIN (HUMAN) 12.5 G: 0.25 INJECTION, SOLUTION INTRAVENOUS at 17:59

## 2025-05-18 RX ADMIN — PANCRELIPASE LIPASE, PANCRELIPASE PROTEASE, PANCRELIPASE AMYLASE 10000 UNITS: 5000; 17000; 24000 CAPSULE, DELAYED RELEASE ORAL at 09:46

## 2025-05-18 RX ADMIN — HEPARIN SODIUM 5000 UNITS: 1000 INJECTION INTRAVENOUS; SUBCUTANEOUS at 02:01

## 2025-05-18 RX ADMIN — GABAPENTIN 300 MG: 300 CAPSULE ORAL at 23:19

## 2025-05-18 RX ADMIN — ALBUMIN (HUMAN) 12.5 G: 0.25 INJECTION, SOLUTION INTRAVENOUS at 23:16

## 2025-05-18 RX ADMIN — MEROPENEM 1000 MG: 1 INJECTION INTRAVENOUS at 17:59

## 2025-05-18 RX ADMIN — MEROPENEM 1000 MG: 1 INJECTION INTRAVENOUS at 08:15

## 2025-05-18 RX ADMIN — SENNOSIDES AND DOCUSATE SODIUM 1 TABLET: 50; 8.6 TABLET ORAL at 23:19

## 2025-05-18 RX ADMIN — PANTOPRAZOLE SODIUM 40 MG: 40 TABLET, DELAYED RELEASE ORAL at 17:57

## 2025-05-18 RX ADMIN — Medication 100 MG: at 23:19

## 2025-05-18 RX ADMIN — LACTULOSE 20 G: 10 SOLUTION ORAL at 09:48

## 2025-05-18 RX ADMIN — ATORVASTATIN CALCIUM 20 MG: 20 TABLET, FILM COATED ORAL at 23:19

## 2025-05-18 RX ADMIN — Medication 100 MG: at 09:46

## 2025-05-18 RX ADMIN — ALBUMIN (HUMAN) 12.5 G: 0.25 INJECTION, SOLUTION INTRAVENOUS at 12:44

## 2025-05-18 RX ADMIN — INSULIN LISPRO 8 UNITS: 100 INJECTION, SOLUTION INTRAVENOUS; SUBCUTANEOUS at 09:48

## 2025-05-18 RX ADMIN — IPRATROPIUM BROMIDE 0.5 MG: 0.5 SOLUTION RESPIRATORY (INHALATION) at 20:52

## 2025-05-18 RX ADMIN — PANCRELIPASE LIPASE, PANCRELIPASE PROTEASE, PANCRELIPASE AMYLASE 10000 UNITS: 5000; 17000; 24000 CAPSULE, DELAYED RELEASE ORAL at 12:42

## 2025-05-18 ASSESSMENT — PAIN SCALES - GENERAL
PAINLEVEL_OUTOF10: 8
PAINLEVEL_OUTOF10: 7
PAINLEVEL_OUTOF10: 0
PAINLEVEL_OUTOF10: 0
PAINLEVEL_OUTOF10: 5
PAINLEVEL_OUTOF10: 8

## 2025-05-18 ASSESSMENT — PAIN - FUNCTIONAL ASSESSMENT
PAIN_FUNCTIONAL_ASSESSMENT: PREVENTS OR INTERFERES SOME ACTIVE ACTIVITIES AND ADLS
PAIN_FUNCTIONAL_ASSESSMENT: ACTIVITIES ARE NOT PREVENTED

## 2025-05-18 ASSESSMENT — PAIN DESCRIPTION - LOCATION
LOCATION: LEG
LOCATION: FOOT
LOCATION: FOOT

## 2025-05-18 ASSESSMENT — PAIN DESCRIPTION - ONSET: ONSET: GRADUAL

## 2025-05-18 ASSESSMENT — PAIN DESCRIPTION - ORIENTATION
ORIENTATION: RIGHT
ORIENTATION: RIGHT

## 2025-05-18 ASSESSMENT — PAIN DESCRIPTION - DESCRIPTORS
DESCRIPTORS: SHARP
DESCRIPTORS: SORE

## 2025-05-18 ASSESSMENT — PAIN DESCRIPTION - PAIN TYPE: TYPE: ACUTE PAIN

## 2025-05-18 ASSESSMENT — PAIN DESCRIPTION - FREQUENCY: FREQUENCY: INTERMITTENT

## 2025-05-18 NOTE — DISCHARGE INSTRUCTIONS
** ENSURE Close follow up on discharge    To continue:    Creon Enzymes + high-dose multivitamins with lipo soluble vitamins including D, K, E, A,   plus healthy diet    Continue high-protein ENSURE or BOOST twice per day

## 2025-05-19 ENCOUNTER — APPOINTMENT (OUTPATIENT)
Facility: HOSPITAL | Age: 60
DRG: 871 | End: 2025-05-19
Payer: MEDICARE

## 2025-05-19 LAB
ALBUMIN SERPL-MCNC: 2.8 G/DL (ref 3.4–5)
ANION GAP SERPL CALC-SCNC: 11 MMOL/L (ref 3–18)
APTT PPP: 106.4 SEC (ref 23–36.4)
APTT PPP: 112.2 SEC (ref 23–36.4)
APTT PPP: 60.5 SEC (ref 23–36.4)
BUN SERPL-MCNC: 25 MG/DL (ref 6–23)
BUN/CREAT SERPL: 23 (ref 12–20)
CALCIUM SERPL-MCNC: 8.4 MG/DL (ref 8.5–10.1)
CHLORIDE SERPL-SCNC: 106 MMOL/L (ref 98–107)
CO2 SERPL-SCNC: 25 MMOL/L (ref 21–32)
CREAT SERPL-MCNC: 1.06 MG/DL (ref 0.6–1.3)
GLUCOSE BLD STRIP.AUTO-MCNC: 176 MG/DL (ref 70–110)
GLUCOSE BLD STRIP.AUTO-MCNC: 181 MG/DL (ref 70–110)
GLUCOSE BLD STRIP.AUTO-MCNC: 210 MG/DL (ref 70–110)
GLUCOSE BLD STRIP.AUTO-MCNC: 215 MG/DL (ref 70–110)
GLUCOSE BLD STRIP.AUTO-MCNC: 242 MG/DL (ref 70–110)
GLUCOSE SERPL-MCNC: 227 MG/DL (ref 74–108)
NIACIN SERPL-MCNC: <5 NG/ML (ref 0–5)
NICOTINAMIDE SERPL-MCNC: 3.7 NG/ML (ref 5.2–72.1)
PHOSPHATE SERPL-MCNC: 2.8 MG/DL (ref 2.5–4.9)
POTASSIUM SERPL-SCNC: 4.6 MMOL/L (ref 3.5–5.5)
SODIUM SERPL-SCNC: 143 MMOL/L (ref 136–145)

## 2025-05-19 PROCEDURE — 2500000003 HC RX 250 WO HCPCS: Performed by: INTERNAL MEDICINE

## 2025-05-19 PROCEDURE — 2580000003 HC RX 258: Performed by: HOSPITALIST

## 2025-05-19 PROCEDURE — 6370000000 HC RX 637 (ALT 250 FOR IP): Performed by: HOSPITALIST

## 2025-05-19 PROCEDURE — 2700000000 HC OXYGEN THERAPY PER DAY

## 2025-05-19 PROCEDURE — 6370000000 HC RX 637 (ALT 250 FOR IP): Performed by: INTERNAL MEDICINE

## 2025-05-19 PROCEDURE — 6370000000 HC RX 637 (ALT 250 FOR IP): Performed by: PSYCHIATRY & NEUROLOGY

## 2025-05-19 PROCEDURE — 3430000000 HC RX DIAGNOSTIC RADIOPHARMACEUTICAL: Performed by: INTERNAL MEDICINE

## 2025-05-19 PROCEDURE — 6370000000 HC RX 637 (ALT 250 FOR IP): Performed by: FAMILY MEDICINE

## 2025-05-19 PROCEDURE — 85730 THROMBOPLASTIN TIME PARTIAL: CPT

## 2025-05-19 PROCEDURE — P9047 ALBUMIN (HUMAN), 25%, 50ML: HCPCS | Performed by: HOSPITALIST

## 2025-05-19 PROCEDURE — A9547 IN111 OXYQUINOLINE: HCPCS | Performed by: INTERNAL MEDICINE

## 2025-05-19 PROCEDURE — 1100000003 HC PRIVATE W/ TELEMETRY

## 2025-05-19 PROCEDURE — 74018 RADEX ABDOMEN 1 VIEW: CPT

## 2025-05-19 PROCEDURE — 6360000002 HC RX W HCPCS: Performed by: INTERNAL MEDICINE

## 2025-05-19 PROCEDURE — 36415 COLL VENOUS BLD VENIPUNCTURE: CPT

## 2025-05-19 PROCEDURE — 78802 RP LOCLZJ TUM WHBDY 1 D IMG: CPT

## 2025-05-19 PROCEDURE — 6360000002 HC RX W HCPCS: Performed by: HOSPITALIST

## 2025-05-19 PROCEDURE — 80069 RENAL FUNCTION PANEL: CPT

## 2025-05-19 PROCEDURE — 6370000000 HC RX 637 (ALT 250 FOR IP): Performed by: PHYSICIAN ASSISTANT

## 2025-05-19 PROCEDURE — 70450 CT HEAD/BRAIN W/O DYE: CPT

## 2025-05-19 PROCEDURE — 94640 AIRWAY INHALATION TREATMENT: CPT

## 2025-05-19 PROCEDURE — 82962 GLUCOSE BLOOD TEST: CPT

## 2025-05-19 RX ORDER — INDIUM IN-111 OXYQUINOLINE 1 UG/ML
540 SOLUTION INTRAVENOUS
Status: COMPLETED | OUTPATIENT
Start: 2025-05-19 | End: 2025-05-19

## 2025-05-19 RX ORDER — SIMETHICONE 80 MG
80 TABLET,CHEWABLE ORAL EVERY 6 HOURS PRN
Status: ACTIVE | OUTPATIENT
Start: 2025-05-19 | End: 2025-05-21

## 2025-05-19 RX ORDER — LACTULOSE 10 G/15ML
30 SOLUTION ORAL ONCE
Status: DISCONTINUED | OUTPATIENT
Start: 2025-05-19 | End: 2025-05-22

## 2025-05-19 RX ORDER — NIACIN 100 MG
100 TABLET ORAL NIGHTLY
Status: DISCONTINUED | OUTPATIENT
Start: 2025-05-19 | End: 2025-05-22 | Stop reason: HOSPADM

## 2025-05-19 RX ORDER — NIACIN 100 MG
100 TABLET ORAL NIGHTLY
Status: DISCONTINUED | OUTPATIENT
Start: 2025-05-19 | End: 2025-05-19

## 2025-05-19 RX ADMIN — MEROPENEM 1000 MG: 1 INJECTION INTRAVENOUS at 00:27

## 2025-05-19 RX ADMIN — LACTULOSE 20 G: 10 SOLUTION ORAL at 09:25

## 2025-05-19 RX ADMIN — Medication 100 MG: at 09:24

## 2025-05-19 RX ADMIN — INSULIN GLARGINE 12 UNITS: 100 INJECTION, SOLUTION SUBCUTANEOUS at 22:45

## 2025-05-19 RX ADMIN — INSULIN LISPRO 4 UNITS: 100 INJECTION, SOLUTION INTRAVENOUS; SUBCUTANEOUS at 11:28

## 2025-05-19 RX ADMIN — ATORVASTATIN CALCIUM 20 MG: 20 TABLET, FILM COATED ORAL at 22:47

## 2025-05-19 RX ADMIN — Medication 2000 UNITS: at 09:24

## 2025-05-19 RX ADMIN — Medication 100 MG: at 22:47

## 2025-05-19 RX ADMIN — ALBUMIN (HUMAN) 12.5 G: 0.25 INJECTION, SOLUTION INTRAVENOUS at 16:14

## 2025-05-19 RX ADMIN — LACTULOSE 20 G: 10 SOLUTION ORAL at 22:44

## 2025-05-19 RX ADMIN — GABAPENTIN 300 MG: 300 CAPSULE ORAL at 22:46

## 2025-05-19 RX ADMIN — QUETIAPINE FUMARATE 100 MG: 100 TABLET ORAL at 22:47

## 2025-05-19 RX ADMIN — BUDESONIDE 500 MCG: 0.5 INHALANT RESPIRATORY (INHALATION) at 07:40

## 2025-05-19 RX ADMIN — ZINC SULFATE 220 MG (50 MG) CAPSULE 50 MG: CAPSULE at 09:26

## 2025-05-19 RX ADMIN — Medication 1 TABLET: at 09:24

## 2025-05-19 RX ADMIN — ALBUMIN (HUMAN) 12.5 G: 0.25 INJECTION, SOLUTION INTRAVENOUS at 11:35

## 2025-05-19 RX ADMIN — SENNOSIDES AND DOCUSATE SODIUM 1 TABLET: 50; 8.6 TABLET ORAL at 09:25

## 2025-05-19 RX ADMIN — PANCRELIPASE LIPASE, PANCRELIPASE PROTEASE, PANCRELIPASE AMYLASE 10000 UNITS: 5000; 17000; 24000 CAPSULE, DELAYED RELEASE ORAL at 18:49

## 2025-05-19 RX ADMIN — MEROPENEM 1000 MG: 1 INJECTION INTRAVENOUS at 18:47

## 2025-05-19 RX ADMIN — IPRATROPIUM BROMIDE 0.5 MG: 0.5 SOLUTION RESPIRATORY (INHALATION) at 15:00

## 2025-05-19 RX ADMIN — HEPARIN SODIUM 24 UNITS/KG/HR: 10000 INJECTION, SOLUTION INTRAVENOUS at 22:59

## 2025-05-19 RX ADMIN — GABAPENTIN 300 MG: 300 CAPSULE ORAL at 16:14

## 2025-05-19 RX ADMIN — INSULIN LISPRO 4 UNITS: 100 INJECTION, SOLUTION INTRAVENOUS; SUBCUTANEOUS at 07:06

## 2025-05-19 RX ADMIN — PANCRELIPASE LIPASE, PANCRELIPASE PROTEASE, PANCRELIPASE AMYLASE 10000 UNITS: 5000; 17000; 24000 CAPSULE, DELAYED RELEASE ORAL at 11:27

## 2025-05-19 RX ADMIN — OXYCODONE 5 MG: 5 TABLET ORAL at 16:43

## 2025-05-19 RX ADMIN — INSULIN LISPRO 4 UNITS: 100 INJECTION, SOLUTION INTRAVENOUS; SUBCUTANEOUS at 18:53

## 2025-05-19 RX ADMIN — SENNOSIDES AND DOCUSATE SODIUM 1 TABLET: 50; 8.6 TABLET ORAL at 22:47

## 2025-05-19 RX ADMIN — MEROPENEM 1000 MG: 1 INJECTION INTRAVENOUS at 09:39

## 2025-05-19 RX ADMIN — SODIUM CHLORIDE, PRESERVATIVE FREE 10 ML: 5 INJECTION INTRAVENOUS at 22:48

## 2025-05-19 RX ADMIN — BUDESONIDE 500 MCG: 0.5 INHALANT RESPIRATORY (INHALATION) at 20:50

## 2025-05-19 RX ADMIN — SODIUM CHLORIDE, PRESERVATIVE FREE 10 ML: 5 INJECTION INTRAVENOUS at 09:25

## 2025-05-19 RX ADMIN — GABAPENTIN 300 MG: 300 CAPSULE ORAL at 09:24

## 2025-05-19 RX ADMIN — PANTOPRAZOLE SODIUM 40 MG: 40 TABLET, DELAYED RELEASE ORAL at 06:33

## 2025-05-19 RX ADMIN — OXYCODONE 10 MG: 5 TABLET ORAL at 06:33

## 2025-05-19 RX ADMIN — PANTOPRAZOLE SODIUM 40 MG: 40 TABLET, DELAYED RELEASE ORAL at 16:14

## 2025-05-19 RX ADMIN — HEPARIN SODIUM 26 UNITS/KG/HR: 10000 INJECTION, SOLUTION INTRAVENOUS at 01:51

## 2025-05-19 RX ADMIN — IPRATROPIUM BROMIDE 0.5 MG: 0.5 SOLUTION RESPIRATORY (INHALATION) at 07:41

## 2025-05-19 RX ADMIN — INDIUM IN-111 OXYQUINOLINE 0.54 MILLICURIE: 1 SOLUTION INTRAVENOUS at 10:50

## 2025-05-19 RX ADMIN — IPRATROPIUM BROMIDE 0.5 MG: 0.5 SOLUTION RESPIRATORY (INHALATION) at 20:49

## 2025-05-19 RX ADMIN — PANCRELIPASE LIPASE, PANCRELIPASE PROTEASE, PANCRELIPASE AMYLASE 10000 UNITS: 5000; 17000; 24000 CAPSULE, DELAYED RELEASE ORAL at 09:24

## 2025-05-19 RX ADMIN — ALBUMIN (HUMAN) 12.5 G: 0.25 INJECTION, SOLUTION INTRAVENOUS at 22:53

## 2025-05-19 RX ADMIN — ALBUMIN (HUMAN) 12.5 G: 0.25 INJECTION, SOLUTION INTRAVENOUS at 05:24

## 2025-05-19 ASSESSMENT — PAIN DESCRIPTION - LOCATION
LOCATION: ABDOMEN
LOCATION: LEG

## 2025-05-19 ASSESSMENT — PAIN DESCRIPTION - DESCRIPTORS: DESCRIPTORS: DISCOMFORT;PRESSURE

## 2025-05-19 ASSESSMENT — PAIN DESCRIPTION - ORIENTATION
ORIENTATION: RIGHT
ORIENTATION: LOWER

## 2025-05-19 ASSESSMENT — PAIN SCALES - GENERAL
PAINLEVEL_OUTOF10: 8
PAINLEVEL_OUTOF10: 5

## 2025-05-19 NOTE — CARE COORDINATION
05/03/25 1110   Readmission Assessment   Number of Days since last admission? 1-7 days   Previous Disposition Home with Family   Who is being Interviewed Patient   What was the patient's/caregiver's perception as to why they think they needed to return back to the hospital? Other (Comment)  (new symptoms/diagnosis)   Did you visit your Primary Care Physician after you left the hospital, before you returned this time? Yes   Did you see a specialist, such as Cardiac, Pulmonary, Orthopedic Physician, etc. after you left the hospital? Yes   Who advised the patient to return to the hospital? Physician   Does the patient report anything that got in the way of taking their medications? No   In our efforts to provide the best possible care to you and others like you, can you think of anything that we could have done to help you after you left the hospital the first time, so that you might not have needed to return so soon? Arrange for more help when leaving the hospital     Patient a re-admission. Patient was able to follow up appropriately and was told by speciality office to report to ED. Unsure medical plan/need at this time. SW will continue to follow to assist with DC planning services.  
MD informed this writer to cancel transport tomorrow. Pt is pending bone marrow biopsy and has a complicated condition that needs treatment once his biopsy results are back.     SW cancelled transport with MMT and informed Kelsey with [facility].     KHANG Moreno  Case Management Department     
Patient discussed in IDR's. The patient plan today is for bone marrow bx. NGT has been placed and clinicals sent to [facility] for possible transfer if qualifies.  
Patient discussed in rounds. Patient referred to [facility]. Patient does qualify for [facility] at this time.  ID to consult regarding meds needed upon DC. SW to follow.   
Patient has received, reviewed and signed the 2nd Important Message from Medicare letter. Acknowledged understanding of rights. Copy left at patient's bedside and original placed in chart.  
Patient has received, reviewed and signed the 2nd Important Message from Medicare letter. Acknowledged understanding of rights. Copy left at patient's bedside and original placed in chart.  
Pt is off of pressors, Kelsey from [facility] is reviewing clinicals.    Per the pt he is in agreement with transfer to [facility].    CM to follow up.     KHANG Moreno  Case Management Department     
SW scheduled [phone] transport for tomorrow at 12pm (Trip #[id number]). PCS placed in pts hard Chart. In the event pt is ot stable, transport can be placed on will call.     MD and RN to call Report to [phone].    Admission contact is Kelsey [phone]     [facility]    Basic Information    Address:  [address] [place] [address]  [address] [place] [address]  [facility]  Phone:  [phone]    KHANG Moreno  Case Management Department     
WILBUR discussed case with [facility] rep Edwige [phone] who stated that pt will need a 3night ICU stay. Midnight will make pts 3rd night. Edwige reported pt is on an expensive medication; Ingrezza that is $9,000 a month. Per edwige she is pending a response from leadership on if they can accept the medication.    WILBUR to follow up.    ADDENDUM @ 4:09pm    Meds have been approved per Edwige with [facility]. Per the attending Pulmonologist, pt is likely to be stable to transfer to LTACH next week once off of pressors. [facility]ACH can accommodate pressors depending on staffing and dose, Edwige to follow up and review.     KHANG Moreno  Case Management Department     
WILBUR placed call to [place] [phone] with [facility] however, received no answer. SW left a VM.    ADDENDUM @ 12:28pm    SW received call back and pt has been accepted and a bed is available today.    WILBUR informed the attending MD who stated pt is not stable for DC. WILBUR informed the attending that a MD to MD can take place [phone]. Per the attending MD, pt is NOT discharging today.    Per Kelsey at [facility], they can accept pt tomorrow.    Corbin Raines, MSW  Case Management Department     
dialogue that supports the patient's individualized plan of care/goals, treatment preferences, and shares the quality data associated with the providers?  Yes     Patient re-admitted from home. Patient a recent DC to home from [facility]. Patient was able to follow up with PCP and sent to neuro and told to come to ED for LP and work up. LP attempted in ED, however patient was admitted to ICU for further work up. Pulmonology and cardiology as well as neurology following. Unsure medical plan and or need at this time. SW to continue to follow to assist with DC planning needs.

## 2025-05-20 ENCOUNTER — APPOINTMENT (OUTPATIENT)
Facility: HOSPITAL | Age: 60
DRG: 871 | End: 2025-05-20
Payer: MEDICARE

## 2025-05-20 LAB
ALBUMIN SERPL-MCNC: 2.5 G/DL (ref 3.4–5)
ANION GAP SERPL CALC-SCNC: 9 MMOL/L (ref 3–18)
APTT PPP: 119.2 SEC (ref 23–36.4)
APTT PPP: 46.1 SEC (ref 23–36.4)
APTT PPP: 51.5 SEC (ref 23–36.4)
BACTERIA SPEC CULT: NORMAL
BACTERIA SPEC CULT: NORMAL
BASOPHILS # BLD: 0.09 K/UL (ref 0–0.1)
BASOPHILS NFR BLD: 0.5 % (ref 0–2)
BUN SERPL-MCNC: 27 MG/DL (ref 6–23)
BUN/CREAT SERPL: 26 (ref 12–20)
CALCIUM SERPL-MCNC: 8.2 MG/DL (ref 8.5–10.1)
CHLORIDE SERPL-SCNC: 106 MMOL/L (ref 98–107)
CO2 SERPL-SCNC: 25 MMOL/L (ref 21–32)
CREAT SERPL-MCNC: 1.02 MG/DL (ref 0.6–1.3)
DIFFERENTIAL METHOD BLD: ABNORMAL
EOSINOPHIL # BLD: 0.93 K/UL (ref 0–0.4)
EOSINOPHIL NFR BLD: 4.8 % (ref 0–5)
ERYTHROCYTE [DISTWIDTH] IN BLOOD BY AUTOMATED COUNT: 18.5 % (ref 11.6–14.5)
GLUCOSE BLD STRIP.AUTO-MCNC: 180 MG/DL (ref 70–110)
GLUCOSE BLD STRIP.AUTO-MCNC: 234 MG/DL (ref 70–110)
GLUCOSE BLD STRIP.AUTO-MCNC: 237 MG/DL (ref 70–110)
GLUCOSE BLD STRIP.AUTO-MCNC: 237 MG/DL (ref 70–110)
GLUCOSE BLD STRIP.AUTO-MCNC: 287 MG/DL (ref 70–110)
GLUCOSE SERPL-MCNC: 196 MG/DL (ref 74–108)
HCT VFR BLD AUTO: 24.9 % (ref 36–48)
HCT VFR BLD AUTO: 29.8 % (ref 36–48)
HGB BLD-MCNC: 7.5 G/DL (ref 13–16)
HGB BLD-MCNC: 9.1 G/DL (ref 13–16)
IMM GRANULOCYTES # BLD AUTO: 0.85 K/UL (ref 0–0.04)
IMM GRANULOCYTES NFR BLD AUTO: 4.4 % (ref 0–0.5)
LYMPHOCYTES # BLD: 1.87 K/UL (ref 0.9–3.3)
LYMPHOCYTES NFR BLD: 9.6 % (ref 21–52)
MCH RBC QN AUTO: 27.4 PG (ref 24–34)
MCHC RBC AUTO-ENTMCNC: 30.1 G/DL (ref 31–37)
MCV RBC AUTO: 90.9 FL (ref 78–100)
MONOCYTES # BLD: 1.11 K/UL (ref 0.05–1.2)
MONOCYTES NFR BLD: 5.7 % (ref 3–10)
NEUTS SEG # BLD: 14.64 K/UL (ref 1.8–8)
NEUTS SEG NFR BLD: 75 % (ref 40–73)
NRBC # BLD: 0 K/UL (ref 0–0.01)
NRBC BLD-RTO: 0 PER 100 WBC
PHOSPHATE SERPL-MCNC: 2.7 MG/DL (ref 2.5–4.9)
PLATELET # BLD AUTO: 318 K/UL (ref 135–420)
PMV BLD AUTO: 10.9 FL (ref 9.2–11.8)
POTASSIUM SERPL-SCNC: 4.2 MMOL/L (ref 3.5–5.5)
RBC # BLD AUTO: 2.74 M/UL (ref 4.35–5.65)
SERVICE CMNT-IMP: NORMAL
SERVICE CMNT-IMP: NORMAL
SODIUM SERPL-SCNC: 141 MMOL/L (ref 136–145)
WBC # BLD AUTO: 19.5 K/UL (ref 4.6–13.2)

## 2025-05-20 PROCEDURE — 6370000000 HC RX 637 (ALT 250 FOR IP): Performed by: INTERNAL MEDICINE

## 2025-05-20 PROCEDURE — 2700000000 HC OXYGEN THERAPY PER DAY

## 2025-05-20 PROCEDURE — 85014 HEMATOCRIT: CPT

## 2025-05-20 PROCEDURE — 74018 RADEX ABDOMEN 1 VIEW: CPT

## 2025-05-20 PROCEDURE — 36415 COLL VENOUS BLD VENIPUNCTURE: CPT

## 2025-05-20 PROCEDURE — 85025 COMPLETE CBC W/AUTO DIFF WBC: CPT

## 2025-05-20 PROCEDURE — 2500000003 HC RX 250 WO HCPCS: Performed by: INTERNAL MEDICINE

## 2025-05-20 PROCEDURE — 6370000000 HC RX 637 (ALT 250 FOR IP): Performed by: PSYCHIATRY & NEUROLOGY

## 2025-05-20 PROCEDURE — 6360000002 HC RX W HCPCS: Performed by: INTERNAL MEDICINE

## 2025-05-20 PROCEDURE — 2580000003 HC RX 258: Performed by: HOSPITALIST

## 2025-05-20 PROCEDURE — 94640 AIRWAY INHALATION TREATMENT: CPT

## 2025-05-20 PROCEDURE — 6360000002 HC RX W HCPCS: Performed by: HOSPITALIST

## 2025-05-20 PROCEDURE — 97530 THERAPEUTIC ACTIVITIES: CPT

## 2025-05-20 PROCEDURE — 6370000000 HC RX 637 (ALT 250 FOR IP): Performed by: PHYSICIAN ASSISTANT

## 2025-05-20 PROCEDURE — 1100000003 HC PRIVATE W/ TELEMETRY

## 2025-05-20 PROCEDURE — 85018 HEMOGLOBIN: CPT

## 2025-05-20 PROCEDURE — 92526 ORAL FUNCTION THERAPY: CPT

## 2025-05-20 PROCEDURE — 85730 THROMBOPLASTIN TIME PARTIAL: CPT

## 2025-05-20 PROCEDURE — 6370000000 HC RX 637 (ALT 250 FOR IP): Performed by: HOSPITALIST

## 2025-05-20 PROCEDURE — 82962 GLUCOSE BLOOD TEST: CPT

## 2025-05-20 PROCEDURE — P9047 ALBUMIN (HUMAN), 25%, 50ML: HCPCS | Performed by: HOSPITALIST

## 2025-05-20 PROCEDURE — 97602 WOUND(S) CARE NON-SELECTIVE: CPT

## 2025-05-20 PROCEDURE — 97116 GAIT TRAINING THERAPY: CPT

## 2025-05-20 PROCEDURE — 80069 RENAL FUNCTION PANEL: CPT

## 2025-05-20 RX ADMIN — LACTULOSE 20 G: 10 SOLUTION ORAL at 09:11

## 2025-05-20 RX ADMIN — INSULIN LISPRO 4 UNITS: 100 INJECTION, SOLUTION INTRAVENOUS; SUBCUTANEOUS at 09:17

## 2025-05-20 RX ADMIN — ALBUMIN (HUMAN) 12.5 G: 0.25 INJECTION, SOLUTION INTRAVENOUS at 23:12

## 2025-05-20 RX ADMIN — IPRATROPIUM BROMIDE 0.5 MG: 0.5 SOLUTION RESPIRATORY (INHALATION) at 07:01

## 2025-05-20 RX ADMIN — QUETIAPINE FUMARATE 100 MG: 100 TABLET ORAL at 20:59

## 2025-05-20 RX ADMIN — IPRATROPIUM BROMIDE 0.5 MG: 0.5 SOLUTION RESPIRATORY (INHALATION) at 21:15

## 2025-05-20 RX ADMIN — Medication 100 MG: at 09:18

## 2025-05-20 RX ADMIN — GABAPENTIN 300 MG: 300 CAPSULE ORAL at 20:59

## 2025-05-20 RX ADMIN — BUDESONIDE 500 MCG: 0.5 INHALANT RESPIRATORY (INHALATION) at 07:01

## 2025-05-20 RX ADMIN — PANTOPRAZOLE SODIUM 40 MG: 40 TABLET, DELAYED RELEASE ORAL at 09:11

## 2025-05-20 RX ADMIN — HEPARIN SODIUM 5000 UNITS: 1000 INJECTION INTRAVENOUS; SUBCUTANEOUS at 18:19

## 2025-05-20 RX ADMIN — Medication 1 TABLET: at 09:10

## 2025-05-20 RX ADMIN — INSULIN GLARGINE 12 UNITS: 100 INJECTION, SOLUTION SUBCUTANEOUS at 20:54

## 2025-05-20 RX ADMIN — INSULIN LISPRO 4 UNITS: 100 INJECTION, SOLUTION INTRAVENOUS; SUBCUTANEOUS at 14:24

## 2025-05-20 RX ADMIN — MEROPENEM 1000 MG: 1 INJECTION INTRAVENOUS at 00:33

## 2025-05-20 RX ADMIN — INSULIN LISPRO 8 UNITS: 100 INJECTION, SOLUTION INTRAVENOUS; SUBCUTANEOUS at 18:11

## 2025-05-20 RX ADMIN — ACETAMINOPHEN 650 MG: 325 TABLET ORAL at 18:10

## 2025-05-20 RX ADMIN — MEROPENEM 1000 MG: 1 INJECTION INTRAVENOUS at 18:24

## 2025-05-20 RX ADMIN — PANTOPRAZOLE SODIUM 40 MG: 40 TABLET, DELAYED RELEASE ORAL at 14:24

## 2025-05-20 RX ADMIN — SENNOSIDES AND DOCUSATE SODIUM 1 TABLET: 50; 8.6 TABLET ORAL at 20:57

## 2025-05-20 RX ADMIN — BUDESONIDE 500 MCG: 0.5 INHALANT RESPIRATORY (INHALATION) at 21:15

## 2025-05-20 RX ADMIN — PANCRELIPASE LIPASE, PANCRELIPASE PROTEASE, PANCRELIPASE AMYLASE 10000 UNITS: 5000; 17000; 24000 CAPSULE, DELAYED RELEASE ORAL at 18:10

## 2025-05-20 RX ADMIN — HEPARIN SODIUM 26 UNITS/KG/HR: 10000 INJECTION, SOLUTION INTRAVENOUS at 18:18

## 2025-05-20 RX ADMIN — PANCRELIPASE LIPASE, PANCRELIPASE PROTEASE, PANCRELIPASE AMYLASE 10000 UNITS: 5000; 17000; 24000 CAPSULE, DELAYED RELEASE ORAL at 14:24

## 2025-05-20 RX ADMIN — GABAPENTIN 300 MG: 300 CAPSULE ORAL at 09:10

## 2025-05-20 RX ADMIN — SODIUM CHLORIDE, PRESERVATIVE FREE 10 ML: 5 INJECTION INTRAVENOUS at 09:18

## 2025-05-20 RX ADMIN — Medication 100 MG: at 00:35

## 2025-05-20 RX ADMIN — SODIUM CHLORIDE, PRESERVATIVE FREE 10 ML: 5 INJECTION INTRAVENOUS at 20:56

## 2025-05-20 RX ADMIN — MEROPENEM 1000 MG: 1 INJECTION INTRAVENOUS at 09:20

## 2025-05-20 RX ADMIN — Medication 2000 UNITS: at 09:10

## 2025-05-20 RX ADMIN — ALBUMIN (HUMAN) 12.5 G: 0.25 INJECTION, SOLUTION INTRAVENOUS at 15:36

## 2025-05-20 RX ADMIN — GABAPENTIN 300 MG: 300 CAPSULE ORAL at 14:24

## 2025-05-20 RX ADMIN — ACETAMINOPHEN 650 MG: 325 TABLET ORAL at 00:34

## 2025-05-20 RX ADMIN — IPRATROPIUM BROMIDE 0.5 MG: 0.5 SOLUTION RESPIRATORY (INHALATION) at 14:08

## 2025-05-20 RX ADMIN — Medication 100 MG: at 20:54

## 2025-05-20 RX ADMIN — SENNOSIDES AND DOCUSATE SODIUM 1 TABLET: 50; 8.6 TABLET ORAL at 09:10

## 2025-05-20 RX ADMIN — INSULIN LISPRO 4 UNITS: 100 INJECTION, SOLUTION INTRAVENOUS; SUBCUTANEOUS at 20:54

## 2025-05-20 RX ADMIN — PANCRELIPASE LIPASE, PANCRELIPASE PROTEASE, PANCRELIPASE AMYLASE 10000 UNITS: 5000; 17000; 24000 CAPSULE, DELAYED RELEASE ORAL at 09:11

## 2025-05-20 RX ADMIN — ATORVASTATIN CALCIUM 20 MG: 20 TABLET, FILM COATED ORAL at 20:59

## 2025-05-20 RX ADMIN — ZINC SULFATE 220 MG (50 MG) CAPSULE 50 MG: CAPSULE at 09:10

## 2025-05-20 RX ADMIN — MIRTAZAPINE 7.5 MG: 15 TABLET, FILM COATED ORAL at 20:57

## 2025-05-20 RX ADMIN — INSULIN LISPRO 4 UNITS: 100 INJECTION, SOLUTION INTRAVENOUS; SUBCUTANEOUS at 00:34

## 2025-05-20 RX ADMIN — Medication 100 MG: at 09:11

## 2025-05-20 ASSESSMENT — PAIN DESCRIPTION - DESCRIPTORS: DESCRIPTORS: CRAMPING

## 2025-05-20 ASSESSMENT — PAIN DESCRIPTION - LOCATION: LOCATION: ABDOMEN

## 2025-05-20 ASSESSMENT — PAIN SCALES - GENERAL: PAINLEVEL_OUTOF10: 6

## 2025-05-20 NOTE — WOUND CARE
Wound care nurse met with patient and his mother for me to introduce myself and my role.  Ostomy bag removed and stoma cleaned.  Patient and mom visualized stoma.  Discussed normal stoma appearance, function, stool consistency, and when to call a nurse of MD and report concerns.  Appointment made to meet with patient and mother to continue ostomy teaching tomorrow.  Educational material left in the room for each to review.

## 2025-05-20 NOTE — WOUND CARE
Inpatient Wound Care Note:     Lan Gonzalez  MEDICAL RECORD NUMBER:  [id number]  AGE: 59 y.o.   GENDER: male  : 1965  TODAY'S DATE:  2025    [x] Follow-up visit for coccyx wound  [] New consult for   Seen in  with no assistance  Patient admitted from home    PAST MEDICAL HISTORY    Past Medical History:   Diagnosis Date    AAA (abdominal aortic aneurysm)     \"probably seven or eight years ago\" last seen by Cassie Bagley 23    Aneurysm of right common iliac artery 2025    18 mm (2025)    Anxiety     Arthritis     Nat Donohue    B12 nutritional deficiency 2025    severe;  w/ LE weakness.   Hx of whipple procedure    BPH (benign prostatic hyperplasia)     CAD (coronary artery disease)     at least since , Nonobstructive CAD-mild diffuse RCA disease, minimal circumflex disease, moderate mid LAD disease on cardiac cath in , Joselito Raizada    Cardiomyopathy (HCC)     at least since , Joselito Raizada    Cerebral artery occlusion with cerebral infarction (HCC) 2023    Eddy Guallpa    Chronic kidney disease (CKD), active medical management without dialysis, stage 3 (moderate) (HCC)     Ciarajenny Nelson-Post    Chronic pain     lower back    Chronic pancreatitis (HCC)     at least since , previously followed by Alcides Flanagan and Jason Bedoya, had classic whipple procedure 14    COPD (chronic obstructive pulmonary disease) (HCC)     \"probably ten years ago\" not currently followed by any specialists    Diverticulosis     Exercise tolerance finding     \"feels winded\" after one flight of stairs but \"walks the dog multiple times a day\" without SOB oe chest pain as of 25    Gastritis     Gastroparesis     Nat Donohue    Hiatal hernia     Hypercholesterolemia     Nat Donohue    Hypoalbuminemia due to protein-calorie malnutrition 2025    Insulin pump in place     Ephraim rfanco    MDD (major depressive disorder)     Nat Donohue    Migraine     Multiple

## 2025-05-21 ENCOUNTER — APPOINTMENT (OUTPATIENT)
Facility: HOSPITAL | Age: 60
DRG: 871 | End: 2025-05-21
Payer: MEDICARE

## 2025-05-21 LAB
ALBUMIN SERPL-MCNC: 2.5 G/DL (ref 3.4–5)
ANION GAP SERPL CALC-SCNC: 9 MMOL/L (ref 3–18)
APTT PPP: 133.8 SEC (ref 23–36.4)
APTT PPP: 53.7 SEC (ref 23–36.4)
APTT PPP: 81.2 SEC (ref 23–36.4)
BUN SERPL-MCNC: 25 MG/DL (ref 6–23)
BUN/CREAT SERPL: 28 (ref 12–20)
CALCIUM SERPL-MCNC: 8.2 MG/DL (ref 8.5–10.1)
CHLORIDE SERPL-SCNC: 104 MMOL/L (ref 98–107)
CO2 SERPL-SCNC: 27 MMOL/L (ref 21–32)
CREAT SERPL-MCNC: 0.9 MG/DL (ref 0.6–1.3)
GLUCOSE BLD STRIP.AUTO-MCNC: 165 MG/DL (ref 70–110)
GLUCOSE BLD STRIP.AUTO-MCNC: 205 MG/DL (ref 70–110)
GLUCOSE BLD STRIP.AUTO-MCNC: 252 MG/DL (ref 70–110)
GLUCOSE BLD STRIP.AUTO-MCNC: 269 MG/DL (ref 70–110)
GLUCOSE BLD STRIP.AUTO-MCNC: 312 MG/DL (ref 70–110)
GLUCOSE SERPL-MCNC: 150 MG/DL (ref 74–108)
HCT VFR BLD AUTO: 23.9 % (ref 36–48)
HCT VFR BLD AUTO: 25.7 % (ref 36–48)
HCT VFR BLD AUTO: 27 % (ref 36–48)
HGB BLD-MCNC: 7.2 G/DL (ref 13–16)
HGB BLD-MCNC: 7.7 G/DL (ref 13–16)
HGB BLD-MCNC: 8.3 G/DL (ref 13–16)
Lab: NORMAL
Lab: NORMAL
PHOSPHATE SERPL-MCNC: 2.5 MG/DL (ref 2.5–4.9)
POTASSIUM SERPL-SCNC: 3.8 MMOL/L (ref 3.5–5.5)
PROCALCITONIN SERPL-MCNC: 1.7 NG/ML
REFERENCE LAB: NORMAL
SODIUM SERPL-SCNC: 140 MMOL/L (ref 136–145)

## 2025-05-21 PROCEDURE — 94640 AIRWAY INHALATION TREATMENT: CPT

## 2025-05-21 PROCEDURE — 72158 MRI LUMBAR SPINE W/O & W/DYE: CPT

## 2025-05-21 PROCEDURE — 6370000000 HC RX 637 (ALT 250 FOR IP): Performed by: HOSPITALIST

## 2025-05-21 PROCEDURE — 2580000003 HC RX 258: Performed by: INTERNAL MEDICINE

## 2025-05-21 PROCEDURE — 85730 THROMBOPLASTIN TIME PARTIAL: CPT

## 2025-05-21 PROCEDURE — 6360000004 HC RX CONTRAST MEDICATION: Performed by: INTERNAL MEDICINE

## 2025-05-21 PROCEDURE — 72197 MRI PELVIS W/O & W/DYE: CPT

## 2025-05-21 PROCEDURE — 97530 THERAPEUTIC ACTIVITIES: CPT

## 2025-05-21 PROCEDURE — 2700000000 HC OXYGEN THERAPY PER DAY

## 2025-05-21 PROCEDURE — 6370000000 HC RX 637 (ALT 250 FOR IP): Performed by: INTERNAL MEDICINE

## 2025-05-21 PROCEDURE — 6360000002 HC RX W HCPCS: Performed by: INTERNAL MEDICINE

## 2025-05-21 PROCEDURE — P9047 ALBUMIN (HUMAN), 25%, 50ML: HCPCS | Performed by: HOSPITALIST

## 2025-05-21 PROCEDURE — 6360000002 HC RX W HCPCS: Performed by: HOSPITALIST

## 2025-05-21 PROCEDURE — 85014 HEMATOCRIT: CPT

## 2025-05-21 PROCEDURE — 1100000003 HC PRIVATE W/ TELEMETRY

## 2025-05-21 PROCEDURE — 2500000003 HC RX 250 WO HCPCS: Performed by: INTERNAL MEDICINE

## 2025-05-21 PROCEDURE — 6370000000 HC RX 637 (ALT 250 FOR IP): Performed by: PSYCHIATRY & NEUROLOGY

## 2025-05-21 PROCEDURE — 80069 RENAL FUNCTION PANEL: CPT

## 2025-05-21 PROCEDURE — 85018 HEMOGLOBIN: CPT

## 2025-05-21 PROCEDURE — 82962 GLUCOSE BLOOD TEST: CPT

## 2025-05-21 PROCEDURE — 97116 GAIT TRAINING THERAPY: CPT

## 2025-05-21 PROCEDURE — 6370000000 HC RX 637 (ALT 250 FOR IP): Performed by: PHYSICIAN ASSISTANT

## 2025-05-21 PROCEDURE — 36415 COLL VENOUS BLD VENIPUNCTURE: CPT

## 2025-05-21 PROCEDURE — 5A1D70Z PERFORMANCE OF URINARY FILTRATION, INTERMITTENT, LESS THAN 6 HOURS PER DAY: ICD-10-PCS | Performed by: INTERNAL MEDICINE

## 2025-05-21 PROCEDURE — 70551 MRI BRAIN STEM W/O DYE: CPT

## 2025-05-21 PROCEDURE — A9577 INJ MULTIHANCE: HCPCS | Performed by: INTERNAL MEDICINE

## 2025-05-21 PROCEDURE — 84145 PROCALCITONIN (PCT): CPT

## 2025-05-21 RX ORDER — DIAZEPAM 10 MG/2ML
5 INJECTION, SOLUTION INTRAMUSCULAR; INTRAVENOUS EVERY 4 HOURS PRN
Status: DISCONTINUED | OUTPATIENT
Start: 2025-05-21 | End: 2025-05-22 | Stop reason: HOSPADM

## 2025-05-21 RX ADMIN — INSULIN LISPRO 12 UNITS: 100 INJECTION, SOLUTION INTRAVENOUS; SUBCUTANEOUS at 16:55

## 2025-05-21 RX ADMIN — IPRATROPIUM BROMIDE 0.5 MG: 0.5 SOLUTION RESPIRATORY (INHALATION) at 14:37

## 2025-05-21 RX ADMIN — PANCRELIPASE LIPASE, PANCRELIPASE PROTEASE, PANCRELIPASE AMYLASE 10000 UNITS: 5000; 17000; 24000 CAPSULE, DELAYED RELEASE ORAL at 10:36

## 2025-05-21 RX ADMIN — INSULIN LISPRO 8 UNITS: 100 INJECTION, SOLUTION INTRAVENOUS; SUBCUTANEOUS at 06:32

## 2025-05-21 RX ADMIN — ATORVASTATIN CALCIUM 20 MG: 20 TABLET, FILM COATED ORAL at 21:28

## 2025-05-21 RX ADMIN — PANTOPRAZOLE SODIUM 40 MG: 40 TABLET, DELAYED RELEASE ORAL at 05:52

## 2025-05-21 RX ADMIN — Medication 100 MG: at 21:28

## 2025-05-21 RX ADMIN — GABAPENTIN 300 MG: 300 CAPSULE ORAL at 10:36

## 2025-05-21 RX ADMIN — INSULIN GLARGINE 12 UNITS: 100 INJECTION, SOLUTION SUBCUTANEOUS at 21:28

## 2025-05-21 RX ADMIN — ALBUMIN (HUMAN) 12.5 G: 0.25 INJECTION, SOLUTION INTRAVENOUS at 05:47

## 2025-05-21 RX ADMIN — SENNOSIDES AND DOCUSATE SODIUM 1 TABLET: 50; 8.6 TABLET ORAL at 21:28

## 2025-05-21 RX ADMIN — HEPARIN SODIUM 5000 UNITS: 1000 INJECTION INTRAVENOUS; SUBCUTANEOUS at 16:54

## 2025-05-21 RX ADMIN — SENNOSIDES AND DOCUSATE SODIUM 1 TABLET: 50; 8.6 TABLET ORAL at 10:36

## 2025-05-21 RX ADMIN — MEROPENEM 1000 MG: 1 INJECTION INTRAVENOUS at 00:43

## 2025-05-21 RX ADMIN — IPRATROPIUM BROMIDE 0.5 MG: 0.5 SOLUTION RESPIRATORY (INHALATION) at 19:15

## 2025-05-21 RX ADMIN — MIRTAZAPINE 7.5 MG: 15 TABLET, FILM COATED ORAL at 21:28

## 2025-05-21 RX ADMIN — MEROPENEM 1000 MG: 1 INJECTION INTRAVENOUS at 17:03

## 2025-05-21 RX ADMIN — PANTOPRAZOLE SODIUM 40 MG: 40 TABLET, DELAYED RELEASE ORAL at 16:55

## 2025-05-21 RX ADMIN — Medication 1 TABLET: at 10:35

## 2025-05-21 RX ADMIN — MEROPENEM 1000 MG: 1 INJECTION INTRAVENOUS at 10:42

## 2025-05-21 RX ADMIN — INSULIN LISPRO 4 UNITS: 100 INJECTION, SOLUTION INTRAVENOUS; SUBCUTANEOUS at 21:28

## 2025-05-21 RX ADMIN — Medication 100 MG: at 10:35

## 2025-05-21 RX ADMIN — ALBUMIN (HUMAN) 12.5 G: 0.25 INJECTION, SOLUTION INTRAVENOUS at 22:44

## 2025-05-21 RX ADMIN — Medication 100 MG: at 10:36

## 2025-05-21 RX ADMIN — Medication 2000 UNITS: at 10:36

## 2025-05-21 RX ADMIN — SODIUM CHLORIDE, PRESERVATIVE FREE 10 ML: 5 INJECTION INTRAVENOUS at 10:36

## 2025-05-21 RX ADMIN — GABAPENTIN 300 MG: 300 CAPSULE ORAL at 21:28

## 2025-05-21 RX ADMIN — ZINC SULFATE 220 MG (50 MG) CAPSULE 50 MG: CAPSULE at 10:35

## 2025-05-21 RX ADMIN — PANCRELIPASE LIPASE, PANCRELIPASE PROTEASE, PANCRELIPASE AMYLASE 10000 UNITS: 5000; 17000; 24000 CAPSULE, DELAYED RELEASE ORAL at 16:55

## 2025-05-21 RX ADMIN — BUDESONIDE 500 MCG: 0.5 INHALANT RESPIRATORY (INHALATION) at 19:15

## 2025-05-21 RX ADMIN — IPRATROPIUM BROMIDE 0.5 MG: 0.5 SOLUTION RESPIRATORY (INHALATION) at 07:47

## 2025-05-21 RX ADMIN — QUETIAPINE FUMARATE 100 MG: 100 TABLET ORAL at 21:28

## 2025-05-21 RX ADMIN — DIAZEPAM 5 MG: 5 INJECTION, SOLUTION INTRAMUSCULAR; INTRAVENOUS at 11:06

## 2025-05-21 RX ADMIN — GABAPENTIN 300 MG: 300 CAPSULE ORAL at 16:55

## 2025-05-21 RX ADMIN — SODIUM CHLORIDE, PRESERVATIVE FREE 10 ML: 5 INJECTION INTRAVENOUS at 21:25

## 2025-05-21 RX ADMIN — HEPARIN SODIUM 24 UNITS/KG/HR: 10000 INJECTION, SOLUTION INTRAVENOUS at 11:09

## 2025-05-21 RX ADMIN — GADOBENATE DIMEGLUMINE 15 ML: 529 INJECTION, SOLUTION INTRAVENOUS at 12:25

## 2025-05-21 RX ADMIN — ALBUMIN (HUMAN) 12.5 G: 0.25 INJECTION, SOLUTION INTRAVENOUS at 17:02

## 2025-05-21 RX ADMIN — BUDESONIDE 500 MCG: 0.5 INHALANT RESPIRATORY (INHALATION) at 07:47

## 2025-05-21 RX ADMIN — LACTULOSE 20 G: 10 SOLUTION ORAL at 10:35

## 2025-05-21 RX ADMIN — PANCRELIPASE LIPASE, PANCRELIPASE PROTEASE, PANCRELIPASE AMYLASE 10000 UNITS: 5000; 17000; 24000 CAPSULE, DELAYED RELEASE ORAL at 13:20

## 2025-05-21 NOTE — WOUND CARE
Inpatient Wound Care Note:     Lan Gonzalez  MEDICAL RECORD NUMBER:  [id number]  AGE: 59 y.o.   GENDER: male  : 1965  TODAY'S DATE:  2025    Attempted to see patient for dressing change, however patient eating breakfast and will then be going to MRI. Discussed with bedside RN and informed that supplies would be left in room and orders placed in chart. Pt seen by WC yesterday and assessment in flowsheet.     PAST MEDICAL HISTORY    Past Medical History:   Diagnosis Date    AAA (abdominal aortic aneurysm)     \"probably seven or eight years ago\" last seen by Cassie Bagley 23    Aneurysm of right common iliac artery 2025    18 mm (2025)    Anxiety     Arthritis     Nat Donohue    B12 nutritional deficiency 2025    severe;  w/ LE weakness.   Hx of whipple procedure    BPH (benign prostatic hyperplasia)     CAD (coronary artery disease)     at least since , Nonobstructive CAD-mild diffuse RCA disease, minimal circumflex disease, moderate mid LAD disease on cardiac cath in , Joselito Raizada    Cardiomyopathy (HCC)     at least since , Joselito Raizada    Cerebral artery occlusion with cerebral infarction (HCC) 2023    Eddy Guallpa    Chronic kidney disease (CKD), active medical management without dialysis, stage 3 (moderate) (HCC)     Ciara Nelson-Post    Chronic pain     lower back    Chronic pancreatitis (HCC)     at least since , previously followed by Alcides Flanagan and Jason Bedoya, had classic whipple procedure 14    COPD (chronic obstructive pulmonary disease) (HCC)     \"probably ten years ago\" not currently followed by any specialists    Diverticulosis     Exercise tolerance finding     \"feels winded\" after one flight of stairs but \"walks the dog multiple times a day\" without SOB oe chest pain as of 25    Gastritis     Gastroparesis     Nat Donohue    Hiatal hernia     Hypercholesterolemia     Nat Donohue    Hypoalbuminemia due to protein-calorie

## 2025-05-21 NOTE — WOUND CARE
Wound Care Note:    Chart audited for low flaquito score of 17, patient with high risk for skin breakdown.  Does patient have documentation of pressure injury?  yes - DTI on Sacrum.  Flaquito score order set started on 5/3/2025     Skin Care & Pressure Relief Recommendations  Minimize layers of linen  Pads under patient to optimize support surface  Turn/reposition approximately every 2 hours  Use pillow wedges to maintain positioning but do not put pillow directly over wounds  Manage incontinence   Promote continence; Skin Protective lotion/cream to buttocks and sacrum daily and as needed with incontinence care  Offload heels pillows    Consult wound care if any wounds noted during admission

## 2025-05-22 ENCOUNTER — APPOINTMENT (OUTPATIENT)
Facility: HOSPITAL | Age: 60
DRG: 871 | End: 2025-05-22
Payer: MEDICARE

## 2025-05-22 VITALS
RESPIRATION RATE: 18 BRPM | TEMPERATURE: 99 F | WEIGHT: 175.7 LBS | HEART RATE: 95 BPM | BODY MASS INDEX: 25.15 KG/M2 | OXYGEN SATURATION: 99 % | DIASTOLIC BLOOD PRESSURE: 71 MMHG | SYSTOLIC BLOOD PRESSURE: 131 MMHG | HEIGHT: 70 IN

## 2025-05-22 LAB
APTT PPP: 59.3 SEC (ref 23–36.4)
APTT PPP: 99.5 SEC (ref 23–36.4)
APTT PPP: >180 SEC (ref 23–36.4)
BASOPHILS # BLD: 0.06 K/UL (ref 0–0.1)
BASOPHILS NFR BLD: 0.3 % (ref 0–2)
DIFFERENTIAL METHOD BLD: ABNORMAL
EOSINOPHIL # BLD: 0.43 K/UL (ref 0–0.4)
EOSINOPHIL NFR BLD: 2.5 % (ref 0–5)
ERYTHROCYTE [DISTWIDTH] IN BLOOD BY AUTOMATED COUNT: 18.3 % (ref 11.6–14.5)
GLUCOSE BLD STRIP.AUTO-MCNC: 288 MG/DL (ref 70–110)
GLUCOSE BLD STRIP.AUTO-MCNC: 295 MG/DL (ref 70–110)
GLUCOSE BLD STRIP.AUTO-MCNC: 296 MG/DL (ref 70–110)
HCT VFR BLD AUTO: 21.9 % (ref 36–48)
HCT VFR BLD AUTO: 22.7 % (ref 36–48)
HGB BLD-MCNC: 6.8 G/DL (ref 13–16)
HGB BLD-MCNC: 6.9 G/DL (ref 13–16)
HISTORY CHECK: NORMAL
IMM GRANULOCYTES # BLD AUTO: 0.37 K/UL (ref 0–0.04)
IMM GRANULOCYTES NFR BLD AUTO: 2.2 % (ref 0–0.5)
LYMPHOCYTES # BLD: 1.48 K/UL (ref 0.9–3.3)
LYMPHOCYTES NFR BLD: 8.6 % (ref 21–52)
MCH RBC QN AUTO: 28 PG (ref 24–34)
MCHC RBC AUTO-ENTMCNC: 31.1 G/DL (ref 31–37)
MCV RBC AUTO: 90.1 FL (ref 78–100)
MONOCYTES # BLD: 0.86 K/UL (ref 0.05–1.2)
MONOCYTES NFR BLD: 5 % (ref 3–10)
NEUTS SEG # BLD: 13.98 K/UL (ref 1.8–8)
NEUTS SEG NFR BLD: 81.4 % (ref 40–73)
NRBC # BLD: 0 K/UL (ref 0–0.01)
NRBC BLD-RTO: 0 PER 100 WBC
NT PRO BNP: 7360 PG/ML (ref 36–900)
PLATELET # BLD AUTO: 325 K/UL (ref 135–420)
PMV BLD AUTO: 11.2 FL (ref 9.2–11.8)
RBC # BLD AUTO: 2.43 M/UL (ref 4.35–5.65)
WBC # BLD AUTO: 17.2 K/UL (ref 4.6–13.2)

## 2025-05-22 PROCEDURE — 82962 GLUCOSE BLOOD TEST: CPT

## 2025-05-22 PROCEDURE — 36430 TRANSFUSION BLD/BLD COMPNT: CPT

## 2025-05-22 PROCEDURE — 36415 COLL VENOUS BLD VENIPUNCTURE: CPT

## 2025-05-22 PROCEDURE — 6360000002 HC RX W HCPCS: Performed by: INTERNAL MEDICINE

## 2025-05-22 PROCEDURE — 71045 X-RAY EXAM CHEST 1 VIEW: CPT

## 2025-05-22 PROCEDURE — 85018 HEMOGLOBIN: CPT

## 2025-05-22 PROCEDURE — 6370000000 HC RX 637 (ALT 250 FOR IP): Performed by: HOSPITALIST

## 2025-05-22 PROCEDURE — 6360000002 HC RX W HCPCS: Performed by: HOSPITALIST

## 2025-05-22 PROCEDURE — 6370000000 HC RX 637 (ALT 250 FOR IP): Performed by: INTERNAL MEDICINE

## 2025-05-22 PROCEDURE — 86901 BLOOD TYPING SEROLOGIC RH(D): CPT

## 2025-05-22 PROCEDURE — 2500000003 HC RX 250 WO HCPCS: Performed by: INTERNAL MEDICINE

## 2025-05-22 PROCEDURE — 83880 ASSAY OF NATRIURETIC PEPTIDE: CPT

## 2025-05-22 PROCEDURE — P9016 RBC LEUKOCYTES REDUCED: HCPCS

## 2025-05-22 PROCEDURE — 85025 COMPLETE CBC W/AUTO DIFF WBC: CPT

## 2025-05-22 PROCEDURE — 2580000003 HC RX 258: Performed by: INTERNAL MEDICINE

## 2025-05-22 PROCEDURE — 6370000000 HC RX 637 (ALT 250 FOR IP): Performed by: PHYSICIAN ASSISTANT

## 2025-05-22 PROCEDURE — 6370000000 HC RX 637 (ALT 250 FOR IP): Performed by: PSYCHIATRY & NEUROLOGY

## 2025-05-22 PROCEDURE — 86850 RBC ANTIBODY SCREEN: CPT

## 2025-05-22 PROCEDURE — 2700000000 HC OXYGEN THERAPY PER DAY

## 2025-05-22 PROCEDURE — 86900 BLOOD TYPING SEROLOGIC ABO: CPT

## 2025-05-22 PROCEDURE — P9047 ALBUMIN (HUMAN), 25%, 50ML: HCPCS | Performed by: HOSPITALIST

## 2025-05-22 PROCEDURE — 85014 HEMATOCRIT: CPT

## 2025-05-22 PROCEDURE — 94640 AIRWAY INHALATION TREATMENT: CPT

## 2025-05-22 PROCEDURE — 86923 COMPATIBILITY TEST ELECTRIC: CPT

## 2025-05-22 PROCEDURE — 85730 THROMBOPLASTIN TIME PARTIAL: CPT

## 2025-05-22 RX ORDER — SENNA AND DOCUSATE SODIUM 50; 8.6 MG/1; MG/1
1 TABLET, FILM COATED ORAL 2 TIMES DAILY
Qty: 60 TABLET | Refills: 0
Start: 2025-05-22

## 2025-05-22 RX ORDER — HEPARIN SODIUM 1000 [USP'U]/ML
80 INJECTION, SOLUTION INTRAVENOUS; SUBCUTANEOUS PRN
Qty: 30 EACH | Refills: 1
Start: 2025-05-22

## 2025-05-22 RX ORDER — INSULIN GLARGINE 100 [IU]/ML
12 INJECTION, SOLUTION SUBCUTANEOUS NIGHTLY
Qty: 10 ML | Refills: 3 | Status: SHIPPED | OUTPATIENT
Start: 2025-05-22

## 2025-05-22 RX ORDER — INSULIN LISPRO 100 [IU]/ML
0-16 INJECTION, SOLUTION INTRAVENOUS; SUBCUTANEOUS
Qty: 30 EACH | Refills: 0 | Status: SHIPPED | OUTPATIENT
Start: 2025-05-22

## 2025-05-22 RX ORDER — CYANOCOBALAMIN 1000 UG/ML
1000 INJECTION, SOLUTION INTRAMUSCULAR; SUBCUTANEOUS WEEKLY
Qty: 4 ML | Refills: 6
Start: 2025-05-25 | End: 2025-12-15

## 2025-05-22 RX ORDER — HEPARIN SODIUM 10000 [USP'U]/100ML
5-30 INJECTION, SOLUTION INTRAVENOUS CONTINUOUS
Qty: 300 ML | Refills: 0 | Status: SHIPPED | OUTPATIENT
Start: 2025-05-22

## 2025-05-22 RX ORDER — OXYCODONE HYDROCHLORIDE 5 MG/1
5 TABLET ORAL EVERY 6 HOURS PRN
Qty: 12 TABLET | Refills: 0 | Status: SHIPPED | OUTPATIENT
Start: 2025-05-22 | End: 2025-05-25

## 2025-05-22 RX ORDER — NIACIN 100 MG
100 TABLET ORAL NIGHTLY
Qty: 30 TABLET | Refills: 3 | Status: SHIPPED | OUTPATIENT
Start: 2025-05-22

## 2025-05-22 RX ORDER — SODIUM CHLORIDE 9 MG/ML
INJECTION, SOLUTION INTRAVENOUS PRN
Status: DISCONTINUED | OUTPATIENT
Start: 2025-05-22 | End: 2025-05-22 | Stop reason: HOSPADM

## 2025-05-22 RX ORDER — HEPARIN SODIUM 1000 [USP'U]/ML
1000 INJECTION, SOLUTION INTRAVENOUS; SUBCUTANEOUS ONCE
Qty: 1 ML | Refills: 0
Start: 2025-05-22 | End: 2025-05-22

## 2025-05-22 RX ORDER — THIAMINE MONONITRATE (VIT B1) 100 MG
100 TABLET ORAL 2 TIMES DAILY
Qty: 60 TABLET | Refills: 0
Start: 2025-05-22

## 2025-05-22 RX ORDER — LACTULOSE 10 G/15ML
20 SOLUTION ORAL 2 TIMES DAILY
Qty: 1800 ML | Refills: 0 | Status: SHIPPED | OUTPATIENT
Start: 2025-05-22

## 2025-05-22 RX ORDER — PANTOPRAZOLE SODIUM 40 MG/1
40 TABLET, DELAYED RELEASE ORAL
Qty: 30 TABLET | Refills: 3 | Status: SHIPPED | OUTPATIENT
Start: 2025-05-23

## 2025-05-22 RX ORDER — FUROSEMIDE 10 MG/ML
20 INJECTION INTRAMUSCULAR; INTRAVENOUS ONCE
Status: COMPLETED | OUTPATIENT
Start: 2025-05-22 | End: 2025-05-22

## 2025-05-22 RX ORDER — BUDESONIDE 0.5 MG/2ML
0.5 INHALANT ORAL
Qty: 60 EACH | Refills: 3 | Status: SHIPPED | OUTPATIENT
Start: 2025-05-22

## 2025-05-22 RX ORDER — GLUCAGON 1 MG/ML
1 KIT INJECTION PRN
Qty: 1 EACH | Refills: 0
Start: 2025-05-22

## 2025-05-22 RX ADMIN — Medication 2000 UNITS: at 09:20

## 2025-05-22 RX ADMIN — MEROPENEM 1000 MG: 1 INJECTION INTRAVENOUS at 09:20

## 2025-05-22 RX ADMIN — HEPARIN SODIUM 30 UNITS/KG/HR: 10000 INJECTION, SOLUTION INTRAVENOUS at 04:30

## 2025-05-22 RX ADMIN — SENNOSIDES AND DOCUSATE SODIUM 1 TABLET: 50; 8.6 TABLET ORAL at 09:20

## 2025-05-22 RX ADMIN — PANTOPRAZOLE SODIUM 40 MG: 40 TABLET, DELAYED RELEASE ORAL at 06:53

## 2025-05-22 RX ADMIN — FUROSEMIDE 20 MG: 10 INJECTION, SOLUTION INTRAMUSCULAR; INTRAVENOUS at 12:21

## 2025-05-22 RX ADMIN — PANCRELIPASE LIPASE, PANCRELIPASE PROTEASE, PANCRELIPASE AMYLASE 10000 UNITS: 5000; 17000; 24000 CAPSULE, DELAYED RELEASE ORAL at 12:22

## 2025-05-22 RX ADMIN — PANCRELIPASE LIPASE, PANCRELIPASE PROTEASE, PANCRELIPASE AMYLASE 10000 UNITS: 5000; 17000; 24000 CAPSULE, DELAYED RELEASE ORAL at 16:17

## 2025-05-22 RX ADMIN — INSULIN LISPRO 8 UNITS: 100 INJECTION, SOLUTION INTRAVENOUS; SUBCUTANEOUS at 12:22

## 2025-05-22 RX ADMIN — ALBUMIN (HUMAN) 12.5 G: 0.25 INJECTION, SOLUTION INTRAVENOUS at 04:32

## 2025-05-22 RX ADMIN — IPRATROPIUM BROMIDE 0.5 MG: 0.5 SOLUTION RESPIRATORY (INHALATION) at 07:44

## 2025-05-22 RX ADMIN — PANTOPRAZOLE SODIUM 40 MG: 40 TABLET, DELAYED RELEASE ORAL at 14:46

## 2025-05-22 RX ADMIN — IPRATROPIUM BROMIDE 0.5 MG: 0.5 SOLUTION RESPIRATORY (INHALATION) at 14:05

## 2025-05-22 RX ADMIN — GABAPENTIN 300 MG: 300 CAPSULE ORAL at 14:46

## 2025-05-22 RX ADMIN — ZINC SULFATE 220 MG (50 MG) CAPSULE 50 MG: CAPSULE at 09:20

## 2025-05-22 RX ADMIN — PANCRELIPASE LIPASE, PANCRELIPASE PROTEASE, PANCRELIPASE AMYLASE 10000 UNITS: 5000; 17000; 24000 CAPSULE, DELAYED RELEASE ORAL at 09:21

## 2025-05-22 RX ADMIN — MEROPENEM 1000 MG: 1 INJECTION INTRAVENOUS at 00:53

## 2025-05-22 RX ADMIN — INSULIN LISPRO 8 UNITS: 100 INJECTION, SOLUTION INTRAVENOUS; SUBCUTANEOUS at 16:57

## 2025-05-22 RX ADMIN — Medication 1 TABLET: at 09:21

## 2025-05-22 RX ADMIN — LACTULOSE 20 G: 10 SOLUTION ORAL at 09:21

## 2025-05-22 RX ADMIN — MEROPENEM 1000 MG: 1 INJECTION INTRAVENOUS at 16:19

## 2025-05-22 RX ADMIN — Medication 100 MG: at 09:21

## 2025-05-22 RX ADMIN — GABAPENTIN 300 MG: 300 CAPSULE ORAL at 09:20

## 2025-05-22 RX ADMIN — BUDESONIDE 500 MCG: 0.5 INHALANT RESPIRATORY (INHALATION) at 07:44

## 2025-05-22 RX ADMIN — HEPARIN SODIUM 5000 UNITS: 1000 INJECTION INTRAVENOUS; SUBCUTANEOUS at 00:50

## 2025-05-22 RX ADMIN — SODIUM CHLORIDE, PRESERVATIVE FREE 10 ML: 5 INJECTION INTRAVENOUS at 10:34

## 2025-05-22 NOTE — PROGRESS NOTES
[facility] Specialists  Pulmonary, Critical Care, and Sleep Medicine    Name: Lan Gonzalez MRN: [id number]   : 1965 Hospital: [facility]    Date: 2025  Room: [address] [place] [address] Note                                              Consult requesting physician: Dr. Monreal  Reason for Consult: Hypotension, shock, sepsis    IMPRESSION:   Shock  Sepsis  Generalized weakness  Malnutrition  COPD  Cardiomyopathy  Peripheral neuropathy  Rheumatoid arthritis  Diabetes        Active Hospital Problems    Diagnosis Date Noted    Severe malnutrition [E43] 05/15/2025    Shock (HCC) [R57.9] 2025    Moderate malnutrition [E44.0] 2025    Vitamin deficiency related neuropathy [E56.9, G63] 05/10/2025    Lumbosacral radiculopathy [M54.17] 2025    B12 deficiency [E53.8] 2025    Weakness [R53.1] 2025    Blood loss anemia [D50.0] 2025    GI bleed [K92.2] 2025    Stage 3a chronic kidney disease (HCC) [N18.31] 2025    Elevated troponin [R79.89] 2025    Hypoalbuminemia due to protein-calorie malnutrition [E88.09, E46] 2025    COPD (chronic obstructive pulmonary disease) (HCC) [J44.9]     AAA (abdominal aortic aneurysm) [I71.40]     CAD (coronary artery disease) [I25.10] 2025    Cardiomyopathy (HCC) [I42.9] 2025    Severe sepsis (HCC) [A41.9, R65.20] 2025    Peripheral neuropathy [G62.9] 2025    RA (rheumatoid arthritis) (HCC) [M06.9] 2017    Type 1 diabetes mellitus (HCC) [E10.9] 2017        Code status: Full Code       RECOMMENDATIONS:     Respiratory:   Acute hypoxemia on 4 L of oxygen saturations above 95  Last night with suspect patient had aspiration event.  Patient is on full anticoagulation  1. No change in low lung volumes with bibasilar atelectasis left greater than  right.  ABG  7.4 /102/98  CTA 5/15/2025  IMPRESSION:  1.  No large/central pulmonary embolism within the limitations of the 
                     Pulmonary Specialists  Pulmonary, Critical Care, and Sleep Medicine    Name: Lan Gonzalez MRN: [id number]   : 1965 Hospital: [facility]    Date: 5/3/2025  Room: [address] [place] [address] Note                                              Consult requesting physician: Dr. 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] 2025     Priority: Medium    Stage 3a chronic kidney disease (HCC) [N18.31] 2025    Elevated troponin [R79.89] 2025    Hypoalbuminemia due to protein-calorie malnutrition [E88.09, E46] 2025    COPD (chronic obstructive pulmonary disease) (HCC) [J44.9]     BPH (benign prostatic hyperplasia) [N40.0]     AAA (abdominal aortic aneurysm) [I71.40]     CAD (coronary artery disease) [I25.10] 2025    Cardiomyopathy (HCC) [I42.9] 2025    RA (rheumatoid arthritis) (HCC) [M06.9] 2017    Type 1 diabetes mellitus (HCC) [E10.9] 2017    MDD (major depressive disorder) [F32.9] 2017        Code status: Full Code       RECOMMENDATIONS:     Respiratory: History of COPD, former smoker, pulmonary nodule.  CXR 2025: Clear lungs.  No acute pulmonary process.  CT chest 2025 reviewed: sub-6 mm subpleural RLL pulmonary nodule (patient reported he is aware of the nodule and seen on prior low-dose lung cancer screening CT chest with PCP; it has remained stable per patient.  This nodule is reported on [facility] CT chest abdomen pelvis 4/3/2025 compared to LDCT 2024, : Stable reflect 9 mm pleural-based nodule lateral RLL).  Bibasilar mild atelectasis.  Patient is advised to follow-up with PCP and continue 
         Over night events noted.   Will follow in AM. Echo reviewed.     Chela Mathis MD, [facility]  Interventional Cardiologist   05/15/25     
       [facility]  (A Division of [facility])                                                           Date of Admission: 5/2/2025       ID FU for antimicrobial management presumed sepsis/Pyelitis- requested by Dr Guerrero   PCP: Nat Donohue APRN - NP    C/C: lower ext weakness/unable to walk    Current Antimicrobials:    Prior Antimicrobials:    Vanco/Cefepime 5/2 to ceftriaxone 5/5 to date Vantin 04/4/25 X10 days PO  Doxycycline X7 days 4/1/25    Vanco/Cefepime 4/24 to 4/28     Allaergy to antibiotics- PCN-hives     Assessment:     Chronically sick looking patient with:    Persistent leucocytosis-mainly neutrophilia- etiology unclear   28K on admission-> 19.5K-> 18K-> 26.3K  - presumed possible pyelitis ( UA and urine culture unremarkable though). Other possible DDX-reactive due to acute anemia Vs related to RA/Autoimmune issue , BM disorder? Currently trending up despite emperic abx. No finding of abd pain/diarrhea/rash. Has generalized body aches. CT CAP with right side pyelitis-but no severe obstructive uropathy or abscess, or diffuse LAP.  MRI of C/T/L spine- without fluid collection       --leucocytosis has been on going since R renal obstructive uropathy that required surgery- 02/2025 5/2- blood culture: NGSF        -UA unremarkable, culture negative        -CXR-clear        -CTA CAP-- possible right pyelitis  5/4- TTE: EF 50-55%       --CRP elevated 10.8mg/dL, sed rate 16, Procal 0.82/0.55    Severe anemia- <6- from GIB. Post transfusion of multiple PRBC    LE weakness--GBS ruled out by benign CSF -Neuro on board--LP 5/5- unremarkable, has severe Neural foraminal stenosis on MRI 5/3/25        --Severe bilateral L5-S1 neural foraminal stenosis  on MRI 5/3/25    Rheumatoid arthritis( RF positive)- Used to follow [facility]/ get Rituxumab infusion. Last record on Epic/Care Everywhere 1-/2021       -HIV neg-2020, hepatitis profile screen/EBV/CMV negative prior to 
       [facility]  (A Division of [facility])                                                           Date of Admission: 5/2/2025       ID FU for antimicrobial management presumed sepsis/Pyelitis- requested by Dr Guerrero   PCP: Nat Donohue APRN - NP    C/C: leg weakness    Current Antimicrobials:    Prior Antimicrobials:  Ceftriaxone-> meropenem /Vanco 5/14 to date Vantin 04/4/25 X10 days PO  Doxycycline X7 days 4/1/25    Vanco/Cefepime 4/24 to 4/28    Vanco/Cefepime 5/2 to ceftriaxone 5/5 to 5/7       Allaergy to antibiotics- PCN-hives     Assessment:     Acutely and critically sick patient with:    Septic shock +/- hypovolumia. On pressor. Fever with hypo-tensin and hypoxia overnight with lactic acidosis.  Possible source- aspiration/NGT feeding ongoing- CXR with no new infiltrate, has atelectasis. DDX- GI amber. ? Viral. Urinary retention requiring ruggiero- but UA appears benign. There is a drop in HB from 10-> 7.7    Persistent leucocytsis. Multifactorial likely. Bandemia to 9% 05/13, none today  -- 28K on admission-> 19.5K-> 18K-> 26.3K-> 21K-> 20.3K-> 22K-> 23K-> 21.9K-> 21K today    Reactive/autoimmune related/ hematologic work up in progress- no specific focal bacterial infection isolated/cultures prior admission and this admission. GIB may contribute as well        - no occult abscess/infection finding on imaging studies. Treatment for possible pyelitis( UA and urine culture normal)- no significant change         -Persistent leucocytosis with fluctuation. Had received emperic IV abx two rounds( in last part of April and this admission) X4-5 days. Cultures negative to date        - last Rituxumab treatment for RA 10/2021. None since then       --leucocytosis has been on going since R renal obstructive uropathy that required surgery- 02/2025 5/2- blood culture: NGSF        -UA unremarkable, culture negative        -CXR-clear        -CTA CAP-- possible right 
      PHYSICAL THERAPY TREATMENT    Patient: Lan Gonzalez (59 y.o. 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 Visually weaker than previous session with this writer. He is very willing to participate, transitioning to EOB with use of rails. Pt is able to complete 3 standing trials, with no ambulation. Ex performed as listed below. Will benefit from continued PT wto establish greater OOB/activty tolerance.     Progression toward goals: Fair  []      Improving appropriately and progressing toward goals  [x]      Improving slowly and progressing toward goals  []      Not making progress toward goals and plan of care will be adjusted     PLAN:  Patient continues to benefit from skilled intervention to address the above impairments.  Continue treatment per established plan of care.    Further Equipment Recommendations for Discharge: bedside commode, gait belt, and rolling walker    [facility]:   AM-PAC Inpatient Mobility without Stair Climbing Raw Score : 14    Current research shows that an AM-PAC score of 17 (13 without stairs) or less is not associated with a discharge to the patient's home setting. Based on an AM-PAC score and their current functional mobility deficits, it is recommended that the patient have 5-7 sessions per week of Physical Therapy at d/c to increase the patient's independence.  Currently, this patient demonstrates the potential endurance, and/or tolerance for 3 hours of therapy each day at d/c.    Current research shows that an AM-PAC score of 17 (13 without 
    Cardiology Progress Note    Patient: Lan Gonzalez Age: 59 y.o. Sex: male    YOB: 1965 Admit Date: 5/2/2025 PCP: Nat Donohue APRN - [facility]   MRN: [id number]  CSN: [id number]       Cardiologist: Chela Mathis MD       Assessment and Diagnosis   NSTEMI  Hx of CAD  Hx of ischemic cmp, now normal LVEF  GI bleed/ anemia  GBS, workup in progress  DM1    Recommendations and Plan    Continue workup for Anemia/ GI bleed  Once, GI bleed is managed, we will proceed with Cath/ etc  Medical Rx for now.   Will follow.     Chief Complaint     Chief Complaint   Patient presents with    Neurologic Problem     Dr. Guallpa, has seen this patient in his clinic, pt with worsening neuropathy, concern for GBS with weakness, admit to ICU, LP with protein, cell count, gram stain; spirometry too; Dr. Henry on call and will see the patient        SUBJECTIVE:   Lan Gonzalez is a 59 y.o. male who  presented with Sx of GBS and severe anemia.No new sx today.     Medications:     Allergies   Allergen Reactions    Morphine      GI upset    Thimerosal Hives    Fentanyl Other (See Comments)     Tachycardia, hypertension    Penicillins Hives        Current Facility-Administered Medications   Medication Dose Route Frequency    cyanocobalamin injection 1,000 mcg  1,000 mcg IntraMUSCular Daily    0.9 % sodium chloride infusion   IntraVENous PRN    0.9 % sodium chloride infusion   IntraVENous PRN    acetaminophen (TYLENOL) tablet 650 mg  650 mg Oral Q4H PRN    ondansetron (ZOFRAN) injection 4 mg  4 mg IntraVENous Q4H PRN    sodium chloride flush 0.9 % injection 5-40 mL  5-40 mL IntraVENous 2 times per day    sodium chloride flush 0.9 % injection 5-40 mL  5-40 mL IntraVENous PRN    0.9 % sodium chloride infusion   IntraVENous PRN    magnesium sulfate 2000 mg in 50 mL IVPB premix  2,000 mg IntraVENous PRN    ondansetron (ZOFRAN-ODT) disintegrating tablet 4 mg  4 mg Oral Q8H PRN    Or    ondansetron (ZOFRAN) injection 4 mg  4 mg 
    Cardiology Progress Note    Patient: Lan Gonzalez Age: 59 y.o. Sex: male    YOB: 1965 Admit Date: 5/2/2025 PCP: Nat Donohue APRN - [facility]   MRN: [id number]  CSN: [id number]       Cardiologist: Chela Mathis MD       Assessment and Diagnosis   NSTEMI  Hx of CAD  Hx of ischemic cmp, now normal LVEF  GI bleed/ anemia  GBS, workup in progress  DM1    Recommendations and Plan    Continue workup for Anemia/ GI bleed  Once, GI bleed is managed, we will proceed with Cath/ etc  Medical Rx for now.     Chief Complaint     Chief Complaint   Patient presents with    Neurologic Problem     Dr. Guallpa, has seen this patient in his clinic, pt with worsening neuropathy, concern for GBS with weakness, admit to ICU, LP with protein, cell count, gram stain; spirometry too; Dr. Henry on call and will see the patient        SUBJECTIVE:   Lan Gonzalez is a 59 y.o. male who  presented with Sx of GBS and severe anemia.No new sx today.     Medications:     Allergies   Allergen Reactions    Morphine      GI upset    Thimerosal Hives    Fentanyl Other (See Comments)     Tachycardia, hypertension    Penicillins Hives        Current Facility-Administered Medications   Medication Dose Route Frequency    0.9 % sodium chloride infusion   IntraVENous PRN    0.9 % sodium chloride infusion   IntraVENous PRN    vancomycin (VANCOCIN) 1250 mg in sodium chloride 0.9% 250 mL IVPB  1,250 mg IntraVENous Q24H    acetaminophen (TYLENOL) tablet 650 mg  650 mg Oral Q4H PRN    ondansetron (ZOFRAN) injection 4 mg  4 mg IntraVENous Q4H PRN    sodium chloride flush 0.9 % injection 5-40 mL  5-40 mL IntraVENous 2 times per day    sodium chloride flush 0.9 % injection 5-40 mL  5-40 mL IntraVENous PRN    0.9 % sodium chloride infusion   IntraVENous PRN    magnesium sulfate 2000 mg in 50 mL IVPB premix  2,000 mg IntraVENous PRN    ondansetron (ZOFRAN-ODT) disintegrating tablet 4 mg  4 mg Oral Q8H PRN    Or    ondansetron (ZOFRAN) injection 4 mg  4 
    Cardiology Progress Note    Patient: Lan Gonzalez Age: 59 y.o. Sex: male    YOB: 1965 Admit Date: 5/2/2025 PCP: Nat Donohue APRN - [facility]   MRN: [id number]  CSN: [id number]       Cardiologist: Chela Mathis MD       Assessment and Diagnosis   NSTEMI  Hx of CAD  Hx of ischemic cmp, now normal LVEF  Neurological issues, seems to be resolving. Attributed Vitamin B12 deficiency   Sepsis, workup in progress.     Recommendations and Plan    Once other issues are addressed, will pursue further cardiac workup.  Continue current management.   Chief Complaint     Chief Complaint   Patient presents with    Neurologic Problem     Dr. Guallpa, has seen this patient in his clinic, pt with worsening neuropathy, concern for GBS with weakness, admit to ICU, LP with protein, cell count, gram stain; spirometry too; Dr. Henry on call and will see the patient        SUBJECTIVE:   Lan Gonzalez is a 59 y.o. male who  presented with Sx of GBS and severe anemia.No new sx today.     Medications:     Allergies   Allergen Reactions    Morphine      GI upset    Thimerosal Hives    Fentanyl Other (See Comments)     Tachycardia, hypertension    Penicillins Hives        Current Facility-Administered Medications   Medication Dose Route Frequency    iron sucrose (VENOFER) 300 mg in sodium chloride 0.9 % 250 mL IVPB  300 mg IntraVENous Q24H    Vitamin D (CHOLECALCIFEROL) 1,000 Units, vitamin D3 (CHOLECALCIFEROL) 5,000 Units  6,000 Units Oral Daily    Followed by    [START ON 5/15/2025] Vitamin D (CHOLECALCIFEROL) tablet 2,000 Units  2,000 Units Oral Daily    [START ON 5/11/2025] cyanocobalamin injection 1,000 mcg  1,000 mcg IntraMUSCular Weekly    gabapentin (NEURONTIN) capsule 300 mg  300 mg Oral TID    glucose chewable tablet 16 g  4 tablet Oral PRN    dextrose bolus 10% 125 mL  125 mL IntraVENous PRN    Or    dextrose bolus 10% 250 mL  250 mL IntraVENous PRN    glucagon injection 1 mg  1 mg SubCUTAneous PRN    dextrose 10 % 
    Cardiology Progress Note    Patient: Lan Gonzalez Age: 59 y.o. Sex: male    YOB: 1965 Admit Date: 5/2/2025 PCP: Nat Donohue APRN - NP   MRN: [id number]  CSN: [id number]       Cardiologist: Chela Mathis MD       Assessment and Diagnosis   NSTEMI  Hx of CAD  Hx of ischemic cmp, now normal LVEF  Neurological issues, seems to be resolving. Attributed Vitamin B12 deficiency     Recommendations and Plan    Recovering well.   Pre-discharge cath  Will follow.     SUBJECTIVE:   Lan Gonzalez is a 59 y.o. male who  presented with Sx of GBS and severe anemia.No new sx today.     Medications:     Allergies   Allergen Reactions    Morphine      GI upset    Thimerosal Hives    Fentanyl Other (See Comments)     Tachycardia, hypertension    Penicillins Hives        Current Facility-Administered Medications   Medication Dose Route Frequency    QUEtiapine (SEROQUEL) tablet 100 mg  100 mg Oral Nightly    zinc sulfate (ZINCATE) 220 mg capsule - elemental zinc 50 mg  50 mg Oral Daily    vitamin B-6 (PYRIDOXINE) tablet 100 mg  100 mg Oral Daily    oxymetazoline (AFRIN) 0.05 % nasal spray 2 spray  2 spray Each Nostril Once    lidocaine (XYLOCAINE) 2 % uro-jet   Topical Once    thiamine (B-1) injection 100 mg  100 mg IntraVENous BID    Vitamin D (CHOLECALCIFEROL) 1,000 Units, vitamin D3 (CHOLECALCIFEROL) 5,000 Units  6,000 Units Oral Daily    Followed by    [START ON 5/15/2025] Vitamin D (CHOLECALCIFEROL) tablet 2,000 Units  2,000 Units Oral Daily    heparin (porcine) injection 5,000 Units  80 Units/kg IntraVENous PRN    heparin (porcine) injection 2,500 Units  40 Units/kg IntraVENous PRN    heparin 25,000 units in dextrose 5% 250 mL (premix) infusion  5-30 Units/kg/hr IntraVENous Continuous    cyanocobalamin injection 1,000 mcg  1,000 mcg IntraMUSCular Weekly    gabapentin (NEURONTIN) capsule 300 mg  300 mg Oral TID    glucose chewable tablet 16 g  4 tablet Oral PRN    dextrose bolus 10% 125 mL  125 mL 
    Cardiology Progress Note    Patient: Lan Gonzalez Age: 59 y.o. Sex: male    YOB: 1965 Admit Date: 5/2/2025 PCP: Nat Donohue APRN - NP   MRN: [id number]  CSN: [id number]       Cardiologist: Chela Mathis MD       Assessment and Diagnosis   NSTEMI  Hx of CAD  Hx of ischemic cmp, now normal LVEF  Neurological issues, seems to be resolving. Attributed Vitamin B12 deficiency   Sepsis, workup in progress.     Recommendations and Plan    Recovering well.   Cardiac workup early next week.   Continue current management.   Will follow on Monday.   Weekend PRN. Available by phone.    SUBJECTIVE:   Lan Gonzalez is a 59 y.o. male who  presented with Sx of GBS and severe anemia.No new sx today.     Medications:     Allergies   Allergen Reactions    Morphine      GI upset    Thimerosal Hives    Fentanyl Other (See Comments)     Tachycardia, hypertension    Penicillins Hives        Current Facility-Administered Medications   Medication Dose Route Frequency    QUEtiapine (SEROQUEL) tablet 100 mg  100 mg Oral Nightly    zinc sulfate (ZINCATE) 220 mg capsule - elemental zinc 50 mg  50 mg Oral Daily    vitamin B-6 (PYRIDOXINE) tablet 100 mg  100 mg Oral Daily    oxymetazoline (AFRIN) 0.05 % nasal spray 2 spray  2 spray Each Nostril Once    lidocaine (XYLOCAINE) 2 % uro-jet   Topical Once    thiamine (B-1) injection 100 mg  100 mg IntraVENous BID    Vitamin D (CHOLECALCIFEROL) 1,000 Units, vitamin D3 (CHOLECALCIFEROL) 5,000 Units  6,000 Units Oral Daily    Followed by    [START ON 5/15/2025] Vitamin D (CHOLECALCIFEROL) tablet 2,000 Units  2,000 Units Oral Daily    heparin (porcine) injection 5,000 Units  80 Units/kg IntraVENous PRN    heparin (porcine) injection 2,500 Units  40 Units/kg IntraVENous PRN    heparin 25,000 units in dextrose 5% 250 mL (premix) infusion  5-30 Units/kg/hr IntraVENous Continuous    cyanocobalamin injection 1,000 mcg  1,000 mcg IntraMUSCular Weekly    gabapentin (NEURONTIN) capsule 
    Hospitalist Progress Note      Patient name: Lan Gonzalez.  MRN: [id number]          PCP: Nat Donohue APRN - NP     Summary:      Lan Gonzalez is a 59 y.o. old male with complicated PMHx including CAD, PAD, gerd/gastritis, AAA, hx of CVA, HLD and SUNG, CKD stage 3, chronic pancreatitis s/p Whipple procedure (pancreatojejunostomy with gastrojejunostomy, cholecystectomy, and hepaticojejunostomy in 2014),, DM type 1, COPD, hx of seizure, major depressive disorder, and Rheumatoid arthritis (former rituxan) who was admitted to [facility] 5/02/25 with bilateral LE weakness, N/V and significant weight loss.  He was found to have sepsis with pyelitis/UTI, severe anemia (Hgb 5.6) w/ GI bleed, iron deficiency (sat 5%) and severe vitamin B12 deficiency (<150), undetectable Vit D.   Also endorsed constipation      Assessment/Plan:  Medical Complexity: High-1 acute medical problem with high risk threat, at least 3 follow up items    # LE weakness in the setting of Severe B12 deficiency  (exocrine pancreatic insufficiency s/p hx whipple surgery)   Initial concern for possibility of GBS.  Neurology consulted.  LP done: elevated protein (& glucose); not in line w/ GBS.    - Severe B12 deficiency (was <150):   s/p IM B12 daily x 3 days then weekly B12 IM   - Severe Vit D deficiency (was undetectable <6).  Started on daily Supplementation.   - Thiamine/B1 was okay, 106.   Copper okay, 85.  Zinc 40 (mildly low)   - MRI L Spine  5/03:   Severe bilateral L5-S1 neural foraminal stenosis.    On Gabapentin -lowered to 300 mg TID due to encephalopathy. Will need o/p F/up with orthopedic surgery for lumbar radiculopathy.   - Fall precautions,  PT/OT and likely will need rehab.   - f/up with Spine surgery o/p.   On DC: ensure continues Creon + high-dose multivitamins with lipo soluble vitamins including D, K, E, A plus healthy diet.     # Persistent Leukocytosis/ Sepsis- last fever 5/08 ( (101.3)  Noted Wbc elev since R renal obstructive 
    Hospitalist Progress Note      Patient name: Lan Gonzalez.  MRN: [id number]          PCP: Nat Donohue APRN - NP     Summary:      Lan Gonzalez is a 59 y.o. old male with complicated PMHx including CAD, PAD, gerd/gastritis, AAA, hx of CVA, HLD and SUNG, CKD stage 3, chronic pancreatitis s/p Whipple procedure (pancreatojejunostomy with gastrojejunostomy, cholecystectomy, and hepaticojejunostomy in 2014),, DM type 1, COPD, hx of seizure, major depressive disorder, and Rheumatoid arthritis (former rituxan) who was admitted to [facility] 5/02/25 with bilateral LE weakness, N/V and significant weight loss.  He was found to have sepsis with pyelitis/UTI, severe anemia (Hgb 5.6) w/ GI bleed, iron deficiency (sat 5%) and severe vitamin B12 deficiency (<150), undetectable Vit D.   Also endorsed constipation      Assessment/Plan:  Medical Complexity: High-1 acute medical problem with high risk threat, at least 3 follow up items    # LE weakness due to severe nutritional deficiencies;  Severe B12 deficiency  (exocrine pancreatic insufficiency s/p hx whipple surgery)   Initial concern for possibility of GBS.  Neurology consulted.  LP done: elevated protein (& glucose); not in line w/ GBS.    - Severe B12 deficiency (was <150):   s/p IM B12 daily x 3 days then weekly B12 IM   - Severe Vit D deficiency (was undetectable <6).  Started on daily Supplementation.   - Thiamine/B1 was okay, 106.   Copper okay, 85.  Zinc 40 (mildly low)   - MRI L Spine  5/03:   Severe bilateral L5-S1 neural foraminal stenosis.    On Gabapentin -lowered to 300 mg TID due to encephalopathy. Will need o/p F/up with orthopedic surgery for lumbar radiculopathy.   - Fall precautions,  PT/OT and likely will need rehab. --> N eeds f/up with Spine surgery o/p.   On DC: ensure pt continues Creon + high-dose multivitamins with lipo soluble vitamins including D, K, E, A plus healthy diet.     # Persistent Leukocytosis   (w/ anemia)  Noted Wbc elev since R renal 
    Hospitalist Progress Note      Patient name: Lan Gonzalez.  MRN: [id number]          PCP: Nat Donohue APRN - NP     Summary:      Lan Gonzalez is a 59 y.o. old male with complicated PMHx including CAD, PAD, gerd/gastritis, AAA, hx of CVA, HLD and SUNG, CKD stage 3, chronic pancreatitis s/p Whipple procedure (pancreatojejunostomy with gastrojejunostomy, cholecystectomy, and hepaticojejunostomy in 2014),, DM type 1, COPD, hx of seizure, major depressive disorder, and Rheumatoid arthritis (former rituxan) who was admitted to [facility] 5/02/25 with bilateral LE weakness, N/V and significant weight loss.  He was found to have sepsis with pyelitis/UTI, severe anemia (Hgb 5.6) w/ GI bleed, iron deficiency (sat 5%) and severe vitamin B12 deficiency (<150), undetectable Vit D.   Also endorsed constipation      Assessment/Plan:  Medical Complexity: High-1 acute medical problem with high risk threat, at least 3 follow up items    # LE weakness in the setting of Severe B12 deficiency  (exocrine pancreatic insufficiency s/p hx whipple surgery)   Initial concern for possibility of GBS.  Neurology consulted.  LP done: elevated protein (& glucose); not in line w/ GBS.    - Severe B12 deficiency (was <150):   s/p IM B12 daily x 3 days then weekly B12 IM   - Severe Vit D deficiency (was undetectable <6).  Started on daily Supplementation.   - Thiamine/B1 was okay, 106.   Copper okay, 85.  Zinc 40 (mildly low)   - MRI L Spine  5/03:   Severe bilateral L5-S1 neural foraminal stenosis.    On Gabapentin -lowered to 300 mg TID due to encephalopathy. Will need o/p F/up with orthopedic surgery for lumbar radiculopathy.   - Fall precautions,  PT/OT and likely will need rehab.   - f/up with Spine surgery o/p.   On DC: ensure continues Creon + high-dose multivitamins with lipo soluble vitamins including D, K, E, A plus healthy diet.     # Persistent Leukocytosis/ Sepsis- last fever 5/08 ( (101.3)  Noted Wbc elev since R renal obstructive 
    Hospitalist Progress Note    Patient: Lan Gonzalez MRN: [id number]  CSN: [id number]    YOB: 1965  Age: 59 y.o.  Sex: male    DOA: 5/2/2025 LOS:  LOS: 1 day         Total duration of encounter: 1 day      Chief Complaint ;  Lan Gonzalez is a 59 y.o.  male with a very complex medical history to include recent hospitalization for sepsis/cellulitis/vasculitis.  Discharged from the hospital on April 28, 2025.  2 days ago, patient followed up with his primary care physician.  This prompted an emergency visit today to see neurology in follow-up.  Patient saw Dr. Guallpa.  Based on his assessment in the office, patient was sent immediately back to the hospital for lumbar puncture and admission to the hospital with concerns of Guillian [place] Syndrome.     Subjective ;  Weakness same, no sob and  no chest pain     Rn no bleeding hh respond to transfusion       Review of systems:  Constitutional: denies fevers, chills, myalgias  Respiratory: denies SOB, cough  Cardiovascular: denies chest pain, palpitations  Gastrointestinal: denies nausea, vomiting, diarrhea    10 systems reviewed, all negative other than what is mentioned above.      Vital signs/Intake and Output:  Visit Vitals  /63   Pulse 92   Temp 98.2 °F (36.8 °C) (Oral)   Resp 18   Ht 1.778 m (5' 10\")   Wt 62.3 kg (137 lb 5.6 oz)   SpO2 99%   BMI 19.71 kg/m²     Current Shift:  05/03 0701 - 05/03 1900  In: 872.7 [P.O.:400; I.V.:10]  Out: 700 [Urine:700]  Last three shifts:  05/01 1901 - 05/03 0700  In: 1519.3 [P.O.:300; I.V.:10]  Out: 675 [Urine:675]    Exam:    General: Well developed, alert, NAD, OX3  Head/Neck: NCAT, supple, No masses, No lymphadenopathy  CVS:Regular rate and rhythm, no M/R/G, S1/S2 heard, no thrill  Lungs:Clear to auscultation bilaterally, no wheezes, rhonchi, or rales  Abdomen: Soft, Nontender, No distention, Normal Bowel sounds, No hepatomegaly  Extremities: No C/C/E, pulses palpable 2+  Skin:normal texture and turgor, no 
    Hospitalist Progress Note    Patient: Lan Gonzalez MRN: [id number]  CSN: [id number]    YOB: 1965  Age: 59 y.o.  Sex: male    DOA: 5/2/2025 LOS:  LOS: 17 days         Total duration of encounter: 17 days      Chief Complaint ;    Unfortunate 59-year-old with history of coronary artery disease gastritis AAA stroke chronic kidney disease chronic pancreatitis status post Whipple procedure with secondary malabsorption syndrome and multiple nutritional deficiencies including B12 thiamine vitamin D and niacin admitted with sepsis from UTI GI bleed    Subjective ;  519  Having severe abdominal pain at night required Dilaudid  Increasing abdominal distention no stool in over 4 days  Periodic lethargy and brain fog    Review of systems:  Constitutional: denies fevers, chills, myalgias  Respiratory: denies SOB, cough  Cardiovascular: denies chest pain, palpitations  Gastrointestinal: denies nausea, vomiting, diarrhea    10 systems reviewed, all negative other than what is mentioned above.      Vital signs/Intake and Output:  Visit Vitals  /67   Pulse (!) 102   Temp 98.1 °F (36.7 °C) (Oral)   Resp 18   Ht 1.778 m (5' 10\")   Wt 73 kg (161 lb)   SpO2 100%   BMI 23.10 kg/m²     Current Shift:  05/19 1901 - 05/20 0700  In: 132.5   Out: -   Last three shifts:  05/18 0701 - 05/19 1900  In: 1969.5 [P.O.:240; I.V.:202]  Out: 1850 [Urine:1850]    Exam:    General: Well developed, alert, NAD, OX3  Head/Neck: NCAT, supple, No masses, No lymphadenopathy  CVS:Regular rate and rhythm, no M/R/G, S1/S2 heard, no thrill  Lungs:Clear to auscultation bilaterally, no wheezes, rhonchi, or rales  Abdomen: Soft, Nontender, No distention, Normal Bowel sounds, No hepatomegaly  Extremities: No C/C/E, pulses palpable 2+ contractures multiple upper extremity lower extremity  Skin:normal texture and turgor, no rashes, no lesions  Neuro:grossly normal , follows commands some lethargy  Psych:appropriate flat affect    Labs: Results: 
    Hospitalist Progress Note    Patient: Lan Gonzalez MRN: [id number]  CSN: [id number]    YOB: 1965  Age: 59 y.o.  Sex: male    DOA: 5/2/2025 LOS:  LOS: 7 days         Total duration of encounter: 7 days      Chief Complaint ;  Lan Gonzalez is a 59 y.o.  male with a very complex medical history to include recent hospitalization for sepsis/cellulitis/vasculitis.  Discharged from the hospital on April 28, 2025.  2 days ago, patient followed up with his primary care physician.  This prompted an emergency visit today to see neurology in follow-up.  Patient saw Dr. Guallpa.  Based on his assessment in the office, patient was sent immediately back to the hospital for lumbar puncture and admission to the hospital with concerns of Guillian [place] Syndrome.     Subjective ;     Still weak , no change ,   Pt lying in the beds comfortable . LP done and neuro f/u with pt . He is frustrated     Will have ortho on board . Case discussed with  Dr. Briggs and Dr. Shah   Review of systems:  Constitutional: denies fevers, chills, myalgias  Respiratory: denies SOB, cough  Cardiovascular: denies chest pain, palpitations  Gastrointestinal: denies nausea, vomiting, diarrhea    10 systems reviewed, all negative other than what is mentioned above.      Vital signs/Intake and Output:  Visit Vitals  /66   Pulse 73   Temp 97.3 °F (36.3 °C) (Oral)   Resp 18   Ht 1.778 m (5' 10\")   Wt 62.1 kg (137 lb)   SpO2 97%   BMI 19.66 kg/m²     Current Shift:  No intake/output data recorded.  Last three shifts:  05/07 1901 - 05/09 0700  In: 725 [P.O.:725]  Out: 1025 [Urine:1025]    Exam:    General: Well developed, alert, NAD, OX3  Head/Neck: NCAT, supple, No masses, No lymphadenopathy  CVS:Regular rate and rhythm, no M/R/G, S1/S2 heard, no thrill  Lungs:Clear to auscultation bilaterally, no wheezes, rhonchi, or rales  Abdomen: Soft, Nontender, No distention, Normal Bowel sounds, No hepatomegaly  Extremities: No C/C/E, pulses 
    Hospitalist Progress Note    Patient: Lan Gonzalez MRN: [id number]  CSN: [id number]    YOB: 1965  Age: 59 y.o.  Sex: male    DOA: 5/2/2025 LOS:  LOS: 19 days         Total duration of encounter: 19 days      Chief Complaint ;    Unfortunate 59-year-old with history of coronary artery disease gastritis AAA stroke chronic kidney disease chronic pancreatitis status post Whipple procedure with secondary malabsorption syndrome and multiple nutritional deficiencies including B12 thiamine vitamin D and niacin admitted with sepsis from UTI GI bleed    Subjective ;  519  Having severe abdominal pain at night required Dilaudid  Increasing abdominal distention no stool in over 4 days  Periodic lethargy and brain fog  5/20 having a bad day walked to have bm  Tagged wbc negaive  H/h stable no bleeding   Bm possible Mds special stains pending  increased cellar    Review of systems:  Constitutional: denies fevers, chills, myalgias  Respiratory: denies SOB, cough  Cardiovascular: denies chest pain, palpitations  Gastrointestinal: denies nausea, vomiting, diarrhea    5/20  Apoke with [facility]  Advxied againsg prednisone  Due to immune status  Not a canidate for transfer service availabele    MRI done with vallium no new stroke  See results  Ortho will see      10 systems reviewed, all negative other than what is mentioned above.      Vital signs/Intake and Output:  Visit Vitals  /69   Pulse (!) 104   Temp 99.7 °F (37.6 °C) (Oral)   Resp 18   Ht 1.778 m (5' 10\")   Wt 79.7 kg (175 lb 11.2 oz)   SpO2 96%   BMI 25.21 kg/m²     Current Shift:  No intake/output data recorded.  Last three shifts:  05/20 0701 - 05/21 1900  In: 1068.7 [P.O.:530]  Out: 2500 [Urine:2500]    Exam:    General: Well developed, alert, NAD, OX3  Head/Neck: NCAT, supple, No masses, No lymphadenopathy  CVS:Regular rate and rhythm, no M/R/G, S1/S2 heard, no thrill  Lungs:Clear to auscultation bilaterally, no wheezes, rhonchi, or 
    Hospitalist Progress Note    Patient: Lan Gonzalez MRN: [id number]  CSN: [id number]    YOB: 1965  Age: 59 y.o.  Sex: male    DOA: 5/2/2025 LOS:  LOS: 4 days         Total duration of encounter: 4 days      Chief Complaint ;  Lan Gonzalez is a 59 y.o.  male with a very complex medical history to include recent hospitalization for sepsis/cellulitis/vasculitis.  Discharged from the hospital on April 28, 2025.  2 days ago, patient followed up with his primary care physician.  This prompted an emergency visit today to see neurology in follow-up.  Patient saw Dr. Guallpa.  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     Subjective ;     Still weak , no change ,   Pt lying in the beds comfortable . LP done and neuro f/u with pt . He is frustrated     Will have ortho on board . Case discussed with  Dr. Briggs and Dr. Shah   Review of systems:  Constitutional: denies fevers, chills, myalgias  Respiratory: denies SOB, cough  Cardiovascular: denies chest pain, palpitations  Gastrointestinal: denies nausea, vomiting, diarrhea    10 systems reviewed, all negative other than what is mentioned above.      Vital signs/Intake and Output:  Visit Vitals  /80   Pulse (!) 103   Temp 98.2 °F (36.8 °C) (Oral)   Resp 18   Ht 1.778 m (5' 10\")   Wt 62.1 kg (137 lb)   SpO2 99%   BMI 19.66 kg/m²     Current Shift:  05/06 0701 - 05/06 1900  In: 100 [P.O.:100]  Out: -   Last three shifts:  05/04 1901 - 05/06 0700  In: 1057.7 [P.O.:720]  Out: 2150 [Urine:2150]    Exam:    General: Well developed, alert, NAD, OX3  Head/Neck: NCAT, supple, No masses, No lymphadenopathy  CVS:Regular rate and rhythm, no M/R/G, S1/S2 heard, no thrill  Lungs:Clear to auscultation bilaterally, no wheezes, rhonchi, or rales  Abdomen: Soft, Nontender, No distention, Normal Bowel sounds, No 
    Hospitalist Progress Note    Patient: Lan Gonzalez MRN: [id number]  CSN: [id number]    YOB: 1965  Age: 59 y.o.  Sex: male    DOA: 5/2/2025 LOS:  LOS: 2 days         Total duration of encounter: 2 days      Chief Complaint ;  Lan Gonzalez is a 59 y.o.  male with a very complex medical history to include recent hospitalization for sepsis/cellulitis/vasculitis.  Discharged from the hospital on April 28, 2025.  2 days ago, patient followed up with his primary care physician.  This prompted an emergency visit today to see neurology in follow-up.  Patient saw Dr. Guallpa.  Based on his assessment in the office, patient was sent immediately back to the hospital for lumbar puncture and admission to the hospital with concerns of Guillian [place] Syndrome.     Subjective ;     I feel fine, no concerns now, weakness same    Pt lying in the beds comfortable     Rn stable overnight waiting for LP tomorrow     Review of systems:  Constitutional: denies fevers, chills, myalgias  Respiratory: denies SOB, cough  Cardiovascular: denies chest pain, palpitations  Gastrointestinal: denies nausea, vomiting, diarrhea    10 systems reviewed, all negative other than what is mentioned above.      Vital signs/Intake and Output:  Visit Vitals  /70   Pulse 97   Temp 98.4 °F (36.9 °C) (Oral)   Resp 19   Ht 1.778 m (5' 10\")   Wt 62.1 kg (137 lb)   SpO2 97%   BMI 19.66 kg/m²     Current Shift:  No intake/output data recorded.  Last three shifts:  05/02 1901 - 05/04 0700  In: 2429.6 [P.O.:700; I.V.:20]  Out: 2475 [Urine:2475]    Exam:    General: Well developed, alert, NAD, OX3  Head/Neck: NCAT, supple, No masses, No lymphadenopathy  CVS:Regular rate and rhythm, no M/R/G, S1/S2 heard, no thrill  Lungs:Clear to auscultation bilaterally, no wheezes, rhonchi, or rales  Abdomen: Soft, Nontender, No distention, Normal Bowel sounds, No hepatomegaly  Extremities: No C/C/E, pulses palpable 2+  Skin:normal texture and turgor, no 
    PHYSICAL THERAPY TREATMENT    Patient: Lan Gonzalez (59 y.o. 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 Pt agrees to mark ambulation, but rambles in slight confusion. SPO2 was 98% on RA. NG and IV placed on same pole for ease of mobility. Pt continues to demo fair+ bed mobility. Again with difficulty following commands for UE use during transfers. Pt is able Increase amb distance with RW, but after 33', experiences hot increase in fatigue. Max cues and physical redirection needed to avoid pt entering another room for rest break. HR of 96 and  bpm after Gait completion. Will continue to treat and increase activity as tolerated.    Progression toward goals: Fair  []      Improving appropriately and progressing toward goals  [x]      Improving slowly and progressing toward goals  []      Not making progress toward goals and plan of care will be adjusted     PLAN:  Patient continues to benefit from skilled intervention to address the above impairments.  Continue treatment per established plan of care.    Further Equipment Recommendations for Discharge: gait belt and rolling walker    [facility]:   AM-PAC Inpatient Mobility without Stair Climbing Raw Score : 16    Current research shows that an AM-PAC score of 17 (13 without stairs) or less is not associated with a discharge to the patient's home setting. Based on an AM-PAC score and their current functional mobility deficits, it is recommended that the patient have 5-7 sessions per week of Physical Therapy at d/c to 
    PHYSICAL THERAPY TREATMENT    Patient: Lan Gonzalez (59 y.o. 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 Pt is agreeable exercises and standing trials. Pt demonstrates SBA-CGA level needs for Mobility up to standing marches at beside. 3 standing trials completed, with RW and good feedback. Moderate fatigue noted. BP is more stable today with reading staying ne 150s/90s. Will advance activity as tolerated     Progression toward goals: Fair  []      Improving appropriately and progressing toward goals  [x]      Improving slowly and progressing toward goals  []      Not making progress toward goals and plan of care will be adjusted     PLAN:  Patient continues to benefit from skilled intervention to address the above impairments.  Continue treatment per established plan of care.    Further Equipment Recommendations for Discharge: rolling walker    [facility]:   AM-PAC Inpatient Mobility without Stair Climbing Raw Score : 15    Current research shows that an AM-PAC score of 17 (13 without stairs) or less is not associated with a discharge to the patient's home setting. Based on an AM-PAC score and their current functional mobility deficits, it is recommended that the patient have 5-7 sessions per week of Physical Therapy at d/c to increase the patient's independence.  Currently, this patient demonstrates the potential endurance, and/or tolerance for 3 hours of therapy each day at d/c.    Current research shows that an AM-PAC score of 17 (13 without stairs) or less is not associated with a 
    PHYSICAL THERAPY TREATMENT    Patient: Lan Gonzalez (59 y.o. 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 C/o needing to use the rest room. Initially agrees to BSC. Increased confusion and irritation with activity. Pt's nasal adhesive for NGT required replacement. Slight improvement in transfers with cue for hand placement. Pt ambs to/from restroom, due to change of heart regarding BSC. Very audible moaning on amb by to bedside. Pt is not able to identify cause for agitation or discomfort. He is very discouraged regarding feeling of inability to recover from current illness.     Progression toward goals: Fair   []      Improving appropriately and progressing toward goals  [x]      Improving slowly and progressing toward goals  []      Not making progress toward goals and plan of care will be adjusted     PLAN:  Patient continues to benefit from skilled intervention to address the above impairments.  Continue treatment per established plan of care.    Further Equipment Recommendations for Discharge: hospital bed and rolling walker    [facility]:   AM-PAC Inpatient Mobility without Stair Climbing Raw Score : 14    Current research shows that an AM-PAC score of 17 (13 without stairs) or less is not associated with a discharge to the patient's home setting. Based on an AM-PAC score and their current functional mobility deficits, it is recommended that the patient have 5-7 sessions per week of Physical Therapy at d/c to increase the patient's independence.  Currently, this patient demonstrates the potential endurance, 
    PHYSICAL THERAPY TREATMENT    Patient: Lan Gonzalez (59 y.o. 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 He reports having UE pain earlier in the day, but without pain at time of session. No difficulty with transition to EOB. Delayed standing transfer. Cues (verbal and physical). Stood for 30 second, without movement, before needing to sit. 6 min seated rest before second attempt. Pt is able stand again (still cued for hand placement). This trial lead to ambulation. Gait is short, nearly shuffled. 6 rest breaks with leaning elbows onto RW handles (~1 min per) were needed to complete 30' of amb . Pt reports no pain  of chest pressure, but feeling of significant fatigue. Moderate SOB observed with all activity. 92% SOP2 reading taken during max exertion. 134 max HR. Will advance activity as tolerate.        Progression toward goals: Fair  []      Improving appropriately and progressing toward goals  [x]      Improving slowly and progressing toward goals  []      Not making progress toward goals and plan of care will be adjusted     PLAN:  Patient continues to benefit from skilled intervention to address the above impairments.  Continue treatment per established plan of care.    Further Equipment Recommendations for Discharge: gait belt and rolling walker    [facility]:   AM-PAC Inpatient Mobility without Stair Climbing Raw Score : 13    Current research shows that an AM-PAC score of 17 (13 without stairs) or less is not associated with a discharge to the patient's home setting. Based on an AM-PAC score and their current 
    PHYSICAL THERAPY TREATMENT    Patient: aLn Gonzalez (59 y.o. 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 Upon entering, pt is enthused to resume OOB. Educated pt on this writer's goal of EOB with Ex, with potential for standing. VS were fair with BP of 110s/50s. Pt remains amble to self mobilize to EOB with additional time and PCs for hand placement. Pt was very eager to stand and ambulate. After 5 min seated rest, he stands with use of RW. 30 secs of standing tolerated before needing to return to sitting position. Pt sat for an additional 4 min, before noting that he needed to return to supine. Pt agrees seated ex,        Progression toward goals: Fair   []      Improving appropriately and progressing toward goals  [x]      Improving slowly and progressing toward goals  []      Not making progress toward goals and plan of care will be adjusted     PLAN:  Patient continues to benefit from skilled intervention to address the above impairments.  Continue treatment per established plan of care.    Further Equipment Recommendations for Discharge: gait belt and rolling walker    [facility]:   AM-PAC Inpatient Mobility without Stair Climbing Raw Score : 15    Current research shows that an AM-PAC score of 17 (13 without stairs) or less is not associated with a discharge to the patient's home setting. Based on an AM-PAC score and their current functional mobility deficits, it is recommended that the patient have 5-7 sessions per week of Physical Therapy at d/c to increase the patient's independence.  Currently, this 
    PRN FOLLOW UP      As our input is minimal, we will be available on prn basis. Please reach out if I can be of help.       Chela Mathis MD  Interventional Cardiologist  5/16/25  Tel [phone]    
    Progress Note POD #      Patient: Lan Gonzalez               Sex: male          DOA: 5/2/2025         YOB: 1965      Surgery: Procedure(s):  ESOPHAGOGASTRODUODENOSCOPY DIAGNOSTIC ONLY           LOS: 20 days               Subjective:     No new complaints  Asked to see concerning findings on MRI of bursitis and unknown source of sepsis.  He reports no pain in hip, legs or back      Objective:      Visit Vitals  /71   Pulse 88   Temp 97.9 °F (36.6 °C) (Oral)   Resp 20   Ht 1.778 m (5' 10\")   Wt 79.7 kg (175 lb 11.2 oz)   SpO2 95%   BMI 25.21 kg/m²       Physical Exam:  Neurological: motor strength: 5/5 in lower extremities bilaterally excepts 3/5 bilateral TA,                           sensation: intact to light touch  No pain to palpation of right hip or motion of right hip        Intake and Output:  Current Shift:  No intake/output data recorded.  Last three shifts:  05/20 1901 - 05/22 0700  In: 1068.7 [P.O.:240]  Out: 2200 [Urine:2200]      Lab/Data Reviewed:  Lab Results   Component Value Date/Time    WBC 17.2 05/22/2025 06:04 AM    HGB 6.8 05/22/2025 06:04 AM    HCT 21.9 05/22/2025 06:04 AM     05/22/2025 06:04 AM    MCV 90.1 05/22/2025 06:04 AM     Lab Results   Component Value Date/Time    APTT >180.0 05/22/2025 06:04 AM     Lab Results   Component Value Date/Time    INR 1.2 05/14/2025 12:54 PM    INR 1.3 05/03/2025 05:47 AM    INR 1.3 05/02/2025 11:33 AM      Recent Labs     05/21/25  0855 05/21/25  1458 05/22/25  0604   HGB 8.3* 7.7* 6.8*     MRI Lumbar and Pelvis IMPRESSION:  1.  Diffuse soft tissue and muscle edema and swelling throughout the lumbar  spine and pelvis; findings can be seen with volume overload, neuropathic change,  cellulitis, and/or myositis, correlate clinically. No definite evidence of  abscess or osteomyelitis.  2.  Large right trochanteric bursal effusion/bursitis.  3.  No lumbar spinal canal stenosis. Multilevel lumbar foraminal stenosis, most  severe 
    [facility]    Name:   Lan Gonzalez   YOB: 1965  Date of Service:  05/13/25     Chief Complaint  Weakness    Interval Changes  Patient continues to have ongoing fatigue, muscle tremors with effort. Yesterday patient had GI bleed.    Review of Systems  A complete ROS was done; negative except per HPI    Physical Exam  Vitals:    05/13/25 0730   BP: 114/65   Pulse: 92   Resp: 18   Temp: 98 °F (36.7 °C)   SpO2: 93%      Constitutional: alert, awake, in no acute distress  Eyes: PERRL, EOMI  ENT: normocephalic, atraumatic  Neck: supple, non-tender  Cardiovascular: normal perfusion, RRR  Respiratory: normal respiratory efforts, no wheezing  GI: soft, non-tender  Skin: warm, dry    Laboratory Data  Data from the last 24 hours reviewed    Assessment and Plan  Lan Gonzalez is a 60 y/o male who was admitted with weakness    Leukocytosis  Anemia  Patient with ongoing leukocytosis, anemia during hospitalization. Neutrophilia is not improving at this time.  Workup demonstrated B-12 deficiency (repletion IM), iron deficiency; there is also mild zinc deficiency as well as vitamin B-6 deficiency.  On oral zinc and B-6 supplements.   Pending bone marrow biopsy today, flow cytometry, and other testing to rule out underlying malignancy. Patient does not need to remain inpatient for results.   Monitor and maintain hemoglobin above 7    Deep Vein Thrombosis  Heparin drip on hold at this time due to GI bleed.  NM scan was negative for acute GI bleed.  Consider re-challenge with heparin drip after completion of procedures at a lower aPTT level (prophylaxis level); escalate dose if hemoglobin is stable and no additional bleed noticed.     All questions from the patient were answered  On discharge, to follow-up with Dr. Solo.  Will continue to follow    Ryland Car MD  [facility] News Office: [phone]    
    [facility]    Name:   Lan Gonzalez   YOB: 1965  Date of Service:  05/12/25     Chief Complaint  Weakness    Interval Changes  No new complaints this morning    Review of Systems  A complete ROS was done; negative except per HPI    Physical Exam  Vitals:    05/12/25 0700   BP: (!) 140/71   Pulse: 84   Resp: 17   Temp: 98.7 °F (37.1 °C)   SpO2: 95%      Constitutional: alert, awake, in no acute distress  Eyes: PERRL, EOMI  ENT: normocephalic, atraumatic  Neck: supple, non-tender  Cardiovascular: normal perfusion, RRR  Respiratory: normal respiratory efforts, no wheezing  GI: soft, non-tender  Skin: warm, dry    Laboratory Data  Data from the last 24 hours reviewed    Assessment and Plan  Lan Gonzalez is a 58 y/o male who was admitted with weakness    Leukocytosis  Anemia  Patient with ongoing leukocytosis, anemia during hospitalization. Neutrophilia is not improving at this time.  Workup demonstrated B-12 deficiency (repletion IM), iron deficiency; there is also mild zinc deficiency as well as vitamin B-6 deficiency.  Recommend starting oral B-6 supplements as well as zinc supplements  Pending bone marrow biopsy, flow cytometry, and other testing to rule out underlying malignancy. Patient does not need to remain inpatient for results.   Monitor and maintain hemoglobin above 7    Deep Vein Thrombosis  Continue heparin drip; transition to apixaban once no further procedures are planned.     All questions from the patient were answered  On discharge, to follow-up with Dr. Solo.  Will continue to follow    Ryland Car MD  [facility] News Office: [phone]    
    [facility]    Name:   Lan Gonzalez   YOB: 1965  Date of Service:  05/19/25     Chief Complaint  Weakness    Interval Changes  No acute overnight events. Tachycardic, denies pain. Eating more    Review of Systems  A complete ROS was done; negative except per HPI    Physical Exam  Vitals:    05/19/25 0528   BP: (!) 146/81   Pulse: (!) 102   Resp: 18   Temp: 98.8 °F (37.1 °C)   SpO2:       Constitutional: alert, awake, in no acute distress  Eyes: PERRL, EOMI  ENT: normocephalic, atraumatic  Neck: supple, non-tender  Cardiovascular: normal perfusion, RRR  Respiratory: normal respiratory efforts, no wheezing  GI: soft, non-tender  Skin: warm, dry    Laboratory Data  Data from the last 24 hours reviewed    Assessment and Plan  Lan Gonzalez is a 58 y/o male who was admitted with weakness     Leukocytosis  Anemia  Patient with ongoing leukocytosis, anemia during hospitalization. Neutrophilia is not improving at this time. There was an increase of leukocytosis two days ago, coinciding with transfer to ICU for presumed septic shock, then decreased to 19.6 on 5/18/25  Workup demonstrated B-12 deficiency (repletion IM), iron deficiency; there is also mild zinc deficiency as well as vitamin B-6 deficiency.  On zinc and B-6 supplements.   S/p bone marrow biopsy 5/8/25- results pending. MPN and CML testing thus far negative.   Monitor and maintain hemoglobin above 7     Deep Vein Thrombosis  Heparin drip ongoing; transition to direct oral anticoagulant when stable.   NM scan was negative for acute GI bleed.  Continue to monitor for signs of bleeding. Hemoglobin is stable at this time.      On discharge, to follow-up with Dr. Solo.  Will continue to follow    Lynne Maradiaga MD  [facility] News Office: [phone]    
    [facility]    Name:   Lan Gonzalez   YOB: 1965  Date of Service:  05/14/25     Chief Complaint  Weakness    Interval Changes  This morning patient with fever, tachycardia. Sepsis alert called. Denies any complaints though lethargic.     Review of Systems  A complete ROS was done; negative except per HPI    Physical Exam  Vitals:    05/14/25 0822   BP: 130/69   Pulse: (!) 114   Resp: 21   Temp: 97.2 °F (36.2 °C)   SpO2: 99%      Constitutional: lethargic, awake, in no acute distress  Eyes: PERRL, EOMI  ENT: normocephalic, atraumatic  Neck: supple, non-tender  Cardiovascular: normal perfusion, RRR  Respiratory: normal respiratory efforts, no wheezing  GI: soft, non-tender  Skin: warm, dry    Laboratory Data  Data from the last 24 hours reviewed    Assessment and Plan  Lan Gonzalez is a 58 y/o male who was admitted with weakness    Leukocytosis  Anemia  Patient with ongoing leukocytosis, anemia during hospitalization. Neutrophilia is not improving at this time.  Workup demonstrated B-12 deficiency (repletion IM), iron deficiency; there is also mild zinc deficiency as well as vitamin B-6 deficiency.  On oral zinc and B-6 supplements.   Pending bone marrow biopsy results, flow cytometry, and other testing to rule out underlying malignancy.   Monitor and maintain hemoglobin above 7    Deep Vein Thrombosis  Heparin drip ongoing  NM scan was negative for acute GI bleed.  Continue to monitor for signs of bleeding. Hemoglobin is stable at this time.     All questions from the patient were answered  On discharge, to follow-up with Dr. Solo.  Will continue to follow    Ryland Car MD  [facility] News Office: [phone]    
    [facility]    Name:   Lan Gonzalez   YOB: 1965  Date of Service:  05/20/25     Chief Complaint  Weakness    Interval Changes  No acute overnight events. Eating breakfast, feeling better    Review of Systems  A complete ROS was done; negative except per HPI    Physical Exam  Vitals:    05/20/25 0500   BP:    Pulse: 84   Resp:    Temp:    SpO2:       Constitutional: alert, awake, in no acute distress  Eyes: PERRL, EOMI  ENT: normocephalic, atraumatic  Neck: supple, non-tender  Cardiovascular: normal perfusion, RRR  Respiratory: normal respiratory efforts, no wheezing  GI: soft, non-tender  Skin: warm, dry    Laboratory Data  Data from the last 24 hours reviewed    Assessment and Plan  Lan Gonzalez is a 58 y/o male who was admitted with weakness     Leukocytosis  Anemia  Patient with ongoing leukocytosis, anemia during hospitalization. Neutrophilia is not improving at this time. There was an increase of leukocytosis two days ago, coinciding with transfer to ICU for presumed septic shock, then decreased to 19.6 on 5/18/25  Workup demonstrated B-12 deficiency (repletion IM), iron deficiency; there is also mild zinc deficiency as well as vitamin B-6 deficiency.  On zinc and B-6 supplements.   S/p bone marrow biopsy 5/13/25- results pending. MPN with JAK2 and CML testing negative.   Monitor and maintain hemoglobin above 7     Deep Vein Thrombosis  Heparin drip ongoing; ok to transition to DOAC  NM scan was negative for acute GI bleed.  Continue to monitor for signs of bleeding. Hemoglobin is stable at this time.      On discharge, to follow-up with Dr. Solo.  Will continue to follow    Lynne Maradiaga MD  [facility] News Office: [phone]    
    [facility]    Name:   Lan Gonzalez   YOB: 1965  Date of Service:  05/16/25     Chief Complaint  Weakness    Interval Changes  Patient remains in the ICU; no new complaints. Continues to have weakness, numbness    Review of Systems  A complete ROS was done; negative except per HPI    Physical Exam  Vitals:    05/16/25 0700   BP: 128/63   Pulse: 94   Resp: 21   Temp:    SpO2: 97%      Constitutional: alert, awake, in no acute distress  Eyes: PERRL, EOMI  ENT: normocephalic, atraumatic  Neck: supple, non-tender  Cardiovascular: normal perfusion, RRR  Respiratory: normal respiratory efforts, no wheezing  GI: soft, non-tender  Skin: warm, dry    Laboratory Data  Data from the last 24 hours reviewed    Assessment and Plan  Lan Gonzalez is a 58 y/o male who was admitted with weakness     Leukocytosis  Anemia  Patient with ongoing leukocytosis, anemia during hospitalization. Neutrophilia is not improving at this time. There was an increase of leukocytosis two days ago, coinciding with transfer to ICU for presumed septic shock, now back down to 20.9 today.  Workup demonstrated B-12 deficiency (repletion IM), iron deficiency; there is also mild zinc deficiency as well as vitamin B-6 deficiency.  On zinc and B-6 supplements.   Pending bone marrow biopsy results to rule out underlying malignancy. MPN and CML testing thus far negative.   Monitor and maintain hemoglobin above 7     Deep Vein Thrombosis  Heparin drip ongoing; transition to direct oral anticoagulant when stable.   NM scan was negative for acute GI bleed.  Continue to monitor for signs of bleeding. Hemoglobin is stable at this time.      All questions from the patient were answered  On discharge, to follow-up with Dr. Solo.  Will continue to follow    Ryland Car MD  [facility] News Office: [phone]    
    [facility]    Name:   Lan Gonzalez   YOB: 1965  Date of Service:  05/22/25     Chief Complaint  Weakness    Interval Changes  No acute overnight events  Bone marrow results with some suggestion of MDS  Eating well      Review of Systems  A complete ROS was done; negative except per HPI    Physical Exam  Vitals:    05/22/25 0500   BP:    Pulse: 88   Resp:    Temp:    SpO2:       Constitutional: alert, awake, in no acute distress  Eyes: PERRL, EOMI  ENT: normocephalic, atraumatic  Neck: supple, non-tender  Cardiovascular: normal perfusion, RRR  Respiratory: normal respiratory efforts, no wheezing  GI: soft, non-tender  Skin: warm, dry    Laboratory Data  Data from the last 24 hours reviewed    Assessment and Plan  Lan Gonzalez is a 58 y/o male who was admitted with weakness     Leukocytosis  Anemia  Patient with ongoing leukocytosis, anemia during hospitalization. Neutrophilia is not improving at this time. There was an increase of leukocytosis two days ago, coinciding with transfer to ICU for presumed septic shock, then decreased to 19.6 on 5/18/25  Workup demonstrated B-12 deficiency (repletion IM), iron deficiency; there is also mild zinc deficiency as well as vitamin B-6 deficiency.  On zinc and B-6 supplements.   S/p bone marrow biopsy 5/13/25- results with mildly hypercellular bone marrow with mild increase in megakaryocytes. No evidence of blasts or dysplasia. Pending FISH MDS panel. MPN with JAK2 and CML testing negative. Can follow this as outpatient.   Monitor and maintain hemoglobin above 7     Deep Vein Thrombosis  Heparin drip ongoing; ok to transition to DOAC when not having procedures  NM scan was negative for acute GI bleed.  Continue to monitor for signs of bleeding. Hemoglobin is stable at this time.      On discharge, to follow-up with Dr. Solo.  Will continue to follow    Lynne Maradiaga MD  [facility] News Office: [phone]    
   Nutrition Note Brief    Please refer to full High Risk Nutrition Assessment/Note/Recs completed yesterday 5/12/25.    MD called RD to request RD to order previous PO diet and re-order TF given pt made NPO for bone marrow bx today.  NGT placed last night 5/12 @ 2300 no TF were started yet at that time. RD brought up 2L of Glucerna 1.5 TF formula to nursing station.    Nutrition Recommendations/Plan:   Cont PO diet as kenyatta monitor need for swallow SLP eval for PO safety  At Refeeding Syndrome Risk  Cont check Phos, Mg, K replete as needed - monitor need for scheduled Phos suppl  May need long-term feeding tube   Agree with NGT TF Begin Diabetic/Glucerna 1.5 @ 15ml/hr adv 10ml/hr Q8-12hr to 45ml/hr goal rate as kenyatta provides 1620kcal, 90g pro, 820ml FW  If w/o IVF suggest FWF ~180ml Q4hr  Cont Zenpep  Cont vit B6, vit B12, thiamine and vit D suppl and ADD end date to zinc sulfate 220mg x 14 days   Check vit C and niacin levels  Optimize BG control <180 for wound healing  Please weigh pt no new wts  Cont to monitor POC, wt trends, renal fx, lytes, UOP, bowel fx, wound healing and adjust recs as needed    RD to follow    Delmis Monroy MS, RD  Clinical Dietitian  E: [email or web address]  O:  [phone]  C:  [phone]      
   Phone: [phone]  Paging : [phone]     Hematology/Oncology Consult Note    Patient: Lan Gonzalez MRN: [id number]  CSN: [id number]    YOB: 1965  Age: 59 y.o.  Sex: male    DOA: 5/2/2025 LOS:  LOS: 7 days            REASON FOR CONSULTATION:     Leukocytosis    ASSESSMENT:     Mr. Montenegro is a 59-year-old male with a history of coronary artery disease, peripheral artery disease complicated by AAA, CVA, CKD stage III, chronic pancreatitis status post Whipple, COPD, hyperlipidemia, major depressive disorder, and history of rheumatoid arthritis who is admitted to the hospital with weakness, found to have sepsis, possible GI bleed, and vitamin B12 deficiency.  Hematology consulted on leukocytosis    PLAN:   #Leukocytosis  Based on the differential and in the rapid fluctuations, I doubt this is a primary bone marrow abnormality/myeloproliferative neoplasm.  It seems to be consistent with the patient's recent pyelitis and ureteritis/ascending infection and likely ongoing inflammation from his numerous medical issues. The patient also received prednisone last week per the MAR, unsure if he was intermittently receiving steroids outpatient for his RA and concern for GBS.  Workup via flow cytometry in process, will add BCR abl to rule out CML, will check for other MPNs.   - ESR/CRP, Rheumatoid factor elevated - consistent with RA, benefit from rheum evaluation but no available at [facility], outpatient follow   - Follow-up flow cytometry - in process  - Will send additional MPN workup as mentioned - in process   - Trend CBC with differential daily  - Hold on bone marrow biopsy at this time, may plan to complete one as an outpatient once acute issues have resolved and if leukocytosis is persistent  - Can follow-up with me outpatient    #Normocytic anemia  #Vitamin B12 deficiency  #Iron deficiency anemia   Severely B12 deficient, suspecting absorption issues 2/2 whipple. EGD showed biliary and atrophic 
   Phone: [phone]  Paging : [phone]     Hematology/Oncology Consult Note    Patient: Lan Gonzalez MRN: [id number]  CSN: [id number]    YOB: 1965  Age: 59 y.o.  Sex: male    DOA: 5/2/2025 LOS:  LOS: 9 days            REASON FOR CONSULTATION:     Leukocytosis    ASSESSMENT:     Mr. Montenegro is a 59-year-old male with a history of coronary artery disease, peripheral artery disease complicated by AAA, CVA, CKD stage III, chronic pancreatitis status post Whipple, COPD, hyperlipidemia, major depressive disorder, and history of rheumatoid arthritis who is admitted to the hospital with weakness, found to have sepsis, possible GI bleed, and vitamin B12 deficiency.  Hematology consulted on leukocytosis    PLAN:   #Leukocytosis  #Normocytic anemia  #Vitamin B12 deficiency  #Iron deficiency anemia   He has adequately been treated for his UTI/pyelitis. RA could be driving leukocytosis. I did send work up for MPN, still in process. Given his anemia that is worsening despite supplementation for B12 and iron, I think we should proceed with BMBx now rather than wait. I still feel that this will not be a primary bone marrow abnormality but will need to rule out faster as MPN testing may take some time  - Consult IR for bone marrow biopsy and aspirate  - Follow up MPN work up with JAK2/CALR/MPL/BCR Abl, and flow ctyometry  - s/p IV iron for NICOL. EGD done showed  biliary and atrophic gastritis + ulcers in efferent anastomosis loop which could have been source of bleeding  - s/p IM B12 for severe B12 deficiency, he has a whipple, continue pancreatic enzyme supplement and indefinite IM B12  - Trend CBC with differential daily  - Can follow-up with me outpatient    #Acute R brachia vein DVT  2/2 PIV likely, provoked. Plan for 3 months of AC, no hypercoagulable work up  - Agree with heparin gtt for now  - At discharge, okay for DOAC, I would choose eliquis 5mg PO BID given his whipple and loss of distal stomach 
  Physician Progress Note      PATIENT:               ABIGAIL CARCAMO  [facility] #:                  [id number]  :                       1965  ADMIT DATE:       2025 11:06 AM  DISCH DATE:  RESPONDING  PROVIDER #:        Sarita Monreal DO          QUERY TEXT:    Malnutrition is documented in the medical record *** (date/provider).  Please   specify the degree/type:    The clinical indicators include:  Patient presented with worsening neuropathy, concern for GBS with weakness  found severe anemia with occult stool positive and hypotension  Clinical indicators include:   pulmonology consult note \"Patient he is severely malnourished.\"   consult note:  \"Nutrition consulted x 2 poor PO, ?TF\"  \"Malnutrition Status:  Moderate malnutrition (25 1324)  Context:  Acute Illness (acute on chronic)  Findings of the 6 clinical characteristics of malnutrition:  Energy Intake:  50% or less of estimated energy requirements for 5 or more   days, reported poor PO x >=1 wk  Weight Loss:   (suspect wt loss since admit)  Body Fat Loss:  Moderate body fat loss Triceps, Orbital, Buccal region  Muscle Mass Loss:  Moderate muscle mass loss Clavicles (pectoralis &   deltoids), Calf (gastrocnemius), Hand (interosseous)  Fluid Accumulation:  Moderate to Severe Generalized\"  Options provided:  -- Mild Malnutrition  -- Moderate Malnutrition  -- Severe Malnutrition  -- Mild Protein calorie malnutrition  -- Moderate Protein calorie malnutrition  -- Severe Protein calorie malnutrition  -- Other - I will add my own diagnosis  -- Disagree - Not applicable / Not valid  -- Disagree - Clinically unable to determine / Unknown  -- Refer to Clinical Documentation Reviewer    PROVIDER RESPONSE TEXT:    This patient has moderate malnutrition.    Query created by: Mansi Curtis 5/15/2025 3:23 PM      Electronically signed by:  Sarita Monreal DO 2025 4:01 PM          
 met patient at bedside for an initial visit.    Patient was unable to respond or carry on a conversation.  told the patient that she was praying for him,      provided presence and support for patient.    Chaplains will provide follow-up care for patient and family as needed.    Spiritual Health History and Assessment/Progress Note  [facility]    Spiritual/Emotional Needs,  ,  ,      Name: Lan Gonzalez MRN: [id number]    Age: 59 y.o.     Sex: male   Language: English   Islam: Mosque   Vitamin deficiency related neuropathy     Date: 5/14/2025            Total Time Calculated: 5 min              Spiritual Assessment began in [address] CARDIAC/MEDICAL        Referral/Consult From: Rounding   Encounter Overview/Reason: Spiritual/Emotional Needs  Service Provided For: Patient    Herlinda, Belief, Meaning:   Patient identifies as spiritual, is connected with a herlinda tradition or spiritual practice, and has beliefs or practices that help with coping during difficult times  Family/Friends No family/friends present      Importance and Influence:  Patient has spiritual/personal beliefs that influence decisions regarding their health  Family/Friends No family/friends present    Community:  Patient is connected with a spiritual community and feels well-supported. Support system includes: Children and Herlinda Community  Family/Friends No family/friends present    Assessment and Plan of Care:     Patient Interventions include: Facilitated expression of thoughts and feelings  Family/Friends Interventions include: No family/friends present    Patient Plan of Care: No spiritual needs identified for follow-up  Family/Friends Plan of Care: No family/friends present    Electronically signed by Chaplain Kenneth on 5/14/2025 at 10:35 AM   
0549: Patient had fever 102.9, hospitalist notified, code sepsis called on him, PO tylenol given.  Patient was initially oriented x4, however started losing mentation, RRT called afterwards.  
0683 Patient resting in bed, no distress noted. Patient alert and oriented x4. Educated patient on NG tube purpose, and educated patient not to remove NG tube. Patient verbalized understanding of need for NG tube and agreed not to intentionally remove NG tube. No need for non-violent restraints at this time.     5173 Patient resting in bed. Has not attempted to remove NG tube. A&O 4 verbalized understanding of need for NG tube and verbally agreed not to remove the tube. Hospitalist notified, order for non-violent restraints discontinued.   
0745-Patients heart rate noted to be in the 140s during shift change per telemetry.Recent RRT called about an hour before for possible sepsis. Patient laying in bed,rigors noted,not answering questions but stating \"I'm ok\" when asked by staff. He is also Tachypneic. , who was on the floor, came in to assess patient. His heart rate was still in the 140-150s. One time dose of Metoprolol 2.5mg IV given with some response, second dose of Metoprolol 2.5mg IV given a few minutes later with good response, patients heart rate going down into the 120s, patient a little less tachypneic, ice packs applied to patient, BP stable at 130/69,02 saturation 99% on 4 L. Will continue to monitor patients status.    0900-patient resting quietly in bed, eyes open when this nurse speaks, he remains tachypneic,heart rate 110-115, temp 98.9 oral,vancomycin IV and fluid bolus still infusing.    1030-This nurse in to assess patient, Temp up to 100.4 axillary, heart rate up to almost 130 and BP 81/56.  on the floor and immediately made aware and in to assess patient, LR bolus ordered and albumin ordered.  1055-recheck BP 76/54,glucose reading of 165.Patient being transferred to ICU.    1120-patient into ICU bed 8, report given to Sophie Feldman,JESI    1300-patients personal belongings in a bag,to include a cell phone taken down to icu and placed at bedside with family.    
1025-spoke with MD Ivan Moss. No GI needs at this time. MD hammond see patient on Monday.     1147 -MRI tech on call paged and made aware of STAT MRI order.   
1115 - pt arrived on unit, physical assessment performed refer to flowsheets.   
1530 - Received report from JESI Gonzalez. Dr. Brown ordered for patient to be held in ED until CT scan was done and transcribed.     1630 - Orders received to transfer patient to ICU.     1645 - Patient arrived on unit from ED. Alert and oriented, weak but able to move extremities. Bilateral lower legs +2 edema, good pulses, numbness and tingling stated from patient. Currently receiving blood transfusion.     1745 - Blood transfusion complete.     1930 - H&H MD nessa notified.   
16 Georgian Travis Sump NGT inserted into Right naris to a depth of 75cm. without difficulty. Xray verification placement pending. Taped in place.   
1640 Central line removed at this time.  Transfer orders noted, report called to 3N RN, pt transferred at this time.   
1920: Patients NG tube is clogged. Attempted to unclog possibly replace tubing but patient is refusing. Educated him on the use and importance patient still refusing. Care ongoing will attempt again at later time    2330: NG tube unclogged and running at ordered rate  
2050: RN took blood sample to the lab for aPTT, lab called that sample is insufficient.    2145: RN took second sample to the lab pending result  
58 yo male sp whipple procedure with type one DM, CKD stage 3 exocrine pancreatic insufficiency.  Severe normochromic normocytic anemia to 5.6 , Vit B12 critically low to below 150. Was started on IV supplementation. He had a difficult colonoscopy 6/19/ 2024 long and redundant colon but no polyps   Latest Reference Range & Units 02/23/25 16:31 04/24/25 15:47 04/25/25 04:44 04/26/25 04:46 04/27/25 04:37 04/28/25 05:00 05/02/25 11:33 05/02/25 17:56 05/03/25 00:00 05/03/25 04:56 05/03/25 05:47 05/03/25 16:28 05/04/25 03:23 05/05/25 03:20   Hemoglobin Quant 13.0 - 16.0 g/dL 13.7 10.3 (L) 8.4 (L) 8.4 (L) 9.2 (L) 8.7 (L) 5.6 (LL) 5.9 (LL) 5.7 (LL) 6.5 (L) 6.7 (L) 9.2 (L) 9.1 (L) 8.8 (L)   Hematocrit 36.0 - 48.0 % 42.9 32.7 (L) 26.9 (L) 27.2 (L) 30.0 (L) 27.9 (L) 18.0 (L) 18.6 (L) 17.6 (LL) 20.3 (L) 20.8 (L) 28.1 (L) 28.1 (L) 27.4 (L)   MCV 78.0 - 100.0 FL 90.5 85.8 86.2 86.6 88.0 88.9 88.2    86.7  86.5 87.5   Needs supplement ation with Vit B12 for life      Check Iron profile and occult blood in the stool. Nevere done  With any gastric surgery speciall the bypass like the Whipple he has severe iron iron malabsorption and B12  as well. He can't take any NSAID's and if he has to take baby ASA then it would have to be given with Famotidine or Omeprazole to protect the delicate remaing stomach  
Assisted with preparation and insertion of SBFT.  Communicated plan with patient, stated \"No\", \" I don't want that.  I was able to eat my dinner.\" Nursing supervisor made aware as she was assisting staff with placement.  Primary MD at bedside to discuss risks and benefits of treatment for feedings.    Will await MD plan.  
CSF study results are reviewed, WBC 0 RBC 80, borderline elevated protein is related to blood tap,  normal glucose. GBS is ruled out.   
[facility]   Pharmacy Pharmacokinetic Monitoring Service - Vancomycin     Lan Gonzalez is a 59 y.o. male starting on Vancomycin therapy for Sepsis of Unknown Etiology - 7 day tx. Pharmacy consulted by Daisy Rodgers PA-C for monitoring and adjustment.    Target Concentration: Goal AUC/FIOR 400-600 mg*hr/L    Additional Antimicrobials: Cefepime    Pertinent Laboratory Values:   Wt Readings from Last 1 Encounters:   05/02/25 59 kg (130 lb)     Temp Readings from Last 1 Encounters:   05/02/25 97.7 °F (36.5 °C) (Oral)     Estimated Creatinine Clearance: 49 mL/min (A) (based on SCr of 1.32 mg/dL (H)).  Recent Labs     05/02/25  1133   CREATININE 1.32*   BUN 44*   WBC 28.6*     Plan:  Dosing recommendations based on Bayesian software  Vancomycin 1500 mg IV given at 14:04 5/2/2025, will continue with Vancomycin 1000 mg IV q24hrs x 7 days  Anticipated AUC of 568 mg/l.hr and Trough concentration of 15 mg/l at steady state  Renal labs as indicated   Pharmacy will continue to monitor patient and adjust therapy as indicated    DAISY MORA RPH, BCPS  5/2/2025 4:01 PM   [phone]  
[facility]   Pharmacy Pharmacokinetic Monitoring Service - Vancomycin     Lan Gonzalez is a 59 y.o. male starting on vancomycin therapy for Sepsis  (7 days). Pharmacy consulted by Dr. Monreal for monitoring and adjustment.    Target Concentration: Goal AUC/FIOR 400-600 mg*hr/L    Additional Antimicrobials: meropenem    Pertinent Laboratory Values:   Wt Readings from Last 1 Encounters:   05/04/25 62.1 kg (137 lb)     Temp Readings from Last 1 Encounters:   05/14/25 97.2 °F (36.2 °C) (Oral)     Estimated Creatinine Clearance: 60 mL/min (based on SCr of 1.13 mg/dL).  Recent Labs     05/13/25  0043 05/14/25  0525   CREATININE 1.20 1.13   BUN 29* 29*   WBC 21.9* 21.0*     Plan:  Dosing recommendations based on Bayesian software  Vancomycin 1250 mg IV once, given on 5/14/25 at 06:36        Maintenance dose:  Vancomycin 750 mg IV q12h, scheduled for 5/14 at 18:30  Anticipated AUC of 528 mg/L.hr  and trough concentration of 15.6 mg/L at steady state  Renal labs as indicated   Vancomycin Random level ordered for 5/15/25 at 03:00  Pharmacy will continue to monitor patient and adjust therapy as indicated    Thank you for the consult,  Anayeli Padilla, PharmD  5/14/2025 10:20 AM   
[facility]   Pharmacy Pharmacokinetic Monitoring Service - Vancomycin    Consulting Provider: Leon Rodgers PA-C    Indication: Sepsis of Unknown Etiology - 7 day tx   Target Concentration: Goal AUC/FIOR 400-600 mg*hr/L  Day of Therapy: 2  Additional Antimicrobials: Cefepime     Pertinent Laboratory Values:   Wt Readings from Last 1 Encounters:   05/03/25 62.3 kg (137 lb 5.6 oz)     Temp Readings from Last 1 Encounters:   05/03/25 98.1 °F (36.7 °C) (Oral)     Estimated Creatinine Clearance: 56 mL/min (based on SCr of 1.23 mg/dL).  Recent Labs     05/02/25  1133 05/03/25  0547   CREATININE 1.32* 1.23   BUN 44* 36*   WBC 28.6* 18.6*     Recent vancomycin administrations                     vancomycin (VANCOCIN) 1500 mg in sodium chloride 0.9% 500 mL IVPB (mg) 1,500 mg New Bag 05/02/25 1404          Plan:  Updated current regimen with improving renal function. AUCss - 433mg/L.hr  Increase dose to Vancomycin 1250mg IV q24h  Estimated AUC of 541 mg/l.hr and Trough concentration of 13.3 mg/l at steady state   Repeat vancomycin concentration ordered for 05/04/25 @ 0600   Pharmacy will continue to monitor patient and adjust therapy as indicated    Thank you for the consult,  Brad Mcleod RPH  5/3/2025 6:53 AM  
[facility]   Pharmacy Pharmacokinetic Monitoring Service - Vancomycin    Consulting Provider: Dr. Sarita Monreal   Indication: Sepsis x 7 days  Target Concentration: Goal AUC/FIOR 400-600 mg*hr/L  Day of Therapy: 2  Additional Antimicrobials: Merrem    Pertinent Laboratory Values:   Wt Readings from Last 1 Encounters:   05/15/25 65.9 kg (145 lb 4.5 oz)     Temp Readings from Last 1 Encounters:   05/15/25 97.6 °F (36.4 °C) (Oral)     Estimated Creatinine Clearance: 68 mL/min (based on SCr of 1.07 mg/dL).  Recent Labs     05/14/25  0525 05/14/25 2055 05/14/25 2325 05/15/25  0539   CREATININE 1.13  --   --  1.07   BUN 29*  --   --  30*   WBC 21.0*   < > 43.4* 30.4*    < > = values in this interval not displayed.     Procalcitonin: 66 ng/mL    Pertinent Cultures:  Culture Date Source Results   5/14/25  Blood Culture x 2  (Pending)   MRSA Nasal Swab: N/A. Non-respiratory infection.    Recent vancomycin administrations                     vancomycin (VANCOCIN) 750 mg in sodium chloride 0.9 % 250 mL (addEASE) IVPB (mg) 750 mg New Bag 05/15/25 1242     750 mg New Bag 05/14/25 2243    vancomycin (VANCOCIN) 750 mg in sodium chloride 0.9 % 250 mL (addEASE) IVPB (mg) 750 mg New Bag 05/14/25 1757    vancomycin (VANCOCIN) 1250 mg in sodium chloride 0.9% 250 mL IVPB (mg) 1,250 mg New Bag 05/14/25 0636             Assessment:  Date/Time Current Dose Concentration Timing of Concentration (h)   5/15/25 @ 1309 Vancomycin 750 mg IV q12h 25.8 ug/mL 26 minutes   Note: Serum concentrations collected for AUC dosing may appear elevated if collected in close proximity to the dose administered, this is not necessarily an indication of toxicity    Plan:  Current dosing regimen is sub-therapeutic  Increase dose to Vancomycin 1250 mg IV q24h  Estimated AUC(ss): 576 mg/L.hr  Estimated T(ss): 14.1 mg/L  Repeat vancomycin concentration ordered for 5/16/25 @ 0600   Pharmacy will continue to monitor patient and adjust therapy as 
Care  assisting Julio Cesar Monte Mgr with Discharge Planning needs for patient.  Referral sent to [facility] (patient's preference) for review and consideration for acceptance for home care needs.    Will follow along for further discharge planning needs.  
Care of patient handed off to Sameera Murphy. Written report left since Nurse refused report.  
Comprehensive High Risk Nutrition Assessment Follow-Up    Type and Reason for Visit:  Reassess    Nutrition Recommendations/Plan:   Rec swallow SLP eval for PO safety  At  High Risk Refeeding Syndrome  Cont check Phos, Mg, K replete as needed - monitor need for scheduled Phos suppl  May need long-term feeding tube  Unlikely low vol NGT feeds 15-25ml/hr are related to if pt w/aspiration  Okay to order TF per MD Diabetic/Glucerna 1.5 @ 10ml/hr adv 10ml/hr Q12hr to 50ml/hr goal rate as kenyatta provides 1800kcal, 100g pro, 910ml FW  Defer FWF per MD  Cont vit B6, vit B12, thiamine and vit D suppl  ADD end date to zinc sulfate 220mg x 14 days   Check vit C and niacin levels  Optimize BG control <180 for wound healing  Cont to monitor POC, wt trends, renal fx, lytes, UOP, bowel fx, wound healing and adjust recs as needed     Malnutrition Assessment:  Malnutrition Status:  Severe malnutrition (05/15/25 1553)    Context:  Acute Illness (acute on chronic)     Findings of the 6 clinical characteristics of malnutrition:  Energy Intake:  50% or less of estimated energy requirements for 5 or more days  Weight Loss:  Unable to assess (suspect wt loss)     Body Fat Loss:  Moderate body fat loss Triceps, Orbital   Muscle Mass Loss:  Moderate muscle mass loss Calf (gastrocnemius)  Fluid Accumulation:  Unable to assess Generalized   Strength:  Not Performed    Nutrition Assessment:    58yo M transferred to ICU 5/14 after developing high fevers, tachycardia, hypotension + new hypoxia per MD started on Levo now appears stopped, on O2, NGT placed 5/13 on TF 5/13 @ 15ml/hr to 5/14 @ 25ml/hr then stopped 5/14 after transfer to ICU ?of aspiration noted.  NPO. +NGT. ICU MD requested RD to re-order TF. wt is up since admit ?fluid vs accuracy. decreased appetite poor PO inadequate nutrition since admit x 13 days. on admit appeared wt slight down relatively stable since 63kg Feb 2025 but wt overall trends down since 8% (67kg July 2024) x 10 
Cont meropenem    WBC whole body scan for Monday- ordered    I am covering weekend via phone. Please call via [employer] for questions/concerns over the weekend. Thanks.       Marcia Briggs MD  [facility])  Office: [phone] -Option #8  Office fax:  [phone]   
Dr. Guido kindly agree to see pt   
Family at bedside- expressed concern that patient appears to be over sedated. Reviewed pt's meds. Gabapentin and seroquel held for tonight. Physician notified.     Linzess brought in by family-given to pharmacy   
Following patient peripherally-- monitoring off abx    No recurrent fever- last high fever >101- 5/8/25  WBC remain up/ CRP and rheumatoic factor up    Work up for FI  bleeding scan-negative  Bone biopsy- result pending 5/12    Awaiting fungitell- pending , fungal serology pending    Marcia Briggs MD  [facility])  Office: [phone] -Option #8  Office fax:  [phone]   
IV to PO Conversion Recommendation     Patient: Lan Gonzalez   MRN#: [id number]   Admission Date: 05/02/25    IV TO PO  Medication(s) Pantoprazole   Oral Meds  Yes   Diet:   Regular   TEMP 99.3 °F (37.4 °C) (Oral)   WBC Lab Results   Component Value Date/Time    WBC 18.4 05/05/2025 03:20 AM         Pharmacy Dosing Services:  Summary:   Does the patient meet all criteria for IV to PO switch as listed below? YES    Is the patient excluded from IV to PO automatic switch based on criteria listed below? No    Criteria for IV to PO switch - Antibiotics   Received IV therapy for at least 24 hours   2.   Functioning GI tract   Taking scheduled oral medications  Tolerating diet more advanced than clear liquids   3.   No signs/symptoms of shock  No vasopressor support    Criteria for IV to PO switch - Nonantibiotics   Functioning GI tract   Taking scheduled oral medications  Tolerating diet more advanced than clear liquids  2. No signs/symptoms of shock  No vasopressor support        3.   No seizures for >72 hours (antiepileptic medications only)    Exclusion Criteria   Patient is being treated for an infection that requires IV therapy such as: endocarditis, osteomyelitis, meningitis, pancreatitis (antibiotics only)   NG tube with continous suction   Nausea and/or vomiting or severe diarrhea within past 24 hours  Malabsorption syndromes, partial or total gastrectomy, ileus, gastric outlet or bowel obstruction  Active GI bleeding   Significant painful oral ulceration  Unable to swallow  On total parenteral nutrition with a NPO order  NPO  Febrile in last 24 hours (antibiotics only)  Clinically deteriorating or unstable (antibiotics only)    Assessment/Recommendation:    Protonix IV BID for GI Bleed, pharmacy to leave IV and continue to monitor. No change at this time    This IV to PO conversion recommendation is based on the [facility] P&T approved automatic conversion policy for eligible patients.      Please call with 
Inpatient Wound Care Note:     Lan Gonzalez  MEDICAL RECORD NUMBER:  [id number]  AGE: 59 y.o.   GENDER: male  : 1965  TODAY'S DATE:  2025    [] Follow-up visit for   [x] Seen during Prevalence   Seen in    Patient admitted from ED    PAST MEDICAL HISTORY    Past Medical History:   Diagnosis Date    AAA (abdominal aortic aneurysm)     \"probably seven or eight years ago\" last seen by Cassie Bagley 23    Anxiety     Arthritis     Nat Donohue    BPH (benign prostatic hyperplasia)     CAD (coronary artery disease)     at least since , Nonobstructive CAD-mild diffuse RCA disease, minimal circumflex disease, moderate mid LAD disease on cardiac cath in , Joselito Raizada    Cardiomyopathy (HCC)     at least since , Joselito Raizada    Cerebral artery occlusion with cerebral infarction (HCC) 2023    Eddy Guallpa    Chronic kidney disease (CKD), active medical management without dialysis, stage 3 (moderate) (HCC)     Ciara Nelson-Post    Chronic pain     lower back    Chronic pancreatitis (HCC)     at least since , previously followed by Alcides Flanagan and Jason Bedoya, had classic whipple procedure 14    COPD (chronic obstructive pulmonary disease) (HCC)     \"probably ten years ago\" not currently followed by any specialists    Diverticulosis     Exercise tolerance finding     \"feels winded\" after one flight of stairs but \"walks the dog multiple times a day\" without SOB oe chest pain as of 25    Gastritis     Gastroparesis     Nat Donohue    Hiatal hernia     Hypercholesterolemia     Nat Donohue    Hypoalbuminemia due to protein-calorie malnutrition 2025    Insulin pump in place     novoEphraim hinds    MDD (major depressive disorder)     Nat Donohue    Migraine     Multiple kidney stones      and 25, Ronaldo Mason    SUNG (nonalcoholic steatohepatitis)     per 21 note Lulu Bolden    Pancreatitis, chronic (HCC)     Psychiatric disorder     emetophobia    
MRT called.  Seen at bedside.      D/w RN.     Recommendations remain the same.  See my note signed 06:14.    
Moderate Risk Nutrition Assessment Initial    Type and Reason for Visit: Initial, Positive nutrition screen (MST 1)    Nutrition Recommendations/Plan:   Cont diet as kenyatta   Add Glucerna Shakes PO as kenyatta provides 220kcal, 10g pro per carton  Cont vit B12 supppl and consider adding Donna-aaron daily   Consider add Os-Mateo and vit D3 1000u/day  Check Phos cont check Phos, Mg, K replete as needed  Monitor Lipase and Amylase - on Zenpep  Cont to monitor wt trends, lytes, UOP, bowel fx, skin integrity, and adjust recs as needed     Malnutrition Assessment:  Malnutrition Status: At risk for malnutrition    Nutrition Assessment:  60yo M with weakness stable, 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 MST 1 for decreased appetite no wt loss. appears wt slight down relatively stable since 63kg Feb 2025 and appears overall trends down since 8% (67kg July 2024) x 10 months not significant. eating well good PO here so far on dm/cardiac diet. good UOP.    Estimated Daily Nutrient Needs:  Energy (kcal):  1900 Weight Used for Energy Requirements: Current     Protein (g):  65-70 Weight Used for Protein Requirements: Current        Fluid (ml/day):  1900 Method Used for Fluid Requirements: 1 ml/kcal    Nutrition Related Findings:     Wound Type: None    Current Nutrition Therapies:    ADULT DIET; Regular; 5 carb choices (75 gm/meal); Low Fat/Low Chol/High Fiber/EDVIN    Anthropometric Measures:  Height: 177.8 cm (5' 10\")  Current Body Wt: 62 kg (136 lb 11 oz)   BMI: 19.6 lower end BMI     Nutrition Diagnosis: 
NEUROLOGY PROGRESS NOTE        Patient: Lan Gonzalez        Sex: male          DOA: 5/2/2025  YOB: 1965      Age:  59 y.o.         LOS: 1 day     Identification:  60 YO male presents with leg weakness.             SUBJECTIVE:   Patient had blood transfusion yesterday. Hypotension has improved. Paresthesia remains.       REVIEW OF SYSTEMS: Denies chest pain, abdominal pain, nausea or vomiting. No fever or chills.    OBJECTIVE:    Visit Vitals  /63   Pulse 92   Temp 98.2 °F (36.8 °C) (Oral)   Resp 18   Ht 1.778 m (5' 10\")   Wt 62.3 kg (137 lb 5.6 oz)   SpO2 99%   BMI 19.71 kg/m²     Physical Exam:  GEN: Alert, NAD  EYES: conjunctiva normal, lids with out lesions  HEENT: MMM.  HEART: RRR +S1 +S2  LUNGS: CTA B/L no rales or rhonchi.  ABDOMEN: Soft, non-tender.  EXTREMITIES: No edema cyanosis  SKIN: no rashes or skin breakdown, no nodules  NEURO: Alert, oriented x3. Speech is fluent. Cranials: Face is symmetric. Smiles symmetrically. EOMI, VFF. Tongue is midline. Motor: 5/5 bilateral upper limbs, 4/5 left leg extension, 5/5 left foot dorsiflexion and plantar flexion. 5/5 Right lower proximal and distal lower limbs.  Sensory: decreased vibratory sensation bilateral feet.     Current Facility-Administered Medications   Medication Dose Route Frequency    0.9 % sodium chloride infusion   IntraVENous PRN    0.9 % sodium chloride infusion   IntraVENous PRN    vancomycin (VANCOCIN) 1250 mg in sodium chloride 0.9% 250 mL IVPB  1,250 mg IntraVENous Q24H    acetaminophen (TYLENOL) tablet 650 mg  650 mg Oral Q4H PRN    ondansetron (ZOFRAN) injection 4 mg  4 mg IntraVENous Q4H PRN    sodium chloride flush 0.9 % injection 5-40 mL  5-40 mL IntraVENous 2 times per day    sodium chloride flush 0.9 % injection 5-40 mL  5-40 mL IntraVENous PRN    0.9 % sodium chloride infusion   IntraVENous PRN    magnesium sulfate 2000 mg in 50 mL IVPB premix  2,000 mg IntraVENous PRN    ondansetron (ZOFRAN-ODT) disintegrating 
NEUROLOGY PROGRESS NOTE        Patient: Lan Gonzalez        Sex: male          DOA: 5/2/2025  YOB: 1965      Age:  59 y.o.         LOS: 2 days     Identification:  60 YO male presents with leg weakness.             SUBJECTIVE:   Patient transferred to telemetry yesterday.  Remains to have shooting pain right foot and weakness in left leg.  Brain, cervical, thoracic and lumbar spine MRI were unremarkable except multilevel spondylosis involving cervical and lumbar spine MRI.  Moderate to severe bilateral neuroforaminal stenosis C3/C4.  Severe bilateral L5-S1 neuroforaminal stenosis.      REVIEW OF SYSTEMS: Denies chest pain, abdominal pain, nausea or vomiting. No fever or chills.    OBJECTIVE:    Visit Vitals  BP (!) 103/55   Pulse 98   Temp 99.5 °F (37.5 °C) (Oral)   Resp 18   Ht 1.778 m (5' 10\")   Wt 62.1 kg (137 lb)   SpO2 95%   BMI 19.66 kg/m²     Physical Exam:  GEN: Alert, NAD  EYES: conjunctiva normal, lids with out lesions  HEENT: MMM.  HEART: RRR +S1 +S2  LUNGS: CTA B/L no rales or rhonchi.  ABDOMEN: Soft, non-tender.  EXTREMITIES: No edema cyanosis  SKIN: no rashes or skin breakdown, no nodules  NEURO: Alert, oriented x3. Speech is fluent. Cranials: Face is symmetric. Smiles symmetrically. EOMI, VFF. Tongue is midline. Motor: 5/5 bilateral upper limbs, 4/5 left leg extension, 5/5 left foot dorsiflexion and plantar flexion. 5/5 Right lower proximal and distal lower limbs.  Sensory: decreased vibratory sensation bilateral feet.     Current Facility-Administered Medications   Medication Dose Route Frequency    0.9 % sodium chloride infusion   IntraVENous PRN    0.9 % sodium chloride infusion   IntraVENous PRN    acetaminophen (TYLENOL) tablet 650 mg  650 mg Oral Q4H PRN    ondansetron (ZOFRAN) injection 4 mg  4 mg IntraVENous Q4H PRN    sodium chloride flush 0.9 % injection 5-40 mL  5-40 mL IntraVENous 2 times per day    sodium chloride flush 0.9 % injection 5-40 mL  5-40 mL IntraVENous PRN    
NEUROLOGY PROGRESS NOTE        Patient: Lan Gonzalez        Sex: male          DOA: 5/2/2025  YOB: 1965      Age:  59 y.o.         LOS: 5 days     Identification:  58 YO male presents with leg weakness.             SUBJECTIVE:   Patient has been more drowsy in the past couple of days.  He continues to complain of pain in bilateral lower limbs.  Gabapentin was increased from 300 mg 3 times daily to 600 mg 3 times daily without significant improvement.    REVIEW OF SYSTEMS: Denies chest pain, abdominal pain, nausea or vomiting. No fever or chills.    OBJECTIVE:    Visit Vitals  /71   Pulse 73   Temp 97.8 °F (36.6 °C) (Oral)   Resp 16   Ht 1.778 m (5' 10\")   Wt 62.1 kg (137 lb)   SpO2 99%   BMI 19.66 kg/m²     Physical Exam:  GEN: Alert, NAD  EYES: conjunctiva normal, lids with out lesions  HEENT: MMM.  HEART: RRR +S1 +S2  LUNGS: CTA B/L no rales or rhonchi.  ABDOMEN: Soft, non-tender.  EXTREMITIES: No edema cyanosis  SKIN: no rashes or skin breakdown, no nodules  NEURO: Alert, oriented x3. Speech is fluent. Cranials: Face is symmetric. Smiles symmetrically. EOMI, VFF. Tongue is midline. Motor: 5/5 bilateral upper limbs, 5/5 bilateral lower limbs.  Sensory: decreased vibratory sensation bilateral feet.     Current Facility-Administered Medications   Medication Dose Route Frequency    [START ON 5/11/2025] cyanocobalamin injection 1,000 mcg  1,000 mcg IntraMUSCular Weekly    gabapentin (NEURONTIN) capsule 600 mg  600 mg Oral TID    glucose chewable tablet 16 g  4 tablet Oral PRN    dextrose bolus 10% 125 mL  125 mL IntraVENous PRN    Or    dextrose bolus 10% 250 mL  250 mL IntraVENous PRN    glucagon injection 1 mg  1 mg SubCUTAneous PRN    dextrose 10 % infusion   IntraVENous Continuous PRN    insulin lispro (HUMALOG,ADMELOG) injection vial 0-16 Units  0-16 Units SubCUTAneous 4x Daily AC & HS    pantoprazole (PROTONIX) 40 mg in sodium chloride (PF) 0.9 % 10 mL injection  40 mg IntraVENous Q12H    
NG tube ordered on previous shift. Per nursing supervisor, Supervisor will be on floor soon to place tube. Patient and family updated       
New Glucerna 1.5 was hung and tubing changed. No issues to report  
Noted patient with poor oral intake.  Less than 25% of both breakfast and lunch.  Patient total assist with all meals.  Encourage oral supplement with all meals. Family remain at bedside for support.  Closely monitor.   
Occupational Therapy Evaluation/Treatment Attempt    Chart reviewed. Attempted Occupational Therapy Evaluation/Treatment, however, patient unable to be seen due to:  []  Nausea/vomiting  []  Eating  []  Pain  []  Patient too lethargic  [x]  Off Unit for testing/procedure- doppler  []  Dialysis treatment in progress   []  Telemetry Results  []  WB status not in order set  []  Patient on bedrest per order set  []  Other :     Will follow up later as patient's schedule allows.   Thank you for this referral.  Yuliya HUBER, OTR/L    
Occupational Therapy Goals:  Initiated 5/9/2025 to be met within 7-10 days.  Short Term Goals  Time Frame for Short Term Goals: 7 days  Short Term Goal 1: Patient will complete toilet transfer with SBA  Short Term Goal 2: Patient will complete bathing with SBA  Short Term Goal 3: Patient will complete grooming in standing at sink with SBA  Short Term Goal 4: Patient will complete dressing with SBA  Short Term Goal 5: Patient will complete toileting with SBA    OCCUPATIONAL THERAPY EVALUATION    Patient: Lan Gonzalez (59 y.o. 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 Patient has RA. Reports recently has had difficulty with endurance when walking and feels fatigued.     ASSESSMENT :  Patient presented supine in bed with gripper socks and cardiac monitor. Patient with no visitors present for entire session. Patient completed assessment of ADLs and BUE strength, ROM (see details below). Patient completed toileting and toilet transfer in bathroom with use of grab bar. Patient with some confusion during session, increased response time required.  Due to deficits (listed below) patient has decreased independence with ADLs and functional mobility and would benefit from continued occupational therapy services.  ,      DEFICITS/IMPAIRMENTS:  Performance deficits / Impairments: Decreased functional mobility ;Decreased safe awareness;Decreased balance;Decreased coordination;Decreased ADL status;Decreased high-level IADLs;Decreased strength;Decreased ROM;Decreased endurance;Decreased fine motor control    Patient will benefit from 
[facility] call [facility] to discuss case through transfer cent  Spoke with [facility] rheumatology Dr. Harrison   Currently not a candidate for rheumatologic /systemic treatment  Agreed with our current care.    
PALLIATIVE MEDICINE    AMD STATUS:NO AMD ON FILE    CODE STATUS: FULL CODE    Plan to discharge tomorrow to [facility].     Palliative medicine will continue to follow Lan Gonzalez and his family during his admission.     Asiya Adams RN  Palliative Medicine  [phone]    
PHYSICAL THERAPY TREATMENT    Patient: Lan Gonzalez (59 y.o. 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 After educating pt on previous session's output and concern about not having the endurance needed for trip. Pt again notes determination. Able to transition to EOB with SBA. With use of RW. Pt stands (Pull to stand) and ambulates to restroom. Toilet transfer requires assistance. Pt has significant diff with RW jesika and lateral balance deficit. Assist needed to bring LE to bed ht.  SPO2 remain 94%+ during session without sup O2 .       Progression toward goals: Fair  []      Improving appropriately and progressing toward goals  [x]      Improving slowly and progressing toward goals  []      Not making progress toward goals and plan of care will be adjusted     PLAN:  Patient continues to benefit from skilled intervention to address the above impairments.  Continue treatment per established plan of care.    Further Equipment Recommendations for Discharge: gait belt and rolling walker    [facility]:   AM-PAC Inpatient Mobility without Stair Climbing Raw Score : 14    Current research shows that an AM-PAC score of 17 (13 without stairs) or less is not associated with a discharge to the patient's home setting. Based on an AM-PAC score and their current functional mobility deficits, it is recommended that the patient have 5-7 sessions per week of Physical Therapy at d/c to increase the patient's independence.  Currently, this patient demonstrates the potential endurance, 
PT order received and chart reviewed. Per chart review, bedrest order discontinued. Patient going off the floor for doppler. Will follow up when patient returns to the floor.  Pattie Topete, PT, DPT    
PT session held due to:  [x]  Blood transfusion in progress (1st attempt)  [x]  Meeting with MD and CM (2nd attempt)  []  Extreme Pain  []  Dialysis treatment in progress.  Will f/u,, if D/c is held. Thank you.  Shayne Lawton, PTA    
PT session held due to:  [x]  Early RRT  [x]  Transfer to ICU  [x]  Continue  Hypotension   []  Dialysis treatment in progress.  Will f/u. Thank you.  Shayne Lawton, PTA    
PT session held due to:  [x]  Off unit to MRI  []  RN Communication/ suggestion  []  Extreme Pain  []  Dialysis treatment in progress.  Will f/u later as pt's schedule allows. Thank you.  Shayne Lawton, PTA    
Palliative Medicine    After meeting with patient and his wife, Valeria, the goals of care have been defined.  Code status remains: FULL CODE AMD status: none on file. His wife is his closest living relative and will act as medical surrogate as needed.  Will sign off but remain available for reconsult as needed/if appropriate.     Thank you for the Palliative Medicine consult and allowing us to participate in the care of meghana Gonzalez      Alyson Vaughn RN, MSN  Palliative Medicine   [phone]    
Palliative Medicine    CODE STATUS: FULL CODE    AMD Status: None on file. His wife, Valeria, is his closest living relative.     1050 Seen today in room 108. No family at bedside.  Lying supine with eyes closed. Did not attempt to awaken. Appears chronically ill. Respirations unlabored at rest.  Oxygen on at 2 lpm per NC. Pain --appears comfortable. NG in  Levophed infusing.     WBC improving today 30.4 down from apex of 52 on 5/14/2025.    Awaiting bone marrow biopsy results.     Disposition plan: to be determined based on response to medical treatment, medical recommendations,  and patient/family decisions    Palliative Medicine will continue to follow Lan Gonzalez  and his family during his hospitalization and support them as they make healthcare decisions and define goals of care.    Alyson Vaughn RN, MSN  Palliative Medicine  P: [phone]    
Patient has refused to be turned or repositioned throughout the day. Patient educated on the importance for his skin to be protected from sores. Patient still refuses to be turned  
Patient isnot in room, will check back    Marcia Briggs MD  [facility])  Office: [phone] -Option #8  Office fax:  [phone]   
Patient pulled tube out and will not have it replaced. Due to this, [facility]. Patient still meeting criteria due to drip needed at this time. SW LM for spouse to return call regarding potential DC for today to [facility]. MD wanting patient to receive blood prior to DC. MD asking for later transport due to labs needed later in the day. Transport requested for 6pm with [facility]. Awaiting call back from spouse. SW to follow.   
Patient removed his NG tube while it was feeding. Stated \"how does anyone expect me to get some sleep with that thing? It was blocking my nose from being able to breathe\". Patient stated he was short of breath shortly after and has had a none productive cough. MD aware, CXR ordered  No orders to replace tube at this time  
Patient to DC to [facility]:    RN and MD report: [phone]    DC summary available. LTACH aware of 6pm transfer.     Son and patient agreeable with transfer.   
Perfect Serve  message 05:49    \"Fever 102.9.  .  /67.  Will give Tylenol.\"    Earlier in shift bladder scan with 601.  Straight cath ordered for retention.    A/P      Possible sepsis.  Urinary?  Responded to RN to call code sepsis.    Sepsis order set used.     -Lactic.   -Blood cultures.   -UA and Ucx.   -Ceftriaxone 1gm iv x 1.   -Vancomycin iv x1.     -30ml/kg NS bolus.   -pCXR.    Will need post fluid bolus sepsis reperfusion assessment.     -Jose E        
Pharmacy Dosing Services:     Pharmacist Renal Dosing Note for Lan Gonzalez   Physician: Dr. Guerrero    Indication: Sepsis of Unknown Etiology x 7 days  Previous Regimen Cefepime 2000 mg IV q12h over 240 min   Serum Creatinine No results found for: \"JASPAL\", \"CREAPOC\"   Creatinine Clearance Estimated Creatinine Clearance: 60 mL/min (based on SCr of 1.14 mg/dL).   BUN Lab Results   Component Value Date/Time    BUN 24 05/05/2025 03:20 AM       New Regimen: Cefepime 2000 mg IV q8h over 240 min (extended infusion preferred)    This dose adjustment made using pharmacy-directed automatic renal dosing protocols for Cefepime with the indication of Sepsis and CrCl 60 mL/min or greater.    Jovon Bhakta PharmD  [phone]    
Pharmacy and MD made aware of pt aPTT level at 28.7 and pt received 5,000u bolus and a rate change of 4u increase on hep gtt. Next aPTT ordered for 2100. Will continue to monitor pt.  Miriam Sims RN   
Physical Therapy    1356: Pt looks uncomfortable, reports it took him 2 hours to get into this position and is comfortable.  Will follow up as schedule permits.    1524: Pt requesting help opening wipes to clean is buttocks.  Refusing my assistance with clean up.    
Physical Therapy Goals:  Continued 5/10/2025 to be met within 7-10 days.  Short Term Goals  Short Term Goal 1: Patient will perform supine to/from sit with independence  Short Term Goal 2: Patient will perform sit to/from stand with supervision in prep for gait progression  Short Term Goal 3: Patient will ambulate 150 ft with LRAD and supervision to progress OOB mobility  Short Term Goal 4: Patient will negotiate 4 stairs with handrails and CGA to simulate home entry    []  Patient has met MD rylan colindres for d/c home   []  Recommend HH with 24 hour adult care   [x]  Benefit from additional acute PT session(s): OOB, ambulation, endurance, stairs, rehab placement      PHYSICAL THERAPY TREATMENT    Patient: Lan Gonzalez (59 y.o. 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  HOB elevated and use of bed rails to sit up EOB.  Pt ftigues easily, very frail in appearance.  ELevated bed to assist with sit to stand, Radha needed.  Ambulation improved, only 1 standing rest breaks needed.  Tolerated 40ft with RW, reciprocal gt pattern, decreased step hgieht and length, no LOB or path deviations.  Activity Tolerance: Patient limited by fatigue;Patient limited by endurance  Discharge Recommendations: Subacute/Skilled Nursing Facility    Progression toward goals:   []      Improving appropriately and progressing toward goals  [x]      Improving slowly and progressing toward goals  []      Not making progress toward goals and plan of care will be adjusted 
Pt more alert, sitting up in bed, states ready to go back to room,  denies any pain or distress, dressing to left sciatic area clean dry and intact.  Awaiting transport to go back to room  
Pt refused PT due to:  [x]  Off unit  []  Eating  []  Pain  []  Pt lethargic  []  Off Unit  Will f/u later, as pt's schedule allows. Thank you.  Shayne Lawton, PTA      
Pt refused PT due to:  [x]  Pt on phone.   []  Eating  []  Pain  []  Pt lethargic  []  Off Unit  Will f/u later, as pt's schedule allows. Thank you.  Shayne Lawton, PTA    
Pt refused soap suds enema- Nurse spent 45 minutes explaining the reason for enema. Ask pt if I could bring in the doctor to speak with him. Pt refused doctor.   
Pt to care unit post bone marrow biopsy,  pt open eyes when spoken to  arms next to chest,  knees bent, feeding tube into right nares,  secured, int left hand area, site clean  dressing to left sciatic area, clean dry and intact.   
Responded to RRT bedside--assisted with cooling pt with ice packs and obtaining vitals.  Pt will remain 3N for now per Dr zavala  
SW spoke with patient at bedside regarding DC planning needs. Patient wanting IPR at this time. SW reached out to [facility] regarding review. Awaiting decision from facility. SW to follow.   
Spoke to MARK sawyer about patient consult. Patient SBAr, labs and patient background and hx discussed with MD. No immediate need for EGD/Colonoscopy at this time. If patient develops clear signs of bleeding, hemodynamic instability, or meleana  please alert GI MD. Otherwise MD will see patient on Monday.   
TRANSFER - OUT REPORT:    Verbal report given to Blessing(name) on Lan Gonzalez being transferred to care(unit) for ordered procedure       Report consisted of patient's Situation, Background, Assessment and   Recommendations(SBAR).     Information from the following report(s) Nurse Handoff Report and Event Log was reviewed with the receiving nurse.    Opportunity for questions and clarification was provided.      Patient transported with:   Registered Nurse    
TRANSFER - OUT REPORT:    Verbal report given to Figueroa on Lan Gonzalez  being transferred to [facility] for routine progression of patient care       Report consisted of patient's Situation, Background, Assessment and   Recommendations(SBAR).     Information from the following report(s) Nurse Handoff Report, Adult Overview, Cardiac Rhythm  , and Neuro Assessment was reviewed with the receiving nurse.           Lines:   Extended Dwell Peripherial IV 05/20/25 Left Cephalic (Active)   Criteria for Appropriate Use Limited/no vessel suitable for conventional peripheral access 05/22/25 1600   Site Assessment Clean, dry & intact 05/22/25 1600   Phlebitis Assessment No symptoms 05/22/25 1600   Infiltration Assessment 0 05/22/25 1600   Line Status Flushed;Infusing 05/22/25 1600   Line Care Connections checked and tightened 05/22/25 1600   Alcohol Cap Used Yes 05/22/25 1600   Date of Last Dressing Change 05/20/25 05/22/25 1600   Dressing Type Transparent w/CHG gel 05/22/25 1600   Dressing Status Clean, dry & intact 05/22/25 1600        Opportunity for questions and clarification was provided.      Patient transported with:  IV pump Monitor and O2 @ 3lpm       
Talked with dr. Shah he deferred to spine specialist.   
Talked with son in length. All questions answered. And. Are plan updated . He appreciated the update 
The  visited the patient during daily rounds.  The patient shared that he had just taken communion from a representative from his [employer].  He discussed his frustration that doctors had not yet determined the causes of his symptoms and expressed he was in pain.  The  provided the ministry of encouragement, listening and presence.      Spiritual Health History and Assessment/Progress Note  [facility]    Spiritual/Emotional Needs,  ,  ,      Name: Lan Gonzalez MRN: [id number]    Age: 59 y.o.     Sex: male   Language: English   Buddhist: [employer]ian   GBS (Guillain [place] syndrome)     Date: 5/5/2025            Total Time Calculated: 10 min              Spiritual Assessment began in [address] CARDIAC/MEDICAL        Referral/Consult From: Rounding   Encounter Overview/Reason: Spiritual/Emotional Needs  Service Provided For: Patient    Herlinda, Belief, Meaning:   Patient is connected with a herlinda tradition or spiritual practice  Family/Friends No family/friends present      Importance and Influence:  Patient has spiritual/personal beliefs that influence decisions regarding their health  Family/Friends No family/friends present    Community:  Patient is connected with a spiritual community  Family/Friends No family/friends present    Assessment and Plan of Care:     Patient Interventions include: Facilitated expression of thoughts and feelings and Explored spiritual coping/struggle/distress  Family/Friends Interventions include: No family/friends present    Patient Plan of Care: No spiritual needs identified for follow-up  Family/Friends Plan of Care: No spiritual needs identified for follow-up    Electronically signed by Chaplain Hima on 5/5/2025 at 4:11 PM    
Walked into the pt rm. He had just had a large dark red BM. While using the bathroom the pt Pt was standing up and leaning over the IV pump. He appeared weak and was walking slow. He was assisted x2 in order to get back into the bed. Vitals and blood glucose were taken BP was 159/83, temp 101.1, , RR 20, and O2 was 98 on rm air. He reported no pain. Provider was notified and came to bedside to assess the pt. New orders were placed see chart. Hep gtt was D/C per MD order.   
Wound Care Note:    Patient seen for hospital wide pressure injury prevalence.  Deep Tissue Injury noted to coccyx area.  Mepilex border applied to area.     Full assessment completed, heels and bilateral ischium intact, no redness noted. Patient repositioned at this time.     Skin Care & Pressure Relief Recommendations:  Minimize layers of linen  Pads under patient to optimize support surface and microclimate  Turn/reposition approximately every 2 hours.  Pillow Wedges  Manage incontinence  Promote continence; Skin Protective lotion to buttocks and sacrum daily and as needed with incontinence care    Offload heels with pillows or offloading boots.    Discussed above plan with patient     Transition of Care: Consult wound care if needed  
aPTT of 110.6 considered therapeutic per pharmacist. Recheck in the morning  
Eliquis x 3 months   - Venous dopplers of legs showed no DVTs    # Persistent Leukocytosis/ Sepsis- last fever 5/08 ( (101.3)  Noted Wbc elev since R renal obstructive uropathy w/ surgery- 02/2025. Had received emperic IV abx two rounds( in last part of April and this admission) x 4-5 days.    No longer immunocompromised (last rituxan was 10/2021)   - CXR (-), UA & UCx (-).   CRP 10, ESR 16->36.    Procalc 0.8, 0.6   - blood CX's negative   - CT C/A/P:   ? right pyelitis & ureteritis/ascending infection.   Has hx of obstructive uropathy w/ nephrolithiasis. renal stones but UA neg.   However-- pt on    -? Reactive/autoimmune related/ hematologic work up in progress     # Severe Iron deficiency/ B12 anemia - multifactorial.  Acut GI bleed and nutritional deficiency.     Iron low at 7; 5% saturation. Vit B12 was <150.    FOBT +  Ferritin okay. HIV (-).   - s/p multiple (5) pRBC transfusions.    - s/p IV Venofer   - GI Consulted.  EGD:  anastomosis ulcer.  Gastrin level 45 (wnl).    Continue BID Protonix.   Avoid all steroids/Nsaids.  Doesn't drink/smoke   Colonoscopy 6/2024: long & redundant loopy colon, but no polyps     - H&H stable on heparin gtt.    # Sepsis/  Leukocytosis  (20's)-  fevers/tachy resolved, but still w/ leukocytosis.   No high fever since 5/08 (101.3)   - Only infection isolated was pyelitis.  S/p Cefepime/rocephin (5/03-5/07). ID following   - Has active RA   - Oncology consulted for Wbc/Anemia. Suspects this may  be inflammatory but unlikely a bone marrow or myeloproliferative disorder given fluctuations in Wbc.    --> f/up BCR/ABL, flow cytometry, CALR mutation, MPH   --> f/up SPEP/STEVIE   - per Oncology, can pursue BM Biopsy outpatient if Wbc persists- once acute issues have resolved     # Hypoalbuminemia, third spacing & peripheral edema    - encourage more PO intake. Ensure Supplement ordered.    - IV albumin, gentle lasix PRN to help diurese.  Monitor Cr/GFR    # CAD w/ hx of ICM  # Troponin 
culture: NGSF        -UA unremarkable, culture negative        -CXR-clear        -CTA CAP-- possible right pyelitis  5/4- TTE: EF 50-55%       --CRP elevated 10.8mg/dL, sed rate 16->36, Procal 0.82/0.55/0.65  5/8- hepatitis panel-negative, HIV negative  5/10- Fungitell/galactomannan negative  5/14- blood cuture/urine culture/MRSA screening- negative      Severe anemia- <6- from GIB. Post transfusion of multiple PRBC        ( Iron and B12 deficiency)  Severe nutritional deficiency- getting NGT and specifc rx    LE weakness--GBS ruled out by benign CSF        -- has severe Neural foraminal stenosis on MRI 5/3/25        --Severe bilateral L5-S1 neural foraminal stenosis  on MRI 5/3/25    Rheumatoid arthritis( RF positive) with joint deformity-- RF high ( 244 on 5/8/25)       - Used to follow [facility]/ get Rituxumab infusion. Last record on Epic/Care     Everywhere 10/2021       -HIV neg-2020, hepatitis profile screen/EBV/CMV negative prior to that       - prior to Rituxumab, he was on other biological treatments    R foot rash- erythema- resolved    History of Klebsiella sepsis- 2023  History of obstructive Uropathy with renal stones    Medical History:   Diagnosis Date    AAA (abdominal aortic aneurysm)-3.9 X4cm -05/2025     \"probably seven or eight years ago\" last seen by Cassie Bagley 6/2/23    Anxiety     Arthritis     Nat Donohue    BPH (benign prostatic hyperplasia)     CAD (coronary artery disease)     at least since 2020, Nonobstructive CAD-mild diffuse RCA disease, minimal circumflex disease, moderate mid LAD disease on cardiac cath in 2020, Joselito Raizada    Cardiomyopathy (HCC)     at least since 2023, Joselito Raizada    Cerebral artery occlusion with cerebral infarction (HCC) 11/2023    Eddy Guallpa    Chronic kidney disease (CKD), active medical management without dialysis, stage 3 (moderate) (HCC) 2020    Ciara Nelson-Post    Chronic pain     lower back    Chronic pancreatitis (HCC)     at least since 2013, 
his meals.     # Persistent Leukocytosis   (w/ anemia)  Noted Wbc elev since R renal obstructive uropathy w/ surgery- 02/2025. Had received emperic IV abx 2 rounds (late April & this admission x 4-5 days).    No longer immunocompromised (last rituxan was 10/2021)   - CBC w/ bandemia 5/13.   CXR (-), UA & UCx (-).   CRP 10, ESR 16->36.    Procalc 0.8, 0.6.  Fungitell (-).  Aspergillus (-), Cryptococcus (-).  HIV (-).  EBV (-). Hepatitis (-).  Blood CX's were (-).  Only infection isolated was pyelitis & ascending uro infection,  on CT. -- s/p Cefepime/rocephin (5/03-5/07).       - Has active RA, high . Former Rituxan.  Could this be malignancy related to prior Rituxan use?   - ID & Onco following.    Oncology feels WBC elev may be inflammatory from R.A., ? unlikely a bone marrow or myeloproliferative disorder given initial fluctuations in Wbc--> however, now is relatively stable ~20s.   --MP disorder workup 5/08:   JAK2 V617F (-) for myeloprolif disorders;   BCR/ABL (-) for CML,   IgG/A/M are normal.  High IgE 1100.       Elevated Kappa & lambda light chains - but the Ratio is not elevated.    PENDING:   MPL,  Flow Cytometry, CALR mutation, MPH +  SPEP/STEVIE.    - CT C/A/P 5/15:  No new infection/abscess   --> s/p bone marrow biopsy 5/13  --> PENDING results.    # ?Septic Shock (5/14)- resolved.  Pyelonephritis   high fevers, tachy, hypotension + new hypoxia.  Required ICU transfer for pressor support.  There was concern for aspiration (recent SBFT and tube feeds- but this was not found on CT chest).  UA (-).  CXR (-).  RVP (-).  procalcitonin 40 -> 66 w/shock.   Blood CX's 5/14 NGTD.  Wbc was the same, 21.   Hgb 10  (5/14)--> 7.7 6 hours later  (did receive bolus; is on heparin gtt but no gross bleeding)   - Lactate normalized 4 ->1.5   after IVF/pressor.     - Repeat ECHO 5/14:  EF 55-60%, mild MR/TR   -- Received IV Vanco + Merrem  (5/14---).   Vanco dc'd.   - CT C/A/P 5/15:  No PE, small effusions L>R, No RP 
mL  5-40 mL IntraVENous 2 times per day    sodium chloride flush 0.9 % injection 5-40 mL  5-40 mL IntraVENous PRN    0.9 % sodium chloride infusion   IntraVENous PRN    magnesium sulfate 2000 mg in 50 mL IVPB premix  2,000 mg IntraVENous PRN    ondansetron (ZOFRAN-ODT) disintegrating tablet 4 mg  4 mg Oral Q8H PRN    Or    ondansetron (ZOFRAN) injection 4 mg  4 mg IntraVENous Q6H PRN    acetaminophen (TYLENOL) tablet 650 mg  650 mg Oral Q6H PRN    Or    acetaminophen (TYLENOL) suppository 650 mg  650 mg Rectal Q6H PRN    potassium chloride (KLOR-CON M) extended release tablet 40 mEq  40 mEq Oral PRN    Or    potassium bicarb-citric acid (EFFER-K) effervescent tablet 40 mEq  40 mEq Oral PRN    Or    potassium chloride 10 mEq/100 mL IVPB (Peripheral Line)  10 mEq IntraVENous PRN    sennosides-docusate sodium (SENOKOT-S) 8.6-50 MG tablet 1 tablet  1 tablet Oral BID    atorvastatin (LIPITOR) tablet 20 mg  20 mg Oral Nightly    gabapentin (NEURONTIN) capsule 300 mg  300 mg Oral TID    linaclotide (LINZESS) capsule 145 mcg (Patient Supplied)  145 mcg Oral Daily    lipase-protease-amylase (ZENPEP) delayed release capsule 10,000 Units  10,000 Units Oral TID WC    mirtazapine (REMERON) tablet 7.5 mg  7.5 mg Oral Daily    QUEtiapine (SEROQUEL) tablet 150 mg  150 mg Oral Nightly    glucose chewable tablet 16 g  4 tablet Oral PRN    dextrose bolus 10% 125 mL  125 mL IntraVENous PRN    Or    dextrose bolus 10% 250 mL  250 mL IntraVENous PRN    glucagon injection 1 mg  1 mg SubCUTAneous PRN    dextrose 10 % infusion   IntraVENous Continuous PRN    insulin lispro (HUMALOG,ADMELOG) injection vial 0-4 Units  0-4 Units SubCUTAneous 4x Daily AC & HS    insulin glargine (LANTUS) injection vial 10 Units  10 Units SubCUTAneous Nightly    0.9 % sodium chloride infusion   IntraVENous PRN    oxyCODONE (ROXICODONE) immediate release tablet 10 mg  10 mg Oral Q6H PRN         Physical Exam:     Vitals:    05/05/25 1600   BP: 125/68   Pulse: 100 
started NGT TF 5/15 now at goal and kenyatta. remains on multiple vit/min suppl. DTI coccyx unstageable PI full thickness per WOCN. wt is up since admit ?fluid vs accuracy if correct. ~1100ml UOP. ?accuracy of 73kg (5/17) as pt appears underweight on observation.  Last Weight Metrics:      5/17/2025     6:00 AM 5/16/2025     3:38 AM 5/15/2025     6:00 AM 5/14/2025     1:02 PM 5/5/2025     3:25 PM 5/4/2025     8:19 AM 5/3/2025     6:15 AM   Weight Loss Metrics   Height    5' 10\" 5' 10\" 5' 10\" 5' 10\"   Weight - Scale 161 lbs 149 lbs 4 oz 145 lbs 5 oz 137 lbs  137 lbs 137 lbs 6 oz   BMI (Calculated) 23.1 kg/m2 21.5 kg/m2 20.9 kg/m2 19.7 kg/m2  19.7 kg/m2 19.7 kg/m2     Nutrition Related Findings:      Wound Type: Full Thickness, Deep Tissue Injury, Unstageable, Pressure Injury       Current Nutrition Intake & Therapies:    Average Meal Intake: 0% (PO + NGT TF)  Average Supplements Intake: None Ordered (on TF)  ADULT TUBE FEEDING; Nasogastric; Diabetic; Continuous; 10; Yes; 10; Q 12 hours; 50; 30; Q 4 hours  ADULT DIET; Dysphagia - Soft and Bite Sized; Low Sodium (2 gm)  Current Tube Feeding (TF) Orders:  See above    Anthropometric Measures:  Height: 177.8 cm (5' 10\")  Ideal Body Weight (IBW): 166 lbs (75 kg)    Admission Body Weight: 62 kg (136 lb 11 oz)  Current Body Weight: 73 kg (160 lb 15 oz), 82.3 % IBW. Weight Source: Bed scale  Current BMI (kg/m2): 23.1 ?accuracy  Usual Body Weight: 67 kg (147 lb 11.3 oz)  % Weight Change (Calculated): -7.5  BMI Categories: Normal Weight (BMI 18.5-24.9)    Estimated Daily Nutrient Needs:  Energy Requirements Based On: Kcal/kg  Weight Used for Energy Requirements: Current  Energy (kcal/day): 1900  Weight Used for Protein Requirements: Current  Protein (g/day): 65-70  Method Used for Fluid Requirements: 1 ml/kcal  Fluid (ml/day): 1900    Nutrition Diagnosis:   Severe malnutrition related to catabolic illness, inadequate protein-energy intake, swallowing difficulty, impaired 
turgor, no rashes, no lesions  Neuro:grossly normal , follows commands some lethargy  Psych:appropriate flat affect    Labs: Results:       Chemistry Recent Labs     05/18/25  0230 05/19/25  0441 05/20/25  0406   GLUCOSE 179* 227* 196*   * 143 141   K 4.1 4.6 4.2   * 106 106   CO2 24 25 25   BUN 22 25* 27*   CREATININE 1.01 1.06 1.02   CALCIUM 8.4* 8.4* 8.2*   LABGLOM 85 80 84      CBC w/Diff Recent Labs     05/18/25  0230 05/20/25  0406 05/20/25  1513   WBC 19.6* 19.5*  --    RBC 3.04* 2.74*  --    HGB 8.4* 7.5* 9.1*   HCT 27.5* 24.9* 29.8*    318  --    LYMPHOPCT 10.9* 9.6*  --       Cardiac Enzymes No results for input(s): \"CKTOTAL\", \"CKMB\", \"CKMBINDEX\", \"TROPONINI\" in the last 72 hours.    Invalid input(s): \"RAO\"   Coagulation Recent Labs     05/20/25  1513 05/20/25  1600   APTT 46.1* 51.5*       Lipid Panel No results found for: \"CHOL\", \"TRIG\", \"HDL\", \"VLDL\", \"CHOLHDLRATIO\"   BNP No results for input(s): \"BNP\" in the last 72 hours.   Liver Enzymes No results for input(s): \"ALT\", \"AST\", \"GGT\", \"ALKPHOS\", \"BILITOT\", \"BILIDIR\" in the last 72 hours.   Thyroid Studies Lab Results   Component Value Date/Time    T4FREE 1.1 11/17/2023 04:09 AM    TSH 2.070 05/05/2025 03:00 PM          Procedures/imaging: see electronic medical records for all procedures/Xrays and details which were not copied into this note but were reviewed prior to creation of Plan         Assessment/Plan     Principal Problem:    Vitamin deficiency related neuropathy  Active Problems:    RA (rheumatoid arthritis) (HCC)    Type 1 diabetes mellitus (HCC)    Severe sepsis (HCC)    Peripheral neuropathy    CAD (coronary artery disease)    Cardiomyopathy (HCC)    Weakness    Blood loss anemia    GI bleed    COPD (chronic obstructive pulmonary disease) (HCC)    AAA (abdominal aortic aneurysm)    Stage 3a chronic kidney disease (HCC)    Elevated troponin    Hypoalbuminemia due to protein-calorie malnutrition    B12 deficiency    
aspirin.  Continue atorvastatin       BPH (benign prostatic hyperplasia)       AAA (abdominal aortic aneurysm)       Stage 3a chronic kidney disease (HCC)     Stable     Monitor BMP       Hypoalbuminemia due to protein-calorie malnutrition    Received albumin edema resolving        MDD (major depressive disorder)     Discharge to; , [] Home  []SNF/Rehab  []  Others  in  2 Days, []  stable for DC         Case discussed with:  [x]Patient  []Family  [x]Nursing  []Case Management [x] Consultants  DVT Prophylaxis:  []Lovenox  []Hep SQ  [x]SCDs  []Coumadin, Eliquis, Xarelto, Pradaxa   []On Heparin gtt. [] No indication [] refused     TIME: 55 minutes were spent on the care of this patient today,  This time also includes physician non-face-to-face service time visit on the date of service such as  Preparing to see the patient (eg, review of tests)  Obtaining and/or reviewing separately obtained history  Performing a medically necessary appropriate examination and/or evaluation  Counseling and educating the patient/family/caregiver  Ordering medications, tests, or procedures  Referring and communicating with other health care professionals as needed  Documenting clinical information in the electronic or other health record  Independently interpreting results (not reported separately) and communicating results to the patient/family/caregiver  Care coordination and discharge planning with Case Management.      Dear patient, if you are reviewing this note and have a question regarding the medical terminology, please bring it with you to your next PCP visit.  Medical notes are meant to be a communication between medical professionals.  Additionally, portion of this note were created using voice recognition function, syntax and phonetic over may have escaped proofreading.    Joann Guerrero MD   
lower ext numbness on both sides and unable to walk. This has been new last 2 weeks or so.  He has leucocytosis, treated with Vanco and cefepime prior to DC. He had leucocytosis throughout the hospital stay, negative cultures. Referral to rheumatology- but no note of eval found on Care everywhere.    Seen at Neurology office, and sent to ED for concern of GBS for further work up. Found to have leucocytosis, severe anemia of <6, stool blood +, cxr clear, CT with right sided pyelitis.   Placed on vancomycin and cefepime.    On exam, sitting up in bed, getting PRBC on exam. Denies chest/abd/LE pain. No flank pain. No F/C/N/V.        Past Surgical History:   Procedure Laterality Date    CARDIAC CATHETERIZATION  11/2020    CERVICAL FUSION  2001    Posterior    CERVICAL FUSION  11/07/2012    SPINE POSTERIOR CERVICAL FUSION - POSTERIOR C4-T1 LAMINECTOMY & FUSION W/C-ARM    COLONOSCOPY N/A 06/19/2024    COLONOSCOPY performed by Mariam Ken MD at [facility] ENDOSCOPY    CYSTOSCOPY N/A 10/17/2024    CYSTOSCOPY , LEFT RETROGRADE PYELOGRAM, LEFT URETEROSCOPY WITH THULIUM LASER LITHOTRIPSY, LEFT STENT PLACEMENT WITH C-ARM 'SPEC POP\" performed by Ronaldo Mason MD at [facility] MAIN OR    CYSTOSCOPY Right 2/27/2025    CYSTOSCOPY , RIGHT RETROGRADE PYELOGRAM, RIGHT URETEROSCOPY WITH THULIUM LASER LITHOTRIPSY , RIGHT JJ STENT PLACEMENT WITH C-ARM performed by Ronaldo Mason MD at [facility] MAIN OR    ENDOSCOPY, COLON, DIAGNOSTIC  2014    EPIDURAL BLOOD PATCH  02/12/2013    after headche from myelogram    ERCP  08/13/2014    ESOPHAGOGASTRODUODENOSCOPY  08/22/2012    FL CHOLANGIOPANCREATOGRAM T TUBE  11/15/2013    Endoscopic retrograde cholangiopancreatography    FL CHOLANGIOPANCREATOGRAM T TUBE  01/31/2014    Endoscopic retrograde cholangiopancreatography    FL CHOLANGIOPANCREATOGRAM T TUBE  06/11/2014    Endoscopic retrograde cholangiopancreatography    HAND SURGERY Left     2012,2010    NECK SURGERY  2017    & 2019  RFA    ORTHOPEDIC 
with ECG of 24-APR-2025 15:46,  No significant change was found  Confirmed by Chela Mathis MD ([id number]) on 5/5/2025 7:29:19 AM       04/24/25    ECHO (TTE) COMPLETE (PRN CONTRAST/BUBBLE/STRAIN/3D) 04/25/2025  5:07 PM (Final)    Interpretation Summary    Left Ventricle: Low normal left ventricular systolic function with a visually estimated EF of 50 - 55%. EF by 2D Simpsons Biplane is 53%. Left ventricle size is normal. Normal wall thickness. Normal wall motion. Global longitudinal strain is reduced with a value of -15.1%. Diastolic dysfunction present (Grade I) with normal LV EF. Average E/e' ratio is 6.41.    Right Ventricle: Right ventricle size is normal. Normal systolic function. TAPSE is 1.8 cm. RV Peak S' is 13.9 cm/s.    Aortic Valve: Trileaflet valve. There is no vegetation present.    Mitral Valve: Mild regurgitation. There is no vegetation present.    Tricuspid Valve: Trace regurgitation. No vegetation present.    Pulmonic Valve: Physiologically normal regurgitation. No vegetation present.    Aorta: Mildly dilated aortic root. Ao root diameter is 3.8 cm. Mildly dilated descending aorta.    Image quality is good.    Signed by: Jarad Heard MD on 4/25/2025  5:07 PM    Xray Result (most recent):  XR CHEST PORTABLE 05/02/2025    Narrative  PORTABLE CHEST RADIOGRAPH/S: 5/2/2025 12:08 PM    INDICATION: Sepsis    COMPARISON: 4/24/2025, 10/9/2024, 11/27/2023.    TECHNIQUE: Portable frontal upright radiograph/s of the chest.    FINDINGS:  The lungs are clear. The central airways are patent. No pneumothorax or pleural  effusion.    Impression  Clear lungs.    Electronically signed by Javier Jarrett      Case discussed with:  [x]Patient  []Family [] Primary care physician  [x]Nursing  []Case Management    TIME:  40 minutes were spent on the  care of this patient today,  This time also includes physician non-face-to-face service time visit on the date of service such as  Preparing to see the patient (eg, review 
communication between medical professionals.  Additionally, portion of this note were created using voice recognition function, syntax and phonetic over may have escaped proofreading.    Joann Guerrero MD   
diameter is 3.8 cm. Mildly dilated descending aorta.    Image quality is good.    Signed by: Jarad Heard MD on 4/25/2025  5:07 PM    Xray Result (most recent):  XR CHEST PORTABLE 05/14/2025    Narrative  EXAM:  XR CHEST PORTABLE    INDICATION: central line placement    COMPARISON: 5/14/2025 at 1233 hours    TECHNIQUE: 1500 hours portable chest AP view    FINDINGS: The right IJ catheter overlies the SVC. NG tube tip is in the stomach.  Heart size is normal. Lungs demonstrate persistent low lung volumes with basal  atelectasis left greater than right unchanged.    Impression  1. No pneumothorax      Electronically signed by Cong Charles      Case discussed with:  [x]Patient  []Family [] Primary care physician  [x]Nursing  []Case Management    TIME:  40 minutes were spent on the  care of this patient today,  This time also includes physician non-face-to-face service time visit on the date of service such as  Preparing to see the patient (eg, review of tests)  Obtaining and/or reviewing separately obtained history  Performing a medically necessary appropriate examination and/or evaluation  Counseling and educating the patient/family/caregiver  Ordering medications, tests, or procedures  Referring and communicating with other health care professionals as needed  Documenting clinical information in the electronic or other health record  Independently interpreting results (not reported separately) and communicating results to the patient/family/caregiver  Care coordination and discharge planning with Case Management.      Chela Mathis MD  May 14, 2025  Chela Mathis MD, [facility]  Interventional Cardiologist  [facility]  [id number] [place] [address],   [address],  [place] [address] [phone]    Elements of old note were copied to maintain continuity and comprehensiveness of patient details. The assessment and plan has been reviewed and modified to reflect the most recent condition.   Dictation was 
Mobility:  Bed Mobility:   Bed Mobility Training  Bed Mobility Training: Yes  Supine to Sit: Contact guard assistance  Sit to Supine: Contact guard assistance  Transfers:   Transfer Training  Transfer Training: Yes  Interventions: Safety awareness training;Verbal cues  Sit to Stand: Partial/Moderate assistance  Stand to Sit: Minimal assistance  Balance:   Balance  Sitting: Intact  Standing: Impaired;With support  Standing - Static: Constant support;Fair  Standing - Dynamic: Constant support;Fair  Ambulation/Gait Training:  Gait  Gait Training: Yes  Overall Level of Assistance: Partial/Moderate assistance  Distance (ft): 5 Feet  Assistive Device: Gait belt;Walker, rolling  Interventions: Verbal cues;Tactile cues;Safety awareness training  Base of Support: Narrowed  Speed/Kristen: Slow  Step Length: Left shortened;Right shortened  Gait Abnormalities: Antalgic;Decreased step clearance  Pain:  Pain level pre-treatment: 7/10   Pain level post-treatment: 7/10   Pain Intervention(s): Medication (see MAR); Rest, Ice, Repositioning  Response to intervention: Nurse notified, See doc flow    Activity Tolerance:   Activity Tolerance: Patient tolerated evaluation without incident;Patient tolerated treatment well    After treatment:   []         Patient left in no apparent distress sitting up in chair  [x]         Patient left in no apparent distress in bed  [x]         Call bell left within reach  [x]         Nursing notified  []         Caregiver present  [x]         Bed alarm activated  []         SCDs applied    COMMUNICATION/EDUCATION:   Patient Education  Education Given To: Patient  Education Provided: Role of Therapy;Plan of Care;Home Exercise Program;Transfer Training;Equipment;Fall Prevention Strategies;Mobility Training  Education Method: Demonstration;Verbal  Barriers to Learning: None  Education Outcome: Verbalized understanding;Continued education needed    Thank you for this referral.  Pattie Topete PT  Minutes: 
pressure approximately 13 cmH2O. Electronically signed by Erin Stallworth    Echo (TTE) complete (PRN contrast/bubble/strain/3D)  Result Date: 5/4/2025    Image quality is technically difficult. Technically difficult study due to patient's body habitus.   Left Ventricle: Low normal left ventricular systolic function with a visually estimated EF of 50 - 55%. Left ventricle size is normal. Normal wall thickness. See diagram for wall motion findings. Diastolic dysfunction present with normal LV EF. Grade I diastolic dysfunction present with normal LV EF.   Aorta: Mildly dilated aortic root. Ao root diameter is 3.8 cm.   Tricuspid Valve: Unable to assess RVSP due to inadequate or insignificant tricuspid regurgitation.   Pericardium : No pericardial effusion.     MRI LUMBAR SPINE WO CONTRAST  Result Date: 5/3/2025  EXAM: MRI LUMBAR SPINE WO CONTRAST INDICATION: Paresthesias, weakness bilateral lower extremity. Guillain-[place] syndrome COMPARISON: None TECHNIQUE: MR imaging of the lumbar spine was performed using the following sequences: sagittal T1, T2, STIR;  axial T1, T2. Moderate motion is present on this study. CONTRAST:  None. FINDINGS: Diffuse bladder wall thickening. There is a dextroconvex curvature of the lumbar spine.. Vertebral body heights are maintained. Moderate edema L5-S1 endplate. Bilateral chronic L5 spondylolysis with anterolisthesis of L5 by 9.5 mm. The conus medullaris terminates at L1. Signal and caliber of the distal spinal cord are within normal limits. The paraspinal soft tissues are within normal limits. Lower thoracic spine: No herniation or stenosis. L1-L2: No herniation or stenosis. L2-L3: No herniation or stenosis. L3-L4: No herniation or stenosis. L4-L5: Disc: Marked right-sided facet arthropathy. Mild right-sided neural foraminal stenosis. L5-S1: Disc osteophyte complex. Bilateral severe neural foraminal stenosis. No central stenosis.     Grade 1 anterolisthesis of L5 secondary to bilateral 
tablet 1 tablet  1 tablet Oral BID Leon Rodgers PA-C   1 tablet at 05/04/25 2058    atorvastatin (LIPITOR) tablet 20 mg  20 mg Oral Nightly Leon Rodgers PA-C   20 mg at 05/04/25 2057    gabapentin (NEURONTIN) capsule 300 mg  300 mg Oral TID Leon Rodgers, PA-C   300 mg at 05/05/25 1557    linaclotide (LINZESS) capsule 145 mcg (Patient Supplied)  145 mcg Oral Daily Leon Rodgers PA-C        lipase-protease-amylase (ZENPEP) delayed release capsule 10,000 Units  10,000 Units Oral TID WC Leon Rodgers PA-C   10,000 Units at 05/04/25 1820    mirtazapine (REMERON) tablet 7.5 mg  7.5 mg Oral Daily Leon Rodgers PA-C   7.5 mg at 05/04/25 2057    QUEtiapine (SEROQUEL) tablet 150 mg  150 mg Oral Nightly Leon Rodgers PA-C   150 mg at 05/04/25 2057    glucose chewable tablet 16 g  4 tablet Oral PRN Leon Rodgers PA-C        dextrose bolus 10% 125 mL  125 mL IntraVENous PRN Leon Rodgers PA-C        Or    dextrose bolus 10% 250 mL  250 mL IntraVENous PRN Leon Rodgers PA-C        glucagon injection 1 mg  1 mg SubCUTAneous PRN Leon Rodgers PA-C        dextrose 10 % infusion   IntraVENous Continuous PRN Leon Rodgers PA-C        insulin lispro (HUMALOG,ADMELOG) injection vial 0-4 Units  0-4 Units SubCUTAneous 4x Daily AC & HS Leon Rodgers PA-C   1 Units at 05/05/25 0845    insulin glargine (LANTUS) injection vial 10 Units  10 Units SubCUTAneous Nightly Joann Guerrero MD   10 Units at 05/04/25 2058    0.9 % sodium chloride infusion   IntraVENous PRN Irina Britt MD        oxyCODONE (ROXICODONE) immediate release tablet 10 mg  10 mg Oral Q6H PRN Irina Britt MD   10 mg at 05/04/25 2103        Review of Systems     Negative Unless BOLDED     General: fevers-low grade, chills, myalgias, arthralgias, unexplained weight loss, malaise, fatigue.  HEENT:  headaches, recent URI, recent dental procedures;  tinnitus, hearing loss , visual changes, dizziness or blurred vision  PUlMONARY:  cough , shortness of breath, sputum production, hx 
Ashley Henry MD   300 mg at 05/15/25 2041    glucose chewable tablet 16 g  4 tablet Oral PRN Jan Dorantes MD        dextrose bolus 10% 125 mL  125 mL IntraVENous PRJan Mathew MD        Or    dextrose bolus 10% 250 mL  250 mL IntraVENous PRJan Mathew MD        glucagon injection 1 mg  1 mg SubCUTAneous PRN Jan Dorantes MD        dextrose 10 % infusion   IntraVENous Continuous PRJan Mathew MD        insulin lispro (HUMALOG,ADMELOG) injection vial 0-16 Units  0-16 Units SubCUTAneous 4x Daily AC & HS Jan Dorantes MD   4 Units at 05/14/25 1407    pantoprazole (PROTONIX) 40 mg in sodium chloride (PF) 0.9 % 10 mL injection  40 mg IntraVENous Q12H Joann Guerrero MD   40 mg at 05/16/25 0639    Valbenazine Tosylate CAPS 80 mg (Patient Supplied)  80 mg Oral Nightly Joann Guerrero MD   80 mg at 05/10/25 2112    ondansetron (ZOFRAN-ODT) disintegrating tablet 4 mg  4 mg Oral Q8H PRN Leon Rodgers PA-C        Or    ondansetron (ZOFRAN) injection 4 mg  4 mg IntraVENous Q6H PRN Leon Rodgers PA-C   4 mg at 05/08/25 1833    acetaminophen (TYLENOL) tablet 650 mg  650 mg Oral Q6H PRN Leon Rodgers PA-C   650 mg at 05/14/25 0600    Or    acetaminophen (TYLENOL) suppository 650 mg  650 mg Rectal Q6H PRN Leon Rodgers PA-C   650 mg at 05/14/25 1124    sennosides-docusate sodium (SENOKOT-S) 8.6-50 MG tablet 1 tablet  1 tablet Oral BID Leon Rodgers PA-C   1 tablet at 05/15/25 2039    atorvastatin (LIPITOR) tablet 20 mg  20 mg Oral Nightly Leon Rodgers PA-C   20 mg at 05/15/25 2039    lipase-protease-amylase (ZENPEP) delayed release capsule 10,000 Units  10,000 Units Oral TID WC Leon Rodgers PA-C   10,000 Units at 05/13/25 1718    mirtazapine (REMERON) tablet 7.5 mg  7.5 mg Oral Daily Leon Rodgers PA-C   7.5 mg at 05/15/25 2039    insulin glargine (LANTUS) injection vial 10 Units  10 Units SubCUTAneous Nightly Joann Guerrero MD   10 Units at 05/14/25 2053    oxyCODONE (ROXICODONE) immediate release tablet 10 mg  10 mg 
collected and sent for analysis. (4) Closing pressure approximately 13 cmH2O.      Echo (TTE) complete (PRN contrast/bubble/strain/3D)  5/4/2025    Image quality is technically difficult. Technically difficult study due to patient's body habitus.   Left Ventricle: Low normal left ventricular systolic function with a visually estimated EF of 50 - 55%. Left ventricle size is normal. Normal wall thickness. See diagram for wall motion findings. Diastolic dysfunction present with normal LV EF. Grade I diastolic dysfunction present with normal LV EF.   Aorta: Mildly dilated aortic root. Ao root diameter is 3.8 cm.   Tricuspid Valve: Unable to assess RVSP due to inadequate or insignificant tricuspid regurgitation.   Pericardium : No pericardial effusion.     MRI LUMBAR SPINE WO CONTRAST   5/3/2025  Grade 1 anterolisthesis of L5 secondary to bilateral spondylolysis   Severe bilateral L5-S1 neural foraminal stenosis   Dextroconvex lumbar curvature. Mild cystitis     MRI THORACIC SPINE WO CONTRAST  : 5/3/2025  Normal study for age     MRI BRAIN WO CONTRAST  : 5/3/2025   IMPRESSION: There is no evidence of central cord signal abnormality or canal stenosis. No acute process is identified. Moderate to severe bilateral foraminal stenosis at C3-C4 and mild to moderate left foraminal stenosis at C2-C3..     MRI CERVICAL SPINE WO CONTRAST  : 5/3/2025  Normal MRI of the brain. There is no intracranial mass, hemorrhage or evidence of acute infarction. No acute intracranial process is demonstrated. MR C-spine: Clinical history: Weakness left leg INDICATION:   Weakness left leg COMPARISON: None TECHNIQUE: MR imaging of the cervical spine was performed including sagittal T1, T2, STIR;  axial GRE, T1.  Contrast was not administered. FINDINGS: There is reversal of cervical lordosis. The canal is decompressed from C3-C7. There is no cord signal abnormality or fracture.. Vertebral body heights are maintained. Marrow signal is normal.  The 
ureteritis/ascending infection. Correlation with urinalysis and culture recommended. 2. AAA (39 x 40 mm). 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 22Q Electronically signed by Javier Jarrett    CT CHEST W CONTRAST  Result Date: 5/2/2025  CT ANGIOGRAPHY CHEST and CT OF THE ABDOMEN AND PELVIS WITH CONTRAST. 5/2/2025 3:33 PM INDICATION: Sepsis, ICU admission, Guillain-[place] syndrome, decreased hemoglobin, AAA, Hemoccult positive stool. COMPARISON: None. TECHNIQUE: CT angiography of the chest was performed after the administration of iodinated IV contrast. Coronal and sagittal, and coronal MIP reconstructions were performed. CT of the abdomen and pelvis was performed after the same IV contrast administration. CT dose reduction was achieved through use of a standardized protocol tailored for this examination and automatic exposure control for dose modulation. [Billing note only, not part of diagnostic report: DMPI -please bill as described, despite order title.] FINDINGS: The patient was scanned with the arm(s) at side(s), causing scatter and photon starvation artifact over the examination. The radiation dose is inappropriately low, decreasing signal-to-noise resolution and limiting the examination. The average mA is ~60. Typically, with this body habitus, an mA at least 300-400 would provide better imaging. Chest: The contrast bolus timing is suboptimal, with equal contrast in the systemic and pulmonary arteries. There is no large central pulmonary embolism in the main or lobar pulmonary arteries, and probably no segmental pulmonary embolism. The segmental and subsegmental pulmonary arteries are suboptimally evaluated. A sub-6 mm nodule in the subpleural right lower lobe requires no follow-up. There is subsegmental atelectasis in the lower lobes. The lungs are otherwise clear. The central airways are patent. No pneumothorax or pleural effusion. The heart size is normal. Coronary artery calcification is mild in the 
cleared or approved by the  Food and Drug Administration.          References: Comment        Comment: (NOTE)  1) Kellie Lloyd. Molecular monitoring of chronic myeloid    leukemia. Semin Hematol. 2003 Apr; 40(2 Suppl 2):62-68.  2) NCCN Clinical Practice Guidelines in Oncology Chronic Myeloid    Leukemia Version 1.2025 - August 8, 2024  3) Radha Samson, Kurt P, et al. Establishment of the    of the first World Health Organization International Genetic    Reference Panel for quantitation of BCR-ABL mRNA. Blood. 2010    25; 116(22):[id number].  Performed At:  [facility]  [address] [place], [place] [id number]  [facility] Ph:[phone]  Performed At: [facility]  [address] [place], [place] [id number]  [facility] Ph:[phone]         Culture, CSF (with Gram Stain) [[id number]] Collected: 05/05/25 1310    Order Status: Completed Specimen: CSF Updated: 05/12/25 0652     Special Requests NO SPECIAL REQUESTS        Gram Stain OCCASIONAL WBCS SEEN         NO ORGANISMS SEEN               Gram stain performed on cytocentrifuged sample. Results may reflect greater recovery of cells and organisms present.           Culture NO GROWTH 7 DAYS              Labs: Results:   Chemistry Recent Labs     05/17/25  0359 05/18/25  0230 05/19/25  0441    146* 143   K 3.7 4.1 4.6   * 109* 106   CO2 21 24 25   BUN 23 22 25*      CBC w/Diff Recent Labs     05/17/25  0359 05/18/25  0230   WBC 23.5* 19.6*   RBC 3.13* 3.04*   HGB 8.6* 8.4*   HCT 28.2* 27.5*    340            No results found for: \"SDES\" No components found for: \"CULT\"         Imaging:   All imaging reviewed from Admission to present as per radiology interpretation in [facility]    Radiology reports reviewed:    MRI LUMBAR SPINE WO CONTRAST  Result Date: 5/3/2025  EXAM: MRI LUMBAR SPINE WO CONTRAST INDICATION: Paresthesias, weakness bilateral lower extremity. Guillain-[place] syndrome COMPARISON: None TECHNIQUE: 
is no vegetation present.   Tricuspid Valve: Trace regurgitation. No vegetation present.   Pulmonic Valve: Physiologically normal regurgitation. No vegetation present.   Aorta: Mildly dilated aortic root. Ao root diameter is 3.8 cm. Mildly dilated descending aorta.   Image quality is good.          05/02/25    ECHO (TTE) COMPLETE (PRN CONTRAST/BUBBLE/STRAIN/3D) 05/04/2025  2:37 PM (Final)    Interpretation Summary    Image quality is technically difficult. Technically difficult study due to patient's body habitus.    Left Ventricle: Low normal left ventricular systolic function with a visually estimated EF of 50 - 55%. Left ventricle size is normal. Normal wall thickness. See diagram for wall motion findings. Diastolic dysfunction present with normal LV EF. Grade I diastolic dysfunction present with normal LV EF.    Aorta: Mildly dilated aortic root. Ao root diameter is 3.8 cm.    Tricuspid Valve: Unable to assess RVSP due to inadequate or insignificant tricuspid regurgitation.    Pericardium : No pericardial effusion.    Signed by: Chela Mathis MD on 5/4/2025  2:37 PM           Documentation Time:    >45 minutes were spent on the care of this patient today, including physician non-face-to-face service time visit on the date of service such as  Preparing to see the patient (eg, review of tests)  Obtaining and/or reviewing separately obtained history  Performing a medically necessary appropriate examination and/or evaluation  Counseling and educating the patient/family/caregiver  Ordering medications, tests, or procedures  Referring and communicating with other health care professionals as needed  Documenting clinical information in the electronic or other health record  Independently interpreting results (not reported separately) and communicating results to the patient/family/caregiver  Care coordination and discharge planning with Case Management.        Dear patient Lan Gonzalez, if you are reviewing this 
to severe bilateral foraminal stenosis at C3-C4 and mild to moderate left foraminal stenosis at C2-C3..     MRI CERVICAL SPINE WO CONTRAST  : 5/3/2025  Normal MRI of the brain. There is no intracranial mass, hemorrhage or evidence of acute infarction. No acute intracranial process is demonstrated. MR C-spine: Clinical history: Weakness left leg INDICATION:   Weakness left leg COMPARISON: None TECHNIQUE: MR imaging of the cervical spine was performed including sagittal T1, T2, STIR;  axial GRE, T1.  Contrast was not administered. FINDINGS: There is reversal of cervical lordosis. The canal is decompressed from C3-C7. There is no cord signal abnormality or fracture.. Vertebral body heights are maintained. Marrow signal is normal.  The craniocervical junction is intact. The course, caliber, and signal intensity of the spinal cord are normal.  C2/3: Mild facet arthropathy. Disc bulge. Canal is widely patent. Mild to moderate left foraminal stenosis.. C3/4: Mild facet arthropathy. Disc bulge. Canal is widely patent. Moderate to severe bilateral foraminal stenosis.. C4/5:   The spinal canal and neuroforamina are widely patent. C5/6: Mild facet arthropathy. Canal is widely patent. Mild foraminal stenosis.. C6/7:   The spinal canal and neuroforamina are widely patent.  C7/T1:   The spinal canal and neuroforamina are widely patent.   IMPRESSION: There is no evidence of central cord signal abnormality or canal stenosis. No acute process is identified. Moderate to severe bilateral foraminal stenosis at C3-C4 and mild to moderate left foraminal stenosis at C2-C3..     CTA ABDOMEN PELVIS W WO CONTRAST  5/2/2025  1. Possible right pyelitis and ureteritis/ascending infection. Correlation with urinalysis and culture recommended.   2. AAA (39 x 40 mm). 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 22Q Electronically signed by Javier Jarrett    CT CHEST W CONTRAST  : 5/2/2025  There is no large central pulmonary embolism in the main 
separately) and communicating results to the patient/family/caregiver  Care coordination and discharge planning with Case Management.          Dear patient Lan Gonzalez, if you are reviewing this note and have a question regarding the medical terminology, please bring it with you to your next PCP visit.  Medical notes are meant to be a communication between medical professionals.        Electronically signed by: [facility]  05/14/25        
4/24/2025  EXAMINATION: XR CHEST PORTABLE CLINICAL INDICATION: Male, 59 years old with Sepsis COMPARISON: Chest radiograph dated 10/9/2024 TECHNIQUE: Single view(s) of the chest submitted. FINDINGS: Support Devices: None. Mediastinum: Cardiomediastinal silhouette is stable in size. Lungs: Lungs are well aerated and clear. Pleura: No pleural effusion is identified. No pneumothorax is present. Bones: Visualized skeleton is stable in appearance.     No acute cardiopulmonary disease. Electronically signed by Kay Salinas      ASSESSMENT/IMPRESSION:   59-year-old male with history of uncontrolled diabetes, right lower limb cellulitis, stroke, diabetic neuropathy, recent hospitalization for shortness of breath and sepsis presents with 1 to 2-week history of paresthesia involving bilateral feet and leg and bilateral lower limbs and trunk weakness.   Proximal leg weakness: Resolved today.  It is related to severe B12 deficiency and diabetic neuropathy.  Bilateral feet pain: Related to severe B12 deficiency and diabetic neuropathy.  Radiating pain in right leg and foot: Related to lumbosacral radiculopathy.  Sepsis  Hypotension  Severe anemia     RECOMMENDATIONS:  Continue B12 1000 mg IM.  Increase gabapentin 600 mg 3 times daily.  Follow-up with orthopedic surgery outpatient for lumbar radiculopathy.  Physical therapy when patient is not required to have bed rest.     Neurology signed off, please do not hesitate to call for questions.  Signed:  Ashley Henry MD  5/6/2025  12:59 PM  
Collection Time: 05/15/25  1:09 PM   Result Value Ref Range    Vancomycin Rm 25.8 5.0 - 40.0 UG/ML   Hemoglobin and Hematocrit    Collection Time: 05/15/25  1:09 PM   Result Value Ref Range    Hemoglobin 7.4 (L) 13.0 - 16.0 g/dL    Hematocrit 23.4 (L) 36.0 - 48.0 %   Troponin    Collection Time: 05/15/25  1:54 PM   Result Value Ref Range    Troponin T 74.4 (H) 0.0 - 22.0 ng/L           ABG No results for input(s): \"MODE\", \"POCTV\", \"POCFIO2\", \"POCPEEPCPA\", \"PHAPOC\", \"TJJ4HRFZ\", \"YR2RXYF\", \"WB4WKEEB\", \"IYR5CFR\" in the last 72 hours.       CMP Recent Labs     05/13/25 0043 05/14/25  0525 05/14/25  1254 05/15/25  0539   * 133*  --  142   K 3.8 3.8  --  3.0*   CL 98 96*  --  107   CO2 25 26  --  24   BUN 29* 29*  --  30*   ANIONGAP 12 12  --  11   CREATININE 1.20 1.13  --  1.07   GLUCOSE 103 216*  --  128*   CALCIUM 8.1* 8.6  --  7.5*   GLOB  --   --  2.8  --    BILITOT  --   --  0.6  --    ALKPHOS  --   --  163*  --    AST  --   --  34  --    ALT  --   --  13  --         Ionized Calcium:   No components found for: \"IONCA\"    No components found for: \"IONCA\"  Invalid input(s): \"IONCA\"    Magnesium:   No results for input(s): \"MG\" in the last 72 hours.     Phosphorus:   Recent Labs     05/13/25 0043 05/14/25  0525 05/15/25  0539   PHOS 2.1* 2.3* 3.5       Amylase, Lipase:   Lipase   Date/Time Value Ref Range Status   05/14/2025 12:54 PM 5 (L) 13 - 75 U/L Final     Recent Labs     05/14/25  1254   LIPASE 5*       Lipase:   Lipase   Date/Time Value Ref Range Status   05/14/2025 12:54 PM 5 (L) 13 - 75 U/L Final     Recent Labs     05/14/25  1254   LIPASE 5*       Ammonis:   Lab Results   Component Value Date/Time    AMMONIA 21 05/14/2025 03:11 PM      Recent Labs     05/14/25  1511   AMMONIA 21        Lactic Acid Lactic Acid, Plasma   Date Value Ref Range Status   04/26/2025 1.1 0.4 - 2.0 MMOL/L Final     POC Lactic Acid   Date Value Ref Range Status   05/02/2025 4.81 (HH) 0.40 - 2.00 mmol/L Final   04/24/2025 2.24 
lumbar dextroconvexity and bilateral L5 pars interarticularis defects.     1. Possible right pyelitis and ureteritis/ascending infection. Correlation with urinalysis and culture recommended. 2. AAA (39 x 40 mm). 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 22Q Electronically signed by Javier Jarrett    XR CHEST PORTABLE  Result Date: 5/2/2025  PORTABLE CHEST RADIOGRAPH/S: 5/2/2025 12:08 PM INDICATION: Sepsis COMPARISON: 4/24/2025, 10/9/2024, 11/27/2023. TECHNIQUE: Portable frontal upright radiograph/s of the chest. FINDINGS: The lungs are clear. The central airways are patent. No pneumothorax or pleural effusion.     Clear lungs. Electronically signed by Javier Briggs MD  [facility]  Office #:     [phone]  [phone]- Option #8   Office Fax: [phone]      Note:  Medical notes are meant to be a communication between medical professionals.  Additionally, portion of this note were created using voice recognition function, syntax and phonetic over may have escaped proofreading.   If you are patient or family reviewing this note and have questions, please ask during rounds or bring it with you to your next doctors appointment.       Total time> 60 minutes  
lobar pulmonary arteries, and probably no segmental pulmonary embolism. The segmental and subsegmental pulmonary arteries are suboptimally evaluated. A sub-6 mm nodule in the subpleural right lower lobe requires no follow-up. There is subsegmental atelectasis in the lower lobes. The lungs are otherwise clear. The central airways are patent. No pneumothorax or pleural effusion. The heart size is normal. Coronary artery calcification is mild in the LAD and RCA. No thoracic lymphadenopathy. Abdomen: Post Whipple, with head pancreatectomy, distal gastrectomy and duodenectomy, pancreaticojejunostomy, hepaticojejunostomy, cholecystectomy, and gastrojejunostomy. Pneumobilia in the CBD, left intrahepatic ducts, and cystic duct remnant is expected after hepaticojejunostomy. There is extensive pancreatic parenchymal atrophy and calcification. Incidental note is made of small hepatic cysts. The esophagus, spleen, and adrenals are normal. Urography: A small left upper pole renal calculus is nonobstructing. Right ureterectasis and mild hydronephrosis is of unclear etiology. No obstructing urinary calculus. Urothelial enhancement in the collecting system and proximal ureter suggests pyelitis and ureteritis/ascending infection. A small right renal cyst requires no follow-up. Incidental note is made of a midline, posterior diverticulum. The bladder is otherwise normal. Pelvis: An abdominal aortic aneurysm at the level of the ANTONIA origin measures 39 x 40 mm. A right common iliac artery aneurysm measures 18 mm. The small bowel, ileocecal junction, appendix, and colon are normal. No free air or fluid, and no abdominopelvic lymphadenopathy. Incidental note is made of lumbar dextroconvexity and bilateral L5 pars interarticularis defects.     1. Possible right pyelitis and ureteritis/ascending infection. Correlation with urinalysis and culture recommended. 2. AAA (39 x 40 mm). 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 22Q Electronically 
FINDINGS: The enteric tube terminates in the stomach. The cardiac silhouette is within normal limits. The pulmonary vasculature is within normal limits. The lungs and pleural spaces are clear. There is no pneumothorax. The visualized bones and upper abdomen are age-appropriate.     The enteric tube terminates in the stomach. Electronically signed by Elroy Reyna    NM GI BLOOD LOSS  Result Date: 5/12/2025  EXAM: NM GI BLOOD LOSS INDICATION: rectal bleeding on anticoagulation. 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 COMPARISON: None. CORRELATIVE IMAGING STUDIES: None TRACER: 30.3 mCi Tc 99m tagged red blood cells. TECHNIQUE: Imaging of the abdomen was performed in the anterior projection following the uneventful intravenous administration of technetium 99m tagged red blood cells. FINDINGS: There is no abnormal tracer activity within bowel to suggest acute GI bleed.     No evidence of active GI bleed. Electronically signed by RAIMUNDO BONILLA     XR CHEST PORTABLE  Result Date: 5/14/2025  EXAM:  XR CHEST PORTABLE INDICATION: r/o pna.  Triggering sepsis. COMPARISON: May 12 TECHNIQUE: portable chest AP view FINDINGS The heart size remains normal, the inspiration remains shallow. Blunting of the costophrenic angle and vascular prominence at the lung bases remains. NG tube in place.     No change. Electronically signed by MD Marcia Anderson MD  [facility]  Office #:     [phone]- Option #8   Office Fax: [phone]      Note:  Medical notes are meant to be a communication between medical professionals.  Additionally, portion of this note were created using voice recognition function, syntax and phonetic over may have escaped proofreading.   If you are patient or family reviewing this note and have questions, please ask during rounds or bring it with you to your next doctors appointment.

## 2025-05-22 NOTE — PLAN OF CARE
Problem: Pain  Goal: Verbalizes/displays adequate comfort level or baseline comfort level  5/10/2025 0802 by Isaiah Abrams RN  Outcome: Progressing  5/9/2025 2206 by Jatin Alfaro RN  Outcome: Progressing     Problem: Chronic Conditions and Co-morbidities  Goal: Patient's chronic conditions and co-morbidity symptoms are monitored and maintained or improved  5/10/2025 0802 by Isaiah Abrams RN  Outcome: Progressing  5/9/2025 2206 by Jatin Alfaro RN  Outcome: Progressing     Problem: Discharge Planning  Goal: Discharge to home or other facility with appropriate resources  5/10/2025 0802 by Isaiah Abrams RN  Outcome: Progressing  5/9/2025 2206 by Jatin Alfaro RN  Outcome: Progressing     Problem: Skin/Tissue Integrity  Goal: Skin integrity remains intact  Description: 1.  Monitor for areas of redness and/or skin breakdown2.  Assess vascular access sites hourly3.  Every 4-6 hours minimum:  Change oxygen saturation probe site4.  Every 4-6 hours:  If on nasal continuous positive airway pressure, respiratory therapy assess nares and determine need for appliance change or resting period  5/10/2025 0802 by Isaiah Abrams RN  Outcome: Progressing  5/9/2025 2206 by Jatin Alfaro RN  Outcome: Progressing  Flowsheets (Taken 5/9/2025 2000)  Skin Integrity Remains Intact:   Monitor for areas of redness and/or skin breakdown   Assess vascular access sites hourly   Every 4-6 hours minimum:  Change oxygen saturation probe site   Turn and reposition as indicated     Problem: Safety - Adult  Goal: Free from fall injury  5/10/2025 0802 by Isaiah Abrams RN  Outcome: Progressing  5/9/2025 2206 by Jatin Alfaro RN  Outcome: Progressing     Problem: ABCDS Injury Assessment  Goal: Absence of physical injury  5/10/2025 0802 by Isaiah Abrams RN  Outcome: Progressing  5/9/2025 2206 by Jatin Alfaro RN  Outcome: Progressing     Problem: HH PROBLEMS WITH ACTIVITY-COPD  Description: Decreased tolerance to normal 
  Problem: Pain  Goal: Verbalizes/displays adequate comfort level or baseline comfort level  5/10/2025 2202 by Courtney Gorman RN  Outcome: Progressing  Flowsheets (Taken 5/10/2025 0845 by Isaiah Abrams RN)  Verbalizes/displays adequate comfort level or baseline comfort level: Encourage patient to monitor pain and request assistance  5/10/2025 0802 by Isaiah Abrams RN  Outcome: Progressing     Problem: Safety - Adult  Goal: Free from fall injury  5/10/2025 2202 by Courtney Gorman RN  Outcome: Progressing  Flowsheets (Taken 5/10/2025 2202)  Free From Fall Injury: Instruct family/caregiver on patient safety  5/10/2025 0802 by Isaiah Abrams RN  Outcome: Progressing     Problem: Chronic Conditions and Co-morbidities  Goal: Patient's chronic conditions and co-morbidity symptoms are monitored and maintained or improved  Recent Flowsheet Documentation  Taken 5/10/2025 0812 by Isaiah Abrams RN  Care Plan - Patient's Chronic Conditions and Co-Morbidity Symptoms are Monitored and Maintained or Improved:   Monitor and assess patient's chronic conditions and comorbid symptoms for stability, deterioration, or improvement   Collaborate with multidisciplinary team to address chronic and comorbid conditions and prevent exacerbation or deterioration  5/10/2025 0802 by Isaiah Abrams RN  Outcome: Progressing     
  Problem: Pain  Goal: Verbalizes/displays adequate comfort level or baseline comfort level  5/11/2025 0732 by Isaiah Abrams RN  Outcome: Progressing  5/10/2025 2202 by Courtney Gorman RN  Outcome: Progressing  Flowsheets (Taken 5/10/2025 0845 by Isaiah Abrams RN)  Verbalizes/displays adequate comfort level or baseline comfort level: Encourage patient to monitor pain and request assistance     Problem: Chronic Conditions and Co-morbidities  Goal: Patient's chronic conditions and co-morbidity symptoms are monitored and maintained or improved  Outcome: Progressing     Problem: Discharge Planning  Goal: Discharge to home or other facility with appropriate resources  5/11/2025 0732 by Isaiah Abrams RN  Outcome: Progressing  5/10/2025 2202 by Courtney Gorman RN  Outcome: Progressing  Flowsheets (Taken 5/10/2025 0812 by Isaiah Abrams RN)  Discharge to home or other facility with appropriate resources:   Identify barriers to discharge with patient and caregiver   Arrange for needed discharge resources and transportation as appropriate     Problem: Skin/Tissue Integrity  Goal: Skin integrity remains intact  Description: 1.  Monitor for areas of redness and/or skin breakdown2.  Assess vascular access sites hourly3.  Every 4-6 hours minimum:  Change oxygen saturation probe site4.  Every 4-6 hours:  If on nasal continuous positive airway pressure, respiratory therapy assess nares and determine need for appliance change or resting period  Outcome: Progressing     Problem: Safety - Adult  Goal: Free from fall injury  5/11/2025 0732 by Isaiah Abrams RN  Outcome: Progressing  5/10/2025 2202 by Courtney Gorman RN  Outcome: Progressing  Flowsheets (Taken 5/10/2025 2202)  Free From Fall Injury: Instruct family/caregiver on patient safety     Problem: ABCDS Injury Assessment  Goal: Absence of physical injury  Outcome: Progressing     Problem: HH PROBLEMS WITH ACTIVITY-COPD  Description: Decreased tolerance to normal 
  Problem: Pain  Goal: Verbalizes/displays adequate comfort level or baseline comfort level  5/13/2025 0010 by Sophie Carr RN  Outcome: Progressing  Flowsheets (Taken 5/12/2025 2108)  Verbalizes/displays adequate comfort level or baseline comfort level:   Encourage patient to monitor pain and request assistance   Assess pain using appropriate pain scale   Administer analgesics based on type and severity of pain and evaluate response  5/12/2025 1757 by Fer Hernandez RN  Outcome: Progressing     Problem: Chronic Conditions and Co-morbidities  Goal: Patient's chronic conditions and co-morbidity symptoms are monitored and maintained or improved  5/13/2025 0010 by Sophie Carr RN  Outcome: Progressing  Flowsheets (Taken 5/12/2025 2108)  Care Plan - Patient's Chronic Conditions and Co-Morbidity Symptoms are Monitored and Maintained or Improved:   Monitor and assess patient's chronic conditions and comorbid symptoms for stability, deterioration, or improvement   Collaborate with multidisciplinary team to address chronic and comorbid conditions and prevent exacerbation or deterioration   Update acute care plan with appropriate goals if chronic or comorbid symptoms are exacerbated and prevent overall improvement and discharge  5/12/2025 1757 by Fer Hernandez RN  Outcome: Progressing  Flowsheets (Taken 5/12/2025 0800)  Care Plan - Patient's Chronic Conditions and Co-Morbidity Symptoms are Monitored and Maintained or Improved:   Monitor and assess patient's chronic conditions and comorbid symptoms for stability, deterioration, or improvement   Collaborate with multidisciplinary team to address chronic and comorbid conditions and prevent exacerbation or deterioration   Update acute care plan with appropriate goals if chronic or comorbid symptoms are exacerbated and prevent overall improvement and discharge     Problem: Discharge Planning  Goal: Discharge to home or other facility with appropriate 
  Problem: Pain  Goal: Verbalizes/displays adequate comfort level or baseline comfort level  5/13/2025 0941 by Fer Hernandez RN  Outcome: Progressing  5/13/2025 0010 by Sophie Carr RN  Outcome: Progressing  Flowsheets (Taken 5/12/2025 2108)  Verbalizes/displays adequate comfort level or baseline comfort level:   Encourage patient to monitor pain and request assistance   Assess pain using appropriate pain scale   Administer analgesics based on type and severity of pain and evaluate response     Problem: Chronic Conditions and Co-morbidities  Goal: Patient's chronic conditions and co-morbidity symptoms are monitored and maintained or improved  5/13/2025 0941 by Fer Hernandez RN  Outcome: Progressing  Flowsheets (Taken 5/13/2025 0800)  Care Plan - Patient's Chronic Conditions and Co-Morbidity Symptoms are Monitored and Maintained or Improved:   Monitor and assess patient's chronic conditions and comorbid symptoms for stability, deterioration, or improvement   Collaborate with multidisciplinary team to address chronic and comorbid conditions and prevent exacerbation or deterioration   Update acute care plan with appropriate goals if chronic or comorbid symptoms are exacerbated and prevent overall improvement and discharge  5/13/2025 0010 by Sophie Carr RN  Outcome: Progressing  Flowsheets (Taken 5/12/2025 2108)  Care Plan - Patient's Chronic Conditions and Co-Morbidity Symptoms are Monitored and Maintained or Improved:   Monitor and assess patient's chronic conditions and comorbid symptoms for stability, deterioration, or improvement   Collaborate with multidisciplinary team to address chronic and comorbid conditions and prevent exacerbation or deterioration   Update acute care plan with appropriate goals if chronic or comorbid symptoms are exacerbated and prevent overall improvement and discharge     Problem: Discharge Planning  Goal: Discharge to home or other facility with appropriate 
  Problem: Pain  Goal: Verbalizes/displays adequate comfort level or baseline comfort level  5/13/2025 2233 by Jatin Alfaro RN  Outcome: Progressing  5/13/2025 0941 by Fer Hernandez RN  Outcome: Progressing     Problem: Chronic Conditions and Co-morbidities  Goal: Patient's chronic conditions and co-morbidity symptoms are monitored and maintained or improved  5/13/2025 2233 by Jatin Alfaro RN  Outcome: Progressing  5/13/2025 0941 by Fer Hernandez RN  Outcome: Progressing  Flowsheets (Taken 5/13/2025 0800)  Care Plan - Patient's Chronic Conditions and Co-Morbidity Symptoms are Monitored and Maintained or Improved:   Monitor and assess patient's chronic conditions and comorbid symptoms for stability, deterioration, or improvement   Collaborate with multidisciplinary team to address chronic and comorbid conditions and prevent exacerbation or deterioration   Update acute care plan with appropriate goals if chronic or comorbid symptoms are exacerbated and prevent overall improvement and discharge     Problem: Skin/Tissue Integrity  Goal: Skin integrity remains intact  Description: 1.  Monitor for areas of redness and/or skin breakdown2.  Assess vascular access sites hourly3.  Every 4-6 hours minimum:  Change oxygen saturation probe site4.  Every 4-6 hours:  If on nasal continuous positive airway pressure, respiratory therapy assess nares and determine need for appliance change or resting period  5/13/2025 2233 by Jatin Alfaro RN  Outcome: Progressing  Flowsheets (Taken 5/13/2025 2000)  Skin Integrity Remains Intact:   Monitor for areas of redness and/or skin breakdown   Assess vascular access sites hourly   Every 4-6 hours minimum:  Change oxygen saturation probe site   Turn and reposition as indicated  5/13/2025 0941 by Fer Hernandez RN  Outcome: Progressing  Flowsheets (Taken 5/13/2025 0800)  Skin Integrity Remains Intact:   Monitor for areas of redness and/or skin breakdown   Assess vascular 
  Problem: Pain  Goal: Verbalizes/displays adequate comfort level or baseline comfort level  5/18/2025 1213 by Sharri Mcgrath RN  Outcome: Progressing  5/18/2025 1213 by Sharri Mcgrath RN  Outcome: Progressing     Problem: Chronic Conditions and Co-morbidities  Goal: Patient's chronic conditions and co-morbidity symptoms are monitored and maintained or improved  5/18/2025 1213 by Sharri Mcgrath RN  Outcome: Progressing  5/18/2025 1213 by Sharri Mcgrath RN  Outcome: Progressing     Problem: Discharge Planning  Goal: Discharge to home or other facility with appropriate resources  5/18/2025 1213 by Sharri Mcgrath RN  Outcome: Progressing  5/18/2025 1213 by Sharri Mcgrath RN  Outcome: Progressing     Problem: Skin/Tissue Integrity  Goal: Skin integrity remains intact  Description: 1.  Monitor for areas of redness and/or skin breakdown2.  Assess vascular access sites hourly3.  Every 4-6 hours minimum:  Change oxygen saturation probe site4.  Every 4-6 hours:  If on nasal continuous positive airway pressure, respiratory therapy assess nares and determine need for appliance change or resting period  5/18/2025 1213 by Sharri Mcgrath RN  Outcome: Progressing  5/18/2025 1213 by Sharri Mcgrath RN  Outcome: Progressing     Problem: Safety - Adult  Goal: Free from fall injury  5/18/2025 1213 by Sharri Mcgrath RN  Outcome: Progressing  5/18/2025 1213 by Sharri Mcgrath RN  Outcome: Progressing     Problem: ABCDS Injury Assessment  Goal: Absence of physical injury  5/18/2025 1213 by Sharri Mcgrath RN  Outcome: Progressing  5/18/2025 1213 by Sharri Mcgrath RN  Outcome: Progressing     Problem: HH PROBLEMS WITH ACTIVITY-COPD  Goal: Ability to tolerate activity  Description: Patient will demonstrate increased endurance and ability to perform activities of daily living by maintenance of respiration and blood pressure while caring for self and performing routine tasks within 2 day(s).  5/18/2025 1213 
  Problem: Pain  Goal: Verbalizes/displays adequate comfort level or baseline comfort level  5/19/2025 0014 by Sameera Murphy RN  Outcome: Progressing  5/18/2025 1213 by Sharri Mcgrath RN  Outcome: Progressing  5/18/2025 1213 by Sharri Mcgrath RN  Outcome: Progressing     Problem: Chronic Conditions and Co-morbidities  Goal: Patient's chronic conditions and co-morbidity symptoms are monitored and maintained or improved  5/19/2025 0014 by Sameera Murphy RN  Outcome: Progressing  Flowsheets (Taken 5/18/2025 2000)  Care Plan - Patient's Chronic Conditions and Co-Morbidity Symptoms are Monitored and Maintained or Improved: Monitor and assess patient's chronic conditions and comorbid symptoms for stability, deterioration, or improvement  5/18/2025 1213 by Sharri Mcgrath RN  Outcome: Progressing  5/18/2025 1213 by Sharri Mcgrath RN  Outcome: Progressing  Flowsheets (Taken 5/18/2025 0730 by Fer Hernandez RN)  Care Plan - Patient's Chronic Conditions and Co-Morbidity Symptoms are Monitored and Maintained or Improved:   Monitor and assess patient's chronic conditions and comorbid symptoms for stability, deterioration, or improvement   Collaborate with multidisciplinary team to address chronic and comorbid conditions and prevent exacerbation or deterioration   Update acute care plan with appropriate goals if chronic or comorbid symptoms are exacerbated and prevent overall improvement and discharge     Problem: Discharge Planning  Goal: Discharge to home or other facility with appropriate resources  5/19/2025 0014 by Sameera Murphy RN  Outcome: Progressing  Flowsheets (Taken 5/18/2025 2000)  Discharge to home or other facility with appropriate resources: Identify barriers to discharge with patient and caregiver  5/18/2025 1213 by Sharri Mcgrath RN  Outcome: Progressing  5/18/2025 1213 by Sharri Mcgrath RN  Outcome: Progressing  Flowsheets (Taken 5/18/2025 0730 by Fer Hernandez 
  Problem: Pain  Goal: Verbalizes/displays adequate comfort level or baseline comfort level  5/21/2025 1143 by Melissa Obrien RN  Outcome: Progressing  5/21/2025 0352 by Ronald Chaves RN  Outcome: Progressing  Flowsheets (Taken 5/21/2025 0352)  Verbalizes/displays adequate comfort level or baseline comfort level:   Encourage patient to monitor pain and request assistance   Assess pain using appropriate pain scale   Administer analgesics based on type and severity of pain and evaluate response   Notify Licensed Independent Practitioner if interventions unsuccessful or patient reports new pain     Problem: Chronic Conditions and Co-morbidities  Goal: Patient's chronic conditions and co-morbidity symptoms are monitored and maintained or improved  5/21/2025 1143 by Melissa Obrien RN  Outcome: Progressing  Flowsheets (Taken 5/21/2025 0800)  Care Plan - Patient's Chronic Conditions and Co-Morbidity Symptoms are Monitored and Maintained or Improved:   Monitor and assess patient's chronic conditions and comorbid symptoms for stability, deterioration, or improvement   Collaborate with multidisciplinary team to address chronic and comorbid conditions and prevent exacerbation or deterioration   Update acute care plan with appropriate goals if chronic or comorbid symptoms are exacerbated and prevent overall improvement and discharge  5/21/2025 0352 by Ronald Chaves RN  Outcome: Progressing  Flowsheets (Taken 5/21/2025 0352)  Care Plan - Patient's Chronic Conditions and Co-Morbidity Symptoms are Monitored and Maintained or Improved:   Monitor and assess patient's chronic conditions and comorbid symptoms for stability, deterioration, or improvement   Collaborate with multidisciplinary team to address chronic and comorbid conditions and prevent exacerbation or deterioration   Update acute care plan with appropriate goals if chronic or comorbid symptoms are exacerbated and prevent overall improvement and discharge   
  Problem: Pain  Goal: Verbalizes/displays adequate comfort level or baseline comfort level  5/3/2025 2210 by Courtney Gorman RN  Outcome: Progressing  Flowsheets (Taken 5/3/2025 1200 by Amalia Blackwell RN)  Verbalizes/displays adequate comfort level or baseline comfort level: Encourage patient to monitor pain and request assistance  5/3/2025 1442 by Amalia Blackwell RN  Outcome: Progressing  Flowsheets  Taken 5/3/2025 1200  Verbalizes/displays adequate comfort level or baseline comfort level: Encourage patient to monitor pain and request assistance  Taken 5/3/2025 0800  Verbalizes/displays adequate comfort level or baseline comfort level:   Encourage patient to monitor pain and request assistance   Assess pain using appropriate pain scale     Problem: Chronic Conditions and Co-morbidities  Goal: Patient's chronic conditions and co-morbidity symptoms are monitored and maintained or improved  5/3/2025 2210 by Courtney Gorman RN  Outcome: Progressing  Flowsheets (Taken 5/3/2025 0800 by Amalia Blackwell RN)  Care Plan - Patient's Chronic Conditions and Co-Morbidity Symptoms are Monitored and Maintained or Improved: Monitor and assess patient's chronic conditions and comorbid symptoms for stability, deterioration, or improvement  5/3/2025 1442 by Amalia Blackwell RN  Outcome: Progressing  Flowsheets (Taken 5/3/2025 0800)  Care Plan - Patient's Chronic Conditions and Co-Morbidity Symptoms are Monitored and Maintained or Improved: Monitor and assess patient's chronic conditions and comorbid symptoms for stability, deterioration, or improvement     Problem: Discharge Planning  Goal: Discharge to home or other facility with appropriate resources  5/3/2025 1442 by Amalia Blackwell RN  Outcome: Progressing  Flowsheets (Taken 5/3/2025 0800)  Discharge to home or other facility with appropriate resources: Identify barriers to discharge with patient and caregiver     Problem: Skin/Tissue Integrity  Goal: Skin 
  Problem: Pain  Goal: Verbalizes/displays adequate comfort level or baseline comfort level  5/4/2025 2055 by Courtney Gorman RN  Outcome: Progressing  Flowsheets (Taken 5/3/2025 1200 by Amalia Blackwell RN)  Verbalizes/displays adequate comfort level or baseline comfort level: Encourage patient to monitor pain and request assistance  5/4/2025 0822 by Sharri Mcgrath RN  Outcome: Progressing     Problem: Safety - Adult  Goal: Free from fall injury  5/4/2025 2055 by Courtney Gorman RN  Outcome: Progressing  Flowsheets (Taken 5/4/2025 2055)  Free From Fall Injury:   Instruct family/caregiver on patient safety   Based on caregiver fall risk screen, instruct family/caregiver to ask for assistance with transferring infant if caregiver noted to have fall risk factors  5/4/2025 0822 by Sharri Mcgrath RN  Outcome: Progressing     
  Problem: Pain  Goal: Verbalizes/displays adequate comfort level or baseline comfort level  5/5/2025 2332 by Sameera Zuluaga RN  Outcome: Progressing  Flowsheets (Taken 5/5/2025 2159)  Verbalizes/displays adequate comfort level or baseline comfort level:   Encourage patient to monitor pain and request assistance   Assess pain using appropriate pain scale   Administer analgesics based on type and severity of pain and evaluate response     Problem: Chronic Conditions and Co-morbidities  Goal: Patient's chronic conditions and co-morbidity symptoms are monitored and maintained or improved  5/5/2025 2332 by Sameera Zuluaga RN  Outcome: Progressing  Flowsheets (Taken 5/5/2025 2030)  Care Plan - Patient's Chronic Conditions and Co-Morbidity Symptoms are Monitored and Maintained or Improved:   Collaborate with multidisciplinary team to address chronic and comorbid conditions and prevent exacerbation or deterioration   Monitor and assess patient's chronic conditions and comorbid symptoms for stability, deterioration, or improvement   Update acute care plan with appropriate goals if chronic or comorbid symptoms are exacerbated and prevent overall improvement and discharge     Problem: Discharge Planning  Goal: Discharge to home or other facility with appropriate resources  5/5/2025 2332 by Sameera Zuluaga RN  Outcome: Progressing  Flowsheets (Taken 5/5/2025 2030)  Discharge to home or other facility with appropriate resources:   Identify barriers to discharge with patient and caregiver   Arrange for needed discharge resources and transportation as appropriate   Identify discharge learning needs (meds, wound care, etc)     Problem: Skin/Tissue Integrity  Goal: Skin integrity remains intact  Description: 1.  Monitor for areas of redness and/or skin breakdown2.  Assess vascular access sites hourly3.  Every 4-6 hours minimum:  Change oxygen saturation probe site4.  Every 4-6 hours:  If on nasal continuous positive 
  Problem: Pain  Goal: Verbalizes/displays adequate comfort level or baseline comfort level  5/8/2025 0042 by Nhan Jane RN  Outcome: Progressing  Flowsheets (Taken 5/7/2025 2200)  Verbalizes/displays adequate comfort level or baseline comfort level:   Encourage patient to monitor pain and request assistance   Assess pain using appropriate pain scale   Administer analgesics based on type and severity of pain and evaluate response   Implement non-pharmacological measures as appropriate and evaluate response  5/7/2025 1443 by Sharri Mcgrath RN  Outcome: Progressing     Problem: Chronic Conditions and Co-morbidities  Goal: Patient's chronic conditions and co-morbidity symptoms are monitored and maintained or improved  5/8/2025 0042 by Nhan Jane RN  Outcome: Progressing  Flowsheets (Taken 5/7/2025 2200)  Care Plan - Patient's Chronic Conditions and Co-Morbidity Symptoms are Monitored and Maintained or Improved:   Monitor and assess patient's chronic conditions and comorbid symptoms for stability, deterioration, or improvement   Collaborate with multidisciplinary team to address chronic and comorbid conditions and prevent exacerbation or deterioration   Update acute care plan with appropriate goals if chronic or comorbid symptoms are exacerbated and prevent overall improvement and discharge  5/7/2025 1443 by Sharri Mcgrath RN  Outcome: Progressing     Problem: Discharge Planning  Goal: Discharge to home or other facility with appropriate resources  5/8/2025 0042 by Nhan Jane RN  Outcome: Progressing  Flowsheets (Taken 5/7/2025 2200)  Discharge to home or other facility with appropriate resources:   Identify barriers to discharge with patient and caregiver   Identify discharge learning needs (meds, wound care, etc)  5/7/2025 1443 by Sharri Mcgrath RN  Outcome: Progressing     Problem: Skin/Tissue Integrity  Goal: Skin integrity remains intact  Description: 1.  Monitor for areas of redness and/or skin 
  Problem: Pain  Goal: Verbalizes/displays adequate comfort level or baseline comfort level  5/9/2025 1015 by Ana Peter RN  Outcome: Progressing  5/9/2025 0017 by Nhan Jane RN  Outcome: Progressing  Flowsheets (Taken 5/8/2025 2000)  Verbalizes/displays adequate comfort level or baseline comfort level:   Encourage patient to monitor pain and request assistance   Assess pain using appropriate pain scale   Administer analgesics based on type and severity of pain and evaluate response   Implement non-pharmacological measures as appropriate and evaluate response     Problem: Chronic Conditions and Co-morbidities  Goal: Patient's chronic conditions and co-morbidity symptoms are monitored and maintained or improved  5/9/2025 1015 by Ana Peter RN  Outcome: Progressing  5/9/2025 0017 by Nhan Jane RN  Outcome: Progressing  Flowsheets (Taken 5/8/2025 2000)  Care Plan - Patient's Chronic Conditions and Co-Morbidity Symptoms are Monitored and Maintained or Improved:   Monitor and assess patient's chronic conditions and comorbid symptoms for stability, deterioration, or improvement   Collaborate with multidisciplinary team to address chronic and comorbid conditions and prevent exacerbation or deterioration   Update acute care plan with appropriate goals if chronic or comorbid symptoms are exacerbated and prevent overall improvement and discharge     Problem: Discharge Planning  Goal: Discharge to home or other facility with appropriate resources  5/9/2025 1015 by Ana Peter RN  Outcome: Progressing  5/9/2025 0017 by Nhan Jane RN  Outcome: Progressing  Flowsheets (Taken 5/8/2025 2000)  Discharge to home or other facility with appropriate resources:   Identify barriers to discharge with patient and caregiver   Identify discharge learning needs (meds, wound care, etc)     Problem: Skin/Tissue Integrity  Goal: Skin integrity remains intact  Description: 1.  Monitor for areas of redness and/or skin breakdown2.  
  Problem: Pain  Goal: Verbalizes/displays adequate comfort level or baseline comfort level  5/9/2025 2206 by Jatin Alfaro RN  Outcome: Progressing  5/9/2025 1015 by Ana Peter RN  Outcome: Progressing     Problem: Skin/Tissue Integrity  Goal: Skin integrity remains intact  Description: 1.  Monitor for areas of redness and/or skin breakdown2.  Assess vascular access sites hourly3.  Every 4-6 hours minimum:  Change oxygen saturation probe site4.  Every 4-6 hours:  If on nasal continuous positive airway pressure, respiratory therapy assess nares and determine need for appliance change or resting period  5/9/2025 2206 by Jatin Alfaro, RN  Outcome: Progressing  Flowsheets (Taken 5/9/2025 2000)  Skin Integrity Remains Intact:   Monitor for areas of redness and/or skin breakdown   Assess vascular access sites hourly   Every 4-6 hours minimum:  Change oxygen saturation probe site   Turn and reposition as indicated  5/9/2025 1015 by Ana Peter RN  Outcome: Progressing     Problem: Safety - Adult  Goal: Free from fall injury  5/9/2025 2206 by Jatin Alfaro, RN  Outcome: Progressing  5/9/2025 1015 by Ana Peter, RN  Outcome: Progressing     
  Problem: Pain  Goal: Verbalizes/displays adequate comfort level or baseline comfort level  Outcome: Not Progressing     Problem: Chronic Conditions and Co-morbidities  Goal: Patient's chronic conditions and co-morbidity symptoms are monitored and maintained or improved  Outcome: Not Progressing  Flowsheets  Taken 5/14/2025 1115 by Dev Vang RN  Care Plan - Patient's Chronic Conditions and Co-Morbidity Symptoms are Monitored and Maintained or Improved:   Monitor and assess patient's chronic conditions and comorbid symptoms for stability, deterioration, or improvement   Collaborate with multidisciplinary team to address chronic and comorbid conditions and prevent exacerbation or deterioration   Update acute care plan with appropriate goals if chronic or comorbid symptoms are exacerbated and prevent overall improvement and discharge  Taken 5/14/2025 0800 by Azalea Bolden RN  Care Plan - Patient's Chronic Conditions and Co-Morbidity Symptoms are Monitored and Maintained or Improved: Monitor and assess patient's chronic conditions and comorbid symptoms for stability, deterioration, or improvement     Problem: Pain  Goal: Verbalizes/displays adequate comfort level or baseline comfort level  Outcome: Not Progressing     Problem: Chronic Conditions and Co-morbidities  Goal: Patient's chronic conditions and co-morbidity symptoms are monitored and maintained or improved  Outcome: Not Progressing  Flowsheets  Taken 5/14/2025 1115 by Dev Vang RN  Care Plan - Patient's Chronic Conditions and Co-Morbidity Symptoms are Monitored and Maintained or Improved:   Monitor and assess patient's chronic conditions and comorbid symptoms for stability, deterioration, or improvement   Collaborate with multidisciplinary team to address chronic and comorbid conditions and prevent exacerbation or deterioration   Update acute care plan with appropriate goals if chronic or comorbid symptoms are exacerbated and prevent overall 
  Problem: Pain  Goal: Verbalizes/displays adequate comfort level or baseline comfort level  Outcome: Not Progressing  Flowsheets (Taken 5/14/2025 2000)  Verbalizes/displays adequate comfort level or baseline comfort level:   Encourage patient to monitor pain and request assistance   Assess pain using appropriate pain scale   Administer analgesics based on type and severity of pain and evaluate response   Implement non-pharmacological measures as appropriate and evaluate response   Notify Licensed Independent Practitioner if interventions unsuccessful or patient reports new pain   Consider cultural and social influences on pain and pain management     Problem: Chronic Conditions and Co-morbidities  Goal: Patient's chronic conditions and co-morbidity symptoms are monitored and maintained or improved  Outcome: Not Progressing  Flowsheets (Taken 5/14/2025 2000)  Care Plan - Patient's Chronic Conditions and Co-Morbidity Symptoms are Monitored and Maintained or Improved:   Monitor and assess patient's chronic conditions and comorbid symptoms for stability, deterioration, or improvement   Collaborate with multidisciplinary team to address chronic and comorbid conditions and prevent exacerbation or deterioration   Update acute care plan with appropriate goals if chronic or comorbid symptoms are exacerbated and prevent overall improvement and discharge     Problem: Discharge Planning  Goal: Discharge to home or other facility with appropriate resources  Outcome: Not Progressing  Flowsheets (Taken 5/14/2025 2000)  Discharge to home or other facility with appropriate resources:   Identify barriers to discharge with patient and caregiver   Arrange for needed discharge resources and transportation as appropriate   Identify discharge learning needs (meds, wound care, etc)   Refer to discharge planning if patient needs post-hospital services based on physician order or complex needs related to functional status, cognitive ability 
  Problem: Pain  Goal: Verbalizes/displays adequate comfort level or baseline comfort level  Outcome: Progressing     Problem: Chronic Conditions and Co-morbidities  Goal: Patient's chronic conditions and co-morbidity symptoms are monitored and maintained or improved  Outcome: Progressing     Problem: Discharge Planning  Goal: Discharge to home or other facility with appropriate resources  Outcome: Progressing     Problem: Skin/Tissue Integrity  Goal: Skin integrity remains intact  Description: 1.  Monitor for areas of redness and/or skin breakdown2.  Assess vascular access sites hourly3.  Every 4-6 hours minimum:  Change oxygen saturation probe site4.  Every 4-6 hours:  If on nasal continuous positive airway pressure, respiratory therapy assess nares and determine need for appliance change or resting period  Outcome: Progressing     Problem: Safety - Adult  Goal: Free from fall injury  Outcome: Progressing     Problem: ABCDS Injury Assessment  Goal: Absence of physical injury  Outcome: Progressing     Problem: HH PROBLEMS WITH ACTIVITY-COPD  Goal: Ability to tolerate activity  Description: Patient will demonstrate increased endurance and ability to perform activities of daily living by maintenance of respiration and blood pressure while caring for self and performing routine tasks within 2 day(s).  Outcome: Progressing     
  Problem: Pain  Goal: Verbalizes/displays adequate comfort level or baseline comfort level  Outcome: Progressing     Problem: Chronic Conditions and Co-morbidities  Goal: Patient's chronic conditions and co-morbidity symptoms are monitored and maintained or improved  Outcome: Progressing     Problem: Discharge Planning  Goal: Discharge to home or other facility with appropriate resources  Outcome: Progressing     Problem: Skin/Tissue Integrity  Goal: Skin integrity remains intact  Description: 1.  Monitor for areas of redness and/or skin breakdown2.  Assess vascular access sites hourly3.  Every 4-6 hours minimum:  Change oxygen saturation probe site4.  Every 4-6 hours:  If on nasal continuous positive airway pressure, respiratory therapy assess nares and determine need for appliance change or resting period  Outcome: Progressing     Problem: Safety - Adult  Goal: Free from fall injury  Outcome: Progressing     Problem: ABCDS Injury Assessment  Goal: Absence of physical injury  Outcome: Progressing     Problem: HH PROBLEMS WITH ACTIVITY-COPD  Goal: Ability to tolerate activity  Description: Patient will demonstrate increased endurance and ability to perform activities of daily living by maintenance of respiration and blood pressure while caring for self and performing routine tasks within 2 day(s).  Outcome: Progressing     Problem: Safety - Medical Restraint  Goal: Remains free of injury from restraints (Restraint for Interference with Medical Device)  Description: INTERVENTIONS:1. Determine that other, less restrictive measures have been tried or would not be effective before applying the restraint2. Evaluate the patient's condition at the time of restraint application3. Inform patient/family regarding the reason for restraint4. Q2H: Monitor safety, psychosocial status, comfort, nutrition and hydration  Outcome: Progressing     
  Problem: Pain  Goal: Verbalizes/displays adequate comfort level or baseline comfort level  Outcome: Progressing     Problem: Chronic Conditions and Co-morbidities  Goal: Patient's chronic conditions and co-morbidity symptoms are monitored and maintained or improved  Outcome: Progressing     Problem: Discharge Planning  Goal: Discharge to home or other facility with appropriate resources  Outcome: Progressing     Problem: Skin/Tissue Integrity  Goal: Skin integrity remains intact  Description: 1.  Monitor for areas of redness and/or skin breakdown2.  Assess vascular access sites hourly3.  Every 4-6 hours minimum:  Change oxygen saturation probe site4.  Every 4-6 hours:  If on nasal continuous positive airway pressure, respiratory therapy assess nares and determine need for appliance change or resting period  Outcome: Progressing     Problem: Safety - Adult  Goal: Free from fall injury  Outcome: Progressing     Problem: ABCDS Injury Assessment  Goal: Absence of physical injury  Outcome: Progressing     Problem: HH PROBLEMS WITH ACTIVITY-COPD  Goal: Ability to tolerate activity  Description: Patient will demonstrate increased endurance and ability to perform activities of daily living by maintenance of respiration and blood pressure while caring for self and performing routine tasks within 2 day(s).  Outcome: Progressing     Problem: Safety - Medical Restraint  Goal: Remains free of injury from restraints (Restraint for Interference with Medical Device)  Description: INTERVENTIONS:1. Determine that other, less restrictive measures have been tried or would not be effective before applying the restraint2. Evaluate the patient's condition at the time of restraint application3. Inform patient/family regarding the reason for restraint4. Q2H: Monitor safety, psychosocial status, comfort, nutrition and hydration  Outcome: Progressing     Will continue to monitor and answer questions as needed.  Miriam Sims RN   
  Problem: Pain  Goal: Verbalizes/displays adequate comfort level or baseline comfort level  Outcome: Progressing     Problem: Chronic Conditions and Co-morbidities  Goal: Patient's chronic conditions and co-morbidity symptoms are monitored and maintained or improved  Outcome: Progressing  Flowsheets (Taken 5/12/2025 0800)  Care Plan - Patient's Chronic Conditions and Co-Morbidity Symptoms are Monitored and Maintained or Improved:   Monitor and assess patient's chronic conditions and comorbid symptoms for stability, deterioration, or improvement   Collaborate with multidisciplinary team to address chronic and comorbid conditions and prevent exacerbation or deterioration   Update acute care plan with appropriate goals if chronic or comorbid symptoms are exacerbated and prevent overall improvement and discharge     Problem: Discharge Planning  Goal: Discharge to home or other facility with appropriate resources  Outcome: Progressing  Flowsheets (Taken 5/12/2025 0800)  Discharge to home or other facility with appropriate resources:   Identify barriers to discharge with patient and caregiver   Arrange for needed discharge resources and transportation as appropriate   Identify discharge learning needs (meds, wound care, etc)     Problem: Skin/Tissue Integrity  Goal: Skin integrity remains intact  Description: 1.  Monitor for areas of redness and/or skin breakdown2.  Assess vascular access sites hourly3.  Every 4-6 hours minimum:  Change oxygen saturation probe site4.  Every 4-6 hours:  If on nasal continuous positive airway pressure, respiratory therapy assess nares and determine need for appliance change or resting period  Outcome: Progressing  Flowsheets (Taken 5/12/2025 0800)  Skin Integrity Remains Intact:   Monitor for areas of redness and/or skin breakdown   Assess vascular access sites hourly   Pressure redistribution bed/mattress (bed type)     Problem: Safety - Adult  Goal: Free from fall injury  Outcome: 
  Problem: Pain  Goal: Verbalizes/displays adequate comfort level or baseline comfort level  Outcome: Progressing     Problem: Chronic Conditions and Co-morbidities  Goal: Patient's chronic conditions and co-morbidity symptoms are monitored and maintained or improved  Outcome: Progressing  Flowsheets (Taken 5/15/2025 2000)  Care Plan - Patient's Chronic Conditions and Co-Morbidity Symptoms are Monitored and Maintained or Improved:   Monitor and assess patient's chronic conditions and comorbid symptoms for stability, deterioration, or improvement   Collaborate with multidisciplinary team to address chronic and comorbid conditions and prevent exacerbation or deterioration   Update acute care plan with appropriate goals if chronic or comorbid symptoms are exacerbated and prevent overall improvement and discharge     Problem: Discharge Planning  Goal: Discharge to home or other facility with appropriate resources  Outcome: Progressing  Flowsheets (Taken 5/15/2025 2000)  Discharge to home or other facility with appropriate resources:   Identify barriers to discharge with patient and caregiver   Arrange for needed discharge resources and transportation as appropriate   Identify discharge learning needs (meds, wound care, etc)   Refer to discharge planning if patient needs post-hospital services based on physician order or complex needs related to functional status, cognitive ability or social support system     Problem: Skin/Tissue Integrity  Goal: Skin integrity remains intact  Description: 1.  Monitor for areas of redness and/or skin breakdown2.  Assess vascular access sites hourly3.  Every 4-6 hours minimum:  Change oxygen saturation probe site4.  Every 4-6 hours:  If on nasal continuous positive airway pressure, respiratory therapy assess nares and determine need for appliance change or resting period  Outcome: Progressing  Flowsheets (Taken 5/15/2025 2000)  Skin Integrity Remains Intact:   Monitor for areas of 
  Problem: Pain  Goal: Verbalizes/displays adequate comfort level or baseline comfort level  Outcome: Progressing     Problem: Chronic Conditions and Co-morbidities  Goal: Patient's chronic conditions and co-morbidity symptoms are monitored and maintained or improved  Outcome: Progressing  Flowsheets (Taken 5/19/2025 0810)  Care Plan - Patient's Chronic Conditions and Co-Morbidity Symptoms are Monitored and Maintained or Improved: Monitor and assess patient's chronic conditions and comorbid symptoms for stability, deterioration, or improvement     Problem: Discharge Planning  Goal: Discharge to home or other facility with appropriate resources  Outcome: Progressing  Flowsheets (Taken 5/19/2025 0810)  Discharge to home or other facility with appropriate resources: Identify barriers to discharge with patient and caregiver     Problem: Skin/Tissue Integrity  Goal: Skin integrity remains intact  Description: 1.  Monitor for areas of redness and/or skin breakdown2.  Assess vascular access sites hourly3.  Every 4-6 hours minimum:  Change oxygen saturation probe site4.  Every 4-6 hours:  If on nasal continuous positive airway pressure, respiratory therapy assess nares and determine need for appliance change or resting period  Outcome: Progressing  Flowsheets (Taken 5/19/2025 0810)  Skin Integrity Remains Intact: Monitor for areas of redness and/or skin breakdown     Problem: Safety - Adult  Goal: Free from fall injury  Outcome: Progressing     Problem: ABCDS Injury Assessment  Goal: Absence of physical injury  Outcome: Progressing     Problem: HH PROBLEMS WITH ACTIVITY-COPD  Goal: Ability to tolerate activity  Description: Patient will demonstrate increased endurance and ability to perform activities of daily living by maintenance of respiration and blood pressure while caring for self and performing routine tasks within 2 day(s).  Outcome: Progressing     Problem: Safety - Medical Restraint  Goal: Remains free of injury 
  Problem: Pain  Goal: Verbalizes/displays adequate comfort level or baseline comfort level  Outcome: Progressing  Flowsheets  Taken 5/3/2025 1200  Verbalizes/displays adequate comfort level or baseline comfort level: Encourage patient to monitor pain and request assistance  Taken 5/3/2025 0800  Verbalizes/displays adequate comfort level or baseline comfort level:   Encourage patient to monitor pain and request assistance   Assess pain using appropriate pain scale     Problem: Chronic Conditions and Co-morbidities  Goal: Patient's chronic conditions and co-morbidity symptoms are monitored and maintained or improved  Outcome: Progressing  Flowsheets (Taken 5/3/2025 0800)  Care Plan - Patient's Chronic Conditions and Co-Morbidity Symptoms are Monitored and Maintained or Improved: Monitor and assess patient's chronic conditions and comorbid symptoms for stability, deterioration, or improvement     Problem: Discharge Planning  Goal: Discharge to home or other facility with appropriate resources  Outcome: Progressing  Flowsheets (Taken 5/3/2025 0800)  Discharge to home or other facility with appropriate resources: Identify barriers to discharge with patient and caregiver     Problem: Skin/Tissue Integrity  Goal: Skin integrity remains intact  Description: 1.  Monitor for areas of redness and/or skin breakdown2.  Assess vascular access sites hourly3.  Every 4-6 hours minimum:  Change oxygen saturation probe site4.  Every 4-6 hours:  If on nasal continuous positive airway pressure, respiratory therapy assess nares and determine need for appliance change or resting period  Outcome: Progressing  Flowsheets (Taken 5/3/2025 0800)  Skin Integrity Remains Intact:   Monitor for areas of redness and/or skin breakdown   Assess vascular access sites hourly     Problem: Safety - Adult  Goal: Free from fall injury  Outcome: Progressing  Flowsheets (Taken 5/3/2025 1442)  Free From Fall Injury:   Based on caregiver fall risk screen, 
  Problem: Pain  Goal: Verbalizes/displays adequate comfort level or baseline comfort level  Outcome: Progressing  Flowsheets (Taken 5/16/2025 1234 by Kori Villatoro RN)  Verbalizes/displays adequate comfort level or baseline comfort level:   Encourage patient to monitor pain and request assistance   Assess pain using appropriate pain scale     Problem: Chronic Conditions and Co-morbidities  Goal: Patient's chronic conditions and co-morbidity symptoms are monitored and maintained or improved  Outcome: Progressing  Flowsheets (Taken 5/16/2025 2045)  Care Plan - Patient's Chronic Conditions and Co-Morbidity Symptoms are Monitored and Maintained or Improved:   Monitor and assess patient's chronic conditions and comorbid symptoms for stability, deterioration, or improvement   Collaborate with multidisciplinary team to address chronic and comorbid conditions and prevent exacerbation or deterioration   Update acute care plan with appropriate goals if chronic or comorbid symptoms are exacerbated and prevent overall improvement and discharge     Problem: Discharge Planning  Goal: Discharge to home or other facility with appropriate resources  Outcome: Progressing  Flowsheets (Taken 5/16/2025 2045)  Discharge to home or other facility with appropriate resources:   Identify barriers to discharge with patient and caregiver   Arrange for needed discharge resources and transportation as appropriate   Identify discharge learning needs (meds, wound care, etc)     Problem: Skin/Tissue Integrity  Goal: Skin integrity remains intact  Description: 1.  Monitor for areas of redness and/or skin breakdown2.  Assess vascular access sites hourly3.  Every 4-6 hours minimum:  Change oxygen saturation probe site4.  Every 4-6 hours:  If on nasal continuous positive airway pressure, respiratory therapy assess nares and determine need for appliance change or resting period  Outcome: Progressing  Flowsheets (Taken 5/16/2025 2045)  Skin Integrity 
  Problem: Pain  Goal: Verbalizes/displays adequate comfort level or baseline comfort level  Outcome: Progressing  Flowsheets (Taken 5/21/2025 0352)  Verbalizes/displays adequate comfort level or baseline comfort level:   Encourage patient to monitor pain and request assistance   Assess pain using appropriate pain scale   Administer analgesics based on type and severity of pain and evaluate response   Notify Licensed Independent Practitioner if interventions unsuccessful or patient reports new pain     Problem: Chronic Conditions and Co-morbidities  Goal: Patient's chronic conditions and co-morbidity symptoms are monitored and maintained or improved  Outcome: Progressing  Flowsheets (Taken 5/21/2025 0352)  Care Plan - Patient's Chronic Conditions and Co-Morbidity Symptoms are Monitored and Maintained or Improved:   Monitor and assess patient's chronic conditions and comorbid symptoms for stability, deterioration, or improvement   Collaborate with multidisciplinary team to address chronic and comorbid conditions and prevent exacerbation or deterioration   Update acute care plan with appropriate goals if chronic or comorbid symptoms are exacerbated and prevent overall improvement and discharge     Problem: Discharge Planning  Goal: Discharge to home or other facility with appropriate resources  Outcome: Progressing  Flowsheets (Taken 5/21/2025 0352)  Discharge to home or other facility with appropriate resources:   Identify barriers to discharge with patient and caregiver   Arrange for needed discharge resources and transportation as appropriate   Refer to discharge planning if patient needs post-hospital services based on physician order or complex needs related to functional status, cognitive ability or social support system     Problem: Safety - Adult  Goal: Free from fall injury  Outcome: Progressing  Flowsheets (Taken 5/21/2025 0352)  Free From Fall Injury: Instruct family/caregiver on patient safety      
Discharge Instructions from  In Patient Wound Care Nurse at [facility]   [address]   [address]  [place] [address]  [phone] Fax [phone]    NAME:  Lan Gonzalez  YOB: 1965  MEDICAL RECORD NUMBER:  [id number]  DATE:  5/2/2025    Wound Cleansing:   Do not scrub or use excessive force.  Cleanse wound prior to applying a clean dressing with:  [x] Normal Saline         Topical Treatments:  Do not apply lotions, creams, or ointments to wound bed unless directed.     [x] Apply thin film of moisture barrier ointment to skin immediately around wound.       Dressings:           Wound Location: sacral wound   [x] Apply Primary Dressing:       [x] [place]alt DO NOT wet.  Apply dry to wound bed.     [x] Cover and Secure with:    [x] Mepilex sacral Border   Avoid contact of tape with skin.  [x] Change dressing: [x] Daily           Pressure Relief:  [x] When sitting, shift position or do seat lifts every 15 minutes.  [] Wheelchair cushion [] Specialty Bed/Mattress  [x] Turn every 2 hours when in bed.  Avoid position directing pressure on wound site.  Limit side lying to 30 degree tilt.  Limit HOB elevation to 30 degrees.         Electronically signed Edwige Vicente RN on 5/22/2025 at 3:01 PM     
Problem: SLP Adult - Impaired Swallowing  Goal: By Discharge: Advance to least restrictive diet without signs or symptoms of aspiration for planned discharge setting.  See evaluation for individualized goals.  Description: Patient will:  1. Tolerate PO trials with 0 s/s overt distress in 4/5 trials  2. Utilize compensatory swallow strategies/maneuvers (decrease bite/sip, size/rate, alt. liq/sol) with min cues in 4/5 trials  3. Perform oral-motor/laryngeal exercises to increase oropharyngeal swallow function with min cues  4. Complete an objective swallow study (i.e., MBSS) to assess swallow integrity, r/o aspiration, and determine of safest LRD, min A as indicated/ordered by MD     Rec:     Soft and bite size solid diet with thin liquids; alternate small bites/single sips  Set up assistance  Aspiration precautions  HOB >45 during po intake, remain >30 for 30-45 minutes after po   Small bites/sips; alternate liquid/solid with slow feeding rate   Oral care TID  Meds per pt preference; break up larger pills  Outcome: Progressing  SPEECH LANGUAGE PATHOLOGY DYSPHAGIA TREATMENT    Patient: Lan Gonzalez (59 y.o. 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 Spouse at bedside. Pt w/ RRT and subsequent transfer to the ICU on 5/14 d/t hypotension and fever. CTA Chest 5/16 found no evidence of airspace disease. Pt w/ NGT receiving enteral feeding and NPO. Pt is alert and very agreeable to PO trials upon SLP arrival. No overt s/sx aspiration/penetration observed w/ trials of ice chips, single 
Progressing  Flowsheets (Taken 5/8/2025 2000)  Skin Integrity Remains Intact:   Monitor for areas of redness and/or skin breakdown   Assess vascular access sites hourly   Every 4-6 hours minimum:  Change oxygen saturation probe site   Every 4-6 hours:  If on nasal continuous positive airway pressure, assess nares and determine need for appliance change or resting period   Turn and reposition as indicated     Problem: Safety - Adult  Goal: Free from fall injury  Outcome: Progressing     Problem: ABCDS Injury Assessment  Goal: Absence of physical injury  Outcome: Progressing     Problem: HH PROBLEMS WITH ACTIVITY-COPD  Goal: Ability to tolerate activity  Description: Patient will demonstrate increased endurance and ability to perform activities of daily living by maintenance of respiration and blood pressure while caring for self and performing routine tasks within 2 day(s).  Outcome: Progressing     
airway pressure, respiratory therapy assess nares and determine need for appliance change or resting period  5/6/2025 2232 by Sameera Zuluaga, RN  Outcome: Progressing  Flowsheets (Taken 5/6/2025 2055)  Skin Integrity Remains Intact:   Monitor for areas of redness and/or skin breakdown   Assess vascular access sites hourly     Problem: Safety - Adult  Goal: Free from fall injury  5/6/2025 2232 by Sameera Zuluaga, RN  Outcome: Progressing  Flowsheets (Taken 5/6/2025 2055)  Free From Fall Injury: Instruct family/caregiver on patient safety     
family/caregiver on patient safety     Problem: ABCDS Injury Assessment  Goal: Absence of physical injury  Outcome: Progressing     Problem: HH PROBLEMS WITH ACTIVITY-COPD  Goal: Ability to tolerate activity  Description: Patient will demonstrate increased endurance and ability to perform activities of daily living by maintenance of respiration and blood pressure while caring for self and performing routine tasks within 2 day(s).  Outcome: Progressing     
patient's chronic conditions and comorbid symptoms for stability, deterioration, or improvement   Collaborate with multidisciplinary team to address chronic and comorbid conditions and prevent exacerbation or deterioration   Update acute care plan with appropriate goals if chronic or comorbid symptoms are exacerbated and prevent overall improvement and discharge     Problem: Discharge Planning  Goal: Discharge to home or other facility with appropriate resources  5/8/2025 0943 by Sarita Powers RN  Outcome: Progressing  Flowsheets (Taken 5/7/2025 2200 by Nhan Jane, RN)  Discharge to home or other facility with appropriate resources:   Identify barriers to discharge with patient and caregiver   Identify discharge learning needs (meds, wound care, etc)  5/8/2025 0042 by Nhan Jane RN  Outcome: Progressing  Flowsheets (Taken 5/7/2025 2200)  Discharge to home or other facility with appropriate resources:   Identify barriers to discharge with patient and caregiver   Identify discharge learning needs (meds, wound care, etc)     Problem: Skin/Tissue Integrity  Goal: Skin integrity remains intact  Description: 1.  Monitor for areas of redness and/or skin breakdown2.  Assess vascular access sites hourly3.  Every 4-6 hours minimum:  Change oxygen saturation probe site4.  Every 4-6 hours:  If on nasal continuous positive airway pressure, respiratory therapy assess nares and determine need for appliance change or resting period  5/8/2025 0943 by Sarita Powers RN  Outcome: Progressing  Flowsheets (Taken 5/7/2025 2200 by Nhan Jane, RN)  Skin Integrity Remains Intact:   Monitor for areas of redness and/or skin breakdown   Assess vascular access sites hourly   Every 4-6 hours minimum:  Change oxygen saturation probe site   Every 4-6 hours:  If on nasal continuous positive airway pressure, assess nares and determine need for appliance change or resting period   Turn and reposition as indicated  5/8/2025 0042 by Buck 
safety     Problem: ABCDS Injury Assessment  Goal: Absence of physical injury  Outcome: Progressing  Flowsheets (Taken 5/12/2025 0147)  Absence of Physical Injury: Implement safety measures based on patient assessment     
within reach  [x]              Nursing notified  []              Family/caregiver present    COMMUNICATION/EDUCATION:   [x]            Aspiration precautions; swallow safety; compensatory techniques provided via demonstration, verbalization and teach back of comprehension  [x]         Patient/family have participated as able in goal setting and plan of care.  []            Patient/family agree to work toward stated goals and plan of care.  []            Patient understands intent and goals of therapy, neutral about participation.  []            Patient unable to participate in goal setting/plan of care secondary to cognition, hearing/vision deficits; education ongoing with interdisciplinary staff   []            Handout regarding diet recommendations and thickener instructions provided.  []         Posted safety precautions in patient's room.    Thank you for this referral,    Nini Turner M.S., CCC-SLP  Speech-Language Pathologist   
FUSION - POSTERIOR C4-T1 LAMINECTOMY & FUSION W/C-ARM    COLONOSCOPY N/A 06/19/2024    COLONOSCOPY performed by Mariam Ken MD at [facility] ENDOSCOPY    CYSTOSCOPY N/A 10/17/2024    CYSTOSCOPY , LEFT RETROGRADE PYELOGRAM, LEFT URETEROSCOPY WITH THULIUM LASER LITHOTRIPSY, LEFT STENT PLACEMENT WITH C-ARM 'SPEC POP\" performed by Ronaldo Mason MD at [facility] MAIN OR    CYSTOSCOPY Right 2/27/2025    CYSTOSCOPY , RIGHT RETROGRADE PYELOGRAM, RIGHT URETEROSCOPY WITH THULIUM LASER LITHOTRIPSY , RIGHT JJ STENT PLACEMENT WITH C-ARM performed by Ronaldo Mason MD at [facility] MAIN OR    ENDOSCOPY, COLON, DIAGNOSTIC  2014    EPIDURAL BLOOD PATCH  02/12/2013    after headche from myelogram    ERCP  08/13/2014    ESOPHAGOGASTRODUODENOSCOPY  08/22/2012    FL CHOLANGIOPANCREATOGRAM T TUBE  11/15/2013    Endoscopic retrograde cholangiopancreatography    FL CHOLANGIOPANCREATOGRAM T TUBE  01/31/2014    Endoscopic retrograde cholangiopancreatography    FL CHOLANGIOPANCREATOGRAM T TUBE  06/11/2014    Endoscopic retrograde cholangiopancreatography    HAND SURGERY Left     2012,2010    NECK SURGERY  2017    & 2019  RFA    ORTHOPEDIC SURGERY Left     joint replacement   index finger    ORTHOPEDIC SURGERY      left foot surgery x 2    PANCREAS SURGERY  11/24/2014    Classic pancreaticoduodenectomy with open cholecystectomy.    CT UNLISTED PROCEDURE ABDOMEN PERITONEUM & OMENTUM  2014    whipple procedure for chronic pancreatitis    UPPER GASTROINTESTINAL ENDOSCOPY N/A 5/7/2025    ESOPHAGOGASTRODUODENOSCOPY DIAGNOSTIC ONLY performed by Mariam Watt MD at [facility] ENDOSCOPY    UROLOGICAL SURGERY  03/11/2021    CYSTOSCOPY, RIGHT URETEROSCOPY, LASER LITHOTRIPSY WITH HOLMIUIM, STENT PLACEMENT (C-ARM, HOLMIUM LASER- AGILITY, STENTS)    UROLOGICAL SURGERY  02/21/2019    CYSTOSCOPY,LEFT RETROGRADE PYELOGRAM WITH INTERPRETATION ,LEFT URETEROSCOPIC STONE EXTRECTION WITH HOLMIUM, JJ STENT PLACEMENT WITH C-ARM    UROLOGICAL SURGERY  08/25/2017

## 2025-05-23 LAB
ABO + RH BLD: NORMAL
BLD PROD TYP BPU: NORMAL
BLOOD BANK BLOOD PRODUCT EXPIRATION DATE: NORMAL
BLOOD BANK DISPENSE STATUS: NORMAL
BLOOD BANK ISBT PRODUCT BLOOD TYPE: 5100
BLOOD BANK PRODUCT CODE: NORMAL
BLOOD BANK UNIT TYPE AND RH: NORMAL
BLOOD GROUP ANTIBODIES SERPL: NORMAL
BPU ID: NORMAL
CROSSMATCH RESULT: NORMAL
SPECIMEN EXP DATE BLD: NORMAL
UNIT DIVISION: 0
UNIT ISSUE DATE/TIME: NORMAL

## 2025-06-01 PROBLEM — R79.89 ELEVATED TROPONIN: Status: RESOLVED | Noted: 2025-05-02 | Resolved: 2025-06-01

## 2025-06-09 ENCOUNTER — APPOINTMENT (OUTPATIENT)
Facility: HOSPITAL | Age: 60
DRG: 321 | End: 2025-06-09
Payer: MEDICARE

## 2025-06-09 ENCOUNTER — HOSPITAL ENCOUNTER (INPATIENT)
Facility: HOSPITAL | Age: 60
LOS: 3 days | Discharge: NURSING, REHAB OR LONG-TERM CARE | DRG: 321 | End: 2025-06-12
Attending: EMERGENCY MEDICINE | Admitting: INTERNAL MEDICINE
Payer: MEDICARE

## 2025-06-09 DIAGNOSIS — M54.17 LUMBOSACRAL RADICULOPATHY: ICD-10-CM

## 2025-06-09 DIAGNOSIS — E43 SEVERE MALNUTRITION: ICD-10-CM

## 2025-06-09 DIAGNOSIS — I21.4 NSTEMI (NON-ST ELEVATED MYOCARDIAL INFARCTION) (HCC): Primary | ICD-10-CM

## 2025-06-09 DIAGNOSIS — G24.01 TARDIVE DYSKINESIA: ICD-10-CM

## 2025-06-09 LAB
ALBUMIN SERPL-MCNC: 1.7 G/DL (ref 3.4–5)
ALBUMIN/GLOB SERPL: 0.5 (ref 0.8–1.7)
ALP SERPL-CCNC: 130 U/L (ref 45–117)
ALT SERPL-CCNC: 28 U/L (ref 10–50)
ANION GAP SERPL CALC-SCNC: 9 MMOL/L (ref 3–18)
AST SERPL-CCNC: 40 U/L (ref 10–38)
BASOPHILS # BLD: 0.1 K/UL (ref 0–0.1)
BASOPHILS NFR BLD: 0.4 % (ref 0–2)
BILIRUB SERPL-MCNC: 0.2 MG/DL (ref 0.2–1)
BUN SERPL-MCNC: 29 MG/DL (ref 6–23)
BUN/CREAT SERPL: 29 (ref 12–20)
CALCIUM SERPL-MCNC: 8.8 MG/DL (ref 8.5–10.1)
CHLORIDE SERPL-SCNC: 103 MMOL/L (ref 98–107)
CO2 SERPL-SCNC: 25 MMOL/L (ref 21–32)
CREAT SERPL-MCNC: 0.98 MG/DL (ref 0.6–1.3)
DIFFERENTIAL METHOD BLD: ABNORMAL
EOSINOPHIL # BLD: 0.33 K/UL (ref 0–0.4)
EOSINOPHIL NFR BLD: 1.3 % (ref 0–5)
ERYTHROCYTE [DISTWIDTH] IN BLOOD BY AUTOMATED COUNT: 17 % (ref 11.6–14.5)
GLOBULIN SER CALC-MCNC: 3.4 G/DL (ref 2–4)
GLUCOSE SERPL-MCNC: 250 MG/DL (ref 74–108)
HCT VFR BLD AUTO: 28.5 % (ref 36–48)
HGB BLD-MCNC: 9 G/DL (ref 13–16)
IMM GRANULOCYTES # BLD AUTO: 0.2 K/UL (ref 0–0.04)
IMM GRANULOCYTES NFR BLD AUTO: 0.8 % (ref 0–0.5)
LACTATE BLD-SCNC: 1.82 MMOL/L (ref 0.4–2)
LYMPHOCYTES # BLD: 0.9 K/UL (ref 0.9–3.3)
LYMPHOCYTES NFR BLD: 3.6 % (ref 21–52)
MCH RBC QN AUTO: 27.1 PG (ref 24–34)
MCHC RBC AUTO-ENTMCNC: 31.6 G/DL (ref 31–37)
MCV RBC AUTO: 85.8 FL (ref 78–100)
MONOCYTES # BLD: 0.75 K/UL (ref 0.05–1.2)
MONOCYTES NFR BLD: 3 % (ref 3–10)
NEUTS SEG # BLD: 22.82 K/UL (ref 1.8–8)
NEUTS SEG NFR BLD: 90.9 % (ref 40–73)
NRBC # BLD: 0 K/UL (ref 0–0.01)
NRBC BLD-RTO: 0 PER 100 WBC
PLATELET # BLD AUTO: 487 K/UL (ref 135–420)
PLATELET COMMENT: ABNORMAL
PMV BLD AUTO: 10.6 FL (ref 9.2–11.8)
POTASSIUM SERPL-SCNC: 4.5 MMOL/L (ref 3.5–5.5)
PROT SERPL-MCNC: 5.1 G/DL (ref 6.4–8.2)
RBC # BLD AUTO: 3.32 M/UL (ref 4.35–5.65)
RBC MORPH BLD: ABNORMAL
RBC MORPH BLD: ABNORMAL
SODIUM SERPL-SCNC: 137 MMOL/L (ref 136–145)
TROPONIN T SERPL HS-MCNC: 572 NG/L (ref 0–22)
WBC # BLD AUTO: 25.1 K/UL (ref 4.6–13.2)

## 2025-06-09 PROCEDURE — 80053 COMPREHEN METABOLIC PANEL: CPT

## 2025-06-09 PROCEDURE — 83605 ASSAY OF LACTIC ACID: CPT

## 2025-06-09 PROCEDURE — 71045 X-RAY EXAM CHEST 1 VIEW: CPT

## 2025-06-09 PROCEDURE — 85025 COMPLETE CBC W/AUTO DIFF WBC: CPT

## 2025-06-09 PROCEDURE — 99285 EMERGENCY DEPT VISIT HI MDM: CPT

## 2025-06-09 PROCEDURE — 51702 INSERT TEMP BLADDER CATH: CPT

## 2025-06-09 PROCEDURE — 87040 BLOOD CULTURE FOR BACTERIA: CPT

## 2025-06-09 PROCEDURE — 71275 CT ANGIOGRAPHY CHEST: CPT

## 2025-06-09 PROCEDURE — 87636 SARSCOV2 & INF A&B AMP PRB: CPT

## 2025-06-09 PROCEDURE — 93005 ELECTROCARDIOGRAM TRACING: CPT | Performed by: EMERGENCY MEDICINE

## 2025-06-09 PROCEDURE — 84484 ASSAY OF TROPONIN QUANT: CPT

## 2025-06-09 PROCEDURE — 1100000000 HC RM PRIVATE

## 2025-06-09 PROCEDURE — 6360000004 HC RX CONTRAST MEDICATION: Performed by: EMERGENCY MEDICINE

## 2025-06-09 RX ORDER — SODIUM CHLORIDE 0.9 % (FLUSH) 0.9 %
5-40 SYRINGE (ML) INJECTION EVERY 12 HOURS SCHEDULED
Status: DISCONTINUED | OUTPATIENT
Start: 2025-06-10 | End: 2025-06-10 | Stop reason: SDUPTHER

## 2025-06-09 RX ORDER — SODIUM CHLORIDE 9 MG/ML
INJECTION, SOLUTION INTRAVENOUS PRN
Status: DISCONTINUED | OUTPATIENT
Start: 2025-06-09 | End: 2025-06-10

## 2025-06-09 RX ORDER — POTASSIUM CHLORIDE 1500 MG/1
40 TABLET, EXTENDED RELEASE ORAL PRN
Status: DISCONTINUED | OUTPATIENT
Start: 2025-06-09 | End: 2025-06-12 | Stop reason: HOSPADM

## 2025-06-09 RX ORDER — ENOXAPARIN SODIUM 100 MG/ML
1 INJECTION SUBCUTANEOUS 2 TIMES DAILY
Status: DISCONTINUED | OUTPATIENT
Start: 2025-06-10 | End: 2025-06-12 | Stop reason: HOSPADM

## 2025-06-09 RX ORDER — ONDANSETRON 2 MG/ML
4 INJECTION INTRAMUSCULAR; INTRAVENOUS EVERY 6 HOURS PRN
Status: DISCONTINUED | OUTPATIENT
Start: 2025-06-09 | End: 2025-06-12 | Stop reason: HOSPADM

## 2025-06-09 RX ORDER — INSULIN GLARGINE 100 [IU]/ML
12 INJECTION, SOLUTION SUBCUTANEOUS DAILY
Status: DISCONTINUED | OUTPATIENT
Start: 2025-06-10 | End: 2025-06-12 | Stop reason: HOSPADM

## 2025-06-09 RX ORDER — GLUCAGON 1 MG/ML
1 KIT INJECTION PRN
Status: DISCONTINUED | OUTPATIENT
Start: 2025-06-09 | End: 2025-06-12 | Stop reason: HOSPADM

## 2025-06-09 RX ORDER — ONDANSETRON 4 MG/1
4 TABLET, ORALLY DISINTEGRATING ORAL EVERY 8 HOURS PRN
Status: DISCONTINUED | OUTPATIENT
Start: 2025-06-09 | End: 2025-06-12 | Stop reason: HOSPADM

## 2025-06-09 RX ORDER — ACETAMINOPHEN 325 MG/1
650 TABLET ORAL EVERY 6 HOURS PRN
Status: DISCONTINUED | OUTPATIENT
Start: 2025-06-09 | End: 2025-06-10 | Stop reason: SDUPTHER

## 2025-06-09 RX ORDER — POTASSIUM CHLORIDE 7.45 MG/ML
10 INJECTION INTRAVENOUS PRN
Status: DISCONTINUED | OUTPATIENT
Start: 2025-06-09 | End: 2025-06-12 | Stop reason: HOSPADM

## 2025-06-09 RX ORDER — ACETAMINOPHEN 650 MG/1
650 SUPPOSITORY RECTAL EVERY 6 HOURS PRN
Status: DISCONTINUED | OUTPATIENT
Start: 2025-06-09 | End: 2025-06-12 | Stop reason: HOSPADM

## 2025-06-09 RX ORDER — ASPIRIN 81 MG/1
81 TABLET ORAL DAILY
Status: DISCONTINUED | OUTPATIENT
Start: 2025-06-10 | End: 2025-06-10 | Stop reason: SDUPTHER

## 2025-06-09 RX ORDER — NITROGLYCERIN 0.4 MG/1
0.4 TABLET SUBLINGUAL EVERY 5 MIN PRN
Status: DISCONTINUED | OUTPATIENT
Start: 2025-06-09 | End: 2025-06-12 | Stop reason: HOSPADM

## 2025-06-09 RX ORDER — IOPAMIDOL 755 MG/ML
75 INJECTION, SOLUTION INTRAVASCULAR
Status: COMPLETED | OUTPATIENT
Start: 2025-06-09 | End: 2025-06-09

## 2025-06-09 RX ORDER — PANTOPRAZOLE SODIUM 40 MG/1
40 TABLET, DELAYED RELEASE ORAL
Status: DISCONTINUED | OUTPATIENT
Start: 2025-06-10 | End: 2025-06-12 | Stop reason: HOSPADM

## 2025-06-09 RX ORDER — INSULIN LISPRO 100 [IU]/ML
0-4 INJECTION, SOLUTION INTRAVENOUS; SUBCUTANEOUS
Status: DISCONTINUED | OUTPATIENT
Start: 2025-06-10 | End: 2025-06-12 | Stop reason: HOSPADM

## 2025-06-09 RX ORDER — DEXTROSE MONOHYDRATE 100 MG/ML
INJECTION, SOLUTION INTRAVENOUS CONTINUOUS PRN
Status: DISCONTINUED | OUTPATIENT
Start: 2025-06-09 | End: 2025-06-12 | Stop reason: HOSPADM

## 2025-06-09 RX ORDER — METOPROLOL TARTRATE 25 MG/1
12.5 TABLET, FILM COATED ORAL 2 TIMES DAILY
Status: DISCONTINUED | OUTPATIENT
Start: 2025-06-10 | End: 2025-06-11

## 2025-06-09 RX ORDER — SODIUM CHLORIDE 0.9 % (FLUSH) 0.9 %
5-40 SYRINGE (ML) INJECTION PRN
Status: DISCONTINUED | OUTPATIENT
Start: 2025-06-09 | End: 2025-06-10 | Stop reason: SDUPTHER

## 2025-06-09 RX ORDER — MAGNESIUM SULFATE IN WATER 40 MG/ML
2000 INJECTION, SOLUTION INTRAVENOUS PRN
Status: DISCONTINUED | OUTPATIENT
Start: 2025-06-09 | End: 2025-06-12 | Stop reason: HOSPADM

## 2025-06-09 RX ORDER — POLYETHYLENE GLYCOL 3350 17 G/17G
17 POWDER, FOR SOLUTION ORAL DAILY PRN
Status: DISCONTINUED | OUTPATIENT
Start: 2025-06-09 | End: 2025-06-12 | Stop reason: HOSPADM

## 2025-06-09 RX ORDER — PANTOPRAZOLE SODIUM 40 MG/1
40 TABLET, DELAYED RELEASE ORAL
Status: DISCONTINUED | OUTPATIENT
Start: 2025-06-10 | End: 2025-06-09

## 2025-06-09 RX ADMIN — IOPAMIDOL 75 ML: 755 INJECTION, SOLUTION INTRAVENOUS at 22:23

## 2025-06-10 LAB
ECHO BSA: 1.66 M2
EKG ATRIAL RATE: 117 BPM
EKG DIAGNOSIS: NORMAL
EKG P AXIS: 34 DEGREES
EKG P-R INTERVAL: 216 MS
EKG Q-T INTERVAL: 282 MS
EKG QRS DURATION: 94 MS
EKG QTC CALCULATION (BAZETT): 393 MS
EKG R AXIS: 27 DEGREES
EKG T AXIS: 93 DEGREES
EKG VENTRICULAR RATE: 117 BPM
EST. AVERAGE GLUCOSE BLD GHB EST-MCNC: 153 MG/DL
FLUAV RNA SPEC QL NAA+PROBE: NOT DETECTED
FLUBV RNA SPEC QL NAA+PROBE: NOT DETECTED
GLUCOSE BLD STRIP.AUTO-MCNC: 102 MG/DL (ref 70–110)
GLUCOSE BLD STRIP.AUTO-MCNC: 130 MG/DL (ref 70–110)
GLUCOSE BLD STRIP.AUTO-MCNC: 173 MG/DL (ref 70–110)
GLUCOSE BLD STRIP.AUTO-MCNC: 196 MG/DL (ref 70–110)
GLUCOSE BLD STRIP.AUTO-MCNC: 292 MG/DL (ref 70–110)
HBA1C MFR BLD: 7 % (ref 4.2–5.6)
HCT VFR BLD AUTO: 27.5 % (ref 36–48)
HGB BLD-MCNC: 8.5 G/DL (ref 13–16)
PROCALCITONIN SERPL-MCNC: 0.21 NG/ML
SARS-COV-2 RNA RESP QL NAA+PROBE: NOT DETECTED
SOURCE: NORMAL
TROPONIN T SERPL HS-MCNC: 700 NG/L (ref 0–22)
TROPONIN T SERPL HS-MCNC: 788 NG/L (ref 0–22)

## 2025-06-10 PROCEDURE — 84145 PROCALCITONIN (PCT): CPT

## 2025-06-10 PROCEDURE — B3101ZZ FLUOROSCOPY OF THORACIC AORTA USING LOW OSMOLAR CONTRAST: ICD-10-PCS | Performed by: INTERNAL MEDICINE

## 2025-06-10 PROCEDURE — 84484 ASSAY OF TROPONIN QUANT: CPT

## 2025-06-10 PROCEDURE — 6360000004 HC RX CONTRAST MEDICATION: Performed by: INTERNAL MEDICINE

## 2025-06-10 PROCEDURE — 6370000000 HC RX 637 (ALT 250 FOR IP): Performed by: INTERNAL MEDICINE

## 2025-06-10 PROCEDURE — 85018 HEMOGLOBIN: CPT

## 2025-06-10 PROCEDURE — 36415 COLL VENOUS BLD VENIPUNCTURE: CPT

## 2025-06-10 PROCEDURE — 85014 HEMATOCRIT: CPT

## 2025-06-10 PROCEDURE — 83036 HEMOGLOBIN GLYCOSYLATED A1C: CPT

## 2025-06-10 PROCEDURE — 99152 MOD SED SAME PHYS/QHP 5/>YRS: CPT | Performed by: INTERNAL MEDICINE

## 2025-06-10 PROCEDURE — 027034Z DILATION OF CORONARY ARTERY, ONE ARTERY WITH DRUG-ELUTING INTRALUMINAL DEVICE, PERCUTANEOUS APPROACH: ICD-10-PCS | Performed by: INTERNAL MEDICINE

## 2025-06-10 PROCEDURE — 2500000003 HC RX 250 WO HCPCS: Performed by: INTERNAL MEDICINE

## 2025-06-10 PROCEDURE — B2111ZZ FLUOROSCOPY OF MULTIPLE CORONARY ARTERIES USING LOW OSMOLAR CONTRAST: ICD-10-PCS | Performed by: INTERNAL MEDICINE

## 2025-06-10 PROCEDURE — 6360000002 HC RX W HCPCS: Performed by: INTERNAL MEDICINE

## 2025-06-10 PROCEDURE — C9600 PERC DRUG-EL COR STENT SING: HCPCS | Performed by: INTERNAL MEDICINE

## 2025-06-10 PROCEDURE — 2580000003 HC RX 258: Performed by: INTERNAL MEDICINE

## 2025-06-10 PROCEDURE — C1887 CATHETER, GUIDING: HCPCS | Performed by: INTERNAL MEDICINE

## 2025-06-10 PROCEDURE — 4A023N7 MEASUREMENT OF CARDIAC SAMPLING AND PRESSURE, LEFT HEART, PERCUTANEOUS APPROACH: ICD-10-PCS | Performed by: INTERNAL MEDICINE

## 2025-06-10 PROCEDURE — 99153 MOD SED SAME PHYS/QHP EA: CPT | Performed by: INTERNAL MEDICINE

## 2025-06-10 PROCEDURE — 1100000003 HC PRIVATE W/ TELEMETRY

## 2025-06-10 PROCEDURE — C1769 GUIDE WIRE: HCPCS | Performed by: INTERNAL MEDICINE

## 2025-06-10 PROCEDURE — C1874 STENT, COATED/COV W/DEL SYS: HCPCS | Performed by: INTERNAL MEDICINE

## 2025-06-10 PROCEDURE — 82962 GLUCOSE BLOOD TEST: CPT

## 2025-06-10 PROCEDURE — 93458 L HRT ARTERY/VENTRICLE ANGIO: CPT | Performed by: INTERNAL MEDICINE

## 2025-06-10 PROCEDURE — 94640 AIRWAY INHALATION TREATMENT: CPT

## 2025-06-10 PROCEDURE — B2151ZZ FLUOROSCOPY OF LEFT HEART USING LOW OSMOLAR CONTRAST: ICD-10-PCS | Performed by: INTERNAL MEDICINE

## 2025-06-10 PROCEDURE — 2709999900 HC NON-CHARGEABLE SUPPLY: Performed by: INTERNAL MEDICINE

## 2025-06-10 PROCEDURE — C1894 INTRO/SHEATH, NON-LASER: HCPCS | Performed by: INTERNAL MEDICINE

## 2025-06-10 PROCEDURE — 2700000000 HC OXYGEN THERAPY PER DAY

## 2025-06-10 DEVICE — STENT ONYXNG35038UX ONYX 3.50X38RX
Type: IMPLANTABLE DEVICE | Status: FUNCTIONAL
Brand: ONYX FRONTIER™

## 2025-06-10 DEVICE — XIENCE SIERRA™ EVEROLIMUS ELUTING CORONARY STENT SYSTEM 3.50 MM X 28 MM / RAPID-EXCHANGE
Type: IMPLANTABLE DEVICE | Status: FUNCTIONAL
Brand: XIENCE SIERRA™

## 2025-06-10 RX ORDER — ASPIRIN 81 MG/1
81 TABLET, CHEWABLE ORAL DAILY
Status: DISCONTINUED | OUTPATIENT
Start: 2025-06-11 | End: 2025-06-12 | Stop reason: HOSPADM

## 2025-06-10 RX ORDER — FENTANYL CITRATE 50 UG/ML
INJECTION, SOLUTION INTRAMUSCULAR; INTRAVENOUS
Status: DISPENSED
Start: 2025-06-10 | End: 2025-06-11

## 2025-06-10 RX ORDER — SODIUM CHLORIDE 0.9 % (FLUSH) 0.9 %
5-40 SYRINGE (ML) INJECTION EVERY 12 HOURS SCHEDULED
Status: DISCONTINUED | OUTPATIENT
Start: 2025-06-10 | End: 2025-06-12 | Stop reason: HOSPADM

## 2025-06-10 RX ORDER — MIDAZOLAM HYDROCHLORIDE 1 MG/ML
INJECTION, SOLUTION INTRAMUSCULAR; INTRAVENOUS
Status: DISPENSED
Start: 2025-06-10 | End: 2025-06-11

## 2025-06-10 RX ORDER — CLOPIDOGREL BISULFATE 75 MG/1
75 TABLET ORAL DAILY
Status: DISCONTINUED | OUTPATIENT
Start: 2025-06-11 | End: 2025-06-12 | Stop reason: HOSPADM

## 2025-06-10 RX ORDER — POLYETHYLENE GLYCOL 3350 17 G/17G
17 POWDER, FOR SOLUTION ORAL DAILY
Status: DISCONTINUED | OUTPATIENT
Start: 2025-06-10 | End: 2025-06-12 | Stop reason: HOSPADM

## 2025-06-10 RX ORDER — NITROGLYCERIN 400 UG/1
SPRAY ORAL PRN
Status: DISCONTINUED | OUTPATIENT
Start: 2025-06-10 | End: 2025-06-10 | Stop reason: HOSPADM

## 2025-06-10 RX ORDER — CYANOCOBALAMIN 1000 UG/ML
1000 INJECTION, SOLUTION INTRAMUSCULAR; SUBCUTANEOUS WEEKLY
Status: DISCONTINUED | OUTPATIENT
Start: 2025-06-15 | End: 2025-06-12 | Stop reason: HOSPADM

## 2025-06-10 RX ORDER — QUETIAPINE FUMARATE 100 MG/1
100 TABLET, FILM COATED ORAL NIGHTLY
Status: DISCONTINUED | OUTPATIENT
Start: 2025-06-10 | End: 2025-06-10

## 2025-06-10 RX ORDER — LACTULOSE 10 G/15ML
20 SOLUTION ORAL 3 TIMES DAILY
Status: DISCONTINUED | OUTPATIENT
Start: 2025-06-10 | End: 2025-06-12 | Stop reason: HOSPADM

## 2025-06-10 RX ORDER — HEPARIN SODIUM 200 [USP'U]/100ML
INJECTION, SOLUTION INTRAVENOUS CONTINUOUS PRN
Status: COMPLETED | OUTPATIENT
Start: 2025-06-10 | End: 2025-06-10

## 2025-06-10 RX ORDER — ZINC SULFATE 50(220)MG
50 CAPSULE ORAL DAILY
Status: DISCONTINUED | OUTPATIENT
Start: 2025-06-10 | End: 2025-06-12 | Stop reason: HOSPADM

## 2025-06-10 RX ORDER — CLOPIDOGREL BISULFATE 75 MG/1
TABLET ORAL PRN
Status: DISCONTINUED | OUTPATIENT
Start: 2025-06-10 | End: 2025-06-10 | Stop reason: HOSPADM

## 2025-06-10 RX ORDER — SODIUM CHLORIDE 0.9 % (FLUSH) 0.9 %
5-40 SYRINGE (ML) INJECTION PRN
Status: DISCONTINUED | OUTPATIENT
Start: 2025-06-10 | End: 2025-06-12 | Stop reason: HOSPADM

## 2025-06-10 RX ORDER — ATORVASTATIN CALCIUM 20 MG/1
80 TABLET, FILM COATED ORAL NIGHTLY
Status: DISCONTINUED | OUTPATIENT
Start: 2025-06-10 | End: 2025-06-12 | Stop reason: HOSPADM

## 2025-06-10 RX ORDER — ASPIRIN 325 MG
TABLET ORAL PRN
Status: DISCONTINUED | OUTPATIENT
Start: 2025-06-10 | End: 2025-06-10 | Stop reason: HOSPADM

## 2025-06-10 RX ORDER — LIDOCAINE HYDROCHLORIDE 10 MG/ML
INJECTION, SOLUTION INFILTRATION; PERINEURAL PRN
Status: DISCONTINUED | OUTPATIENT
Start: 2025-06-10 | End: 2025-06-10 | Stop reason: HOSPADM

## 2025-06-10 RX ORDER — SODIUM CHLORIDE 9 MG/ML
INJECTION, SOLUTION INTRAVENOUS PRN
Status: DISCONTINUED | OUTPATIENT
Start: 2025-06-10 | End: 2025-06-12 | Stop reason: HOSPADM

## 2025-06-10 RX ORDER — TRAMADOL HYDROCHLORIDE 50 MG/1
25 TABLET ORAL EVERY 6 HOURS PRN
Status: DISCONTINUED | OUTPATIENT
Start: 2025-06-10 | End: 2025-06-12 | Stop reason: HOSPADM

## 2025-06-10 RX ORDER — GABAPENTIN 400 MG/1
400 CAPSULE ORAL 3 TIMES DAILY
Status: DISCONTINUED | OUTPATIENT
Start: 2025-06-10 | End: 2025-06-12 | Stop reason: HOSPADM

## 2025-06-10 RX ORDER — BUDESONIDE 0.5 MG/2ML
0.5 INHALANT ORAL
Status: DISCONTINUED | OUTPATIENT
Start: 2025-06-10 | End: 2025-06-12 | Stop reason: HOSPADM

## 2025-06-10 RX ORDER — LANOLIN ALCOHOL/MO/W.PET/CERES
100 CREAM (GRAM) TOPICAL DAILY
Status: DISCONTINUED | OUTPATIENT
Start: 2025-06-10 | End: 2025-06-12 | Stop reason: HOSPADM

## 2025-06-10 RX ORDER — VITAMIN B COMPLEX
2000 TABLET ORAL DAILY
Status: DISCONTINUED | OUTPATIENT
Start: 2025-06-10 | End: 2025-06-12 | Stop reason: HOSPADM

## 2025-06-10 RX ORDER — MIRTAZAPINE 15 MG/1
7.5 TABLET, FILM COATED ORAL DAILY
Status: DISCONTINUED | OUTPATIENT
Start: 2025-06-10 | End: 2025-06-12

## 2025-06-10 RX ORDER — ACETAMINOPHEN 325 MG/1
650 TABLET ORAL EVERY 4 HOURS PRN
Status: DISCONTINUED | OUTPATIENT
Start: 2025-06-10 | End: 2025-06-12 | Stop reason: HOSPADM

## 2025-06-10 RX ORDER — ALPRAZOLAM 0.25 MG
0.25 TABLET ORAL 2 TIMES DAILY PRN
Status: DISCONTINUED | OUTPATIENT
Start: 2025-06-09 | End: 2025-06-12 | Stop reason: HOSPADM

## 2025-06-10 RX ORDER — LACTOBACILLUS RHAMNOSUS GG 10B CELL
1 CAPSULE ORAL DAILY
Status: DISCONTINUED | OUTPATIENT
Start: 2025-06-10 | End: 2025-06-12 | Stop reason: HOSPADM

## 2025-06-10 RX ORDER — GAUZE BANDAGE 2" X 2"
100 BANDAGE TOPICAL 2 TIMES DAILY
Status: DISCONTINUED | OUTPATIENT
Start: 2025-06-10 | End: 2025-06-12 | Stop reason: HOSPADM

## 2025-06-10 RX ORDER — HEPARIN SODIUM 1000 [USP'U]/ML
INJECTION, SOLUTION INTRAVENOUS; SUBCUTANEOUS PRN
Status: DISCONTINUED | OUTPATIENT
Start: 2025-06-10 | End: 2025-06-10 | Stop reason: HOSPADM

## 2025-06-10 RX ORDER — NIACIN 100 MG
100 TABLET ORAL NIGHTLY
Status: DISCONTINUED | OUTPATIENT
Start: 2025-06-10 | End: 2025-06-12 | Stop reason: HOSPADM

## 2025-06-10 RX ORDER — ATORVASTATIN CALCIUM 20 MG/1
20 TABLET, FILM COATED ORAL NIGHTLY
Status: DISCONTINUED | OUTPATIENT
Start: 2025-06-10 | End: 2025-06-10 | Stop reason: DRUGHIGH

## 2025-06-10 RX ORDER — M-VIT,TX,IRON,MINS/CALC/FOLIC 27MG-0.4MG
1 TABLET ORAL DAILY
Status: DISCONTINUED | OUTPATIENT
Start: 2025-06-10 | End: 2025-06-12 | Stop reason: HOSPADM

## 2025-06-10 RX ORDER — IPRATROPIUM BROMIDE AND ALBUTEROL SULFATE 2.5; .5 MG/3ML; MG/3ML
1 SOLUTION RESPIRATORY (INHALATION) EVERY 4 HOURS PRN
Status: DISCONTINUED | OUTPATIENT
Start: 2025-06-09 | End: 2025-06-12 | Stop reason: HOSPADM

## 2025-06-10 RX ORDER — MIDAZOLAM HYDROCHLORIDE 1 MG/ML
INJECTION INTRAMUSCULAR; INTRAVENOUS PRN
Status: DISCONTINUED | OUTPATIENT
Start: 2025-06-10 | End: 2025-06-10 | Stop reason: HOSPADM

## 2025-06-10 RX ORDER — BIVALIRUDIN 250 MG/5ML
INJECTION, POWDER, LYOPHILIZED, FOR SOLUTION INTRAVENOUS PRN
Status: DISCONTINUED | OUTPATIENT
Start: 2025-06-10 | End: 2025-06-10 | Stop reason: HOSPADM

## 2025-06-10 RX ORDER — IOPAMIDOL 612 MG/ML
INJECTION, SOLUTION INTRAVASCULAR PRN
Status: DISCONTINUED | OUTPATIENT
Start: 2025-06-10 | End: 2025-06-10 | Stop reason: HOSPADM

## 2025-06-10 RX ADMIN — GABAPENTIN 400 MG: 400 CAPSULE ORAL at 09:39

## 2025-06-10 RX ADMIN — Medication 100 MG: at 22:33

## 2025-06-10 RX ADMIN — METOPROLOL TARTRATE 12.5 MG: 25 TABLET, FILM COATED ORAL at 09:45

## 2025-06-10 RX ADMIN — Medication 100 MG: at 13:40

## 2025-06-10 RX ADMIN — GABAPENTIN 400 MG: 400 CAPSULE ORAL at 15:14

## 2025-06-10 RX ADMIN — BUDESONIDE 500 MCG: 0.5 INHALANT RESPIRATORY (INHALATION) at 23:50

## 2025-06-10 RX ADMIN — PANCRELIPASE LIPASE, PANCRELIPASE PROTEASE, PANCRELIPASE AMYLASE 10000 UNITS: 5000; 17000; 24000 CAPSULE, DELAYED RELEASE ORAL at 11:56

## 2025-06-10 RX ADMIN — BUDESONIDE 500 MCG: 0.5 INHALANT RESPIRATORY (INHALATION) at 08:52

## 2025-06-10 RX ADMIN — PANTOPRAZOLE SODIUM 40 MG: 40 TABLET, DELAYED RELEASE ORAL at 05:50

## 2025-06-10 RX ADMIN — ZINC SULFATE 220 MG (50 MG) CAPSULE 50 MG: CAPSULE at 13:42

## 2025-06-10 RX ADMIN — ENOXAPARIN SODIUM 60 MG: 100 INJECTION SUBCUTANEOUS at 22:35

## 2025-06-10 RX ADMIN — INSULIN LISPRO 1 UNITS: 100 INJECTION, SOLUTION INTRAVENOUS; SUBCUTANEOUS at 07:07

## 2025-06-10 RX ADMIN — Medication 2000 UNITS: at 13:41

## 2025-06-10 RX ADMIN — IPRATROPIUM BROMIDE 0.5 MG: 0.5 SOLUTION RESPIRATORY (INHALATION) at 13:31

## 2025-06-10 RX ADMIN — ENOXAPARIN SODIUM 60 MG: 100 INJECTION SUBCUTANEOUS at 10:02

## 2025-06-10 RX ADMIN — GABAPENTIN 400 MG: 400 CAPSULE ORAL at 22:33

## 2025-06-10 RX ADMIN — SODIUM CHLORIDE, PRESERVATIVE FREE 10 ML: 5 INJECTION INTRAVENOUS at 09:47

## 2025-06-10 RX ADMIN — QUETIAPINE FUMARATE 100 MG: 100 TABLET ORAL at 02:34

## 2025-06-10 RX ADMIN — INSULIN GLARGINE 12 UNITS: 100 INJECTION, SOLUTION SUBCUTANEOUS at 09:47

## 2025-06-10 RX ADMIN — ENOXAPARIN SODIUM 60 MG: 100 INJECTION SUBCUTANEOUS at 02:33

## 2025-06-10 RX ADMIN — INSULIN LISPRO 2 UNITS: 100 INJECTION, SOLUTION INTRAVENOUS; SUBCUTANEOUS at 02:33

## 2025-06-10 RX ADMIN — Medication 100 MG: at 13:41

## 2025-06-10 RX ADMIN — ASPIRIN 81 MG: 81 TABLET, COATED ORAL at 09:39

## 2025-06-10 RX ADMIN — IPRATROPIUM BROMIDE 0.5 MG: 0.5 SOLUTION RESPIRATORY (INHALATION) at 23:50

## 2025-06-10 RX ADMIN — Medication 1 CAPSULE: at 09:40

## 2025-06-10 RX ADMIN — POLYETHYLENE GLYCOL 3350 17 G: 17 POWDER, FOR SOLUTION ORAL at 09:41

## 2025-06-10 RX ADMIN — ATORVASTATIN CALCIUM 20 MG: 20 TABLET, FILM COATED ORAL at 02:35

## 2025-06-10 RX ADMIN — METOPROLOL TARTRATE 12.5 MG: 25 TABLET, FILM COATED ORAL at 02:40

## 2025-06-10 RX ADMIN — ATORVASTATIN CALCIUM 80 MG: 20 TABLET, FILM COATED ORAL at 22:33

## 2025-06-10 RX ADMIN — MIRTAZAPINE 7.5 MG: 15 TABLET, FILM COATED ORAL at 13:39

## 2025-06-10 RX ADMIN — METOPROLOL TARTRATE 12.5 MG: 25 TABLET, FILM COATED ORAL at 22:32

## 2025-06-10 RX ADMIN — Medication 1 TABLET: at 13:40

## 2025-06-10 RX ADMIN — LACTULOSE 20 G: 10 SOLUTION ORAL at 22:37

## 2025-06-10 RX ADMIN — SODIUM CHLORIDE, PRESERVATIVE FREE 10 ML: 5 INJECTION INTRAVENOUS at 22:44

## 2025-06-10 ASSESSMENT — PAIN SCALES - GENERAL
PAINLEVEL_OUTOF10: 0
PAINLEVEL_OUTOF10: 0

## 2025-06-10 ASSESSMENT — PAIN - FUNCTIONAL ASSESSMENT
PAIN_FUNCTIONAL_ASSESSMENT: NONE - DENIES PAIN

## 2025-06-10 NOTE — CARE COORDINATION
06/10/25 1518   Readmission Assessment   Number of Days since last admission? 8-30 days   Previous Disposition Acute Rehab   Who is being Interviewed Patient   What was the patient's/caregiver's perception as to why they think they needed to return back to the hospital? Other (Comment)  (chest pain  - NSTEMI)   Did you visit your Primary Care Physician after you left the hospital, before you returned this time? No   Why weren't you able to visit your PCP? Other (Comment)  (at rehab)   Did you see a specialist, such as Cardiac, Pulmonary, Orthopedic Physician, etc. after you left the hospital? Yes  (at rehab)   Who advised the patient to return to the hospital? Skilled Unit   Does the patient report anything that got in the way of taking their medications? No   In our efforts to provide the best possible care to you and others like you, can you think of anything that we could have done to help you after you left the hospital the first time, so that you might not have needed to return so soon? Arrange for more help when leaving the hospital

## 2025-06-10 NOTE — PROGRESS NOTES
TRANSFER - IN REPORT:    Verbal report received from JESI Christina  on Lan Gonzalez  being received from ED for routine progression of patient care      Report consisted of patient's Situation, Background, Assessment and   Recommendations(SBAR).     Information from the following report(s) Nurse Handoff Report, MAR, Cardiac Rhythm Sinus Tach, and Neuro Assessment was reviewed with the receiving nurse.    Opportunity for questions and clarification was provided.      Assessment completed upon patient's arrival to unit and care assumed.

## 2025-06-10 NOTE — DISCHARGE INSTRUCTIONS
HEART CATHETERIZATION/ANGIOGRAPHY DISCHARGE INSTRUCTIONS    Check puncture site frequently for swelling or bleeding. If there is any bleeding, lie down and apply pressure over the area with a clean towel or washcloth. Notify your doctor for any redness, swelling, drainage, or oozing from the puncture site. Notify your doctor for any fever or chills.  If the extremity becomes cold, numb, or painful go to the emergency room Activity should be limited for the next  24 hours. Avoid any  strenuous activity for 48 hours. No heavy lifting (anything over 10 pounds) for 5 days.  You may resume your usual diet. Drink more fluids than usual.  Have a responsible person drive you home and stay with you for at least 24 hours after your heart catheterization/angiography.  You may remove bandage from your {ARM/GROIN:80840} in 24 hours. You may shower in 24 hours. No tub baths, hot tubs, or swimming for 1 week. Do not place any lotions, creams, powders, or ointments over puncture site for 1 week. You may place a clean band-aid over the puncture site each day for 5 days. Change daily.

## 2025-06-10 NOTE — ED PROVIDER NOTES
[facility] EMERGENCY DEPT  EMERGENCY DEPARTMENT HISTORY AND PHYSICAL EXAM      Date of evaluation: 6/9/2025  Patient Name: Lan Gonzalez  Birthdate 1965  MRN: [id number]  ED Provider: Jocelin Woodward DO   Note Started: 9:13 PM EDT 6/9/25    HISTORY OF PRESENT ILLNESS     Chief Complaint   Patient presents with    Chest Pain       History Provided By: Patient, spouse    HPI: Lan Gonzalez is a 59 y.o. male who presents emergency department with chest pain and shortness of breath.    PAST MEDICAL HISTORY   Past Medical History:  Past Medical History:   Diagnosis Date    AAA (abdominal aortic aneurysm)     \"probably seven or eight years ago\" last seen by Cassie Bagley 6/2/23    Aneurysm of right common iliac artery 05/02/2025    18 mm (5/2025)    Anxiety     Arthritis     Nat Donohue    B12 nutritional deficiency 05/08/2025    severe;  w/ LE weakness.   Hx of whipple procedure    BPH (benign prostatic hyperplasia)     CAD (coronary artery disease)     at least since 2020, Nonobstructive CAD-mild diffuse RCA disease, minimal circumflex disease, moderate mid LAD disease on cardiac cath in 2020, Joselito Raizada    Cardiomyopathy (HCC)     at least since 2023, Joselito Raizada    Cerebral artery occlusion with cerebral infarction (HCC) 11/2023    Eddy Guallpa    Chronic kidney disease (CKD), active medical management without dialysis, stage 3 (moderate) (HCC) 2020    Ciara Nelson-Post    Chronic pain     lower back    Chronic pancreatitis (HCC)     at least since 2013, previously followed by Alcides Flanagan and Jason Bedoya, had classic whipple procedure 11/24/14    COPD (chronic obstructive pulmonary disease) (HCC)     \"probably ten years ago\" not currently followed by any specialists    Diverticulosis     Exercise tolerance finding     \"feels winded\" after one flight of stairs but \"walks the dog multiple times a day\" without SOB oe chest pain as of 2/26/25    Gastritis     Gastroparesis     Nat Donohue    Hiatal hernia      as: PROTONIX  Take 1 tablet by mouth 2 times daily (before meals)     pyridoxine 100 MG tablet  Commonly known as: B-6  Take 1 tablet by mouth daily     QUEtiapine 100 MG tablet  Commonly known as: SEROQUEL     sennosides-docusate sodium 8.6-50 MG tablet  Commonly known as: SENOKOT-S  Take 1 tablet by mouth 2 times daily     therapeutic multivitamin-minerals tablet  Take 1 tablet by mouth daily     vitamin B-1 100 MG tablet  Commonly known as: THIAMINE  Take 1 tablet by mouth in the morning and at bedtime     vitamin D 50 MCG (2000 UT) Tabs tablet  Commonly known as: CHOLECALCIFEROL  Take 1 tablet by mouth daily Take with food     zinc sulfate 220 (50 Zn)  mg capsule - elemental zinc  Commonly known as: ZINCATE  Take 1 capsule by mouth daily                DISCONTINUED MEDICATIONS:  Current Discharge Medication List          I am the Primary Clinician of Record. Jocelin Woodward DO (electronically signed)    (Please note that parts of this dictation were completed with voice recognition software. Quite often unanticipated grammatical, syntax, homophones, and other interpretive errors are inadvertently transcribed by the computer software. Please disregards these errors. Please excuse any errors that have escaped final proofreading.)     Jocelin Woodward DO  06/09/25 1871

## 2025-06-10 NOTE — PLAN OF CARE
Problem: Chronic Conditions and Co-morbidities  Goal: Patient's chronic conditions and co-morbidity symptoms are monitored and maintained or improved  6/10/2025 1324 by Fer Hernandez RN  Outcome: Progressing  Flowsheets (Taken 6/10/2025 0800)  Care Plan - Patient's Chronic Conditions and Co-Morbidity Symptoms are Monitored and Maintained or Improved:   Monitor and assess patient's chronic conditions and comorbid symptoms for stability, deterioration, or improvement   Collaborate with multidisciplinary team to address chronic and comorbid conditions and prevent exacerbation or deterioration   Update acute care plan with appropriate goals if chronic or comorbid symptoms are exacerbated and prevent overall improvement and discharge  6/10/2025 0411 by Jatin Alfaro RN  Outcome: Progressing  6/10/2025 0409 by Jatin Alfaro RN  Outcome: Progressing  Flowsheets (Taken 6/10/2025 0145)  Care Plan - Patient's Chronic Conditions and Co-Morbidity Symptoms are Monitored and Maintained or Improved:   Monitor and assess patient's chronic conditions and comorbid symptoms for stability, deterioration, or improvement   Collaborate with multidisciplinary team to address chronic and comorbid conditions and prevent exacerbation or deterioration     Problem: Discharge Planning  Goal: Discharge to home or other facility with appropriate resources  6/10/2025 1324 by Fer Hernandez RN  Outcome: Progressing  Flowsheets (Taken 6/10/2025 0800)  Discharge to home or other facility with appropriate resources:   Identify barriers to discharge with patient and caregiver   Arrange for needed discharge resources and transportation as appropriate   Identify discharge learning needs (meds, wound care, etc)  6/10/2025 0411 by Jatin Alfaro RN  Outcome: Progressing  6/10/2025 0409 by Jatin Alfaro RN  Outcome: Progressing     Problem: Skin/Tissue Integrity  Goal: Skin integrity remains intact  Description: 1.  Monitor for areas of

## 2025-06-10 NOTE — CONSULTS
[facility]  (A Division of [facility])  Marcia Briggs MD   Office Tel:  [phone] Option #8                                                               Date of Admission: 6/9/2025       ID Consult for antimicrobial management leucocytosis requested by Dr Salcedo.     PCP: Nat Donohue APRN - NP    C/C: Transferred to [facility] from [facility] for NSTEMI/Cardiac workup and intervention    Current Antimicrobials:    Prior Antimicrobials:    None Vantin 04/4/25 X10 days PO  Doxycycline X7 days 4/1/25     Vanco/Cefepime 4/24 to 4/28     Vanco/Cefepime 5/2 to ceftriaxone 5/5 to 5/7  Vanco 5/14 to 5/15  Meropenem 5/14 to 5/24   Allergy for antibiotics -PCN-Hives       Assessment:     Chronically ill patient with:    Worsening of leucocytosis to 25K- reactive from NSTEMI Vs etiology infection? Suspect former  6/9- blood culture X2: NGSF        - Viral test /PCR for COVID 19/Influenza A and B -negative.        -CXR with bilateral atelectasis     NSTEMI/ chest pain- troponin trending up  Persitsent leucocytosis       BM 5/13/25- neg for hematologic malignancy, or MDS       Septic shock with pressor/MOH/Leukmoid reaction 5/14/25- source unclear. Treated X14 days meropenem       History of GIB/Anemia        Nutritional deficiency- B12/Zinc/Iron      Work up for GBS by Neurology/LP- negative              HIV -non reactive 5/8/25     Rheumatoid arthritis( RF positive) with joint deformity-- RF high ( 244 on 5/8/25)-- he isnot on rx yet       - Used to follow [facility]/ get Rituxumab infusion. Last record on Epic/Care     Debilitated/bed bound    Medical History:   Diagnosis Date    AAA (abdominal aortic aneurysm)     \"probably seven or eight years ago\" last seen by Cassie Bagley 6/2/23    Aneurysm of right common iliac artery 05/02/2025    18 mm (5/2025)    Anxiety     Arthritis     Nat Donohue    B12 nutritional deficiency 05/08/2025    severe;  w/ LE weakness.   Hx of  minutes     This time also includes physician non-face-to-face service time visit on the date of service such as  Preparing to see the patient (eg, review of tests) and patient exam  Obtaining and/or reviewing separately obtained history  Performing a medically necessary appropriate examination and/or evaluation  Counseling and educating the patient/family/caregiver  Ordering medications, tests, or procedures  Referring and communicating with other health care professionals as needed  Documenting clinical information in the electronic or other health record  Independently interpreting results (not reported separately) and communicating results to the patient/family/caregiver  Care coordination and discharge planning with Case Management.     NB:   Medical notes are meant to be a communication between medical professionals.  Additionally, portion of this note were created using voice recognition function, syntax and phonetic over may have escaped proofreading. If patient/family has questions while reviewing this note, please bring it to next round or next MD appointment.

## 2025-06-10 NOTE — WOUND CARE
Attempted to see patient for consult. Patient off the floor in cath lab.  Wound care nurse will attempt to see later as time and patient condition permits.

## 2025-06-10 NOTE — PROCEDURES
Cardiac Catherization Procedure Note    Patient: Lan Gonzalez Age: 59 y.o. Sex: male    YOB: 1965 Admit Date: 6/9/2025 PCP: Nat Donohue APRN - NP   MRN: [id number]  CSN: [id number]        Date: Maude 10, 2025   PHYSICIAN: Dr. Chapin Villanueva MD, [facility], [facility], [facility]    POSTOPERATIVE DIAGNOSIS:   ASCVD with multivessel CAD  Angina  NSTEMI  CKD 3a  HTN  HLD  AF  T1DM  COPD  Decubitus ulcers, grade 2  Malnutrition  Rheumatoid arthritis  GI bleeding history  Anemia with iron deficiency  Chronic pancreatitis with prior Whipple 2014    INDICATIONS:   Angina  NSTEMI  CKD 3a  HTN  HLD  AF  T1DM  COPD  Decubitus ulcers, grade 2  Malnutrition  Rheumatoid arthritis  GI bleeding history  Anemia with iron deficiency  Chronic pancreatitis with prior Whipple 2014    PROCEDURE PERFORMED:   1. Left heart cardiac catheterization.   2. Selective left and right coronary angiography.   3. Left ventriculography.   4. Aortic root angiography.   5. Percutaneous Coronary Intervention (PCI), stent placement LAD  6. Radiologic supervision and interpretation  7. Right radial arterial access using ultrasound guidance       DESCRIPTION OF PROCEDURE:   After informed consent where the procedure was discussed both during outpatient evaluation and again immediately prior to the procedure, the patient was brought to the cardiac catheterization suite. The patient was prepped and draped in the usual sterile fashion. After documentation of normal Toni and reverse Toni tests right radial artery was used for the procedure. After local lidocaine infiltration above the radial artery, right radial access was achieved using ultrasound guidance and with micro puncture needle.  A 6Fr glide sheath was advanced and placed into the right radial artery using Seldinger technique.  Combination of heparin, verapamil and nitroglycerin as outlined in the procedure log was directly injected in the right radial artery. A 5Fr. TIG catheter was advanced  normal MAY 3 flow after stent flow. A large diagonal branch had ostial 75% narrowing left untreated  Circumflex: The circumflex coronary artery was a large vessel and appeared unobstructed with normal flow.  RCA: The RCA was a large dominant artery with mid artery 60% narrowing with normal flow.    LEFT VENTRICULOGRAPHY:   In the PALENCIA projection showed severe global hypokinesis with LVEF 35%, LVEDP 18 mmHg. There was no mitral regurgitation and no aortic stenosis.     AORTIC ROOT ANGIOGRAPHY:   Demonstrated normal type 1 aortic root with no aortic insufficiency.     PLAN and RECOMMENDATION:  1. Dual antiplatelet therapy with plavix and aspirin  2. Outpatient follow up.  3. Focus on lipids and prevention. High intensity statin    Chapin Villanueva MD  Maude 10, 2025  5:59 PM

## 2025-06-10 NOTE — CARE COORDINATION
06/10/25 1130   Service Assessment   Patient Orientation Alert and Oriented   Cognition Alert   History Provided By Patient   Primary Caregiver Other (Comment)  (skilled facility)   Support Systems Spouse/Significant Other;Children   PCP Verified by CM Yes   Last Visit to PCP Within last 6 months   Prior Functional Level Independent in ADLs/IADLs   Current Functional Level Assistance with the following:;Bathing;Dressing;Toileting;Cooking;Housework;Shopping;Mobility   Can patient return to prior living arrangement Unknown at present   Ability to make needs known: Fair   Family able to assist with home care needs: No   Would you like for me to discuss the discharge plan with any other family members/significant others, and if so, who? Yes  (wife Valeria)   Financial Resources Medicare   Community Resources None   Social/Functional History   Lives With Spouse   Type of Home House   Home Layout Multi-level   Home Access Stairs to enter with rails   Entrance Stairs - Number of Steps 1 step   Bathroom Shower/Tub Tub/Shower unit   Home Equipment None   Prior Level of Assist for ADLs Needs assistance   Toileting Needs assistance   Prior Level of Assist for Homemaking Needs assistance   Homemaking Responsibilities No   Ambulation Assistance Non-ambulatory   Prior Level of Assist for Transfers Needs assistance   Active  No   Discharge Planning   Type of Residence Skilled Nursing Facility   Living Arrangements Other (Comment)  (admitted from LTAC)   Current Services Prior To Admission Skilled Nursing Facility   Potential Assistance Needed Durable Medical Equipment  (assess for needs based on discharge plan)   Potential DME Needed Hospital Bed;Bedside Commode;Bedside Table   Potential Assistance Purchasing Medications No   Type of Home Care Services OT;PT;Aide Services   Patient expects to be discharged to: Acute rehab   Follow Up Appointment: Best Day/Time  Monday AM   History of falls? 0   Services At/After Discharge

## 2025-06-10 NOTE — PROGRESS NOTES
Occupational Therapy Evaluation/Treatment Attempt    Chart reviewed. Attempted Occupational Therapy Evaluation/Treatment, however, patient unable to be seen due to:  []  Nausea/vomiting  []  Eating  []  Pain  []  Patient too lethargic  [x]  Off Unit for testing/procedure  []  Dialysis treatment in progress   []  Telemetry Results  []  WB status not in order set  []  Patient on bedrest per order set  []  Other :     Will follow up later as patient's schedule allows.   Thank you for this referral.  Yuliya Orona OTMARNI, OTR/L

## 2025-06-10 NOTE — PLAN OF CARE
Problem: Safety - Adult  Goal: Free from fall injury  Outcome: Progressing     Problem: Skin/Tissue Integrity  Goal: Skin integrity remains intact  Description: 1.  Monitor for areas of redness and/or skin breakdown2.  Assess vascular access sites hourly3.  Every 4-6 hours minimum:  Change oxygen saturation probe site4.  Every 4-6 hours:  If on nasal continuous positive airway pressure, respiratory therapy assess nares and determine need for appliance change or resting period  Outcome: Progressing  Flowsheets (Taken 6/10/2025 0145)  Skin Integrity Remains Intact:   Monitor for areas of redness and/or skin breakdown   Assess vascular access sites hourly   Every 4-6 hours minimum:  Change oxygen saturation probe site   Turn and reposition as indicated     Problem: Chronic Conditions and Co-morbidities  Goal: Patient's chronic conditions and co-morbidity symptoms are monitored and maintained or improved  Outcome: Progressing  Flowsheets (Taken 6/10/2025 0145)  Care Plan - Patient's Chronic Conditions and Co-Morbidity Symptoms are Monitored and Maintained or Improved:   Monitor and assess patient's chronic conditions and comorbid symptoms for stability, deterioration, or improvement   Collaborate with multidisciplinary team to address chronic and comorbid conditions and prevent exacerbation or deterioration

## 2025-06-10 NOTE — ED NOTES
ED TO INPATIENT SBAR HANDOFF    Patient Name: Lan Gonzalez   Preferred Name: Lan  : 1965  59 y.o.   Family/Caregiver Present: no   Code Status Order: Full Code  PO Status: NPO: NA  Telemetry Order: Yes  C-SSRS: Risk of Suicide: No Risk  Sitter no NA  Restraints:     Sepsis Risk Score Sepsis V2 Risk Score: 69.6    Situation  Chief Complaint   Patient presents with    Chest Pain     Brief Description of Patient's Condition:   Mental Status: A+OX4  Arrived from:FACILITY  Imaging:   CTA CHEST W WO CONTRAST PE Eval   Final Result   1.  No evidence of pulmonary embolism.   2.  There are small bilateral pleural effusions that are increased from the   prior study.         Electronically signed by ALONSO [email or web address]      XR CHEST PORTABLE   Final Result      Decreased bibasilar pneumonia versus atelectasis. No pneumothorax. Recommend   followup PA and lateral chest views in 8-10 weeks to ensure resolution.            Electronically signed by David Medina        Abnormal labs:   Abnormal Labs Reviewed   CBC WITH AUTO DIFFERENTIAL - Abnormal; Notable for the following components:       Result Value    WBC 25.1 (*)     RBC 3.32 (*)     Hemoglobin 9.0 (*)     Hematocrit 28.5 (*)     RDW 17.0 (*)     Platelets 487 (*)     Neutrophils % 90.9 (*)     Lymphocytes % 3.6 (*)     Immature Granulocytes % 0.8 (*)     Neutrophils Absolute 22.82 (*)     Immature Granulocytes Absolute 0.20 (*)     All other components within normal limits   TROPONIN - Abnormal; Notable for the following components:    Troponin T 572.0 (*)     All other components within normal limits   COMPREHENSIVE METABOLIC PANEL - Abnormal; Notable for the following components:    Glucose 250 (*)     BUN 29 (*)     BUN/Creatinine Ratio 29 (*)     AST 40 (*)     Alk Phosphatase 130 (*)     Total Protein 5.1 (*)     Albumin 1.7 (*)     Albumin/Globulin Ratio 0.5 (*)     All other components within normal limits       Background  Allergies:   Allergies   Allergen  (DUONEB) nebulizer solution 1 Dose (has no administration in time range)   lactulose (CHRONULAC) 10 GM/15ML solution 20 g (has no administration in time range)   lipase-protease-amylase (ZENPEP) delayed release capsule 10,000 Units (has no administration in time range)   polyethylene glycol (GLYCOLAX) packet 17 g (has no administration in time range)   QUEtiapine (SEROQUEL) tablet 100 mg (has no administration in time range)   traMADol (ULTRAM) tablet 25 mg (has no administration in time range)   gabapentin (NEURONTIN) capsule 400 mg (has no administration in time range)   lactobacillus (CULTURELLE) capsule 1 capsule (has no administration in time range)   iopamidol (ISOVUE-370) 76 % injection 75 mL (75 mLs IntraVENous Given 6/9/25 2223)     Last documented pain medication administration: NA  Pertinent or High Risk Medications/Drips: no   If Yes, please provide details: NA  Blood Product Administration: no  If Yes, please provide details: NA  Process Protocols/Bundles: SEPSIS    Recommendation  Incomplete STAT orders: hospitalist orders  Overdue Medications:   Patient Belongings:    Additional Comments: NA  If any further questions, please call Sending RN at 0915      Admitting Unit Notification  Name of person notified and time: TBD    Electronically signed by: Electronically signed by Carri Dixon RN on 6/10/2025 at 12:10 AM

## 2025-06-10 NOTE — H&P
History & Physical      Name: Lan Gonzalez  : 1965  MRN: [id number]  Date: 2025    Assessment and Plan:    # Non-ST elevation MI.  # Hypertension.  # Mixed hyperlipidemia.  # Atrial fibrillation.  # Tachycardia.  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  # CKD 3a.  # Peripheral arterial disease.  # COPD with chronic hypoxic respiratory failure..  # Seizure disorder.  # Depression.  # Rheumatoid arthritis.  # Iron deficiency anemia.  #Sacral ulcer grade 2.  #BPH with chronic ruggiero catheter.   #GERD.  #Malnutrition.  #History of gastrointestinal bleeding.  #History of venous thromboembolism.  #History of septic shock.    - Admit to hospital with telemetry monitoring.  - Cardiac consultation. Dr. Mathis.  - Continue cardiac monitoring.  - Continue Lipitor  - Nitroglycerin as needed.  - NPO for the moment as he may require cath tomorrow.  Defer to Cardiology.   - Continue outpatient medications including meropenem that he was started on in the LTAC.  ID following.  Plan per notes is to deescalate if blood cultures are negative.    - DVT prevention.  - Full code.      CC: Chest pain.    HPI:     Lan Gonzalez is a 59 y.o. old male with complicated PMHx including CAD, PAD, gerd/gastritis, AAA, hx of CVA, HLD and SUNG, CKD stage 3, chronic pancreatitis s/p Whipple procedure (pancreatojejunostomy with gastrojejunostomy, cholecystectomy, and hepaticojejunostomy in ),, DM type 1, COPD, hx of seizure, major depressive disorder, and Rheumatoid arthritis (former rituxan) who was admitted to [facility] 25 with bilateral LE weakness, N/V and significant weight loss. He was found to have sepsis with pyelitis/UTI, severe anemia (Hgb 5.6) w/ GI bleed, iron deficiency (sat 5%) and severe vitamin B12 deficiency (<150), undetectable Vit D.  Presented to the emergency department with chest pain and shortness of breath.  Symptoms started around 6 PM.  Received 3 nitroglycerin without improvement in symptoms.   5.1 (L)        POC Lactic Acid 0.40 - 2.00 mmol/L      1.82    Troponin T 0.0 - 22.0 ng/L  572.0 (HH)        Albumin 3.4 - 5.0 g/dL  1.7 (L)        Globulin 2.0 - 4.0 g/dL  3.4        Alkaline Phosphatase 45 - 117 U/L  130 (H)        ALT 10 - 50 U/L  28        AST 10 - 38 U/L  40 (H)        Total Bilirubin 0.2 - 1.0 MG/DL  0.2        WBC 4.6 - 13.2 K/uL  25.1 (H)        RBC 4.35 - 5.65 M/uL  3.32 (L)        Hemoglobin Quant 13.0 - 16.0 g/dL  9.0 (L)        Hematocrit 36.0 - 48.0 %  28.5 (L)        MCV 78.0 - 100.0 FL  85.8        MCH 24.0 - 34.0 PG  27.1        MCHC 31.0 - 37.0 g/dL  31.6        MPV 9.2 - 11.8 FL  10.6        RDW 11.6 - 14.5 %  17.0 (H)        Platelet Count 135 - 420 K/uL  487 (H)        Platelet Comment -    Increased Platelets        Neutrophils % 40.0 - 73.0 %  90.9 (H)        Lymphocyte % 21.0 - 52.0 %  3.6 (L)        Monocytes % 3.0 - 10.0 %  3.0        Eosinophils % 0.0 - 5.0 %  1.3        Basophils % 0.0 - 2.0 %  0.4        Neutrophils Absolute 1.80 - 8.00 K/UL  22.82 (H)        Lymphocytes Absolute 0.90 - 3.30 K/UL  0.90        Monocytes Absolute 0.05 - 1.20 K/UL  0.75        Eosinophils Absolute 0.00 - 0.40 K/UL  0.33        Basophils Absolute 0.00 - 0.10 K/UL  0.10        Differential Type -    AUTOMATED        Immature Granulocytes % 0.0 - 0.5 %  0.8 (H)        Nucleated Red Blood Cells 0  WBC  0.00 - 0.01 K/uL  0.0  0.00        Immature Granulocytes Absolute 0.00 - 0.04 K/UL  0.20 (H)        RBC Comment -    -    MICROCYTOSIS  1+    HYPOCHROMIA  1+          CULTURE, BLOOD 1      Rpt (IP)     CULTURE, BLOOD 2      Rpt (IP)     CTA CHEST W WO CONTRAST        Rpt   XR CHEST 1 VIEW  Rpt (E)         XR CHEST PORTABLE     Rpt      EKG 12-LEAD    Rpt (P)       Atrial Rate BPM   117 (P)       Diagnosis    Sinus tachycardia with 1st degree AV block  Possible Left atrial enlargement  Nonspecific T wave abnormality  Abnormal ECG  When compared with ECG of 15-MAY-2025 13:49,  Vent. rate has

## 2025-06-10 NOTE — H&P
Interventional Cardiology Consultation Note    Patient: Lan Gonzalez Age: 59 y.o. Sex: male    YOB: 1965 Admit Date: 6/9/2025 PCP: Nat Donohue APRN - NP   MRN: [id number]  CSN: [id number]       Cardiologist: Chapin Villanueva MD       Chief Complaint     Chief Complaint   Patient presents with    Chest Pain        HPI:   Lan Gonzalez is a 59 y.o. male with complex and multipel medical problems who presetned with chest pain and SOB with blood tests identifying NSTEMI. Offered cardiac catheterization to assess cornary anatomy    Assessment and Diagnosis   ASCVD with multivessel CAD  Angina  NSTEMI  CKD 3a  HTN  HLD  AF  T1DM  COPD  Decubitus ulcers, grade 2  Malnutrition  Rheumatoid arthritis  GI bleeding history  Anemia with iron deficiency  Chronic pancreatitis with prior Whipple 2014    Recommendations and Plan    Recommended and offered cardiac catheterizaiton to asseess coronary anatomy and PCI if indicated. Will need high intensity statin.    Physical Examination:     Vital Signs:    Vitals:    06/10/25 1649   BP:    Pulse:    Resp:    Temp:    SpO2: 95%        HEENT: Neck supple, without obvious masses  Cardiovascular: Regular rate and rhythm, no murmurs, no rubs or gallops, no carotid bruit. JVP 8 with no HJR  Pulmonary: Clear to auscultation bilaterally; nontender to palpation, effort normal  Abdomen: Nontender, normal active bowel sounds, soft  Musculoskeletal: Normal appearing bilateral upper and lower extremities  Skin: Warm, dry, intact  Neurologic: Grossly normal neurologic exam with normal strength and sensation      Chapin Villanueva MD  Maude 10, 2025  6:06 PM

## 2025-06-10 NOTE — ED TRIAGE NOTES
Pt presents to ED with cc of CP and SOB that started around 1800. Pt currently at [facility] specialty and sent over d/t elevated trop. Pt cardiologist is Dr Mathis. Nurse at [facility] gave 3 SL nitro without improvement. Pt admitted to [facility] specialty for sepsis.

## 2025-06-11 LAB
ALBUMIN SERPL-MCNC: 1.7 G/DL (ref 3.4–5)
ALBUMIN/GLOB SERPL: 0.5 (ref 0.8–1.7)
ALP SERPL-CCNC: 130 U/L (ref 45–117)
ALT SERPL-CCNC: 32 U/L (ref 10–50)
ANION GAP SERPL CALC-SCNC: 13 MMOL/L (ref 3–18)
AST SERPL-CCNC: 49 U/L (ref 10–38)
BILIRUB SERPL-MCNC: 0.3 MG/DL (ref 0.2–1)
BUN SERPL-MCNC: 27 MG/DL (ref 6–23)
BUN/CREAT SERPL: 29 (ref 12–20)
CALCIUM SERPL-MCNC: 9 MG/DL (ref 8.5–10.1)
CHLORIDE SERPL-SCNC: 101 MMOL/L (ref 98–107)
CO2 SERPL-SCNC: 22 MMOL/L (ref 21–32)
CREAT SERPL-MCNC: 0.93 MG/DL (ref 0.6–1.3)
ERYTHROCYTE [DISTWIDTH] IN BLOOD BY AUTOMATED COUNT: 17.6 % (ref 11.6–14.5)
GLOBULIN SER CALC-MCNC: 3.6 G/DL (ref 2–4)
GLUCOSE BLD STRIP.AUTO-MCNC: 160 MG/DL (ref 70–110)
GLUCOSE BLD STRIP.AUTO-MCNC: 168 MG/DL (ref 70–110)
GLUCOSE BLD STRIP.AUTO-MCNC: 202 MG/DL (ref 70–110)
GLUCOSE BLD STRIP.AUTO-MCNC: 248 MG/DL (ref 70–110)
GLUCOSE SERPL-MCNC: 139 MG/DL (ref 74–108)
HCT VFR BLD AUTO: 33.8 % (ref 36–48)
HGB BLD-MCNC: 10.2 G/DL (ref 13–16)
MAGNESIUM SERPL-MCNC: 2.1 MG/DL (ref 1.6–2.6)
MCH RBC QN AUTO: 27.1 PG (ref 24–34)
MCHC RBC AUTO-ENTMCNC: 30.2 G/DL (ref 31–37)
MCV RBC AUTO: 89.7 FL (ref 78–100)
NRBC # BLD: 0 K/UL (ref 0–0.01)
NRBC BLD-RTO: 0 PER 100 WBC
PLATELET # BLD AUTO: 505 K/UL (ref 135–420)
PMV BLD AUTO: 10.8 FL (ref 9.2–11.8)
POTASSIUM SERPL-SCNC: 4.6 MMOL/L (ref 3.5–5.5)
PROCALCITONIN SERPL-MCNC: 0.2 NG/ML
PROCALCITONIN SERPL-MCNC: 0.2 NG/ML
PROCALCITONIN SERPL-MCNC: 0.22 NG/ML
PROCALCITONIN SERPL-MCNC: 0.24 NG/ML
PROT SERPL-MCNC: 5.3 G/DL (ref 6.4–8.2)
RBC # BLD AUTO: 3.77 M/UL (ref 4.35–5.65)
SODIUM SERPL-SCNC: 137 MMOL/L (ref 136–145)
TROPONIN T SERPL HS-MCNC: 1060 NG/L (ref 0–22)
TROPONIN T SERPL HS-MCNC: 1071 NG/L (ref 0–22)
TROPONIN T SERPL HS-MCNC: 968 NG/L (ref 0–22)
TROPONIN T SERPL HS-MCNC: 975 NG/L (ref 0–22)
WBC # BLD AUTO: 22.2 K/UL (ref 4.6–13.2)

## 2025-06-11 PROCEDURE — 83735 ASSAY OF MAGNESIUM: CPT

## 2025-06-11 PROCEDURE — 97165 OT EVAL LOW COMPLEX 30 MIN: CPT

## 2025-06-11 PROCEDURE — 6360000002 HC RX W HCPCS: Performed by: INTERNAL MEDICINE

## 2025-06-11 PROCEDURE — 6370000000 HC RX 637 (ALT 250 FOR IP): Performed by: INTERNAL MEDICINE

## 2025-06-11 PROCEDURE — 84145 PROCALCITONIN (PCT): CPT

## 2025-06-11 PROCEDURE — 97163 PT EVAL HIGH COMPLEX 45 MIN: CPT

## 2025-06-11 PROCEDURE — 84484 ASSAY OF TROPONIN QUANT: CPT

## 2025-06-11 PROCEDURE — 2700000000 HC OXYGEN THERAPY PER DAY

## 2025-06-11 PROCEDURE — 94640 AIRWAY INHALATION TREATMENT: CPT

## 2025-06-11 PROCEDURE — 82962 GLUCOSE BLOOD TEST: CPT

## 2025-06-11 PROCEDURE — 2500000003 HC RX 250 WO HCPCS: Performed by: INTERNAL MEDICINE

## 2025-06-11 PROCEDURE — 97602 WOUND(S) CARE NON-SELECTIVE: CPT

## 2025-06-11 PROCEDURE — 1100000003 HC PRIVATE W/ TELEMETRY

## 2025-06-11 PROCEDURE — 36415 COLL VENOUS BLD VENIPUNCTURE: CPT

## 2025-06-11 PROCEDURE — 85027 COMPLETE CBC AUTOMATED: CPT

## 2025-06-11 PROCEDURE — 80053 COMPREHEN METABOLIC PANEL: CPT

## 2025-06-11 RX ORDER — CARVEDILOL 3.12 MG/1
3.12 TABLET ORAL 2 TIMES DAILY WITH MEALS
Status: DISCONTINUED | OUTPATIENT
Start: 2025-06-11 | End: 2025-06-12 | Stop reason: HOSPADM

## 2025-06-11 RX ORDER — METOPROLOL TARTRATE 1 MG/ML
5 INJECTION, SOLUTION INTRAVENOUS EVERY 6 HOURS PRN
Status: DISCONTINUED | OUTPATIENT
Start: 2025-06-11 | End: 2025-06-11

## 2025-06-11 RX ADMIN — LACTULOSE 20 G: 10 SOLUTION ORAL at 14:17

## 2025-06-11 RX ADMIN — PANCRELIPASE LIPASE, PANCRELIPASE PROTEASE, PANCRELIPASE AMYLASE 10000 UNITS: 5000; 17000; 24000 CAPSULE, DELAYED RELEASE ORAL at 12:13

## 2025-06-11 RX ADMIN — IPRATROPIUM BROMIDE 0.5 MG: 0.5 SOLUTION RESPIRATORY (INHALATION) at 20:49

## 2025-06-11 RX ADMIN — POLYETHYLENE GLYCOL 3350 17 G: 17 POWDER, FOR SOLUTION ORAL at 10:40

## 2025-06-11 RX ADMIN — ENOXAPARIN SODIUM 60 MG: 100 INJECTION SUBCUTANEOUS at 10:40

## 2025-06-11 RX ADMIN — METOPROLOL TARTRATE 5 MG: 5 INJECTION INTRAVENOUS at 02:42

## 2025-06-11 RX ADMIN — METOPROLOL TARTRATE 12.5 MG: 25 TABLET, FILM COATED ORAL at 10:50

## 2025-06-11 RX ADMIN — LACTULOSE 20 G: 10 SOLUTION ORAL at 10:48

## 2025-06-11 RX ADMIN — LACTULOSE 20 G: 10 SOLUTION ORAL at 20:25

## 2025-06-11 RX ADMIN — Medication 1 CAPSULE: at 10:46

## 2025-06-11 RX ADMIN — IPRATROPIUM BROMIDE 0.5 MG: 0.5 SOLUTION RESPIRATORY (INHALATION) at 07:40

## 2025-06-11 RX ADMIN — METOPROLOL TARTRATE 12.5 MG: 25 TABLET, FILM COATED ORAL at 20:26

## 2025-06-11 RX ADMIN — PANCRELIPASE LIPASE, PANCRELIPASE PROTEASE, PANCRELIPASE AMYLASE 10000 UNITS: 5000; 17000; 24000 CAPSULE, DELAYED RELEASE ORAL at 10:44

## 2025-06-11 RX ADMIN — SODIUM CHLORIDE, PRESERVATIVE FREE 10 ML: 5 INJECTION INTRAVENOUS at 11:02

## 2025-06-11 RX ADMIN — GABAPENTIN 400 MG: 400 CAPSULE ORAL at 20:26

## 2025-06-11 RX ADMIN — INSULIN LISPRO 1 UNITS: 100 INJECTION, SOLUTION INTRAVENOUS; SUBCUTANEOUS at 16:57

## 2025-06-11 RX ADMIN — ENOXAPARIN SODIUM 60 MG: 100 INJECTION SUBCUTANEOUS at 20:26

## 2025-06-11 RX ADMIN — Medication 100 MG: at 20:26

## 2025-06-11 RX ADMIN — CLOPIDOGREL BISULFATE 75 MG: 75 TABLET, FILM COATED ORAL at 10:45

## 2025-06-11 RX ADMIN — PANTOPRAZOLE SODIUM 40 MG: 40 TABLET, DELAYED RELEASE ORAL at 16:57

## 2025-06-11 RX ADMIN — BUDESONIDE 500 MCG: 0.5 INHALANT RESPIRATORY (INHALATION) at 20:49

## 2025-06-11 RX ADMIN — INSULIN GLARGINE 12 UNITS: 100 INJECTION, SOLUTION SUBCUTANEOUS at 10:43

## 2025-06-11 RX ADMIN — MIRTAZAPINE 7.5 MG: 15 TABLET, FILM COATED ORAL at 10:49

## 2025-06-11 RX ADMIN — Medication 2000 UNITS: at 10:43

## 2025-06-11 RX ADMIN — INSULIN LISPRO 1 UNITS: 100 INJECTION, SOLUTION INTRAVENOUS; SUBCUTANEOUS at 12:14

## 2025-06-11 RX ADMIN — Medication 100 MG: at 10:50

## 2025-06-11 RX ADMIN — Medication 100 MG: at 10:45

## 2025-06-11 RX ADMIN — CARVEDILOL 3.12 MG: 3.12 TABLET, FILM COATED ORAL at 23:28

## 2025-06-11 RX ADMIN — Medication 1 TABLET: at 10:46

## 2025-06-11 RX ADMIN — GABAPENTIN 400 MG: 400 CAPSULE ORAL at 14:17

## 2025-06-11 RX ADMIN — GABAPENTIN 400 MG: 400 CAPSULE ORAL at 10:46

## 2025-06-11 RX ADMIN — PANTOPRAZOLE SODIUM 40 MG: 40 TABLET, DELAYED RELEASE ORAL at 06:22

## 2025-06-11 RX ADMIN — SODIUM CHLORIDE, PRESERVATIVE FREE 30 ML: 5 INJECTION INTRAVENOUS at 20:28

## 2025-06-11 RX ADMIN — IPRATROPIUM BROMIDE 0.5 MG: 0.5 SOLUTION RESPIRATORY (INHALATION) at 13:49

## 2025-06-11 RX ADMIN — ZINC SULFATE 220 MG (50 MG) CAPSULE 50 MG: CAPSULE at 10:46

## 2025-06-11 RX ADMIN — ATORVASTATIN CALCIUM 80 MG: 20 TABLET, FILM COATED ORAL at 20:26

## 2025-06-11 RX ADMIN — PANCRELIPASE LIPASE, PANCRELIPASE PROTEASE, PANCRELIPASE AMYLASE 10000 UNITS: 5000; 17000; 24000 CAPSULE, DELAYED RELEASE ORAL at 16:57

## 2025-06-11 RX ADMIN — BUDESONIDE 500 MCG: 0.5 INHALANT RESPIRATORY (INHALATION) at 07:39

## 2025-06-11 RX ADMIN — COLLAGENASE SANTYL: 250 OINTMENT TOPICAL at 15:27

## 2025-06-11 ASSESSMENT — PAIN SCALES - GENERAL
PAINLEVEL_OUTOF10: 0
PAINLEVEL_OUTOF10: 0

## 2025-06-11 NOTE — PROGRESS NOTES
[facility]  (A Division of [facility])  Marcia Briggs MD   Office Tel:  [phone] Option #8                                                               Date of Admission: 6/9/2025       ID FU for antimicrobial management leucocytosis requested by Dr Salcedo.     PCP: Nat Donohue, DEREK - NP    C/C: Transferred to [facility] from [facility] for NSTEMI/Cardiac workup and intervention    Current Antimicrobials:    Prior Antimicrobials:    None Vantin 04/4/25 X10 days PO  Doxycycline X7 days 4/1/25     Vanco/Cefepime 4/24 to 4/28     Vanco/Cefepime 5/2 to ceftriaxone 5/5 to 5/7  Vanco 5/14 to 5/15  Meropenem 5/14 to 5/24   Allergy for antibiotics -PCN-Hives       Assessment:     Chronically ill and very complex patient, RA not on treatment with:    Worsening of leucocytosis to 25K- reactive from NSTEMI Vs etiology infection? Suspect former  NSTEMI/ chest pain- troponin trending up  S/P cardiac cath- Multivessel disease, Stent placed 6/10/25  6/9- blood culture X2: NGSF        - Viral test /PCR for COVID 19/Influenza A and B -negative.         -CXR with bilateral atelectasis   Procal x4- normal-6/10 to 11    Persitsent leucocytosis-fluctuates  Thrombocytosis- reactive 505K       BM 5/13/25- neg for hematologic malignancy, or MDS       Septic shock with pressor/MOH/Leukmoid reaction 5/14/25- source unclear. Treated X14 days meropenem       History of GIB/Anemia        Malnutrition/ Nutritional deficiency- B12/Zinc/Iron-treated       Work up for GBS by Neurology/LP- negative              HIV -non reactive 5/8/25     Rheumatoid arthritis( RF positive) with joint deformity-- RF high ( 244 on 5/8/25)-- he isnot on rx yet       - Used to follow [facility]/ get Rituxumab infusion. Last record on Epic/Care     Debilitated/bed bound    Medical History:   Diagnosis Date    AAA (abdominal aortic aneurysm)     \"probably seven or eight years ago\" last seen by Cassie Bagley 6/2/23     medically necessary appropriate examination and/or evaluation  Counseling and educating the patient/family/caregiver  Ordering medications, tests, or procedures  Referring and communicating with other health care professionals as needed  Documenting clinical information in the electronic or other health record  Independently interpreting results (not reported separately) and communicating results to the patient/family/caregiver  Care coordination and discharge planning with Case Management.     NB:   Medical notes are meant to be a communication between medical professionals.  Additionally, portion of this note were created using voice recognition function, syntax and phonetic over may have escaped proofreading. If patient/family has questions while reviewing this note, please bring it to next round or next MD appointment.

## 2025-06-11 NOTE — PLAN OF CARE
Problem: Chronic Conditions and Co-morbidities  Goal: Patient's chronic conditions and co-morbidity symptoms are monitored and maintained or improved  6/11/2025 1140 by Melissa Obrien RN  Outcome: Progressing  Flowsheets (Taken 6/11/2025 0800)  Care Plan - Patient's Chronic Conditions and Co-Morbidity Symptoms are Monitored and Maintained or Improved:   Monitor and assess patient's chronic conditions and comorbid symptoms for stability, deterioration, or improvement   Update acute care plan with appropriate goals if chronic or comorbid symptoms are exacerbated and prevent overall improvement and discharge   Collaborate with multidisciplinary team to address chronic and comorbid conditions and prevent exacerbation or deterioration  6/11/2025 0133 by Jatin Alfaro RN  Outcome: Progressing     Problem: Discharge Planning  Goal: Discharge to home or other facility with appropriate resources  6/11/2025 1140 by Melissa Obrien RN  Outcome: Progressing  Flowsheets (Taken 6/11/2025 0800)  Discharge to home or other facility with appropriate resources:   Identify barriers to discharge with patient and caregiver   Arrange for needed discharge resources and transportation as appropriate   Identify discharge learning needs (meds, wound care, etc)  6/11/2025 0133 by Jatin Alfaro, RN  Outcome: Progressing     Problem: Skin/Tissue Integrity  Goal: Skin integrity remains intact  Description: 1.  Monitor for areas of redness and/or skin breakdown2.  Assess vascular access sites hourly3.  Every 4-6 hours minimum:  Change oxygen saturation probe site4.  Every 4-6 hours:  If on nasal continuous positive airway pressure, respiratory therapy assess nares and determine need for appliance change or resting period  6/11/2025 1140 by Melissa Obrien, RN  Outcome: Progressing  6/11/2025 0133 by Jatin Alfaro RN  Outcome: Progressing  Flowsheets  Taken 6/10/2025 2230  Skin Integrity Remains Intact:   Monitor for areas of redness  and/or skin breakdown   Assess vascular access sites hourly   Every 4-6 hours minimum:  Change oxygen saturation probe site   Turn and reposition as indicated  Taken 6/10/2025 2200  Skin Integrity Remains Intact:   Monitor for areas of redness and/or skin breakdown   Assess vascular access sites hourly   Every 4-6 hours minimum:  Change oxygen saturation probe site   Turn and reposition as indicated     Problem: Safety - Adult  Goal: Free from fall injury  6/11/2025 1140 by Melissa Obrien RN  Outcome: Progressing  6/11/2025 0133 by Jatin Alfaro RN  Outcome: Progressing     Problem: ABCDS Injury Assessment  Goal: Absence of physical injury  6/11/2025 1140 by Melissa Obrien RN  Outcome: Progressing  6/11/2025 0133 by Jatin Alfaro RN  Outcome: Progressing     Problem: Pain  Goal: Verbalizes/displays adequate comfort level or baseline comfort level  6/11/2025 1140 by Melissa Obrien RN  Outcome: Progressing  6/11/2025 0133 by Jatin Alfaro RN  Outcome: Progressing

## 2025-06-11 NOTE — PROGRESS NOTES
Comprehensive High Risk Nutrition Assessment Initial    Type and Reason for Visit:  Initial    Nutrition Recommendations/Plan:   ?need for swallow SLP eval for PO safety recent h/o dysphagia  Cont diet and add Glucerna shakes PO as kenyatta provides 220kcal, 10g pro per carton  May need long-term feeding tube to meet needs   Defer FWF per MD  Also had niacin and vit C def instead of multiple vit/min suppl consider change to DEKAS (verify contains vit C, niacine, etc) - defer to MD  ADD end date to zinc sulfate 220mg x 14 days   May be at cont risk for Refeeding  Check Phos, cont check Phos, Mg, K replete as needed  Check pending WOCN eval to see if with wound healing recent h/o DTI coccyx  Optimize BG control <180 for wound healing  Cont to monitor POC, wt trends, renal fx, lytes, UOP, bowel fx, wound healing and adjust recs as needed     Malnutrition Assessment:  Malnutrition Status:  Severe malnutrition (06/11/25 1337)    Context:  Chronic Illness     Findings of the 6 clinical characteristics of malnutrition:  Energy Intake:  75% or less estimated energy requirements for 1 month or longer  Weight Loss:  Greater than 5% over 1 month     Body Fat Loss:  Severe body fat loss Orbital, Triceps   Muscle Mass Loss:  Severe muscle mass loss Temples (temporalis), Clavicles (pectoralis & deltoids), Calf (gastrocnemius)  Fluid Accumulation:  Unable to assess     Strength:  Not Performed    Nutrition Assessment:    58yo M with recent NSTEMI s/p cardiac cath, weakness; h/o RA, DM1, CKD3, COPD, chronic pancreatitis, cardiomyopathy, AAA, vit B12 def; recent admit May 2025 was eating poorly, SLP/ST, NGT TF to meet nutrition needs, multiple vit/min def. on DM/cardiac diet variable PO intakes from poor to good so far here. appears sig ~5% wt loss x 1 month if correct, recent h/o severe malnutrition and progressive ongoing wt loss ~12% (67kg July 2024) x 11 months although not significant over past 11 months pt appears w/overt signs  malnutrition, prevent refeeding, improve vit/min def, promote wt gain)  Type of Goal: New goal  Previous Goal Met: New Goal    Nutrition Monitoring and Evaluation:   Behavioral-Environmental Outcomes: None Identified  Food/Nutrient Intake Outcomes: Progression of Nutrition, Food and Nutrient Intake, Supplement Intake, Vitamin/Mineral Intake  Physical Signs/Symptoms Outcomes: Biochemical Data, Chewing or Swallowing, Nutrition Focused Physical Findings, Fluid Status or Edema, Skin, Weight    Discharge Planning:    Too soon to determine     Delmis Monroy MS, RD  Clinical Dietitian  E: [email or web address]  O:  [phone]  C:  [phone]

## 2025-06-11 NOTE — PROGRESS NOTES
Patient had HR sustaining in the 130s, no complaints from patient, hospitalist notified, new orders given.

## 2025-06-11 NOTE — PROGRESS NOTES
Verbal shift change report given to Jatin RN (oncoming nurse) by Marck RN (offgoing nurse). Report included the following information Nurse Handoff Report, Index, Adult Overview, Intake/Output, MAR, Recent Results, and Med Rec Status.

## 2025-06-11 NOTE — ACP (ADVANCE CARE PLANNING)
Palliative Care Consult:      The Palliative Care team received a new consult for this patient. The Palliative Care team will conduct a chart review, at this time; and will assess patient and reach out to family at a later time. Thanks for the Palliative consult and allowing the Palliative Care team to assist with caring for this patient, during this hospital stay.       Antonio Read Jr., MSW  Palliative Care   Ph#[phone]

## 2025-06-11 NOTE — PROGRESS NOTES
Returned to care unit post cardiac cath with stent placement to mid LAD x1.  Angiomax gtt infusing without incident,

## 2025-06-11 NOTE — PROGRESS NOTES
Red drainage noted from bandage,  dressing removed, amy pressure to site x15 minutes until hemostasis obtained,  site recovered with 2x2 and tegaderm.  Neuro vasc assessment of right wrist remains WNL splint reapplied . Pt made Tele ready,  repositioned for comfort. Pt resting watching news

## 2025-06-11 NOTE — PLAN OF CARE
Problem: Chronic Conditions and Co-morbidities  Goal: Patient's chronic conditions and co-morbidity symptoms are monitored and maintained or improved  6/11/2025 0133 by Jatin Alfaro RN  Outcome: Progressing  6/10/2025 1324 by Fer Hernandez RN  Outcome: Progressing  Flowsheets (Taken 6/10/2025 0800)  Care Plan - Patient's Chronic Conditions and Co-Morbidity Symptoms are Monitored and Maintained or Improved:   Monitor and assess patient's chronic conditions and comorbid symptoms for stability, deterioration, or improvement   Collaborate with multidisciplinary team to address chronic and comorbid conditions and prevent exacerbation or deterioration   Update acute care plan with appropriate goals if chronic or comorbid symptoms are exacerbated and prevent overall improvement and discharge     Problem: Skin/Tissue Integrity  Goal: Skin integrity remains intact  Description: 1.  Monitor for areas of redness and/or skin breakdown2.  Assess vascular access sites hourly3.  Every 4-6 hours minimum:  Change oxygen saturation probe site4.  Every 4-6 hours:  If on nasal continuous positive airway pressure, respiratory therapy assess nares and determine need for appliance change or resting period  6/11/2025 0133 by Jatin Alfaro RN  Outcome: Progressing  Flowsheets  Taken 6/10/2025 2230  Skin Integrity Remains Intact:   Monitor for areas of redness and/or skin breakdown   Assess vascular access sites hourly   Every 4-6 hours minimum:  Change oxygen saturation probe site   Turn and reposition as indicated  Taken 6/10/2025 2200  Skin Integrity Remains Intact:   Monitor for areas of redness and/or skin breakdown   Assess vascular access sites hourly   Every 4-6 hours minimum:  Change oxygen saturation probe site   Turn and reposition as indicated  6/10/2025 1324 by Fer Hernandez RN  Outcome: Progressing  Flowsheets (Taken 6/10/2025 0800)  Skin Integrity Remains Intact:   Monitor for areas of redness and/or skin

## 2025-06-11 NOTE — PROGRESS NOTES
Packet of teaching info and stent card given and reviewed with pt.  Pt verbalized understanding, and had opportunity to ask questions

## 2025-06-11 NOTE — PROGRESS NOTES
Tr band removed without incident,  site clean, covered with 2x2 and tegaderm. Neuro vasc assessment of right wrist WNL

## 2025-06-11 NOTE — PROGRESS NOTES
Cardiology Progress Note        Patient: Lan Gonzalez Age: 59 y.o. Sex: male    YOB: 1965 Admit Date: 6/9/2025 PCP: Nat Donohue APRN - NP   MRN: [id number]  CSN: [id number]       Chief Complaint:   Chief Complaint   Patient presents with    Chest Pain        Plan:    Lan Gonzalez is a 59 y.o. old male with PMHx of severe protein malnutrition deficiency severe Mal obstruction syndrome post Whipple chronic pancreatitis DVTs on chronic Eliquis GI bleed on PPI rheumatoid arthritis off of Rituxan who was admitted to [facility] 6/9/2025 with NSTEMI.  Severe cardiomyopathy will slowly add GMDT medicaiton as outpatient given his low BP.  Tolerating low dose metoprolol, would change to coreg 3.125 bid.  No other rtitration given low BP. Please have him follow up with Dr Mathis for further treatment of his CAD.    New Issues (last 24hrs) and Status of Active Issues:   NSTEMI, LAD stent 6/11/2025. Conitnue DAPT with plavix and aspirin. Peak troponin 1071.     Impression:     Patient Active Problem List    Diagnosis Date Noted    NSTEMI (non-ST elevated myocardial infarction) ([facility]) 06/09/2025    Severe malnutrition 05/15/2025    Shock ([facility]) 05/14/2025    Moderate malnutrition 05/12/2025    Vitamin deficiency related neuropathy 05/10/2025    Lumbosacral radiculopathy 05/06/2025    B12 deficiency 05/04/2025    Weakness 05/02/2025    Blood loss anemia 05/02/2025    GI bleed 05/02/2025    Stage 3a chronic kidney disease ([facility]) 05/02/2025    Hypoalbuminemia due to protein-calorie malnutrition 05/02/2025    COPD (chronic obstructive pulmonary disease) ([facility])     AAA (abdominal aortic aneurysm)     Severe sepsis ([facility]) 04/24/2025    Peripheral neuropathy 04/24/2025    Cellulitis of right lower extremity 04/24/2025    Stage 3b chronic kidney disease ([facility]) 04/24/2025    Myalgia 04/24/2025    CAD (coronary artery disease) 04/24/2025    Exertional shortness of breath 04/24/2025    Cardiomyopathy ([facility])

## 2025-06-11 NOTE — PROGRESS NOTES
postprocessing was performed.  MIP reconstructions were generated. CT dose reduction was achieved through use of a standardized protocol tailored for this examination and automatic exposure control for dose modulation. FINDINGS: This is a good quality study for evaluation of pulmonary embolism to the first subsegmental arterial level. There are no filling defects within the opacified portions of the pulmonary arterial tree.  The thoracic aorta is normal in caliber.  There is no pericardial effusion. There is no significant lymphadenopathy.  There are small bilateral pleural effusions that are increased since prior study. Adjacent compressive atelectasis is noted. There are no acute fractures or aggressive appearing bony abnormalities. There are postoperative changes of the lower cervical spine.     1.  No evidence of pulmonary embolism. 2.  There are small bilateral pleural effusions that are increased from the prior study. Electronically signed by GAVIN Vega    XR CHEST PORTABLE  Result Date: 6/9/2025  EXAM:  XR CHEST PORTABLE INDICATION: Chest Pain COMPARISON: Portable chest on 5/22/2025 and 5/15/2025. CT chest on 5/15/2025. TECHNIQUE: Semiupright portable chest AP view FINDINGS: Cardiac monitoring wires overlie the thorax. The cardiac silhouette is within normal limits. The pulmonary vasculature is within normal limits. Decreased density of right lower lobe opacity. Near complete resolution of left lung base opacity. No pneumothorax. Bones are osteopenic. Cervical spine hardware is grossly unchanged.     Decreased bibasilar pneumonia versus atelectasis. No pneumothorax. Recommend followup PA and lateral chest views in 8-10 weeks to ensure resolution. Electronically signed by David Medina    XR CHEST 1 VIEW  Result Date: 6/9/2025  EXAM DESCRIPTION: XR CHEST 1  ACCESSION: [id number] COMPLETED DATE/TIME: 6/9/2025 4:06 pm REASON FOR EXAM: fever COMPARISON: 06/05/2025 TECHNIQUE: Single AP portable view of the  in the rectum. No bowel obstruction. Normal appendix. Diverticuli are seen in the colon without surrounding inflammatory changes. Lymph nodes: There are no enlarged abdominopelvic lymph nodes. Peritoneum/Retroperitoneum: There is new small volume ascites. No free air is seen. No retroperitoneal mass or hemorrhage. Vascular: Infrarenal abdominal aortic aneurysm is unchanged from prior measuring up to 4.1 cm in diameter at the level of the inferior mesenteric artery takeoff. Right common iliac artery aneurysm is also unchanged from prior measuring up to 1.9 cm. Bones/Soft Tissues: There are bilateral pars defects at L5 with anterolisthesis of L5 on S1 and associated degenerative changes, similar to prior. No acute osseous abnormality is seen. No abdominal wall hernia.     1. No evidence of retroperitoneal hemorrhage. 2. New small volume ascites and small bilateral pleural effusions. 3. Subtle streaky areas of cortical hypoenhancement at the right kidney and possible urinary bladder wall thickening concerning for urinary tract infection with possible right pyelonephritis. Recommend correlation with urinalysis. 4. Large stool burden in the rectum. 5. Other chronic findings as above. Electronically signed by Kay Salinas    CTA CHEST W WO CONTRAST  Result Date: 5/15/2025  EXAM:  CTA CHEST W WO CONTRAST INDICATION: Shortness of breath. Evaluate for pulmonary embolism. COMPARISON: CT chest 5/2/2025, CT abdomen/pelvis 2/16/2019 TECHNIQUE: Helical thin section chest CT following intravenous administration of nonionic contrast 100 mL of isovue 370 according to departmental PE protocol. Coronal and sagittal reformats were performed. 3D post processing was performed.  CT dose reduction was achieved through the use of a standardized protocol tailored for this examination and automatic exposure control for dose modulation. FINDINGS: PULMONARY ARTERIES: This is a good quality study for the evaluation of pulmonary

## 2025-06-11 NOTE — PROGRESS NOTES
TRANSFER - OUT REPORT:    Verbal report given to Nhan DENNISON on Lan Gonzalez  being transferred to [address] [place] [address] for routine progression of patient care       Report consisted of patient's Situation, Background, Assessment and   Recommendations(SBAR).     Information from the following report(s) Nurse Handoff Report was reviewed with the receiving nurse.           Lines:   Peripheral IV 06/09/25 Left Antecubital (Active)   Site Assessment Clean, dry & intact 06/10/25 1551   Line Status Flushed 06/10/25 1551   Line Care Cap changed;Ports disinfected 06/10/25 0800   Phlebitis Assessment No symptoms 06/10/25 1551   Infiltration Assessment 0 06/10/25 1551   Alcohol Cap Used Yes 06/10/25 1551   Dressing Status Clean, dry & intact 06/10/25 1551   Dressing Type Transparent 06/10/25 0800       Peripheral IV Left Forearm (Active)   Site Assessment Clean, dry & intact 06/10/25 1550   Line Status Flushed 06/10/25 1550   Phlebitis Assessment No symptoms 06/10/25 1550   Infiltration Assessment 0 06/10/25 1550   Alcohol Cap Used Yes 06/10/25 1550   Dressing Status Clean, dry & intact 06/10/25 1550       Peripheral IV Left Forearm (Active)   Site Assessment Clean, dry & intact 06/10/25 1551   Line Status Flushed 06/10/25 1551   Phlebitis Assessment No symptoms 06/10/25 1551   Infiltration Assessment 0 06/10/25 1551   Alcohol Cap Used Yes 06/10/25 1551   Dressing Status Clean, dry & intact 06/10/25 1551        Opportunity for questions and clarification was provided.      Patient transported with:  Monitor and Registered Nurse

## 2025-06-11 NOTE — PROGRESS NOTES
URETEROSCOPIC STONE EXTRECTION WITH HOLMIUM, JJ STENT PLACEMENT WITH C-ARM    UROLOGICAL SURGERY  08/25/2017    CYSTOSCOPY,LEFT RETRO PYELOGRAM,LEFT URETEROSCOPY WITH HOLIMIUM LASER FRAGMENTATION OF STONE AND STONE EXTRACTION,STENT PLACEMENT W/C-ARM     Barriers to Learning/Limitations: none  Compensate with: visual, verbal, tactile, kinesthetic cues/model  Home Situation:  Social/Functional History  Lives With: Spouse  Type of Home: House  Home Layout: Multi-level  Home Access: Stairs to enter with rails  Entrance Stairs - Number of Steps: 1 step  Bathroom Shower/Tub: Tub/Shower unit  Home Equipment: None  Prior Level of Assist for ADLs: Needs assistance  Toileting: Needs assistance  Prior Level of Assist for Homemaking: Needs assistance  Homemaking Responsibilities: No  Prior Level of Assist for Ambulation:  (Independent prior to admission on 5/2)  Prior Level of Assist for Transfers: Needs assistance  Active : No  Critical Behavior:  Overall CognitiveOrientation: WFL  Overall Cognitive Status: WFL      Strength:    Strength: Generally decreased, functional (-3/5 BLE, able to SLR bilaterally, DF L foot 5/5 DF R foot)    Tone & Sensation:   Sensation: Impaired (RLE decreased to light touch, patient reports started prior to 5/2 admission)    Range Of Motion:  AROM: Generally decreased, functional    Functional Mobility:  Bed Mobility:  Bed Mobility Training  Bed Mobility Training: Yes  Rolling: Stand by assistance  Supine to Sit: Stand by assistance  Sit to Supine: Stand by assistance  Scooting: Stand by assistance  Balance:     Balance  Sitting: Intact;Without support    Pain:  Pain level pre-treatment: 0/10   Pain level post-treatment: 0/10   Pain Intervention(s): None indicated  Response to intervention: Nurse notified, See doc flow    Activity Tolerance:   Activity Tolerance: Patient tolerated evaluation without incident;Patient limited by endurance;Patient limited by fatigue    Please refer to the  flowsheet for vital signs taken during this treatment.    After treatment:   []         Patient left in no apparent distress sitting up in chair  [x]         Patient left in no apparent distress in bed  [x]         Call bell left within reach  [x]         Nursing notified  []         Caregiver present  []         Bed alarm activated  []         SCDs applied    COMMUNICATION/EDUCATION:   Patient Education  Education Given To: Patient  Education Provided: Role of Therapy;Plan of Care;Transfer Training;Mobility Training  Education Method: Verbal  Barriers to Learning: None  Education Outcome: Demonstrated understanding;Verbalized understanding    Thank you for this referral.  Venus Ballard, [facility]  Minutes: 19    Eval Complexity: Decision Making: High Complexity

## 2025-06-11 NOTE — WOUND CARE
Inpatient Wound Care Note:     Lan Gonzalez  MEDICAL RECORD NUMBER:  [id number]  AGE: 59 y.o.   GENDER: male  : 1965  TODAY'S DATE:  2025    [] Follow-up visit for   [x] New consult for sacral wound  Seen in  with assistance from no one  Patient admitted from [facility]    PAST MEDICAL HISTORY    Past Medical History:   Diagnosis Date    AAA (abdominal aortic aneurysm)     \"probably seven or eight years ago\" last seen by Cassie Bagley 21    Aneurysm of right common iliac artery 2025    18 mm (2025)    Anxiety     Arthritis     Nat Donohue    B12 nutritional deficiency 2025    severe;  w/ LE weakness.   Hx of whipple procedure    BPH (benign prostatic hyperplasia)     CAD (coronary artery disease)     at least since , Nonobstructive CAD-mild diffuse RCA disease, minimal circumflex disease, moderate mid LAD disease on cardiac cath in , Joselito Raizada    Cardiomyopathy (HCC)     at least since , Joselito Raizada    Cerebral artery occlusion with cerebral infarction (HCC) 2023    Eddy Guallpa    Chronic kidney disease (CKD), active medical management without dialysis, stage 3 (moderate) (HCC)     Ciarajenny Nelson-Post    Chronic pain     lower back    Chronic pancreatitis (HCC)     at least since , previously followed by Alcides Flanagan and Jason Bedoya, had classic whipple procedure 14    COPD (chronic obstructive pulmonary disease) (HCC)     \"probably ten years ago\" not currently followed by any specialists    Diverticulosis     Exercise tolerance finding     \"feels winded\" after one flight of stairs but \"walks the dog multiple times a day\" without SOB oe chest pain as of 23    Gastritis     Gastroparesis     Nat Donohue    Hiatal hernia     Hypercholesterolemia     Nat Donohue    Hypoalbuminemia due to protein-calorie malnutrition 2025    Insulin pump in place     novoEphraim hinds    MDD (major depressive disorder)     Nat Donohue    Migraine        Offloading for Diabetic Foot Ulcers Offloading not required   Dressing Change Due 06/12/25   Wound Length (cm) 1.2 cm   Wound Width (cm) 1.5 cm   Wound Surface Area (cm^2) 1.8 cm^2   Change in Wound Size % (l*w) -80   Wound Assessment Fibrinous;Eschar moist   Drainage Amount Small (< 25%)   Drainage Description Serosanguinous   Odor None   [place]-wound Assessment Intact   Margins Defined edges   Wound Thickness Description not for Pressure Injury Full thickness   Wound Follow Up   Require Follow Up Yes          Wound Care Recommendations:    Santyl daily - see orders    Pressure Prevention and [place] Protocol:  Turn/reposition approximately every 2 hours. Remind independent patients to reposition frequently  Offload heels with heels hanging off end of pillow at all times while in bed.  Sacral Preventive dressing: change as needed every 3-7 days or as needed for soiling. Discontinue if incontinence is frequently soiling dressing.     Skin Barrier cream: to buttocks and sacrum daily and as needed with incontinence care  Moisturize Skin: Apply moisturizing lotion to dry flaky skin  Pressure prevention mattress: Use only flat or fitted sheet and one incontinence pad.     Provider Contact:  [x]No concerns to relay to provider.  []Dicussed wound concerns with provider.    Discussed plan of care with Patient and primary nurse .  Orders:   Daily Dressing Change orders placed on chart Yes   [facility] paperwork filled out and faxed to [facility] no   Wound care orders for facility or home health and care management notified No    Transition of Care: Wound care nurse will follow per protocol and as needed while admitted to hospital.     Edwige Vicente RN

## 2025-06-11 NOTE — PROGRESS NOTES
flow with normal augmentation. Right subclavian vein is normally compressible and demonstrates normal, spontaneous, phasic flow with normal augmentation. Right axillary vein is normally compressible and demonstrates normal, spontaneous, phasic flow with normal augmentation. Right brachial vein is normally compressible and demonstrates normal, spontaneous, phasic flow with normal augmentation. Right radial vein is normally compressible and demonstrates normal, spontaneous, phasic flow with normal augmentation. Right ulnar vein is normally compressible and demonstrates normal, spontaneous, phasic flow with normal augmentation. Right basilic vein is abnormal. An acute thrombus is present. Right cephalic vein is normally compressible and demonstrates normal, spontaneous, phasic flow with normal augmentation. Left jugular vein is normally compressible and demonstrates normal, spontaneous, phasic flow with normal augmentation. Left subclavian vein is normally compressible and demonstrates normal, spontaneous, phasic flow with normal augmentation. Left axillary vein is normally compressible and demonstrates normal, spontaneous, phasic flow with normal augmentation. Left brachial vein is normally compressible and demonstrates normal, spontaneous, phasic flow with normal augmentation. Left radial vein is normally compressible and demonstrates normal, spontaneous, phasic flow with normal augmentation. Left ulnar vein is normally compressible and demonstrates normal, spontaneous, phasic flow with normal augmentation. Left basilic vein is normally compressible and demonstrates normal, spontaneous, phasic flow with normal augmentation.   Left cephalic vein is abnormal. An acute thrombus is present.    XR CHEST 1 VIEW  Result Date: 6/6/2025  EXAM DESCRIPTION: XR CHEST 1 VW ACCESSION: [id number] COMPLETED DATE/TIME: 6/5/2025 9:44 pm REASON FOR EXAM: temp 101.2 REPORT: Heart is upper limits of normal in size small left pleural  occult infection Comparison Nuclear Scan: None. Correlation Imaging Studies: CT HCAP. TECHNIQUE: Anterior and posterior scintigraphic planar images are obtained from head to mid lower extremities, with appropriate delay following reinjection of autologous WBCs labeled with 0.54 mCi indium 111 oxyquinoline. FINDINGS: There is physiologic distribution of radiolabeled WBCs in the liver, spleen and marrow. There is no focal accumulation indicative of infection/abscess.     Negative NM WBC Scan. No source of infection or apparent abscess. Electronically signed by TANNER ENRIQUEZ    XR ABDOMEN (KUB) (SINGLE AP VIEW)  Result Date: 5/20/2025  ABDOMINAL RADIOGRAPHS. 5/20/2025 10:12 AM INDICATION:fecal impaction COMPARISON: 5/19/2025, CT 5/15/2025. TECHNIQUE: AP supine  view/s of the abdomen.     Rectal fecal impaction persists. There is a moderate volume of stool in the colon. The small bowel gas pattern is normal. An NG tube terminates in appropriate position in the stomach. There is left greater than right basilar atelectasis. Electronically signed by Javier Jarrett    XR ABDOMEN (KUB) (SINGLE AP VIEW)  Result Date: 5/19/2025  EXAM:  XR ABDOMEN (KUB) (SINGLE AP VIEW) INDICATION: Small bowel obstruction COMPARISON: 5/15/2025. TECHNIQUE: Supine views of the abdomen. FINDINGS: Gaseous distention of the stomach. Mildly dilated loops of small bowel. Large amount stool in the rectum.     Large stool burden rectum. Mild gaseous distention of the small bowel. Gaseous distention of the stomach. Electronically signed by Jaime Ledesma    CT HEAD WO CONTRAST  Result Date: 5/19/2025  EXAM: CT HEAD WO CONTRAST INDICATION: dysarthria COMPARISON: CT May 7. CONTRAST: None. TECHNIQUE: Unenhanced CT of the head was performed using 5 mm images. Brain and bone windows were generated. Coronal and sagittal reformats. CT dose reduction was achieved through use of a standardized protocol tailored for this examination and automatic exposure

## 2025-06-12 VITALS
RESPIRATION RATE: 16 BRPM | OXYGEN SATURATION: 96 % | SYSTOLIC BLOOD PRESSURE: 100 MMHG | TEMPERATURE: 97.5 F | HEIGHT: 66 IN | HEART RATE: 100 BPM | DIASTOLIC BLOOD PRESSURE: 65 MMHG | BODY MASS INDEX: 20.89 KG/M2 | WEIGHT: 130 LBS

## 2025-06-12 LAB
ALBUMIN SERPL-MCNC: 1.6 G/DL (ref 3.4–5)
ALBUMIN/GLOB SERPL: 0.5 (ref 0.8–1.7)
ALP SERPL-CCNC: 139 U/L (ref 45–117)
ALT SERPL-CCNC: 32 U/L (ref 10–50)
ANION GAP SERPL CALC-SCNC: 8 MMOL/L (ref 3–18)
AST SERPL-CCNC: 41 U/L (ref 10–38)
BILIRUB SERPL-MCNC: 0.3 MG/DL (ref 0.2–1)
BUN SERPL-MCNC: 27 MG/DL (ref 6–23)
BUN/CREAT SERPL: 31 (ref 12–20)
CALCIUM SERPL-MCNC: 9.1 MG/DL (ref 8.5–10.1)
CHLORIDE SERPL-SCNC: 100 MMOL/L (ref 98–107)
CO2 SERPL-SCNC: 27 MMOL/L (ref 21–32)
CREAT SERPL-MCNC: 0.87 MG/DL (ref 0.6–1.3)
ERYTHROCYTE [DISTWIDTH] IN BLOOD BY AUTOMATED COUNT: 17.2 % (ref 11.6–14.5)
GLOBULIN SER CALC-MCNC: 3.4 G/DL (ref 2–4)
GLUCOSE BLD STRIP.AUTO-MCNC: 101 MG/DL (ref 70–110)
GLUCOSE BLD STRIP.AUTO-MCNC: 126 MG/DL (ref 70–110)
GLUCOSE BLD STRIP.AUTO-MCNC: 193 MG/DL (ref 70–110)
GLUCOSE SERPL-MCNC: 120 MG/DL (ref 74–108)
HCT VFR BLD AUTO: 26 % (ref 36–48)
HGB BLD-MCNC: 8 G/DL (ref 13–16)
MAGNESIUM SERPL-MCNC: 2.1 MG/DL (ref 1.6–2.6)
MCH RBC QN AUTO: 26.7 PG (ref 24–34)
MCHC RBC AUTO-ENTMCNC: 30.8 G/DL (ref 31–37)
MCV RBC AUTO: 86.7 FL (ref 78–100)
NRBC # BLD: 0 K/UL (ref 0–0.01)
NRBC BLD-RTO: 0 PER 100 WBC
PLATELET # BLD AUTO: 473 K/UL (ref 135–420)
PMV BLD AUTO: 10.6 FL (ref 9.2–11.8)
POTASSIUM SERPL-SCNC: 4.3 MMOL/L (ref 3.5–5.5)
PROCALCITONIN SERPL-MCNC: 0.2 NG/ML
PROCALCITONIN SERPL-MCNC: 0.21 NG/ML
PROT SERPL-MCNC: 5 G/DL (ref 6.4–8.2)
RBC # BLD AUTO: 3 M/UL (ref 4.35–5.65)
SODIUM SERPL-SCNC: 135 MMOL/L (ref 136–145)
TROPONIN T SERPL HS-MCNC: 1054 NG/L (ref 0–22)
TROPONIN T SERPL HS-MCNC: 1112 NG/L (ref 0–22)
WBC # BLD AUTO: 23.6 K/UL (ref 4.6–13.2)

## 2025-06-12 PROCEDURE — 99223 1ST HOSP IP/OBS HIGH 75: CPT

## 2025-06-12 PROCEDURE — 82962 GLUCOSE BLOOD TEST: CPT

## 2025-06-12 PROCEDURE — 80053 COMPREHEN METABOLIC PANEL: CPT

## 2025-06-12 PROCEDURE — 6370000000 HC RX 637 (ALT 250 FOR IP): Performed by: INTERNAL MEDICINE

## 2025-06-12 PROCEDURE — 84484 ASSAY OF TROPONIN QUANT: CPT

## 2025-06-12 PROCEDURE — 36415 COLL VENOUS BLD VENIPUNCTURE: CPT

## 2025-06-12 PROCEDURE — 97602 WOUND(S) CARE NON-SELECTIVE: CPT

## 2025-06-12 PROCEDURE — 6360000002 HC RX W HCPCS: Performed by: INTERNAL MEDICINE

## 2025-06-12 PROCEDURE — 84145 PROCALCITONIN (PCT): CPT

## 2025-06-12 PROCEDURE — 2700000000 HC OXYGEN THERAPY PER DAY

## 2025-06-12 PROCEDURE — 85027 COMPLETE CBC AUTOMATED: CPT

## 2025-06-12 PROCEDURE — 94640 AIRWAY INHALATION TREATMENT: CPT

## 2025-06-12 PROCEDURE — 83735 ASSAY OF MAGNESIUM: CPT

## 2025-06-12 PROCEDURE — 2500000003 HC RX 250 WO HCPCS: Performed by: INTERNAL MEDICINE

## 2025-06-12 RX ORDER — ASPIRIN 81 MG/1
81 TABLET, CHEWABLE ORAL DAILY
Qty: 30 TABLET | Refills: 3 | Status: SHIPPED | OUTPATIENT
Start: 2025-06-13

## 2025-06-12 RX ORDER — POLYETHYLENE GLYCOL 3350 17 G/17G
17 POWDER, FOR SOLUTION ORAL DAILY PRN
Qty: 527 G | Refills: 1
Start: 2025-06-12 | End: 2025-08-13

## 2025-06-12 RX ORDER — IPRATROPIUM BROMIDE AND ALBUTEROL SULFATE 2.5; .5 MG/3ML; MG/3ML
3 SOLUTION RESPIRATORY (INHALATION) EVERY 4 HOURS PRN
Qty: 360 ML | Refills: 0 | Status: SHIPPED | OUTPATIENT
Start: 2025-06-12

## 2025-06-12 RX ORDER — MIRTAZAPINE 15 MG/1
7.5 TABLET, FILM COATED ORAL NIGHTLY
Status: DISCONTINUED | OUTPATIENT
Start: 2025-06-13 | End: 2025-06-12 | Stop reason: HOSPADM

## 2025-06-12 RX ORDER — LACTOBACILLUS RHAMNOSUS GG 10B CELL
1 CAPSULE ORAL DAILY
Qty: 30 CAPSULE | Refills: 0 | Status: SHIPPED | OUTPATIENT
Start: 2025-06-13

## 2025-06-12 RX ORDER — CLOPIDOGREL BISULFATE 75 MG/1
75 TABLET ORAL DAILY
Qty: 30 TABLET | Refills: 3 | Status: SHIPPED | OUTPATIENT
Start: 2025-06-13

## 2025-06-12 RX ORDER — MIRTAZAPINE 7.5 MG/1
7.5 TABLET, FILM COATED ORAL NIGHTLY
Qty: 30 TABLET | Refills: 3 | Status: SHIPPED | OUTPATIENT
Start: 2025-06-13

## 2025-06-12 RX ORDER — GABAPENTIN 400 MG/1
400 CAPSULE ORAL 3 TIMES DAILY
Qty: 9 CAPSULE | Refills: 0 | Status: SHIPPED | OUTPATIENT
Start: 2025-06-12 | End: 2025-06-15

## 2025-06-12 RX ORDER — TRAMADOL HYDROCHLORIDE 50 MG/1
25 TABLET ORAL EVERY 6 HOURS PRN
Qty: 6 TABLET | Refills: 0 | Status: SHIPPED | OUTPATIENT
Start: 2025-06-12 | End: 2025-06-15

## 2025-06-12 RX ORDER — CARVEDILOL 3.12 MG/1
3.12 TABLET ORAL 2 TIMES DAILY WITH MEALS
Qty: 60 TABLET | Refills: 3 | Status: SHIPPED | OUTPATIENT
Start: 2025-06-12

## 2025-06-12 RX ORDER — ATORVASTATIN CALCIUM 80 MG/1
80 TABLET, FILM COATED ORAL NIGHTLY
Qty: 30 TABLET | Refills: 3 | Status: SHIPPED | OUTPATIENT
Start: 2025-06-12

## 2025-06-12 RX ORDER — ENOXAPARIN SODIUM 100 MG/ML
1 INJECTION SUBCUTANEOUS 2 TIMES DAILY
Qty: 36 ML | Refills: 0
Start: 2025-06-12

## 2025-06-12 RX ORDER — NITROGLYCERIN 0.4 MG/1
0.4 TABLET SUBLINGUAL EVERY 5 MIN PRN
Qty: 25 TABLET | Refills: 3 | Status: SHIPPED | OUTPATIENT
Start: 2025-06-12

## 2025-06-12 RX ADMIN — SODIUM CHLORIDE, PRESERVATIVE FREE 10 ML: 5 INJECTION INTRAVENOUS at 08:52

## 2025-06-12 RX ADMIN — GABAPENTIN 400 MG: 400 CAPSULE ORAL at 08:39

## 2025-06-12 RX ADMIN — Medication 2000 UNITS: at 08:38

## 2025-06-12 RX ADMIN — LACTULOSE 20 G: 10 SOLUTION ORAL at 14:53

## 2025-06-12 RX ADMIN — GABAPENTIN 400 MG: 400 CAPSULE ORAL at 14:53

## 2025-06-12 RX ADMIN — LACTULOSE 20 G: 10 SOLUTION ORAL at 08:39

## 2025-06-12 RX ADMIN — BUDESONIDE 500 MCG: 0.5 INHALANT RESPIRATORY (INHALATION) at 07:54

## 2025-06-12 RX ADMIN — INSULIN LISPRO 1 UNITS: 100 INJECTION, SOLUTION INTRAVENOUS; SUBCUTANEOUS at 11:28

## 2025-06-12 RX ADMIN — ASPIRIN 81 MG: 81 TABLET, CHEWABLE ORAL at 08:39

## 2025-06-12 RX ADMIN — COLLAGENASE SANTYL: 250 OINTMENT TOPICAL at 08:53

## 2025-06-12 RX ADMIN — MIRTAZAPINE 7.5 MG: 15 TABLET, FILM COATED ORAL at 08:37

## 2025-06-12 RX ADMIN — Medication 1 CAPSULE: at 08:38

## 2025-06-12 RX ADMIN — PANCRELIPASE LIPASE, PANCRELIPASE PROTEASE, PANCRELIPASE AMYLASE 10000 UNITS: 5000; 17000; 24000 CAPSULE, DELAYED RELEASE ORAL at 08:38

## 2025-06-12 RX ADMIN — ZINC SULFATE 220 MG (50 MG) CAPSULE 50 MG: CAPSULE at 08:39

## 2025-06-12 RX ADMIN — EMPAGLIFLOZIN 10 MG: 10 TABLET, FILM COATED ORAL at 11:27

## 2025-06-12 RX ADMIN — IPRATROPIUM BROMIDE 0.5 MG: 0.5 SOLUTION RESPIRATORY (INHALATION) at 07:54

## 2025-06-12 RX ADMIN — PANTOPRAZOLE SODIUM 40 MG: 40 TABLET, DELAYED RELEASE ORAL at 14:53

## 2025-06-12 RX ADMIN — POLYETHYLENE GLYCOL 3350 17 G: 17 POWDER, FOR SOLUTION ORAL at 08:45

## 2025-06-12 RX ADMIN — PANTOPRAZOLE SODIUM 40 MG: 40 TABLET, DELAYED RELEASE ORAL at 06:13

## 2025-06-12 RX ADMIN — Medication 100 MG: at 08:39

## 2025-06-12 RX ADMIN — IPRATROPIUM BROMIDE 0.5 MG: 0.5 SOLUTION RESPIRATORY (INHALATION) at 14:30

## 2025-06-12 RX ADMIN — Medication 100 MG: at 08:35

## 2025-06-12 RX ADMIN — PANCRELIPASE LIPASE, PANCRELIPASE PROTEASE, PANCRELIPASE AMYLASE 10000 UNITS: 5000; 17000; 24000 CAPSULE, DELAYED RELEASE ORAL at 11:27

## 2025-06-12 RX ADMIN — Medication 1 TABLET: at 08:38

## 2025-06-12 RX ADMIN — CLOPIDOGREL BISULFATE 75 MG: 75 TABLET, FILM COATED ORAL at 08:35

## 2025-06-12 RX ADMIN — INSULIN GLARGINE 12 UNITS: 100 INJECTION, SOLUTION SUBCUTANEOUS at 08:34

## 2025-06-12 RX ADMIN — ENOXAPARIN SODIUM 60 MG: 100 INJECTION SUBCUTANEOUS at 08:32

## 2025-06-12 ASSESSMENT — PAIN SCALES - WONG BAKER: WONGBAKER_NUMERICALRESPONSE: NO HURT

## 2025-06-12 ASSESSMENT — PAIN SCALES - GENERAL: PAINLEVEL_OUTOF10: 0

## 2025-06-12 NOTE — CONSULTS
statements on recent admission.    Patient states he is accepting of all resuscitative interventions for cardiac and/or respiratory arrest. He understands this potentially includes chest compressions, shock, ACLS medications, and intubation/ventilation.    Patient states a primary goal of care is finding the cause and potential treatments for his underlying health problem, which is negatively affecting his functionality and quality of life. He is unable to ambulate, has a poor appetite, and experiences generalized neuropathy causing weakness, pain, anxiety, fatigue, and poor digestion/absorption. I encouraged him to prioritize a rheumatology follow-up.       Please refer to Palliative Medicine ACP notes for further details.    PALLIATIVE ASSESSMENT:      Palliative Performance Scale (PPS):  PPS: 40    Modified ESAS:  Modified-[place] Symptom Assessment Scale (ESAS)  Tiredness Score: 9  Depression Score: 3  Pain Score: 5  Anxiety Score: 3  Appetite Score: 9  Dyspnea Score: 3  Wellbeing Score: 9    Clinical Pain Assessment (nonverbal scale for severity on nonverbal patients):   Clinical Pain Assessment  Severity: 5  Location: BLE  Character: sharp  Duration: sudden  Frequency: occasional       Vital Signs: Blood pressure 100/65, pulse 100, temperature 97.5 °F (36.4 °C), temperature source Oral, resp. rate 16, height 1.676 m (5' 5.98\"), weight 59 kg (130 lb), SpO2 96%.    PHYSICAL ASSESSMENT:   General: [x] Oriented x4  [] Well appearing  [] Intubated  [x]Ill appearing  []Other:  Mental Status: [x] Normal mental status exam  [] Drowsy  [] Confused  []Other:  Cardiovascular: [x] Regular rate/rhythm  [] Arrhythmia  [x] Other: stent placed after NSTEMI  Chest: [] Effort normal  []Lungs clear  [] Respiratory distress  []Tachypnea  [x] Other: baseline dyspnea on 2 LPM, accessory muscle use, increased WOB  Abdomen: [] Soft/non-tender  [] Normal appearance  [] Distended  [] Ascites  [x] Other: poor appetite, s/p  this service date was __75__ minutes which was spent performing a face-to-face encounter and personally completing the provider-level activities documented in the note. This includes time spent prior to the visit and after the visit in direct care of the patient. This time does not include time spent in any separately reportable services.    Electronically signed by   DEREK Oden CNP  Palliative Care Team  on 6/12/2025 at 5:16 PM

## 2025-06-12 NOTE — PROGRESS NOTES
met patient at bedside for an initial visit.     Patient said he was okay. He asked that  return at another time. Patient thanked  for the visit.     provided presence and support for patient.    Chaplains will provide follow-up care for patient and family as needed.    Spiritual Health History and Assessment/Progress Note  [facility]    Spiritual/Emotional Needs,  ,  ,      Name: Lan Gonzalez MRN: [id number]    Age: 59 y.o.     Sex: male   Language: English   Mu-ism: Druze   NSTEMI (non-ST elevated myocardial infarction) (HCC)     Date: 6/12/2025            Total Time Calculated: 5 min              Spiritual Assessment began in [address] CARDIAC/MEDICAL            Encounter Overview/Reason: Spiritual/Emotional Needs  Service Provided For: Patient    Herlinda, Belief, Meaning:   Patient identifies as spiritual, is connected with a herlinda tradition or spiritual practice, and has beliefs or practices that help with coping during difficult times  Family/Friends No family/friends present      Importance and Influence:  Patient has spiritual/personal beliefs that influence decisions regarding their health  Family/Friends No family/friends present    Community:  Patient is connected with a spiritual community and feels well-supported. Support system includes: Spouse/Partner and Herlinda Community  Family/Friends No family/friends present    Assessment and Plan of Care:     Patient Interventions include: Facilitated expression of thoughts and feelings  Family/Friends Interventions include: No family/friends present    Patient Plan of Care: No spiritual needs identified for follow-up  Family/Friends Plan of Care: No family/friends present    Electronically signed by Chaplain Kenneth on 6/12/2025 at 12:50 PM

## 2025-06-12 NOTE — PROGRESS NOTES
endplate marrow edema and fatty change The conus medullaris terminates at L1-L2. Signal and caliber of the distal spinal cord are within normal limits. There is no pathologic intrathecal enhancement. No spinal canal stenosis. Multilevel foraminal stenosis, most severe at L5-S1 on the right. Diffuse soft tissue edema and swelling. Diffuse paraspinal muscle edema and swelling. Stable 4.3 cm infrarenal abdominal aortic aneurysm (1-24). PELVIS: Bone marrow: No fracture, dislocation, or marrow replacing process. No evidence of stress reaction or periostitis. Joint fluid: None. Tendons: Intact. Muscles: Diffuse patchy edema signal. Neurovascular bundles: Within normal limits. Articular cartilage: Degenerative changes. Soft tissues: Diffuse soft tissue edema and swelling. No definite mass. Large right trochanteric bursal effusion. Other: Hennessy catheter. Bladder wall thickening with intraluminal gas. Bilateral hydroceles. Large rectal stool ball.      1.  Diffuse soft tissue and muscle edema and swelling throughout the lumbar spine and pelvis; findings can be seen with volume overload, neuropathic change, cellulitis, and/or myositis, correlate clinically. No definite evidence of abscess or osteomyelitis. 2.  Large right trochanteric bursal effusion/bursitis. 3.  No lumbar spinal canal stenosis. Multilevel lumbar foraminal stenosis, most severe at L5-S1 on the right. 4.  Bladder wall thickening and gas, correlate for cystitis. 5.  Incidental/nonemergent findings as above. 22Q Electronically signed by EMMETT RODRIGUEZ    MRI PELVIS W WO CONTRAST  Result Date: 5/21/2025  EXAM: MRI PELVIS W WO CONTRAST, EXAM: MRI LUMBAR SPINE W WO CONTRAST INDICATION: work up for sepsis. Effusion, right elbow / Reason for exam:->work up for sepsis COMPARISON: CT abdomen pelvis 5/15/2025, MRI entire spine 5/3/2025 TECHNIQUE: Multiplanar, multisequence pre and postcontrast MRI of the lumbar spine and pelvis. CONTRAST: 15 mL of MultiHance. FINDINGS:  portion of this note were created using voice recognition function, syntax which may have escaped proofreading.    Carlo Salcedo MD

## 2025-06-12 NOTE — WOUND CARE
Inpatient Wound Care Note:     Lan Gonzalez  MEDICAL RECORD NUMBER:  [id number]  AGE: 59 y.o.   GENDER: male  : 1965  TODAY'S DATE:  2025    [x] Follow-up visit for sacral wound  [] New consult for   Seen in  with assistance from no one  Patient admitted from [facility]    PAST MEDICAL HISTORY    Past Medical History:   Diagnosis Date    AAA (abdominal aortic aneurysm)     \"probably seven or eight years ago\" last seen by Cassie Bagley 21    Aneurysm of right common iliac artery 2025    18 mm (2025)    Anxiety     Arthritis     Nat Donohue    B12 nutritional deficiency 2025    severe;  w/ LE weakness.   Hx of whipple procedure    BPH (benign prostatic hyperplasia)     CAD (coronary artery disease)     at least since , Nonobstructive CAD-mild diffuse RCA disease, minimal circumflex disease, moderate mid LAD disease on cardiac cath in , Joselito Raizada    Cardiomyopathy (HCC)     at least since , Joselito Raizada    Cerebral artery occlusion with cerebral infarction (HCC) 2023    Eddy Guallpa    Chronic kidney disease (CKD), active medical management without dialysis, stage 3 (moderate) (HCC)     Ciarajenny Nelson-Post    Chronic pain     lower back    Chronic pancreatitis (HCC)     at least since , previously followed by Alcides Flanagan and Jason Bedoya, had classic whipple procedure 14    COPD (chronic obstructive pulmonary disease) (HCC)     \"probably ten years ago\" not currently followed by any specialists    Diverticulosis     Exercise tolerance finding     \"feels winded\" after one flight of stairs but \"walks the dog multiple times a day\" without SOB oe chest pain as of 25    Gastritis     Gastroparesis     Nat Donohue    Hiatal hernia     Hypercholesterolemia     Nat Donohue    Hypoalbuminemia due to protein-calorie malnutrition 2025    Insulin pump in place     novoEphraim hinds    MDD (major depressive disorder)     Nat Donohue    Migraine

## 2025-06-12 NOTE — PLAN OF CARE
Problem: Chronic Conditions and Co-morbidities  Goal: Patient's chronic conditions and co-morbidity symptoms are monitored and maintained or improved  6/12/2025 0907 by Melissa Obrien RN  Outcome: Progressing  Flowsheets (Taken 6/12/2025 0800)  Care Plan - Patient's Chronic Conditions and Co-Morbidity Symptoms are Monitored and Maintained or Improved:   Monitor and assess patient's chronic conditions and comorbid symptoms for stability, deterioration, or improvement   Collaborate with multidisciplinary team to address chronic and comorbid conditions and prevent exacerbation or deterioration   Update acute care plan with appropriate goals if chronic or comorbid symptoms are exacerbated and prevent overall improvement and discharge  6/11/2025 2152 by France Ballesteros RN  Outcome: Progressing     Problem: Discharge Planning  Goal: Discharge to home or other facility with appropriate resources  6/12/2025 0907 by Melissa Obrien RN  Outcome: Progressing  Flowsheets (Taken 6/12/2025 0800)  Discharge to home or other facility with appropriate resources:   Identify barriers to discharge with patient and caregiver   Arrange for needed discharge resources and transportation as appropriate   Identify discharge learning needs (meds, wound care, etc)  6/11/2025 2153 by France Ballesteros RN  Outcome: Progressing  6/11/2025 2152 by France Ballesteros RN  Outcome: Progressing     Problem: Skin/Tissue Integrity  Goal: Skin integrity remains intact  Description: 1.  Monitor for areas of redness and/or skin breakdown2.  Assess vascular access sites hourly3.  Every 4-6 hours minimum:  Change oxygen saturation probe site4.  Every 4-6 hours:  If on nasal continuous positive airway pressure, respiratory therapy assess nares and determine need for appliance change or resting period  6/12/2025 0907 by Melissa Obrien, RN  Outcome: Progressing  6/11/2025 2153 by France Ballesteros RN  Outcome: Progressing  6/11/2025 2152 by

## 2025-06-12 NOTE — ACP (ADVANCE CARE PLANNING)
Advance Care Planning     Palliative Team Advance Care Planning (ACP) Conversation    Date of Conversation: 06/12/25    Individuals present for the conversation: Patient, Hazel Yancey NP , Asiya Adams RN     ACP documents on file prior to discussion:  -None    Healthcare Decision Maker:    Primary Decision Maker: Valeria Gonzalez M - Spouse - [phone]     Conversation Summary    Seen today in Rm 341, along with Hazel Yancey NP. Patient lying in bed supine with head of bed raised.  AAOX4. Respirations even and unlabored on 2LO2 NC. Able to feed self. Approximately 25% of plate consumed.     Arrived to ED from [facility] with SOB and chest pain.      Admitted for NSTEMI.   Stent placed in mid LAD 6/10/2025.      PMH:  COPY/PASTE DR SHANTEL DELGADILLO, 6/9/2025: \"CAD, PAD, gerd/gastritis, AAA, hx of CVA, HLD and SUNG, CKD stage 3, chronic pancreatitis s/p Whipple procedure (pancreatojejunostomy with gastrojejunostomy, cholecystectomy, and hepaticojejunostomy in 2014),, DM type 1, COPD, hx of seizure, major depressive disorder, and Rheumatoid arthritis (former rituxan) who was admitted to [facility] 5/02/25 with bilateral LE weakness, N/V and significant weight loss. He was found to have sepsis with pyelitis/UTI, severe anemia (Hgb 5.6) w/ GI bleed, iron deficiency (sat 5%) and severe vitamin B12 deficiency (<150), undetectable Vit D.\"     Prior to admissions patient lived at home with wife. He would make morning coffee and walk his dog daily.   Has been bed bound since last admission. Patient states, \"I am languishing\" describing his legs feeling heavy and weakness in arms. He reports a weak swallow.     Patient has not seen Rheumatologist since Covid, about 5 years. He had an appointment scheduled but was hospitalized.     Goals of Care Discussion: FULL CODE vs DNR/DNI. Described four possible   interventions including CPR, shock, medication and intubation. Pt verbalized choice to remain FULL CODE.    Patient stated, I have

## 2025-06-12 NOTE — PLAN OF CARE
Problem: Chronic Conditions and Co-morbidities  Goal: Patient's chronic conditions and co-morbidity symptoms are monitored and maintained or improved  6/11/2025 2152 by France Ballesteros RN  Outcome: Progressing  6/11/2025 1140 by Melissa Obrien RN  Outcome: Progressing  Flowsheets (Taken 6/11/2025 0800)  Care Plan - Patient's Chronic Conditions and Co-Morbidity Symptoms are Monitored and Maintained or Improved:   Monitor and assess patient's chronic conditions and comorbid symptoms for stability, deterioration, or improvement   Update acute care plan with appropriate goals if chronic or comorbid symptoms are exacerbated and prevent overall improvement and discharge   Collaborate with multidisciplinary team to address chronic and comorbid conditions and prevent exacerbation or deterioration     Problem: Discharge Planning  Goal: Discharge to home or other facility with appropriate resources  6/11/2025 2153 by France Ballesteros RN  Outcome: Progressing  6/11/2025 2152 by France Ballesteros RN  Outcome: Progressing  6/11/2025 1140 by Melissa Obrien RN  Outcome: Progressing  Flowsheets (Taken 6/11/2025 0800)  Discharge to home or other facility with appropriate resources:   Identify barriers to discharge with patient and caregiver   Arrange for needed discharge resources and transportation as appropriate   Identify discharge learning needs (meds, wound care, etc)     Problem: Skin/Tissue Integrity  Goal: Skin integrity remains intact  Description: 1.  Monitor for areas of redness and/or skin breakdown2.  Assess vascular access sites hourly3.  Every 4-6 hours minimum:  Change oxygen saturation probe site4.  Every 4-6 hours:  If on nasal continuous positive airway pressure, respiratory therapy assess nares and determine need for appliance change or resting period  6/11/2025 2153 by France Ballesteros RN  Outcome: Progressing  6/11/2025 2152 by France Ballesteros RN  Outcome: Progressing  6/11/2025 1140 by  Obrien, Melissa, RN  Outcome: Progressing     Problem: Pain  Goal: Verbalizes/displays adequate comfort level or baseline comfort level  6/11/2025 2153 by France Ballesteros RN  Outcome: Progressing  6/11/2025 2152 by France Ballesteros RN  Outcome: Progressing  6/11/2025 1140 by Melissa Obrien RN  Outcome: Progressing     Problem: Cardiovascular - Adult  Goal: Maintains optimal cardiac output and hemodynamic stability  Outcome: Progressing  Goal: Absence of cardiac dysrhythmias or at baseline  Outcome: Progressing     Problem: Safety - Adult  Goal: Free from fall injury  6/11/2025 2152 by France Ballesteros RN  Outcome: Progressing  6/11/2025 1140 by Melissa Obrien RN  Outcome: Progressing

## 2025-06-12 NOTE — PLAN OF CARE
Discharge Instructions from  In Patient Wound Care Nurse at [facility]   [address]   [address]  [place] [address]  [phone] Fax [phone]    NAME:  Lan Gonzalez  YOB: 1965  MEDICAL RECORD NUMBER:  [id number]  DATE:  6/9/2025    Wound Cleansing:   Do not scrub or use excessive force.  Cleanse wound prior to applying a clean dressing with:  [x] Normal Saline         Topical Treatments:  Do not apply lotions, creams, or ointments to wound bed unless directed.        Dressings:           Wound Location sacrum   [x] Apply Primary Dressing:       [x] Santyl with Moisten saline gauze       [x] Cover and Secure with:     [x] Mepilex or other skin safe cover dressing   Avoid contact of tape with skin.  [x] Change dressing: [x] Daily        Pressure Relief:  [x] When sitting, shift position or do seat lifts every 15 minutes.  [x] Wheelchair cushion   [x] Turn every 2 hours when in bed.  Avoid position directing pressure on wound site.  Limit side lying to 30 degree tilt.  Limit HOB elevation to 30 degrees.     Activity:  [x] Activity as tolerated:              Return Appointment:  [x]Call [facility] at [phone] for follow up appointment after discharge from facility.         Electronically signed Edwige Vicente RN on 6/12/2025 at 2:17 PM

## 2025-06-12 NOTE — DISCHARGE SUMMARY
DISCHARGE SUMMARY  [facility]      Patient: Lan Gonzalez               Sex: male          DOA: 6/9/2025    YOB: 1965      Age:  59 y.o.        LOS:  LOS: 3 days                Admit Date: 6/9/2025  Discharge Date: 6/12/2025    Admission Diagnoses: NSTEMI (non-ST elevated myocardial infarction) ([facility]) [I21.4]    Discharge Diagnoses:    Hospital Problems           Last Modified POA    * (Principal) NSTEMI (non-ST elevated myocardial infarction) ([facility]) 6/9/2025 Yes    RA (rheumatoid arthritis) ([facility]) 6/10/2025 Yes    Type 1 diabetes mellitus ([facility]) 6/10/2025 Yes    Stage 3b chronic kidney disease ([facility]) 6/10/2025 Yes    Cardiomyopathy ([facility]) 6/10/2025 Yes    COPD (chronic obstructive pulmonary disease) ([facility]) 6/10/2025 Yes    Overview Signed 5/2/2025  3:20 PM by Leon Rodgers PA-C   \"probably ten years ago\" not currently followed by any specialists         AAA (abdominal aortic aneurysm) 6/10/2025 Yes    Overview Signed 5/2/2025  3:20 PM by Leon Rodgers PA-C   \"probably seven or eight years ago\" last seen by Cassie Bagley 6/2/23         Stage 3a chronic kidney disease ([facility]) 6/10/2025 Yes    Hypoalbuminemia due to protein-calorie malnutrition 6/10/2025 Yes    B12 deficiency 6/10/2025 Yes    Severe malnutrition 6/11/2025 Yes        Discharge Condition: Good      HOSPITAL COURSE:      Lan Gonzalez is a 59 y.o. old male with PMHx of coronary artery disease chronic kidney disease stage III hypertension hyperlipidemia severe malabsorption multiple vitamin deficiencies COPD stage II decub malnutrition rheumatoid arthritis GI bleed anemia chronic leukocytosis chronic pancreatitis post Whipple who was admitted 6/9/2025 with   NSTEMI after having chest pain              NSTEMI  Patient was transition from heparin drip to Lovenox and started on goal-directed medical therapy as blood pressure permitting he underwent cardiac catheterization and was found to have 50% left main and a stent placed in the

## 2025-06-12 NOTE — CARE COORDINATION
Patient is stable for DC today. Plan is to retrun to [facility] and transport has been set up for 1630. The nurse and provider are to call [phone] for report.    [facility]   Address: [address], [place] [address]

## 2025-06-18 LAB — ECHO BSA: 1.66 M2

## 2025-07-10 ENCOUNTER — TELEPHONE (OUTPATIENT)
Facility: HOSPITAL | Age: 60
End: 2025-07-10

## 2025-07-10 NOTE — TELEPHONE ENCOUNTER
CR contacted pt to discuss rehab program. Cr was informed pt  on 2025.    Socorro Parra 07/10/2025 @ 1:47 PM

## (undated) DEVICE — GLIDESHEATH SLENDER STAINLESS STEEL KIT: Brand: GLIDESHEATH SLENDER

## (undated) DEVICE — DUAL LUMEN URETERAL CATHETER

## (undated) DEVICE — CYSTO PACK: Brand: MEDLINE INDUSTRIES, INC.

## (undated) DEVICE — CATHETER IV 22GA L1IN BLU POLYUR STR HUB RADPQ PROTCT +

## (undated) DEVICE — CATHETER GUID 6FR 0.071IN COR STD JUDKINS L 3 MID

## (undated) DEVICE — RADIFOCUS GLIDEWIRE: Brand: GLIDEWIRE

## (undated) DEVICE — CATHETER URET L70CM OD6FR OPN END FLEXIMA

## (undated) DEVICE — Device

## (undated) DEVICE — (D)SYR 10ML 1/5ML GRAD NSAF -- PKGING CHANGE USE ITEM 338027

## (undated) DEVICE — SOLUTION IRRIG 3000ML 0.9% SOD CHL FLX CONT 0797208] ICU MEDICAL INC]

## (undated) DEVICE — Y-TYPE TUR/BLADDER IRRIGATION SET, REGULATING CLAMP

## (undated) DEVICE — SENSOR PLSE OXMTR AD CBL L36IN ADH FRM FIT SPO2 DISP

## (undated) DEVICE — SOL IRR SOD CHL 0.9% TITAN XL CNTNR 3000ML

## (undated) DEVICE — ENDO CARRY-ON PROCEDURE KIT INCLUDES ENZYMATIC SPONGE, GAUZE, BIOHAZARD LABEL, TRAY, LUBRICANT, DIRTY SCOPE LABEL, WATER LABEL, TRAY, DRAWSTRING PAD, AND DEFENDO 4-PIECE KIT.: Brand: ENDO CARRY-ON PROCEDURE KIT

## (undated) DEVICE — SOLUTION IRRIG 3000ML 0.9% SOD CHL USP UROMATIC PLAS CONT

## (undated) DEVICE — SEAL ENDOSCP INSTR 7FR BX SELF SEAL

## (undated) DEVICE — GDWIRE 3CM FLX-TIP 0.038X150CM -- BX/5 SENSOR

## (undated) DEVICE — STERILE LATEX POWDER-FREE SURGICAL GLOVESWITH NITRILE COATING: Brand: PROTEXIS

## (undated) DEVICE — BLUNTFILL: Brand: MONOJECT

## (undated) DEVICE — DRAPE,ANGIO,BRACH,STERILE,38X44: Brand: MEDLINE

## (undated) DEVICE — PACK,CYSTOSCOPY,PK III,AURORA: Brand: MEDLINE

## (undated) DEVICE — MASTISOL ADHESIVE LIQ 2/3ML

## (undated) DEVICE — KENDALL RADIOLUCENT FOAM MONITORING ELECTRODE RECTANGULAR SHAPE: Brand: KENDALL

## (undated) DEVICE — DRAPE EP LT SUBCLAV ENTRY SHLD SORBX

## (undated) DEVICE — CATHETER PH SUCT 14FR

## (undated) DEVICE — STERILE MEDICINE CUP 2 OZ KIT: Brand: CARDINAL HEALTH

## (undated) DEVICE — KENDALL SCD EXPRESS SLEEVES, KNEE LENGTH, MEDIUM: Brand: KENDALL SCD

## (undated) DEVICE — 200 MICRON FIBER

## (undated) DEVICE — INFLATION DEVICE: Brand: ENCORE 26

## (undated) DEVICE — WRISTBAND ID AD W2.5XL9.5CM RED VYN ADH CLSR UNI-PRINT

## (undated) DEVICE — BAG PRESSURE INFUSION 3 W STOPCOCK 3000 CC PREMIERPRO DISP

## (undated) DEVICE — URETERAL ACCESS SHEATH SET: Brand: NAVIGATOR HD

## (undated) DEVICE — (D)STRIP SKN CLSR 0.5X4IN WHT --

## (undated) DEVICE — SPLINT WR VELC FOAM NEUT POS DISP FOR RAD ART ACC SFT STRP

## (undated) DEVICE — SYRINGE MED 50ML LUERSLIP TIP

## (undated) DEVICE — DEVON™ KNEE AND BODY STRAP 60" X 3" (1.5 M X 7.6 CM): Brand: DEVON

## (undated) DEVICE — CANNULA CUSH AD W/ 14FT TBG

## (undated) DEVICE — TRAP MUCUS SPECIMEN 40ML -- MEDICHOICE

## (undated) DEVICE — SEAL INSTR GRN SIL SELF SEAL TIP FOR UP TO 5FR ACC

## (undated) DEVICE — Device: Brand: PROWATER

## (undated) DEVICE — SYRINGE 50ML E/T

## (undated) DEVICE — TRAY PREP DRY W/ PREM GLV 2 APPL 6 SPNG 2 UNDPD 1 OVERWRAP

## (undated) DEVICE — GUIDEWIRE ENDOSCP L150CM DIA0.035IN TIP 3CM PTFE NIT

## (undated) DEVICE — SYR LR LCK 1ML GRAD NSAF 30ML --

## (undated) DEVICE — GARMENT,MEDLINE,DVT,INT,CALF,MED, GEN2: Brand: MEDLINE

## (undated) DEVICE — STRAP,POSITIONING,KNEE/BODY,FOAM,4X60": Brand: MEDLINE

## (undated) DEVICE — RADIFOCUS OPTITORQUE ANGIOGRAPHIC CATHETER: Brand: OPTITORQUE

## (undated) DEVICE — ELECTRODES PAIR QUIK COMBO CONN RTS DEFIB

## (undated) DEVICE — SYRINGE MED 5ML STD CLR PLAS LUERLOCK TIP N CTRL DISP

## (undated) DEVICE — VALVE HEMSTAS GUIDEWIRE INSRTN TOOL TORQUE DEV VSI GRDIAN II

## (undated) DEVICE — NITINOL STONE RETRIEVAL BASKET: Brand: ZERO TIP

## (undated) DEVICE — URETEROSCOPE FLX 100 DEG FLD VW SHFT L 670 MM WORKING

## (undated) DEVICE — EJECTOR SALIVA 6 IN FLX CLR

## (undated) DEVICE — SYRINGE MEDICAL 3ML CLEAR PLASTIC STANDARD NON CONTROL LUERLOCK TIP DISPOSABLE

## (undated) DEVICE — MOUTHPIECE ENDOSCP L CTRL OPN AND SIDE PORTS DISP

## (undated) DEVICE — URETERAL ACCESS SHEATH SET: Brand: NAVIGATOR

## (undated) DEVICE — TRAP,MUCUS SPECIMEN,40CC: Brand: MEDLINE

## (undated) DEVICE — STOPCOCK TRNSDUC 500PSI 3 W ROT M LUER LT BLU OFF HNDL R

## (undated) DEVICE — SOLUTION IV 1000ML 0.9% SOD CHL PH 5 INJ USP VIAFLX PLAS

## (undated) DEVICE — SPONGE GZ W4XL4IN COT 12 PLY TYP VII WVN C FLD DSGN

## (undated) DEVICE — TOURNIQUET PHLEB W1XL18IN BLU FLAT RL AND BND REUSE FOR IV

## (undated) DEVICE — DEVICE INFL W/HEM VLV TORQUE

## (undated) DEVICE — BAND COMPR L24CM REG CLR PLAS HEMSTAT EXT HK AND LOOP RETEN

## (undated) DEVICE — TUBING, SUCTION, 1/4" X 12', STRAIGHT: Brand: MEDLINE

## (undated) DEVICE — DRAPE TWL SURG 16X26IN BLU ORB04] ALLCARE INC]